# Patient Record
Sex: MALE | Race: WHITE | NOT HISPANIC OR LATINO | Employment: OTHER | URBAN - METROPOLITAN AREA
[De-identification: names, ages, dates, MRNs, and addresses within clinical notes are randomized per-mention and may not be internally consistent; named-entity substitution may affect disease eponyms.]

---

## 2017-01-09 ENCOUNTER — ALLSCRIPTS OFFICE VISIT (OUTPATIENT)
Dept: OTHER | Facility: OTHER | Age: 77
End: 2017-01-09

## 2017-01-09 ENCOUNTER — TRANSCRIBE ORDERS (OUTPATIENT)
Dept: LAB | Facility: CLINIC | Age: 77
End: 2017-01-09

## 2017-01-09 ENCOUNTER — APPOINTMENT (OUTPATIENT)
Dept: LAB | Facility: CLINIC | Age: 77
End: 2017-01-09
Payer: COMMERCIAL

## 2017-01-09 DIAGNOSIS — E11.8 TYPE 2 DIABETES MELLITUS WITH COMPLICATIONS (HCC): ICD-10-CM

## 2017-01-09 DIAGNOSIS — E11.65 TYPE 2 DIABETES MELLITUS WITH HYPERGLYCEMIA (HCC): ICD-10-CM

## 2017-01-09 LAB
ALBUMIN SERPL BCP-MCNC: 3.8 G/DL (ref 3.5–5)
ALP SERPL-CCNC: 40 U/L (ref 46–116)
ALT SERPL W P-5'-P-CCNC: 23 U/L (ref 12–78)
ANION GAP SERPL CALCULATED.3IONS-SCNC: 8 MMOL/L (ref 4–13)
AST SERPL W P-5'-P-CCNC: 17 U/L (ref 5–45)
BACTERIA UR QL AUTO: ABNORMAL /HPF
BASOPHILS # BLD AUTO: 0.03 THOUSANDS/ΜL (ref 0–0.1)
BASOPHILS NFR BLD AUTO: 1 % (ref 0–1)
BILIRUB SERPL-MCNC: 0.55 MG/DL (ref 0.2–1)
BILIRUB UR QL STRIP: ABNORMAL
BUN SERPL-MCNC: 17 MG/DL (ref 5–25)
CALCIUM SERPL-MCNC: 9.5 MG/DL (ref 8.3–10.1)
CHLORIDE SERPL-SCNC: 105 MMOL/L (ref 100–108)
CHOLEST SERPL-MCNC: 144 MG/DL (ref 50–200)
CLARITY UR: CLEAR
CO2 SERPL-SCNC: 29 MMOL/L (ref 21–32)
COLOR UR: ABNORMAL
CREAT SERPL-MCNC: 0.95 MG/DL (ref 0.6–1.3)
CREAT UR-MCNC: 311 MG/DL
EOSINOPHIL # BLD AUTO: 0.08 THOUSAND/ΜL (ref 0–0.61)
EOSINOPHIL NFR BLD AUTO: 1 % (ref 0–6)
ERYTHROCYTE [DISTWIDTH] IN BLOOD BY AUTOMATED COUNT: 17.5 % (ref 11.6–15.1)
EST. AVERAGE GLUCOSE BLD GHB EST-MCNC: 154 MG/DL
GFR SERPL CREATININE-BSD FRML MDRD: >60 ML/MIN/1.73SQ M
GLUCOSE SERPL-MCNC: 111 MG/DL (ref 65–140)
GLUCOSE UR STRIP-MCNC: NEGATIVE MG/DL
HBA1C MFR BLD: 7 % (ref 4.2–6.3)
HCT VFR BLD AUTO: 38.7 % (ref 36.5–49.3)
HDLC SERPL-MCNC: 60 MG/DL (ref 40–60)
HGB BLD-MCNC: 12.9 G/DL (ref 12–17)
HGB UR QL STRIP.AUTO: ABNORMAL
KETONES UR STRIP-MCNC: ABNORMAL MG/DL
LEUKOCYTE ESTERASE UR QL STRIP: NEGATIVE
LYMPHOCYTES # BLD AUTO: 1.3 THOUSANDS/ΜL (ref 0.6–4.47)
LYMPHOCYTES NFR BLD AUTO: 24 % (ref 14–44)
MCH RBC QN AUTO: 36.3 PG (ref 26.8–34.3)
MCHC RBC AUTO-ENTMCNC: 33.3 G/DL (ref 31.4–37.4)
MCV RBC AUTO: 109 FL (ref 82–98)
MICROALBUMIN UR-MCNC: 139 MG/L (ref 0–20)
MICROALBUMIN/CREAT 24H UR: 45 MG/G CREATININE (ref 0–30)
MONOCYTES # BLD AUTO: 0.42 THOUSAND/ΜL (ref 0.17–1.22)
MONOCYTES NFR BLD AUTO: 8 % (ref 4–12)
NEUTROPHILS # BLD AUTO: 3.68 THOUSANDS/ΜL (ref 1.85–7.62)
NEUTS SEG NFR BLD AUTO: 66 % (ref 43–75)
NITRITE UR QL STRIP: NEGATIVE
NON-SQ EPI CELLS URNS QL MICRO: ABNORMAL /HPF
NONHDLC SERPL-MCNC: 84 MG/DL
NRBC BLD AUTO-RTO: 0 /100 WBCS
PH UR STRIP.AUTO: 5 [PH] (ref 4.5–8)
PLATELET # BLD AUTO: 221 THOUSANDS/UL (ref 149–390)
PMV BLD AUTO: 10.9 FL (ref 8.9–12.7)
POTASSIUM SERPL-SCNC: 4.4 MMOL/L (ref 3.5–5.3)
PROT SERPL-MCNC: 7.6 G/DL (ref 6.4–8.2)
PROT UR STRIP-MCNC: ABNORMAL MG/DL
RBC # BLD AUTO: 3.55 MILLION/UL (ref 3.88–5.62)
RBC #/AREA URNS AUTO: ABNORMAL /HPF
SODIUM SERPL-SCNC: 142 MMOL/L (ref 136–145)
SP GR UR STRIP.AUTO: 1.03 (ref 1–1.03)
TSH SERPL DL<=0.05 MIU/L-ACNC: 0.51 UIU/ML (ref 0.36–3.74)
UROBILINOGEN UR QL STRIP.AUTO: 0.2 E.U./DL
WBC # BLD AUTO: 5.52 THOUSAND/UL (ref 4.31–10.16)
WBC #/AREA URNS AUTO: ABNORMAL /HPF

## 2017-01-09 PROCEDURE — 82043 UR ALBUMIN QUANTITATIVE: CPT

## 2017-01-09 PROCEDURE — 83721 ASSAY OF BLOOD LIPOPROTEIN: CPT

## 2017-01-09 PROCEDURE — 83718 ASSAY OF LIPOPROTEIN: CPT

## 2017-01-09 PROCEDURE — 36415 COLL VENOUS BLD VENIPUNCTURE: CPT

## 2017-01-09 PROCEDURE — 80053 COMPREHEN METABOLIC PANEL: CPT

## 2017-01-09 PROCEDURE — 85025 COMPLETE CBC W/AUTO DIFF WBC: CPT

## 2017-01-09 PROCEDURE — 82570 ASSAY OF URINE CREATININE: CPT

## 2017-01-09 PROCEDURE — 81001 URINALYSIS AUTO W/SCOPE: CPT

## 2017-01-09 PROCEDURE — 84443 ASSAY THYROID STIM HORMONE: CPT

## 2017-01-09 PROCEDURE — 83036 HEMOGLOBIN GLYCOSYLATED A1C: CPT

## 2017-05-22 ENCOUNTER — APPOINTMENT (OUTPATIENT)
Dept: LAB | Facility: CLINIC | Age: 77
End: 2017-05-22
Payer: COMMERCIAL

## 2017-05-22 ENCOUNTER — ALLSCRIPTS OFFICE VISIT (OUTPATIENT)
Dept: OTHER | Facility: OTHER | Age: 77
End: 2017-05-22

## 2017-05-22 ENCOUNTER — TRANSCRIBE ORDERS (OUTPATIENT)
Dept: LAB | Facility: CLINIC | Age: 77
End: 2017-05-22

## 2017-05-22 DIAGNOSIS — E11.8 DIABETIC COMPLICATION (HCC): ICD-10-CM

## 2017-05-22 DIAGNOSIS — I50.22 CHRONIC SYSTOLIC CONGESTIVE HEART FAILURE (HCC): ICD-10-CM

## 2017-05-22 DIAGNOSIS — E11.8 DIABETIC COMPLICATION (HCC): Primary | ICD-10-CM

## 2017-05-22 DIAGNOSIS — R29.6 REPEATED FALLS: ICD-10-CM

## 2017-05-22 DIAGNOSIS — I42.9 CARDIOMYOPATHY (HCC): ICD-10-CM

## 2017-05-22 DIAGNOSIS — IMO0002: ICD-10-CM

## 2017-05-22 DIAGNOSIS — R06.02 SHORTNESS OF BREATH: ICD-10-CM

## 2017-05-22 DIAGNOSIS — I25.10 ATHEROSCLEROTIC HEART DISEASE OF NATIVE CORONARY ARTERY WITHOUT ANGINA PECTORIS: ICD-10-CM

## 2017-05-22 LAB
ALBUMIN SERPL BCP-MCNC: 3.6 G/DL (ref 3.5–5)
ALP SERPL-CCNC: 38 U/L (ref 46–116)
ALT SERPL W P-5'-P-CCNC: 18 U/L (ref 12–78)
ANION GAP SERPL CALCULATED.3IONS-SCNC: 8 MMOL/L (ref 4–13)
AST SERPL W P-5'-P-CCNC: 14 U/L (ref 5–45)
BASOPHILS # BLD AUTO: 0.03 THOUSANDS/ΜL (ref 0–0.1)
BASOPHILS NFR BLD AUTO: 1 % (ref 0–1)
BILIRUB SERPL-MCNC: 0.44 MG/DL (ref 0.2–1)
BUN SERPL-MCNC: 17 MG/DL (ref 5–25)
CALCIUM SERPL-MCNC: 9 MG/DL (ref 8.3–10.1)
CHLORIDE SERPL-SCNC: 108 MMOL/L (ref 100–108)
CO2 SERPL-SCNC: 28 MMOL/L (ref 21–32)
CREAT SERPL-MCNC: 0.8 MG/DL (ref 0.6–1.3)
EOSINOPHIL # BLD AUTO: 0.1 THOUSAND/ΜL (ref 0–0.61)
EOSINOPHIL NFR BLD AUTO: 2 % (ref 0–6)
ERYTHROCYTE [DISTWIDTH] IN BLOOD BY AUTOMATED COUNT: 18.2 % (ref 11.6–15.1)
EST. AVERAGE GLUCOSE BLD GHB EST-MCNC: 154 MG/DL
GFR SERPL CREATININE-BSD FRML MDRD: >60 ML/MIN/1.73SQ M
GLUCOSE P FAST SERPL-MCNC: 91 MG/DL (ref 65–99)
HBA1C MFR BLD: 7 % (ref 4.2–6.3)
HCT VFR BLD AUTO: 35.9 % (ref 36.5–49.3)
HGB BLD-MCNC: 11.8 G/DL (ref 12–17)
LYMPHOCYTES # BLD AUTO: 1.3 THOUSANDS/ΜL (ref 0.6–4.47)
LYMPHOCYTES NFR BLD AUTO: 24 % (ref 14–44)
MCH RBC QN AUTO: 35.8 PG (ref 26.8–34.3)
MCHC RBC AUTO-ENTMCNC: 32.9 G/DL (ref 31.4–37.4)
MCV RBC AUTO: 109 FL (ref 82–98)
MONOCYTES # BLD AUTO: 0.55 THOUSAND/ΜL (ref 0.17–1.22)
MONOCYTES NFR BLD AUTO: 10 % (ref 4–12)
NEUTROPHILS # BLD AUTO: 3.34 THOUSANDS/ΜL (ref 1.85–7.62)
NEUTS SEG NFR BLD AUTO: 63 % (ref 43–75)
NRBC BLD AUTO-RTO: 0 /100 WBCS
NT-PROBNP SERPL-MCNC: 1075 PG/ML
PLATELET # BLD AUTO: 219 THOUSANDS/UL (ref 149–390)
PMV BLD AUTO: 9.9 FL (ref 8.9–12.7)
POTASSIUM SERPL-SCNC: 4.6 MMOL/L (ref 3.5–5.3)
PROT SERPL-MCNC: 7 G/DL (ref 6.4–8.2)
RBC # BLD AUTO: 3.3 MILLION/UL (ref 3.88–5.62)
SODIUM SERPL-SCNC: 144 MMOL/L (ref 136–145)
T4 FREE SERPL-MCNC: 1.27 NG/DL (ref 0.76–1.46)
TSH SERPL DL<=0.05 MIU/L-ACNC: 0.25 UIU/ML (ref 0.36–3.74)
WBC # BLD AUTO: 5.33 THOUSAND/UL (ref 4.31–10.16)

## 2017-05-22 PROCEDURE — 80053 COMPREHEN METABOLIC PANEL: CPT

## 2017-05-22 PROCEDURE — 36415 COLL VENOUS BLD VENIPUNCTURE: CPT

## 2017-05-22 PROCEDURE — 83036 HEMOGLOBIN GLYCOSYLATED A1C: CPT

## 2017-05-22 PROCEDURE — 85025 COMPLETE CBC W/AUTO DIFF WBC: CPT

## 2017-05-22 PROCEDURE — 84443 ASSAY THYROID STIM HORMONE: CPT

## 2017-05-22 PROCEDURE — 84439 ASSAY OF FREE THYROXINE: CPT

## 2017-05-22 PROCEDURE — 83880 ASSAY OF NATRIURETIC PEPTIDE: CPT

## 2017-05-31 ENCOUNTER — ALLSCRIPTS OFFICE VISIT (OUTPATIENT)
Dept: OTHER | Facility: OTHER | Age: 77
End: 2017-05-31

## 2017-06-06 ENCOUNTER — ALLSCRIPTS OFFICE VISIT (OUTPATIENT)
Dept: OTHER | Facility: OTHER | Age: 77
End: 2017-06-06

## 2017-06-16 ENCOUNTER — APPOINTMENT (OUTPATIENT)
Dept: RADIOLOGY | Facility: CLINIC | Age: 77
End: 2017-06-16
Payer: COMMERCIAL

## 2017-06-16 ENCOUNTER — TRANSCRIBE ORDERS (OUTPATIENT)
Dept: MRI IMAGING | Facility: CLINIC | Age: 77
End: 2017-06-16

## 2017-06-16 DIAGNOSIS — R06.02 SHORTNESS OF BREATH: ICD-10-CM

## 2017-06-16 DIAGNOSIS — I42.9 CARDIOMYOPATHY (HCC): ICD-10-CM

## 2017-06-16 DIAGNOSIS — I50.22 CHRONIC SYSTOLIC CONGESTIVE HEART FAILURE (HCC): ICD-10-CM

## 2017-06-16 PROCEDURE — 71020 HB CHEST X-RAY 2VW FRONTAL&LATL: CPT

## 2017-06-18 ENCOUNTER — GENERIC CONVERSION - ENCOUNTER (OUTPATIENT)
Dept: OTHER | Facility: OTHER | Age: 77
End: 2017-06-18

## 2017-06-27 ENCOUNTER — HOSPITAL ENCOUNTER (OUTPATIENT)
Dept: NON INVASIVE DIAGNOSTICS | Facility: CLINIC | Age: 77
Discharge: HOME | End: 2017-06-27
Payer: COMMERCIAL

## 2017-06-27 ENCOUNTER — GENERIC CONVERSION - ENCOUNTER (OUTPATIENT)
Dept: OTHER | Facility: OTHER | Age: 77
End: 2017-06-27

## 2017-06-27 DIAGNOSIS — R06.02 SHORTNESS OF BREATH: ICD-10-CM

## 2017-06-27 DIAGNOSIS — I42.9 CARDIOMYOPATHY (HCC): ICD-10-CM

## 2017-06-27 DIAGNOSIS — I25.10 ATHEROSCLEROTIC HEART DISEASE OF NATIVE CORONARY ARTERY WITHOUT ANGINA PECTORIS: ICD-10-CM

## 2017-06-27 LAB
ARRHY DURING EX: NORMAL
CHEST PAIN STATEMENT: NORMAL
MAX DIASTOLIC BP: 60 MMHG
MAX HEART RATE: 78 BPM
MAX PREDICTED HEART RATE: 145 BPM
MAX. SYSTOLIC BP: 120 MMHG
PROTOCOL NAME: NORMAL
REASON FOR TERMINATION: NORMAL
TARGET HR FORMULA: NORMAL
TIME IN EXERCISE PHASE: 180 S

## 2017-06-27 PROCEDURE — 78452 HT MUSCLE IMAGE SPECT MULT: CPT

## 2017-06-27 PROCEDURE — 93017 CV STRESS TEST TRACING ONLY: CPT

## 2017-06-27 PROCEDURE — A9502 TC99M TETROFOSMIN: HCPCS

## 2017-06-27 RX ADMIN — REGADENOSON 0.4 MG: 0.08 INJECTION, SOLUTION INTRAVENOUS at 09:11

## 2017-07-06 ENCOUNTER — HOSPITAL ENCOUNTER (OUTPATIENT)
Dept: NON INVASIVE DIAGNOSTICS | Facility: CLINIC | Age: 77
Discharge: HOME | End: 2017-07-06
Payer: COMMERCIAL

## 2017-07-06 DIAGNOSIS — R06.02 SHORTNESS OF BREATH: ICD-10-CM

## 2017-07-06 PROCEDURE — 93306 TTE W/DOPPLER COMPLETE: CPT

## 2017-07-09 ENCOUNTER — GENERIC CONVERSION - ENCOUNTER (OUTPATIENT)
Dept: OTHER | Facility: OTHER | Age: 77
End: 2017-07-09

## 2017-07-10 ENCOUNTER — ALLSCRIPTS OFFICE VISIT (OUTPATIENT)
Dept: OTHER | Facility: OTHER | Age: 77
End: 2017-07-10

## 2017-08-08 ENCOUNTER — GENERIC CONVERSION - ENCOUNTER (OUTPATIENT)
Dept: OTHER | Facility: OTHER | Age: 77
End: 2017-08-08

## 2017-08-09 ENCOUNTER — ALLSCRIPTS OFFICE VISIT (OUTPATIENT)
Dept: OTHER | Facility: OTHER | Age: 77
End: 2017-08-09

## 2017-09-12 ENCOUNTER — TRANSCRIBE ORDERS (OUTPATIENT)
Dept: LAB | Facility: CLINIC | Age: 77
End: 2017-09-12

## 2017-09-12 ENCOUNTER — LAB (OUTPATIENT)
Dept: LAB | Facility: CLINIC | Age: 77
End: 2017-09-12
Payer: COMMERCIAL

## 2017-09-12 DIAGNOSIS — E11.8 DIABETIC COMPLICATION (HCC): ICD-10-CM

## 2017-09-12 DIAGNOSIS — E11.8 UNCONTROLLED TYPE 2 DIABETES MELLITUS WITH COMPLICATION, UNSPECIFIED LONG TERM INSULIN USE STATUS: Primary | ICD-10-CM

## 2017-09-12 DIAGNOSIS — E11.65 UNCONTROLLED TYPE 2 DIABETES MELLITUS WITH COMPLICATION, UNSPECIFIED LONG TERM INSULIN USE STATUS: ICD-10-CM

## 2017-09-12 DIAGNOSIS — E11.65 UNCONTROLLED TYPE 2 DIABETES MELLITUS WITH COMPLICATION, UNSPECIFIED LONG TERM INSULIN USE STATUS: Primary | ICD-10-CM

## 2017-09-12 DIAGNOSIS — E11.8 UNCONTROLLED TYPE 2 DIABETES MELLITUS WITH COMPLICATION, UNSPECIFIED LONG TERM INSULIN USE STATUS: ICD-10-CM

## 2017-09-12 LAB
ALBUMIN SERPL BCP-MCNC: 3.9 G/DL (ref 3.5–5)
ALP SERPL-CCNC: 32 U/L (ref 46–116)
ALT SERPL W P-5'-P-CCNC: 18 U/L (ref 12–78)
ANION GAP SERPL CALCULATED.3IONS-SCNC: 7 MMOL/L (ref 4–13)
AST SERPL W P-5'-P-CCNC: 18 U/L (ref 5–45)
BASOPHILS # BLD AUTO: 0.04 THOUSANDS/ΜL (ref 0–0.1)
BASOPHILS NFR BLD AUTO: 1 % (ref 0–1)
BILIRUB SERPL-MCNC: 0.62 MG/DL (ref 0.2–1)
BUN SERPL-MCNC: 16 MG/DL (ref 5–25)
CALCIUM SERPL-MCNC: 9.5 MG/DL (ref 8.3–10.1)
CHLORIDE SERPL-SCNC: 106 MMOL/L (ref 100–108)
CO2 SERPL-SCNC: 26 MMOL/L (ref 21–32)
CREAT SERPL-MCNC: 0.92 MG/DL (ref 0.6–1.3)
EOSINOPHIL # BLD AUTO: 0.09 THOUSAND/ΜL (ref 0–0.61)
EOSINOPHIL NFR BLD AUTO: 2 % (ref 0–6)
ERYTHROCYTE [DISTWIDTH] IN BLOOD BY AUTOMATED COUNT: 18.6 % (ref 11.6–15.1)
EST. AVERAGE GLUCOSE BLD GHB EST-MCNC: 157 MG/DL
GFR SERPL CREATININE-BSD FRML MDRD: 81 ML/MIN/1.73SQ M
GLUCOSE SERPL-MCNC: 142 MG/DL (ref 65–140)
HBA1C MFR BLD: 7.1 % (ref 4.2–6.3)
HCT VFR BLD AUTO: 38.2 % (ref 36.5–49.3)
HGB BLD-MCNC: 12.9 G/DL (ref 12–17)
LYMPHOCYTES # BLD AUTO: 1.3 THOUSANDS/ΜL (ref 0.6–4.47)
LYMPHOCYTES NFR BLD AUTO: 26 % (ref 14–44)
MCH RBC QN AUTO: 36.6 PG (ref 26.8–34.3)
MCHC RBC AUTO-ENTMCNC: 33.8 G/DL (ref 31.4–37.4)
MCV RBC AUTO: 109 FL (ref 82–98)
MONOCYTES # BLD AUTO: 0.58 THOUSAND/ΜL (ref 0.17–1.22)
MONOCYTES NFR BLD AUTO: 12 % (ref 4–12)
NEUTROPHILS # BLD AUTO: 2.92 THOUSANDS/ΜL (ref 1.85–7.62)
NEUTS SEG NFR BLD AUTO: 59 % (ref 43–75)
NRBC BLD AUTO-RTO: 0 /100 WBCS
PLATELET # BLD AUTO: 221 THOUSANDS/UL (ref 149–390)
PMV BLD AUTO: 10.5 FL (ref 8.9–12.7)
POTASSIUM SERPL-SCNC: 4.6 MMOL/L (ref 3.5–5.3)
PROT SERPL-MCNC: 7.5 G/DL (ref 6.4–8.2)
RBC # BLD AUTO: 3.52 MILLION/UL (ref 3.88–5.62)
SODIUM SERPL-SCNC: 139 MMOL/L (ref 136–145)
T4 FREE SERPL-MCNC: 1.22 NG/DL (ref 0.76–1.46)
TSH SERPL DL<=0.05 MIU/L-ACNC: 0.62 UIU/ML (ref 0.36–3.74)
WBC # BLD AUTO: 4.95 THOUSAND/UL (ref 4.31–10.16)

## 2017-09-12 PROCEDURE — 36415 COLL VENOUS BLD VENIPUNCTURE: CPT

## 2017-09-12 PROCEDURE — 85025 COMPLETE CBC W/AUTO DIFF WBC: CPT

## 2017-09-12 PROCEDURE — 83036 HEMOGLOBIN GLYCOSYLATED A1C: CPT

## 2017-09-12 PROCEDURE — 84439 ASSAY OF FREE THYROXINE: CPT

## 2017-09-12 PROCEDURE — 84443 ASSAY THYROID STIM HORMONE: CPT

## 2017-09-12 PROCEDURE — 80053 COMPREHEN METABOLIC PANEL: CPT

## 2017-09-13 ENCOUNTER — GENERIC CONVERSION - ENCOUNTER (OUTPATIENT)
Dept: OTHER | Facility: OTHER | Age: 77
End: 2017-09-13

## 2017-09-28 ENCOUNTER — ALLSCRIPTS OFFICE VISIT (OUTPATIENT)
Dept: OTHER | Facility: OTHER | Age: 77
End: 2017-09-28

## 2017-10-13 ENCOUNTER — ALLSCRIPTS OFFICE VISIT (OUTPATIENT)
Dept: OTHER | Facility: OTHER | Age: 77
End: 2017-10-13

## 2017-10-13 DIAGNOSIS — Z12.11 ENCOUNTER FOR SCREENING FOR MALIGNANT NEOPLASM OF COLON: ICD-10-CM
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
## 2018-01-11 NOTE — RESULT NOTES
Verified Results  NM MYOCARDIAL PERFUSION SPECT (RX STRESS AND/OR REST) [id number] 07:49AM Gerald Werner Order Number: [id number]    - Patient Instructions: To schedule this appointment, please contact Central Scheduling at [phone]  Test Name Result Flag Reference   NM MYOCARDIAL PERFUSION SPECT (RX STRESS AND/OR REST) (Report)     [address] [place], [address] [place] [address]   [phone]     Rest/Stress Gated SPECT Myocardial Perfusion Imaging After Regadenoson     Patient: Baltazar Lentz   MR number: [id number]   Account number: [de-identified]   : 1940   Age: 77 years   Gender: Male   Status: Outpatient   Location: [facility]   Height: 66 in   Weight: 164 lb   BP: 120/ 60 mmHg     Allergies: ALLERGIES NOT ON FILE     Diagnosis: I42 9 - Cardiomyopathy, unspecified, R06 02 - Shortness of breath     Primary Physician: Darin Zurita MD   Interpreting Physician: Bunny Love MD   Referring Physician: Bunny Love MD   Technician: Rony Simon BS, BA, AART(N)   Group: [facility]   Other: Marci Lindo MS, CCT     INDICATIONS: Cardiomyopathy, SOB     HISTORY: The patient is a 77year old  male  Chest pain status: no chest pain  Other symptoms: change in dyspnea  Coronary artery disease risk factors: dyslipidemia, hypertension, and diabetes mellitus  Cardiovascular history:   coronary artery disease  Prior cardiovascular procedures: percutaneous transluminal coronary angioplasty and implantable cardioverter defibrillator procedure  Medications: a beta blocker, aspirin, clopidogrel, a lipid lowering agent, an   antihypertensive agent, diabetic medications, and thyroid medications  REST ECG: Normal sinus rhythm  Sinus bradycardia  Lateral wall MI, age indeterminate  Nonspecific ST and T wave abnormalities were present  PROCEDURE: The study was performed at [address] [place] [address] regadenoson infusion pharmacologic stress test was performed  Gated SPECT myocardial perfusion imaging was performed after stress and at rest  Systolic blood pressure   was 120 mmHg, at the start of the study  Diastolic blood pressure was 60 mmHg, at the start of the study  The heart rate was 59 bpm, at the start of the study  Regadenoson protocol:   HR bpm SBP mmHg DBP mmHg   Baseline 59 120 60   Immediate 76 112 60   1 min 70 -- --   2 min 65 120 60   No medications or fluids given  STRESS SUMMARY: Duration of pharmacologic stress was 3 min  Maximal heart rate during stress was 76 bpm  The rate-pressure product for the peak heart rate and blood pressure was 9120  There was no chest pain during stress  The stress test   was terminated due to protocol completion  The stress ECG was non-diagnostic due to resting ST/T wave abnormality  ISOTOPE ADMINISTRATION:   Resting isotope administration Stress isotope administration   Agent Tetrofosmin Tetrofosmin   Dose 10 84 mCi 31 4 mCi   Date 06/27/2017 06/27/2017     MYOCARDIAL PERFUSION IMAGING:   The image quality was good  The left ventricle was moderately dilated  The TID ratio was 1 12  GATED SPECT:   The calculated left ventricular ejection fraction was 25 %  There was severely reduced myocardial thickening and motion of the anterior wall and septal wall of the left ventricle  SUMMARY:   - Stress results: There was no chest pain during stress  - ECG conclusions: The stress ECG was non-diagnostic due to resting ST/T wave abnormality    - Gated SPECT: The calculated left ventricular ejection fraction was 25 %  There was severely reduced myocardial thickening and motion of the anterior wall and septal wall of the left ventricle  IMPRESSIONS: Abnormal study  large sized, severe intensity fixed defect involving the mid and apical anterior wall, apex and the apical septum c/w previous infract  No ischemia   Left ventricular systolic function was severely reduced, with   regional wall motion abnormalities       Prepared and signed by     Simeon St MD   Signed 06/27/2017 18:37:57

## 2018-01-12 VITALS
OXYGEN SATURATION: 98 % | HEIGHT: 66 IN | BODY MASS INDEX: 26.36 KG/M2 | DIASTOLIC BLOOD PRESSURE: 60 MMHG | SYSTOLIC BLOOD PRESSURE: 120 MMHG | HEART RATE: 82 BPM | WEIGHT: 164 LBS

## 2018-01-13 VITALS
OXYGEN SATURATION: 98 % | WEIGHT: 159 LBS | DIASTOLIC BLOOD PRESSURE: 50 MMHG | HEART RATE: 59 BPM | BODY MASS INDEX: 25.55 KG/M2 | SYSTOLIC BLOOD PRESSURE: 104 MMHG | HEIGHT: 66 IN

## 2018-01-13 VITALS
HEART RATE: 60 BPM | BODY MASS INDEX: 26.37 KG/M2 | DIASTOLIC BLOOD PRESSURE: 60 MMHG | SYSTOLIC BLOOD PRESSURE: 116 MMHG | WEIGHT: 163.38 LBS

## 2018-01-13 NOTE — PROGRESS NOTES
Plan  Screen for colon cancer    · (1) OCCULT BLOOD, FECAL IMMUNOCHEMICAL TEST; Status:Active; Requested  for:13Oct2017;   Screening for genitourinary condition    · (1) PSA (SCREEN) (Dx V76 44 Screen for Prostate Cancer); Status:Active; Requested  for:13Oct2017;     Discussion/Summary    Blood pressure, diabetes, heart failure under reasonable control  Preventive maintenance assessments includes colon cancer screening with Hemoccult and prostate screening with PSA  Follow-up diabetic check in 4 months  Impression: Subsequent Annual Wellness Visit  Cardiovascular screening and counseling: screening is current and Dx - V81 2 Screen for CV Disorder  Diabetes screening and counseling: screening not indicated and Dx - V77 1 Screen for DM  Colorectal cancer screening and counseling: screening is current and Dx - V76 51 Screen for CRC  Prostate cancer screening and counseling: screening is current and Dx - V76 44 Screen PSA  Osteoporosis screening and counseling: counseling was given on obtaining adequate amounts of calcium and vitamin D on a daily basis  Abdominal aortic aneurysm screening and counseling: screening is current and Dx - V81 2 Screen for CV Disorder  Glaucoma screening and counseling: screening is current and Dx - V80 1 Screen for Glaucoma  HIV screening and counseling: screening not indicated  Immunizations: the risks and benefits of influenza vaccination were discussed with the patient, the lifetime pneumococcal vaccine has been completed, hepatitis B vaccination series is not indicated at this time due to the patient's low risk of harry the disease, the patient declines the Zostavax vaccine, the patient declines the Td vaccine and the patient declines the Tdap vaccine  Advance Directive Planning: complete and up to date  Patient Discussion: plan discussed with the patient, follow-up visit needed in one year        History of Present Illness  HPI: Hypertension, diabetes, dyslipidemia  Systolic heart failure  Macrocytosis with slight anemia  This but last is stable  Welcome to [employer] and Wellness Visits: The patient is being seen for the subsequent annual wellness visit  Medicare Screening and Risk Factors   Hospitalizations: no previous hospitalizations  Once per lifetime medicare screening tests: AAA screening US has not yet been done  Medicare Screening Tests Risk Questions   Osteoporosis risk assessment:  and over 48years of age  Drug and Alcohol Use: The patient has never smoked cigarettes  The patient reports occasional alcohol use and drinking 1-2 drinks per week  He has never used illicit drugs  Diet and Physical Activity: Current diet includes well balanced meals, low salt food choices, 0 servings of fruit per day, 1 servings of meat per day and 1 cups of coffee per day  The patient does not exercise  Mood Disorder and Cognitive Impairment Screening: PHQ-9 Depression Scale   Over the past 2 weeks, how often have you been bothered by the following problems? 1 ) Little interest or pleasure in doing things? Not at all    2 ) Feeling down, depressed or hopeless? Half the days or more  3 ) Trouble falling asleep or sleeping too much? Half the days or more  4 ) Feeling tired or having little energy? Half the days or more  5 ) Poor appetite or overeating? Not at all    6 ) Feeling bad about yourself, or that you are a failure, or have let yourself or your family down? Not at all    7 ) Trouble concentrating on things, such as reading a newspaper or watching television? Several days  8 ) Moving or speaking so slowly that other people could have noticed, or the opposite, moving or speaking faster than usual? Not at all    9 ) Thoughts that you would be off dead or of hurting yourself in some way? Not at all     Cognitive impairment screening: denies difficulty learning/retaining new information, denies difficulty handling complex tasks, denies difficulty with reasoning, denies difficulty with spatial ability and orientation, denies difficulty with language and denies difficulty with behavior  Functional Ability/Level of Safety: Hearing is significantly decreased in the right ear and significantly decreased in the left ear  He uses a hearing aid  Injury History: polypharmacy and alcohol use  Advance Directives: Advance directives: living will and durable power of  for health care directives  Co-Managers and Medical Equipment/Suppliers: See Patient Care Team      Patient Care Team    Care Team Member Role Specialty Office Number   Daisy CARO  Cardiology [phone]   Irina Rankin MD  Urology [phone]     Active Problems    1  Anemia, unspecified type (285 9) (D64 9)   2  Arteriosclerotic cardiovascular disease (ASCVD) (429 2,440 9) (I25 10)   3  Arthralgia (719 40)   4  CAD (coronary artery disease) (414 00) (I25 10)   5  CAD S/P percutaneous coronary angioplasty (414 01,V45 82) (I25 10,Z98 61)   6  Cardiomyopathy (425 4) (I42 9)   7  Changing skin lesion (709 9) (L98 9)   8  Chest pain (786 50) (R07 9)   9  Chronic systolic congestive heart failure (428 22,428 0) (I50 22)   10  Diabetes mellitus type 2 with complications, uncontrolled (250 92) (E11 8,E11 65)   11  Elevated prostate specific antigen (PSA) (790 93) (R97 20)   12  Enlarged prostate with lower urinary tract symptoms (LUTS) (600 01) (N40 1)   13  Fall, subsequent encounter (V58 89,E888 9) (W19 XXXD)   15  Falls frequently (V15 88) (R29 6)   15  Gout (274 9) (M10 9)   16  Hyperlipidemia (272 4) (E78 5)   17  Hypertension (401 9) (I10)   18  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)   19  Microhematuria (599 72) (R31 29)   20  Mitral 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) (I05 9)   21  No advance directive on file (V49 89) (Z78 9)   22  Pain due to total hip replacement (996 77,V43 64) (T84 84XA,Z96 649)   23  Pain in joint, lower leg (719 46) (M25 569)   24   Renal mass (593  9) (N28 89)   25  Screen for colon cancer (V76 51) (Z12 11)   26  Screening for genitourinary condition (V81 6) (Z13 89)   27  Screening for skin condition (V82 0) (Z13 89)   28  Seborrheic keratosis (702 19) (L82 1)   29  Skin tag (701 9) (L91 8)   30  SOB (shortness of breath) (786 05) (R06 02)   31   Thyroid activity decreased (244 9) (E03 9)    Past Medical History    · History of Acquired Cyst Of Kidney (593 2)   · History of Benign paroxysmal vertigo, unspecified laterality (386 11) (H81 10)   · History of Cardiomyopathy (425 4) (I42 9)   · History of Cellulitis of leg (682 6) (L03 119)   · History of Cervical spinal stenosis (723 0) (M48 02)   · History of Colonoscopy (Fiberoptic) Screening   · History of Congestive heart failure (428 0) (I50 9)   · History of Coronary atherosclerosis of native coronary artery (414 01) (I25 10)   · History of Dysphagia (787 20) (R13 10)   · History of Esophageal candidiasis (112 84) (B37 81)   · History of anemia (V12 3) (Z86 2)   · History of cardiac disorder (V12 50) (Z86 79)   · History of chest pain (V13 89) (Y14 619)   · History of diabetes mellitus (V12 29) (Z86 39)   · History of hematuria (V13 09) (Z87 448)   · History of herpes zoster (V12 09) (Z86 19)   · History of hypertension (V12 59) (Z86 79)   · History of nonmelanoma skin cancer (V10 83) (A81 628)   · History of shortness of breath (V13 89) (Z33 546)   · 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)   · History of Incomplete emptying of bladder (788 21) (R33 9)   · History of Inflamed seborrheic keratosis (702 11) (L82 0)   · History of Internal Hemorrhoids (455 0)   · History of Malignant Neoplasm Of Skin Of Trunk (173 50)   · 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) (I25 2)   · History of Paroxysmal tachycardia (427 2) (I47 9)   · History of Special screening examination for neoplasm of prostate (V76 44) (Z12 5)   · History of Vitamin B deficiency (266 9) (E53 9)    Surgical History    · History of Cath Stent Placement   · History of Diagnostic Cystoscopy   · History of Excision Of Lesion Trunk Malignant   · History of Hip Surgery   · History of Implantable Cardioverter-Defibrillator   · History of PTCA    Family History  Mother    · Family history of Coronary Artery Disease (V17 49)   · Family history of Family Health Status Of Mother -    · Family history of Sudden / Instantaneous Death  Father    · Family history of Father  At Age 79   · Family history of Malignant Neoplasm Of Head, Face, Or Neck  Family History    · Family history of Cancer   · Family history of Coronary Artery Disease (V17 49)    Social History    · Alcohol Use (History)   · Denied: History of Drug Use   · Former smoker (V15 82) (O38 495)   · [address]   · Living Independently With Spouse   ·    · No advance directive on file (V49 89) (Z78 9)   · Occupation: Retired   · [employer]    Current Meds   1  Aspirin 81 MG TABS; TAKE 1 TABLET DAILY; Therapy: (Recorded:2014) to Recorded   2  Clopidogrel Bisulfate 75 MG Oral Tablet; take 1 tablet by mouth daily; Therapy: 21PQL2924 to (Evaluate:2018)  Requested for: 45EXV5749; Last   Rx:48Hlg8427 Ordered   3  Finasteride 5 MG Oral Tablet; TAKE 1 TABLET DAILY AS DIRECTED; Therapy: 58STX7333 to (Evaluate:08Koo6079)  Requested for: 02BMD3654; Last   Rx:96Sfq2638 Ordered   4  GlipiZIDE 5 MG Oral Tablet; take 1 tablet by mouth twice a day; Therapy: 82RBT5849 to (Javier Gardner)  Requested for: 45TWT0545; Last   Rx:44Aim0061 Ordered   5  Levothyroxine Sodium 88 MCG Oral Tablet; take 1 tablet by mouth once daily; Therapy: 80MIB2864 to (Evaluate:45Umq3624)  Requested for: 31VUI0189; Last   Rx:23Bpu1479 Ordered   6  MetFORMIN HCl - 1000 MG Oral Tablet; take 1 tablet by mouth twice a day; Therapy: 96Ohz4569 to (Evaluate:58Gaz8217)  Requested for: 94Fkp1114; Last   Rx:25Kbc8795; Status: ACTIVE - Renewal Denied Ordered   7   Metoprolol Tartrate 25 MG Oral Tablet; take 1 tablet by mouth twice a day; Therapy: 11XHU4565 to (Evaluate:12Apr2018)  Requested for: 72Gfb6496; Last   Rx:06Vrq4078 Ordered   8  Pioglitazone HCl - 30 MG Oral Tablet; take 1 tablet by mouth once daily; Therapy: 84HZF9069 to (Evaluate:51Gho8418)  Requested for: 13Pmh7680; Last   Rx:41Zcz9320 Ordered   9  Tamsulosin HCl - 0 4 MG Oral Capsule; take 1 capsule daily  Requested for: 00XKU4780;   Last Rx:17Abg5492 Ordered   10  Vytorin 10-20 MG Oral Tablet; take 1 tablet by mouth once daily; Therapy: 02OSE7011 to (Bertha Breen)  Requested for: 28XZS8248; Last    Rx:90Fgc0705 Ordered    Allergies    1  Lipitor TABS   2  Percocet TABS    Immunizations   1 2 3    Influenza  14-Oct-2010 01-Sep-2015 12-Sep-2016    PCV  07-Sep-2016      PPSV  Sep 2015      Tdap  23-Feb-2016      Zoster  Fall 2015       Vitals  Signs    Heart Rate: 71  Systolic: 496  Diastolic: 58  Height: 5 ft 6 in  Weight: 165 lb 10 oz  BMI Calculated: 26 73  BSA Calculated: 1 85  O2 Saturation: 98    Results/Data  Falls Risk Assessment (Dx Z13 89 Screen for Neurologic Disorder) 73QUN5753 03:43PM User, Ahs     Test Name Result Flag Reference   Falls Risk      No falls in the past year     PHQ-9 Adult Depression Screening 71GPX3871 03:30PM User, Ahs     Test Name Result Flag Reference   PHQ-9 Adult Depression Score 6     Over the last two weeks, how often have you been bothered by any of the following problems?   Little interest or pleasure in doing things: Not at all - 0  Feeling down, depressed, or hopeless: Not at all - 0  Trouble falling or staying asleep, or sleeping too much: More than half the days - 2  Feeling tired or having little energy: Nearly every day - 3  Poor appetite or over eating: Not at all - 0  Feeling bad about yourself - or that you are a failure or have let yourself or your family down: Not at all - 0  Trouble concentrating on things, such as reading the newspaper or watching television: Several days - 1  Moving or speaking so slowly that other people could have noticed  Or the opposite -  being so fidgety or restless that you have been moving around a lot more than usual: Not at all - 0  Thoughts that you would be better off dead, or of hurting yourself in some way: Not at all - 0   PHQ-9 Adult Depression Screening Negative     PHQ-9 Difficulty Level Not difficult at all     PHQ-9 Severity Mild Depression         Health Management  Health Maintenance   (1) PSA (SCREEN) (Dx V76 44 Screen for Prostate Cancer); every 1 year; Last  56OWW4218; Next Due: 54HZD9980; Overdue  Influenza (Split); every 1 year; Last 38Vce9304; Next Due: 93CHC2839; Overdue  Medicare Annual Wellness Visit; every 1 year; Next Due: 51Dzr1218; Overdue    Future Appointments    Date/Time Provider Specialty Site   12/15/2017 11:20 AM Cardiology, [facility]   [address]   10/22/2018 09:45 AM [place], SAN[facility]UARY AT THE [facility], THE Urology [facility] FOR [address]   08/13/2018 02:50 PM JUAN LUIS Cain   Dermatology [facility]     Signatures   Electronically signed by : JUAN LUIS Ramirez ; Oct 13 2017  3:52PM EST                       (Author)

## 2018-01-14 VITALS
HEIGHT: 66 IN | HEART RATE: 60 BPM | BODY MASS INDEX: 26.52 KG/M2 | DIASTOLIC BLOOD PRESSURE: 64 MMHG | SYSTOLIC BLOOD PRESSURE: 110 MMHG | WEIGHT: 165 LBS

## 2018-01-14 VITALS
HEART RATE: 59 BPM | HEIGHT: 66 IN | DIASTOLIC BLOOD PRESSURE: 54 MMHG | BODY MASS INDEX: 25.91 KG/M2 | WEIGHT: 161.25 LBS | SYSTOLIC BLOOD PRESSURE: 118 MMHG | OXYGEN SATURATION: 95 %

## 2018-01-15 VITALS
HEART RATE: 63 BPM | OXYGEN SATURATION: 98 % | DIASTOLIC BLOOD PRESSURE: 60 MMHG | SYSTOLIC BLOOD PRESSURE: 120 MMHG | HEIGHT: 66 IN | BODY MASS INDEX: 26.28 KG/M2 | WEIGHT: 163.5 LBS

## 2018-01-15 NOTE — RESULT NOTES
Verified Results  ECHO COMPLETE WITH CONTRAST IF INDICATED 74WRX1566 12:54PM Amber Juarez Order Number: [id number]    - Patient Instructions: To schedule this appointment, please contact Central Scheduling at [phone]  Test Name Result Flag Reference   ECHO COMPLETE WITH CONTRAST IF INDICATED (Report)     [place] [address]   [phone]     Transthoracic Echocardiogram   2D, M-mode, and Color Doppler     Study date: 2017     Patient: Prashant Vicente   MR number: [id number]   Account number: [de-identified]   : 1940   Age: 77 years   Gender: Male   Status: Outpatient   Location: [facility]   Height: 66 in   Weight: 164 lb   BP: 120/ 60 mmHg     Indications: Shortness of Breath  Diagnoses: R06 02 - Shortness of breath     Sonographer: Golden RCS   Interpreting Physician: Geovanna Forrest MD   Primary Physician: Ewa Bowers MD   Referring Physician: Geovanna Forrest MD   Group: [facility]     SUMMARY     LEFT VENTRICLE:   The ventricle was mildly dilated  Ejection fraction was estimated to be 25 %  There is severe hypokinesis of the septum, anterior wall and apex  MITRAL VALVE:   There was trace regurgitation  TRICUSPID VALVE:   There was trace regurgitation  HISTORY: PRIOR HISTORY: CAD  s/p angioplasty  Hyperlipidemia  Tachycardia  Former smoker  Previous (remote) myocardial infarction  Risk factors: hypertension, oral hypoglycemic-treated diabetes, and a family history of coronary artery   disease  PRIOR PROCEDURES: PTCA  PROCEDURE: The study was performed in the [address] [place] [address]  This was a routine study  The transthoracic approach was used  The study included complete 2D imaging, M-mode, and color Doppler  The heart rate was 69 bpm, at the   start of the study   Images were obtained from the parasternal, apical, subcostal, and suprasternal notch acoustic windows  Echocardiographic views were limited due to poor acoustic window availability, decreased penetration, and lung   interference  This was a technically difficult study  LEFT VENTRICLE: The ventricle was mildly dilated  Ejection fraction was estimated to be 25 %  There is severe hypokinesis of the septum, anterior wall and apex  RIGHT VENTRICLE: The size was normal  Systolic function was normal  Wall thickness was normal  ICD lead noted  LEFT ATRIUM: Size was normal      RIGHT ATRIUM: Size was normal      MITRAL VALVE: There was annular calcification  Valve structure was normal  There was normal leaflet separation  DOPPLER: The transmitral velocity was within the normal range  There was no evidence for stenosis  There was trace   regurgitation  AORTIC VALVE: The valve was not well visualized  DOPPLER: There was no evidence for stenosis  TRICUSPID VALVE: The valve structure was normal  There was normal leaflet separation  DOPPLER: The transtricuspid velocity was within the normal range  There was no evidence for stenosis  There was trace regurgitation  PULMONIC VALVE: Leaflets exhibited normal thickness, no calcification, and normal cuspal separation  DOPPLER: The transpulmonic velocity was within the normal range  There was no regurgitation  PERICARDIUM: There was no pericardial effusion  The pericardium was normal in appearance  AORTA: The root exhibited normal size  SYSTEM MEASUREMENT TABLES     Apical four chamber   4 chamber Left Atrium Volume Index; Planimetry; End Systole; Apical four chamber;: 27 1 cm2   Left Ventricular Diastolic Area; Method of Disks, Single Plane; End Diastole; Apical four chamber;: 47 77 cm2   Left Ventricular Ejection Fraction; Method of Disks, Single Plane; Apical four chamber;: 26 7 %   Left Ventricular systolic Area; Method of Disks, Single Plane; End Systole;  Apical four chamber;: 38 84 cm2   Right Atrium Systolic Area; Planimetry; End Systole; Apical four chamber;: 20 21 cm2   Right Ventricular Internal Diastolic Dimension; End Diastole; Apical four chamber;: 32 1 mm   TAPSE: 24 3 mm     Unspecified Scan Mode   Aortic Root Diameter; End Systole;: 35 2 mm   Gradient Pressure, Peak; Simplified Bernoulli; Antegrade Flow; Systole;: 6 8 mm[Hg]   Gradient pressure, average; Simplified Bernoulli; Antegrade Flow; Systole;: 4 2 mm[Hg]   Left Atrium to Aortic Root Ratio;: 1 02   Left atrial diameter; End Diastole;: 35 8 mm   Cardiac Output; Teichholz; Systole;: 7 24 L/min   Heart rate; Teichholz;: 67 {H  B }/min   Interventricular Septum Diastolic Thickness; Teichholz; End Diastole;: 7 5 mm   Left Ventricle Internal End Diastolic Dimension; Teichholz;: 61 4 mm   Left Ventricle Internal Systolic Dimension; Teichholz; End Systole;: 44 3 mm   Left Ventricle Mass; Mass AVCube with Teichholz; End Diastole;: 180 g   Left Ventricle Posterior Wall Diastolic Thickness; Teichholz; End Diastole;: 7 6 mm   Left Ventricular Ejection Fraction; Teichholz;: 53 %   Left Ventricular End Diastolic Volume; Teichholz;: 189 7 ml   Left Ventricular End Systolic Volume; Teichholz;: 89 1 ml   Left Ventricular Fractional Shortening;: 27 9 %   Stroke volume;  Teichholz; Systole;: 100 6 ml   Mitral Valve Area; Area by Pressure Half-Time; Systole;: 3 73 cm2   Mitral Valve E to A Ratio; Systole;: 0 46   Pressure half time; Diastole;: 0 06 s     [address] Accredited Echocardiography Laboratory     Prepared and electronically signed by     Arianne Holt MD   Signed 07-Jul-2017 15:35:37

## 2018-01-15 NOTE — RESULT NOTES
Verified Results  * XR CHEST PA & LATERAL [id number] 11:29AM Genia Bales Order Number: [id number]     Test Name Result Flag Reference   XR CHEST PA & LATERAL (Report)     CHEST      INDICATION: Dyspnea  COMPARISON: None     VIEWS: Frontal and lateral projections     IMAGES: 2     FINDINGS: Left apical defibrillator noted  The heart is top normal in size  The mediastinum and hilar structures are normal      The lungs are clear  No pneumothorax or pleural effusion  Visualized osseous structures appear within normal limits for the patient's age  IMPRESSION:     No active pulmonary disease         Workstation performed: [id number]     Signed by:   Joe Alanis MD   6/18/17

## 2018-01-16 NOTE — RESULT NOTES
Verified Results  (1) CBC/PLT/DIFF 88Ggv6426 01:56PM Larna Ng     Test Name Result Flag Reference   WBC COUNT 4 95 Thousand/uL  4 31-10 16   RBC COUNT 3 52 Million/uL L 3 88-5 62   HEMOGLOBIN 12 9 g/dL  12 0-17 0   HEMATOCRIT 38 2 %  36 5-49 3    fL H 82-98   MCH 36 6 pg H 26 8-34 3   MCHC 33 8 g/dL  31 4-37 4   RDW 18 6 % H 11 6-15 1   MPV 10 5 fL  8 9-12 7   PLATELET COUNT 629 Thousands/uL  149-390   nRBC AUTOMATED 0 /100 WBCs     NEUTROPHILS RELATIVE PERCENT 59 %  43-75   LYMPHOCYTES RELATIVE PERCENT 26 %  14-44   MONOCYTES RELATIVE PERCENT 12 %  4-12   EOSINOPHILS RELATIVE PERCENT 2 %  0-6   BASOPHILS RELATIVE PERCENT 1 %  0-1   NEUTROPHILS ABSOLUTE COUNT 2 92 Thousands/? ??L  1 85-7 62   LYMPHOCYTES ABSOLUTE COUNT 1 30 Thousands/? ??L  0 60-4 47   MONOCYTES ABSOLUTE COUNT 0 58 Thousand/? ??L  0 17-1 22   EOSINOPHILS ABSOLUTE COUNT 0 09 Thousand/? ??L  0 00-0 61   BASOPHILS ABSOLUTE COUNT 0 04 Thousands/? ??L  0 00-0 10   This is a patient instruction: This test is non-fasting  Please drink two glasses of water morning of bloodwork  (1) HEMOGLOBIN A1C 13Osr2677 01:56PM Larna Ng     Test Name Result Flag Reference   HEMOGLOBIN A1C 7 1 % H 4 2-6 3   EST  AVG  GLUCOSE 157 mg/dl       (1) T4, FREE 20Dne6849 01:56PM Larna Ng     Test Name Result Flag Reference   T4,FREE 1 22 ng/dL  0 76-1 46   Specimen collection should occur prior to Sulfasalazine administration due to the potential for falsely elevated results  (1) TSH 36Deg2432 01:56PM Larna Ng     Test Name Result Flag Reference   TSH 0 620 uIU/mL  0 358-3 740   This is a patient instruction: This test is non-fasting  Please drink two glasses of water morning of bloodwork  Patients undergoing fluorescein dye angiography may retain small amounts of fluorescein in the body for 48-72 hours post procedure  Samples containing fluorescein can produce falsely depressed TSH values   If the patient had this procedure,a specimen should be resubmitted post fluorescein clearance  (1) COMPREHENSIVE METABOLIC PANEL 60HBY6640 [address]     Test Name Result Flag Reference   GLUCOSE,RANDM 142 mg/dL H    If the patient is fasting, the ADA then defines impaired fasting glucose as > 100 mg/dL and diabetes as > or equal to 123 mg/dL  Specimen collection should occur prior to Sulfasalazine administration due to the potential for falsely depressed results  Specimen collection should occur prior to Sulfapyridine administration due to the potential for falsely elevated results  SODIUM 139 mmol/L  136-145   POTASSIUM 4 6 mmol/L  3 5-5 3   CHLORIDE 106 mmol/L  100-108   CARBON DIOXIDE 26 mmol/L  21-32   ANION GAP (CALC) 7 mmol/L  4-13   BLOOD UREA NITROGEN 16 mg/dL  5-25   CREATININE 0 92 mg/dL  0 60-1 30   Standardized to IDMS reference method   CALCIUM 9 5 mg/dL  8 3-10 1   BILI, TOTAL 0 62 mg/dL  0 20-1 00   ALK PHOSPHATAS 32 U/L L    ALT (SGPT) 18 U/L  12-78   Specimen collection should occur prior to Sulfasalazine and/or Sulfapyridine administration due to the potential for falsely depressed results  AST(SGOT) 18 U/L  5-45   Specimen collection should occur prior to Sulfasalazine administration due to the potential for falsely depressed results  ALBUMIN 3 9 g/dL  3 5-5 0   TOTAL PROTEIN 7 5 g/dL  6 4-8 2   eGFR 81 ml/min/1 73sq m     National Kidney Disease Education Program recommendations are as follows:  GFR calculation is accurate only with a steady state creatinine  Chronic Kidney disease less than 60 ml/min/1 73 sq  meters  Kidney failure less than 15 ml/min/1 73 sq  meters

## 2018-01-22 VITALS
BODY MASS INDEX: 26.62 KG/M2 | SYSTOLIC BLOOD PRESSURE: 106 MMHG | HEART RATE: 71 BPM | WEIGHT: 165.63 LBS | DIASTOLIC BLOOD PRESSURE: 58 MMHG | HEIGHT: 66 IN | OXYGEN SATURATION: 98 %

## 2018-02-09 ENCOUNTER — OFFICE VISIT (OUTPATIENT)
Dept: UROLOGY | Facility: CLINIC | Age: 78
End: 2018-02-09
Payer: COMMERCIAL

## 2018-02-09 ENCOUNTER — TELEPHONE (OUTPATIENT)
Dept: CARDIOLOGY CLINIC | Facility: CLINIC | Age: 78
End: 2018-02-09

## 2018-02-09 ENCOUNTER — TELEPHONE (OUTPATIENT)
Dept: INTERNAL MEDICINE CLINIC | Facility: CLINIC | Age: 78
End: 2018-02-09

## 2018-02-09 VITALS
HEART RATE: 82 BPM | WEIGHT: 145.8 LBS | DIASTOLIC BLOOD PRESSURE: 58 MMHG | HEIGHT: 66 IN | BODY MASS INDEX: 23.43 KG/M2 | SYSTOLIC BLOOD PRESSURE: 96 MMHG

## 2018-02-09 DIAGNOSIS — R33.9 URINARY RETENTION: ICD-10-CM

## 2018-02-09 PROCEDURE — 51798 US URINE CAPACITY MEASURE: CPT | Performed by: UROLOGY

## 2018-02-09 RX ORDER — PIOGLITAZONEHYDROCHLORIDE 30 MG/1
1 TABLET ORAL DAILY
COMMUNITY
Start: 2016-10-31 | End: 2018-02-14 | Stop reason: HOSPADM

## 2018-02-09 RX ORDER — FINASTERIDE 5 MG/1
1 TABLET, FILM COATED ORAL DAILY
COMMUNITY
Start: 2015-10-01 | End: 2018-02-16 | Stop reason: SDUPTHER

## 2018-02-09 RX ORDER — LEVOTHYROXINE SODIUM 88 UG/1
TABLET ORAL
Refills: 0 | COMMUNITY
Start: 2017-12-02 | End: 2018-02-16 | Stop reason: SDUPTHER

## 2018-02-09 RX ORDER — TAMSULOSIN HYDROCHLORIDE 0.4 MG/1
1 CAPSULE ORAL DAILY
COMMUNITY
End: 2018-02-16 | Stop reason: SDUPTHER

## 2018-02-09 RX ORDER — GLIPIZIDE 5 MG/1
1 TABLET ORAL 2 TIMES DAILY
COMMUNITY
Start: 2014-02-04 | End: 2018-02-16 | Stop reason: SDUPTHER

## 2018-02-09 RX ORDER — ROSUVASTATIN CALCIUM 20 MG/1
1 TABLET, COATED ORAL DAILY
COMMUNITY
Start: 2017-12-11 | End: 2018-02-16 | Stop reason: SDUPTHER

## 2018-02-09 RX ORDER — CLOPIDOGREL BISULFATE 75 MG/1
75 TABLET ORAL DAILY
Refills: 0 | COMMUNITY
Start: 2018-01-04 | End: 2018-02-16 | Stop reason: SDUPTHER

## 2018-02-09 NOTE — PROGRESS NOTES
2/9/2018    Ina Hamilton  1940  [id number]    Diagnosis  Urinary retention    Plan  Patient is to follow up with Dr Mahesh Almonte on 2/23/18 at 9:45    Procedure PVR    Patient was in the ER last night which heart trouble  Patient said that while he was there that they also put in a catheter and drained his bladder  He was able to void after that  Today he has been dribbling urine but has not had a large void  Patient states that he drank 4 cups of water today  Bladder ultrasound performed and PVR measured 118ml  Patient states that he has not had a bowel movement in a couple of days  Patient states that he is taking both finasteride and flomax but he does not think they were giving him one of them while he was in the hospital  Samantha Amado PA-C states that replace patient cortez if volume is over 250ml  Patient aware that cortez catheter does not need to be placed  Patient aware that he needs to continue taking bath finasteride and flomax and that he should take a laxative since he has not had a bowel movement  Patient aware that if over the weekend he is unable to void that he needs to go to the ER  Patient aware that he is to follow up with Dr Mahesh Almonte on 2/13/18 at 9:45 at the [facility]       Vitals:    02/09/18 1530   BP: 96/58   BP Location: Right arm   Patient Position: Sitting   Cuff Size: Standard   Pulse: 82   Weight: 66 1 kg (145 lb 12 8 oz)   Height: 5' 6" (1 676 m)           Ronald Clarke RN

## 2018-02-09 NOTE — TELEPHONE ENCOUNTER
PT'S WIFE CALLED AND SAID HE HAD A HEART ATTACK 2/7 AND WAS IN HOSPITAL  HE HAD LABS AND WANTS THE REPORT SENT OVER FROM [facility]

## 2018-02-10 ENCOUNTER — HOSPITAL ENCOUNTER (INPATIENT)
Facility: HOSPITAL | Age: 78
LOS: 4 days | Discharge: HOME HEALTH | DRG: 378 | End: 2018-02-14
Attending: EMERGENCY MEDICINE | Admitting: FAMILY MEDICINE
Payer: COMMERCIAL

## 2018-02-10 ENCOUNTER — APPOINTMENT (EMERGENCY)
Dept: RADIOLOGY | Facility: HOSPITAL | Age: 78
DRG: 378 | End: 2018-02-10
Payer: COMMERCIAL

## 2018-02-10 ENCOUNTER — APPOINTMENT (INPATIENT)
Dept: CT IMAGING | Facility: HOSPITAL | Age: 78
DRG: 378 | End: 2018-02-10
Payer: COMMERCIAL

## 2018-02-10 DIAGNOSIS — R33.9 URINARY RETENTION: Primary | ICD-10-CM

## 2018-02-10 DIAGNOSIS — R19.5 HEME POSITIVE STOOL: ICD-10-CM

## 2018-02-10 DIAGNOSIS — I25.10 CORONARY ARTERY DISEASE: ICD-10-CM

## 2018-02-10 DIAGNOSIS — E11.9 TYPE 2 DIABETES MELLITUS WITHOUT COMPLICATION, WITHOUT LONG-TERM CURRENT USE OF INSULIN (HCC): ICD-10-CM

## 2018-02-10 DIAGNOSIS — N17.9 AKI (ACUTE KIDNEY INJURY) (HCC): ICD-10-CM

## 2018-02-10 DIAGNOSIS — D64.9 ANEMIA, UNSPECIFIED TYPE: ICD-10-CM

## 2018-02-10 PROBLEM — I10 HYPERTENSION: Status: ACTIVE | Noted: 2018-02-10

## 2018-02-10 PROBLEM — E78.5 HYPERLIPIDEMIA: Status: ACTIVE | Noted: 2018-02-10

## 2018-02-10 PROBLEM — I50.9 CHF (CONGESTIVE HEART FAILURE) (HCC): Status: ACTIVE | Noted: 2018-02-10

## 2018-02-10 LAB
ABO GROUP BLD: NORMAL
ALBUMIN SERPL BCP-MCNC: 3.1 G/DL (ref 3.5–5)
ALP SERPL-CCNC: 163 U/L (ref 46–116)
ALT SERPL W P-5'-P-CCNC: 186 U/L (ref 12–78)
ANION GAP SERPL CALCULATED.3IONS-SCNC: 11 MMOL/L (ref 4–13)
AST SERPL W P-5'-P-CCNC: 371 U/L (ref 5–45)
ATRIAL RATE: 104 BPM
BACTERIA UR QL AUTO: ABNORMAL /HPF
BACTERIA UR QL AUTO: ABNORMAL /HPF
BASOPHILS # BLD AUTO: 0.01 THOUSANDS/ΜL (ref 0–0.1)
BASOPHILS NFR BLD AUTO: 0 % (ref 0–1)
BILIRUB SERPL-MCNC: 1.8 MG/DL (ref 0.2–1)
BILIRUB UR QL STRIP: NEGATIVE
BILIRUB UR QL STRIP: NEGATIVE
BLD GP AB SCN SERPL QL: NEGATIVE
BUN SERPL-MCNC: 25 MG/DL (ref 5–25)
CALCIUM SERPL-MCNC: 9 MG/DL (ref 8.3–10.1)
CHLORIDE SERPL-SCNC: 100 MMOL/L (ref 100–108)
CLARITY UR: CLEAR
CLARITY UR: CLEAR
CLARITY, POC: CLEAR
CO2 SERPL-SCNC: 27 MMOL/L (ref 21–32)
COLOR UR: YELLOW
COLOR UR: YELLOW
COLOR, POC: NORMAL
CREAT SERPL-MCNC: 2.06 MG/DL (ref 0.6–1.3)
EOSINOPHIL # BLD AUTO: 0 THOUSAND/ΜL (ref 0–0.61)
EOSINOPHIL NFR BLD AUTO: 0 % (ref 0–6)
ERYTHROCYTE [DISTWIDTH] IN BLOOD BY AUTOMATED COUNT: 17.7 % (ref 11.6–15.1)
EXT BILIRUBIN, UA: NORMAL
EXT BLOOD URINE: NORMAL
EXT GLUCOSE, UA: 100
EXT KETONES: NEGATIVE
EXT NITRITE, UA: NEGATIVE
EXT PH, UA: 6
EXT PROTEIN, UA: NORMAL
EXT SPECIFIC GRAVITY, UA: 1.02
EXT UROBILINOGEN: 0.2
FINE GRAN CASTS URNS QL MICRO: ABNORMAL /LPF
GFR SERPL CREATININE-BSD FRML MDRD: 30 ML/MIN/1.73SQ M
GLUCOSE SERPL-MCNC: 210 MG/DL (ref 65–140)
GLUCOSE SERPL-MCNC: 214 MG/DL (ref 65–140)
GLUCOSE SERPL-MCNC: 271 MG/DL (ref 65–140)
GLUCOSE UR STRIP-MCNC: ABNORMAL MG/DL
GLUCOSE UR STRIP-MCNC: ABNORMAL MG/DL
HCT VFR BLD AUTO: 26.8 % (ref 36.5–49.3)
HGB BLD-MCNC: 9 G/DL (ref 12–17)
HGB UR QL STRIP.AUTO: ABNORMAL
HGB UR QL STRIP.AUTO: ABNORMAL
KETONES UR STRIP-MCNC: NEGATIVE MG/DL
KETONES UR STRIP-MCNC: NEGATIVE MG/DL
LACTATE SERPL-SCNC: 2 MMOL/L (ref 0.5–2)
LEUKOCYTE ESTERASE UR QL STRIP: ABNORMAL
LEUKOCYTE ESTERASE UR QL STRIP: NEGATIVE
LIPASE SERPL-CCNC: 154 U/L (ref 73–393)
LYMPHOCYTES # BLD AUTO: 0.48 THOUSANDS/ΜL (ref 0.6–4.47)
LYMPHOCYTES NFR BLD AUTO: 6 % (ref 14–44)
MAGNESIUM SERPL-MCNC: 2 MG/DL (ref 1.6–2.6)
MCH RBC QN AUTO: 35.4 PG (ref 26.8–34.3)
MCHC RBC AUTO-ENTMCNC: 33.6 G/DL (ref 31.4–37.4)
MCV RBC AUTO: 106 FL (ref 82–98)
MONOCYTES # BLD AUTO: 0.58 THOUSAND/ΜL (ref 0.17–1.22)
MONOCYTES NFR BLD AUTO: 7 % (ref 4–12)
NEUTROPHILS # BLD AUTO: 6.76 THOUSANDS/ΜL (ref 1.85–7.62)
NEUTS SEG NFR BLD AUTO: 86 % (ref 43–75)
NITRITE UR QL STRIP: NEGATIVE
NITRITE UR QL STRIP: NEGATIVE
NON-SQ EPI CELLS URNS QL MICRO: ABNORMAL /HPF
NON-SQ EPI CELLS URNS QL MICRO: ABNORMAL /HPF
NRBC BLD AUTO-RTO: 0 /100 WBCS
P AXIS: 60 DEGREES
PH UR STRIP.AUTO: 6 [PH] (ref 4.5–8)
PH UR STRIP.AUTO: 7 [PH] (ref 4.5–8)
PLATELET # BLD AUTO: 206 THOUSANDS/UL (ref 149–390)
PMV BLD AUTO: 10.2 FL (ref 8.9–12.7)
POTASSIUM SERPL-SCNC: 3.1 MMOL/L (ref 3.5–5.3)
PR INTERVAL: 190 MS
PROT SERPL-MCNC: 7.2 G/DL (ref 6.4–8.2)
PROT UR STRIP-MCNC: ABNORMAL MG/DL
PROT UR STRIP-MCNC: ABNORMAL MG/DL
QRS AXIS: -65 DEGREES
QRSD INTERVAL: 114 MS
QT INTERVAL: 366 MS
QTC INTERVAL: 481 MS
RBC # BLD AUTO: 2.54 MILLION/UL (ref 3.88–5.62)
RBC #/AREA URNS AUTO: ABNORMAL /HPF
RBC #/AREA URNS AUTO: ABNORMAL /HPF
RETICS # AUTO: NORMAL 10*3/UL (ref 14356–105094)
RETICS # CALC: 1.6 % (ref 0.37–1.87)
RH BLD: POSITIVE
SODIUM SERPL-SCNC: 138 MMOL/L (ref 136–145)
SP GR UR STRIP.AUTO: 1.01 (ref 1–1.03)
SP GR UR STRIP.AUTO: 1.02 (ref 1–1.03)
SPECIMEN EXPIRATION DATE: NORMAL
T WAVE AXIS: 117 DEGREES
UROBILINOGEN UR QL STRIP.AUTO: 0.2 E.U./DL
UROBILINOGEN UR QL STRIP.AUTO: 0.2 E.U./DL
VENTRICULAR RATE: 104 BPM
WBC # BLD AUTO: 7.86 THOUSAND/UL (ref 4.31–10.16)
WBC # BLD EST: NEGATIVE 10*3/UL
WBC #/AREA URNS AUTO: ABNORMAL /HPF
WBC #/AREA URNS AUTO: ABNORMAL /HPF

## 2018-02-10 PROCEDURE — 83735 ASSAY OF MAGNESIUM: CPT | Performed by: GENERAL PRACTICE

## 2018-02-10 PROCEDURE — 82728 ASSAY OF FERRITIN: CPT | Performed by: GENERAL PRACTICE

## 2018-02-10 PROCEDURE — 86900 BLOOD TYPING SEROLOGIC ABO: CPT | Performed by: EMERGENCY MEDICINE

## 2018-02-10 PROCEDURE — 81001 URINALYSIS AUTO W/SCOPE: CPT | Performed by: GENERAL PRACTICE

## 2018-02-10 PROCEDURE — 85025 COMPLETE CBC W/AUTO DIFF WBC: CPT | Performed by: EMERGENCY MEDICINE

## 2018-02-10 PROCEDURE — 81001 URINALYSIS AUTO W/SCOPE: CPT | Performed by: EMERGENCY MEDICINE

## 2018-02-10 PROCEDURE — 80053 COMPREHEN METABOLIC PANEL: CPT | Performed by: EMERGENCY MEDICINE

## 2018-02-10 PROCEDURE — 99285 EMERGENCY DEPT VISIT HI MDM: CPT

## 2018-02-10 PROCEDURE — 87040 BLOOD CULTURE FOR BACTERIA: CPT | Performed by: EMERGENCY MEDICINE

## 2018-02-10 PROCEDURE — 83690 ASSAY OF LIPASE: CPT | Performed by: EMERGENCY MEDICINE

## 2018-02-10 PROCEDURE — 83605 ASSAY OF LACTIC ACID: CPT | Performed by: EMERGENCY MEDICINE

## 2018-02-10 PROCEDURE — 86850 RBC ANTIBODY SCREEN: CPT | Performed by: EMERGENCY MEDICINE

## 2018-02-10 PROCEDURE — 93005 ELECTROCARDIOGRAM TRACING: CPT

## 2018-02-10 PROCEDURE — 86901 BLOOD TYPING SEROLOGIC RH(D): CPT | Performed by: EMERGENCY MEDICINE

## 2018-02-10 PROCEDURE — 93010 ELECTROCARDIOGRAM REPORT: CPT | Performed by: INTERNAL MEDICINE

## 2018-02-10 PROCEDURE — 83550 IRON BINDING TEST: CPT | Performed by: GENERAL PRACTICE

## 2018-02-10 PROCEDURE — 83540 ASSAY OF IRON: CPT | Performed by: GENERAL PRACTICE

## 2018-02-10 PROCEDURE — 87086 URINE CULTURE/COLONY COUNT: CPT | Performed by: GENERAL PRACTICE

## 2018-02-10 PROCEDURE — 36415 COLL VENOUS BLD VENIPUNCTURE: CPT | Performed by: EMERGENCY MEDICINE

## 2018-02-10 PROCEDURE — 99222 1ST HOSP IP/OBS MODERATE 55: CPT | Performed by: INTERNAL MEDICINE

## 2018-02-10 PROCEDURE — 74176 CT ABD & PELVIS W/O CONTRAST: CPT

## 2018-02-10 PROCEDURE — 85045 AUTOMATED RETICULOCYTE COUNT: CPT | Performed by: GENERAL PRACTICE

## 2018-02-10 PROCEDURE — 82948 REAGENT STRIP/BLOOD GLUCOSE: CPT

## 2018-02-10 PROCEDURE — 81002 URINALYSIS NONAUTO W/O SCOPE: CPT | Performed by: EMERGENCY MEDICINE

## 2018-02-10 PROCEDURE — 99223 1ST HOSP IP/OBS HIGH 75: CPT | Performed by: GENERAL PRACTICE

## 2018-02-10 PROCEDURE — 0T9B70Z DRAINAGE OF BLADDER WITH DRAINAGE DEVICE, VIA NATURAL OR ARTIFICIAL OPENING: ICD-10-PCS | Performed by: EMERGENCY MEDICINE

## 2018-02-10 PROCEDURE — 71046 X-RAY EXAM CHEST 2 VIEWS: CPT

## 2018-02-10 RX ORDER — FINASTERIDE 5 MG/1
5 TABLET, FILM COATED ORAL DAILY
Status: DISCONTINUED | OUTPATIENT
Start: 2018-02-10 | End: 2018-02-11

## 2018-02-10 RX ORDER — PANTOPRAZOLE SODIUM 40 MG/1
40 TABLET, DELAYED RELEASE ORAL
Status: DISCONTINUED | OUTPATIENT
Start: 2018-02-10 | End: 2018-02-11

## 2018-02-10 RX ORDER — POTASSIUM CHLORIDE 20 MEQ/1
40 TABLET, EXTENDED RELEASE ORAL ONCE
Status: COMPLETED | OUTPATIENT
Start: 2018-02-10 | End: 2018-02-10

## 2018-02-10 RX ORDER — ASPIRIN 81 MG/1
81 TABLET, CHEWABLE ORAL DAILY
Status: DISCONTINUED | OUTPATIENT
Start: 2018-02-10 | End: 2018-02-11

## 2018-02-10 RX ORDER — CLOPIDOGREL BISULFATE 75 MG/1
75 TABLET ORAL DAILY
Status: DISCONTINUED | OUTPATIENT
Start: 2018-02-10 | End: 2018-02-11

## 2018-02-10 RX ORDER — TAMSULOSIN HYDROCHLORIDE 0.4 MG/1
0.4 CAPSULE ORAL DAILY
Status: DISCONTINUED | OUTPATIENT
Start: 2018-02-10 | End: 2018-02-11

## 2018-02-10 RX ORDER — ONDANSETRON 2 MG/ML
4 INJECTION INTRAMUSCULAR; INTRAVENOUS EVERY 6 HOURS PRN
Status: DISCONTINUED | OUTPATIENT
Start: 2018-02-10 | End: 2018-02-14 | Stop reason: HOSPADM

## 2018-02-10 RX ORDER — FERROUS SULFATE 325(65) MG
325 TABLET ORAL
Status: DISCONTINUED | OUTPATIENT
Start: 2018-02-11 | End: 2018-02-10

## 2018-02-10 RX ORDER — ATORVASTATIN CALCIUM 40 MG/1
40 TABLET, FILM COATED ORAL
Status: DISCONTINUED | OUTPATIENT
Start: 2018-02-10 | End: 2018-02-10

## 2018-02-10 RX ORDER — LEVOTHYROXINE SODIUM 88 UG/1
88 TABLET ORAL
Status: DISCONTINUED | OUTPATIENT
Start: 2018-02-11 | End: 2018-02-14 | Stop reason: HOSPADM

## 2018-02-10 RX ADMIN — METOPROLOL TARTRATE 25 MG: 25 TABLET ORAL at 17:50

## 2018-02-10 RX ADMIN — TAMSULOSIN HYDROCHLORIDE 0.4 MG: 0.4 CAPSULE ORAL at 15:29

## 2018-02-10 RX ADMIN — INSULIN LISPRO 1 UNITS: 100 INJECTION, SOLUTION INTRAVENOUS; SUBCUTANEOUS at 16:15

## 2018-02-10 RX ADMIN — PANTOPRAZOLE SODIUM 40 MG: 40 TABLET, DELAYED RELEASE ORAL at 16:12

## 2018-02-10 RX ADMIN — POTASSIUM CHLORIDE 40 MEQ: 1500 TABLET, EXTENDED RELEASE ORAL at 15:29

## 2018-02-10 RX ADMIN — FINASTERIDE 5 MG: 5 TABLET, FILM COATED ORAL at 15:29

## 2018-02-10 RX ADMIN — SODIUM CHLORIDE 1000 ML: 0.9 INJECTION, SOLUTION INTRAVENOUS at 12:43

## 2018-02-10 RX ADMIN — CLOPIDOGREL BISULFATE 75 MG: 75 TABLET ORAL at 15:29

## 2018-02-10 RX ADMIN — ASPIRIN 81 MG 81 MG: 81 TABLET ORAL at 15:29

## 2018-02-10 RX ADMIN — INSULIN LISPRO 2 UNITS: 100 INJECTION, SOLUTION INTRAVENOUS; SUBCUTANEOUS at 21:28

## 2018-02-10 NOTE — ED PROVIDER NOTES
History  Chief Complaint   Patient presents with    Urinary Retention     Pt reports urinary retention  Pt reports hx of enlarged prostate, inability to urinate since Thursday  60-year-old male with past medical history significant for coronary artery disease, BPH, diabetes presents for evaluation of acute recurrent urinary retention and abdominal pain  Patient has a longstanding history of BPH she has had to have multiple fully is in the past   He was seen at [facility] on Wednesday for ACS rule out and received an echocardiogram and a stress test, was discharged on Thursday  During the visit he had to have a Mckeon placed for urinary tension but they did remove the Mckeon prior to him leaving  Since then he has only been able to pee a small amount of urine, and today he states that he is unable to urinate at all  He also is having lower abdominal pain and abdominal distension  This is nonradiating, constant, pressure-like  He also states that he has been constipated recently headache only had baby poop today which he describes a small amount of dark stool  He has never had a history of GI bleeding in the past, he has never had a colonoscopy in the past despite his doctor suggesting he get one  He is on aspirin and Plavix but no other anticoagulation  Otherwise he is currently denying chest pain or shortness of breath denies any any nausea vomiting denies any hematemesis  Denies any headaches denies any fevers but does state that he had chills last night  He did not receive any Tylenol or Motrin prior to arrival             Prior to Admission Medications   Prescriptions Last Dose Informant Patient Reported?  Taking?   aspirin 81 MG tablet Past Week at Unknown time Self Yes Yes   Sig: Take 1 tablet by mouth daily   clopidogrel (PLAVIX) 75 mg tablet Past Week at Unknown time Self Yes Yes   Sig: Take 75 mg by mouth daily   finasteride (PROSCAR) 5 mg tablet Past Week at Unknown time Self Yes Yes Sig: Take 1 tablet by mouth daily   glipiZIDE (GLUCOTROL) 5 mg tablet Past Week at Unknown time Self Yes Yes   Sig: Take 1 tablet by mouth 2 (two) times a day   levothyroxine 88 mcg tablet Past Week at Unknown time Self Yes Yes   metFORMIN (GLUCOPHAGE) 1000 MG tablet Past Week at Unknown time Self Yes Yes   Sig: Take 1 tablet by mouth 2 (two) times a day   metoprolol tartrate (LOPRESSOR) 25 mg tablet Past Week at Unknown time Self Yes Yes   Sig: Take 1 tablet by mouth 2 (two) times a day   pioglitazone (ACTOS) 30 mg tablet  Self Yes No   Sig: Take 1 tablet by mouth daily   rosuvastatin (CRESTOR) 20 MG tablet Past Week at Unknown time Self Yes Yes   Sig: Take 1 tablet by mouth daily   tamsulosin (FLOMAX) 0 4 mg Past Week at Unknown time Self Yes Yes   Sig: Take 1 capsule by mouth daily      Facility-Administered Medications: None       Past Medical History:   Diagnosis Date    Cardiac disease     CHF (congestive heart failure) (HCC)     Diabetes mellitus (HCC)     Disease of thyroid gland     Enlarged prostate     Hyperlipidemia     Hypertension        Past Surgical History:   Procedure Laterality Date    HIP ARTHROPLASTY Right        History reviewed  No pertinent family history  I have reviewed and agree with the history as documented  Social History   Substance Use Topics    Smoking status: Former Smoker    Smokeless tobacco: Never Used    Alcohol use Yes      Comment: 1-2 per month         Review of Systems   Constitutional: Positive for chills  Negative for appetite change and fever  HENT: Negative for rhinorrhea and sore throat  Eyes: Negative for photophobia and visual disturbance  Respiratory: Negative for cough and shortness of breath  Cardiovascular: Negative for chest pain and palpitations  Gastrointestinal: Positive for abdominal distention and abdominal pain  Negative for diarrhea     Genitourinary: Positive for decreased urine volume, difficulty urinating, dysuria and hematuria  Negative for discharge, frequency, penile pain and urgency  Skin: Negative for rash  Neurological: Negative for dizziness and weakness  All other systems reviewed and are negative  Physical Exam  ED Triage Vitals   Temperature Pulse Respirations Blood Pressure SpO2   02/10/18 1258 02/10/18 1146 02/10/18 1146 02/10/18 1146 02/10/18 1146   98 7 °F (37 1 °C) (!) 108 22 98/59 99 %      Temp Source Heart Rate Source Patient Position - Orthostatic VS BP Location FiO2 (%)   02/10/18 1258 02/10/18 1146 02/10/18 1146 02/10/18 1146 --   Oral Monitor Lying Right arm       Pain Score       02/10/18 1258       No Pain           Orthostatic Vital Signs  Vitals:    02/10/18 1146 02/10/18 1258   BP: 98/59 121/57   Pulse: (!) 108 95   Patient Position - Orthostatic VS: Lying        Physical Exam   Constitutional: He is oriented to person, place, and time  He appears well-developed and well-nourished  Elderly male in no acute distress   HENT:   Head: Normocephalic and atraumatic  Right Ear: External ear normal    Left Ear: External ear normal    Mouth/Throat: Oropharynx is clear and moist    Eyes: Conjunctivae and EOM are normal  Pupils are equal, round, and reactive to light  Neck: Normal range of motion  Neck supple  No JVD present  No tracheal deviation present  Cardiovascular: Regular rhythm and normal heart sounds  Exam reveals no gallop and no friction rub  No murmur heard  Tachycardic   Pulmonary/Chest: Effort normal  No stridor  No respiratory distress  He has no wheezes  He has no rales  Abdominal: Soft  He exhibits distension  He exhibits no mass  There is tenderness  There is no rebound and no guarding  Distended and tender abdomen, most significantly left lower quadrant and suprapubic, bowel sounds present   Genitourinary: Rectal exam shows guaiac positive stool     Genitourinary Comments: Gross blood on rectal exam without any evidence of external or internal hemorrhoids or fissures Musculoskeletal: Normal range of motion  He exhibits no edema  Neurological: He is alert and oriented to person, place, and time  No cranial nerve deficit  Skin: Skin is warm and dry  No rash noted  No erythema  No pallor  Psychiatric: He has a normal mood and affect  Nursing note and vitals reviewed  ED Medications  Medications   aspirin chewable tablet 81 mg (81 mg Oral Given 2/10/18 1529)   clopidogrel (PLAVIX) tablet 75 mg (75 mg Oral Given 2/10/18 1529)   levothyroxine tablet 88 mcg (not administered)   metoprolol tartrate (LOPRESSOR) tablet 25 mg (not administered)   finasteride (PROSCAR) tablet 5 mg (5 mg Oral Given 2/10/18 1529)   tamsulosin (FLOMAX) capsule 0 4 mg (0 4 mg Oral Given 2/10/18 1529)   ondansetron (ZOFRAN) injection 4 mg (not administered)   insulin lispro (HumaLOG) 100 units/mL subcutaneous injection 1-5 Units (1 Units Subcutaneous Given 2/10/18 1615)   insulin lispro (HumaLOG) 100 units/mL subcutaneous injection 1-5 Units (not administered)   pantoprazole (PROTONIX) EC tablet 40 mg (40 mg Oral Given 2/10/18 1612)   sodium chloride 0 9 % bolus 1,000 mL (1,000 mL Intravenous New Bag 2/10/18 1243)   potassium chloride (K-DUR,KLOR-CON) CR tablet 40 mEq (40 mEq Oral Given 2/10/18 1529)       Diagnostic Studies  Results Reviewed     Procedure Component Value Units Date/Time    Iron Saturation % [[id number]] Collected:  02/10/18 1219    Lab Status: In process Specimen:  Blood from Arm, Right Updated:  02/10/18 1442    Ferritin [[id number]] Collected:  02/10/18 1219    Lab Status:   In process Specimen:  Blood from Arm, Right Updated:  02/10/18 1442    Reticulocytes [[id number]]  (Normal) Collected:  02/10/18 1219    Lab Status:  Final result Specimen:  Blood from Arm, Right Updated:  02/10/18 1442     Retic Ct Abs 41,000     Retic Ct Pct 1 60 %     Magnesium [[id number]]  (Normal) Collected:  02/10/18 1219    Lab Status:  Final result Specimen:  Blood from Arm, Right Updated:  02/10/18 7703 Magnesium 2 0 mg/dL     POCT urinalysis dipstick [[id number]]  (Normal) Resulted:  02/10/18 1320    Lab Status:  Final result Updated:  02/10/18 1342     Color, UA yellow/red     Clarity, UA clear     EXT Glucose,      EXT Bilirubin, UA (Ref: Negative) small     EXT Ketones, UA (Ref: Negative) negative     EXT Spec Grav, UA 1 020     EXT Blood, UA (Ref: Negative) large     EXT pH, UA 6 0     EXT Protein, UA (Ref: Negative) 30+     EXT Urobilinogen, UA (Ref: 0 2- 1 0) 0 2     EXT Leukocytes, UA (Ref: Negative) negative     EXT Nitrite, UA (Ref: Negative) negative    Urine Microscopic [[id number]]  (Abnormal) Collected:  02/10/18 1237    Lab Status:  Final result Specimen:  Urine from Urine, Straight Cath Updated:  02/10/18 1259     RBC, UA 20-30 (A) /hpf      WBC, UA None Seen /hpf      Epithelial Cells None Seen /hpf      Bacteria, UA None Seen /hpf      Fine granular casts 1-2 /lpf     Lactic acid x2 Q2H [[id number]]  (Normal) Collected:  02/10/18 1219    Lab Status:  Final result Specimen:  Blood from Arm, Right Updated:  02/10/18 1251     LACTIC ACID 2 0 mmol/L     Narrative:         Result may be elevated if tourniquet was used during collection      UA w Reflex to Microscopic w Reflex to Culture [[id number]]  (Abnormal) Collected:  02/10/18 1237    Lab Status:  Final result Specimen:  Urine from Urine, Straight Cath Updated:  02/10/18 1248     Color, UA Yellow     Clarity, UA Clear     Specific Bentley, UA 1 020     pH, UA 6 0     Leukocytes, UA Negative     Nitrite, UA Negative     Protein,  (2+) (A) mg/dl      Glucose,  (1/10%) (A) mg/dl      Ketones, UA Negative mg/dl      Urobilinogen, UA 0 2 E U /dl      Bilirubin, UA Negative     Blood, UA Large (A)    Comprehensive metabolic panel [[id number]]  (Abnormal) Collected:  02/10/18 1219    Lab Status:  Final result Specimen:  Blood from Arm, Right Updated:  02/10/18 1246     Sodium 138 mmol/L      Potassium 3 1 (L) mmol/L      Chloride 100 mmol/L CO2 27 mmol/L      Anion Gap 11 mmol/L      BUN 25 mg/dL      Creatinine 2 06 (H) mg/dL      Glucose 214 (H) mg/dL      Calcium 9 0 mg/dL       (H) U/L       (H) U/L      Alkaline Phosphatase 163 (H) U/L      Total Protein 7 2 g/dL      Albumin 3 1 (L) g/dL      Total Bilirubin 1 80 (H) mg/dL      eGFR 30 ml/min/1 73sq m     Narrative:         National Kidney Disease Education Program recommendations are as follows:  GFR calculation is accurate only with a steady state creatinine  Chronic Kidney disease less than 60 ml/min/1 73 sq  meters  Kidney failure less than 15 ml/min/1 73 sq  meters  Lipase [[id number]]  (Normal) Collected:  02/10/18 1219    Lab Status:  Final result Specimen:  Blood from Arm, Right Updated:  02/10/18 1246     Lipase 154 u/L     CBC and differential [[id number]]  (Abnormal) Collected:  02/10/18 1219    Lab Status:  Final result Specimen:  Blood from Arm, Right Updated:  02/10/18 1227     WBC 7 86 Thousand/uL      RBC 2 54 (L) Million/uL      Hemoglobin 9 0 (L) g/dL      Hematocrit 26 8 (L) %       (H) fL      MCH 35 4 (H) pg      MCHC 33 6 g/dL      RDW 17 7 (H) %      MPV 10 2 fL      Platelets 972 Thousands/uL      nRBC 0 /100 WBCs      Neutrophils Relative 86 (H) %      Lymphocytes Relative 6 (L) %      Monocytes Relative 7 %      Eosinophils Relative 0 %      Basophils Relative 0 %      Neutrophils Absolute 6 76 Thousands/µL      Lymphocytes Absolute 0 48 (L) Thousands/µL      Monocytes Absolute 0 58 Thousand/µL      Eosinophils Absolute 0 00 Thousand/µL      Basophils Absolute 0 01 Thousands/µL     Blood culture #2 [[id number]] Collected:  02/10/18 1219    Lab Status: In process Specimen:  Blood from Arm, Right Updated:  02/10/18 1224    Blood culture #1 [[id number]] Collected:  02/10/18 1219    Lab Status:   In process Specimen:  Blood from Arm, Right Updated:  02/10/18 1223                 CT abdomen pelvis wo contrast   Final Result by Rick Mckay MD (02/10 1350)   1  Cholelithiasis without other evidence of acute cholecystitis  Multiple dense foci within the region of the distal common bile duct suspicious for choledocholithiasis  Correlate for clinical and laboratory evidence of biliary obstruction and    consider further evaluation with MRCP  2   The bladder is decompressed by a Mckeon catheter  The catheter tip is at the superior and anterior margin of the bladder but does appear to still be within the bladder  Correlate clinically and consider catheter retraction  3   Cardiomegaly with small pericardial effusion  4   Large prostate  Workstation performed: [id number]         XR chest 2 views    (Results Pending)   US right upper quadrant    (Results Pending)              Procedures  Procedures       Phone Contacts  ED Phone Contact    ED Course  ED Course                                MDM  Number of Diagnoses or Management Options  Diagnosis management comments: 60-year-old male presents for evaluation of abdominal pain, urinary tension, melena  Patient borderline hypertensive in the emergency department will resuscitate with 1 L normal saline, will obtain 2 large-bore IVs, will treat with Protonix and obtain CBC, CMP, lipase, type and screen, lactic, blood cultures will insert Mckeon and obtain urinalysis, will obtain chest x-ray    Patient will likely be admitted for further care    CritCare Time    Disposition  Final diagnoses:   Urinary retention   [facility] ([facility] )   Heme positive stool     Time reflects when diagnosis was documented in both MDM as applicable and the Disposition within this note     Time User Action Codes Description Comment    2/10/2018  1:02 PM Kaylan Hunt Add [R33 9] Urinary retention     2/10/2018  1:02 PM Kaylan Bibber Add [N17 9] JESSA (acute kidney injury) ([facility] )     2/10/2018  1:03 PM Kaylan Hunt Add [R19 5] Heme positive stool     2/10/2018  2:25 PM Lenora Moreira Add [I25 10] Coronary artery disease 2/10/2018  2:25 PM Alexa Moreira Modify [I25 10] Coronary artery disease       ED Disposition     ED Disposition Condition Comment    Admit  Case was discussed with [facility] and the patient's admission status was agreed to be Admission Status: inpatient status to the service of Dr Anisa Jeffries   Follow-up Information    None       Current Discharge Medication List      CONTINUE these medications which have NOT CHANGED    Details   aspirin 81 MG tablet Take 1 tablet by mouth daily      clopidogrel (PLAVIX) 75 mg tablet Take 75 mg by mouth daily  Refills: 0      finasteride (PROSCAR) 5 mg tablet Take 1 tablet by mouth daily      glipiZIDE (GLUCOTROL) 5 mg tablet Take 1 tablet by mouth 2 (two) times a day      levothyroxine 88 mcg tablet Refills: 0      metFORMIN (GLUCOPHAGE) 1000 MG tablet Take 1 tablet by mouth 2 (two) times a day      metoprolol tartrate (LOPRESSOR) 25 mg tablet Take 1 tablet by mouth 2 (two) times a day      rosuvastatin (CRESTOR) 20 MG tablet Take 1 tablet by mouth daily      tamsulosin (FLOMAX) 0 4 mg Take 1 capsule by mouth daily      pioglitazone (ACTOS) 30 mg tablet Take 1 tablet by mouth daily           No discharge procedures on file      ED Provider  Electronically Signed by           Hansel Madsen MD  02/10/18 1238

## 2018-02-10 NOTE — CONSULTS
Consultation - [facility]  Brenton Brizuela 69 y o  male MRN: [id number]  Unit/Bed#: -01 Encounter: [id number]        Consults    ASSESSMENT/PLAN:     1  Heme-positive stool  2  Anemia new onset  3  Choledocholithiasis? 4   abnormal LFTs with a mixed picture differential diagnosis include hepatitis versus is gallstones    Heme-positive stool with baseline hemoglobin of 12 current hemoglobin of 9 differential diagnosis include peptic ulcer disease versus diverticular bleed less likely has no overt bleeding seen versus colonic mass versus AVM  Patient is not a reliable historian as he is unable to give details regarding his previous history  Will monitor H&H  Will start the Protonix drip  Can continue clears for now  In setting of aspirin/Plavix patient may have gastritis/peptic ulcer disease  Continue to monitor stool output  Further management based on trend of hemoglobin  May need inpatient versus outpatient EGD and colonoscopy  Of note no previous endoscopic evaluation as per patient  Would hold iron this time so stool output can be appropriately evaluated  New set anemia may be secondary to recent admission to the hospital with acute illness versus GI losses with heme-positive stools  Currently not having a major obscure overt GI bleed as patient has not had significant stool output in over several days  In fact reports being more constipated  Will continue monitor stool output  Can start stool softeners for now due to history of constipation  Possible choledocholithiasis seen on a CT scan without contrast   Would recommend ultrasound of the right upper quadrant  LFT elevation may be secondary to choledocholithiasis  We will continue to trend LFTs and follow up on ultrasound  Patient cannot get MRI due to pacer/defibrillator  If ultrasound is inconclusive and LFTs continue to rise will need ERCP versus endoscopic ultrasound    Is also since patient is on Plavix please discuss with Cardiology can be safely held  Fortunately no evidence of cholangitis  Check direct bili         ______________________________________________________________________    Reason for Consult / Principal Problem: Urinary retention    HPI: Chely Valverde is a 69y o  year old male who presents with Urinary retention  GI consult was called as patient was found to have new onset anemia, elevated LFTs, and heme-positive stool  CT scan of abdomen without contrast showed possible evidence of choledocholithiasis  No evidence of sepsis or cholangitis on exam or based on labs  The patient presents here for urinary retention CT scan of abdomen pelvis was done for further evaluation  CT scan without contrast that showed evidence of possible choledocholithiasis  Previously LFTs were within normal limits currently AST/ALT/T bili is elevated  AST today is 371, ALT is 186, alk-phos is 163, total bilirubin is 1 80  Currently denies any right upper quadrant pain  Denies any fevers chills  Liver enzymes on 09/12/2017 are within normal limits  No previous history of liver disease or hepatitis  He was also identified to have a hemoglobin of 9 with a baseline of approximately 12  He did have a recent admission at [facility] for urinary retention  We do not have the hemoglobin trend from that hospitalization  On exam he was identified to have heme-positive stools  He denies any overt bleeding  No previous endoscopic evaluation or colonoscopy  He is on aspirin and Plavix for coronary artery disease  Denies use of NSAIDs or any additional blood thinners  Otherwise doing well overall  Review of Systems: The remainder of the review of systems was negative except for the pertinent positives noted in HPI  Historical Information   Past Medical History:   Diagnosis Date    Cardiac disease     Disease of thyroid gland     Enlarged prostate     Hyperlipidemia      History reviewed  No pertinent surgical history  Social History   History   Alcohol Use No     History   Drug Use No     History   Smoking Status    Former Smoker   Smokeless Tobacco    Never Used     History reviewed  No pertinent family history  Meds/Allergies     Prescriptions Prior to Admission   Medication    aspirin 81 MG tablet    clopidogrel (PLAVIX) 75 mg tablet    finasteride (PROSCAR) 5 mg tablet    glipiZIDE (GLUCOTROL) 5 mg tablet    levothyroxine 88 mcg tablet    metFORMIN (GLUCOPHAGE) 1000 MG tablet    metoprolol tartrate (LOPRESSOR) 25 mg tablet    pioglitazone (ACTOS) 30 mg tablet    rosuvastatin (CRESTOR) 20 MG tablet    tamsulosin (FLOMAX) 0 4 mg     Current Facility-Administered Medications   Medication Dose Route Frequency    aspirin chewable tablet 81 mg  81 mg Oral Daily    clopidogrel (PLAVIX) tablet 75 mg  75 mg Oral Daily    [START ON 2/11/2018] ferrous sulfate tablet 325 mg  325 mg Oral Daily With Breakfast    finasteride (PROSCAR) tablet 5 mg  5 mg Oral Daily    insulin lispro (HumaLOG) 100 units/mL subcutaneous injection 1-5 Units  1-5 Units Subcutaneous TID AC    insulin lispro (HumaLOG) 100 units/mL subcutaneous injection 1-5 Units  1-5 Units Subcutaneous HS    [START ON 2/11/2018] levothyroxine tablet 88 mcg  88 mcg Oral Early Morning    metoprolol tartrate (LOPRESSOR) tablet 25 mg  25 mg Oral BID    ondansetron (ZOFRAN) injection 4 mg  4 mg Intravenous Q6H PRN    pantoprazole (PROTONIX) EC tablet 40 mg  40 mg Oral BID AC    potassium chloride (K-DUR,KLOR-CON) CR tablet 40 mEq  40 mEq Oral Once    tamsulosin (FLOMAX) capsule 0 4 mg  0 4 mg Oral Daily       Allergies   Allergen Reactions    Atorvastatin     Percocet  [Oxycodone-Acetaminophen]        Objective     Blood pressure 121/57, pulse 95, temperature 98 7 °F (37 1 °C), temperature source Oral, resp  rate 18, SpO2 95 %        Intake/Output Summary (Last 24 hours) at 02/10/18 1448  Last data filed at 02/10/18 1243 Gross per 24 hour   Intake                0 ml   Output             1000 ml   Net            -1000 ml       PHYSICAL EXAM     GEN: well nourished, well developed, no acute distress  HEENT: anicteric, MMM, no cervical or supraclavicular lymphadenopathy  CV: RRR, no m/r/g  CHEST: CTA b/l, no WRR  ABD: +BS, soft, NT/ND, no hepatosplenomegaly  EXT: no c/c/e  SKIN: no rashes,  NEURO: aaox3    Lab Results:   Admission on 02/10/2018   Component Date Value    WBC 02/10/2018 7 86     RBC 02/10/2018 2 54*    Hemoglobin 02/10/2018 9 0*    Hematocrit 02/10/2018 26 8*    MCV 02/10/2018 106*    MCH 02/10/2018 35 4*    MCHC 02/10/2018 33 6     RDW 02/10/2018 17 7*    MPV 02/10/2018 10 2     Platelets 11/12/8685 206     nRBC 02/10/2018 0     Neutrophils Relative 02/10/2018 86*    Lymphocytes Relative 02/10/2018 6*    Monocytes Relative 02/10/2018 7     Eosinophils Relative 02/10/2018 0     Basophils Relative 02/10/2018 0     Neutrophils Absolute 02/10/2018 6 76     Lymphocytes Absolute 02/10/2018 0 48*    Monocytes Absolute 02/10/2018 0 58     Eosinophils Absolute 02/10/2018 0 00     Basophils Absolute 02/10/2018 0 01     Sodium 02/10/2018 138     Potassium 02/10/2018 3 1*    Chloride 02/10/2018 100     CO2 02/10/2018 27     Anion Gap 02/10/2018 11     BUN 02/10/2018 25     Creatinine 02/10/2018 2 06*    Glucose 02/10/2018 214*    Calcium 02/10/2018 9 0     AST 02/10/2018 371*    ALT 02/10/2018 186*    Alkaline Phosphatase 02/10/2018 163*    Total Protein 02/10/2018 7 2     Albumin 02/10/2018 3 1*    Total Bilirubin 02/10/2018 1 80*    eGFR 02/10/2018 30     Lipase 02/10/2018 154     Color, UA 02/10/2018 yellow/red     Clarity, UA 02/10/2018 clear     EXT Glucose, UA 02/10/2018 100     EXT Bilirubin, UA (Ref: * 02/10/2018 small     EXT Ketones, UA (Ref: Ne* 02/10/2018 negative     EXT Spec Grav, UA 02/10/2018 1 020     EXT Blood, UA (Ref: Nega* 02/10/2018 large     EXT pH, UA 02/10/2018 6 0     EXT Protein, UA (Ref: Ne* 02/10/2018 30+     EXT Urobilinogen, UA (Re* 02/10/2018 0 2     EXT Leukocytes, UA (Ref:* 02/10/2018 negative     EXT Nitrite, UA (Ref: Ne* 02/10/2018 negative     Color, UA 02/10/2018 Yellow     Clarity, UA 02/10/2018 Clear     Specific Gravity, UA 02/10/2018 1 020     pH, UA 02/10/2018 6 0     Leukocytes, UA 02/10/2018 Negative     Nitrite, UA 02/10/2018 Negative     Protein, UA 02/10/2018 100 (2+)*    Glucose, UA 02/10/2018 100 (1/10%)*    Ketones, UA 02/10/2018 Negative     Urobilinogen, UA 02/10/2018 0 2     Bilirubin, UA 02/10/2018 Negative     Blood, UA 02/10/2018 Large*    LACTIC ACID 02/10/2018 2 0     ABO Grouping 02/10/2018 O     Rh Factor 02/10/2018 Positive     Antibody Screen 02/10/2018 Negative     Specimen Expiration Date 02/10/2018 [id number]     RBC, UA 02/10/2018 20-30*    WBC, UA 02/10/2018 None Seen     Epithelial Cells 02/10/2018 None Seen     Bacteria, UA 02/10/2018 None Seen     Fine granular casts 02/10/2018 1-2     Magnesium 02/10/2018 2 0     Ventricular Rate 02/10/2018 104     Atrial Rate 02/10/2018 104     VT Interval 02/10/2018 190     QRSD Interval 02/10/2018 114     QT Interval 02/10/2018 366     QTC Interval 02/10/2018 481     P Axis 02/10/2018 60     QRS Axis 02/10/2018 -65     T Wave [place] 02/10/2018 117     Retic Ct Abs 02/10/2018 11778     Retic Ct Pct 02/10/2018 1 60      Imaging Studies: I have personally reviewed pertinent reports

## 2018-02-10 NOTE — H&P
History and Physical - [facility]    Patient Information: Segundo Martínez 69 y o  male MRN: [id number]  Unit/Bed#: ED 12 Encounter: [id number]  Admitting Physician: Jennifer Bolanos MD  PCP: Juanita Xie MD  Date of Admission:  02/10/18    Assessment/Plan:    Hospital Problem List:     Principal Problem:    Urinary retention  Active Problems:    Coronary artery disease    Type 2 diabetes mellitus ([facility] [address] )    Anemia    Hypertension    Hyperlipidemia    CHF (congestive heart failure) ([facility] )      Plan for the Primary Problem(s):    Principal Problem:    Urinary retention-urology consult cortez placed with return of >1 liter urine; ho BPH; he has required cortez in the past; abdominal pain resolved  JESSA cr 2-received 1l IVF in er and has h/o systolic chf with aicd-will monitor cr with cortez in place  Transaminitis-ct a/p with Multiple dense foci within the region of the distal common bile duct suspicious for choledocholithiasis  Correlate for clinical and laboratory evidence of biliary obstruction and consider further evaluation with MRCP   Pt cant have mrcp due to AICD-gi consulted; since bladder decompression no abdominal pain-case d/w dr Libby Babb start PPI; hold plavix will place cardiology consult for cad recent admit to [facility] for chest pain regarding holding plavix; ruq us; clear liquid diet for now; may need ercp Monday 2/12/18      Active Problems:  Anemia-heme positive stool-GI consult; check indices;  Start fe    Coronary artery disease and h/o chf-had recent admit [facility] discharged 2 days ago  for chest pain-not presently complaining of this; cardiology consult and obtain records    Type 2 diabetes mellitus (HCC)-on metformin and glipizide as outpatient; ss coverage      Hypertension-continue outpatient meds    Hyperlipidemia-on statin as outpatient but will hold with elevated lfts  Hypokalemia replace and check mg        VTE Prophylaxis: Pharmacologic VTE Prophylaxis contraindicated due to anemia  / sequential compression device   Code Status: full  POLST: POLST is not applicable to this patient    Anticipated Length of Stay:  Patient will be admitted on an Inpatient basis with an anticipated length of stay of  > 2 midnights  Justification for Hospital Stay: anemia gi bleed    Total Time for Visit, including Counseling / Coordination of Care: 30 minutes  Greater than 50% of this total time spent on direct patient counseling and coordination of care  Chief Complaint:   Urinary retention    History of Present Illness:    Zully Avila is a 69 y o  male who presents with urinary retention  Patient was recently admitted to [facility] and discharged 2 days ago he was retaining urine at the time of discharge and was straight cathed however he has not been able to void since he was discharged  He has had abdominal pain and associated constipation  A [place] catheter was placed in the emergency department which relieved his abdominal pain  Patient notes that he had a small bowel movement yesterday which was black in color  Patient presently denies chest pain and his admission at [facility] recently was for chest pain he reports he had a stress test and was told his heart was weak  He has 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  Review of Systems:    Review of Systems   Constitutional: Negative  HENT: Negative  Respiratory: Negative for cough, choking and chest tightness  Cardiovascular: Negative for chest pain, palpitations and leg swelling  Gastrointestinal: Positive for abdominal pain and constipation  Negative for anal bleeding and blood in stool  Genitourinary: Positive for difficulty urinating and dysuria  Musculoskeletal: Negative  Allergic/Immunologic: Negative  Neurological: Negative for dizziness and light-headedness  Hematological: Negative          Past Medical and Surgical History:     Past Medical History: Diagnosis Date    Cardiac disease     Disease of thyroid gland     Enlarged prostate     Hyperlipidemia        History reviewed  No pertinent surgical history  Meds/Allergies:    Prior to Admission medications    Medication Sig Start Date End Date Taking? Authorizing Provider   aspirin 81 MG tablet Take 1 tablet by mouth daily    Historical Provider, MD   clopidogrel (PLAVIX) 75 mg tablet Take 75 mg by mouth daily 1/4/18   Historical Provider, MD   finasteride (PROSCAR) 5 mg tablet Take 1 tablet by mouth daily 10/1/15   Historical Provider, MD   glipiZIDE (GLUCOTROL) 5 mg tablet Take 1 tablet by mouth 2 (two) times a day 2/4/14   Historical Provider, MD   levothyroxine 88 mcg tablet  12/2/17   Historical Provider, MD   metFORMIN (GLUCOPHAGE) 1000 MG tablet Take 1 tablet by mouth 2 (two) times a day 8/2/17   Historical Provider, MD   metoprolol tartrate (LOPRESSOR) 25 mg tablet Take 1 tablet by mouth 2 (two) times a day 11/25/15   Historical Provider, MD   pioglitazone (ACTOS) 30 mg tablet Take 1 tablet by mouth daily 10/31/16   Historical Provider, MD   rosuvastatin (CRESTOR) 20 MG tablet Take 1 tablet by mouth daily 12/11/17   Historical Provider, MD   tamsulosin (FLOMAX) 0 4 mg Take 1 capsule by mouth daily    Historical Provider, MD     I have reviewed home medications with patient personally  Allergies:    Allergies   Allergen Reactions    Atorvastatin     Percocet  [Oxycodone-Acetaminophen]        Social History:     Marital Status: /Civil Union   Occupation:   Patient Pre-hospital Living Situation:   Patient Pre-hospital Level of Mobility:   Patient Pre-hospital Diet Restrictions:   Substance Use History:   History   Alcohol Use No     History   Smoking Status    Former Smoker   Smokeless Tobacco    Never Used     History   Drug Use No       Family History:    non-contributory    Physical Exam:     Vitals:   Blood Pressure: 121/57 (02/10/18 1258)  Pulse: 95 (02/10/18 1258)  Temperature: 98 7 °F (37 1 °C) (02/10/18 1258)  Temp Source: Oral (02/10/18 1258)  Respirations: 18 (02/10/18 1258)  SpO2: 95 % (02/10/18 1258)    Physical Exam   Constitutional: He is oriented to person, place, and time  He appears well-developed and well-nourished  HENT:   Head: Normocephalic and atraumatic  Eyes: Pupils are equal, round, and reactive to light  Cardiovascular: Normal rate and regular rhythm  Pulmonary/Chest: Effort normal and breath sounds normal    Abdominal: Soft  He exhibits no distension  There is no tenderness  There is no rebound and no guarding  Musculoskeletal: He exhibits edema  Neurological: He is alert and oriented to person, place, and time  Additional Data:     Lab Results: I have personally reviewed pertinent reports  Results from last 7 days  Lab Units 02/10/18  1219   WBC Thousand/uL 7 86   HEMOGLOBIN g/dL 9 0*   HEMATOCRIT % 26 8*   PLATELETS Thousands/uL 206   NEUTROS PCT % 86*   LYMPHS PCT % 6*   MONOS PCT % 7   EOS PCT % 0       Results from last 7 days  Lab Units 02/10/18  1219   SODIUM mmol/L 138   POTASSIUM mmol/L 3 1*   CHLORIDE mmol/L 100   CO2 mmol/L 27   BUN mg/dL 25   CREATININE mg/dL 2 06*   CALCIUM mg/dL 9 0   TOTAL PROTEIN g/dL 7 2   BILIRUBIN TOTAL mg/dL 1 80*   ALK PHOS U/L 163*   ALT U/L 186*   AST U/L 371*   GLUCOSE RANDOM mg/dL 214*           Imaging: I have personally reviewed pertinent reports  No results found  EKG, Pathology, and Other Studies Reviewed on Admission:   · EKG:     Allscripts Records Reviewed: Yes     ** Please Note: Dragon 360 Dictation voice to text software may have been used in the creation of this document   **

## 2018-02-10 NOTE — PLAN OF CARE
Problem: Nutrition/Hydration-ADULT  Goal: Nutrient/Hydration intake appropriate for improving, restoring or maintaining nutritional needs  Monitor and assess patient's nutrition/hydration status for malnutrition (ex- brittle hair, bruises, dry skin, pale skin and conjunctiva, muscle wasting, smooth red tongue, and disorientation)  Collaborate with interdisciplinary team and initiate plan and interventions as ordered  Monitor patient's weight and dietary intake as ordered or per policy  Utilize nutrition screening tool and intervene per policy  Determine patient's food preferences and provide high-protein, high-caloric foods as appropriate  INTERVENTIONS:  - Monitor oral intake, urinary output, labs, and treatment plans  - Assess nutrition and hydration status and recommend course of action  - Evaluate amount of meals eaten  - Assist patient with eating if necessary   - Allow adequate time for meals  - Recommend/ encourage appropriate diets, oral nutritional supplements, and vitamin/mineral supplements  - Order, calculate, and assess calorie counts as needed  - Recommend, monitor, and adjust tube feedings and TPN/PPN based on assessed needs  - Assess need for intravenous fluids  - Provide specific nutrition/hydration education as appropriate  - Include patient/family/caregiver in decisions related to nutrition   Outcome: Progressing      Problem: Potential for Falls  Goal: Patient will remain free of falls  INTERVENTIONS:  - Assess patient frequently for physical needs  -  Identify cognitive and physical deficits and behaviors that affect risk of falls    -  [place] fall precautions as indicated by assessment   - Educate patient/family on patient safety including physical limitations  - Instruct patient to call for assistance with activity based on assessment  - Modify environment to reduce risk of injury  - Consider OT/PT consult to assist with strengthening/mobility   Outcome: Progressing      Problem: PAIN - ADULT  Goal: Verbalizes/displays adequate comfort level or baseline comfort level  Interventions:  - Encourage patient to monitor pain and request assistance  - Assess pain using appropriate pain scale  - Administer analgesics based on type and severity of pain and evaluate response  - Implement non-pharmacological measures as appropriate and evaluate response  - Consider cultural and social influences on pain and pain management  - Notify physician/advanced practitioner if interventions unsuccessful or patient reports new pain  Outcome: Progressing      Problem: INFECTION - ADULT  Goal: Absence or prevention of progression during hospitalization  INTERVENTIONS:  - Assess and monitor for signs and symptoms of infection  - Monitor lab/diagnostic results  - Monitor all insertion sites, i e  indwelling lines, tubes, and drains  - Monitor endotracheal (as able) and nasal secretions for changes in amount and color  - [place] appropriate cooling/warming therapies per order  - Administer medications as ordered  - Instruct and encourage patient and family to use good hand hygiene technique  - Identify and instruct in appropriate isolation precautions for identified infection/condition  Outcome: Progressing    Goal: Absence of fever/infection during neutropenic period  INTERVENTIONS:  - Monitor WBC  - Implement neutropenic guidelines  Outcome: Progressing      Problem: SAFETY ADULT  Goal: Maintain or return to baseline ADL function  INTERVENTIONS:  -  Assess patient's ability to carry out ADLs; assess patient's baseline for ADL function and identify physical deficits which impact ability to perform ADLs (bathing, care of mouth/teeth, toileting, grooming, dressing, etc )  - Assess/evaluate cause of self-care deficits   - Assess range of motion  - Assess patient's mobility; develop plan if impaired  - Assess patient's need for assistive devices and provide as appropriate  - Encourage maximum independence but intervene and supervise when necessary  ¯ Involve family in performance of ADLs  ¯ Assess for home care needs following discharge   ¯ Request OT consult to assist with ADL evaluation and planning for discharge  ¯ Provide patient education as appropriate  Outcome: Progressing    Goal: Maintain or return mobility status to optimal level  INTERVENTIONS:  - Assess patient's baseline mobility status (ambulation, transfers, stairs, etc )    - Identify cognitive and physical deficits and behaviors that affect mobility  - Identify mobility aids required to assist with transfers and/or ambulation (gait belt, sit-to-stand, lift, walker, cane, etc )  - [place] fall precautions as indicated by assessment  - Record patient progress and toleration of activity level on Mobility SBAR; progress patient to next Phase/Stage  - Instruct patient to call for assistance with activity based on assessment  - Request Rehabilitation consult to assist with strengthening/weightbearing, etc   Outcome: Progressing      Problem: DISCHARGE PLANNING  Goal: Discharge to home or other facility with appropriate resources  INTERVENTIONS:  - Identify barriers to discharge w/patient and caregiver  - Arrange for needed discharge resources and transportation as appropriate  - Identify discharge learning needs (meds, wound care, etc )  - Arrange for interpretive services to assist at discharge as needed  - Refer to Case Management Department for coordinating discharge planning if the patient needs post-hospital services based on physician/advanced practitioner order or complex needs related to functional status, cognitive ability, or social support system  Outcome: Progressing      Problem: Knowledge Deficit  Goal: Patient/family/caregiver demonstrates understanding of disease process, treatment plan, medications, and discharge instructions  Complete learning assessment and assess knowledge base    Interventions:  - Provide teaching at level of understanding  - Provide teaching via preferred learning methods  Outcome: Progressing      Problem: GENITOURINARY - ADULT  Goal: Maintains or returns to baseline urinary function  INTERVENTIONS:  - Assess urinary function  - Encourage oral fluids to ensure adequate hydration  - Administer IV fluids as ordered to ensure adequate hydration  - Administer ordered medications as needed  - Offer frequent toileting  - Follow urinary retention protocol if ordered  Outcome: Progressing    Goal: Absence of urinary retention  INTERVENTIONS:  - Assess patients ability to void and empty bladder  - Monitor I/O  - Bladder scan as needed  - Discuss with physician/AP medications to alleviate retention as needed  - Discuss catheterization for long term situations as appropriate  Outcome: Progressing    Goal: Urinary catheter remains patent  INTERVENTIONS:  - Assess patency of urinary catheter  - If patient has a chronic cortez, consider changing catheter if non-functioning  - Follow guidelines for intermittent irrigation of non-functioning urinary catheter  Outcome: Progressing

## 2018-02-11 ENCOUNTER — APPOINTMENT (INPATIENT)
Dept: ULTRASOUND IMAGING | Facility: HOSPITAL | Age: 78
DRG: 378 | End: 2018-02-11
Payer: COMMERCIAL

## 2018-02-11 PROBLEM — R19.5 HEME POSITIVE STOOL: Status: ACTIVE | Noted: 2018-02-10

## 2018-02-11 LAB
ALBUMIN SERPL BCP-MCNC: 2.6 G/DL (ref 3.5–5)
ALP SERPL-CCNC: 137 U/L (ref 46–116)
ALT SERPL W P-5'-P-CCNC: 139 U/L (ref 12–78)
ANION GAP SERPL CALCULATED.3IONS-SCNC: 6 MMOL/L (ref 4–13)
AST SERPL W P-5'-P-CCNC: 180 U/L (ref 5–45)
BASOPHILS # BLD AUTO: 0.01 THOUSANDS/ΜL (ref 0–0.1)
BASOPHILS NFR BLD AUTO: 0 % (ref 0–1)
BILIRUB DIRECT SERPL-MCNC: 0.84 MG/DL (ref 0–0.2)
BILIRUB SERPL-MCNC: 1.4 MG/DL (ref 0.2–1)
BUN SERPL-MCNC: 20 MG/DL (ref 5–25)
CALCIUM SERPL-MCNC: 8.8 MG/DL (ref 8.3–10.1)
CHLORIDE SERPL-SCNC: 107 MMOL/L (ref 100–108)
CO2 SERPL-SCNC: 30 MMOL/L (ref 21–32)
CREAT SERPL-MCNC: 1.79 MG/DL (ref 0.6–1.3)
EOSINOPHIL # BLD AUTO: 0.03 THOUSAND/ΜL (ref 0–0.61)
EOSINOPHIL NFR BLD AUTO: 1 % (ref 0–6)
ERYTHROCYTE [DISTWIDTH] IN BLOOD BY AUTOMATED COUNT: 18.1 % (ref 11.6–15.1)
EST. AVERAGE GLUCOSE BLD GHB EST-MCNC: 166 MG/DL
FERRITIN SERPL-MCNC: 515 NG/ML (ref 8–388)
GFR SERPL CREATININE-BSD FRML MDRD: 36 ML/MIN/1.73SQ M
GLUCOSE SERPL-MCNC: 119 MG/DL (ref 65–140)
GLUCOSE SERPL-MCNC: 124 MG/DL (ref 65–140)
GLUCOSE SERPL-MCNC: 133 MG/DL (ref 65–140)
GLUCOSE SERPL-MCNC: 152 MG/DL (ref 65–140)
GLUCOSE SERPL-MCNC: 218 MG/DL (ref 65–140)
HBA1C MFR BLD: 7.4 % (ref 4.2–6.3)
HCT VFR BLD AUTO: 23.3 % (ref 36.5–49.3)
HCT VFR BLD AUTO: 23.6 % (ref 36.5–49.3)
HCT VFR BLD AUTO: 23.8 % (ref 36.5–49.3)
HGB BLD-MCNC: 7.7 G/DL (ref 12–17)
HGB BLD-MCNC: 7.8 G/DL (ref 12–17)
HGB BLD-MCNC: 7.9 G/DL (ref 12–17)
IRON SATN MFR SERPL: 10 %
IRON SERPL-MCNC: 29 UG/DL (ref 65–175)
LYMPHOCYTES # BLD AUTO: 0.92 THOUSANDS/ΜL (ref 0.6–4.47)
LYMPHOCYTES NFR BLD AUTO: 17 % (ref 14–44)
MCH RBC QN AUTO: 35.3 PG (ref 26.8–34.3)
MCHC RBC AUTO-ENTMCNC: 33.1 G/DL (ref 31.4–37.4)
MCV RBC AUTO: 107 FL (ref 82–98)
MONOCYTES # BLD AUTO: 0.46 THOUSAND/ΜL (ref 0.17–1.22)
MONOCYTES NFR BLD AUTO: 8 % (ref 4–12)
NEUTROPHILS # BLD AUTO: 4.1 THOUSANDS/ΜL (ref 1.85–7.62)
NEUTS SEG NFR BLD AUTO: 74 % (ref 43–75)
NRBC BLD AUTO-RTO: 0 /100 WBCS
PLATELET # BLD AUTO: 190 THOUSANDS/UL (ref 149–390)
PMV BLD AUTO: 10.5 FL (ref 8.9–12.7)
POTASSIUM SERPL-SCNC: 3.8 MMOL/L (ref 3.5–5.3)
PROT SERPL-MCNC: 6.3 G/DL (ref 6.4–8.2)
RBC # BLD AUTO: 2.21 MILLION/UL (ref 3.88–5.62)
SODIUM SERPL-SCNC: 143 MMOL/L (ref 136–145)
TIBC SERPL-MCNC: 280 UG/DL (ref 250–450)
WBC # BLD AUTO: 5.54 THOUSAND/UL (ref 4.31–10.16)

## 2018-02-11 PROCEDURE — 82248 BILIRUBIN DIRECT: CPT | Performed by: INTERNAL MEDICINE

## 2018-02-11 PROCEDURE — 85025 COMPLETE CBC W/AUTO DIFF WBC: CPT | Performed by: GENERAL PRACTICE

## 2018-02-11 PROCEDURE — 76705 ECHO EXAM OF ABDOMEN: CPT

## 2018-02-11 PROCEDURE — 85014 HEMATOCRIT: CPT | Performed by: FAMILY MEDICINE

## 2018-02-11 PROCEDURE — 99222 1ST HOSP IP/OBS MODERATE 55: CPT | Performed by: INTERNAL MEDICINE

## 2018-02-11 PROCEDURE — 85014 HEMATOCRIT: CPT | Performed by: GENERAL PRACTICE

## 2018-02-11 PROCEDURE — 85018 HEMOGLOBIN: CPT | Performed by: GENERAL PRACTICE

## 2018-02-11 PROCEDURE — 85018 HEMOGLOBIN: CPT | Performed by: FAMILY MEDICINE

## 2018-02-11 PROCEDURE — 83036 HEMOGLOBIN GLYCOSYLATED A1C: CPT | Performed by: FAMILY MEDICINE

## 2018-02-11 PROCEDURE — 99222 1ST HOSP IP/OBS MODERATE 55: CPT | Performed by: PHYSICIAN ASSISTANT

## 2018-02-11 PROCEDURE — C9113 INJ PANTOPRAZOLE SODIUM, VIA: HCPCS | Performed by: GENERAL PRACTICE

## 2018-02-11 PROCEDURE — 82948 REAGENT STRIP/BLOOD GLUCOSE: CPT

## 2018-02-11 PROCEDURE — 99232 SBSQ HOSP IP/OBS MODERATE 35: CPT | Performed by: GENERAL PRACTICE

## 2018-02-11 PROCEDURE — 80053 COMPREHEN METABOLIC PANEL: CPT | Performed by: GENERAL PRACTICE

## 2018-02-11 RX ADMIN — ASPIRIN 81 MG 81 MG: 81 TABLET ORAL at 08:28

## 2018-02-11 RX ADMIN — SODIUM CHLORIDE 8 MG/HR: 9 INJECTION, SOLUTION INTRAVENOUS at 22:42

## 2018-02-11 RX ADMIN — METOPROLOL TARTRATE 12.5 MG: 25 TABLET ORAL at 18:21

## 2018-02-11 RX ADMIN — CLOPIDOGREL BISULFATE 75 MG: 75 TABLET ORAL at 08:28

## 2018-02-11 RX ADMIN — SODIUM CHLORIDE 8 MG/HR: 9 INJECTION, SOLUTION INTRAVENOUS at 13:10

## 2018-02-11 RX ADMIN — FINASTERIDE 5 MG: 5 TABLET, FILM COATED ORAL at 08:28

## 2018-02-11 RX ADMIN — TAMSULOSIN HYDROCHLORIDE 0.4 MG: 0.4 CAPSULE ORAL at 08:28

## 2018-02-11 RX ADMIN — LEVOTHYROXINE SODIUM 88 MCG: 88 TABLET ORAL at 06:17

## 2018-02-11 RX ADMIN — PANTOPRAZOLE SODIUM 40 MG: 40 TABLET, DELAYED RELEASE ORAL at 06:17

## 2018-02-11 RX ADMIN — POLYETHYLENE GLYCOL 3350, SODIUM SULFATE ANHYDROUS, SODIUM BICARBONATE, SODIUM CHLORIDE, POTASSIUM CHLORIDE 4000 ML: 236; 22.74; 6.74; 5.86; 2.97 POWDER, FOR SOLUTION ORAL at 14:32

## 2018-02-11 NOTE — PROGRESS NOTES
[facility]  Progress Note - Nery Harley 69 y o  male MRN: [id number]    Unit/Bed#: -01 Encounter: [id number]    Assessment/Plan:  1  Heme-positive stool  2  Anemia new onset  3  Choledocholithiasis? 4   abnormal LFTs with a mixed picture differential diagnosis include hepatitis versus is gallstones  5   weight loss      Heme-positive stool with baseline hemoglobin of 12 current hemoglobin of 7 8 differential diagnosis include peptic ulcer disease versus diverticular bleed less likely has no overt bleeding seen versus colonic mass versus AVM  hemoglobin continues to decrease  Recent hemoglobin at outside hospital was approximately 9 approximately 5 days ago  He currently no overt bleeding or bowel movements  Rectal exam there was no stool in the rectal vault but there was scant red blood on exam   Patient is not a reliable historian as he is unable to give details regarding his previous history  Will monitor H&H  Will start the Protonix drip  Can continue clears for now  In setting of aspirin/Plavix patient may have gastritis/peptic ulcer disease  Continue to monitor stool output  Plan for diagnostic EGD and colonoscopy as hemoglobin continues to trend down worse  Of note no previous endoscopic evaluation as per patient  Would hold iron this time so stool output can be appropriately evaluated  Cardiology evaluate the patient has stopped aspirin and Plavix  Will plan for EGD/ colonoscopy tomorrow      New set anemia may be secondary to recent admission to the hospital with acute illness versus GI losses with heme-positive stools  Currently not having a major obscure overt GI bleed as patient has not had significant stool output in over several days  Will continue monitor stool output  Can start stool softeners for now due to history of constipation      Possible choledocholithiasis seen on a CT scan without contrast   Ultrasound of the right upper quadrant pending   LFT elevation may be secondary to choledocholithiasis  We will continue to trend LFTs and follow up on ultrasound  Fortunately LFTs are trending down  Patient cannot get MRI due to pacer/defibrillator  If ultrasound is inconclusive and LFTs continue to rise will need ERCP versus endoscopic ultrasound  Fortunately no evidence of cholangitis  Check direct bili  Last Plavix use was 2 days ago  Will probably plan for ERCP if needed later this week  30 lb weight loss over the last years of great concern with this H and new onset anemia  He will need further evaluation with EGD and colonoscopy for this  Will plan for this tomorrow  Of note patient was recently on Plavix 2 days ago            Subjective:   Denies any acute distress  Denies any nausea, vomiting, fevers, chills  No abdominal pain  No evidence of confusion  Objective:     Vitals: Blood pressure 100/52, pulse 80, temperature 98 6 °F (37 °C), temperature source Oral, resp  rate 17, SpO2 97 %  ,There is no height or weight on file to calculate BMI        Intake/Output Summary (Last 24 hours) at 02/11/18 1352  Last data filed at 02/11/18 0800   Gross per 24 hour   Intake              100 ml   Output           2428 8 ml   Net          -2328 8 ml       Physical Exam:    GEN: wn/wd, NAD  HEENT: MMM, no cervical or supraclavicular LAD, anciteric  CV: RRR, no m/r/g  CHEST: CTA b/l, no w/r/r  ABD: +BS, soft, NT/ND, no hepatosplenomegaly  EXT: no c/c/e  SKIN: no rashes  NEURO: aaox3      Invasive Devices     Peripheral Intravenous Line            Peripheral IV 02/10/18 Left Antecubital 1 day    Peripheral IV 02/10/18 Right Forearm 1 day          Drain            Urethral Catheter Coude 16 Fr  3 days                        Lab, Imaging and other studies:     Admission on 02/10/2018   Component Date Value    WBC 02/10/2018 7 86     RBC 02/10/2018 2 54*    Hemoglobin 02/10/2018 9 0*    Hematocrit 02/10/2018 26 8*    MCV 02/10/2018 106*    [facility] 02/10/2018 35 4*    MCHC 02/10/2018 33 6     RDW 02/10/2018 17 7*    MPV 02/10/2018 10 2     Platelets 59/33/3164 206     nRBC 02/10/2018 0     Neutrophils Relative 02/10/2018 86*    Lymphocytes Relative 02/10/2018 6*    Monocytes Relative 02/10/2018 7     Eosinophils Relative 02/10/2018 0     Basophils Relative 02/10/2018 0     Neutrophils Absolute 02/10/2018 6 76     Lymphocytes Absolute 02/10/2018 0 48*    Monocytes Absolute 02/10/2018 0 58     Eosinophils Absolute 02/10/2018 0 00     Basophils Absolute 02/10/2018 0 01     Sodium 02/10/2018 138     Potassium 02/10/2018 3 1*    Chloride 02/10/2018 100     CO2 02/10/2018 27     Anion Gap 02/10/2018 11     BUN 02/10/2018 25     Creatinine 02/10/2018 2 06*    Glucose 02/10/2018 214*    Calcium 02/10/2018 9 0     AST 02/10/2018 371*    ALT 02/10/2018 186*    Alkaline Phosphatase 02/10/2018 163*    Total Protein 02/10/2018 7 2     Albumin 02/10/2018 3 1*    Total Bilirubin 02/10/2018 1 80*    eGFR 02/10/2018 30     Lipase 02/10/2018 154     Color, UA 02/10/2018 yellow/red     Clarity, UA 02/10/2018 clear     EXT Glucose, UA 02/10/2018 100     EXT Bilirubin, UA (Ref: * 02/10/2018 small     EXT Ketones, UA (Ref: Ne* 02/10/2018 negative     EXT Spec Grav, UA 02/10/2018 1 020     EXT Blood, UA (Ref: Nega* 02/10/2018 large     EXT pH, UA 02/10/2018 6 0     EXT Protein, UA (Ref: Ne* 02/10/2018 30+     EXT Urobilinogen, UA (Re* 02/10/2018 0 2     EXT Leukocytes, UA (Ref:* 02/10/2018 negative     EXT Nitrite, UA (Ref: Ne* 02/10/2018 negative     Color, UA 02/10/2018 Yellow     Clarity, UA 02/10/2018 Clear     Specific Gravity, UA 02/10/2018 1 020     pH, UA 02/10/2018 6 0     Leukocytes, UA 02/10/2018 Negative     Nitrite, UA 02/10/2018 Negative     Protein, UA 02/10/2018 100 (2+)*    Glucose, UA 02/10/2018 100 (1/10%)*    Ketones, UA 02/10/2018 Negative     Urobilinogen, UA 02/10/2018 0 2     Bilirubin, UA 02/10/2018 Negative     Blood, UA 02/10/2018 Large*    LACTIC ACID 02/10/2018 2 0     ABO Grouping 02/10/2018 O     Rh Factor 02/10/2018 Positive     Antibody Screen 02/10/2018 Negative     Specimen Expiration Date 02/10/2018 [id number]     RBC, UA 02/10/2018 20-30*    WBC, UA 02/10/2018 None Seen     Epithelial Cells 02/10/2018 None Seen     Bacteria, UA 02/10/2018 None Seen     Fine granular casts 02/10/2018 1-2     Magnesium 02/10/2018 2 0     Ventricular Rate 02/10/2018 104     Atrial Rate 02/10/2018 104     W[place] Interval 02/10/2018 190     QRSD Interval 02/10/2018 114     QT Interval 02/10/2018 366     QTC Interval 02/10/2018 481     P Axis 02/10/2018 60     QRS Axis 02/10/2018 -65     T Wave [place] 02/10/2018 117     Color, UA 02/10/2018 Yellow     Clarity, UA 02/10/2018 Clear     Specific Gravity, UA 02/10/2018 1 010     pH, UA 02/10/2018 7 0     Leukocytes, UA 02/10/2018 Small*    Nitrite, UA 02/10/2018 Negative     Protein, UA 02/10/2018 30 (1+)*    Glucose, UA 02/10/2018 250 (1/4%)*    Ketones, UA 02/10/2018 Negative     Urobilinogen, UA 02/10/2018 0 2     Bilirubin, UA 02/10/2018 Negative     Blood, UA 02/10/2018 Moderate*    Iron Saturation 02/10/2018 10     TIBC 02/10/2018 280     Iron 02/10/2018 29*    Ferritin 02/10/2018 515*    Retic Ct Abs 02/10/2018 86975     Retic Ct Pct 02/10/2018 1 60     POC Glucose 02/10/2018 210*    POC Glucose 02/10/2018 271*    RBC, UA 02/10/2018 0-1*    WBC, UA 02/10/2018 10-20*    Epithelial Cells 02/10/2018 None Seen     Bacteria, UA 02/10/2018 Occasional     Sodium 02/11/2018 143     Potassium 02/11/2018 3 8     Chloride 02/11/2018 107     CO2 02/11/2018 30     Anion Gap 02/11/2018 6     BUN 02/11/2018 20     Creatinine 02/11/2018 1 79*    Glucose 02/11/2018 119     Calcium 02/11/2018 8 8     AST 02/11/2018 180*    ALT 02/11/2018 139*    Alkaline Phosphatase 02/11/2018 137*    Total Protein 02/11/2018 6 3*    Albumin 02/11/2018 2 6*    Total Bilirubin 02/11/2018 1 40*    eGFR 02/11/2018 36     WBC 02/11/2018 5 54     RBC 02/11/2018 2 21*    Hemoglobin 02/11/2018 7 8*    Hematocrit 02/11/2018 23 6*    MCV 02/11/2018 107*    MCH 02/11/2018 35 3*    MCHC 02/11/2018 33 1     RDW 02/11/2018 18 1*    MPV 02/11/2018 10 5     Platelets 09/58/9023 190     nRBC 02/11/2018 0     Neutrophils Relative 02/11/2018 74     Lymphocytes Relative 02/11/2018 17     Monocytes Relative 02/11/2018 8     Eosinophils Relative 02/11/2018 1     Basophils Relative 02/11/2018 0     Neutrophils Absolute 02/11/2018 4 10     Lymphocytes Absolute 02/11/2018 0 92     Monocytes Absolute 02/11/2018 0 46     Eosinophils Absolute 02/11/2018 0 03     Basophils Absolute 02/11/2018 0 01     POC Glucose 02/11/2018 133     POC Glucose 02/11/2018 124     Hemoglobin 02/11/2018 7 7*    Hematocrit 02/11/2018 23 3*         I have personally reviewed pertinent reports        Current Facility-Administered Medications   Medication Dose Route Frequency    insulin lispro (HumaLOG) 100 units/mL subcutaneous injection 1-5 Units  1-5 Units Subcutaneous TID AC    insulin lispro (HumaLOG) 100 units/mL subcutaneous injection 1-5 Units  1-5 Units Subcutaneous HS    levothyroxine tablet 88 mcg  88 mcg Oral Early Morning    metoprolol tartrate (LOPRESSOR) partial tablet 12 5 mg  12 5 mg Oral BID    ondansetron (ZOFRAN) injection 4 mg  4 mg Intravenous Q6H PRN    pantoprazole (PROTONIX) 80 mg in sodium chloride 0 9 % 100 mL infusion  8 mg/hr Intravenous Continuous

## 2018-02-11 NOTE — PROGRESS NOTES
[facility] Progress Note  Patient: Kristofer Hernandez 69 y o  male   MRN: [id number]  PCP: Marine Smith MD  Unit/Bed#: -01 Encounter: [id number]  Date Of Visit: 02/11/18    Assessment:    Principal Problem:    Urinary retention  Active Problems:    Coronary artery disease    Type 2 diabetes mellitus (HCC)    Anemia    Hypertension    Hyperlipidemia    CHF (congestive heart failure) ([facility] [address] )      Plan:    Urinary retention-urology consult cortez placed with return of >1 liter urine; ho BPH; he has required cortez in the past;     JESSA-improved with IV fluid and placement of cortez-will follow    Anemia-GI bleed-hgb down-received IVF also; on PPI; gi following;     Transaminitis-ct a/p with Multiple dense foci within the region of the distal common bile duct suspicious for choledocholithiasis   hold plavix will place cardiology consult for cad recent admit to [facility] for chest pain regarding holding plavix; ruq us; clear liquid diet for now; may need ercp Monday 2/12/18        Active Problems:      Coronary artery disease and h/o chf-had recent admit [facility] discharged 3 days ago  for chest pain-not presently complaining of this; cardiology consult and obtain records    Type 2 diabetes mellitus (HCC)-on metformin and glipizide as outpatient; ss coverage      Hypertension-continue outpatient meds    Hyperlipidemia-on statin as outpatient but will hold with elevated lfts  Hypokalemia resolved            VTE Pharmacologic Prophylaxis:   Pharmacologic: Pharmacologic VTE Prophylaxis contraindicated due to gi bleed  Mechanical VTE Prophylaxis in Place: Yes    Patient Centered Rounds: I have performed bedside rounds with nursing staff today  Discussions with Specialists or Other Care Team Provider:     Education and Discussions with Family / Patient:     Time Spent for Care: 30 minutes  More than 50% of total time spent on counseling and coordination of care as described above      Current Length of Stay: 1 day(s)    Current Patient Status: Inpatient   Certification Statement: The patient will continue to require additional inpatient hospital stay due to gi bleed    Discharge Plan: pending    Code Status: Level 1 - Full Code      Subjective:   Denies abdominal pain  Objective:     Vitals:   Temp (24hrs), Av 7 °F (37 1 °C), Min:98 6 °F (37 °C), Max:98 8 °F (37 1 °C)    HR:  [] 80  Resp:  [17-22] 17  BP: ()/(30-91) 100/52  SpO2:  [94 %-99 %] 97 %  There is no height or weight on file to calculate BMI  Input and Output Summary (last 24 hours): Intake/Output Summary (Last 24 hours) at 18 0828  Last data filed at 18 0641   Gross per 24 hour   Intake                0 ml   Output           3428 8 ml   Net          -3428 8 ml       Physical Exam:     Physical Exam   Constitutional: He appears well-developed and well-nourished  HENT:   Head: Normocephalic  Eyes: Pupils are equal, round, and reactive to light  Cardiovascular: Normal rate and regular rhythm  Pulmonary/Chest: Effort normal and breath sounds normal    Abdominal: Soft  He exhibits no distension  Musculoskeletal: He exhibits no edema  Additional Data:     Labs:      Results from last 7 days  Lab Units 18  0443   WBC Thousand/uL 5 54   HEMOGLOBIN g/dL 7 8*   HEMATOCRIT % 23 6*   PLATELETS Thousands/uL 190   NEUTROS PCT % 74   LYMPHS PCT % 17   MONOS PCT % 8   EOS PCT % 1       Results from last 7 days  Lab Units 18  0443   SODIUM mmol/L 143   POTASSIUM mmol/L 3 8   CHLORIDE mmol/L 107   CO2 mmol/L 30   BUN mg/dL 20   CREATININE mg/dL 1 79*   CALCIUM mg/dL 8 8   TOTAL PROTEIN g/dL 6 3*   BILIRUBIN TOTAL mg/dL 1 40*   ALK PHOS U/L 137*   ALT U/L 139*   AST U/L 180*   GLUCOSE RANDOM mg/dL 119           * I Have Reviewed All Lab Data Listed Above  * Additional Pertinent Lab Tests Reviewed:  All Labs Within Last 24 Hours Reviewed    Imaging:    Imaging Reports Reviewed Today Include:   Imaging Personally Reviewed by Myself Includes:      Recent Cultures (last 7 days):           Last 24 Hours Medication List:     Current Facility-Administered Medications:  aspirin 81 mg Oral Daily Skylar Fregoso MD   clopidogrel 75 mg Oral Daily Skylar Fregoso MD   finasteride 5 mg Oral Daily Pauly Moreira MD   insulin lispro 1-5 Units Subcutaneous TID AC Pauly Moreira MD   insulin lispro 1-5 Units Subcutaneous HS Skylar Fregoso MD   levothyroxine 88 mcg Oral Early Morning Jessiegabe Moreira MD   metoprolol tartrate 25 mg Oral BID Jessiegabe Moreira MD   ondansetron 4 mg Intravenous Q6H PRN Skylar Fregoso MD   pantoprazole 40 mg Oral BID AC Skylar Fregoso MD   tamsulosin 0 4 mg Oral Daily Skylar Fregoso MD        Today, Patient Was Seen By: Skylar Fregoso MD    ** Please Note: Dragon 360 Dictation voice to text software may have been used in the creation of this document   **

## 2018-02-11 NOTE — PLAN OF CARE
DISCHARGE PLANNING     Discharge to home or other facility with appropriate resources Progressing        GENITOURINARY - ADULT     Maintains or returns to baseline urinary function Progressing     Absence of urinary retention Progressing     Urinary catheter remains patent Progressing        INFECTION - ADULT     Absence or prevention of progression during hospitalization Progressing     Absence of fever/infection during neutropenic period Progressing        Knowledge Deficit     Patient/family/caregiver demonstrates understanding of disease process, treatment plan, medications, and discharge instructions Progressing        Nutrition/Hydration-ADULT     Nutrient/Hydration intake appropriate for improving, restoring or maintaining nutritional needs Progressing        PAIN - ADULT     Verbalizes/displays adequate comfort level or baseline comfort level Progressing        Potential for Falls     Patient will remain free of falls Progressing        SAFETY ADULT     Maintain or return to baseline ADL function Progressing     Maintain or return mobility status to optimal level Progressing

## 2018-02-11 NOTE — PLAN OF CARE
Problem: Nutrition/Hydration-ADULT  Goal: Nutrient/Hydration intake appropriate for improving, restoring or maintaining nutritional needs  Monitor and assess patient's nutrition/hydration status for malnutrition (ex- brittle hair, bruises, dry skin, pale skin and conjunctiva, muscle wasting, smooth red tongue, and disorientation)  Collaborate with interdisciplinary team and initiate plan and interventions as ordered  Monitor patient's weight and dietary intake as ordered or per policy  Utilize nutrition screening tool and intervene per policy  Determine patient's food preferences and provide high-protein, high-caloric foods as appropriate  INTERVENTIONS:  - Monitor oral intake, urinary output, labs, and treatment plans  - Assess nutrition and hydration status and recommend course of action  - Evaluate amount of meals eaten  - Assist patient with eating if necessary   - Allow adequate time for meals  - Recommend/ encourage appropriate diets, oral nutritional supplements, and vitamin/mineral supplements  - Order, calculate, and assess calorie counts as needed  - Recommend, monitor, and adjust tube feedings and TPN/PPN based on assessed needs  - Assess need for intravenous fluids  - Provide specific nutrition/hydration education as appropriate  - Include patient/family/caregiver in decisions related to nutrition   Outcome: Progressing      Problem: Potential for Falls  Goal: Patient will remain free of falls  INTERVENTIONS:  - Assess patient frequently for physical needs  -  Identify cognitive and physical deficits and behaviors that affect risk of falls    -  Pinch fall precautions as indicated by assessment   - Educate patient/family on patient safety including physical limitations  - Instruct patient to call for assistance with activity based on assessment  - Modify environment to reduce risk of injury  - Consider OT/PT consult to assist with strengthening/mobility   Outcome: Progressing      Problem: PAIN - ADULT  Goal: Verbalizes/displays adequate comfort level or baseline comfort level  Interventions:  - Encourage patient to monitor pain and request assistance  - Assess pain using appropriate pain scale  - Administer analgesics based on type and severity of pain and evaluate response  - Implement non-pharmacological measures as appropriate and evaluate response  - Consider cultural and social influences on pain and pain management  - Notify physician/advanced practitioner if interventions unsuccessful or patient reports new pain   Outcome: Progressing      Problem: INFECTION - ADULT  Goal: Absence or prevention of progression during hospitalization  INTERVENTIONS:  - Assess and monitor for signs and symptoms of infection  - Monitor lab/diagnostic results  - Monitor all insertion sites, i e  indwelling lines, tubes, and drains  - Monitor endotracheal (as able) and nasal secretions for changes in amount and color  - Husser appropriate cooling/warming therapies per order  - Administer medications as ordered  - Instruct and encourage patient and family to use good hand hygiene technique  - Identify and instruct in appropriate isolation precautions for identified infection/condition   Outcome: Progressing    Goal: Absence of fever/infection during neutropenic period  INTERVENTIONS:  - Monitor WBC  - Implement neutropenic guidelines   Outcome: Progressing      Problem: SAFETY ADULT  Goal: Maintain or return to baseline ADL function  INTERVENTIONS:  -  Assess patient's ability to carry out ADLs; assess patient's baseline for ADL function and identify physical deficits which impact ability to perform ADLs (bathing, care of mouth/teeth, toileting, grooming, dressing, etc )  - Assess/evaluate cause of self-care deficits   - Assess range of motion  - Assess patient's mobility; develop plan if impaired  - Assess patient's need for assistive devices and provide as appropriate  - Encourage maximum independence but intervene and supervise when necessary  ¯ Involve family in performance of ADLs  ¯ Assess for home care needs following discharge   ¯ Request OT consult to assist with ADL evaluation and planning for discharge  ¯ Provide patient education as appropriate   Outcome: Progressing    Goal: Maintain or return mobility status to optimal level  INTERVENTIONS:  - Assess patient's baseline mobility status (ambulation, transfers, stairs, etc )    - Identify cognitive and physical deficits and behaviors that affect mobility  - Identify mobility aids required to assist with transfers and/or ambulation (gait belt, sit-to-stand, lift, walker, cane, etc )  - [place] fall precautions as indicated by assessment  - Record patient progress and toleration of activity level on Mobility SBAR; progress patient to next Phase/Stage  - Instruct patient to call for assistance with activity based on assessment  - Request Rehabilitation consult to assist with strengthening/weightbearing, etc    Outcome: Progressing      Problem: DISCHARGE PLANNING  Goal: Discharge to home or other facility with appropriate resources  INTERVENTIONS:  - Identify barriers to discharge w/patient and caregiver  - Arrange for needed discharge resources and transportation as appropriate  - Identify discharge learning needs (meds, wound care, etc )  - Arrange for interpretive services to assist at discharge as needed  - Refer to Case Management Department for coordinating discharge planning if the patient needs post-hospital services based on physician/advanced practitioner order or complex needs related to functional status, cognitive ability, or social support system   Outcome: Progressing      Problem: Knowledge Deficit  Goal: Patient/family/caregiver demonstrates understanding of disease process, treatment plan, medications, and discharge instructions  Complete learning assessment and assess knowledge base    Interventions:  - Provide teaching at level of understanding  - Provide teaching via preferred learning methods   Outcome: Progressing      Problem: GENITOURINARY - ADULT  Goal: Maintains or returns to baseline urinary function  INTERVENTIONS:  - Assess urinary function  - Encourage oral fluids to ensure adequate hydration  - Administer IV fluids as ordered to ensure adequate hydration  - Administer ordered medications as needed  - Offer frequent toileting  - Follow urinary retention protocol if ordered   Outcome: Progressing    Goal: Absence of urinary retention  INTERVENTIONS:  - Assess patients ability to void and empty bladder  - Monitor I/O  - Bladder scan as needed  - Discuss with physician/AP medications to alleviate retention as needed  - Discuss catheterization for long term situations as appropriate   Outcome: Progressing    Goal: Urinary catheter remains patent  INTERVENTIONS:  - Assess patency of urinary catheter  - If patient has a chronic cortez, consider changing catheter if non-functioning  - Follow guidelines for intermittent irrigation of non-functioning urinary catheter   Outcome: Progressing

## 2018-02-11 NOTE — CONSULTS
Consultation - Cardiology    Jazmyn Bell 69 y o  male MRN: [id number]  Unit/Bed#: -01 Encounter: [id number]    Physician Requesting Consult: Car Rivera MD    Reason for Consult / Chief Complaint: cad    HPI: Jazmyn Bell is a 69 y o  male with a past medical history significant for CAD with history of PCI on Plavix, chronic systolic congestive heart failure, cardiomyopathy with AICD implantation, anemia, diabetes, gout, hypertension, hyperlipidemia, skin cancer  Patient came to the hospital with complaints of inability to urinate  Patient states he was recently at [facility], had Mckeon placed, was able to urinate, the Mckeon was taken out, he was discharged and since then has not been able to urinate  He has associated abdominal pain and constipation  He notes he had a small bowel movement about 2 days ago and it was black in color but had taken some milk of magnesia  He was at [facility] with complaints of chest pain  Had stress test which showed no ischemia but fixed defects, EF was 26%  At that time hemoglobin at its lowest was 9 3/27 7, on 2/7/2018  Patient denies any chest pain, chest pressure, chest heaviness currently  He denies any shortness of breath  He appears comfortable  Historical Information   Past Medical History:   Diagnosis Date    Cardiac disease     [facility] [address] [facility]     [facility]     Disease of thyroid gland     Enlarged prostate     Hyperlipidemia     Hypertension      Past Surgical History:   Procedure Laterality Date    HIP ARTHROPLASTY Right      History   Alcohol Use    Yes     Comment: 1-2 per month      History   Drug Use No     History   Smoking Status    Former Smoker   Smokeless Tobacco    Never Used       FAMILY HISTORY:  History reviewed  No pertinent family history      MEDS & ALLERGIES:  all current active meds have been reviewed and current meds:   Current Facility-Administered Medications Medication Dose Route Frequency    aspirin chewable tablet 81 mg  81 mg Oral Daily    clopidogrel (PLAVIX) tablet 75 mg  75 mg Oral Daily    finasteride (PROSCAR) tablet 5 mg  5 mg Oral Daily    insulin lispro (HumaLOG) 100 units/mL subcutaneous injection 1-5 Units  1-5 Units Subcutaneous TID AC    insulin lispro (HumaLOG) 100 units/mL subcutaneous injection 1-5 Units  1-5 Units Subcutaneous HS    levothyroxine tablet 88 mcg  88 mcg Oral Early Morning    metoprolol tartrate (LOPRESSOR) tablet 25 mg  25 mg Oral BID    ondansetron (ZOFRAN) injection 4 mg  4 mg Intravenous Q6H PRN    pantoprazole (PROTONIX) EC tablet 40 mg  40 mg Oral BID AC    tamsulosin (FLOMAX) capsule 0 4 mg  0 4 mg Oral Daily        Allergies   Allergen Reactions    Atorvastatin     Percocet  [Oxycodone-Acetaminophen]          REVIEW OF SYSTEMS:  Constitutional: Denies fever or chills  Eyes: Denies eye discharge or change in visual acuity   HENT: Denies sneezing, nasal congestion or sore throat   Respiratory: Denies cough, expectoration or shortness of breath   Cardiovascular: Denies chest pain, palpitations or lower extremity swelling   GI: + abdominal pain, constipation, black stool x1  Denies nausea, vomiting, bloody stools or diarrhea   : + difficulty with urination Denies dysuria, frequency, nocturia  Musculoskeletal: Denies back pain or joint pain   Neurologic: Denies lightheadedness, syncope, headache, focal weakness or sensory changes   Endocrine: Denies polyuria or polydipsia   Lymphatic: Denies swollen glands   Psychiatric: Denies depression, anxiety or panic       PHYSICAL EXAM:  Vitals:   Vitals:    02/11/18 0700   BP: 100/52   Pulse: 80   Resp: 17   Temp: 98 6 °F (37 °C)   SpO2: 97%     There is no height or weight on file to calculate BMI      Systolic (97BUM), DPP:198 , Min:78 , NGF:774     Diastolic (98FKF), AFJ:31, Min:30, Max:91      Intake/Output Summary (Last 24 hours) at 02/11/18 0908  Last data filed at 02/11/18 0641   Gross per 24 hour   Intake                0 ml   Output           3428 8 ml   Net          -3428 8 ml     Weight (last 2 days)     None        Invasive Devices     Peripheral Intravenous Line            Peripheral IV 02/10/18 Left Antecubital less than 1 day    Peripheral IV 02/10/18 Right Forearm less than 1 day          Drain            Urethral Catheter Coude 16 Fr  2 days                General: +pallor Patient is not in acute distress  Laying comfortably in the bed  Awake, alert, responding to commands  Head: Normocephalic  Atraumatic  HEENT: Both pupils normal sized, round and reactive to light  Nonicteric  Neck: JVP not elevated  Trachea central  No thyromegaly  Respiratory: Bilateral bronchovascular breath sounds with no crackles or rhonchi  Cardiovascular: RRR  S1 and S2 normal  No murmur, rub or gallop  GI: Abdomen soft, nontender  No guarding or rigidity  Liver and spleen not palpable  Bowel sounds present  Neurologic: Patient is awake, alert, responding to commands  Well-oriented to time, place and person   Moving all extremities  Extremities: No clubbing, cyanosis or edema  Integument: No skin rashes or ulceration  Lymphatic: No cervical lymphadenopathy      LABORATORY RESULTS:        CBC with diff:   Results from last 7 days  Lab Units 02/11/18  0443 02/10/18  1219   WBC Thousand/uL 5 54 7 86   HEMOGLOBIN g/dL 7 8* 9 0*   HEMATOCRIT % 23 6* 26 8*   MCV fL 107* 106*   PLATELETS Thousands/uL 190 206   MCH pg 35 3* 35 4*   MCHC g/dL 33 1 33 6   RDW % 18 1* 17 7*   MPV fL 10 5 10 2   NRBC AUTO /100 WBCs 0 0       CMP:  Results from last 7 days  Lab Units 02/11/18  0443 02/10/18  1219   SODIUM mmol/L 143 138   POTASSIUM mmol/L 3 8 3 1*   CHLORIDE mmol/L 107 100   CO2 mmol/L 30 27   ANION GAP mmol/L 6 11   BUN mg/dL 20 25   CREATININE mg/dL 1 79* 2 06*   GLUCOSE RANDOM mg/dL 119 214*   CALCIUM mg/dL 8 8 9 0   AST U/L 180* 371*   ALT U/L 139* 186*   ALK PHOS U/L 137* 163*   TOTAL PROTEIN g/dL 6  3* 7 2   BILIRUBIN TOTAL mg/dL 1 40* 1 80*   EGFR ml/min/1 73sq m 36 30       BMP:  Results from last 7 days  Lab Units 18  0443 02/10/18  1219   SODIUM mmol/L 143 138   POTASSIUM mmol/L 3 8 3 1*   CHLORIDE mmol/L 107 100   CO2 mmol/L 30 27   BUN mg/dL 20 25   CREATININE mg/dL 1 79* 2 06*   GLUCOSE RANDOM mg/dL 119 214*   CALCIUM mg/dL 8 8 9 0              Results from last 7 days  Lab Units 02/10/18  1219   MAGNESIUM mg/dL 2 0                   Lipid Profile:   Lab Results   Component Value Date    CHOL 144 2017    CHOL 142 2015     Lab Results   Component Value Date    HDL 60 2017    HDL 57 2015     Lab Results   Component Value Date    LDLCALC 66 2015     Lab Results   Component Value Date    TRIG 95 2015       Cardiac testing:   Results for orders placed during the hospital encounter of 17   Echo complete with contrast if indicated    Narrative [address]  [phone]    Transthoracic Echocardiogram  2D, M-mode, and Color Doppler    Study date:  2017    Patient: Hank Caldera  MR number: [id number]  Account number: [de-identified]  : 1940  Age: 77 years  Gender: Male  Status: Outpatient  Location: [facility]  Height: 66 in  Weight: 164 lb  BP: 120/ 60 mmHg    Indications: Shortness of Breath  Diagnoses: R06 02 - Shortness of breath    Sonographer:  Michael Her, ARTHUR  Interpreting Physician:  Ru Hutchinson MD  Primary Physician:  Sally Beck MD  Referring Physician:  Ru Hutchinson MD  Group:  [facility]    SUMMARY    LEFT VENTRICLE:  The ventricle was mildly dilated  Ejection fraction was estimated to be 25 %  There is severe hypokinesis of the septum, anterior wall and apex  MITRAL VALVE:  There was trace regurgitation  TRICUSPID VALVE:  There was trace regurgitation  HISTORY: PRIOR HISTORY: CAD  s/p angioplasty  Hyperlipidemia  Tachycardia  Former smoker  Previous (remote) myocardial infarction  Risk factors: hypertension, oral hypoglycemic-treated diabetes, and a family history of coronary artery  disease  PRIOR PROCEDURES: PTCA  PROCEDURE: The study was performed in the [address] [place] [address]  This was a routine study  The transthoracic approach was used  The study included complete 2D imaging, M-mode, and color Doppler  The heart rate was 69 bpm, at the  start of the study  Images were obtained from the parasternal, apical, subcostal, and suprasternal notch acoustic windows  Echocardiographic views were limited due to poor acoustic window availability, decreased penetration, and lung  interference  This was a technically difficult study  LEFT VENTRICLE: The ventricle was mildly dilated  Ejection fraction was estimated to be 25 %  There is severe hypokinesis of the septum, anterior wall and apex  RIGHT VENTRICLE: The size was normal  Systolic function was normal  Wall thickness was normal  ICD lead noted  LEFT ATRIUM: Size was normal     RIGHT ATRIUM: Size was normal     MITRAL VALVE: There was annular calcification  Valve structure was normal  There was normal leaflet separation  DOPPLER: The transmitral velocity was within the normal range  There was no evidence for stenosis  There was trace  regurgitation  AORTIC VALVE: The valve was not well visualized  DOPPLER: There was no evidence for stenosis  TRICUSPID VALVE: The valve structure was normal  There was normal leaflet separation  DOPPLER: The transtricuspid velocity was within the normal range  There was no evidence for stenosis  There was trace regurgitation  PULMONIC VALVE: Leaflets exhibited normal thickness, no calcification, and normal cuspal separation  DOPPLER: The transpulmonic velocity was within the normal range  There was no regurgitation  PERICARDIUM: There was no pericardial effusion   The pericardium was normal in appearance  AORTA: The root exhibited normal size  SYSTEM MEASUREMENT TABLES    Apical four chamber  4 chamber Left Atrium Volume Index; Planimetry; End Systole; Apical four chamber;: 27 1 cm2  Left Ventricular Diastolic Area; Method of Disks, Single Plane; End Diastole; Apical four chamber;: 47 77 cm2  Left Ventricular Ejection Fraction; Method of Disks, Single Plane; Apical four chamber;: 26 7 %  Left Ventricular systolic Area; Method of Disks, Single Plane; End Systole; Apical four chamber;: 38 84 cm2  Right Atrium Systolic Area; Planimetry; End Systole; Apical four chamber;: 20 21 cm2  Right Ventricular Internal Diastolic Dimension; End Diastole; Apical four chamber;: 32 1 mm  TAPSE: 24 3 mm    Unspecified Scan Mode  Aortic Root Diameter; End Systole;: 35 2 mm  Gradient Pressure, Peak; Simplified Bernoulli; Antegrade Flow; Systole;: 6 8 mm[Hg]  Gradient pressure, average; Simplified Bernoulli; Antegrade Flow; Systole;: 4 2 mm[Hg]  Left Atrium to Aortic Root Ratio;: 1 02  Left atrial diameter; End Diastole;: 35 8 mm  Cardiac Output; Teichholz; Systole;: 7 24 L/min  Heart rate; Teichholz;: 72 /min  Interventricular Septum Diastolic Thickness; Teichholz; End Diastole;: 7 5 mm  Left Ventricle Internal End Diastolic Dimension; Teichholz;: 61 4 mm  Left Ventricle Internal Systolic Dimension; Teichholz; End Systole;: 44 3 mm  Left Ventricle Mass; Mass AVCube with Teichholz; End Diastole;: 180 g  Left Ventricle Posterior Wall Diastolic Thickness; Teichholz; End Diastole;: 7 6 mm  Left Ventricular Ejection Fraction; Teichholz;: 53 %  Left Ventricular End Diastolic Volume; Teichholz;: 189 7 ml  Left Ventricular End Systolic Volume; Teichholz;: 89 1 ml  Left Ventricular Fractional Shortening;: 27 9 %  Stroke volume;  Teichholz; Systole;: 100 6 ml  Mitral Valve Area; Area by Pressure Half-Time; Systole;: 3 73 cm2  Mitral Valve E to A Ratio; Systole;: 0 46  Pressure half time; Diastole;: 0 06 s    [employer] Accredited Echocardiography Laboratory    Prepared and electronically signed by    Georges Sun MD  Signed 07-Jul-2017 15:35:37       No results found for this or any previous visit  No procedure found  No results found for this or any previous visit  Imaging: I have personally reviewed pertinent reports  Procedure: Ct Abdomen Pelvis Wo Contrast    Result Date: 2/10/2018  Narrative: CT ABDOMEN AND PELVIS WITHOUT IV CONTRAST INDICATION:  Urinary retention  COMPARISON: CT abdomen pelvis 10/28/2015 TECHNIQUE:  CT examination of the abdomen and pelvis was performed without intravenous contrast   Reformatted images were created in axial, sagittal, and coronal planes  Radiation dose length product (DLP) for this visit:  631 mGy-cm   This examination, like all CT scans performed in the [facility], was performed utilizing techniques to minimize radiation dose exposure, including the use of iterative reconstruction and automated exposure control  Enteric contrast was administered  FINDINGS: ABDOMEN Evaluation is limited by lack of intravenous contrast  LOWER CHEST:  Atelectatic changes are noted at the lung bases  No other significant abnormality identified in the lower chest   There is cardiomegaly with small pericardial effusion  LIVER/BILIARY TREE:  No intrahepatic or extrahepatic biliary ductal dilation  Multiple densities in the region of the distal common bile duct suspicious for choledocholithiasis  GALLBLADDER:  There are gallstone(s) within the gallbladder, without pericholecystic inflammatory changes  SPLEEN:  Unremarkable  PANCREAS:  Unremarkable  ADRENAL GLANDS:  Unremarkable  KIDNEYS/URETERS:  Unremarkable  No hydronephrosis  Stable right renal scarring  STOMACH AND BOWEL:  Small hiatal hernia  No evidence of bowel obstruction  Mild stool retention throughout the colon  APPENDIX:  A normal appendix was visualized   ABDOMINOPELVIC CAVITY:  No ascites or free intraperitoneal air  No lymphadenopathy  VESSELS:  Atherosclerotic changes are present  No evidence of aneurysm  PELVIS Evaluation of the pelvis is limited by streak artifact from right hip arthroplasty  REPRODUCTIVE ORGANS:  The prostate is enlarged  URINARY BLADDER:  Bladder is decompressed by a catheter  The catheter tip along the anterior superior margin of the bladder  ABDOMINAL WALL/INGUINAL REGIONS:  Unremarkable  OSSEOUS STRUCTURES:  There is streak artifact from the right hip arthroplasty  There are degenerative changes of the lumbar spine  Impression: 1  Cholelithiasis without other evidence of acute cholecystitis  Multiple dense foci within the region of the distal common bile duct suspicious for choledocholithiasis  Correlate for clinical and laboratory evidence of biliary obstruction and consider further evaluation with MRCP  2   The bladder is decompressed by a Mckeon catheter  The catheter tip is at the superior and anterior margin of the bladder but does appear to still be within the bladder  Correlate clinically and consider catheter retraction  3   Cardiomegaly with small pericardial effusion  4   Large prostate  Workstation performed: [id number]       EKG reviewed personally: ST, with PVC, IRBBB, LAFB, septal infarct age undetermined, t wave abnormalities consider lateral ishcemia    Telemetry reviewed personally:  Non tele      Assessment and Plan:  1  History of CAD/PCI--recent stress test done at [facility] last week; no ischemia but large fixed defect noted  EF 26%  Given current anemia will DC aspirin and Plavix, continue Lopressor 25 b i d  statins on hold given elevated LFTs  2   Anemia/Heme + Stools--H&H on admission 9 0/26 8 now 7 8/23  6  Aspirin and Plavix have been discontinued  Management per GI and [facility] Hospitalist    3  Elevated LFTs--right upper quadrant ultrasound pending    Management per [facility] Internal [facility] Hospitalist and GI    4  Chronic systolic congestive heart failure/cardiomyopathy--EF on stress test 1 week ago was 26%; patient has AICD  Patient appears euvolemic  Patient does not take diuretics at home  Continue with beta-blockers  Aspirin and Plavix are on hold, statins have been discontinued given elevated LFTs  No Ace inhibitors given renal insufficiency  Monitor intake and output  5   Hypertension--on the lower side last blood pressure 100/52; overnight patient was noted to have a blood pressure of 78/30  Monitor vitals closely  Patient on metoprolol 25 b i d  with holding parameters for SBP less than 110      6  Hyperlipidemia--statins on hold given elevated LFTs      Code Status: Level 1 - Full Code      Thank you for allowing us to participate in this patient's care  This pt will follow up with Dr Dallas Yun  once discharged  Counseling / Coordination of Care  Total floor / unit time spent today 35 minutes  Greater than 50% of total time was spent with the patient and / or family counseling and / or coordination of care  A description of the counseling / coordination of care: Review of history, current assessment, development of a plan         Albertina Husain PA-C  2/11/2018,9:08 AM

## 2018-02-11 NOTE — CASE MANAGEMENT
Initial Clinical Review    Admission: Date/Time/Statement: 2/10/18 @ 1303     Orders Placed This Encounter   Procedures    Inpatient Admission     Standing Status:   Standing     Number of Occurrences:   1     Order Specific Question:   Admitting Physician     Answer:   Sue Mason     Order Specific Question:   Level of Care     Answer:   Med Surg [16]     Order Specific Question:   Estimated length of stay     Answer:   More than 2 Midnights     Order Specific Question:   Certification     Answer:   I certify that inpatient services are medically necessary for this patient for a duration of greater than two midnights  See H&P and MD Progress Notes for additional information about the patient's course of treatment  ED: Date/Time/Mode of Arrival:   ED Arrival Information     Expected Arrival Acuity Means of Arrival Escorted By Service Admission Type    - 2/10/2018 11:28 Emergent [facility] Medicine Emergency    Arrival Complaint    URINARY RETENTION          Chief Complaint:   Chief Complaint   Patient presents with    Urinary Retention     Pt reports urinary retention  Pt reports hx of enlarged prostate, inability to urinate since Thursday  History of Sheryl Dubon is a 69 y o  male who presents with urinary retention  Patient was recently admitted to [facility] and discharged 2 days ago he was retaining urine at the time of discharge and was straight cathed however he has not been able to void since he was discharged  He has had abdominal pain and associated constipation  A Mckeon catheter was placed in the emergency department which relieved his abdominal pain  Patient notes that he had a small bowel movement yesterday which was black in color  Patient presently denies chest pain and his admission at [facility] recently was for chest pain he reports he had a stress test and was told his heart was weak    He has 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  ED Vital Signs:   ED Triage Vitals   Temperature Pulse Respirations Blood Pressure SpO2   02/10/18 1258 02/10/18 1146 02/10/18 1146 02/10/18 1146 02/10/18 1146   98 7 °F (37 1 °C) (!) 108 22 98/59 99 %      Temp Source Heart Rate Source Patient Position - Orthostatic VS BP Location FiO2 (%)   02/10/18 1258 02/10/18 1146 02/10/18 1146 02/10/18 1146 --   Oral Monitor Lying Right arm       Pain Score       02/10/18 1258       No Pain        Wt Readings from Last 1 Encounters:   02/09/18 66 1 kg (145 lb 12 8 oz)       Vital Signs (abnormal):   02/11/18 0015  --  --  --  100/55  --  --  --   02/10/18 2317  98 8 °F (37 1 °C)  76  18   78/30  95 %  None (Room air)  Lying   02/10/18 2000  98 6 °F (37 °C)  83  18  121/85  --  None (Room            Abnormal Labs/Diagnostic Test Results: K  3 1, BUN creat   25 2 06, gluc 214, ast  371, alt  186, alk phos  163, alb  3 1, total  Bili  1 80, H&H  9 0 26 8  CT abd -    1   Cholelithiasis without other evidence of acute cholecystitis   Multiple dense foci within the region of the distal common bile duct suspicious for choledocholithiasis   Correlate for clinical and laboratory evidence of biliary obstruction and   consider further evaluation with MRCP  2   The bladder is decompressed by a Mckeon catheter   The catheter tip is at the superior and anterior margin of the bladder but does appear to still be within the bladder   Correlate clinically and consider catheter retraction  3   Cardiomegaly with small pericardial effusion  4   Large prostate          EKG-    Sinus tachycardia with occasional Premature ventricular complexes  Incomplete right bundle branch block  Left anterior fascicular block  Septal infarct , age undetermined          ED Treatment:   Medication Administration from 02/10/2018 1128 to 02/10/2018 1428       Date/Time Order Dose Route Action Action by Comments     02/10/2018 1243 sodium chloride 0 9 % bolus 1,000 mL 1,000 mL Intravenous New Bag Gaurang Mccabe RN           Past Medical/Surgical History: Active Ambulatory Problems     Diagnosis Date Noted    No Active Ambulatory Problems     Resolved Ambulatory Problems     Diagnosis Date Noted    No Resolved Ambulatory Problems     Past Medical History:   Diagnosis Date    Cardiac disease     CHF (congestive heart failure) ([place] [address] )     Diabetes mellitus ([place] [address] )     Disease of thyroid gland     Enlarged prostate     Hyperlipidemia     Hypertension        Admitting Diagnosis: Coronary artery disease [I25 10]  Urinary retention [R33 9]  Heme positive stool [R19 5]  JESSA (acute kidney injury) ([place] [address] ) [N17 9]    Age/Sex: 69 y o  male    Assessment/Plan: Assessment/Plan:     Hospital Problem List:      Principal Problem:    Urinary retention  Active Problems:    Coronary artery disease    Type 2 diabetes mellitus (HCC)    Anemia    Hypertension    Hyperlipidemia    CHF (congestive heart failure) ([place] [address] )        Plan for the Primary Problem(s):     Principal Problem:    Urinary retention-urology consult cortez placed with return of >1 liter urine; ho BPH; he has required cortze in the past; abdominal pain resolved  JESSA cr 2-received 1l IVF in er and has h/o systolic chf with aicd-will monitor cr with cortez in place  Transaminitis-ct a/p with Multiple dense foci within the region of the distal common bile duct suspicious for choledocholithiasis   Correlate for clinical and laboratory evidence of biliary obstruction and consider further evaluation with MRCP   Pt cant have mrcp due to AICD-gi consulted; since bladder decompression no abdominal pain-case d/w dr Simran Paniagua start PPI; hold plavix will place cardiology consult for cad recent admit to [facility] for chest pain regarding holding plavix; ruq us; clear liquid diet for now; may need ercp Monday 2/12/18        Active Problems:  Anemia-heme positive stool-GI consult; check indices;  Start fe    Coronary artery disease and h/o chf-had recent admit [facility] discharged 2 days ago  for chest pain-not presently complaining of this; cardiology consult and obtain records    Type 2 diabetes mellitus (HCC)-on metformin and glipizide as outpatient; ss coverage      Hypertension-continue outpatient meds    Hyperlipidemia-on statin as outpatient but will hold with elevated lfts  Hypokalemia replace and check mg           VTE Prophylaxis: Pharmacologic VTE Prophylaxis contraindicated due to anemia  / sequential compression device   Code Status: full  POLST: POLST is not applicable to this patient     Anticipated Length of Stay:  Patient will be admitted on an Inpatient basis with an anticipated length of stay of  > 2 midnights  Justification for Hospital Stay: anemia gi bleed    Admission Orders:  Scheduled Meds:   Current Facility-Administered Medications:  aspirin 81 mg Oral Daily Rox Santiago MD   clopidogrel 75 mg Oral Daily Rox Santiago MD   finasteride 5 mg Oral Daily Paulygabe Moreira MD   insulin lispro 1-5 Units Subcutaneous TID AC Pauly Moreira MD   insulin lispro 1-5 Units Subcutaneous [facility] Lillie   levothyroxine 88 mcg Oral Early Morning Pauly Moreira MD   metoprolol tartrate 25 mg Oral BID [facility] MD Lillie   ondansetron 4 mg Intravenous Q6H PRN Rox Santiago MD   pantoprazole 40 mg Oral BID AC Pauly Moreira MD   tamsulosin 0 4 mg Oral Daily Pauly Moreira MD     Continuous Infusions:    PRN Meds: ondansetron    Fingerstick ac and hs  NPO   Up and OOB as cresencio   US RUQ   2/10 bili direct , hgb a1c   2/12 cbc , cmp   Cardiology , GI and urology consult   SCD    GI consult 2/10  ASSESSMENT/PLAN:      1   Heme-positive stool  2  Anemia new onset  3  Choledocholithiasis?   4   abnormal LFTs with a mixed picture differential diagnosis include hepatitis versus is gallstones     Heme-positive stool with baseline hemoglobin of 12 current hemoglobin of 9 differential diagnosis include peptic ulcer disease versus diverticular bleed less likely has no overt bleeding seen versus colonic mass versus AVM  Patient is not a reliable historian as he is unable to give details regarding his previous history  Will monitor H&H  Will start the Protonix drip  Can continue clears for now  In setting of aspirin/Plavix patient may have gastritis/peptic ulcer disease  Continue to monitor stool output  Further management based on trend of hemoglobin  May need inpatient versus outpatient EGD and colonoscopy  Of note no previous endoscopic evaluation as per patient  Would hold iron this time so stool output can be appropriately evaluated      New set anemia may be secondary to recent admission to the hospital with acute illness versus GI losses with heme-positive stools  Currently not having a major obscure overt GI bleed as patient has not had significant stool output in over several days  In fact reports being more constipated  Will continue monitor stool output  Can start stool softeners for now due to history of constipation      Possible choledocholithiasis seen on a CT scan without contrast   Would recommend ultrasound of the right upper quadrant  LFT elevation may be secondary to choledocholithiasis  We will continue to trend LFTs and follow up on ultrasound  Patient cannot get MRI due to pacer/defibrillator  If ultrasound is inconclusive and LFTs continue to rise will need ERCP versus endoscopic ultrasound  Is also since patient is on Plavix please discuss with Cardiology can be safely held  Fortunately no evidence of cholangitis  Check direct bili         Thank you,  [address] [facility] in the [facility] by Jose Malagon for 2017  Network Utilization Review Department  Phone: [phone]; Fax [phone]  ATTENTION: The Network Utilization Review Department is now centralized for our 7 Facilities   Make a note that we have a new phone and fax numbers for our Department  Please call with any questions or concerns to [phone] and carefully follow the prompts so that you are directed to the right person  All voicemails are confidential  Fax any determinations, approvals, denials, and requests for initial or continue stay review clinical to [phone]  Due to HIGH CALL volume, it would be easier if you could please send faxed requests to expedite your requests and in part, help us provide discharge notifications faster

## 2018-02-12 ENCOUNTER — ANESTHESIA (INPATIENT)
Dept: PERIOP | Facility: HOSPITAL | Age: 78
DRG: 378 | End: 2018-02-12
Payer: COMMERCIAL

## 2018-02-12 ENCOUNTER — TRANSITIONAL CARE MANAGEMENT (OUTPATIENT)
Dept: INTERNAL MEDICINE CLINIC | Facility: CLINIC | Age: 78
End: 2018-02-12

## 2018-02-12 ENCOUNTER — TELEPHONE (OUTPATIENT)
Dept: UROLOGY | Facility: CLINIC | Age: 78
End: 2018-02-12

## 2018-02-12 ENCOUNTER — ANESTHESIA EVENT (INPATIENT)
Dept: PERIOP | Facility: HOSPITAL | Age: 78
DRG: 378 | End: 2018-02-12
Payer: COMMERCIAL

## 2018-02-12 LAB
ALBUMIN SERPL BCP-MCNC: 2.5 G/DL (ref 3.5–5)
ALP SERPL-CCNC: 130 U/L (ref 46–116)
ALT SERPL W P-5'-P-CCNC: 95 U/L (ref 12–78)
ANION GAP SERPL CALCULATED.3IONS-SCNC: 10 MMOL/L (ref 4–13)
AST SERPL W P-5'-P-CCNC: 69 U/L (ref 5–45)
BACTERIA UR CULT: NORMAL
BASOPHILS # BLD AUTO: 0.02 THOUSANDS/ΜL (ref 0–0.1)
BASOPHILS NFR BLD AUTO: 0 % (ref 0–1)
BILIRUB SERPL-MCNC: 0.7 MG/DL (ref 0.2–1)
BUN SERPL-MCNC: 16 MG/DL (ref 5–25)
CALCIUM SERPL-MCNC: 8.4 MG/DL (ref 8.3–10.1)
CHLORIDE SERPL-SCNC: 105 MMOL/L (ref 100–108)
CO2 SERPL-SCNC: 28 MMOL/L (ref 21–32)
CREAT SERPL-MCNC: 1.7 MG/DL (ref 0.6–1.3)
EOSINOPHIL # BLD AUTO: 0.02 THOUSAND/ΜL (ref 0–0.61)
EOSINOPHIL NFR BLD AUTO: 0 % (ref 0–6)
ERYTHROCYTE [DISTWIDTH] IN BLOOD BY AUTOMATED COUNT: 18.3 % (ref 11.6–15.1)
EST. AVERAGE GLUCOSE BLD GHB EST-MCNC: 163 MG/DL
GFR SERPL CREATININE-BSD FRML MDRD: 38 ML/MIN/1.73SQ M
GLUCOSE SERPL-MCNC: 103 MG/DL (ref 65–140)
GLUCOSE SERPL-MCNC: 117 MG/DL (ref 65–140)
GLUCOSE SERPL-MCNC: 152 MG/DL (ref 65–140)
GLUCOSE SERPL-MCNC: 251 MG/DL (ref 65–140)
GLUCOSE SERPL-MCNC: 91 MG/DL (ref 65–140)
HBA1C MFR BLD: 7.3 % (ref 4.2–6.3)
HCT VFR BLD AUTO: 24.3 % (ref 36.5–49.3)
HGB BLD-MCNC: 8.1 G/DL (ref 12–17)
LYMPHOCYTES # BLD AUTO: 1 THOUSANDS/ΜL (ref 0.6–4.47)
LYMPHOCYTES NFR BLD AUTO: 20 % (ref 14–44)
MCH RBC QN AUTO: 35.4 PG (ref 26.8–34.3)
MCHC RBC AUTO-ENTMCNC: 33.3 G/DL (ref 31.4–37.4)
MCV RBC AUTO: 106 FL (ref 82–98)
MONOCYTES # BLD AUTO: 0.45 THOUSAND/ΜL (ref 0.17–1.22)
MONOCYTES NFR BLD AUTO: 9 % (ref 4–12)
NEUTROPHILS # BLD AUTO: 3.52 THOUSANDS/ΜL (ref 1.85–7.62)
NEUTS SEG NFR BLD AUTO: 70 % (ref 43–75)
NRBC BLD AUTO-RTO: 0 /100 WBCS
PLATELET # BLD AUTO: 184 THOUSANDS/UL (ref 149–390)
PMV BLD AUTO: 10.1 FL (ref 8.9–12.7)
POTASSIUM SERPL-SCNC: 3.3 MMOL/L (ref 3.5–5.3)
PROT SERPL-MCNC: 6.3 G/DL (ref 6.4–8.2)
RBC # BLD AUTO: 2.29 MILLION/UL (ref 3.88–5.62)
SODIUM SERPL-SCNC: 143 MMOL/L (ref 136–145)
WBC # BLD AUTO: 5.03 THOUSAND/UL (ref 4.31–10.16)

## 2018-02-12 PROCEDURE — 85025 COMPLETE CBC W/AUTO DIFF WBC: CPT | Performed by: GENERAL PRACTICE

## 2018-02-12 PROCEDURE — 80053 COMPREHEN METABOLIC PANEL: CPT | Performed by: GENERAL PRACTICE

## 2018-02-12 PROCEDURE — 82043 UR ALBUMIN QUANTITATIVE: CPT | Performed by: INTERNAL MEDICINE

## 2018-02-12 PROCEDURE — C9113 INJ PANTOPRAZOLE SODIUM, VIA: HCPCS | Performed by: GENERAL PRACTICE

## 2018-02-12 PROCEDURE — 45378 DIAGNOSTIC COLONOSCOPY: CPT | Performed by: INTERNAL MEDICINE

## 2018-02-12 PROCEDURE — 81001 URINALYSIS AUTO W/SCOPE: CPT | Performed by: INTERNAL MEDICINE

## 2018-02-12 PROCEDURE — 99221 1ST HOSP IP/OBS SF/LOW 40: CPT | Performed by: INTERNAL MEDICINE

## 2018-02-12 PROCEDURE — 99221 1ST HOSP IP/OBS SF/LOW 40: CPT | Performed by: NURSE PRACTITIONER

## 2018-02-12 PROCEDURE — 0DJD8ZZ INSPECTION OF LOWER INTESTINAL TRACT, VIA NATURAL OR ARTIFICIAL OPENING ENDOSCOPIC: ICD-10-PCS | Performed by: INTERNAL MEDICINE

## 2018-02-12 PROCEDURE — 82570 ASSAY OF URINE CREATININE: CPT | Performed by: INTERNAL MEDICINE

## 2018-02-12 PROCEDURE — 99232 SBSQ HOSP IP/OBS MODERATE 35: CPT | Performed by: GENERAL PRACTICE

## 2018-02-12 PROCEDURE — 43235 EGD DIAGNOSTIC BRUSH WASH: CPT | Performed by: INTERNAL MEDICINE

## 2018-02-12 PROCEDURE — 0DJ08ZZ INSPECTION OF UPPER INTESTINAL TRACT, VIA NATURAL OR ARTIFICIAL OPENING ENDOSCOPIC: ICD-10-PCS | Performed by: INTERNAL MEDICINE

## 2018-02-12 PROCEDURE — 83036 HEMOGLOBIN GLYCOSYLATED A1C: CPT | Performed by: GENERAL PRACTICE

## 2018-02-12 PROCEDURE — 82948 REAGENT STRIP/BLOOD GLUCOSE: CPT

## 2018-02-12 PROCEDURE — 99232 SBSQ HOSP IP/OBS MODERATE 35: CPT | Performed by: PHYSICIAN ASSISTANT

## 2018-02-12 RX ORDER — SODIUM CHLORIDE, SODIUM LACTATE, POTASSIUM CHLORIDE, CALCIUM CHLORIDE 600; 310; 30; 20 MG/100ML; MG/100ML; MG/100ML; MG/100ML
INJECTION, SOLUTION INTRAVENOUS CONTINUOUS PRN
Status: DISCONTINUED | OUTPATIENT
Start: 2018-02-12 | End: 2018-02-12 | Stop reason: SURG

## 2018-02-12 RX ORDER — PROPOFOL 10 MG/ML
INJECTION, EMULSION INTRAVENOUS AS NEEDED
Status: DISCONTINUED | OUTPATIENT
Start: 2018-02-12 | End: 2018-02-12 | Stop reason: SURG

## 2018-02-12 RX ORDER — POTASSIUM CHLORIDE 20 MEQ/1
40 TABLET, EXTENDED RELEASE ORAL ONCE
Status: COMPLETED | OUTPATIENT
Start: 2018-02-12 | End: 2018-02-12

## 2018-02-12 RX ORDER — KETAMINE HYDROCHLORIDE 50 MG/ML
INJECTION, SOLUTION, CONCENTRATE INTRAMUSCULAR; INTRAVENOUS AS NEEDED
Status: DISCONTINUED | OUTPATIENT
Start: 2018-02-12 | End: 2018-02-12 | Stop reason: SURG

## 2018-02-12 RX ADMIN — INSULIN LISPRO 1 UNITS: 100 INJECTION, SOLUTION INTRAVENOUS; SUBCUTANEOUS at 12:19

## 2018-02-12 RX ADMIN — METOPROLOL TARTRATE 12.5 MG: 25 TABLET ORAL at 17:50

## 2018-02-12 RX ADMIN — PROPOFOL 10 MG: 10 INJECTION, EMULSION INTRAVENOUS at 14:45

## 2018-02-12 RX ADMIN — LIDOCAINE HYDROCHLORIDE 50 MG: 20 INJECTION, SOLUTION INTRAVENOUS at 14:29

## 2018-02-12 RX ADMIN — METOPROLOL TARTRATE 12.5 MG: 25 TABLET ORAL at 10:28

## 2018-02-12 RX ADMIN — PROPOFOL 10 MG: 10 INJECTION, EMULSION INTRAVENOUS at 14:41

## 2018-02-12 RX ADMIN — SODIUM CHLORIDE 8 MG/HR: 9 INJECTION, SOLUTION INTRAVENOUS at 17:51

## 2018-02-12 RX ADMIN — PROPOFOL 10 MG: 10 INJECTION, EMULSION INTRAVENOUS at 14:36

## 2018-02-12 RX ADMIN — SODIUM CHLORIDE, SODIUM LACTATE, POTASSIUM CHLORIDE, AND CALCIUM CHLORIDE: .6; .31; .03; .02 INJECTION, SOLUTION INTRAVENOUS at 14:27

## 2018-02-12 RX ADMIN — KETAMINE HYDROCHLORIDE 10 MG: 50 INJECTION INTRAMUSCULAR; INTRAVENOUS at 14:31

## 2018-02-12 RX ADMIN — INSULIN LISPRO 2 UNITS: 100 INJECTION, SOLUTION INTRAVENOUS; SUBCUTANEOUS at 22:52

## 2018-02-12 RX ADMIN — PROPOFOL 100 MG: 10 INJECTION, EMULSION INTRAVENOUS at 14:34

## 2018-02-12 RX ADMIN — POTASSIUM CHLORIDE 40 MEQ: 1500 TABLET, EXTENDED RELEASE ORAL at 10:28

## 2018-02-12 RX ADMIN — KETAMINE HYDROCHLORIDE 5 MG: 50 INJECTION INTRAMUSCULAR; INTRAVENOUS at 14:46

## 2018-02-12 RX ADMIN — PROPOFOL 10 MG: 10 INJECTION, EMULSION INTRAVENOUS at 14:38

## 2018-02-12 RX ADMIN — SODIUM CHLORIDE 8 MG/HR: 9 INJECTION, SOLUTION INTRAVENOUS at 10:31

## 2018-02-12 RX ADMIN — PROPOFOL 10 MG: 10 INJECTION, EMULSION INTRAVENOUS at 14:43

## 2018-02-12 NOTE — ANESTHESIA POSTPROCEDURE EVALUATION
Post-Op Assessment Note      CV Status:  Stable    Mental Status:  Alert and awake    Hydration Status:  Stable    PONV Controlled:  None    Airway Patency:  Patent    Post Op Vitals Reviewed: Yes          Staff: CRNA           /56 (02/12/18 1503)    Temp (!) 97 °F (36 1 °C) (02/12/18 1503)    Pulse 69 (02/12/18 1503)   Resp 17 (02/12/18 1503)    SpO2 97 % (02/12/18 1503)    Post procedure VS noted above, SV non obstructed on RA, awake and comfortable

## 2018-02-12 NOTE — ANESTHESIA PREPROCEDURE EVALUATION
Review of Systems/Medical History  Patient summary reviewed  Chart reviewed  No history of anesthetic complications     Cardiovascular  EKG reviewed, Exercise tolerance: poor,  Pacemaker/AICD (ICD shocked him years ago, reset and nothing since), Hyperlipidemia, Hypertension controlled, Past MI > 6 months, CAD, , Cardiac stents > 1 year History of percutaneous transluminal coronary angioplasty, CHF compensated CHF, SIDDIQUI,   Comment: History of CAD/PCI--recent stress test done at [facility] 2/07/18; no ischemia but large fixed defect noted  EF 26%  Off Plavix and ASA since 2/8/18    TTE - July 2017    LEFT VENTRICLE: The ventricle was mildly dilated  Ejection fraction was estimated to be 25 %  There is severe hypokinesis of the septum, anterior wall and apex      RIGHT VENTRICLE: The size was normal  Systolic function was normal  Wall thickness was normal  ICD lead noted  CT Scan - Cardiomegaly with small pericardial effusion,  Pulmonary    Comment: Former smoker     GI/Hepatic    GI bleeding (melena, no vomiting) , active,        Prostatic disorder, benign prostatic hyperplasia  Comment: Patient with recurrent urinary retention needing cortez catheter placement  Endo/Other  Diabetes type 2 Oral agent, History of thyroid disease , hypothyroidism,      GYN       Hematology  Anemia acute blood loss anemia,     Musculoskeletal  Negative musculoskeletal ROS        Neurology  Negative neurology ROS      Psychology           Physical Exam    Airway  Comment: Uvula is split into 2 sides  Mallampati score: I  TM Distance: >3 FB  Neck ROM: full     Dental   Comment: Missing multiple teeth, remaining teeth poor dentition, No notable dental hx     Cardiovascular  Rhythm: regular, Rate: normal,     Pulmonary  Pulmonary exam normal     Other Findings        Anesthesia Plan  ASA Score- 3     Anesthesia Type- IV sedation with anesthesia with ASA Monitors     Additional Monitors:   Airway Plan:         Plan Factors-    Induction- intravenous  Postoperative Plan-     Informed Consent- Anesthetic plan and risks discussed with patient  I personally reviewed this patient with the CRNA  Discussed and agreed on the Anesthesia Plan with the CRNA  Renato Page Results for Danilo Berkowitz (MRN [id number]) as of 2/12/2018 09:24   Ref   Range 2/12/2018 05:53   Hemoglobin Latest Ref Range: 12 0 - 17 0 g/dL 8 1 (L)   Hematocrit Latest Ref Range: 36 5 - 49 3 % 24 3 (L)

## 2018-02-12 NOTE — OP NOTE
**** GI/ENDOSCOPY REPORT ****     PATIENT NAME: Sunita Manning - VISIT ID:  Patient ID: [id number] YOB: 1940     INTRODUCTION: Esophagogastroduodenoscopy - A 69 male patient presents for   an inpatient Esophagogastroduodenoscopy at [facility]  INDICATIONS: Low hematocrit anemia  CONSENT: The benefits, risks, and alternatives to the procedure were   discussed and informed consent was obtained from the patient  PREPARATION:  EKG, pulse, pulse oximetry and blood pressure were monitored   throughout the procedure  ASA Classification: Class 3 - Patient has severe   systemic disturbance that may or may not be related to the disorder   requiring surgery  MEDICATIONS: Anesthesia administered by anesthesiologist      PROCEDURE:  The endoscope was passed without difficulty through the mouth   under direct visualization and advanced to the 2nd portion of the   duodenum  The scope was withdrawn and the mucosa was carefully examined  FINDINGS:   Esophagus: The esophagus appeared to be normal, regular z line   at 41 cm from the entry site  Stomach: A diffuse area of moderately   severe chronic gastritis was found in the cardia, fundus, body of the   stomach, and incisura and on the greater curvature of the stomach body  The mucosa appeared cobblestoned and erosive  Otherwise, the stomach   appeared to be normal   Duodenum: The duodenal bulb, 1st portion of the   duodenum, and 2nd portion of the duodenum appeared to be normal      COMPLICATIONS: There were no complications  IMPRESSIONS: Normal esophagus  Moderately severe chronic gastritis found   in the cardia, fundus, body of the stomach, and incisura and on the   greater curvature of the stomach body  Normal duodenal bulb, 1st portion   of the duodenum, and 2nd portion of the duodenum  RECOMMENDATIONS: Return to floor   Avoid all non-steroidal   anti-inflammatory drugs (NSAID's) including but not limited to Ibuprofen,   Advil, Motrin, and Nuprin  Anti-reflux measures: Raise the head of the bed   4 to 6 inches  Avoid smoking  Avoid excess coffee, tea or other   caffeinated beverages  Avoid garments that fit tightly through the   abdomen  Avoid eating before bed  Check H pylori stool testing  Continue   PPI once daily  Will proceed with Colonoscopy for anemia  ESTIMATED BLOOD LOSS: None  PATHOLOGY SPECIMENS: No     PROCEDURE CODES:     ICD-9 Codes: 280 0 Iron deficiency anemia secondary to blood loss   (chronic) 535 10 Atrophic gastritis, 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

## 2018-02-12 NOTE — TELEPHONE ENCOUNTER
----- Message from Merly Zarco, [address] sent at 2/12/2018  7:23 AM EST -----  Regarding: Void trial  Please contact patient or spouse with new patient appointment for void trial within 1 week  Patient is known to Dr Aguirre Nurse and was seen in consultation at [facility] February 12 for urinary retention  Patient will be discharged with Mckeon catheter and should have void trial within 1 week  Thank you

## 2018-02-12 NOTE — MALNUTRITION/BMI
This medical record reflects one or more clinical indicators suggestive of malnutrition and/or morbid obesity  Malnutrition Findings:   Malnutrition type: Acute illness (Acute moderate malnutrition related to inadequate intake as evidenced by <75% energy intake >7 days per pt reported diet history, evidence of 12% weight loss based on weight of 165 lb on 10/13/17 encounter and 145 lb on 2/9/18 encounter )  Degree of Malnutrition: Malnutrition of moderate degree    See Nutrition note dated 2/12/18 for additional details  Completed nutrition assessment is viewable in the nutrition documentation

## 2018-02-12 NOTE — OP NOTE
**** GI/ENDOSCOPY REPORT ****     PATIENT NAME: Oliver Carballo ------ VISIT ID:  Patient ID: [id number]   YOB: 1940     INTRODUCTION: Colonoscopy - A 69 male patient presents for an inpatient   Colonoscopy at [facility]  PREVIOUS COLONOSCOPY: No prior colonoscopy  INDICATIONS: Surveillance  Low hematocrit anemia  CONSENT:  The benefits, risks, and alternatives to the procedure were   discussed and informed consent was obtained from the patient  PREPARATION: EKG, pulse, pulse oximetry and blood pressure were monitored   throughout the procedure  The patient was identified by myself both   verbally and by visual inspection of ID band  ASA Classification: Class 3   - Patient has severe systemic disturbance that may or may not be related   to the disorder requiring surgery  MEDICATIONS: Anesthesia-check records     PROCEDURE:  The endoscope was passed without difficulty through the anus   under direct visualization and advanced to the terminal ileum, confirmed   by appendiceal orifice, cecal strap (crow's foot), and ileocecal valve  The scope was withdrawn and the mucosa was carefully examined  The quality   of the preparation was fair  Cecal Intubation Time: 5 minutes(s) Scope   Withdrawal Time: 7 minutes(s)     RECTAL EXAM: Normal rectal exam  Normal sphincter tone  FINDINGS:  On retroflexed view, small internal hemorrhoids were found in   the distal rectum  Otherwise, the colon appeared to be normal  There was   no evidence of fresh or old blood througout the colon  The TI appeared   unremarkable in the distal 10cm as well  COMPLICATIONS: There were no complications  IMPRESSIONS: Internal hemorrhoids in the distal rectum  There was no   evidence of fresh or old blood througout the colon  The TI appeared   unremarkable in the distal 10cm as well  RECOMMENDATIONS: Colonoscopy recommended in 1 year   The recommended   follow-up interval was changed due to preparation for exam was   inadequate/incomplete  Monitor H/H and transfuse as necessary  He will   likely need outpatient pill cam as no source of anemia identified on   today's exam  Can resume anticoagulation while closely monitoring the H/H      ESTIMATED BLOOD LOSS: None  PATHOLOGY SPECIMENS: No     PROCEDURE CODES: Colonoscopy     ICD-9 Codes:     ICD-10 Codes: D64 9 Anemia, unspecified K64 9 Unspecified hemorrhoids     PERFORMED BY: JUAN LUIS Zapata  on 02/12/2018  Version 1, electronically signed by JUAN LUIS Mckay  on 02/12/2018   at 15:04

## 2018-02-12 NOTE — PROGRESS NOTES
General Cardiology   Progress Note   Makayla Jackson 69 y o  male MRN: [id number]  Unit/Bed#: -01 Encounter: [id number]        Subjective:   No significant events since the last encounter  Denies chest pain, shortness of breath, palpitations, lightheadedness, leg swelling, syncope  Follows Dr Clover Lawson outpatient  Objective:   Vitals:  Vitals:    02/12/18 0700   BP: 110/60   Pulse: 88   Resp: 18   Temp: 99 1 °F (37 3 °C)   SpO2: 99%       There is no height or weight on file to calculate BMI  Systolic (72MVQ), SLN:378 , Min:110 , NWW:040     Diastolic (30DCE), YBK:77, Min:53, Max:72      Intake/Output Summary (Last 24 hours) at 02/12/18 8545  Last data filed at 02/12/18 0500   Gross per 24 hour   Intake                0 ml   Output             1100 ml   Net            -1100 ml     Weight (last 2 days)     None          PHYSICAL EXAMS:  General:  Patient is not in acute distress, laying in the bed comfortably, awake, alert responding to commands  Head: Normocephalic, Atraumatic  HEENT: White sclera, pink conjunctiva,  PERRLA,pharynx benign  Neck:  Supple, no neck vein distention, carotids+2/+2 no bruits, thyromegaly, adenopathy  Respiratory: clear to P/A  Cardiovascular:  PMI normal, S1-S2 normal, No  Murmurs, thrills, gallops, rubs   Regular rhythm  GI:  Abdomen soft nontender   No hepatosplenomegaly, adenopathy, ascites,or rebound tenderness  Extremities: No edema, normal pulses, no calf tenderness, no joint deformities, no venous disease   Integument:  No skin rashes or ulceration  Lymphatic:  No cervical or inguinal lymphadenopathy  Neurologic:  Patient is awake alert, responding to command, well-oriented to time and place and person moving all extremities      LABORATORY RESULTS:      CBC with diff:   Results from last 7 days  Lab Units 02/12/18  0553 02/11/18  1658 02/11/18  1300 02/11/18  0443 02/10/18  1219   WBC Thousand/uL 5 03  --   --  5 54 7 86   HEMOGLOBIN g/dL 8 1* 7 9* 7 7* 7 8* 9 0* HEMATOCRIT % 24 3* 23 8* 23 3* 23 6* 26 8*   MCV fL 106*  --   --  107* 106*   PLATELETS Thousands/uL 184  --   --  190 206   MCH pg 35 4*  --   --  35 3* 35 4*   MCHC g/dL 33 3  --   --  33 1 33 6   RDW % 18 3*  --   --  18 1* 17 7*   MPV fL 10 1  --   --  10 5 10 2   NRBC AUTO /100 WBCs 0  --   --  0 0       CMP:  Results from last 7 days  Lab Units 02/12/18  0553 02/11/18  0443 02/10/18  1219   SODIUM mmol/L 143 143 138   POTASSIUM mmol/L 3 3* 3 8 3 1*   CHLORIDE mmol/L 105 107 100   CO2 mmol/L 28 30 27   ANION GAP mmol/L 10 6 11   BUN mg/dL 16 20 25   CREATININE mg/dL 1 70* 1 79* 2 06*   GLUCOSE RANDOM mg/dL 117 119 214*   CALCIUM mg/dL 8 4 8 8 9 0   AST U/L 69* 180* 371*   ALT U/L 95* 139* 186*   ALK PHOS U/L 130* 137* 163*   TOTAL PROTEIN g/dL 6 3* 6 3* 7 2   BILIRUBIN TOTAL mg/dL 0 70 1 40* 1 80*   EGFR ml/min/1 73sq m 38 36 30       BMP:  Results from last 7 days  Lab Units 02/12/18  0553 02/11/18  0443 02/10/18  1219   SODIUM mmol/L 143 143 138   POTASSIUM mmol/L 3 3* 3 8 3 1*   CHLORIDE mmol/L 105 107 100   CO2 mmol/L 28 30 27   BUN mg/dL 16 20 25   CREATININE mg/dL 1 70* 1 79* 2 06*   GLUCOSE RANDOM mg/dL 117 119 214*   CALCIUM mg/dL 8 4 8 8 9 0                Results from last 7 days  Lab Units 02/10/18  1219   MAGNESIUM mg/dL 2 0       Results from last 7 days  Lab Units 02/12/18  0553 02/11/18  1300   HEMOGLOBIN A1C % 7 3* 7 4*               Lipid Profile:   Lab Results   Component Value Date    CHOL 144 01/09/2017    CHOL 142 05/21/2015     Lab Results   Component Value Date    HDL 60 01/09/2017    HDL 57 05/21/2015     Lab Results   Component Value Date    LDLCALC 66 05/21/2015     Lab Results   Component Value Date    TRIG 95 05/21/2015       Cardiac testing:   Results for orders placed during the hospital encounter of 07/06/17   Echo complete with contrast if indicated    Narrative [facility] [address] [facility]  [phone]    Transthoracic Echocardiogram  2D, M-mode, and Color Doppler    Study date:  2017    Patient: Nelly Gomez  MR number: [id number]  Account number: [de-identified]  : 1940  Age: 77 years  Gender: Male  Status: Outpatient  Location: [facility]  Height: 66 in  Weight: 164 lb  BP: 120/ 60 mmHg    Indications: Shortness of Breath  Diagnoses: R06 02 - Shortness of breath    Sonographer:  Rodriguez RCS  Interpreting Physician:  Shyam Kincaid MD  Primary Physician:  Mateo Jimenes MD  Referring Physician:  Shyam Kincaid MD  Group:  [facility]    LEFT VENTRICLE:  The ventricle was mildly dilated  Ejection fraction was estimated to be 25 %  There is severe hypokinesis of the septum, anterior wall and apex  MITRAL VALVE:  There was trace regurgitation  TRICUSPID VALVE:  There was trace regurgitation  HISTORY: PRIOR HISTORY: CAD  s/p angioplasty  Hyperlipidemia  Tachycardia  Former smoker  Previous (remote) myocardial infarction  Risk factors: hypertension, oral hypoglycemic-treated diabetes, and a family history of coronary artery  disease  PRIOR PROCEDURES: PTCA  PROCEDURE: The study was performed in the [address] [place] [address]  This was a routine study  The transthoracic approach was used  The study included complete 2D imaging, M-mode, and color Doppler  The heart rate was 69 bpm, at the  start of the study  Images were obtained from the parasternal, apical, subcostal, and suprasternal notch acoustic windows  Echocardiographic views were limited due to poor acoustic window availability, decreased penetration, and lung  interference  This was a technically difficult study  LEFT VENTRICLE: The ventricle was mildly dilated  Ejection fraction was estimated to be 25 %  There is severe hypokinesis of the septum, anterior wall and apex      RIGHT VENTRICLE: The size was normal  Systolic function was normal  Wall thickness was normal  ICD lead noted     LEFT ATRIUM: Size was normal     RIGHT ATRIUM: Size was normal     MITRAL VALVE: There was annular calcification  Valve structure was normal  There was normal leaflet separation  DOPPLER: The transmitral velocity was within the normal range  There was no evidence for stenosis  There was trace  regurgitation  AORTIC VALVE: The valve was not well visualized  DOPPLER: There was no evidence for stenosis  TRICUSPID VALVE: The valve structure was normal  There was normal leaflet separation  DOPPLER: The transtricuspid velocity was within the normal range  There was no evidence for stenosis  There was trace regurgitation  PULMONIC VALVE: Leaflets exhibited normal thickness, no calcification, and normal cuspal separation  DOPPLER: The transpulmonic velocity was within the normal range  There was no regurgitation  PERICARDIUM: There was no pericardial effusion  The pericardium was normal in appearance  AORTA: The root exhibited normal size  SYSTEM MEASUREMENT TABLES    Apical four chamber  4 chamber Left Atrium Volume Index; Planimetry; End Systole; Apical four chamber;: 27 1 cm2  Left Ventricular Diastolic Area; Method of Disks, Single Plane; End Diastole; Apical four chamber;: 47 77 cm2  Left Ventricular Ejection Fraction; Method of Disks, Single Plane; Apical four chamber;: 26 7 %  Left Ventricular systolic Area; Method of Disks, Single Plane; End Systole; Apical four chamber;: 38 84 cm2  Right Atrium Systolic Area; Planimetry; End Systole; Apical four chamber;: 20 21 cm2  Right Ventricular Internal Diastolic Dimension; End Diastole; Apical four chamber;: 32 1 mm  TAPSE: 24 3 mm    Unspecified Scan Mode  Aortic Root Diameter; End Systole;: 35 2 mm  Gradient Pressure, Peak; Simplified Bernoulli; Antegrade Flow; Systole;: 6 8 mm[Hg]  Gradient pressure, average; Simplified Bernoulli; Antegrade Flow; Systole;: 4 2 mm[Hg]  Left Atrium to Aortic Root Ratio;: 1 02  Left atrial diameter;  End Diastole;: 35 8 mm  Cardiac Output; Teichholz; Systole;: 7 24 L/min  Heart rate; Teichholz;: 72 HB/min  Interventricular Septum Diastolic Thickness; Teichholz; End Diastole;: 7 5 mm  Left Ventricle Internal End Diastolic Dimension; Teichholz;: 61 4 mm  Left Ventricle Internal Systolic Dimension; Teichholz; End Systole;: 44 3 mm  Left Ventricle Mass; Mass AVCube with Teichholz; End Diastole;: 180 g  Left Ventricle Posterior Wall Diastolic Thickness; Teichholz; End Diastole;: 7 6 mm  Left Ventricular Ejection Fraction; Teichholz;: 53 %  Left Ventricular End Diastolic Volume; Teichholz;: 189 7 ml  Left Ventricular End Systolic Volume; Teichholz;: 89 1 ml  Left Ventricular Fractional Shortening;: 27 9 %  Stroke volume;  Teichholz; Systole;: 100 6 ml  Mitral Valve Area; Area by Pressure Half-Time; Systole;: 3 73 cm2  Mitral Valve E to A Ratio; Systole;: 0 46  Pressure half time; Diastole;: 0 06 s    [facility]    Prepared and electronically signed by    Glorious Carrel, MD  Signed 07-Jul-2017 15:35:37         Meds/Allergies   all current active meds have been reviewed and current meds:   Current Facility-Administered Medications   Medication Dose Route Frequency    insulin lispro (HumaLOG) 100 units/mL subcutaneous injection 1-5 Units  1-5 Units Subcutaneous TID AC    insulin lispro (HumaLOG) 100 units/mL subcutaneous injection 1-5 Units  1-5 Units Subcutaneous HS    levothyroxine tablet 88 mcg  88 mcg Oral Early Morning    metoprolol tartrate (LOPRESSOR) partial tablet 12 5 mg  12 5 mg Oral BID    ondansetron (ZOFRAN) injection 4 mg  4 mg Intravenous Q6H PRN    pantoprazole (PROTONIX) 80 mg in sodium chloride 0 9 % 100 mL infusion  8 mg/hr Intravenous Continuous     Prescriptions Prior to Admission   Medication    aspirin 81 MG tablet    clopidogrel (PLAVIX) 75 mg tablet    finasteride (PROSCAR) 5 mg tablet    glipiZIDE (GLUCOTROL) 5 mg tablet    levothyroxine 88 mcg tablet    metFORMIN (GLUCOPHAGE) 1000 MG tablet    metoprolol tartrate (LOPRESSOR) 25 mg tablet    rosuvastatin (CRESTOR) 20 MG tablet    tamsulosin (FLOMAX) 0 4 mg    pioglitazone (ACTOS) 30 mg tablet         pantoprozole (PROTONIX) infusion (Continuous) 8 mg/hr Last Rate: 8 mg/hr (02/11/18 8733)       Assessment/Plan:  1  History of CAD/PCI:  Recent stress test done at [facility] last week showed large fixed defect but no ischemia, EF 26%  Continue to hold aspirin and Plavix given anemia  Continue beta-blocker  Hold statin given elevated LFTs  2   Anemia, heme-positive stool:  Management per hospitalist Medicine  GI following  3   Elevated LFTs: Management per hospitalist Medicine  GI following  4   Chronic systolic CHF, cardiomyopathy:  EF per stress test last week 26%  Patient has ICD  Euvolemic on exam   Patient does not take diuretics at home  Continue beta-blocker  No ACEI/ARB given elevated creatinine  5   Hypertension:  Last recorded /60  Administer antihypertensive medications within parameters  6   Hyperlipidemia:  Statins held given elevated LFTs  ** Please Note: Dragon 360 Dictation voice to text software may have been used in the creation of this document   **

## 2018-02-12 NOTE — CONSULTS
Consultation - Nephrology   Ina Hamilton 69 y o  male MRN: [id number]  Unit/Bed#: -01 Encounter: [id number]    Referring PHYSICIAN:  Jossie Buerger     REASON FOR THE CONSULTATION: acute kidney injury    DATE OF CONSULTATION:  February 12, 2018    ADMISSION DIAGNOSIS: Urinary retention     CHIEF COMPLAINT     Patient admitted to hospital with urinary retention and also has acute kidney injury so I am being consulted    HPI     29-year-old gentleman with complex medical history including cardiomyopathy and history of renal cell cancer requiring partial nephrectomy  Also has enlarged prostate  Recently he was hospitalized in [facility] with chest pain where he also urinary retention  He had a Mckeon catheter inserted but he was discharged home without catheter  He started retaining urine again and came to this hospital and was admitted with urinary retention    Patient does have coronary artery disease requiring multiple stenting  He has a cardiomyopathy and pacemaker inserted  He is overall feeling well  Had abdominal discomfort and good have positive stool so he got EGD and colonoscopy which is positive for polyp only    He denies ever had a kidney problem in the past    PAST MEDICAL HISTORY     Past Medical History:   Diagnosis Date    Cardiac disease     CHF [facility]congestive heart failure) ([facility] )     Diabetes mellitus ([facility] )     Disease of thyroid gland     Enlarged prostate     Hyperlipidemia     Hypertension     Myocardial infarction        PAST SURGICAL HISTORY     Past Surgical History:   Procedure Laterality Date    CORONARY ANGIOPLASTY WITH STENT PLACEMENT      HIP ARTHROPLASTY Right     INSERT / REPLACE / REMOVE PACEMAKER      JOINT REPLACEMENT Right        ALLERGIES     Allergies   Allergen Reactions    Atorvastatin      Pt unsure      Percocet [Oxycodone-Acetaminophen] GI Intolerance       SOCIAL HISTORY     History   Alcohol Use    Yes     Comment: 1-2 per month      History Drug Use No     History   Smoking Status    Former Smoker   Smokeless Tobacco    Never Used       FAMILY HISTORY     History reviewed  No pertinent family history      CURRENT MEDICATIONS       Current Facility-Administered Medications:     insulin lispro (HumaLOG) 100 units/mL subcutaneous injection 1-5 Units, 1-5 Units, Subcutaneous, TID AC, 1 Units at 02/12/18 1219 **AND** Fingerstick Glucose (POCT), , , TID AC, Pauly Moreira MD    insulin lispro (HumaLOG) 100 units/mL subcutaneous injection 1-5 Units, 1-5 Units, Subcutaneous, HS, Rico Braun MD, 2 Units at 02/10/18 2128    levothyroxine tablet 88 mcg, 88 mcg, Oral, Early Morning, Rico Braun MD, Stopped at 02/12/18 0735    metoprolol tartrate (LOPRESSOR) partial tablet 12 5 mg, 12 5 mg, Oral, BID, Yesi Cantu PA-C, 12 5 mg at 02/12/18 1028    ondansetron (ZOFRAN) injection 4 mg, 4 mg, Intravenous, Q6H PRN, Charito Moreira MD    pantoprazole (PROTONIX) 80 mg in sodium chloride 0 9 % 100 mL infusion, 8 mg/hr, Intravenous, Continuous, Charito Moreira MD, Last Rate: 10 mL/hr at 02/12/18 1031, 8 mg/hr at 02/12/18 1031    REVIEW OF SYSTEMS     Review of Systems    LAB RESULTS          Results from last 7 days  Lab Units 02/12/18  0553 02/11/18  1658 02/11/18  1300 02/11/18  0443 02/10/18  1219   WBC Thousand/uL 5 03  --   --  5 54 7 86   HEMOGLOBIN g/dL 8 1* 7 9* 7 7* 7 8* 9 0*   HEMATOCRIT % 24 3* 23 8* 23 3* 23 6* 26 8*   PLATELETS Thousands/uL 184  --   --  190 206   SODIUM mmol/L 143  --   --  143 138   POTASSIUM mmol/L 3 3*  --   --  3 8 3 1*   CHLORIDE mmol/L 105  --   --  107 100   CO2 mmol/L 28  --   --  30 27   BUN mg/dL 16  --   --  20 25   CREATININE mg/dL 1 70*  --   --  1 79* 2 06*   EGFR ml/min/1 73sq m 38  --   --  36 30   CALCIUM mg/dL 8 4  --   --  8 8 9 0   MAGNESIUM mg/dL  --   --   --   --  2 0   TOTAL PROTEIN g/dL 6 3*  --   --  6 3* 7 2   GLUCOSE RANDOM mg/dL 117  --   --  119 214* I have personally reviewed the old medical records and patient's previously known baseline creatinine level is ~ 0 9    RADIOLOGY RESULTS     No results found for this or any previous visit  Results for orders placed during the hospital encounter of 02/10/18   XR chest 2 views    Narrative CHEST    INDICATION: 67-year-old male, fever, chills  COMPARISON: 6/16/2017 chest x-ray    VIEWS:  Frontal and lateral projections; 2 images    FINDINGS:  Typical single lead left-sided ICD  The lungs are clear  No pleural effusions  The cardiomediastinal silhouette is unremarkable  Bony thorax unremarkable  Mild multilevel degenerative thoracic spondylosis    Findings are stable      Impression No acute cardiopulmonary disease, stable        Workstation performed: [id number]       No results found for this or any previous visit  Results for orders placed in visit on 10/26/15   CT abdomen pelvis w wo contrast    Narrative EXAM ROOM =    EXAM START = [id number]   EXAM STOP = [id number]   FILMS USED =       CT ABDOMEN AND PELVIS WITH AND WITHOUT IV CONTRAST      INDICATION-  Microscopic hematuria  Difficulty urinating yesterday  COMPARISON-  March 12, 2013  TECHNIQUE- CT of the kidneys was performed without intravenous   contrast  Dynamic postcontrast CT evaluation of the abdomen and pelvis   was performed in both nephrographic and delayed phases after the   administration of intravenous contrast   Axial, sagittal and coronal   reformatted images were submitted for interpretation  Examination was   performed utilizing techniques to minimize radiation dose, including   the use of dose reduction software  100 ml of Omnipaque 350 was   injected intravenously  Enteric contrast was not administered        FINDINGS-      ABDOMEN      RIGHT KIDNEY AND URETER-   Metallic structure, presumably surgical clip, in the posterior medial   cortex of the upper pole, with adjacent focal cortical loss, unchanged   in appearance since the earlier CT  Stable 1 5 cm cortical cyst in the upper pole with mural   calcifications  No other evidence of mass  No hydronephrosis or hydroureter  1 mm nonobstructing calculus in the lower pole  LEFT KIDNEY AND URETER-   2 cm cortical cyst in the mid left kidney and 4 mm cyst in the lower   pole  No suspicious left renal mass  No hydronephrosis or hydroureter  No urinary tract calculi  No perinephric collection  URINARY BLADDER-   No bladder wall mass  No calculi  LUNG BASES-  Lung bases and imaged portions of the pleural spaces   clear  Cardiomegaly  Distal esophagus unremarkable  LIVER/BILIARY TREE-  Unremarkable  GALLBLADDER-  9 mm calcified gallstone  Gallbladder wall normal in   thickness  SPLEEN-  Unremarkable  PANCREAS-  Unremarkable  ADRENAL GLANDS-  Unremarkable  STOMACH, BOWEL AND APPENDIX-  Unremarkable  ABDOMINOPELVIC CAVITY-  No ascites  No free intraperitoneal air  No   lymphadenopathy  VESSELS-  Unremarkable for patient's age  PELVIS      REPRODUCTIVE ORGANS-  Prostatomegaly  ABDOMINAL WALL/INGUINAL REGIONS-  Unremarkable  OSSEOUS STRUCTURES-  Right hip replacement  Degenerative disease in   the spine and left hip  No recent fracture or destructive lesion  IMPRESSION-        1  Postoperative changes in the right kidney  2   Small bilateral renal cysts with no suspicious renal mass  3   1 mm nonobstructing right renal calculus  4   Prostatomegaly           Transcribed on- [id number]            [facility], RAD MD        Reading Radiologist- KAYE Zaman MD        Electronically Signed- KAYE Zaman MD        Released Date Time- 10/28/15 1607    ------------------------------------------------------------------------------   READ BY = 2411^DAVID THEODORE [facility] = 2411^DAVID LEWIS      Results for orders placed during the hospital encounter of 02/10/18   CT abdomen pelvis wo contrast    Narrative CT ABDOMEN AND PELVIS WITHOUT IV CONTRAST    INDICATION:  Urinary retention  COMPARISON: CT abdomen pelvis 10/28/2015    TECHNIQUE:  CT examination of the abdomen and pelvis was performed without intravenous contrast   Reformatted images were created in axial, sagittal, and coronal planes  Radiation dose length product (DLP) for this visit:  631 mGy-cm   This examination, like all CT scans performed in the [facility], was performed utilizing techniques to minimize radiation dose exposure, including the use of iterative   reconstruction and automated exposure control  Enteric contrast was administered  FINDINGS:    ABDOMEN    Evaluation is limited by lack of intravenous contrast     LOWER CHEST:  Atelectatic changes are noted at the lung bases  No other significant abnormality identified in the lower chest   There is cardiomegaly with small pericardial effusion  LIVER/BILIARY TREE:  No intrahepatic or extrahepatic biliary ductal dilation  Multiple densities in the region of the distal common bile duct suspicious for choledocholithiasis  GALLBLADDER:  There are gallstone(s) within the gallbladder, without pericholecystic inflammatory changes  SPLEEN:  Unremarkable  PANCREAS:  Unremarkable  ADRENAL GLANDS:  Unremarkable  KIDNEYS/URETERS:  Unremarkable  No hydronephrosis  Stable right renal scarring  STOMACH AND BOWEL:  Small hiatal hernia  No evidence of bowel obstruction  Mild stool retention throughout the colon  APPENDIX:  A normal appendix was visualized  ABDOMINOPELVIC CAVITY:  No ascites or free intraperitoneal air  No lymphadenopathy  VESSELS:  Atherosclerotic changes are present  No evidence of aneurysm  PELVIS    Evaluation of the pelvis is limited by streak artifact from right hip arthroplasty  REPRODUCTIVE ORGANS:  The prostate is enlarged      URINARY BLADDER:  Bladder is decompressed by a catheter  The catheter tip along the anterior superior margin of the bladder  ABDOMINAL WALL/INGUINAL REGIONS:  Unremarkable  OSSEOUS STRUCTURES:  There is streak artifact from the right hip arthroplasty  There are degenerative changes of the lumbar spine  Impression 1  Cholelithiasis without other evidence of acute cholecystitis  Multiple dense foci within the region of the distal common bile duct suspicious for choledocholithiasis  Correlate for clinical and laboratory evidence of biliary obstruction and   consider further evaluation with MRCP  2   The bladder is decompressed by a Mckeon catheter  The catheter tip is at the superior and anterior margin of the bladder but does appear to still be within the bladder  Correlate clinically and consider catheter retraction  3   Cardiomegaly with small pericardial effusion  4   Large prostate  Workstation performed: [id number]       No results found for this or any previous visit  OBJECTIVE     Current Weight:    Vitals:    02/12/18 1620   BP: 130/63   Pulse: 75   Resp: 18   Temp:    SpO2: 98%       Intake/Output Summary (Last 24 hours) at 02/12/18 1653  Last data filed at 02/12/18 1500   Gross per 24 hour   Intake              200 ml   Output             1425 ml   Net            -1225 ml       PHYSICAL EXAMINATION     Physical Exam     PLAN / RECOMMENDATIONS      Acute kidney injury:  Possible due to cardiorenal syndrome  Urinary retention may be contributing  Will monitor kidney function closely and if his due to urinary retention it will get better  I will also check urine electrolytes and will advised to avoid any nephrotoxic medicine    Urinary retention:  Due to enlarged prostate    History of renal cancer with status post partial nephrectomy    Cardiomyopathy:  Seems to be asymptomatic at this point            Thank you for the consultation to participate in patient's care   I have personally discussed my plan with the referring physician  Caridad Guzman MD  Nephrology  2/12/2018        Portions of the record may have been created with voice recognition software  Occasional wrong word or "sound a like" substitutions may have occurred due to the inherent limitations of voice recognition software  Read the chart carefully and recognize, using context, where substitutions have occurred

## 2018-02-12 NOTE — DISCHARGE INSTRUCTIONS

## 2018-02-12 NOTE — PROGRESS NOTES
[facility] Progress Note  Patient: Kailyn Talley 69 y o  male   MRN: [id number]  PCP: Jacqueline Coates MD  Unit/Bed#: -01 Encounter: [id number]  Date Of Visit: 02/12/18    Assessment:    Principal Problem:    Urinary retention  Active Problems:    Coronary artery disease    Type 2 diabetes mellitus (HCC)    Anemia    Hypertension    Hyperlipidemia    CHF (congestive heart failure) (HCC)    Heme positive stool      Plan:    Urinary retention-urology consult appreciated maintain cortez for d/c and outpatient urology follow up 1 week; UC negative     JESSA-improved with IV fluid and placement of cortez-will follow; nephrology consult     Anemia-GI bleed-hgb down-received IVF also; on PPI; gi following for endoscopy today; holding asa and plavix     Transaminitis-ct a/p with Multiple dense foci within the region of the distal common bile duct suspicious for choledocholithiasis   hold plavix will place cardiology consult for cad recent admit to [facility] for chest pain regarding holding asa and plavix; ruq us reviewed; improving        RUQ us:  Cholelithiasis  Choledocholithiasis, though definitively demonstrated on recent noncontrast CT, cannot be redemonstrated on the current examination due to overlying bowel gas  No intrahepatic biliary dilatation    Common duct only mildly dilated to 7   mm      Mild gallbladder wall thickening without evidence of Pichardo's sign or pericholecystic fluid         Active Problems:       Coronary artery disease and h/o chf systolic dysfunction-[facility]  for [facility] pain-not presently complaining of this; cardiology consult appreciated-negative ett [facility] last week but with systolic dysfunction-asa and plavix on hold    Type 2 diabetes mellitus (HCC)-on metformin and glipizide as outpatient; ss coverage      Hypertension-continue outpatient meds    Hyperlipidemia-on statin as outpatient but will hold with elevated lfts  Hypokalemia replacing      VTE Pharmacologic Prophylaxis:   Pharmacologic: Pharmacologic VTE Prophylaxis contraindicated due to anemia  Mechanical VTE Prophylaxis in Place: Yes    Patient Centered Rounds: I have performed bedside rounds with nursing staff today  Discussions with Specialists or Other Care Team Provider:     Education and Discussions with Family / Patient:     Time Spent for Care: 30 minutes  More than 50% of total time spent on counseling and coordination of care as described above  Current Length of Stay: 2 day(s)    Current Patient Status: Inpatient   Certification Statement: The patient will continue to require additional inpatient hospital stay due to endoscopy today    Discharge Plan: home    Code Status: Level 1 - Full Code      Subjective:   No c/o-denies abdominal pain  Objective:     Vitals:   Temp (24hrs), Av 9 °F (37 2 °C), Min:97 7 °F (36 5 °C), Max:99 5 °F (37 5 °C)    HR:  [75-89] 88  Resp:  [18] 18  BP: (110-119)/(53-72) 110/60  SpO2:  [96 %-99 %] 99 %  There is no height or weight on file to calculate BMI  Input and Output Summary (last 24 hours): Intake/Output Summary (Last 24 hours) at 18 0826  Last data filed at 18 0500   Gross per 24 hour   Intake                0 ml   Output             1100 ml   Net            -1100 ml       Physical Exam:     Physical Exam   Constitutional: He appears well-developed and well-nourished  HENT:   Head: Normocephalic and atraumatic  Eyes: Pupils are equal, round, and reactive to light  Cardiovascular: Normal rate and regular rhythm  Pulmonary/Chest: Effort normal and breath sounds normal    Abdominal: Soft  Bowel sounds are normal  He exhibits no distension  There is no tenderness  Musculoskeletal: He exhibits no edema         Additional Data:     Labs:      Results from last 7 days  Lab Units 18  0553   WBC Thousand/uL 5 03   HEMOGLOBIN g/dL 8 1*   HEMATOCRIT % 24 3*   PLATELETS Thousands/uL 184   NEUTROS PCT % 70   LYMPHS PCT % 20   MONOS PCT % 9   EOS PCT % 0       Results from last 7 days  Lab Units 02/12/18  0553   SODIUM mmol/L 143   POTASSIUM mmol/L 3 3*   CHLORIDE mmol/L 105   CO2 mmol/L 28   BUN mg/dL 16   CREATININE mg/dL 1 70*   CALCIUM mg/dL 8 4   TOTAL PROTEIN g/dL 6 3*   BILIRUBIN TOTAL mg/dL 0 70   ALK PHOS U/L 130*   ALT U/L 95*   AST U/L 69*   GLUCOSE RANDOM mg/dL 117           * I Have Reviewed All Lab Data Listed Above  * Additional Pertinent Lab Tests Reviewed: All Labs Within Last 24 Hours Reviewed    Imaging:    Imaging Reports Reviewed Today Include:   Imaging Personally Reviewed by Myself Includes:      Recent Cultures (last 7 days):       Results from last 7 days  Lab Units 02/10/18  2136 02/10/18  1219   BLOOD CULTURE   --  No Growth at 24 hrs  No Growth at 24 hrs  URINE CULTURE  No Growth <1000 cfu/mL  --        Last 24 Hours Medication List:     Current Facility-Administered Medications:  insulin lispro 1-5 Units Subcutaneous TID AC Pauly Moreira MD    insulin lispro 1-5 Units Subcutaneous HS Dylan Moreira MD    levothyroxine 88 mcg Oral Early Morning Pauly Moreira MD    metoprolol tartrate 12 5 mg Oral BID Yesi Cantu PA-C    ondansetron 4 mg Intravenous Q6H PRN Dylan Moreira MD    pantoprozole (PROTONIX) infusion (Continuous) 8 mg/hr Intravenous Continuous Dylan Moreira MD Last Rate: 8 mg/hr (02/11/18 2242)        Today, Patient Was Seen By: Declan Zee MD    ** Please Note: Dragon 360 Dictation voice to text software may have been used in the creation of this document   **

## 2018-02-12 NOTE — CONSULTS
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, BPH with moderate lower urinary tract symptoms manage maximally with Flomax and Proscar  Patient was recently admitted to [address] for cardiac reasons and developed urinary retention prior to discharge requiring intermittent catheterization  Patient was not discharge with Mckeon catheter and re-presented to [facility] Emergency room with abdominal pain of found to have significant bladder overdistention  Indwelling urinary catheter was inserted with urine yield exceeding 1 L  Patient expressed relief of symptoms  However, patient is admitted to Internal Medicine service for workup by Gastroenterology for heme-positive stool, abnormal LFTs and possible choledocholithiasis  Urologic consultation is requested for Mckeon management  Source:chart review and the patient         Patient Active Problem List   Diagnosis    Coronary artery disease    Urinary retention    [facility]    Anemia    Hypertension    Hyperlipidemia    [facility]cong[facility] failure[facility])    Heme positive stool     Impressions   Urinary Retention (Multi-factorial) secondary to recent acute illness, constipation and underlying BPH with prior history of urinary retention/lower urinary tract symptoms  Recommendations  Maintain Mckeon catheter to straight drainage  Do not remove  Not nursing or other department managed  Continue/resume Flomax and Proscar  Patient had greater than 1 L of urine on catheter insertion    With high volume retention, recommend 1 week Mckeon catheter  In the interim, ambulate as tolerated, prevent/treat constipation, we narcotics as applicable, increase oral fluid intake if not medically contraindicated  Would not recommend void trial during hospital admission, and light of further planned procedures requiring sedation/anesthesia  Our office will contact patient with appointment for void trial in 1 week's time  Past Medical History:   Diagnosis Date    Cardiac disease     [facility]congest[facility]     Diabetes mellitus ([facility] )     Disease of thyroid gland     Enlarged prostate     Hyperlipidemia     Hypertension        Past Surgical History:   Procedure Laterality Date    HIP ARTHROPLASTY Right        History reviewed  No pertinent family history  Social History     Social History    Marital status: /Civil Union     Spouse name: N/A    Number of children: N/A    Years of education: N/A     Occupational History    Not on file       Social History Main Topics    Smoking status: Former Smoker    Smokeless tobacco: Never Used    Alcohol use Yes      Comment: 1-2 per month     Drug use: No    Sexual activity: Not on file     Other Topics Concern    Not on file     Social History Narrative    No narrative on file       Allergies   Allergen Reactions    Atorvastatin     Percocet  [Oxycodone-Acetaminophen]        Review of Systems  10 point review of systems negative except as noted in HPI     Chart Review   Allergies, current medications, history, problem list    Vital Signs  Vitals:    02/12/18 0300   BP: 112/60   Pulse: 89   Resp: 18   Temp: 99 5 °F (37 5 °C)   SpO2: 99%         Physical Exam  /60 (BP Location: Left arm)   Pulse 89   Temp 99 5 °F (37 5 °C) (Oral)   Resp 18   SpO2 99%   General appearance: alert and oriented, in no acute distress, appears stated age and cooperative  Head: Normocephalic, without obvious abnormality, atraumatic  Neck: no adenopathy, no carotid bruit, no JVD, supple, symmetrical, trachea midline and thyroid not enlarged, symmetric, no tenderness/mass/nodules  Lungs: clear to auscultation bilaterally  Heart: regular rate and rhythm, S1, S2 normal, no murmur, click, rub or gallop  Abdomen: soft, non-tender; bowel sounds normal; no masses,  no organomegaly  Extremities: extremities normal, warm and well-perfused; no cyanosis, clubbing, or edema  Pulses: 2+ and symmetric  Neurologic: Grossly normal  Mckeon secure and patent for grossly clear yellow urine  Laboratory Studies  Lab Results   Component Value Date    HGBA1C 7 4 (H) 02/11/2018    HGBA1C 6 9 (H) 08/17/2015     02/12/2018     10/02/2015    K 3 3 (L) 02/12/2018    K 4 0 10/02/2015     02/12/2018     10/02/2015    CO2 28 02/12/2018    CO2 26 10/02/2015    GLUCOSE 117 02/12/2018    GLUCOSE 180 (H) 10/02/2015    CREATININE 1 70 (H) 02/12/2018    CREATININE 0 87 10/02/2015    BUN 16 02/12/2018    BUN 13 10/02/2015    MG 2 0 02/10/2018               Imaging and Other Studies  )Ct Abdomen Pelvis Wo Contrast    Result Date: 2/10/2018  Narrative: CT ABDOMEN AND PELVIS WITHOUT IV CONTRAST INDICATION:  Urinary retention  COMPARISON: CT abdomen pelvis 10/28/2015 TECHNIQUE:  CT examination of the abdomen and pelvis was performed without intravenous contrast   Reformatted images were created in axial, sagittal, and coronal planes  Radiation dose length product (DLP) for this visit:  631 mGy-cm   This examination, like all CT scans performed in the [facility], was performed utilizing techniques to minimize radiation dose exposure, including the use of iterative reconstruction and automated exposure control  Enteric contrast was administered  FINDINGS: ABDOMEN Evaluation is limited by lack of intravenous contrast  LOWER CHEST:  Atelectatic changes are noted at the lung bases  No other significant abnormality identified in the lower chest   There is cardiomegaly with small pericardial effusion  LIVER/BILIARY TREE:  No intrahepatic or extrahepatic biliary ductal dilation  Multiple densities in the region of the distal common bile duct suspicious for choledocholithiasis  GALLBLADDER:  There are gallstone(s) within the gallbladder, without pericholecystic inflammatory changes  SPLEEN:  Unremarkable  PANCREAS:  Unremarkable  ADRENAL GLANDS:  Unremarkable  KIDNEYS/URETERS:  Unremarkable  No hydronephrosis  Stable right renal scarring  STOMACH AND BOWEL:  Small hiatal hernia  No evidence of bowel obstruction  Mild stool retention throughout the colon  APPENDIX:  A normal appendix was visualized  ABDOMINOPELVIC CAVITY:  No ascites or free intraperitoneal air  No lymphadenopathy  VESSELS:  Atherosclerotic changes are present  No evidence of aneurysm  PELVIS Evaluation of the pelvis is limited by streak artifact from right hip arthroplasty  REPRODUCTIVE ORGANS:  The prostate is enlarged  URINARY BLADDER:  Bladder is decompressed by a catheter  The catheter tip along the anterior superior margin of the bladder  ABDOMINAL WALL/INGUINAL REGIONS:  Unremarkable  OSSEOUS STRUCTURES:  There is streak artifact from the right hip arthroplasty  There are degenerative changes of the lumbar spine  Impression: 1  Cholelithiasis without other evidence of acute cholecystitis  Multiple dense foci within the region of the distal common bile duct suspicious for choledocholithiasis  Correlate for clinical and laboratory evidence of biliary obstruction and consider further evaluation with MRCP  2   The bladder is decompressed by a Mckeon catheter  The catheter tip is at the superior and anterior margin of the bladder but does appear to still be within the bladder  Correlate clinically and consider catheter retraction  3   Cardiomegaly with small pericardial effusion  4   Large prostate   Workstation performed: [id number]     Xr Chest 2 Views    Result Date: 2/11/2018  Narrative: CHEST INDICATION: 69-year-old male, fever, chills COMPARISON: 6/16/2017 chest x-ray VIEWS:  Frontal and lateral projections; 2 images FINDINGS: Typical single lead left-sided ICD The lungs are clear  No pleural effusions  The cardiomediastinal silhouette is unremarkable  Bony thorax unremarkable  Mild multilevel degenerative thoracic spondylosis Findings are stable     Impression: No acute cardiopulmonary disease, stable Workstation performed: [id number]     Us Right Upper Quadrant    Result Date: 2/11/2018  Narrative: RIGHT UPPER QUADRANT ULTRASOUND INDICATION:  Abnormal liver function testing  Biliary ductal dilatation  Choledocholithiasis unresponsive CT  COMPARISON:  CT performed February 10, 2018  TECHNIQUE:   Real-time ultrasound of the right upper quadrant was performed with a curvilinear transducer with both volumetric sweeps and still imaging techniques  FINDINGS: PANCREAS:  Visualized portions of the pancreas are within normal limits  AORTA AND IVC:  Visualized portions are normal for patient age  LIVER: Size:  Within normal range  The liver measures 14 5 cm in the midclavicular line  Contour:  Surface contour is smooth  Parenchyma:  Echogenicity and echotexture are within normal limits  No evidence of suspicious mass  Limited imaging of the main portal vein shows it to be patent and hepatopetal   BILIARY: There is no intrahepatic biliary dilatation despite the presence of calcified stones demonstrated in the distal common duct on recent CT  Unfortunately, stones in the distal common duct are obscured on the current examination by overlying bowel gas  There are layering stones and sludge in the dependent lumen of the gallbladder which is minimally thick-walled but demonstrates no surrounding pericholecystic fluid  No sonographic Pichardo's sign  KIDNEY: Right kidney measures 11 7 cm  Within normal limits  ASCITES:   None  Impression: Cholelithiasis    Choledocholithiasis, though definitively demonstrated on recent noncontrast CT, cannot be redemonstrated on the current examination due to overlying bowel gas  No intrahepatic biliary dilatation  Common duct only mildly dilated to 7 mm  Mild gallbladder wall thickening without evidence of Pichardo's sign or pericholecystic fluid  Workstation performed: [id number]         Medications   Scheduled Meds:  Current Facility-Administered Medications:  insulin lispro 1-5 Units Subcutaneous TID AC Pauly Moreira MD    insulin lispro 1-5 Units Subcutaneous HS Baldev Moreira MD    levothyroxine 88 mcg Oral Early Morning Pauly Moreira MD    metoprolol tartrate 12 5 mg Oral BID Yesi Cantu PA-C    ondansetron 4 mg Intravenous Q6H PRN Baldev Moreira MD    pantoprozole (PROTONIX) infusion (Continuous) 8 mg/hr Intravenous Continuous Baldev Moreira MD Last Rate: 8 mg/hr (02/11/18 2242)     Continuous Infusions:  pantoprozole (PROTONIX) infusion (Continuous) 8 mg/hr Last Rate: 8 mg/hr (02/11/18 2242)     PRN Meds: ondansetron      Total time spent with patient 25 minutes, >50% spent counseling and/or coordination of care           )MELODIE Loza

## 2018-02-13 PROBLEM — I50.22 CHRONIC SYSTOLIC CONGESTIVE HEART FAILURE (HCC): Status: ACTIVE | Noted: 2018-02-10

## 2018-02-13 LAB
ALBUMIN SERPL BCP-MCNC: 2.6 G/DL (ref 3.5–5)
ALP SERPL-CCNC: 127 U/L (ref 46–116)
ALT SERPL W P-5'-P-CCNC: 68 U/L (ref 12–78)
ANION GAP SERPL CALCULATED.3IONS-SCNC: 8 MMOL/L (ref 4–13)
AST SERPL W P-5'-P-CCNC: 32 U/L (ref 5–45)
BACTERIA UR QL AUTO: ABNORMAL /HPF
BASOPHILS # BLD AUTO: 0.03 THOUSANDS/ΜL (ref 0–0.1)
BASOPHILS NFR BLD AUTO: 0 % (ref 0–1)
BILIRUB SERPL-MCNC: 0.6 MG/DL (ref 0.2–1)
BILIRUB UR QL STRIP: NEGATIVE
BUN SERPL-MCNC: 14 MG/DL (ref 5–25)
CALCIUM SERPL-MCNC: 8.5 MG/DL (ref 8.3–10.1)
CHLORIDE SERPL-SCNC: 105 MMOL/L (ref 100–108)
CLARITY UR: CLEAR
CO2 SERPL-SCNC: 29 MMOL/L (ref 21–32)
COLOR UR: YELLOW
CREAT SERPL-MCNC: 1.62 MG/DL (ref 0.6–1.3)
CREAT UR-MCNC: 63 MG/DL
EOSINOPHIL # BLD AUTO: 0 THOUSAND/ΜL (ref 0–0.61)
EOSINOPHIL NFR BLD AUTO: 0 % (ref 0–6)
ERYTHROCYTE [DISTWIDTH] IN BLOOD BY AUTOMATED COUNT: 18.1 % (ref 11.6–15.1)
FINE GRAN CASTS URNS QL MICRO: ABNORMAL /LPF
GFR SERPL CREATININE-BSD FRML MDRD: 41 ML/MIN/1.73SQ M
GLUCOSE SERPL-MCNC: 174 MG/DL (ref 65–140)
GLUCOSE SERPL-MCNC: 179 MG/DL (ref 65–140)
GLUCOSE SERPL-MCNC: 256 MG/DL (ref 65–140)
GLUCOSE SERPL-MCNC: 279 MG/DL (ref 65–140)
GLUCOSE SERPL-MCNC: 313 MG/DL (ref 65–140)
GLUCOSE UR STRIP-MCNC: ABNORMAL MG/DL
HCT VFR BLD AUTO: 26.3 % (ref 36.5–49.3)
HGB BLD-MCNC: 8.9 G/DL (ref 12–17)
HGB UR QL STRIP.AUTO: ABNORMAL
HYALINE CASTS #/AREA URNS LPF: ABNORMAL /LPF
KETONES UR STRIP-MCNC: NEGATIVE MG/DL
LEUKOCYTE ESTERASE UR QL STRIP: NEGATIVE
LYMPHOCYTES # BLD AUTO: 0.92 THOUSANDS/ΜL (ref 0.6–4.47)
LYMPHOCYTES NFR BLD AUTO: 14 % (ref 14–44)
MCH RBC QN AUTO: 35.6 PG (ref 26.8–34.3)
MCHC RBC AUTO-ENTMCNC: 33.8 G/DL (ref 31.4–37.4)
MCV RBC AUTO: 105 FL (ref 82–98)
MICROALBUMIN UR-MCNC: 97.4 MG/L (ref 0–20)
MICROALBUMIN/CREAT 24H UR: 155 MG/G CREATININE (ref 0–30)
MONOCYTES # BLD AUTO: 0.57 THOUSAND/ΜL (ref 0.17–1.22)
MONOCYTES NFR BLD AUTO: 8 % (ref 4–12)
NEUTROPHILS # BLD AUTO: 5.2 THOUSANDS/ΜL (ref 1.85–7.62)
NEUTS SEG NFR BLD AUTO: 77 % (ref 43–75)
NITRITE UR QL STRIP: NEGATIVE
NON-SQ EPI CELLS URNS QL MICRO: ABNORMAL /HPF
NRBC BLD AUTO-RTO: 0 /100 WBCS
PH UR STRIP.AUTO: 6.5 [PH] (ref 4.5–8)
PHOSPHATE SERPL-MCNC: 2.2 MG/DL (ref 2.3–4.1)
PLATELET # BLD AUTO: 207 THOUSANDS/UL (ref 149–390)
PMV BLD AUTO: 10.1 FL (ref 8.9–12.7)
POTASSIUM SERPL-SCNC: 3.5 MMOL/L (ref 3.5–5.3)
PROT SERPL-MCNC: 6.6 G/DL (ref 6.4–8.2)
PROT UR STRIP-MCNC: ABNORMAL MG/DL
PTH-INTACT SERPL-MCNC: 54.7 PG/ML (ref 14–72)
RBC # BLD AUTO: 2.5 MILLION/UL (ref 3.88–5.62)
RBC #/AREA URNS AUTO: ABNORMAL /HPF
SODIUM SERPL-SCNC: 142 MMOL/L (ref 136–145)
SP GR UR STRIP.AUTO: 1.01 (ref 1–1.03)
URATE SERPL-MCNC: 4 MG/DL (ref 4.2–8)
UROBILINOGEN UR QL STRIP.AUTO: 0.2 E.U./DL
WBC # BLD AUTO: 6.77 THOUSAND/UL (ref 4.31–10.16)
WBC #/AREA URNS AUTO: ABNORMAL /HPF

## 2018-02-13 PROCEDURE — 99232 SBSQ HOSP IP/OBS MODERATE 35: CPT | Performed by: INTERNAL MEDICINE

## 2018-02-13 PROCEDURE — 82948 REAGENT STRIP/BLOOD GLUCOSE: CPT

## 2018-02-13 PROCEDURE — 80053 COMPREHEN METABOLIC PANEL: CPT | Performed by: GENERAL PRACTICE

## 2018-02-13 PROCEDURE — G8978 MOBILITY CURRENT STATUS: HCPCS

## 2018-02-13 PROCEDURE — 83970 ASSAY OF PARATHORMONE: CPT | Performed by: INTERNAL MEDICINE

## 2018-02-13 PROCEDURE — C9113 INJ PANTOPRAZOLE SODIUM, VIA: HCPCS | Performed by: GENERAL PRACTICE

## 2018-02-13 PROCEDURE — 84100 ASSAY OF PHOSPHORUS: CPT | Performed by: INTERNAL MEDICINE

## 2018-02-13 PROCEDURE — 85025 COMPLETE CBC W/AUTO DIFF WBC: CPT | Performed by: INTERNAL MEDICINE

## 2018-02-13 PROCEDURE — G8979 MOBILITY GOAL STATUS: HCPCS

## 2018-02-13 PROCEDURE — 84550 ASSAY OF BLOOD/URIC ACID: CPT | Performed by: INTERNAL MEDICINE

## 2018-02-13 PROCEDURE — 97161 PT EVAL LOW COMPLEX 20 MIN: CPT

## 2018-02-13 PROCEDURE — G8980 MOBILITY D/C STATUS: HCPCS

## 2018-02-13 RX ORDER — PANTOPRAZOLE SODIUM 40 MG/1
40 TABLET, DELAYED RELEASE ORAL
Status: DISCONTINUED | OUTPATIENT
Start: 2018-02-13 | End: 2018-02-14 | Stop reason: HOSPADM

## 2018-02-13 RX ORDER — TAMSULOSIN HYDROCHLORIDE 0.4 MG/1
0.4 CAPSULE ORAL DAILY
Status: DISCONTINUED | OUTPATIENT
Start: 2018-02-13 | End: 2018-02-14 | Stop reason: HOSPADM

## 2018-02-13 RX ORDER — FINASTERIDE 5 MG/1
5 TABLET, FILM COATED ORAL DAILY
Status: DISCONTINUED | OUTPATIENT
Start: 2018-02-13 | End: 2018-02-14 | Stop reason: HOSPADM

## 2018-02-13 RX ORDER — CLOPIDOGREL BISULFATE 75 MG/1
75 TABLET ORAL DAILY
Status: DISCONTINUED | OUTPATIENT
Start: 2018-02-13 | End: 2018-02-14 | Stop reason: HOSPADM

## 2018-02-13 RX ORDER — ATORVASTATIN CALCIUM 40 MG/1
40 TABLET, FILM COATED ORAL
Status: DISCONTINUED | OUTPATIENT
Start: 2018-02-13 | End: 2018-02-14 | Stop reason: HOSPADM

## 2018-02-13 RX ORDER — ASPIRIN 81 MG/1
81 TABLET, CHEWABLE ORAL DAILY
Status: DISCONTINUED | OUTPATIENT
Start: 2018-02-13 | End: 2018-02-14 | Stop reason: HOSPADM

## 2018-02-13 RX ADMIN — CLOPIDOGREL BISULFATE 75 MG: 75 TABLET ORAL at 12:04

## 2018-02-13 RX ADMIN — PANTOPRAZOLE SODIUM 40 MG: 40 TABLET, DELAYED RELEASE ORAL at 16:52

## 2018-02-13 RX ADMIN — INSULIN LISPRO 3 UNITS: 100 INJECTION, SOLUTION INTRAVENOUS; SUBCUTANEOUS at 22:05

## 2018-02-13 RX ADMIN — ASPIRIN 81 MG 81 MG: 81 TABLET ORAL at 12:04

## 2018-02-13 RX ADMIN — INSULIN LISPRO 2 UNITS: 100 INJECTION, SOLUTION INTRAVENOUS; SUBCUTANEOUS at 12:07

## 2018-02-13 RX ADMIN — TAMSULOSIN HYDROCHLORIDE 0.4 MG: 0.4 CAPSULE ORAL at 12:04

## 2018-02-13 RX ADMIN — METOPROLOL TARTRATE 12.5 MG: 25 TABLET ORAL at 08:42

## 2018-02-13 RX ADMIN — SODIUM CHLORIDE 8 MG/HR: 9 INJECTION, SOLUTION INTRAVENOUS at 03:28

## 2018-02-13 RX ADMIN — METOPROLOL TARTRATE 12.5 MG: 25 TABLET ORAL at 22:05

## 2018-02-13 RX ADMIN — INSULIN LISPRO 2 UNITS: 100 INJECTION, SOLUTION INTRAVENOUS; SUBCUTANEOUS at 16:53

## 2018-02-13 RX ADMIN — ATORVASTATIN CALCIUM 40 MG: 40 TABLET, FILM COATED ORAL at 16:52

## 2018-02-13 RX ADMIN — FINASTERIDE 5 MG: 5 TABLET, FILM COATED ORAL at 12:05

## 2018-02-13 RX ADMIN — INSULIN LISPRO 1 UNITS: 100 INJECTION, SOLUTION INTRAVENOUS; SUBCUTANEOUS at 08:41

## 2018-02-13 RX ADMIN — LEVOTHYROXINE SODIUM 88 MCG: 88 TABLET ORAL at 05:41

## 2018-02-13 NOTE — PLAN OF CARE
Problem: Potential for Falls  Goal: Patient will remain free of falls  INTERVENTIONS:  - Assess patient frequently for physical needs  -  Identify cognitive and physical deficits and behaviors that affect risk of falls    -  Hayti fall precautions as indicated by assessment   - Educate patient/family on patient safety including physical limitations  - Instruct patient to call for assistance with activity based on assessment  - Modify environment to reduce risk of injury  - Consider OT/PT consult to assist with strengthening/mobility   Outcome: Progressing

## 2018-02-13 NOTE — PROGRESS NOTES
[facility] Internal Medicine Progress Note  Patient: Jeannette Fitzgerald 69 y o  male   MRN: [id number]  PCP: Roberta Borja MD  Unit/Bed#: -01 Encounter: [id number]  Date Of Visit: 02/13/18    Assessment/Plan:    Principal Problem:    Urinary retention  Active Problems:    Coronary artery disease    Type 2 diabetes mellitus ([facility] )    Anemia    Hypertension    Hyperlipidemia    Chronic systolic congestive heart failure ([facility] )    Heme positive stool    Present on Admission:   Coronary artery disease   Urinary retention   Type 2 diabetes mellitus ([facility] )   Anemia   Hypertension   Hyperlipidemia   Chronic systolic congestive heart failure (HCC)   Heme positive stool      · Urinary retention-patient status post Mckeon catheter placement  Mckeon catheter will be maintained at patient follow up with Urology on outpatient basis  No need for any antibiotics as cultures have been negative  · Acute kidney injury secondary to urinary retention-most likely  Creatinine is better as patient has a Mckeon catheter in which is draining fine  · Heme-positive stool  EGD and colonoscopy unremarkable  He would need capsule study on outpatient basis  Resume his aspirin and Plavix  He was also advised not to take any NSAIDs anymore  · Elevated transaminases  Not sure what the exact etiology is  LFTs are better/improved  Patient to follow up with GI on outpatient basis as well  · Diabetes mellitus type 2  Patient has ejection fraction of 25% or so  I would be very careful putting him back on Actos or even metformin  Both of these will be discontinued and would continue his glipizide  Patient to discussed with his primary care physician for alternative medications  May add Januvia on discharge  · Essential hypertension  Continue with current treatment  · Anemia  As above  Continue with current treatment  Also started on oral PPI  · Chronic systolic heart failure    Currently compensated and not in any exacerbation  · Coronary artery disease  Seen by Cardiology  Continue with medical management  · Gallstones  Patient is not really symptomatic  Continue to monitor  · Hypokalemia-repleted  Follow-up as needed      VTE Pharmacologic Prophylaxis:   Pharmacologic: Pharmacologic VTE Prophylaxis contraindicated due to Significant anemia  Mechanical VTE Prophylaxis in Place: Yes    Patient Centered Rounds: I have performed bedside rounds with nursing staff today  Discussions with Specialists or Other Care Team Provider:  Yes including Nephrology    Education and Discussions with Family / Patient:  Yes        Current Length of Stay: 3 day(s)    Current Patient Status: Inpatient   Certification Statement: The patient will continue to require additional inpatient hospital stay due to Monitoring of hemoglobin/renal functions    Discharge Plan:  Possibly discharge home either later today or tomorrow  Waiting for PT/OT evaluation    Code Status: Level 1 - Full Code      Subjective:   He feels fine  Denies any abdominal pain  Denies any chest pain or shortness of breath  Denies any fever or chills  He rarely drinks alcohol  He has not had a beer since last year in October  Objective:     Vitals:   Temp (24hrs), Av 2 °F (36 8 °C), Min:97 °F (36 1 °C), Max:99 °F (37 2 °C)    HR:  [67-90] 80  Resp:  [17-19] 18  BP: (103-160)/(54-69) 120/54  SpO2:  [91 %-100 %] 97 %  There is no height or weight on file to calculate BMI  Input and Output Summary (last 24 hours): Intake/Output Summary (Last 24 hours) at 18 1312  Last data filed at 18 1100   Gross per 24 hour   Intake              720 ml   Output             1675 ml   Net             -955 ml           Physical Exam:     Vital signs are reviewed as above  Constitutional:  Patient in bed  Patient laying comfortably  Eyes: EOM grossly intact  Conjunctivae are  pale  Patient has anicteric sclera  HENT: Oropharynx are dry   Did not notice any significant lesions on the tongue  Head normocephalic  Neck: Neck is supple  Cardiac: I did not hear any rubs or gallop  Patient appears to be in sinus rhythm  Respiratory: Patient not in significant respiratory distress  Air entry in general is fair  GI: Abdomen is soft  It is grossly nontender  Bowel sounds are adequate  I was not able to appreciate any hepatosplenomegaly  Neurologic:  Patient is awake and alert  Neurological examination is grossly intact  No obvious focal neurological deficit noticed  Skin: Skin is warm and dry  Skin is also pale  Psychiatric: Mood and affect are pleasant  Musculoskeletal  Patient moving all extremities   Has chronic arthritic joints   Extremities: Patient has no significant cyanosis, clubbing, or lower extremity edema         Additional Data:     Labs:      Results from last 7 days  Lab Units 02/13/18  0545   WBC Thousand/uL 6 77   HEMOGLOBIN g/dL 8 9*   HEMATOCRIT % 26 3*   PLATELETS Thousands/uL 207   NEUTROS PCT % 77*   LYMPHS PCT % 14   MONOS PCT % 8   EOS PCT % 0       Results from last 7 days  Lab Units 02/13/18  0545   SODIUM mmol/L 142   POTASSIUM mmol/L 3 5   CHLORIDE mmol/L 105   CO2 mmol/L 29   BUN mg/dL 14   CREATININE mg/dL 1 62*   CALCIUM mg/dL 8 5   TOTAL PROTEIN g/dL 6 6   BILIRUBIN TOTAL mg/dL 0 60   ALK PHOS U/L 127*   ALT U/L 68   AST U/L 32   GLUCOSE RANDOM mg/dL 179*           * I Have Reviewed All Lab Data Listed Above  * Additional Pertinent Lab Tests Reviewed: All Labs Within Last 24 Hours Reviewed    Recent Cultures (last 7 days):       Results from last 7 days  Lab Units 02/10/18  2136 02/10/18  1219   BLOOD CULTURE   --  No Growth at 48 hrs  No Growth at 48 hrs     URINE CULTURE  No Growth <1000 cfu/mL  --        Last 24 Hours Medication List:     Current Facility-Administered Medications:  aspirin 81 mg Oral Daily Nahed Vang MD   atorvastatin 40 mg Oral Daily With Dario Keating MD   clopidogrel 75 mg Oral Daily Nahed Vang MD finasteride 5 mg Oral Daily Donnie Oropeza MD   insulin lispro 1-5 Units Subcutaneous TID AC Pauly Moreira MD   insulin lispro 1-5 Units Subcutaneous HS Haven Moreira MD   levothyroxine 88 mcg Oral Early Morning Pauly Moreira MD   metoprolol tartrate 12 5 mg Oral BID Yesi Cantu PA-C   ondansetron 4 mg Intravenous Q6H PRN Haven Moreira MD   pantoprazole 40 mg Oral BID AC Donnie Oropeza MD   tamsulosin 0 4 mg Oral Daily Donnie Oropeza MD        Today, Patient Was Seen By: Donnie Oropeza MD    ** Please Note: Dragon 360 Dictation voice to text software may have been used in the creation of this document   **

## 2018-02-13 NOTE — PROGRESS NOTES
General Cardiology   Progress Note   Zully Avila 69 y o  male MRN: [id number]  Unit/Bed#: -01 Encounter: [id number]        Subjective:    No significant events since the last encounter  Denies chest pain, shortness of breath, palpitations, lightheadedness, leg swelling, syncope  Objective:   Vitals:  Vitals:    18 0500   BP: 118/56   Pulse: 82   Resp: 18   Temp: 99 °F (37 2 °C)   SpO2: 96%       There is no height or weight on file to calculate BMI  Systolic (60JQN), MXA:529 , Min:103 , Z     Diastolic (57PLL), OHM:35, Min:56, Max:69      Intake/Output Summary (Last 24 hours) at 18 1003  Last data filed at 18 1835   Gross per 24 hour   Intake              440 ml   Output              725 ml   Net             -285 ml     Weight (last 2 days)     None          PHYSICAL EXAMS:  General:  Patient is not in acute distress, laying in the bed comfortably, awake, alert responding to commands  Head: Normocephalic, Atraumatic  HEENT: White sclera, pink conjunctiva,  PERRLA,pharynx benign  Neck:  Supple, no neck vein distention, carotids+2/+2 no bruits, thyromegaly, adenopathy  Respiratory: clear to P/A  Cardiovascular:  PMI normal, S1-S2 normal, No  Murmurs, thrills, gallops, rubs   Regular rhythm  GI:  Abdomen soft nontender   No hepatosplenomegaly, adenopathy, ascites,or rebound tenderness  Extremities: No edema, normal pulses, no calf tenderness, no joint deformities, no venous disease   Integument:  No skin rashes or ulceration  Lymphatic:  No cervical or inguinal lymphadenopathy  Neurologic:  Patient is awake alert, responding to command, well-oriented to time and place and person moving all extremities      LABORATORY RESULTS:      CBC with diff:   Results from last 7 days  Lab Units 18  0545 18  0553 18  1658  18  0443   WBC Thousand/uL 6 77 5 03  --   --  5 54   HEMOGLOBIN g/dL 8 9* 8 1* 7 9*  < > 7 8*   HEMATOCRIT % 26 3* 24 3* 23 8*  < > 23 6*   MCV fL 105* 106*  --   --  107*   PLATELETS Thousands/uL 207 184  --   --  190   MCH pg 35 6* 35 4*  --   --  35 3*   MCHC g/dL 33 8 33 3  --   --  33 1   RDW % 18 1* 18 3*  --   --  18 1*   MPV fL 10 1 10 1  --   --  10 5   NRBC AUTO /100 WBCs 0 0  --   --  0   < > = values in this interval not displayed      CMP:  Results from last 7 days  Lab Units 02/13/18  0545 02/12/18  0553 02/11/18  0443   SODIUM mmol/L 142 143 143   POTASSIUM mmol/L 3 5 3 3* 3 8   CHLORIDE mmol/L 105 105 107   CO2 mmol/L 29 28 30   ANION GAP mmol/L 8 10 6   BUN mg/dL 14 16 20   CREATININE mg/dL 1 62* 1 70* 1 79*   GLUCOSE RANDOM mg/dL 179* 117 119   CALCIUM mg/dL 8 5 8 4 8 8   AST U/L 32 69* 180*   ALT U/L 68 95* 139*   ALK PHOS U/L 127* 130* 137*   TOTAL PROTEIN g/dL 6 6 6 3* 6 3*   BILIRUBIN TOTAL mg/dL 0 60 0 70 1 40*   EGFR ml/min/1 73sq m 41 38 36       BMP:  Results from last 7 days  Lab Units 02/13/18  0545 02/12/18  0553 02/11/18  0443   SODIUM mmol/L 142 143 143   POTASSIUM mmol/L 3 5 3 3* 3 8   CHLORIDE mmol/L 105 105 107   CO2 mmol/L 29 28 30   BUN mg/dL 14 16 20   CREATININE mg/dL 1 62* 1 70* 1 79*   GLUCOSE RANDOM mg/dL 179* 117 119   CALCIUM mg/dL 8 5 8 4 8 8                Results from last 7 days  Lab Units 02/10/18  1219   MAGNESIUM mg/dL 2 0       Results from last 7 days  Lab Units 02/12/18  0553 02/11/18  1300   HEMOGLOBIN A1C % 7 3* 7 4*               Lipid Profile:   Lab Results   Component Value Date    CHOL 144 01/09/2017    CHOL 142 05/21/2015     Lab Results   Component Value Date    HDL 60 01/09/2017    HDL 57 05/21/2015     Lab Results   Component Value Date    LDLCALC 66 05/21/2015     Lab Results   Component Value Date    TRIG 95 05/21/2015       Cardiac testing:   Results for orders placed during the hospital encounter of 07/06/17   Echo complete with contrast if indicated    Narrative [address], [address] [facility]  [phone]    Transthoracic Echocardiogram  2D, M-mode, and Color Doppler    Study date:  2017    Patient: Hank Caldera  MR number: [id number]  Account number: [de-identified]  : 1940  Age: 77 years  Gender: Male  Status: Outpatient  [facility]: [facility]  Height: 66 in  Weight: 164 lb  BP: 120/ 60 mmHg    Indications: Shortness of Breath  Diagnoses: R06 02 - Shortness of breath    Sonographer:  Aguilar RCS  Interpreting Physician:  Ru Hutchinson MD  Primary Physician:  Sally Beck MD  Referring Physician:  Ru Hutchinson MD  Group:  [facility]    LEFT VENTRICLE:  The ventricle was mildly dilated  Ejection fraction was estimated to be 25 %  There is severe hypokinesis of the septum, anterior wall and apex  MITRAL VALVE:  There was trace regurgitation  TRICUSPID VALVE:  There was trace regurgitation  HISTORY: PRIOR HISTORY: CAD  s/p angioplasty  Hyperlipidemia  Tachycardia  Former smoker  Previous (remote) myocardial infarction  Risk factors: hypertension, oral hypoglycemic-treated diabetes, and a family history of coronary artery  disease  PRIOR PROCEDURES: PTCA  PROCEDURE: The study was performed in the [address] [place] [address]  This was a routine study  The transthoracic approach was used  The study included complete 2D imaging, M-mode, and color Doppler  The heart rate was 69 bpm, at the  start of the study  Images were obtained from the parasternal, apical, subcostal, and suprasternal notch acoustic windows  Echocardiographic views were limited due to poor acoustic window availability, decreased penetration, and lung  interference  This was a technically difficult study  LEFT VENTRICLE: The ventricle was mildly dilated  Ejection fraction was estimated to be 25 %  There is severe hypokinesis of the septum, anterior wall and apex  RIGHT VENTRICLE: The size was normal  Systolic function was normal  Wall thickness was normal  ICD lead noted      LEFT ATRIUM: Size was normal     RIGHT ATRIUM: Size was normal     MITRAL VALVE: There was annular calcification  Valve structure was normal  There was normal leaflet separation  DOPPLER: The transmitral velocity was within the normal range  There was no evidence for stenosis  There was trace  regurgitation  AORTIC VALVE: The valve was not well visualized  DOPPLER: There was no evidence for stenosis  TRICUSPID VALVE: The valve structure was normal  There was normal leaflet separation  DOPPLER: The transtricuspid velocity was within the normal range  There was no evidence for stenosis  There was trace regurgitation  PULMONIC VALVE: Leaflets exhibited normal thickness, no calcification, and normal cuspal separation  DOPPLER: The transpulmonic velocity was within the normal range  There was no regurgitation  PERICARDIUM: There was no pericardial effusion  The pericardium was normal in appearance  AORTA: The root exhibited normal size  SYSTEM MEASUREMENT TABLES    Apical four chamber  4 chamber Left Atrium Volume Index; Planimetry; End Systole; Apical four chamber;: 27 1 cm2  Left Ventricular Diastolic Area; Method of Disks, Single Plane; End Diastole; Apical four chamber;: 47 77 cm2  Left Ventricular Ejection Fraction; Method of Disks, Single Plane; Apical four chamber;: 26 7 %  Left Ventricular systolic Area; Method of Disks, Single Plane; End Systole; Apical four chamber;: 38 84 cm2  Right Atrium Systolic Area; Planimetry; End Systole; Apical four chamber;: 20 21 cm2  Right Ventricular Internal Diastolic Dimension; End Diastole; Apical four chamber;: 32 1 mm  TAPSE: 24 3 mm    Unspecified Scan Mode  Aortic Root Diameter; End Systole;: 35 2 mm  Gradient Pressure, Peak; Simplified Bernoulli; Antegrade Flow; Systole;: 6 8 mm[Hg]  Gradient pressure, average; Simplified Bernoulli; Antegrade Flow; Systole;: 4 2 mm[Hg]  Left Atrium to Aortic Root Ratio;: 1 02  Left atrial diameter;  End Diastole;: 35 8 mm  Cardiac Output; Teichholz; Systole;: 7 24 L/min  Heart rate; Teichholz;: 72 HB/min  Interventricular Septum Diastolic Thickness; Teichholz; End Diastole;: 7 5 mm  Left Ventricle Internal End Diastolic Dimension; Teichholz;: 61 4 mm  Left Ventricle Internal Systolic Dimension; Teichholz; End Systole;: 44 3 mm  Left Ventricle Mass; Mass AVCube with Teichholz; End Diastole;: 180 g  Left Ventricle Posterior Wall Diastolic Thickness; Teichholz; End Diastole;: 7 6 mm  Left Ventricular Ejection Fraction; Teichholz;: 53 %  Left Ventricular End Diastolic Volume; Teichholz;: 189 7 ml  Left Ventricular End Systolic Volume; Teichholz;: 89 1 ml  Left Ventricular Fractional Shortening;: 27 9 %  Stroke volume;  Teichholz; Systole;: 100 6 ml  Mitral Valve Area; Area by Pressure Half-Time; Systole;: 3 73 cm2  Mitral Valve E to A Ratio; Systole;: 0 46  Pressure half time; Diastole;: 0 06 s    [address] Accredited [facility]ch[facility]    Prepared and electronically signed by    Rishi Braga MD  Signed 07-Jul-2017 15:35:37         Meds/Allergies   all current active meds have been reviewed and current meds:   Current Facility-Administered Medications   Medication Dose Route Frequency    aspirin chewable tablet 81 mg  81 mg Oral Daily    atorvastatin (LIPITOR) tablet 40 mg  40 mg Oral Daily With Dinner    clopidogrel (PLAVIX) tablet 75 mg  75 mg Oral Daily    finasteride (PROSCAR) tablet 5 mg  5 mg Oral Daily    insulin lispro (HumaLOG) 100 units/mL subcutaneous injection 1-5 Units  1-5 Units Subcutaneous TID AC    insulin lispro (HumaLOG) 100 units/mL subcutaneous injection 1-5 Units  1-5 Units Subcutaneous HS    levothyroxine tablet 88 mcg  88 mcg Oral Early Morning    metoprolol tartrate (LOPRESSOR) partial tablet 12 5 mg  12 5 mg Oral BID    ondansetron (ZOFRAN) injection 4 mg  4 mg Intravenous Q6H PRN    pantoprazole (PROTONIX) EC tablet 40 mg  40 mg Oral BID AC    tamsulosin (FLOMAX) capsule 0 4 mg 0 4 mg Oral Daily     Prescriptions Prior to Admission   Medication    aspirin 81 MG tablet    clopidogrel (PLAVIX) 75 mg tablet    finasteride (PROSCAR) 5 mg tablet    glipiZIDE (GLUCOTROL) 5 mg tablet    levothyroxine 88 mcg tablet    metFORMIN (GLUCOPHAGE) 1000 MG tablet    metoprolol tartrate (LOPRESSOR) 25 mg tablet    rosuvastatin (CRESTOR) 20 MG tablet    tamsulosin (FLOMAX) 0 4 mg    pioglitazone (ACTOS) 30 mg tablet         pantoprozole (PROTONIX) infusion (Continuous) 8 mg/hr Last Rate: 8 mg/hr (02/13/18 0328)       Assessment/Plan:  1  History of CAD/PCI:  Recent stress test done at [facility] last week showed large fixed defect but no ischemia, EF 26%  Continue to hold aspirin and Plavix given anemia  Continue beta-blocker  Hold statin given elevated LFTs      2  Anemia, heme-positive stool:  Continue to hold aspirin and Plavix given anemia  Management per hospitalist Medicine  GI following      3   Elevated LFTs: Management per hospitalist Medicine  GI following      4  Chronic systolic CHF, cardiomyopathy:  EF per stress test last week 26%  Patient has ICD  Euvolemic on exam   Patient does not take diuretics at home  Continue beta-blocker  No ACEI/ARB given elevated creatinine        5  Hypertension:  Last recorded /56  Continue present regimen      6  Hyperlipidemia:  Statins held given elevated LFTs  ** Please Note: Dragon 360 Dictation voice to text software may have been used in the creation of this document   **

## 2018-02-13 NOTE — PHYSICAL THERAPY NOTE
PHYSICAL THERAPY EVALUATION NOTE          Patient Name: Chely LOZADAQ Date: 2/13/2018  AGE:   69 y o  Mrn:   [id number]  ADMIT DX:  Coronary artery disease [I25 10]  Urinary retention [R33 9]  Heme positive stool [R19 5]  JESSA (acute kidney injury) ([facility] [address] ) [N17 9]    Past Medical History:   Diagnosis Date    Cardiac disease     CHF (congestive heart failure) (HCC)     Diabetes mellitus ([facility] )     Disease of thyroid gland     Enlarged prostate     Hyperlipidemia     Hypertension     Myocardial infarction      Length Of Stay: 3  PHYSICAL THERAPY EVALUATION :    02/13/18 0701   Note Type   Note type Eval only   Pain Assessment   Pain Assessment 0-10   Pain Score No Pain   Home Living   Type of [address] One level  (3 CLAUDIA with rail)   Bathroom Shower/Tub Tub/shower unit   Bathroom Toilet Standard   Bathroom Equipment Grab bars in shower  (shower seat)   Home Equipment Electric scooter  (SPC, RW)   Prior Function   Level of [place] Independent with ADLs and functional mobility   Lives With Spouse  (and dtr)   Receives Help From Family  (when needed)   ADL Assistance Independent   IADLs Independent   Falls in the last 6 months 1 to 4  (passed out 3 mon ago)   Comments Patient reports SPC use out of home, none in home, drives  Sits on shower seat, sometimes difficulty getting up from toilet  Restrictions/Precautions   Weight Bearing Precautions Per Order No   Braces or Orthoses (none)   Other Precautions Bed Alarm; Chair Alarm; Fall Risk  (catheter)   General   Additional Pertinent History Patient admitted with abdominal pain, constipation  DX: urinary retention  Was at [facility] 2 days ago for same  Mckeon placed with relief  Also noted Choledocholelithiasis  Heme (+) stools with anemia    Had EDG and colonsocopy     (OOB/up with A eval/treat, RN cleared for PT)   Family/Caregiver Present No   Cognition Overall Cognitive Status WFL   Arousal/Participation Alert   Orientation Level Oriented X4   Memory Within functional limits   Following Commands Follows all commands and directions without difficulty   Comments Agreeable to PT   RUE Assessment   RUE Assessment WFL   LUE Assessment   LUE Assessment WFL   RLE Assessment   RLE Assessment (grossly 4, ROM WFL)   LLE Assessment   LLE Assessment (grossly 4, ROM WFL)   Coordination   Movements are Fluid and Coordinated 1   Sensation WFL   Light Touch   RLE Light Touch Grossly intact   LLE Light Touch Grossly intact   Sharp/Dull   RLE Sharp/Dull Not tested   LLE Sharp/Dull Not tested   Proprioception   RLE Proprioception Not tested   LLE Proprioception Not tested   Bed Mobility   Supine to Sit 7  Independent   Additional Comments good sequencing, no problems   Transfers   Sit to Stand 5  Supervision   Additional items Assist x 1;Verbal cues   Stand to Sit 5  Supervision   Additional items Assist x 1;Verbal cues   Additional Comments good sequencing, cues for UE placement   Ambulation/Elevation   Gait pattern Decreased foot clearance; Short stride; Inconsistent brandon; Excessively slow  (decr step length, LE's barely pass each other)   Gait Assistance 5  Supervision   Additional items Assist x 1;Verbal cues  (for increased closeness to walker turning)   Assistive Device Rolling walker   Distance 150 feet   Balance   Static Sitting Good   Dynamic Sitting Good   Static Standing Good   Dynamic Standing Fair +  (with RW)   Ambulatory Fair +  (with RW)   Endurance Deficit   Endurance Deficit Yes   Endurance Deficit Description no SOB, but fatigued  Decreased gait speed after 75 feet  Activity Tolerance   Activity Tolerance Patient tolerated treatment well;Patient limited by fatigue  (LE fatigue)   Nurse Made Aware RN aware of session   Assessment   Prognosis (n/a)   Problem List Decreased strength;Decreased endurance; Impaired balance   Assessment Patient admitted with abdominal pain, constipation  DX: urinary retention  Was at [facility] for same  Mckeon placed with relief  Also noted Choledocholelithiasis  Heme (+) stools with anemia  Had EDG and colonsocopy  Patient presents with low history, moderate examination and stable clinical presentation  He does have deficits and is mildly below his baseline for which PT at home is suggested to assist with return to baseline  No further skilled PT needed in acute setting  Patient should ambulate with staff on unit  Low level evaluation complexity  Barriers to Discharge None   Goals   Patient Goals go home   STG Expiration Date (n/a)   Treatment Day 0   Plan   Treatment/Interventions Spoke to nursing  (no further acute PT need)   PT Frequency One time visit   Recommendation   Recommendation Home PT; Home with family support   Equipment Recommended (BSC for over toilet)   PT - OK to Discharge Yes   Barthel Index   Feeding 10   Bathing 0   Grooming Score 5   Dressing Score 10   Bladder Score 0   Bowels Score 10   Toilet Use Score 10   Transfers (Bed/Chair) Score 10   Mobility (Level Surface) Score 10   Stairs Score 5   Barthel Index Score 70       Skilled PT recommended upon DC to progress pt toward treatment goals       Josseline Hahn, PT

## 2018-02-13 NOTE — PLAN OF CARE
Problem: DISCHARGE PLANNING - CARE MANAGEMENT  Goal: Discharge to post-acute care or home with appropriate resources  INTERVENTIONS:  - Conduct assessment to determine patient/family and health care team treatment goals, and need for post-acute services based on payer coverage, community resources, and patient preferences, and barriers to discharge  - Address psychosocial, clinical, and financial barriers to discharge as identified in assessment in conjunction with the patient/family and health care team  - Arrange appropriate level of post-acute services according to patients   needs and preference and payer coverage in collaboration with the physician and health care team  - Communicate with and update the patient/family, physician, and health care team regarding progress on the discharge plan  - Arrange appropriate transportation to post-acute venues  Outcome: [address] with family when medically stable, with vna if receptive

## 2018-02-13 NOTE — SOCIAL WORK
Cm met with patient at bedside, patient alert and oriented during interview  Patient reports residing in a private one level home with his wife and daughter  Patient reports using a cane at home and is usually independent with ADL's  Patient reports seeing his PCP Carola as recommended and reports his wife drives him to MD appointments  Patient reports filling his prescriptions at [address] [place] [address] with no co-payment barriers  Patient report no advanced directives at this time  Cm team to provide education and resources as needed  Patient goal is to return home with family  Cm team will continue to assess for needs  CM reviewed discharge planning process including the following: identifying help at home, patient preference for discharge planning needs, pharmacy preference, and availability of treatment team to discuss questions or concerns patient and/or family may have regarding understanding medications and recognizing signs and symptoms once discharged  CM also encouraged patient to follow up with all recommended appointments after discharge  Patient advised of importance for patient and family to participate in managing patients medical well being

## 2018-02-13 NOTE — PROGRESS NOTES
NEPHROLOGY PROGRESS NOTE    Patient: Brenton Brizuela               Sex: male          DOA: 2/10/2018 11:35 AM   YOB: 1940        Age:  69 y o         LOS:  LOS: 3 days       HPI     Blondell Finger admitted to hospital with urinary tension  Has a chronic Mckeon catheter right now    SUBJECTIVE     He is feeling quite well    Denies any complaint  CURRENT MEDICATIONS       Current Facility-Administered Medications:     aspirin chewable tablet 81 mg, 81 mg, Oral, Daily, Mick Pederson MD, 81 mg at 02/13/18 1204    atorvastatin (LIPITOR) tablet 40 mg, 40 mg, Oral, Daily With Roberta Braun MD, 40 mg at 02/13/18 1652    clopidogrel (PLAVIX) tablet 75 mg, 75 mg, Oral, Daily, Mick Pederson MD, 75 mg at 02/13/18 1204    finasteride (PROSCAR) tablet 5 mg, 5 mg, Oral, Daily, Mick Pederson MD, 5 mg at 02/13/18 1205    insulin lispro (HumaLOG) 100 units/mL subcutaneous injection 1-5 Units, 1-5 Units, Subcutaneous, TID AC, 2 Units at 02/13/18 1653 **AND** Fingerstick Glucose (POCT), , , TID AC, Iraj Moreira MD    insulin lispro (HumaLOG) 100 units/mL subcutaneous injection 1-5 Units, 1-5 Units, Subcutaneous, HS, Alhaji Pfeiffer MD, 2 Units at 02/12/18 2252    levothyroxine tablet 88 mcg, 88 mcg, Oral, Early Morning, Iraj Moreira MD, 88 mcg at 02/13/18 0541    metoprolol tartrate (LOPRESSOR) partial tablet 12 5 mg, 12 5 mg, Oral, BID, Yesi Cantu PA-C, 12 5 mg at 02/13/18 0842    ondansetron (ZOFRAN) injection 4 mg, 4 mg, Intravenous, Q6H PRN, Alhaji Pfeiffer MD    pantoprazole (PROTONIX) EC tablet 40 mg, 40 mg, Oral, BID AC, Mick Pederson MD, 40 mg at 02/13/18 1652    tamsulosin (FLOMAX) capsule 0 4 mg, 0 4 mg, Oral, Daily, Mick Pederson MD, 0 4 mg at 02/13/18 1204    OBJECTIVE     Current Weight:    Vitals:    02/13/18 1519   BP: 121/58   Pulse: 95   Resp: 18   Temp: 97 5 °F (36 4 °C)   SpO2: 97%       Intake/Output Summary (Last 24 hours) at 02/13/18 1712  Last data filed at 02/13/18 1100   Gross per 24 hour   Intake              520 ml   Output              950 ml   Net             -430 ml       PHYSICAL EXAMINATION     Physical Exam   Constitutional: He is oriented to person, place, and time  He appears well-developed  No distress  HENT:   Head: Normocephalic  Mouth/Throat: Oropharynx is clear and moist    Eyes: Conjunctivae are normal  No scleral icterus  Neck: Neck supple  No JVD present  Cardiovascular: Normal rate and normal heart sounds  Pulmonary/Chest: Effort normal  He has no wheezes  Abdominal: Soft  There is no tenderness  Musculoskeletal: Normal range of motion  He exhibits no edema  Neurological: He is alert and oriented to person, place, and time  Skin: Skin is warm  No rash noted  Psychiatric: He has a normal mood and affect  His behavior is normal         LAB RESULTS       Results from last 7 days  Lab Units 02/13/18  0545 02/12/18  0553 02/11/18  1658 02/11/18  1300 02/11/18  0443 02/10/18  1219   WBC Thousand/uL 6 77 5 03  --   --  5 54 7 86   HEMOGLOBIN g/dL 8 9* 8 1* 7 9* 7 7* 7 8* 9 0*   HEMATOCRIT % 26 3* 24 3* 23 8* 23 3* 23 6* 26 8*   PLATELETS Thousands/uL 207 184  --   --  190 206   SODIUM mmol/L 142 143  --   --  143 138   POTASSIUM mmol/L 3 5 3 3*  --   --  3 8 3 1*   CHLORIDE mmol/L 105 105  --   --  107 100   CO2 mmol/L 29 28  --   --  30 27   BUN mg/dL 14 16  --   --  20 25   CREATININE mg/dL 1 62* 1 70*  --   --  1 79* 2 06*   EGFR ml/min/1 73sq m 41 38  --   --  36 30   CALCIUM mg/dL 8 5 8 4  --   --  8 8 9 0   MAGNESIUM mg/dL  --   --   --   --   --  2 0   PHOSPHORUS mg/dL 2 2*  --   --   --   --   --    TOTAL PROTEIN g/dL 6 6 6 3*  --   --  6 3* 7 2   GLUCOSE RANDOM mg/dL 179* 117  --   --  119 214*       RADIOLOGY RESULTS      No results found for this or any previous visit    Results for orders placed during the hospital encounter of 02/10/18   XR chest 2 views    Narrative CHEST    INDICATION: 29-year-old male, fever, chills  COMPARISON: 6/16/2017 chest x-ray    VIEWS:  Frontal and lateral projections; 2 images    FINDINGS:  Typical single lead left-sided ICD  The lungs are clear  No pleural effusions  The cardiomediastinal silhouette is unremarkable  Bony thorax unremarkable  Mild multilevel degenerative thoracic spondylosis    Findings are stable      Impression No acute cardiopulmonary disease, stable        Workstation performed: [id number]       No results found for this or any previous visit  Results for orders placed in visit on 10/26/15   CT abdomen pelvis w wo contrast    Narrative EXAM ROOM =    EXAM START = [id number]   EXAM STOP = [id number]   FILMS USED =       CT ABDOMEN AND PELVIS WITH AND WITHOUT IV CONTRAST      INDICATION-  Microscopic hematuria  Difficulty urinating yesterday  COMPARISON-  March 12, 2013  TECHNIQUE- CT of the kidneys was performed without intravenous   contrast  Dynamic postcontrast CT evaluation of the abdomen and pelvis   was performed in both nephrographic and delayed phases after the   administration of intravenous contrast   Axial, sagittal and coronal   reformatted images were submitted for interpretation  Examination was   performed utilizing techniques to minimize radiation dose, including   the use of dose reduction software  100 ml of Omnipaque 350 was   injected intravenously  Enteric contrast was not administered  FINDINGS-      ABDOMEN      RIGHT KIDNEY AND URETER-   Metallic structure, presumably surgical clip, in the posterior medial   cortex of the upper pole, with adjacent focal cortical loss, unchanged   in appearance since the earlier CT  Stable 1 5 cm cortical cyst in the upper pole with mural   calcifications  No other evidence of mass  No hydronephrosis or hydroureter  1 mm nonobstructing calculus in the lower pole  LEFT KIDNEY AND URETER-   2 cm cortical cyst in the mid left kidney and 4 mm cyst in the lower   pole    No suspicious left renal mass  No hydronephrosis or hydroureter  No urinary tract calculi  No perinephric collection  URINARY BLADDER-   No bladder wall mass  No calculi  LUNG BASES-  Lung bases and imaged portions of the pleural spaces   clear  Cardiomegaly  Distal esophagus unremarkable  LIVER/BILIARY TREE-  Unremarkable  GALLBLADDER-  9 mm calcified gallstone  Gallbladder wall normal in   thickness  SPLEEN-  Unremarkable  PANCREAS-  Unremarkable  ADRENAL GLANDS-  Unremarkable  STOMACH, BOWEL AND APPENDIX-  Unremarkable  ABDOMINOPELVIC CAVITY-  No ascites  No free intraperitoneal air  No   lymphadenopathy  VESSELS-  Unremarkable for patient's age  PELVIS      REPRODUCTIVE ORGANS-  Prostatomegaly  ABDOMINAL WALL/INGUINAL REGIONS-  Unremarkable  OSSEOUS STRUCTURES-  Right hip replacement  Degenerative disease in   the spine and left hip  No recent fracture or destructive lesion  IMPRESSION-        1  Postoperative changes in the right kidney  2   Small bilateral renal cysts with no suspicious renal mass  3   1 mm nonobstructing right renal calculus  4   Prostatomegaly  Transcribed on- [id number]            [facility], RAD MD        Reading Radiologist- KAYE Yan MD        Electronically Signed- KAYE Yan MD        Released Date Time- 10/28/15 1607    ------------------------------------------------------------------------------   READ BY = 2411^DAVID THEODORE [address] [place] [address] = 2411^DAVID LEWIS      Results for orders placed during the hospital encounter of 02/10/18   CT abdomen pelvis wo contrast    Narrative CT ABDOMEN AND PELVIS WITHOUT IV CONTRAST    INDICATION:  Urinary retention      COMPARISON: CT abdomen pelvis 10/28/2015    TECHNIQUE:  CT examination of the abdomen and pelvis was performed without intravenous contrast   Reformatted images were created in axial, sagittal, and coronal planes  Radiation dose length product (DLP) for this visit:  631 mGy-cm   This examination, like all CT scans performed in the [facility], was performed utilizing techniques to minimize radiation dose exposure, including the use of iterative   reconstruction and automated exposure control  Enteric contrast was administered  FINDINGS:    ABDOMEN    Evaluation is limited by lack of intravenous contrast     LOWER CHEST:  Atelectatic changes are noted at the lung bases  No other significant abnormality identified in the lower chest   There is cardiomegaly with small pericardial effusion  LIVER/BILIARY TREE:  No intrahepatic or extrahepatic biliary ductal dilation  Multiple densities in the region of the distal common bile duct suspicious for choledocholithiasis  GALLBLADDER:  There are gallstone(s) within the gallbladder, without pericholecystic inflammatory changes  SPLEEN:  Unremarkable  PANCREAS:  Unremarkable  ADRENAL GLANDS:  Unremarkable  KIDNEYS/URETERS:  Unremarkable  No hydronephrosis  Stable right renal scarring  STOMACH AND BOWEL:  Small hiatal hernia  No evidence of bowel obstruction  Mild stool retention throughout the colon  APPENDIX:  A normal appendix was visualized  ABDOMINOPELVIC CAVITY:  No ascites or free intraperitoneal air  No lymphadenopathy  VESSELS:  Atherosclerotic changes are present  No evidence of aneurysm  PELVIS    Evaluation of the pelvis is limited by streak artifact from right hip arthroplasty  REPRODUCTIVE ORGANS:  The prostate is enlarged  URINARY BLADDER:  Bladder is decompressed by a catheter  The catheter tip along the anterior superior margin of the bladder  ABDOMINAL WALL/INGUINAL REGIONS:  Unremarkable  OSSEOUS STRUCTURES:  There is streak artifact from the right hip arthroplasty  There are degenerative changes of the lumbar spine  Impression 1    Cholelithiasis without other evidence of acute cholecystitis  Multiple dense foci within the region of the distal common bile duct suspicious for choledocholithiasis  Correlate for clinical and laboratory evidence of biliary obstruction and   consider further evaluation with MRCP  2   The bladder is decompressed by a Mckeon catheter  The catheter tip is at the superior and anterior margin of the bladder but does appear to still be within the bladder  Correlate clinically and consider catheter retraction  3   Cardiomegaly with small pericardial effusion  4   Large prostate  Workstation performed: [id number]       No results found for this or any previous visit  PLAN / RECOMMENDATIONS      CKD stage 3:  Seems to be at baseline at this point  Likely secondary to recurrent urinary retention    Will continue to monitor    Urinary retention:  Likely secondary enlarged prostate    Cardiomyopathy:  Seems to be quite  comfortable    Will continue to monitor can be discharged renal point of view    Galina Miguel MD  Nephrology  2/13/2018        Portions of the record may have been created with voice recognition software  Occasional wrong word or "sound a like" substitutions may have occurred due to the inherent limitations of voice recognition software  Read the chart carefully and recognize, using context, where substitutions have occurred

## 2018-02-14 VITALS
TEMPERATURE: 98.7 F | BODY MASS INDEX: 22.82 KG/M2 | DIASTOLIC BLOOD PRESSURE: 48 MMHG | HEART RATE: 78 BPM | RESPIRATION RATE: 18 BRPM | HEIGHT: 66 IN | SYSTOLIC BLOOD PRESSURE: 102 MMHG | WEIGHT: 142 LBS | OXYGEN SATURATION: 97 %

## 2018-02-14 LAB
ALBUMIN SERPL BCP-MCNC: 2.4 G/DL (ref 3.5–5)
ALP SERPL-CCNC: 110 U/L (ref 46–116)
ALT SERPL W P-5'-P-CCNC: 47 U/L (ref 12–78)
ANION GAP SERPL CALCULATED.3IONS-SCNC: 8 MMOL/L (ref 4–13)
AST SERPL W P-5'-P-CCNC: 18 U/L (ref 5–45)
BASOPHILS # BLD AUTO: 0.02 THOUSANDS/ΜL (ref 0–0.1)
BASOPHILS NFR BLD AUTO: 0 % (ref 0–1)
BILIRUB SERPL-MCNC: 0.6 MG/DL (ref 0.2–1)
BUN SERPL-MCNC: 10 MG/DL (ref 5–25)
CALCIUM SERPL-MCNC: 8.3 MG/DL (ref 8.3–10.1)
CHLORIDE SERPL-SCNC: 104 MMOL/L (ref 100–108)
CO2 SERPL-SCNC: 29 MMOL/L (ref 21–32)
CREAT SERPL-MCNC: 1.52 MG/DL (ref 0.6–1.3)
EOSINOPHIL # BLD AUTO: 0.04 THOUSAND/ΜL (ref 0–0.61)
EOSINOPHIL NFR BLD AUTO: 1 % (ref 0–6)
ERYTHROCYTE [DISTWIDTH] IN BLOOD BY AUTOMATED COUNT: 18.1 % (ref 11.6–15.1)
GFR SERPL CREATININE-BSD FRML MDRD: 44 ML/MIN/1.73SQ M
GLUCOSE SERPL-MCNC: 176 MG/DL (ref 65–140)
GLUCOSE SERPL-MCNC: 204 MG/DL (ref 65–140)
GLUCOSE SERPL-MCNC: 255 MG/DL (ref 65–140)
HCT VFR BLD AUTO: 25.3 % (ref 36.5–49.3)
HGB BLD-MCNC: 8.4 G/DL (ref 12–17)
LYMPHOCYTES # BLD AUTO: 1.21 THOUSANDS/ΜL (ref 0.6–4.47)
LYMPHOCYTES NFR BLD AUTO: 20 % (ref 14–44)
MCH RBC QN AUTO: 35.3 PG (ref 26.8–34.3)
MCHC RBC AUTO-ENTMCNC: 33.2 G/DL (ref 31.4–37.4)
MCV RBC AUTO: 106 FL (ref 82–98)
MONOCYTES # BLD AUTO: 0.6 THOUSAND/ΜL (ref 0.17–1.22)
MONOCYTES NFR BLD AUTO: 10 % (ref 4–12)
NEUTROPHILS # BLD AUTO: 4.1 THOUSANDS/ΜL (ref 1.85–7.62)
NEUTS SEG NFR BLD AUTO: 68 % (ref 43–75)
NRBC BLD AUTO-RTO: 0 /100 WBCS
PLATELET # BLD AUTO: 204 THOUSANDS/UL (ref 149–390)
PMV BLD AUTO: 10.2 FL (ref 8.9–12.7)
POTASSIUM SERPL-SCNC: 3.1 MMOL/L (ref 3.5–5.3)
PROT SERPL-MCNC: 6.3 G/DL (ref 6.4–8.2)
RBC # BLD AUTO: 2.38 MILLION/UL (ref 3.88–5.62)
SODIUM SERPL-SCNC: 141 MMOL/L (ref 136–145)
WBC # BLD AUTO: 6.01 THOUSAND/UL (ref 4.31–10.16)

## 2018-02-14 PROCEDURE — 82948 REAGENT STRIP/BLOOD GLUCOSE: CPT

## 2018-02-14 PROCEDURE — 99232 SBSQ HOSP IP/OBS MODERATE 35: CPT | Performed by: INTERNAL MEDICINE

## 2018-02-14 PROCEDURE — 85025 COMPLETE CBC W/AUTO DIFF WBC: CPT | Performed by: INTERNAL MEDICINE

## 2018-02-14 PROCEDURE — 80053 COMPREHEN METABOLIC PANEL: CPT | Performed by: INTERNAL MEDICINE

## 2018-02-14 PROCEDURE — 99239 HOSP IP/OBS DSCHRG MGMT >30: CPT | Performed by: INTERNAL MEDICINE

## 2018-02-14 RX ORDER — POTASSIUM CHLORIDE 20 MEQ/1
40 TABLET, EXTENDED RELEASE ORAL ONCE
Status: COMPLETED | OUTPATIENT
Start: 2018-02-14 | End: 2018-02-14

## 2018-02-14 RX ORDER — PANTOPRAZOLE SODIUM 40 MG/1
40 TABLET, DELAYED RELEASE ORAL
Qty: 60 TABLET | Refills: 2 | Status: SHIPPED | OUTPATIENT
Start: 2018-02-14 | End: 2018-02-16 | Stop reason: SDUPTHER

## 2018-02-14 RX ADMIN — SITAGLIPTIN 50 MG: 50 TABLET, FILM COATED ORAL at 11:55

## 2018-02-14 RX ADMIN — PANTOPRAZOLE SODIUM 40 MG: 40 TABLET, DELAYED RELEASE ORAL at 06:23

## 2018-02-14 RX ADMIN — LEVOTHYROXINE SODIUM 88 MCG: 88 TABLET ORAL at 06:23

## 2018-02-14 RX ADMIN — INSULIN LISPRO 2 UNITS: 100 INJECTION, SOLUTION INTRAVENOUS; SUBCUTANEOUS at 11:55

## 2018-02-14 RX ADMIN — POTASSIUM CHLORIDE 40 MEQ: 1500 TABLET, EXTENDED RELEASE ORAL at 08:30

## 2018-02-14 RX ADMIN — METOPROLOL TARTRATE 12.5 MG: 25 TABLET ORAL at 09:27

## 2018-02-14 RX ADMIN — ASPIRIN 81 MG 81 MG: 81 TABLET ORAL at 09:27

## 2018-02-14 RX ADMIN — CLOPIDOGREL BISULFATE 75 MG: 75 TABLET ORAL at 09:27

## 2018-02-14 RX ADMIN — INSULIN LISPRO 1 UNITS: 100 INJECTION, SOLUTION INTRAVENOUS; SUBCUTANEOUS at 09:28

## 2018-02-14 RX ADMIN — TAMSULOSIN HYDROCHLORIDE 0.4 MG: 0.4 CAPSULE ORAL at 09:27

## 2018-02-14 RX ADMIN — FINASTERIDE 5 MG: 5 TABLET, FILM COATED ORAL at 09:27

## 2018-02-14 NOTE — DISCHARGE SUMMARY
Discharge Summary - [facility] Internal Medicine    Patient Information: Medhat Lux 69 y o  male MRN: [id number]  Unit/Bed#: -01 Encounter: [id number]    Discharging Physician / Practitioner: Nadiya Gant MD  PCP: Lou Thomas MD  Admission Date: 2/10/2018  Discharge Date: 02/14/18    Reason for Admission:  Urinary retention    Discharge Diagnoses:     Principal Problem:    Urinary retention  Active Problems:    Coronary artery disease    Type 2 diabetes mellitus ([facility] )    Anemia    Hypertension    Hyperlipidemia    Chronic systolic congestive [facility] failure ([facility] )    Heme positive stool  Resolved Problems:    * No resolved hospital problems  *    Present on Admission:   Coronary artery disease   Urinary retention   Type 2 diabetes mellitus ([facility] )   Anemia   Hypertension   Hyperlipidemia   Chronic systolic congestive heart failure (HCC)   Heme positive stool    Consultations During Hospital Stay:  · Urology  · GI  · Nephrology  · Cardiology    Procedures Performed:     · EGD and colonoscopy    Significant Findings:     · Ultrasound abdominal-right upper quadrant-showing gallstones and mild gallbladder wall thickening  · CT scan abdomen/pelvis-gallstone and dense foci within the region of the distal common bile duct suspicious for choledocholithiasis    Incidental Findings:   · As above     Test Results Pending at Discharge (will require follow up): · None     Outpatient Tests Requested:  · None, however, patient to get renal functions checked with primary care physician in a week or so and follow up with urology/nephrology in addition to GI services    Complications:  None    Hospital Course:     Medhat Lux is a 69 y o  male patient who originally presented to the hospital on 2/10/2018 due to urinary retention  He had a Mckeon catheter placed and was also seen by Urology  Patient complained of abdominal pain    His LFTs went up with slowly came down and now they are normal   His symptoms also resolved  As per GI, he may have passed stone, therefore, no further workup or any intervention at this point because of his symptoms have resolved  Patient also is anemic  He had GI workup done which was unremarkable including EGD and colonoscopy  He would likely need a capsule study on outpatient basis  His antiplatelets have been restarted  Patient also developed acute kidney injury/acute renal failure likely secondary to urinary retention and possible dehydration  He is also on metformin  His metformin and Actos have been discontinued in the setting of acute kidney injury  He also has significant underlying ischemic cardiomyopathy with ejection fraction around 25%  He would resume his glipizide  Also added Januvia  Patient discussed this with his primary care physician further adding or changing his diabetic medications when his creatinine improves  He would also follow up with Urology regarding urinary retention  He is back on his Flomax/Proscar  His beta-blocker dose has been decreased  Seen by Cardiology as well  On PPI for his anemia  His renal functions are slowly improving  Nephrology also following  Patient follow up with them closely as well    Condition at Discharge: fair     Discharge Day Visit / Exam:     Subjective:  Feels good today  Denies any chest pain  Denies any abdominal pain  Denies any nausea or vomiting  Vitals: Blood Pressure: (!) 102/48 (02/14/18 0925)  Pulse: 78 (02/14/18 0925)  Temperature: 98 7 °F (37 1 °C) (02/14/18 0709)  Temp Source: Oral (02/14/18 0709)  Respirations: 18 (02/14/18 0709)  Height: 5' 6" (167 6 cm) (02/14/18 0715)  Weight - Scale: 64 4 kg (142 lb) (02/14/18 0715)  SpO2: 97 % (02/14/18 0709)  Exam:        Vital signs are reviewed as above  Patient sitting up in the chair  Not in any distress  Chest mostly clear to auscultation  S1 and S2 are audible  Abdomen is soft  It is nontender  Bowel sounds are audible    Conjunctivae and skin are pale   Has Mckeon catheter in draining pretty much clear urine  Awake and alert  Oriented x3  Discharge instructions/Information to patient and family:   See after visit summary for information provided to patient and family  Provisions for Follow-Up Care:  See after visit summary for information related to follow-up care and any pertinent home health orders  Disposition:     Home with [facility] Services (Reminder: Complete face to face encounter)    For Discharges to [facility]:   · Not Applicable to this Patient - Not Applicable to this Patient    Planned Readmission:  None     Discharge Statement:  I spent 35+ minutes discharging the patient  This time was spent on the day of discharge  I had direct contact with the patient on the day of discharge  Greater than 50% of the total time was spent examining patient, answering all patient questions, arranging and discussing plan of care with patient as well as directly providing post-discharge instructions  Additional time then spent on discharge activities  Discharge Medications:  See after visit summary for reconciled discharge medications provided to patient and family  ** Please Note: Dragon 360 Dictation voice to text software may have been used in the creation of this document   **

## 2018-02-14 NOTE — CASE MANAGEMENT
Continued Stay Review    Date: 2/14    Vital Signs: BP (!) 102/48 (BP Location: Left arm)   Pulse 78   Temp 98 7 °F (37 1 °C) (Oral)   Resp 18   Ht 5' 6" (1 676 m)   Wt 64 4 kg (142 lb)   SpO2 97%   BMI 22 92 kg/m²     Medications:   Scheduled Meds:   Current Facility-Administered Medications:  aspirin 81 mg Oral Daily Haroon Berkowitz MD   atorvastatin 40 mg Oral Daily With Shruti Bermudez MD   clopidogrel 75 mg Oral Daily Haroon Berkowitz MD   finasteride 5 mg Oral Daily Haroon Berkowitz MD   insulin lispro 1-5 Units Subcutaneous TID AC Pauly A MD Lillie   insulin lispro 1-5 Units Subcutaneous HS Kerry Carrera MD   levothyroxine 88 mcg Oral Early Morning Eugene Moreira MD   metoprolol tartrate 12 5 mg Oral BID Yesi Cantu PA-C   ondansetron 4 mg Intravenous Q6H PRN Eugene Moreira MD   pantoprazole 40 mg Oral BID AC Haroon Berkowitz MD   sitaGLIPtin 50 mg Oral Daily Haroon Berkowitz MD   tamsulosin 0 4 mg Oral Daily Haroon Berkowitz MD     Continuous Infusions:    PRN Meds: ondansetron    Abnormal Labs/Diagnostic Results:    Results from last 7 days  Lab Units 02/14/18  0502 02/13/18  0545 02/12/18  0553 02/11/18  1658 02/11/18  1300 02/11/18  0443 02/10/18  1219   WBC Thousand/uL 6 01 6 77 5 03  --   --  5 54 7 86   HEMOGLOBIN g/dL 8 4* 8 9* 8 1* 7 9* 7 7* 7 8* 9 0*   HEMATOCRIT % 25 3* 26 3* 24 3* 23 8* 23 3* 23 6* 26 8*   PLATELETS Thousands/uL 204 207 184  --   --  190 206   SODIUM mmol/L 141 142 143  --   --  143 138   POTASSIUM mmol/L 3 1* 3 5 3 3*  --   --  3 8 3 1*   CHLORIDE mmol/L 104 105 105  --   --  107 100   CO2 mmol/L 29 29 28  --   --  30 27   BUN mg/dL 10 14 16  --   --  20 25   CREATININE mg/dL 1 52* 1 62* 1 70*  --   --  1 79* 2 06*   EGFR ml/min/1 73sq m 44 41 38  --   --  36 30   CALCIUM mg/dL 8 3 8 5 8 4  --   --  8 8 9 0   MAGNESIUM mg/dL  --   --   --   --   --   --  2 0   PHOSPHORUS mg/dL  --  2 2*  --   --   --   --   --    TOTAL PROTEIN g/dL 6 3* 6 6 6 3*  --   --  6 3* 7 2   GLUCOSE RANDOM mg/dL 176* 179* 117  --   --  119 214         Age/Sex: 69 y o  male     Assessment/Plan: Acute kidney injury:  Improved with Mckeon and hydration     CKD stage 3:  Likely to be multifactorial stable at this point to creatinine 1 4     Disposition can be discharged renal point of view and will follow as outpatient  Discussed with the patient and slim    Discharge Plan: [id number] Rubin  2/14  Segundo Martínez is a 69 y o  male patient who originally presented to the hospital on 2/10/2018 due to urinary retention  He had a Mckeon catheter placed and was also seen by Urology  Patient complained of abdominal pain  His LFTs went up with slowly came down and now they are normal   His symptoms also resolved  As per GI, he may have passed stone, therefore, no further workup or any intervention at this point because of his symptoms have resolved  Patient also is anemic  He had GI workup done which was unremarkable including EGD and colonoscopy  He would likely need a capsule study on outpatient basis  His antiplatelets have been restarted  Patient also developed acute kidney injury/acute renal failure likely secondary to urinary retention and possible dehydration  He is also on metformin  His metformin and Actos have been discontinued in the setting of acute kidney injury  He also has significant underlying ischemic cardiomyopathy with ejection fraction around 25%  He would resume his glipizide  Also added Januvia  Patient discussed this with his primary care physician further adding or changing his diabetic medications when his creatinine improves  He would also follow up with Urology regarding urinary retention  He is back on his Flomax/Proscar  His beta-blocker dose has been decreased  Seen by Cardiology as well  On PPI for his anemia  His renal functions are slowly improving  Nephrology also following    Patient follow up with them closely as well

## 2018-02-14 NOTE — PROGRESS NOTES
NEPHROLOGY PROGRESS NOTE    Patient: Isabela Johnson               Sex: male          DOA: 2/10/2018 11:35 AM   YOB: 1940        Age:  69 y o         LOS:  LOS: 4 days       HPI     Sandra Barajas is feeling quite well  Denies any complaint    SUBJECTIVE     He has Mckeon catheter which is draining    No shortness of breath no chest pain no palpitation    CURRENT MEDICATIONS       Current Facility-Administered Medications:     aspirin chewable tablet 81 mg, 81 mg, Oral, Daily, Eligio Mayorga MD, 81 mg at 02/14/18 3417    atorvastatin (LIPITOR) tablet 40 mg, 40 mg, Oral, Daily With Mio Hogan MD, 40 mg at 02/13/18 1652    clopidogrel (PLAVIX) tablet 75 mg, 75 mg, Oral, Daily, Eligio Mayorga MD, 75 mg at 02/14/18 2806    finasteride (PROSCAR) tablet 5 mg, 5 mg, Oral, Daily, Eligio Mayorga MD, 5 mg at 02/14/18 6432    insulin lispro (HumaLOG) 100 units/mL subcutaneous injection 1-5 Units, 1-5 Units, Subcutaneous, TID AC, 1 Units at 02/14/18 0928 **AND** Fingerstick Glucose (POCT), , , TID AC, Pauly Moreira MD    insulin lispro (HumaLOG) 100 units/mL subcutaneous injection 1-5 Units, 1-5 Units, Subcutaneous, HS, Jenifer Moreira MD, 3 Units at 02/13/18 2205    levothyroxine tablet 88 mcg, 88 mcg, Oral, Early Morning, Jenifer Moreira MD, 88 mcg at 02/14/18 9858    metoprolol tartrate (LOPRESSOR) partial tablet 12 5 mg, 12 5 mg, Oral, BID, Yesi Cantu PA-C, 12 5 mg at 02/14/18 0927    ondansetron (ZOFRAN) injection 4 mg, 4 mg, Intravenous, Q6H PRN, Jenifer Moreira MD    pantoprazole (PROTONIX) EC tablet 40 mg, 40 mg, Oral, BID AC, Eligio Mayorga MD, 40 mg at 02/14/18 2003    tamsulosin (FLOMAX) capsule 0 4 mg, 0 4 mg, Oral, Daily, Eligio Mayorga MD, 0 4 mg at 02/14/18 9372    OBJECTIVE     Current Weight: Weight - Scale: 64 4 kg (142 lb)  Vitals:    02/14/18 0925   BP: (!) 102/48   Pulse: 78   Resp:    Temp:    SpO2:        Intake/Output Summary (Last 24 hours) at 02/14/18 1107  Last data filed at 02/14/18 0900   Gross per 24 hour   Intake              560 ml   Output             2100 ml   Net            -1540 ml       PHYSICAL EXAMINATION     Physical Exam   Constitutional: He is oriented to person, place, and time  He appears well-developed  No distress  HENT:   Head: Normocephalic  Mouth/Throat: Oropharynx is clear and moist    Eyes: Conjunctivae are normal  No scleral icterus  Neck: Neck supple  No JVD present  Cardiovascular: Normal rate and normal heart sounds  Pulmonary/Chest: Effort normal  He has no wheezes  Abdominal: Soft  There is no tenderness  Musculoskeletal: Normal range of motion  He exhibits no edema  Neurological: He is alert and oriented to person, place, and time  Skin: Skin is warm  No rash noted  Psychiatric: He has a normal mood and affect  His behavior is normal         LAB RESULTS       Results from last 7 days  Lab Units 02/14/18  0502 02/13/18  0545 02/12/18  0553 02/11/18  1658 02/11/18  1300 02/11/18  0443 02/10/18  1219   WBC Thousand/uL 6 01 6 77 5 03  --   --  5 54 7 86   HEMOGLOBIN g/dL 8 4* 8 9* 8 1* 7 9* 7 7* 7 8* 9 0*   HEMATOCRIT % 25 3* 26 3* 24 3* 23 8* 23 3* 23 6* 26 8*   PLATELETS Thousands/uL 204 207 184  --   --  190 206   SODIUM mmol/L 141 142 143  --   --  143 138   POTASSIUM mmol/L 3 1* 3 5 3 3*  --   --  3 8 3 1*   CHLORIDE mmol/L 104 105 105  --   --  107 100   CO2 mmol/L 29 29 28  --   --  30 27   BUN mg/dL 10 14 16  --   --  20 25   CREATININE mg/dL 1 52* 1 62* 1 70*  --   --  1 79* 2 06*   EGFR ml/min/1 73sq m 44 41 38  --   --  36 30   CALCIUM mg/dL 8 3 8 5 8 4  --   --  8 8 9 0   MAGNESIUM mg/dL  --   --   --   --   --   --  2 0   PHOSPHORUS mg/dL  --  2 2*  --   --   --   --   --    TOTAL PROTEIN g/dL 6 3* 6 6 6 3*  --   --  6 3* 7 2   GLUCOSE RANDOM mg/dL 176* 179* 117  --   --  119 214*       RADIOLOGY RESULTS      No results found for this or any previous visit    Results for orders placed during the hospital encounter of 02/10/18   XR chest 2 views    Narrative CHEST    INDICATION: 69-year-old male, fever, chills  COMPARISON: 6/16/2017 chest x-ray    VIEWS:  Frontal and lateral projections; 2 images    FINDINGS:  Typical single lead left-sided ICD  The lungs are clear  No pleural effusions  The cardiomediastinal silhouette is unremarkable  Bony thorax unremarkable  Mild multilevel degenerative thoracic spondylosis    Findings are stable      Impression No acute cardiopulmonary disease, stable        Workstation performed: [id number]       No results found for this or any previous visit  Results for orders placed in visit on 10/26/15   CT abdomen pelvis w wo contrast    Narrative EXAM ROOM =    EXAM START = [id number]   EXAM STOP = [id number]   FILMS USED =       CT ABDOMEN AND PELVIS WITH AND WITHOUT IV CONTRAST      INDICATION-  Microscopic hematuria  Difficulty urinating yesterday  COMPARISON-  March 12, 2013  TECHNIQUE- CT of the kidneys was performed without intravenous   contrast  Dynamic postcontrast CT evaluation of the abdomen and pelvis   was performed in both nephrographic and delayed phases after the   administration of intravenous contrast   Axial, sagittal and coronal   reformatted images were submitted for interpretation  Examination was   performed utilizing techniques to minimize radiation dose, including   the use of dose reduction software  100 ml of Omnipaque 350 was   injected intravenously  Enteric contrast was not administered  FINDINGS-      ABDOMEN      RIGHT KIDNEY AND URETER-   Metallic structure, presumably surgical clip, in the posterior medial   cortex of the upper pole, with adjacent focal cortical loss, unchanged   in appearance since the earlier CT  Stable 1 5 cm cortical cyst in the upper pole with mural   calcifications  No other evidence of mass  No hydronephrosis or hydroureter  1 mm nonobstructing calculus in the lower pole        LEFT KIDNEY AND URETER-   2 cm cortical cyst in the mid left kidney and 4 mm cyst in the lower   pole  No suspicious left renal mass  No hydronephrosis or hydroureter  No urinary tract calculi  No perinephric collection  URINARY BLADDER-   No bladder wall mass  No calculi  LUNG BASES-  Lung bases and imaged portions of the pleural spaces   clear  Cardiomegaly  Distal esophagus unremarkable  LIVER/BILIARY TREE-  Unremarkable  GALLBLADDER-  9 mm calcified gallstone  Gallbladder wall normal in   thickness  SPLEEN-  Unremarkable  PANCREAS-  Unremarkable  ADRENAL GLANDS-  Unremarkable  STOMACH, BOWEL AND APPENDIX-  Unremarkable  ABDOMINOPELVIC CAVITY-  No ascites  No free intraperitoneal air  No   lymphadenopathy  VESSELS-  Unremarkable for patient's age  PELVIS      REPRODUCTIVE ORGANS-  Prostatomegaly  ABDOMINAL WALL/INGUINAL REGIONS-  Unremarkable  OSSEOUS STRUCTURES-  Right hip replacement  Degenerative disease in   the spine and left hip  No recent fracture or destructive lesion  IMPRESSION-        1  Postoperative changes in the right kidney  2   Small bilateral renal cysts with no suspicious renal mass  3   1 mm nonobstructing right renal calculus  4   Prostatomegaly  Transcribed on- [id number]            [facility], RAD MD        Reading Radiologist- KAYE Doran MD        Electronically Signed- KAYE Doran MD        Released Date Time- 10/28/15 1607    ------------------------------------------------------------------------------   READ BY = 2411^DAVID THEODORE [address] [place] [address] = 2411^DAVID LEWIS      Results for orders placed during the hospital encounter of 02/10/18   CT abdomen pelvis wo contrast    Narrative CT ABDOMEN AND PELVIS WITHOUT IV CONTRAST    INDICATION:  Urinary retention      COMPARISON: CT abdomen pelvis 10/28/2015    TECHNIQUE:  CT examination of the abdomen and pelvis was performed without intravenous contrast   Reformatted images were created in axial, sagittal, and coronal planes  Radiation dose length product (DLP) for this visit:  631 mGy-cm   This examination, like all CT scans performed in the [facility], was performed utilizing techniques to minimize radiation dose exposure, including the use of iterative   reconstruction and automated exposure control  Enteric contrast was administered  FINDINGS:    ABDOMEN    Evaluation is limited by lack of intravenous contrast     LOWER CHEST:  Atelectatic changes are noted at the lung bases  No other significant abnormality identified in the lower chest   There is cardiomegaly with small pericardial effusion  LIVER/BILIARY TREE:  No intrahepatic or extrahepatic biliary ductal dilation  Multiple densities in the region of the distal common bile duct suspicious for choledocholithiasis  GALLBLADDER:  There are gallstone(s) within the gallbladder, without pericholecystic inflammatory changes  SPLEEN:  Unremarkable  PANCREAS:  Unremarkable  ADRENAL GLANDS:  Unremarkable  KIDNEYS/URETERS:  Unremarkable  No hydronephrosis  Stable right renal scarring  STOMACH AND BOWEL:  Small hiatal hernia  No evidence of bowel obstruction  Mild stool retention throughout the colon  APPENDIX:  A normal appendix was visualized  ABDOMINOPELVIC CAVITY:  No ascites or free intraperitoneal air  No lymphadenopathy  VESSELS:  Atherosclerotic changes are present  No evidence of aneurysm  PELVIS    Evaluation of the pelvis is limited by streak artifact from right hip arthroplasty  REPRODUCTIVE ORGANS:  The prostate is enlarged  URINARY BLADDER:  Bladder is decompressed by a catheter  The catheter tip along the anterior superior margin of the bladder  ABDOMINAL WALL/INGUINAL REGIONS:  Unremarkable      OSSEOUS STRUCTURES:  There is streak artifact from the right hip arthroplasty  There are degenerative changes of the lumbar spine  Impression 1  Cholelithiasis without other evidence of acute cholecystitis  Multiple dense foci within the region of the distal common bile duct suspicious for choledocholithiasis  Correlate for clinical and laboratory evidence of biliary obstruction and   consider further evaluation with MRCP  2   The bladder is decompressed by a Mckeon catheter  The catheter tip is at the superior and anterior margin of the bladder but does appear to still be within the bladder  Correlate clinically and consider catheter retraction  3   Cardiomegaly with small pericardial effusion  4   Large prostate  Workstation performed: [id number]       No results found for this or any previous visit  PLAN / RECOMMENDATIONS      Acute kidney injury:  Improved with Mckeon and hydration    CKD stage 3:  Likely to be multifactorial stable at this point to creatinine 1 4    Disposition can be discharged renal point of view and will follow as outpatient  Discussed with the patient and slim    Yumiko Cox MD  Nephrology  2/14/2018        Portions of the record may have been created with voice recognition software  Occasional wrong word or "sound a like" substitutions may have occurred due to the inherent limitations of voice recognition software  Read the chart carefully and recognize, using context, where substitutions have occurred

## 2018-02-14 NOTE — NUTRITION
02/14/18 1004   Recommendations/Interventions   Interventions Diet: continued as ordered; Supplement initiate;Obtain current weight  (Continue current diet order  Recommend Glucerna BID, will adjust frequency based on overall po intake  Please obtain current weight as able  )

## 2018-02-14 NOTE — SOCIAL WORK
Patient will not have [facility] because of insurance coverage   Efax sent to [facility] Ngoc LUEVANO will take him

## 2018-02-14 NOTE — PLAN OF CARE

## 2018-02-15 NOTE — TELEPHONE ENCOUNTER
Called and spoke to spouse,  Patient home resting, Gwyn LUEVANO to come to assess patient  Called [facility],  Frederick Villanueva with clinical coordinator Manolo Ledesma, RN and verbal order given for 8 am cortez removal on Tuesday 2/20/18 at 8 am,  Patient scheduled for PVR at 2 pm [facility] office 2/20/18  Called and informed spouse of appointment details for void trial   Aware of office location

## 2018-02-16 ENCOUNTER — OFFICE VISIT (OUTPATIENT)
Dept: CARDIOLOGY CLINIC | Facility: CLINIC | Age: 78
End: 2018-02-16
Payer: COMMERCIAL

## 2018-02-16 ENCOUNTER — OFFICE VISIT (OUTPATIENT)
Dept: INTERNAL MEDICINE CLINIC | Facility: CLINIC | Age: 78
End: 2018-02-16
Payer: COMMERCIAL

## 2018-02-16 ENCOUNTER — TRANSITIONAL CARE MANAGEMENT (OUTPATIENT)
Dept: INTERNAL MEDICINE CLINIC | Facility: CLINIC | Age: 78
End: 2018-02-16

## 2018-02-16 VITALS
BODY MASS INDEX: 22.14 KG/M2 | WEIGHT: 137.8 LBS | SYSTOLIC BLOOD PRESSURE: 108 MMHG | HEIGHT: 66 IN | DIASTOLIC BLOOD PRESSURE: 60 MMHG | TEMPERATURE: 97.6 F | OXYGEN SATURATION: 88 % | HEART RATE: 255 BPM

## 2018-02-16 DIAGNOSIS — R09.02 HYPOXIA: Primary | ICD-10-CM

## 2018-02-16 DIAGNOSIS — I25.5 ISCHEMIC CARDIOMYOPATHY: ICD-10-CM

## 2018-02-16 DIAGNOSIS — D64.9 ANEMIA, UNSPECIFIED TYPE: ICD-10-CM

## 2018-02-16 DIAGNOSIS — Z95.810 PRESENCE OF AUTOMATIC CARDIOVERTER/DEFIBRILLATOR (AICD): ICD-10-CM

## 2018-02-16 DIAGNOSIS — E11.9 TYPE 2 DIABETES MELLITUS WITHOUT COMPLICATION, WITHOUT LONG-TERM CURRENT USE OF INSULIN (HCC): ICD-10-CM

## 2018-02-16 DIAGNOSIS — I25.10 ATHEROSCLEROSIS OF NATIVE CORONARY ARTERY OF NATIVE HEART WITHOUT ANGINA PECTORIS: ICD-10-CM

## 2018-02-16 DIAGNOSIS — I50.22 CHRONIC SYSTOLIC HEART FAILURE (HCC): Primary | ICD-10-CM

## 2018-02-16 LAB
BACTERIA BLD CULT: NORMAL
BACTERIA BLD CULT: NORMAL

## 2018-02-16 PROCEDURE — 99496 TRANSJ CARE MGMT HIGH F2F 7D: CPT | Performed by: PHYSICIAN ASSISTANT

## 2018-02-16 PROCEDURE — 99499 UNLISTED E&M SERVICE: CPT | Performed by: INTERNAL MEDICINE

## 2018-02-16 PROCEDURE — 93282 PRGRMG EVAL IMPLANTABLE DFB: CPT | Performed by: INTERNAL MEDICINE

## 2018-02-16 RX ORDER — ROSUVASTATIN CALCIUM 20 MG/1
20 TABLET, COATED ORAL DAILY
Qty: 90 TABLET | Refills: 0 | Status: SHIPPED | OUTPATIENT
Start: 2018-02-16 | End: 2018-04-23 | Stop reason: SDUPTHER

## 2018-02-16 RX ORDER — FINASTERIDE 5 MG/1
5 TABLET, FILM COATED ORAL DAILY
Qty: 90 TABLET | Refills: 0 | Status: SHIPPED | OUTPATIENT
Start: 2018-02-16 | End: 2018-03-28 | Stop reason: SDUPTHER

## 2018-02-16 RX ORDER — PANTOPRAZOLE SODIUM 40 MG/1
40 TABLET, DELAYED RELEASE ORAL
Qty: 60 TABLET | Refills: 0 | Status: SHIPPED | OUTPATIENT
Start: 2018-02-16 | End: 2018-04-23 | Stop reason: SDUPTHER

## 2018-02-16 RX ORDER — TAMSULOSIN HYDROCHLORIDE 0.4 MG/1
0.4 CAPSULE ORAL DAILY
Qty: 90 CAPSULE | Refills: 0 | Status: SHIPPED | OUTPATIENT
Start: 2018-02-16 | End: 2018-03-28 | Stop reason: SDUPTHER

## 2018-02-16 RX ORDER — GLIPIZIDE 5 MG/1
5 TABLET ORAL 2 TIMES DAILY
Qty: 180 TABLET | Refills: 0 | Status: SHIPPED | OUTPATIENT
Start: 2018-02-16 | End: 2018-04-23 | Stop reason: SDUPTHER

## 2018-02-16 RX ORDER — CLOPIDOGREL BISULFATE 75 MG/1
75 TABLET ORAL DAILY
Qty: 90 TABLET | Refills: 0 | Status: SHIPPED | OUTPATIENT
Start: 2018-02-16 | End: 2018-04-23 | Stop reason: SDUPTHER

## 2018-02-16 RX ORDER — LEVOTHYROXINE SODIUM 88 UG/1
88 TABLET ORAL DAILY
Qty: 90 TABLET | Refills: 3 | Status: SHIPPED | OUTPATIENT
Start: 2018-02-16 | End: 2018-04-23 | Stop reason: SDUPTHER

## 2018-02-16 NOTE — PROGRESS NOTES
Assessment/Plan:  He remains very weak short of breath with minor exertion  He has a poor appetite he is losing weight  He is scheduled for follow ups with specialists  Will get him nasal oxygen  He becomes tachypnea and hypoxic walking 20 feet  Oxygen saturation dropping from 97-88  I discussed this with Dr Manuela Almanzar    I reviewed all of his hospital data hemoglobin is 8 6 which is up a little BUN and creatinine stable at 2 and 30  He will follow here in 4 weeks or sooner if need be   Diagnoses and all orders for this visit:    Hypoxia  -     Oxygen    Anemia, unspecified type  -     clopidogrel (PLAVIX) 75 mg tablet; Take 1 tablet (75 mg total) by mouth daily for 90 days  -     finasteride (PROSCAR) 5 mg tablet; Take 1 tablet (5 mg total) by mouth daily for 90 days  -     glipiZIDE (GLUCOTROL) 5 mg tablet; Take 1 tablet (5 mg total) by mouth 2 (two) times a day for 90 days  -     levothyroxine 88 mcg tablet; Take 1 tablet (88 mcg total) by mouth daily for 90 days  -     metoprolol tartrate (LOPRESSOR) 25 mg tablet; Take 0 5 tablets (12 5 mg total) by mouth 2 (two) times a day for 90 days  -     pantoprazole (PROTONIX) 40 mg tablet; Take 1 tablet (40 mg total) by mouth 2 (two) times a day before meals  -     rosuvastatin (CRESTOR) 20 MG tablet; Take 1 tablet (20 mg total) by mouth daily for 90 days  -     tamsulosin (FLOMAX) 0 4 mg; Take 1 capsule (0 4 mg total) by mouth daily for 90 days  -     Comprehensive metabolic panel; Future  -     CBC and differential; Future    Type 2 diabetes mellitus without complication, without long-term current use of insulin (HCC)  -     clopidogrel (PLAVIX) 75 mg tablet; Take 1 tablet (75 mg total) by mouth daily for 90 days  -     finasteride (PROSCAR) 5 mg tablet; Take 1 tablet (5 mg total) by mouth daily for 90 days  -     glipiZIDE (GLUCOTROL) 5 mg tablet; Take 1 tablet (5 mg total) by mouth 2 (two) times a day for 90 days  -     levothyroxine 88 mcg tablet;  Take 1 tablet (88 mcg total) by mouth daily for 90 days  -     metoprolol tartrate (LOPRESSOR) 25 mg tablet; Take 0 5 tablets (12 5 mg total) by mouth 2 (two) times a day for 90 days  -     rosuvastatin (CRESTOR) 20 MG tablet; Take 1 tablet (20 mg total) by mouth daily for 90 days  -     sitaGLIPtin (JANUVIA) 50 mg tablet; Take 1 tablet (50 mg total) by mouth daily  -     tamsulosin (FLOMAX) 0 4 mg; Take 1 capsule (0 4 mg total) by mouth daily for 90 days  -     Comprehensive metabolic panel; Future  -     CBC and differential; Future         Subjective:     Patient ID: Onofre Yarbrough is a 69 y o  male  Hospital follow-up he is being followed for 2 hospitalizations  The 1st at [facility] He has had progressive shortness of breath and weakness over the past month or 2 with a lot of trouble walking because of weakness of his legs  Two weeks ago he developed chest pain and shortness of breath he was admitted and ischemic heart disease was ruled out  No changes were made in his medication  He has chronic renal failure which became a little worse he has anemia also a little worse  He has a known cardiomyopathy that was stable  He has diabetes which has been fairly adequately controlled  A few days after his 1st hospitalization he was readmitted to [facility] and urinary retention and again worsening renal function anemia and elevated blood sugars in the 200s  He was discharged 2 days ago and he remains weak and short of breath with minor exertion but no chest pain dizziness falls or syncope is appetite has been poor he is losing weight        Review of Systems   Constitutional: Positive for activity change, appetite change, fever and unexpected weight change  Negative for chills, diaphoresis and fatigue     HENT: Negative for congestion, dental problem, drooling, ear discharge, ear pain, facial swelling, hearing loss, nosebleeds, postnasal drip, rhinorrhea, sinus pain, sinus pressure, sneezing, sore throat, tinnitus, trouble swallowing and voice change  Eyes: Negative for photophobia, pain, discharge, redness, itching and visual disturbance  Respiratory: Positive for shortness of breath  Negative for apnea, cough, choking, chest tightness, wheezing and stridor  Cardiovascular: Negative for chest pain, palpitations and leg swelling  Gastrointestinal: Negative for abdominal distention, abdominal pain, anal bleeding, blood in stool, constipation, diarrhea, nausea, rectal pain and vomiting  Endocrine: Negative for cold intolerance, heat intolerance, polydipsia, polyphagia and polyuria  Genitourinary: Negative for decreased urine volume (Mckeon catheter), difficulty urinating, dysuria, enuresis, flank pain, frequency, genital sores, hematuria and urgency  Musculoskeletal: Negative for arthralgias, back pain, gait problem, joint swelling, myalgias, neck pain and neck stiffness  Skin: Negative for color change, pallor, rash and wound  Neurological: Negative for dizziness, tremors, seizures, syncope, facial asymmetry, speech difficulty, weakness, light-headedness, numbness and headaches  Hematological: Negative for adenopathy  Does not bruise/bleed easily  Psychiatric/Behavioral: Negative for agitation, behavioral problems, confusion, decreased concentration, dysphoric mood, hallucinations, self-injury, sleep disturbance and suicidal ideas  The patient is not nervous/anxious and is not hyperactive  Objective:     Physical Exam   Constitutional: He is oriented to person, place, and time  HENT:   Head: Normocephalic  Right Ear: External ear normal    Left Ear: External ear normal    Mouth/Throat: Oropharynx is clear and moist    Eyes: Conjunctivae are normal  Pupils are equal, round, and reactive to light  Neck: Neck supple  No thyromegaly present  Cardiovascular: Normal rate, regular rhythm, normal heart sounds and intact distal pulses      Pulmonary/Chest: Breath sounds normal  He is in respiratory distress (Decreased breath sounds)  Abdominal: Soft  Bowel sounds are normal  Distention: Mckeon catheter  Musculoskeletal: Normal range of motion  Edema: No significant edema  Neurological: He is alert and oriented to person, place, and time  He has normal reflexes  Skin: Skin is warm and dry  Diaphoretic: Pale chronic it ill looking teeth in poor repair decreased breath sounds  Vitals:    02/16/18 1558   BP: 108/60   BP Location: Left arm   Patient Position: Sitting   Cuff Size: Standard   Pulse: 95   Temp: 97 6 °F (36 4 °C)   TempSrc: Tympanic   SpO2: 95%   Weight: 62 5 kg (137 lb 12 8 oz)   Height: 5' 6" (1 676 m)       Transitional Care Management Review:  Cuca Rivero is a 69 y o  male here for TCM follow up       During the TCM phone call patient stated:    Date and time hospital follow up call was made:  2/12/2018  2:58 PM  Hospital care reviewed:  Records reviewed  Patient was hopsitalized at:  [facility]  Date of admission:  2/7/18  Date of discharge:  2/8/18  Diagnosis:  chest pain  Were the patients medicaitons reviewed and updated:  No  Scheduled for follow up?:  No (Comment: admitted to [place] [address] on 2/10/18)  Comments:  Pt  was admitted to [place] [address] on 2/10/18 for urinary retention and heme stool             Katlyn Beach PA-C

## 2018-02-19 ENCOUNTER — TELEPHONE (OUTPATIENT)
Dept: INTERNAL MEDICINE CLINIC | Facility: CLINIC | Age: 78
End: 2018-02-19

## 2018-02-19 NOTE — TELEPHONE ENCOUNTER
Faxed prescription refills to [facility] pharmacy for Januvia and pantoprazole  Confirmation received

## 2018-02-20 ENCOUNTER — TELEPHONE (OUTPATIENT)
Dept: UROLOGY | Facility: CLINIC | Age: 78
End: 2018-02-20

## 2018-02-20 ENCOUNTER — CLINICAL SUPPORT (OUTPATIENT)
Dept: UROLOGY | Facility: CLINIC | Age: 78
End: 2018-02-20
Payer: COMMERCIAL

## 2018-02-20 VITALS
DIASTOLIC BLOOD PRESSURE: 80 MMHG | SYSTOLIC BLOOD PRESSURE: 118 MMHG | HEIGHT: 66 IN | WEIGHT: 145 LBS | HEART RATE: 72 BPM | BODY MASS INDEX: 23.3 KG/M2

## 2018-02-20 DIAGNOSIS — R33.9 URINARY RETENTION: Primary | ICD-10-CM

## 2018-02-20 PROCEDURE — 51798 US URINE CAPACITY MEASURE: CPT

## 2018-02-20 NOTE — TELEPHONE ENCOUNTER
Received voicemail from [facility] with [facility],  Patient had cortez removed at 8 am this morning per order, patient is scheduled in our office for PVR this afternoon at 2 pm   Patient has declined further nursing services  Calling to update our office

## 2018-02-20 NOTE — PATIENT INSTRUCTIONS
Patient to start Colace 1 tablet twice daily, over the counter and Miralax 1 capful daily for constipation  Patient to follow up in 4 weeks with ALFREDITO Hanna to discuss possible surgery for prostate

## 2018-02-20 NOTE — PROGRESS NOTES
2/20/2018    Segundo Martínez  1940  [id number]    Diagnosis  Urinary retention    Plan  Follow up in 4 weeks with PA to discuss possible surgery for prostate  Procedure Cortez removal/voiding trial    Cortez catheter removed at 8 am this morning by Phi LUEVANO  Encouraged patient to hydrate well and return this afternoon for post void residual  He knows he may return early if uncomfortable and unable to urinate  Patient agrees to this plan  Patient returned this afternoon at 2 pm  Patient states able to void  Patient voided 100 ml while in office cornel colored urine  Bladder ultrasound performed and PVR measured 330 ml  Reviewed with Dr Dipesh Hooker,  Per Dr Dipesh Hooker 18 FR coude tip cortez catheter inserted, bladder drained for 300 ml straw colored urine  Balloon filled with 10 ml sterile water and cortez attached to leg bag  Patient reporting that he has not had bowel movement since colonoscopy last Monday  Per Dr Dipesh Hooker instructed to start Colace 1 tablet BID over the counter and Miralax 1 capful daily  Reviewed with patient and daughter      Vitals:    02/20/18 1356   BP: 118/80   Pulse: 72   Weight: 65 8 kg (145 lb)   Height: 5' 6" (1 676 m)           Omi Young RN

## 2018-02-22 ENCOUNTER — OFFICE VISIT (OUTPATIENT)
Dept: CARDIOLOGY CLINIC | Facility: CLINIC | Age: 78
End: 2018-02-22
Payer: COMMERCIAL

## 2018-02-22 VITALS
HEIGHT: 66 IN | WEIGHT: 143.6 LBS | HEART RATE: 68 BPM | DIASTOLIC BLOOD PRESSURE: 64 MMHG | SYSTOLIC BLOOD PRESSURE: 112 MMHG | BODY MASS INDEX: 23.08 KG/M2

## 2018-02-22 DIAGNOSIS — I42.9 CARDIOMYOPATHY, UNSPECIFIED TYPE (HCC): ICD-10-CM

## 2018-02-22 DIAGNOSIS — I50.22 CHRONIC SYSTOLIC CONGESTIVE HEART FAILURE (HCC): ICD-10-CM

## 2018-02-22 DIAGNOSIS — I10 ESSENTIAL HYPERTENSION: ICD-10-CM

## 2018-02-22 DIAGNOSIS — I25.10 CORONARY ARTERY DISEASE INVOLVING NATIVE CORONARY ARTERY OF NATIVE HEART WITHOUT ANGINA PECTORIS: Primary | ICD-10-CM

## 2018-02-22 PROCEDURE — 99214 OFFICE O/P EST MOD 30 MIN: CPT | Performed by: INTERNAL MEDICINE

## 2018-02-22 NOTE — PROGRESS NOTES
[facility] [place]  [address] [facility]  [id number]  Cardiology Follow Up    Adi Mcdaniel  1940  [id number]    1  Coronary artery disease involving native coronary artery of native heart without angina pectoris     2  Chronic systolic congestive heart failure (HCC)     3  Cardiomyopathy, unspecified type ([address] )     4  Essential hypertension         No chief complaint on file  Interval History: pt presents follow-up visit with history of cad, chronic systolic chf, cardiomyopathy, htn  Pt was recently hospitalized at [facility] and [facility]  He had originally presented to [facility] with chest pain subsequently had nuclear stress test which showed old scar but no new ischemia  He was discharged home and had been feeling well into the realized he had trouble urinating  He then came to [facility] where he was found to have urinary retention, heme-positive stools and anemia  He underwent EGD & colonoscopy were gastritis and internal hemorrhoids were noted  Patient was advised to have pill cam done  Patient states since being home he is feeling well  He denies any further chest pain, chest pressure, chest heaviness, shortness of breath  He denies any dizziness, syncope, palpitations  He is here today with his family  He is taking all medications as prescribed            Patient Active Problem List   Diagnosis    Coronary artery disease    Urinary retention    Type 2 diabetes mellitus ([address] )    Anemia    Hypertension    Hyperlipidemia    Chronic systolic congestive heart failure (HCC)    Heme positive stool     Past Medical History:   Diagnosis Date    Cardiac disease     CHF (congestive heart failure) ([address] )     Diabetes mellitus [address] [address]     Disease of thyroid gland     Enlarged prostate     Hyperlipidemia     Hypertension     Myocardial infarction      Social History     Social History    Marital status: /Civil Union Spouse name: N/A    Number of children: N/A    Years of education: N/A     Occupational History    Not on file  Social History Main Topics    Smoking status: Former Smoker     Quit date: 2/16/1978    Smokeless tobacco: Never Used    Alcohol use Yes      Comment: occasionally 1-2 per month     Drug use: No    Sexual activity: No     Other Topics Concern    Not on file     Social History Narrative    No narrative on file      Family History   Problem Relation Age of Onset    Heart disease Mother     Cancer Father      throat     Past Surgical History:   Procedure Laterality Date    CORONARY ANGIOPLASTY WITH STENT PLACEMENT      EGD AND COLONOSCOPY N/A 2/12/2018    Procedure: EGD AND COLONOSCOPY;  Surgeon: Pam Maharaj MD;  Location: [facility];   Service: Gastroenterology    HIP ARTHROPLASTY Right     Katlyn Living / El Graft / Ann Better JOINT REPLACEMENT Right        Current Outpatient Prescriptions:     aspirin 81 MG tablet, Take 1 tablet by mouth daily, Disp: , Rfl:     clopidogrel (PLAVIX) 75 mg tablet, Take 1 tablet (75 mg total) by mouth daily for 90 days, Disp: 90 tablet, Rfl: 0    finasteride (PROSCAR) 5 mg tablet, Take 1 tablet (5 mg total) by mouth daily for 90 days, Disp: 90 tablet, Rfl: 0    glipiZIDE (GLUCOTROL) 5 mg tablet, Take 1 tablet (5 mg total) by mouth 2 (two) times a day for 90 days, Disp: 180 tablet, Rfl: 0    levothyroxine 88 mcg tablet, Take 1 tablet (88 mcg total) by mouth daily for 90 days, Disp: 90 tablet, Rfl: 3    metoprolol tartrate (LOPRESSOR) 25 mg tablet, Take 0 5 tablets (12 5 mg total) by mouth 2 (two) times a day for 90 days, Disp: , Rfl: 0    pantoprazole (PROTONIX) 40 mg tablet, Take 1 tablet (40 mg total) by mouth 2 (two) times a day before meals, Disp: 60 tablet, Rfl: 0    rosuvastatin (CRESTOR) 20 MG tablet, Take 1 tablet (20 mg total) by mouth daily for 90 days, Disp: 90 tablet, Rfl: 0    sitaGLIPtin (JANUVIA) 50 mg tablet, Take 1 tablet (50 mg total) by mouth daily, Disp: 30 tablet, Rfl: 0    tamsulosin (FLOMAX) 0 4 mg, Take 1 capsule (0 4 mg total) by mouth daily for 90 days, Disp: 90 capsule, Rfl: 0  Allergies   Allergen Reactions    Atorvastatin      Pt unsure      Percocet [Oxycodone-Acetaminophen] GI Intolerance         Imaging: Ct Abdomen Pelvis Wo Contrast    Result Date: 2/10/2018  Narrative: CT ABDOMEN AND PELVIS WITHOUT IV CONTRAST INDICATION:  Urinary retention  COMPARISON: CT abdomen pelvis 10/28/2015 TECHNIQUE:  CT examination of the abdomen and pelvis was performed without intravenous contrast   Reformatted images were created in axial, sagittal, and coronal planes  Radiation dose length product (DLP) for this visit:  631 mGy-cm   This examination, like all CT scans performed in the [facility], was performed utilizing techniques to minimize radiation dose exposure, including the use of iterative reconstruction and automated exposure control  Enteric contrast was administered  FINDINGS: ABDOMEN Evaluation is limited by lack of intravenous contrast  LOWER CHEST:  Atelectatic changes are noted at the lung bases  No other significant abnormality identified in the lower chest   There is cardiomegaly with small pericardial effusion  LIVER/BILIARY TREE:  No intrahepatic or extrahepatic biliary ductal dilation  Multiple densities in the region of the distal common bile duct suspicious for choledocholithiasis  GALLBLADDER:  There are gallstone(s) within the gallbladder, without pericholecystic inflammatory changes  SPLEEN:  Unremarkable  PANCREAS:  Unremarkable  ADRENAL GLANDS:  Unremarkable  KIDNEYS/URETERS:  Unremarkable  No hydronephrosis  Stable right renal scarring  STOMACH AND BOWEL:  Small hiatal hernia  No evidence of bowel obstruction  Mild stool retention throughout the colon  APPENDIX:  A normal appendix was visualized  ABDOMINOPELVIC CAVITY:  No ascites or free intraperitoneal air   No lymphadenopathy  VESSELS:  Atherosclerotic changes are present  No evidence of aneurysm  PELVIS Evaluation of the pelvis is limited by streak artifact from right hip arthroplasty  REPRODUCTIVE ORGANS:  The prostate is enlarged  URINARY BLADDER:  Bladder is decompressed by a catheter  The catheter tip along the anterior superior margin of the bladder  ABDOMINAL WALL/INGUINAL REGIONS:  Unremarkable  OSSEOUS STRUCTURES:  There is streak artifact from the right hip arthroplasty  There are degenerative changes of the lumbar spine  Impression: 1  Cholelithiasis without other evidence of acute cholecystitis  Multiple dense foci within the region of the distal common bile duct suspicious for choledocholithiasis  Correlate for clinical and laboratory evidence of biliary obstruction and consider further evaluation with MRCP  2   The bladder is decompressed by a Mckeon catheter  The catheter tip is at the superior and anterior margin of the bladder but does appear to still be within the bladder  Correlate clinically and consider catheter retraction  3   Cardiomegaly with small pericardial effusion  4   Large prostate  Workstation performed: [id number]     Xr Chest 2 Views    Result Date: 2/11/2018  Narrative: CHEST INDICATION: 28-year-old male, fever, chills COMPARISON: 6/16/2017 chest x-ray VIEWS:  Frontal and lateral projections; 2 images FINDINGS: Typical single lead left-sided ICD The lungs are clear  No pleural effusions  The cardiomediastinal silhouette is unremarkable  Bony thorax unremarkable  Mild multilevel degenerative thoracic spondylosis Findings are stable     Impression: No acute cardiopulmonary disease, stable Workstation performed: [id number]     Us Right Upper Quadrant    Result Date: 2/11/2018  Narrative: RIGHT UPPER QUADRANT ULTRASOUND INDICATION:  Abnormal liver function testing  Biliary ductal dilatation  Choledocholithiasis unresponsive CT  COMPARISON:  CT performed February 10, 2018  TECHNIQUE:   Real-time ultrasound of the right upper quadrant was performed with a curvilinear transducer with both volumetric sweeps and still imaging techniques  FINDINGS: PANCREAS:  Visualized portions of the pancreas are within normal limits  AORTA AND IVC:  Visualized portions are normal for patient age  LIVER: Size:  Within normal range  The liver measures 14 5 cm in the midclavicular line  Contour:  Surface contour is smooth  Parenchyma:  Echogenicity and echotexture are within normal limits  No evidence of suspicious mass  Limited imaging of the main portal vein shows it to be patent and hepatopetal   BILIARY: There is no intrahepatic biliary dilatation despite the presence of calcified stones demonstrated in the distal common duct on recent CT  Unfortunately, stones in the distal common duct are obscured on the current examination by overlying bowel gas  There are layering stones and sludge in the dependent lumen of the gallbladder which is minimally thick-walled but demonstrates no surrounding pericholecystic fluid  No sonographic Pichardo's sign  KIDNEY: Right kidney measures 11 7 cm  Within normal limits  ASCITES:   None  Impression: Cholelithiasis  Choledocholithiasis, though definitively demonstrated on recent noncontrast CT, cannot be redemonstrated on the current examination due to overlying bowel gas  No intrahepatic biliary dilatation  Common duct only mildly dilated to 7 mm  Mild gallbladder wall thickening without evidence of Pichardo's sign or pericholecystic fluid  Workstation performed: [id number]       Review of Systems:  Review of Systems   Constitutional: Negative  HENT: Negative  Respiratory: Negative  Cardiovascular: Negative  Neurological: Negative  Hematological: Negative  All other systems reviewed and are negative          /64   Pulse 68   Ht 5' 6" (1 676 m)   Wt 65 1 kg (143 lb 9 6 oz)   BMI 23 18 kg/m²     Physical Exam:  Physical Exam Constitutional: He is oriented to person, place, and time  He appears well-developed and well-nourished  HENT:   Head: Normocephalic and atraumatic  Cardiovascular: Normal rate, regular rhythm and normal heart sounds  Pulmonary/Chest: Effort normal and breath sounds normal    Musculoskeletal: Normal range of motion  He exhibits no edema  Neurological: He is alert and oriented to person, place, and time  Skin: Skin is warm and dry  Psychiatric: He has a normal mood and affect  His behavior is normal  Judgment and thought content normal    Nursing note and vitals reviewed  Discussion/Summary:  1- CAD; stable  Recent stress test done, done at [facility], no ischemia, old scar noted  Continue all meds  2- chronic systolic chf -- stable, appears euvolemic  Continue all meds  3- Cardiomyopathy; stable  Last echo done July 2017, ef 25%  Continue all meds  4- HTN; stable  Continue all meds       Continue all meds  Report any symptoms  Fu with pcp  Advised to fup with GI, Dr Tunde Reyes  Return in 3 months

## 2018-02-22 NOTE — PATIENT INSTRUCTIONS
Continue all meds  Report any symptoms  Advised to fup with GI, Dr Bjorn Brambila  Return in 3 months

## 2018-03-06 ENCOUNTER — OFFICE VISIT (OUTPATIENT)
Dept: UROLOGY | Facility: CLINIC | Age: 78
End: 2018-03-06
Payer: COMMERCIAL

## 2018-03-06 VITALS
BODY MASS INDEX: 22.98 KG/M2 | DIASTOLIC BLOOD PRESSURE: 76 MMHG | HEART RATE: 68 BPM | HEIGHT: 66 IN | WEIGHT: 143 LBS | SYSTOLIC BLOOD PRESSURE: 118 MMHG

## 2018-03-06 DIAGNOSIS — R33.9 URINARY RETENTION: ICD-10-CM

## 2018-03-06 PROCEDURE — 99211 OFF/OP EST MAY X REQ PHY/QHP: CPT

## 2018-03-06 NOTE — PROGRESS NOTES
Makayla Jackson is a 69 y o  male  Vitals:    03/06/18 1435   BP: 118/76   Pulse: 68     Chief Complaint     cortez catheter concern        Patient presents to office for cortez catheter check states urine is leaking from bag  Irrigated existing cortez catheter with good return, no leakage noted, educated patient on cortez catheter care and attached new leg bag and stat lock secure device  Provided patient with additional leg bag, stat lock, and over night bag  He will follow up as scheduled with Ananth Joseph for possible void trial and discuss surgery

## 2018-03-19 ENCOUNTER — APPOINTMENT (OUTPATIENT)
Dept: LAB | Facility: CLINIC | Age: 78
End: 2018-03-19
Payer: COMMERCIAL

## 2018-03-19 DIAGNOSIS — D64.9 ANEMIA, UNSPECIFIED TYPE: ICD-10-CM

## 2018-03-19 DIAGNOSIS — E11.9 TYPE 2 DIABETES MELLITUS WITHOUT COMPLICATION, WITHOUT LONG-TERM CURRENT USE OF INSULIN (HCC): ICD-10-CM

## 2018-03-19 DIAGNOSIS — Z12.11 ENCOUNTER FOR SCREENING FOR MALIGNANT NEOPLASM OF COLON: ICD-10-CM

## 2018-03-19 LAB
ALBUMIN SERPL BCP-MCNC: 3.3 G/DL (ref 3.5–5)
ALP SERPL-CCNC: 68 U/L (ref 46–116)
ALT SERPL W P-5'-P-CCNC: 31 U/L (ref 12–78)
ANION GAP SERPL CALCULATED.3IONS-SCNC: 6 MMOL/L (ref 4–13)
AST SERPL W P-5'-P-CCNC: 29 U/L (ref 5–45)
BASOPHILS # BLD AUTO: 0.03 THOUSANDS/ΜL (ref 0–0.1)
BASOPHILS NFR BLD AUTO: 1 % (ref 0–1)
BILIRUB SERPL-MCNC: 0.6 MG/DL (ref 0.2–1)
BUN SERPL-MCNC: 18 MG/DL (ref 5–25)
CALCIUM SERPL-MCNC: 8.7 MG/DL (ref 8.3–10.1)
CHLORIDE SERPL-SCNC: 108 MMOL/L (ref 100–108)
CO2 SERPL-SCNC: 27 MMOL/L (ref 21–32)
CREAT SERPL-MCNC: 1.38 MG/DL (ref 0.6–1.3)
EOSINOPHIL # BLD AUTO: 0 THOUSAND/ΜL (ref 0–0.61)
EOSINOPHIL NFR BLD AUTO: 0 % (ref 0–6)
ERYTHROCYTE [DISTWIDTH] IN BLOOD BY AUTOMATED COUNT: 19.6 % (ref 11.6–15.1)
GFR SERPL CREATININE-BSD FRML MDRD: 49 ML/MIN/1.73SQ M
GLUCOSE SERPL-MCNC: 201 MG/DL (ref 65–140)
HCT VFR BLD AUTO: 30.7 % (ref 36.5–49.3)
HGB BLD-MCNC: 10.1 G/DL (ref 12–17)
LYMPHOCYTES # BLD AUTO: 1.26 THOUSANDS/ΜL (ref 0.6–4.47)
LYMPHOCYTES NFR BLD AUTO: 24 % (ref 14–44)
MCH RBC QN AUTO: 36.5 PG (ref 26.8–34.3)
MCHC RBC AUTO-ENTMCNC: 32.9 G/DL (ref 31.4–37.4)
MCV RBC AUTO: 111 FL (ref 82–98)
MONOCYTES # BLD AUTO: 0.47 THOUSAND/ΜL (ref 0.17–1.22)
MONOCYTES NFR BLD AUTO: 9 % (ref 4–12)
NEUTROPHILS # BLD AUTO: 3.54 THOUSANDS/ΜL (ref 1.85–7.62)
NEUTS SEG NFR BLD AUTO: 66 % (ref 43–75)
NRBC BLD AUTO-RTO: 0 /100 WBCS
PLATELET # BLD AUTO: 222 THOUSANDS/UL (ref 149–390)
PMV BLD AUTO: 10.7 FL (ref 8.9–12.7)
POTASSIUM SERPL-SCNC: 4.7 MMOL/L (ref 3.5–5.3)
PROT SERPL-MCNC: 7.3 G/DL (ref 6.4–8.2)
RBC # BLD AUTO: 2.77 MILLION/UL (ref 3.88–5.62)
SODIUM SERPL-SCNC: 141 MMOL/L (ref 136–145)
WBC # BLD AUTO: 5.31 THOUSAND/UL (ref 4.31–10.16)

## 2018-03-19 PROCEDURE — 85025 COMPLETE CBC W/AUTO DIFF WBC: CPT

## 2018-03-19 PROCEDURE — 36415 COLL VENOUS BLD VENIPUNCTURE: CPT

## 2018-03-19 PROCEDURE — 80053 COMPREHEN METABOLIC PANEL: CPT

## 2018-03-20 DIAGNOSIS — N32.89 BLADDER SPASMS: Primary | ICD-10-CM

## 2018-03-20 NOTE — TELEPHONE ENCOUNTER
Patient is experiencing burning when he needs to urinate  Patients urine is not going into the tube that leads to the cath  It is leaking from his penis

## 2018-03-20 NOTE — TELEPHONE ENCOUNTER
Spoke with patient patient states that he is having a bladder discomfort and burning and that he is having urine leaking around the catheter but is also having urine going into the bag  Patient aware that he can try un-hooking the bag and let the urine come out into a cup or the toilet to make sure he does not have an "air lock" patient also aware that the bag should be about his knee so that it is not being pulled  It sounds like patient is most likely having bladder spasms  Could we send in a medication to see if that helps with the patients discomfort and urine leaking  Thank you

## 2018-03-21 RX ORDER — OXYBUTYNIN CHLORIDE 10 MG/1
10 TABLET, EXTENDED RELEASE ORAL
Qty: 30 TABLET | Refills: 0 | Status: SHIPPED | OUTPATIENT
Start: 2018-03-21 | End: 2018-04-23 | Stop reason: ALTCHOICE

## 2018-03-21 NOTE — TELEPHONE ENCOUNTER
Called patient  Left messages explanning to patient that it sounds like he is having bladder spasms and that Michelle Plaza PA-C recommends trying Oxybutynin as needed for bladder spasms  Patient is to call the office if he has any other questions or concerns

## 2018-03-21 NOTE — TELEPHONE ENCOUNTER
Likely having bladder spasms if intermittent and normally urine flowing into catheter  Can try oxybutynin 10mg PO PRN bladder spasms  Advise s/e of constipation, dry mouth, and possible mental status change  Follow up at appointment as scheduled  Thank you

## 2018-03-22 ENCOUNTER — APPOINTMENT (OUTPATIENT)
Dept: LAB | Facility: HOSPITAL | Age: 78
End: 2018-03-22
Payer: COMMERCIAL

## 2018-03-22 LAB — HEMOCCULT STL QL IA: POSITIVE

## 2018-03-22 PROCEDURE — G0328 FECAL BLOOD SCRN IMMUNOASSAY: HCPCS

## 2018-03-23 ENCOUNTER — APPOINTMENT (OUTPATIENT)
Dept: LAB | Facility: CLINIC | Age: 78
End: 2018-03-23
Payer: COMMERCIAL

## 2018-03-23 ENCOUNTER — TELEPHONE (OUTPATIENT)
Dept: INTERNAL MEDICINE CLINIC | Facility: CLINIC | Age: 78
End: 2018-03-23

## 2018-03-23 ENCOUNTER — OFFICE VISIT (OUTPATIENT)
Dept: INTERNAL MEDICINE CLINIC | Facility: CLINIC | Age: 78
End: 2018-03-23
Payer: COMMERCIAL

## 2018-03-23 VITALS
BODY MASS INDEX: 23.5 KG/M2 | WEIGHT: 145.6 LBS | OXYGEN SATURATION: 87 % | HEART RATE: 52 BPM | DIASTOLIC BLOOD PRESSURE: 42 MMHG | SYSTOLIC BLOOD PRESSURE: 98 MMHG

## 2018-03-23 DIAGNOSIS — I50.22 CHRONIC SYSTOLIC CONGESTIVE HEART FAILURE (HCC): ICD-10-CM

## 2018-03-23 DIAGNOSIS — E11.65 TYPE 2 DIABETES MELLITUS WITH HYPERGLYCEMIA, WITHOUT LONG-TERM CURRENT USE OF INSULIN (HCC): Primary | ICD-10-CM

## 2018-03-23 DIAGNOSIS — E11.65 TYPE 2 DIABETES MELLITUS WITH HYPERGLYCEMIA, WITHOUT LONG-TERM CURRENT USE OF INSULIN (HCC): ICD-10-CM

## 2018-03-23 DIAGNOSIS — R33.9 URINARY RETENTION: ICD-10-CM

## 2018-03-23 DIAGNOSIS — I25.10 CORONARY ARTERY DISEASE INVOLVING NATIVE CORONARY ARTERY OF NATIVE HEART WITHOUT ANGINA PECTORIS: ICD-10-CM

## 2018-03-23 DIAGNOSIS — E78.2 MIXED HYPERLIPIDEMIA: ICD-10-CM

## 2018-03-23 DIAGNOSIS — I10 ESSENTIAL HYPERTENSION: ICD-10-CM

## 2018-03-23 DIAGNOSIS — R19.5 HEME POSITIVE STOOL: ICD-10-CM

## 2018-03-23 PROBLEM — I21.9 MYOCARDIAL INFARCTION (HCC): Status: RESOLVED | Noted: 2018-03-23 | Resolved: 2018-03-23

## 2018-03-23 PROBLEM — I50.9 CHF (CONGESTIVE HEART FAILURE) (HCC): Status: ACTIVE | Noted: 2018-03-23

## 2018-03-23 LAB
ALBUMIN SERPL BCP-MCNC: 3.3 G/DL (ref 3.5–5)
ALP SERPL-CCNC: 64 U/L (ref 46–116)
ALT SERPL W P-5'-P-CCNC: 24 U/L (ref 12–78)
ANION GAP SERPL CALCULATED.3IONS-SCNC: 5 MMOL/L (ref 4–13)
AST SERPL W P-5'-P-CCNC: 25 U/L (ref 5–45)
BASOPHILS # BLD AUTO: 0.03 THOUSANDS/ΜL (ref 0–0.1)
BASOPHILS NFR BLD AUTO: 1 % (ref 0–1)
BILIRUB SERPL-MCNC: 0.72 MG/DL (ref 0.2–1)
BUN SERPL-MCNC: 16 MG/DL (ref 5–25)
CALCIUM SERPL-MCNC: 8.9 MG/DL (ref 8.3–10.1)
CHLORIDE SERPL-SCNC: 106 MMOL/L (ref 100–108)
CO2 SERPL-SCNC: 29 MMOL/L (ref 21–32)
CREAT SERPL-MCNC: 1.28 MG/DL (ref 0.6–1.3)
EOSINOPHIL # BLD AUTO: 0 THOUSAND/ΜL (ref 0–0.61)
EOSINOPHIL NFR BLD AUTO: 0 % (ref 0–6)
ERYTHROCYTE [DISTWIDTH] IN BLOOD BY AUTOMATED COUNT: 19.4 % (ref 11.6–15.1)
GFR SERPL CREATININE-BSD FRML MDRD: 54 ML/MIN/1.73SQ M
GLUCOSE SERPL-MCNC: 183 MG/DL (ref 65–140)
HCT VFR BLD AUTO: 30.1 % (ref 36.5–49.3)
HGB BLD-MCNC: 9.7 G/DL (ref 12–17)
LYMPHOCYTES # BLD AUTO: 1.23 THOUSANDS/ΜL (ref 0.6–4.47)
LYMPHOCYTES NFR BLD AUTO: 23 % (ref 14–44)
MCH RBC QN AUTO: 35.7 PG (ref 26.8–34.3)
MCHC RBC AUTO-ENTMCNC: 32.2 G/DL (ref 31.4–37.4)
MCV RBC AUTO: 111 FL (ref 82–98)
MONOCYTES # BLD AUTO: 0.54 THOUSAND/ΜL (ref 0.17–1.22)
MONOCYTES NFR BLD AUTO: 10 % (ref 4–12)
NEUTROPHILS # BLD AUTO: 3.48 THOUSANDS/ΜL (ref 1.85–7.62)
NEUTS SEG NFR BLD AUTO: 66 % (ref 43–75)
NRBC BLD AUTO-RTO: 0 /100 WBCS
PLATELET # BLD AUTO: 224 THOUSANDS/UL (ref 149–390)
PMV BLD AUTO: 10.4 FL (ref 8.9–12.7)
POTASSIUM SERPL-SCNC: 4.6 MMOL/L (ref 3.5–5.3)
PROT SERPL-MCNC: 7.2 G/DL (ref 6.4–8.2)
RBC # BLD AUTO: 2.72 MILLION/UL (ref 3.88–5.62)
SODIUM SERPL-SCNC: 140 MMOL/L (ref 136–145)
WBC # BLD AUTO: 5.28 THOUSAND/UL (ref 4.31–10.16)

## 2018-03-23 PROCEDURE — 36415 COLL VENOUS BLD VENIPUNCTURE: CPT

## 2018-03-23 PROCEDURE — 99214 OFFICE O/P EST MOD 30 MIN: CPT | Performed by: INTERNAL MEDICINE

## 2018-03-23 PROCEDURE — 85025 COMPLETE CBC W/AUTO DIFF WBC: CPT

## 2018-03-23 PROCEDURE — 80053 COMPREHEN METABOLIC PANEL: CPT

## 2018-03-23 NOTE — PATIENT INSTRUCTIONS
Recheck CBC and metabolic panel today principally to keep track of kidney function  Follow-up in about 1 month

## 2018-03-23 NOTE — PROGRESS NOTES
Assessment/Plan:       Diagnoses and all orders for this visit:    Type 2 diabetes mellitus with hyperglycemia, without long-term current use of insulin (HCC)    Chronic systolic congestive heart failure (HCC)    Coronary artery disease involving native coronary artery of native heart without angina pectoris    Essential hypertension    Urinary retention    Heme positive stool    Mixed hyperlipidemia              Subjective:      Patient ID: Heidi Gilman is a 69 y o  male  A 76-year-old man with multiple chronic problems  A 11week-old history of acute urinary retention  Currently has an indwelling Mckeon catheter  Follows with Urology  This is his major acute issue   Working diagnosis is urinary retention secondary to prostatic hyperplasia  Complication of condition was development of acute kidney injury  This necessitated discontinuation of Actos and also metformin  Also, because blood pressure was low, the metoprolol dose was cut to 12 5 mg daily  Anemia noted  EGD and colonoscopy done with no significant diagnoses  Chronic problems of hyperlipidemia, hypertension, coronary artery disease status post angioplasty, systolic heart failure  Recent ejection fraction of 25%  Recent laboratory testing shows hemoglobin A1c of 7 3  but also notes heme-positive stool  Some recent laboratory testing has been done        The following portions of the patient's history were reviewed and updated as appropriate:   He has a past medical history of Cardiac disease; Disease of thyroid gland; Enlarged prostate; Hyperlipidemia; Hypertension; and Myocardial infarction  ,   does not have any pertinent problems on file  ,   has a past surgical history that includes Hip Arthroplasty (Right); Coronary angioplasty with stent; Joint replacement (Right); Insert / replace / remove pacemaker; and EGD AND COLONOSCOPY (N/A, 2/12/2018)  ,  family history includes Cancer in his father; Heart disease in his mother  ,   reports that he quit smoking about 40 years ago  He has never used smokeless tobacco  He reports that he drinks alcohol  He reports that he does not use drugs  ,  is allergic to atorvastatin and percocet [oxycodone-acetaminophen]     Current Outpatient Prescriptions   Medication Sig Dispense Refill    aspirin 81 MG tablet Take 1 tablet by mouth daily      clopidogrel (PLAVIX) 75 mg tablet Take 1 tablet (75 mg total) by mouth daily for 90 days 90 tablet 0    finasteride (PROSCAR) 5 mg tablet Take 1 tablet (5 mg total) by mouth daily for 90 days 90 tablet 0    glipiZIDE (GLUCOTROL) 5 mg tablet Take 1 tablet (5 mg total) by mouth 2 (two) times a day for 90 days 180 tablet 0    levothyroxine 88 mcg tablet Take 1 tablet (88 mcg total) by mouth daily for 90 days 90 tablet 3    metoprolol tartrate (LOPRESSOR) 25 mg tablet Take 0 5 tablets (12 5 mg total) by mouth 2 (two) times a day for 90 days  0    oxybutynin (DITROPAN-XL) 10 MG 24 hr tablet Take 1 tablet (10 mg total) by mouth daily at bedtime 30 tablet 0    pantoprazole (PROTONIX) 40 mg tablet Take 1 tablet (40 mg total) by mouth 2 (two) times a day before meals 60 tablet 0    rosuvastatin (CRESTOR) 20 MG tablet Take 1 tablet (20 mg total) by mouth daily for 90 days 90 tablet 0    sitaGLIPtin (JANUVIA) 50 mg tablet Take 1 tablet (50 mg total) by mouth daily 30 tablet 0    tamsulosin (FLOMAX) 0 4 mg Take 1 capsule (0 4 mg total) by mouth daily for 90 days 90 capsule 0     No current facility-administered medications for this visit  Review of Systems   Constitutional: Positive for fatigue  Negative for chills and fever  HENT: Negative for sore throat and trouble swallowing  Eyes: Negative for pain  Respiratory: Negative for cough and shortness of breath  Cardiovascular: Negative for chest pain and palpitations  Gastrointestinal: Negative for abdominal pain, blood in stool, diarrhea, nausea and vomiting     Endocrine: Negative for cold intolerance and heat intolerance  Genitourinary: Positive for difficulty urinating  Negative for dysuria, frequency and testicular pain  Musculoskeletal: Positive for arthralgias  Negative for joint swelling  Skin: Positive for color change and pallor  Allergic/Immunologic: Negative for immunocompromised state  Neurological: Positive for dizziness  Negative for syncope and headaches  Hematological: Negative for adenopathy  Does not bruise/bleed easily  Psychiatric/Behavioral: Negative for dysphoric mood  The patient is not nervous/anxious  Objective:  Vitals:    03/23/18 1509   BP: (!) 98/42   Pulse: (!) 52   SpO2: (!) 87%      Physical Exam   Constitutional: He is oriented to person, place, and time  He appears well-developed  Chronically ill in appearance  Not tachypneic no dyspneic  Not using accessory muscles  Very pale skin  Nail beds are not pink  Nail beds are weight and do not irvin  HENT:   Head: Normocephalic and atraumatic  Eyes: Pupils are equal, round, and reactive to light  Neck: Normal range of motion  Neck supple  No tracheal deviation present  No thyromegaly present  Cardiovascular: Normal rate, regular rhythm and normal heart sounds  Exam reveals no gallop  No murmur heard  Pulmonary/Chest: Effort normal  No respiratory distress  He has no wheezes  He has no rales  Abdominal: Soft  Bowel sounds are normal  There is no tenderness  Musculoskeletal: Normal range of motion  He exhibits no tenderness or deformity  Neurological: He is alert and oriented to person, place, and time  He has normal reflexes  Coordination normal    Skin: Skin is warm and dry  Psychiatric: He has a normal mood and affect

## 2018-03-23 NOTE — TELEPHONE ENCOUNTER
----- Message from Mateo Jimenes MD sent at 3/23/2018  7:52 AM EDT -----  Please call the patient regarding his abnormal result    Needs office visit for high sugar and also blood in stool

## 2018-03-26 ENCOUNTER — TELEPHONE (OUTPATIENT)
Dept: UROLOGY | Facility: CLINIC | Age: 78
End: 2018-03-26

## 2018-03-26 ENCOUNTER — TELEPHONE (OUTPATIENT)
Dept: INTERNAL MEDICINE CLINIC | Facility: CLINIC | Age: 78
End: 2018-03-26

## 2018-03-26 NOTE — TELEPHONE ENCOUNTER
----- Message from Tk Dunne MD sent at 3/23/2018  6:25 PM EDT -----  Kidney function has returned to near normal   Continue to stay off the metformin and continue to monitor sugars

## 2018-03-26 NOTE — TELEPHONE ENCOUNTER
Patient called  Patient states that he is having leaking around his catheter  Asked patient if he stated taking oxybutynin 10mg that was sent over to the pharmacy for him  Patient has not been taking the oxybutynin  Review with patient that he is most likely having bladder spasms and that the spasms are causing that leaking  Patient aware that he needs to take oxybutynin to help with bladder spasms and stop the leaking  Patient verbalizes understanding

## 2018-03-28 ENCOUNTER — OFFICE VISIT (OUTPATIENT)
Dept: UROLOGY | Facility: CLINIC | Age: 78
End: 2018-03-28
Payer: COMMERCIAL

## 2018-03-28 VITALS
BODY MASS INDEX: 23.78 KG/M2 | SYSTOLIC BLOOD PRESSURE: 112 MMHG | DIASTOLIC BLOOD PRESSURE: 58 MMHG | WEIGHT: 148 LBS | HEART RATE: 66 BPM | HEIGHT: 66 IN

## 2018-03-28 DIAGNOSIS — R33.9 URINARY RETENTION: Primary | ICD-10-CM

## 2018-03-28 DIAGNOSIS — D64.9 ANEMIA, UNSPECIFIED TYPE: ICD-10-CM

## 2018-03-28 DIAGNOSIS — N40.1 BENIGN PROSTATIC HYPERPLASIA WITH INCOMPLETE BLADDER EMPTYING: ICD-10-CM

## 2018-03-28 DIAGNOSIS — E11.9 TYPE 2 DIABETES MELLITUS WITHOUT COMPLICATION, WITHOUT LONG-TERM CURRENT USE OF INSULIN (HCC): ICD-10-CM

## 2018-03-28 DIAGNOSIS — R39.14 BENIGN PROSTATIC HYPERPLASIA WITH INCOMPLETE BLADDER EMPTYING: ICD-10-CM

## 2018-03-28 PROBLEM — N40.0 BPH (BENIGN PROSTATIC HYPERPLASIA): Status: ACTIVE | Noted: 2018-03-28

## 2018-03-28 PROCEDURE — 51798 US URINE CAPACITY MEASURE: CPT | Performed by: PHYSICIAN ASSISTANT

## 2018-03-28 PROCEDURE — 99213 OFFICE O/P EST LOW 20 MIN: CPT | Performed by: PHYSICIAN ASSISTANT

## 2018-03-28 RX ORDER — FINASTERIDE 5 MG/1
5 TABLET, FILM COATED ORAL DAILY
Qty: 90 TABLET | Refills: 3 | Status: SHIPPED | OUTPATIENT
Start: 2018-03-28 | End: 2018-04-23 | Stop reason: SDUPTHER

## 2018-03-28 RX ORDER — TAMSULOSIN HYDROCHLORIDE 0.4 MG/1
0.4 CAPSULE ORAL DAILY
Qty: 90 CAPSULE | Refills: 3 | Status: SHIPPED | OUTPATIENT
Start: 2018-03-28 | End: 2018-04-23 | Stop reason: SDUPTHER
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
## 2018-03-29 ENCOUNTER — TELEPHONE (OUTPATIENT)
Dept: GASTROENTEROLOGY | Facility: CLINIC | Age: 78
End: 2018-03-29

## 2018-03-29 ENCOUNTER — OFFICE VISIT (OUTPATIENT)
Dept: GASTROENTEROLOGY | Facility: CLINIC | Age: 78
End: 2018-03-29
Payer: COMMERCIAL

## 2018-03-29 VITALS
BODY MASS INDEX: 24.27 KG/M2 | HEART RATE: 62 BPM | WEIGHT: 150.38 LBS | SYSTOLIC BLOOD PRESSURE: 98 MMHG | DIASTOLIC BLOOD PRESSURE: 52 MMHG

## 2018-03-29 DIAGNOSIS — D64.9 ANEMIA, UNSPECIFIED TYPE: ICD-10-CM

## 2018-03-29 DIAGNOSIS — Z09 FOLLOW UP: Primary | ICD-10-CM

## 2018-03-29 DIAGNOSIS — R19.5 HEME POSITIVE STOOL: ICD-10-CM

## 2018-03-29 PROCEDURE — 99214 OFFICE O/P EST MOD 30 MIN: CPT | Performed by: INTERNAL MEDICINE

## 2018-03-29 NOTE — TELEPHONE ENCOUNTER
Please schedule an office appt with Dr Fermín Haq or Dr Kj Bonilla in the [facility] if possible  Thank you

## 2018-03-29 NOTE — LETTER
March 29, 2018     Mateo Jimenes MD  [address] [address]    Patient: Unique Martinez   YOB: 1940   Date of Visit: 3/29/2018       Dear Dr Clay Speaker: Thank you for referring Tree Robledo to me for evaluation  Below are my notes for this consultation  If you have questions, please do not hesitate to call me  I look forward to following your patient along with you  Sincerely,        Alfie Mcclellan MD        CC: No Recipients  Alfie Mcclellan MD  3/29/2018  4:36 PM  Sign at close encounter  Follow-up Note - [facility]  Unique Martinez 1940 69 y o  male     ASSESSMENT @ PLAN:   He is a 15-year-old male with a microcytic anemia who was in the hospital in February  His current hemoglobin is 9 7 is MCV is 111  He was stool Hemoccult positive  He had endoscopy at that time which showed chronic severe gastritis and a colonoscopy that was normal  Certainly his anemia is multifactorial     1 we are going to do a video capsule endoscopy to rule out small bowel bleeding  He is on aspirin and Plavix    2 he will continue on the Protonix 40 mg p o  b i d  for now given the severe gastritis    3 he needs to have a hematology referral to rule out myelodysplastic syndrome given his very high MCV-we will make the referral today    Reason:  Stool Hemoccult positive anemia    HPI:  He is a 15-year-old male with stool Hemoccult positive anemia  He reports his hemoglobin has decreased recently  He was in the hospital in February and had an endoscopy with 1 of my colleagues that showed a chronic severe gastritis  He also had a colonoscopy which was normal   It was recommended that he have a video capsule endoscopy but did not do this yet  He is on pantoprazole 40 mg p o  b i d  He denies heartburn regurgitation or dysphagia  He was stool Hemoccult positive recently  So there is some sort of ongoing bleeding process    His MCV is very high it is 111  He has not seen the hematologist ever  He is on aspirin and Plavix  He does have kidney disease  REVIEW OF SYSTEMS:     CONSTITUTIONAL: Denies any fever, chills, or rigors  Good appetite, and no recent weight loss  HEENT: No earache or tinnitus  Denies hearing loss or visual disturbances  CARDIOVASCULAR: No chest pain or palpitations  RESPIRATORY: Denies any cough, hemoptysis, shortness of breath or dyspnea on exertion  GASTROINTESTINAL: As noted in the History of Present Illness  GENITOURINARY: No problems with urination  Denies any hematuria or dysuria  NEUROLOGIC: No dizziness or vertigo, denies headaches  MUSCULOSKELETAL: Denies any muscle or joint pain  SKIN: Denies skin rashes or itching  ENDOCRINE: Denies excessive thirst  Denies intolerance to heat or cold  PSYCHOSOCIAL: Denies depression or anxiety  Denies any recent memory loss  Past Medical History:   Diagnosis Date    Cardiac disease     Disease of thyroid gland     Enlarged prostate     Hyperlipidemia     Hypertension     Myocardial infarction       Past Surgical History:   Procedure Laterality Date    CORONARY ANGIOPLASTY WITH STENT PLACEMENT      EGD AND COLONOSCOPY N/A 2/12/2018    Procedure: EGD AND COLONOSCOPY;  Surgeon: Pam Maharaj MD;  Location: [facility]; Service: Gastroenterology    HIP ARTHROPLASTY Right     INSERT / [facility] / [facility]      JOINT REPLACEMENT Right      Social History     Social History    Marital status: /Civil Union     Spouse name: N/A    Number of children: N/A    Years of education: N/A     Occupational History    Not on file       Social History Main Topics    Smoking status: Former Smoker     Quit date: 2/16/1978    Smokeless tobacco: Never Used    Alcohol use Yes      Comment: occasionally 1-2 per month     Drug use: No    Sexual activity: No     Other Topics Concern    Not on file     Social History Narrative    No narrative on file Family History   Problem Relation Age of Onset    Heart disease Mother     Cancer Father      throat     Atorvastatin and Percocet [oxycodone-acetaminophen]  Current Outpatient Prescriptions   Medication Sig Dispense Refill    aspirin 81 MG tablet Take 1 tablet by mouth daily      clopidogrel (PLAVIX) 75 mg tablet Take 1 tablet (75 mg total) by mouth daily for 90 days 90 tablet 0    finasteride (PROSCAR) 5 mg tablet Take 1 tablet (5 mg total) by mouth daily for 90 days 90 tablet 3    glipiZIDE (GLUCOTROL) 5 mg tablet Take 1 tablet (5 mg total) by mouth 2 (two) times a day for 90 days 180 tablet 0    levothyroxine 88 mcg tablet Take 1 tablet (88 mcg total) by mouth daily for 90 days 90 tablet 3    metoprolol tartrate (LOPRESSOR) 25 mg tablet Take 0 5 tablets (12 5 mg total) by mouth 2 (two) times a day for 90 days  0    pantoprazole (PROTONIX) 40 mg tablet Take 1 tablet (40 mg total) by mouth 2 (two) times a day before meals 60 tablet 0    rosuvastatin (CRESTOR) 20 MG tablet Take 1 tablet (20 mg total) by mouth daily for 90 days 90 tablet 0    sitaGLIPtin (JANUVIA) 50 mg tablet Take 1 tablet (50 mg total) by mouth daily 30 tablet 0    tamsulosin (FLOMAX) 0 4 mg Take 1 capsule (0 4 mg total) by mouth daily for 90 days 90 capsule 3    oxybutynin (DITROPAN-XL) 10 MG 24 hr tablet Take 1 tablet (10 mg total) by mouth daily at bedtime 30 tablet 0     No current facility-administered medications for this visit  Blood pressure 98/52, pulse 62, weight 68 2 kg (150 lb 6 oz)      PHYSICAL EXAM:     General Appearance:   Alert, cooperative, no distress, appears stated age    HEENT:   Normocephalic, atraumatic, anicteric      Neck:  Supple, symmetrical, trachea midline, no adenopathy;    thyroid: no enlargement/tenderness/nodules; no carotid  bruit or JVD    Lungs:   Clear to auscultation bilaterally; no rales, rhonchi or wheezing; respirations unlabored    Heart[de-identified]   S1 and S2 normal; regular rate and rhythm; no murmur, rub, or gallop     Abdomen:   Soft, non-tender, non-distended; normal bowel sounds; no masses, no organomegaly    Genitalia:   Deferred    Rectal:   Deferred    Extremities:  No cyanosis, clubbing or edema    Pulses:  2+ and symmetric all extremities    Skin:  Skin color, texture, turgor normal, no rashes or lesions    Lymph nodes:  No palpable cervical, axillary or inguinal lymphadenopathy        Lab Results   Component Value Date    WBC 5 28 03/23/2018    HGB 9 7 (L) 03/23/2018    HCT 30 1 (L) 03/23/2018     (H) 03/23/2018     03/23/2018     Lab Results   Component Value Date    GLUCOSE 183 (H) 03/23/2018    CALCIUM 8 9 03/23/2018     03/23/2018    K 4 6 03/23/2018    CO2 29 03/23/2018     03/23/2018    BUN 16 03/23/2018    CREATININE 1 28 03/23/2018     Lab Results   Component Value Date    ALT 24 03/23/2018    AST 25 03/23/2018    ALKPHOS 64 03/23/2018    BILITOT 0 72 03/23/2018     No results found for: INR, PROTIME

## 2018-03-29 NOTE — PROGRESS NOTES
Follow-up Note -  [facility]  Tk Balderrama 1940 69 y o  male     ASSESSMENT @ PLAN:   He is a 60-year-old male with a microcytic anemia who was in the hospital in February  His current hemoglobin is 9 7 is MCV is 111  He was stool Hemoccult positive  He had endoscopy at that time which showed chronic severe gastritis and a colonoscopy that was normal  Certainly his anemia is multifactorial     1 we are going to do a video capsule endoscopy to rule out small bowel bleeding  He is on aspirin and Plavix    2 he will continue on the Protonix 40 mg p o  b i d  for now given the severe gastritis    3 he needs to have a hematology referral to rule out myelodysplastic syndrome given his very high MCV-we will make the referral today    Reason:  Stool Hemoccult positive anemia    HPI:  He is a 60-year-old male with stool Hemoccult positive anemia  He reports his hemoglobin has decreased recently  He was in the hospital in February and had an endoscopy with 1 of my colleagues that showed a chronic severe gastritis  He also had a colonoscopy which was normal   It was recommended that he have a video capsule endoscopy but did not do this yet  He is on pantoprazole 40 mg p o  b i d  He denies heartburn regurgitation or dysphagia  He was stool Hemoccult positive recently  So there is some sort of ongoing bleeding process  His MCV is very high it is 111  He has not seen the hematologist ever  He is on aspirin and Plavix  He does have kidney disease  REVIEW OF SYSTEMS:     CONSTITUTIONAL: Denies any fever, chills, or rigors  Good appetite, and no recent weight loss  HEENT: No earache or tinnitus  Denies hearing loss or visual disturbances  CARDIOVASCULAR: No chest pain or palpitations  RESPIRATORY: Denies any cough, hemoptysis, shortness of breath or dyspnea on exertion  GASTROINTESTINAL: As noted in the History of Present Illness  GENITOURINARY: No problems with urination   Denies any hematuria or dysuria  NEUROLOGIC: No dizziness or vertigo, denies headaches  MUSCULOSKELETAL: Denies any muscle or joint pain  SKIN: Denies skin rashes or itching  ENDOCRINE: Denies excessive thirst  Denies intolerance to heat or cold  PSYCHOSOCIAL: Denies depression or anxiety  Denies any recent memory loss  Past Medical History:   Diagnosis Date    Cardiac disease     Disease of thyroid gland     Enlarged prostate     Hyperlipidemia     Hypertension     Myocardial infarction       Past Surgical History:   Procedure Laterality Date    CORONARY ANGIOPLASTY WITH STENT PLACEMENT      EGD AND COLONOSCOPY N/A 2/12/2018    Procedure: EGD AND COLONOSCOPY;  Surgeon: Lilliam Norman MD;  Location: [facility]; Service: Gastroenterology    HIP ARTHROPLASTY Right     INSERT / Julious Kaia / Rayma Rich      JOINT REPLACEMENT Right      Social History     Social History    Marital status: /Civil Union     Spouse name: N/A    Number of children: N/A    Years of education: N/A     Occupational History    Not on file       Social History Main Topics    Smoking status: Former Smoker     Quit date: 2/16/1978    Smokeless tobacco: Never Used    Alcohol use Yes      Comment: occasionally 1-2 per month     Drug use: No    Sexual activity: No     Other Topics Concern    Not on file     Social History Narrative    No narrative on file     Family History   Problem Relation Age of Onset    Heart disease Mother     Cancer Father      throat     Atorvastatin and Percocet [oxycodone-acetaminophen]  Current Outpatient Prescriptions   Medication Sig Dispense Refill    aspirin 81 MG tablet Take 1 tablet by mouth daily      clopidogrel (PLAVIX) 75 mg tablet Take 1 tablet (75 mg total) by mouth daily for 90 days 90 tablet 0    finasteride (PROSCAR) 5 mg tablet Take 1 tablet (5 mg total) by mouth daily for 90 days 90 tablet 3    glipiZIDE (GLUCOTROL) 5 mg tablet Take 1 tablet (5 mg total) by mouth 2 (two) times a day for 90 days 180 tablet 0    levothyroxine 88 mcg tablet Take 1 tablet (88 mcg total) by mouth daily for 90 days 90 tablet 3    metoprolol tartrate (LOPRESSOR) 25 mg tablet Take 0 5 tablets (12 5 mg total) by mouth 2 (two) times a day for 90 days  0    pantoprazole (PROTONIX) 40 mg tablet Take 1 tablet (40 mg total) by mouth 2 (two) times a day before meals 60 tablet 0    rosuvastatin (CRESTOR) 20 MG tablet Take 1 tablet (20 mg total) by mouth daily for 90 days 90 tablet 0    sitaGLIPtin (JANUVIA) 50 mg tablet Take 1 tablet (50 mg total) by mouth daily 30 tablet 0    tamsulosin (FLOMAX) 0 4 mg Take 1 capsule (0 4 mg total) by mouth daily for 90 days 90 capsule 3    oxybutynin (DITROPAN-XL) 10 MG 24 hr tablet Take 1 tablet (10 mg total) by mouth daily at bedtime 30 tablet 0     No current facility-administered medications for this visit  Blood pressure 98/52, pulse 62, weight 68 2 kg (150 lb 6 oz)  PHYSICAL EXAM:     General Appearance:   Alert, cooperative, no distress, appears stated age    HEENT:   Normocephalic, atraumatic, anicteric      Neck:  Supple, symmetrical, trachea midline, no adenopathy;    thyroid: no enlargement/tenderness/nodules; no carotid  bruit or JVD    Lungs:   Clear to auscultation bilaterally; no rales, rhonchi or wheezing; respirations unlabored    Heart[de-identified]   S1 and S2 normal; regular rate and rhythm; no murmur, rub, or gallop     Abdomen:   Soft, non-tender, non-distended; normal bowel sounds; no masses, no organomegaly    Genitalia:   Deferred    Rectal:   Deferred    Extremities:  No cyanosis, clubbing or edema    Pulses:  2+ and symmetric all extremities    Skin:  Skin color, texture, turgor normal, no rashes or lesions    Lymph nodes:  No palpable cervical, axillary or inguinal lymphadenopathy        Lab Results   Component Value Date    WBC 5 28 03/23/2018    HGB 9 7 (L) 03/23/2018    HCT 30 1 (L) 03/23/2018     (H) 03/23/2018     03/23/2018     Lab Results   Component Value Date    GLUCOSE 183 (H) 03/23/2018    CALCIUM 8 9 03/23/2018     03/23/2018    K 4 6 03/23/2018    CO2 29 03/23/2018     03/23/2018    BUN 16 03/23/2018    CREATININE 1 28 03/23/2018     Lab Results   Component Value Date    ALT 24 03/23/2018    AST 25 03/23/2018    ALKPHOS 64 03/23/2018    BILITOT 0 72 03/23/2018     No results found for: INR, PROTIME

## 2018-04-18 ENCOUNTER — CLINICAL SUPPORT (OUTPATIENT)
Dept: GASTROENTEROLOGY | Facility: CLINIC | Age: 78
End: 2018-04-18
Payer: COMMERCIAL

## 2018-04-18 DIAGNOSIS — D64.9 ANEMIA, UNSPECIFIED TYPE: ICD-10-CM

## 2018-04-18 PROCEDURE — 91110 GI TRC IMG INTRAL ESOPH-ILE: CPT | Performed by: INTERNAL MEDICINE

## 2018-04-23 ENCOUNTER — OFFICE VISIT (OUTPATIENT)
Dept: INTERNAL MEDICINE CLINIC | Facility: CLINIC | Age: 78
End: 2018-04-23
Payer: COMMERCIAL

## 2018-04-23 ENCOUNTER — APPOINTMENT (OUTPATIENT)
Dept: LAB | Facility: CLINIC | Age: 78
End: 2018-04-23
Payer: COMMERCIAL

## 2018-04-23 VITALS
BODY MASS INDEX: 23.82 KG/M2 | WEIGHT: 148.2 LBS | SYSTOLIC BLOOD PRESSURE: 104 MMHG | HEIGHT: 66 IN | OXYGEN SATURATION: 98 % | DIASTOLIC BLOOD PRESSURE: 52 MMHG | HEART RATE: 61 BPM

## 2018-04-23 DIAGNOSIS — I50.22 CHRONIC SYSTOLIC CONGESTIVE HEART FAILURE (HCC): ICD-10-CM

## 2018-04-23 DIAGNOSIS — I25.10 CORONARY ARTERY DISEASE INVOLVING NATIVE CORONARY ARTERY OF NATIVE HEART WITHOUT ANGINA PECTORIS: ICD-10-CM

## 2018-04-23 DIAGNOSIS — I10 ESSENTIAL HYPERTENSION: ICD-10-CM

## 2018-04-23 DIAGNOSIS — I21.9 MYOCARDIAL INFARCTION, UNSPECIFIED MI TYPE, UNSPECIFIED ARTERY (HCC): ICD-10-CM

## 2018-04-23 DIAGNOSIS — N40.1 BENIGN PROSTATIC HYPERPLASIA WITH INCOMPLETE BLADDER EMPTYING: ICD-10-CM

## 2018-04-23 DIAGNOSIS — E78.2 MIXED HYPERLIPIDEMIA: ICD-10-CM

## 2018-04-23 DIAGNOSIS — R39.14 BENIGN PROSTATIC HYPERPLASIA WITH INCOMPLETE BLADDER EMPTYING: ICD-10-CM

## 2018-04-23 DIAGNOSIS — D64.9 ANEMIA, UNSPECIFIED TYPE: ICD-10-CM

## 2018-04-23 DIAGNOSIS — R19.5 HEME POSITIVE STOOL: ICD-10-CM

## 2018-04-23 DIAGNOSIS — I50.22 CHRONIC SYSTOLIC CONGESTIVE HEART FAILURE (HCC): Primary | ICD-10-CM

## 2018-04-23 DIAGNOSIS — E11.9 TYPE 2 DIABETES MELLITUS WITHOUT COMPLICATION, WITHOUT LONG-TERM CURRENT USE OF INSULIN (HCC): ICD-10-CM

## 2018-04-23 LAB
ANION GAP SERPL CALCULATED.3IONS-SCNC: 4 MMOL/L (ref 4–13)
BASOPHILS # BLD AUTO: 0.01 THOUSANDS/ΜL (ref 0–0.1)
BASOPHILS NFR BLD AUTO: 0 % (ref 0–1)
BUN SERPL-MCNC: 17 MG/DL (ref 5–25)
CALCIUM SERPL-MCNC: 8.8 MG/DL (ref 8.3–10.1)
CHLORIDE SERPL-SCNC: 108 MMOL/L (ref 100–108)
CO2 SERPL-SCNC: 28 MMOL/L (ref 21–32)
CREAT SERPL-MCNC: 1.26 MG/DL (ref 0.6–1.3)
EOSINOPHIL # BLD AUTO: 0.19 THOUSAND/ΜL (ref 0–0.61)
EOSINOPHIL NFR BLD AUTO: 4 % (ref 0–6)
ERYTHROCYTE [DISTWIDTH] IN BLOOD BY AUTOMATED COUNT: 16.6 % (ref 11.6–15.1)
FOLATE SERPL-MCNC: 18.9 NG/ML (ref 3.1–17.5)
GFR SERPL CREATININE-BSD FRML MDRD: 55 ML/MIN/1.73SQ M
GLUCOSE SERPL-MCNC: 218 MG/DL (ref 65–140)
HCT VFR BLD AUTO: 31 % (ref 36.5–49.3)
HGB BLD-MCNC: 10.1 G/DL (ref 12–17)
IGA SERPL-MCNC: 176 MG/DL (ref 70–400)
IGG SERPL-MCNC: 1370 MG/DL (ref 700–1600)
IGM SERPL-MCNC: 47 MG/DL (ref 40–230)
LYMPHOCYTES # BLD AUTO: 1.36 THOUSANDS/ΜL (ref 0.6–4.47)
LYMPHOCYTES NFR BLD AUTO: 30 % (ref 14–44)
MAGNESIUM SERPL-MCNC: 2.2 MG/DL (ref 1.6–2.6)
MCH RBC QN AUTO: 35.7 PG (ref 26.8–34.3)
MCHC RBC AUTO-ENTMCNC: 32.6 G/DL (ref 31.4–37.4)
MCV RBC AUTO: 110 FL (ref 82–98)
MONOCYTES # BLD AUTO: 0.54 THOUSAND/ΜL (ref 0.17–1.22)
MONOCYTES NFR BLD AUTO: 12 % (ref 4–12)
NEUTROPHILS # BLD AUTO: 2.37 THOUSANDS/ΜL (ref 1.85–7.62)
NEUTS SEG NFR BLD AUTO: 54 % (ref 43–75)
NRBC BLD AUTO-RTO: 0 /100 WBCS
NT-PROBNP SERPL-MCNC: 1796 PG/ML
PLATELET # BLD AUTO: 192 THOUSANDS/UL (ref 149–390)
PMV BLD AUTO: 10.6 FL (ref 8.9–12.7)
POTASSIUM SERPL-SCNC: 4.7 MMOL/L (ref 3.5–5.3)
RBC # BLD AUTO: 2.83 MILLION/UL (ref 3.88–5.62)
SODIUM SERPL-SCNC: 140 MMOL/L (ref 136–145)
T4 FREE SERPL-MCNC: 1.08 NG/DL (ref 0.76–1.46)
TSH SERPL DL<=0.05 MIU/L-ACNC: 0.35 UIU/ML (ref 0.36–3.74)
VIT B12 SERPL-MCNC: 256 PG/ML (ref 100–900)
WBC # BLD AUTO: 4.47 THOUSAND/UL (ref 4.31–10.16)

## 2018-04-23 PROCEDURE — 36415 COLL VENOUS BLD VENIPUNCTURE: CPT

## 2018-04-23 PROCEDURE — 82232 ASSAY OF BETA-2 PROTEIN: CPT

## 2018-04-23 PROCEDURE — 80048 BASIC METABOLIC PNL TOTAL CA: CPT

## 2018-04-23 PROCEDURE — 85025 COMPLETE CBC W/AUTO DIFF WBC: CPT

## 2018-04-23 PROCEDURE — 82784 ASSAY IGA/IGD/IGG/IGM EACH: CPT

## 2018-04-23 PROCEDURE — 99214 OFFICE O/P EST MOD 30 MIN: CPT | Performed by: INTERNAL MEDICINE

## 2018-04-23 PROCEDURE — 84165 PROTEIN E-PHORESIS SERUM: CPT

## 2018-04-23 PROCEDURE — 83880 ASSAY OF NATRIURETIC PEPTIDE: CPT

## 2018-04-23 PROCEDURE — 83735 ASSAY OF MAGNESIUM: CPT

## 2018-04-23 PROCEDURE — 84165 PROTEIN E-PHORESIS SERUM: CPT | Performed by: PATHOLOGY

## 2018-04-23 PROCEDURE — 84439 ASSAY OF FREE THYROXINE: CPT

## 2018-04-23 PROCEDURE — 84443 ASSAY THYROID STIM HORMONE: CPT

## 2018-04-23 PROCEDURE — 82746 ASSAY OF FOLIC ACID SERUM: CPT

## 2018-04-23 PROCEDURE — 82607 VITAMIN B-12: CPT

## 2018-04-23 RX ORDER — ROSUVASTATIN CALCIUM 20 MG/1
20 TABLET, COATED ORAL DAILY
Qty: 90 TABLET | Refills: 0 | Status: SHIPPED | OUTPATIENT
Start: 2018-04-23 | End: 2018-09-18 | Stop reason: CLARIF

## 2018-04-23 RX ORDER — PANTOPRAZOLE SODIUM 40 MG/1
40 TABLET, DELAYED RELEASE ORAL
Qty: 60 TABLET | Refills: 0 | Status: SHIPPED | OUTPATIENT
Start: 2018-04-23 | End: 2018-05-16 | Stop reason: SDUPTHER

## 2018-04-23 RX ORDER — GLIPIZIDE 5 MG/1
5 TABLET ORAL 2 TIMES DAILY
Qty: 180 TABLET | Refills: 0 | Status: SHIPPED | OUTPATIENT
Start: 2018-04-23 | End: 2019-04-30 | Stop reason: SDUPTHER

## 2018-04-23 RX ORDER — CLOPIDOGREL BISULFATE 75 MG/1
75 TABLET ORAL DAILY
Qty: 90 TABLET | Refills: 0 | Status: SHIPPED | OUTPATIENT
Start: 2018-04-23 | End: 2018-11-19 | Stop reason: SDUPTHER

## 2018-04-23 RX ORDER — TAMSULOSIN HYDROCHLORIDE 0.4 MG/1
0.4 CAPSULE ORAL DAILY
Qty: 90 CAPSULE | Refills: 0 | Status: SHIPPED | OUTPATIENT
Start: 2018-04-23 | End: 2018-10-22 | Stop reason: SDUPTHER

## 2018-04-23 RX ORDER — FINASTERIDE 5 MG/1
5 TABLET, FILM COATED ORAL DAILY
Qty: 90 TABLET | Refills: 0 | Status: SHIPPED | OUTPATIENT
Start: 2018-04-23 | End: 2018-10-22 | Stop reason: SDUPTHER

## 2018-04-23 RX ORDER — LEVOTHYROXINE SODIUM 88 UG/1
88 TABLET ORAL DAILY
Qty: 90 TABLET | Refills: 0 | Status: SHIPPED | OUTPATIENT
Start: 2018-04-23 | End: 2019-04-02 | Stop reason: SDUPTHER

## 2018-04-23 NOTE — PATIENT INSTRUCTIONS
Multiple chronic problems are noted  Overall, the patient is fairly stable  His complaints of fatigue are to be expected with an ejection fraction of 25% and hemoglobin which has been low  Recheck lab parameters today in particular regarding CBC, BUN and creatinine, and also B12 and folate given the high MCV  Follow-up visit should be here in about 6 weeks

## 2018-04-23 NOTE — PROGRESS NOTES
Assessment/Plan:       Diagnoses and all orders for this visit:    Chronic systolic congestive heart failure (HCC)    Anemia, unspecified type  -     tamsulosin (FLOMAX) 0 4 mg; Take 1 capsule (0 4 mg total) by mouth daily for 90 days  -     rosuvastatin (CRESTOR) 20 MG tablet; Take 1 tablet (20 mg total) by mouth daily for 90 days  -     pantoprazole (PROTONIX) 40 mg tablet; Take 1 tablet (40 mg total) by mouth 2 (two) times a day before meals  -     metoprolol tartrate (LOPRESSOR) 25 mg tablet; Take 0 5 tablets (12 5 mg total) by mouth 2 (two) times a day for 90 days  -     levothyroxine 88 mcg tablet; Take 1 tablet (88 mcg total) by mouth daily for 90 days  -     glipiZIDE (GLUCOTROL) 5 mg tablet; Take 1 tablet (5 mg total) by mouth 2 (two) times a day for 90 days  -     finasteride (PROSCAR) 5 mg tablet; Take 1 tablet (5 mg total) by mouth daily for 90 days  -     clopidogrel (PLAVIX) 75 mg tablet; Take 1 tablet (75 mg total) by mouth daily for 90 days    Type 2 diabetes mellitus without complication, without long-term current use of insulin ([facility])  -     tamsulosin (FLOMAX) 0 4 mg; Take 1 capsule (0 4 mg total) by mouth daily for 90 days  -     sitaGLIPtin (JANUVIA) 50 mg tablet; Take 1 tablet (50 mg total) by mouth daily  -     rosuvastatin (CRESTOR) 20 MG tablet; Take 1 tablet (20 mg total) by mouth daily for 90 days  -     metoprolol tartrate (LOPRESSOR) 25 mg tablet; Take 0 5 tablets (12 5 mg total) by mouth 2 (two) times a day for 90 days  -     levothyroxine 88 mcg tablet; Take 1 tablet (88 mcg total) by mouth daily for 90 days  -     glipiZIDE (GLUCOTROL) 5 mg tablet; Take 1 tablet (5 mg total) by mouth 2 (two) times a day for 90 days  -     finasteride (PROSCAR) 5 mg tablet; Take 1 tablet (5 mg total) by mouth daily for 90 days  -     clopidogrel (PLAVIX) 75 mg tablet;  Take 1 tablet (75 mg total) by mouth daily for 90 days    Coronary artery disease involving native coronary artery of native heart without angina pectoris    Essential hypertension    Myocardial infarction, unspecified MI type, unspecified artery ([address] )    Benign prostatic hyperplasia with incomplete bladder emptying    Mixed hyperlipidemia    Heme positive stool              Subjective:      Patient ID: Maria Eugenia Baxter is a 69 y o  male  A 73-year-old man with multiple chronic problems  Chronic problems of hyperlipidemia, hypertension, coronary artery disease status post angioplasty, systolic heart failure  Recent ejection fraction of 25%  Recent laboratory testing shows hemoglobin A1c of 7 3      Anemia noted  Hemoglobin between 8 9  Heme-positive stool  EGD was done documenting severe gastritis and he was placed on PPI double dose  At the same time, MCV elevation of 111 so there may be a component of B12 deficiency or myelodysplasia  Chief complaint of fatigue  Chief complaint of pain felt in the right shoulder exacerbated by range of activity and motion and relieved by rest     A several months history of acute urinary retention  Currently has an indwelling Mckeon catheter  Follows with Urology  Working diagnosis is urinary retention secondary to prostatic hyperplasia  Complication of condition was development of acute kidney injury  This necessitated discontinuation of Actos and also metformin  Also, because blood pressure was low, the metoprolol dose was cut to 12 5 mg daily  However, the most recent metabolic panel documented recovery of kidney function            The following portions of the patient's history were reviewed and updated as appropriate:   He has a past medical history of Cardiac disease; Disease of thyroid gland; Enlarged prostate; Hyperlipidemia; Hypertension; and Myocardial infarction ([facility] [address] )  ,   does not have any pertinent problems on file  ,   has a past surgical history that includes Hip Arthroplasty (Right); Coronary angioplasty with stent; Joint replacement (Right);  Insert / replace / remove pacemaker; and EGD AND COLONOSCOPY (N/A, 2/12/2018)  ,  family history includes Cancer in his father; Heart disease in his mother  ,   reports that he quit smoking about 40 years ago  He has never used smokeless tobacco  He reports that he drinks alcohol  He reports that he does not use drugs  ,  is allergic to atorvastatin and percocet [oxycodone-acetaminophen]     Current Outpatient Prescriptions   Medication Sig Dispense Refill    aspirin 81 MG tablet Take 1 tablet by mouth daily      clopidogrel (PLAVIX) 75 mg tablet Take 1 tablet (75 mg total) by mouth daily for 90 days 90 tablet 0    finasteride (PROSCAR) 5 mg tablet Take 1 tablet (5 mg total) by mouth daily for 90 days 90 tablet 3    glipiZIDE (GLUCOTROL) 5 mg tablet Take 1 tablet (5 mg total) by mouth 2 (two) times a day for 90 days 180 tablet 0    levothyroxine 88 mcg tablet Take 1 tablet (88 mcg total) by mouth daily for 90 days 90 tablet 3    metoprolol tartrate (LOPRESSOR) 25 mg tablet Take 0 5 tablets (12 5 mg total) by mouth 2 (two) times a day for 90 days  0    pantoprazole (PROTONIX) 40 mg tablet Take 1 tablet (40 mg total) by mouth 2 (two) times a day before meals 60 tablet 0    rosuvastatin (CRESTOR) 20 MG tablet Take 1 tablet (20 mg total) by mouth daily for 90 days 90 tablet 0    sitaGLIPtin (JANUVIA) 50 mg tablet Take 1 tablet (50 mg total) by mouth daily 30 tablet 0    tamsulosin (FLOMAX) 0 4 mg Take 1 capsule (0 4 mg total) by mouth daily for 90 days 90 capsule 3     No current facility-administered medications for this visit  Review of Systems   Constitutional: Positive for fatigue  Negative for chills and fever  HENT: Negative for sore throat and trouble swallowing  Eyes: Negative for pain  Respiratory: Negative for cough and shortness of breath  Cardiovascular: Negative for chest pain and palpitations  Gastrointestinal: Negative for abdominal pain, blood in stool, diarrhea, nausea and vomiting     Endocrine: Negative for cold intolerance and heat intolerance  Genitourinary: Positive for difficulty urinating  Negative for dysuria, frequency and testicular pain  Musculoskeletal: Positive for arthralgias  Negative for joint swelling  Skin: Positive for color change and pallor  Allergic/Immunologic: Negative for immunocompromised state  Neurological: Positive for dizziness  Negative for syncope and headaches  Hematological: Negative for adenopathy  Does not bruise/bleed easily  Psychiatric/Behavioral: Negative for dysphoric mood  The patient is not nervous/anxious  Objective:  Vitals:    04/23/18 1532   BP: 104/52   Pulse: 61   SpO2: 98%      Physical Exam   Constitutional: He is oriented to person, place, and time  He appears well-developed  Chronically ill in appearance  Not tachypneic no dyspneic  Not using accessory muscles  Very pale skin  Nail beds are not pink  Nail beds are weight and do not irvin  HENT:   Head: Normocephalic and atraumatic  Eyes: Pupils are equal, round, and reactive to light  Neck: Normal range of motion  Neck supple  No tracheal deviation present  No thyromegaly present  Cardiovascular: Normal rate, regular rhythm and normal heart sounds  Exam reveals no gallop  No murmur heard  Pulmonary/Chest: Effort normal  No respiratory distress  He has no wheezes  He has no rales  Abdominal: Soft  Bowel sounds are normal  There is no tenderness  Musculoskeletal: Normal range of motion  He exhibits no tenderness or deformity  Neurological: He is alert and oriented to person, place, and time  He has normal reflexes  Coordination normal    Skin: Skin is warm and dry  Psychiatric: He has a normal mood and affect

## 2018-04-24 LAB — B2 MICROGLOB SERPL-MCNC: 3.3 MG/L (ref 0.6–2.4)

## 2018-04-26 LAB
ALBUMIN SERPL ELPH-MCNC: 3.83 G/DL (ref 3.5–5)
ALBUMIN SERPL ELPH-MCNC: 55.5 % (ref 52–65)
ALPHA1 GLOB SERPL ELPH-MCNC: 0.27 G/DL (ref 0.1–0.4)
ALPHA1 GLOB SERPL ELPH-MCNC: 3.9 % (ref 2.5–5)
ALPHA2 GLOB SERPL ELPH-MCNC: 0.77 G/DL (ref 0.4–1.2)
ALPHA2 GLOB SERPL ELPH-MCNC: 11.1 % (ref 7–13)
BETA GLOB ABNORMAL SERPL ELPH-MCNC: 0.43 G/DL (ref 0.4–0.8)
BETA1 GLOB SERPL ELPH-MCNC: 6.3 % (ref 5–13)
BETA2 GLOB SERPL ELPH-MCNC: 4.8 % (ref 2–8)
BETA2+GAMMA GLOB SERPL ELPH-MCNC: 0.33 G/DL (ref 0.2–0.5)
GAMMA GLOB ABNORMAL SERPL ELPH-MCNC: 1.27 G/DL (ref 0.5–1.6)
GAMMA GLOB SERPL ELPH-MCNC: 18.4 % (ref 12–22)
IGG/ALB SER: 1.25 {RATIO} (ref 1.1–1.8)
PROT PATTERN SERPL ELPH-IMP: NORMAL
PROT SERPL-MCNC: 6.9 G/DL (ref 6.4–8.2)

## 2018-05-16 ENCOUNTER — OFFICE VISIT (OUTPATIENT)
Dept: CARDIOLOGY CLINIC | Facility: CLINIC | Age: 78
End: 2018-05-16
Payer: COMMERCIAL

## 2018-05-16 VITALS
SYSTOLIC BLOOD PRESSURE: 114 MMHG | WEIGHT: 147 LBS | DIASTOLIC BLOOD PRESSURE: 64 MMHG | BODY MASS INDEX: 23.63 KG/M2 | HEART RATE: 63 BPM | HEIGHT: 66 IN | OXYGEN SATURATION: 96 %

## 2018-05-16 DIAGNOSIS — I25.10 CORONARY ARTERY DISEASE INVOLVING NATIVE CORONARY ARTERY OF NATIVE HEART WITHOUT ANGINA PECTORIS: Primary | ICD-10-CM

## 2018-05-16 DIAGNOSIS — K21.9 GASTROESOPHAGEAL REFLUX DISEASE, ESOPHAGITIS PRESENCE NOT SPECIFIED: Primary | ICD-10-CM

## 2018-05-16 DIAGNOSIS — I50.22 CHRONIC SYSTOLIC CONGESTIVE HEART FAILURE (HCC): ICD-10-CM

## 2018-05-16 DIAGNOSIS — D64.9 ANEMIA, UNSPECIFIED TYPE: ICD-10-CM

## 2018-05-16 DIAGNOSIS — I10 ESSENTIAL HYPERTENSION: ICD-10-CM

## 2018-05-16 DIAGNOSIS — E78.2 MIXED HYPERLIPIDEMIA: ICD-10-CM

## 2018-05-16 PROBLEM — R19.5 HEME POSITIVE STOOL: Status: RESOLVED | Noted: 2018-02-10 | Resolved: 2018-05-16

## 2018-05-16 PROCEDURE — 99214 OFFICE O/P EST MOD 30 MIN: CPT | Performed by: INTERNAL MEDICINE

## 2018-05-16 RX ORDER — PANTOPRAZOLE SODIUM 40 MG/1
40 TABLET, DELAYED RELEASE ORAL
Qty: 60 TABLET | Refills: 11 | Status: SHIPPED | OUTPATIENT
Start: 2018-05-16 | End: 2018-06-24 | Stop reason: SDUPTHER

## 2018-05-16 RX ORDER — PANTOPRAZOLE SODIUM 40 MG/1
40 TABLET, DELAYED RELEASE ORAL
Qty: 60 TABLET | Refills: 3 | Status: SHIPPED | OUTPATIENT
Start: 2018-05-16 | End: 2018-05-16 | Stop reason: SDUPTHER

## 2018-05-16 NOTE — PROGRESS NOTES
[facility] [place]  [address] [facility]  [id number]  Cardiology Follow Up    Joseph Carpio  1940  [id number]      1  Coronary artery disease involving native coronary artery of native heart without angina pectoris     2  Essential hypertension     3  Chronic systolic congestive heart failure ([facility] [address] )     4  Mixed hyperlipidemia         Chief Complaint   Patient presents with    Follow-up       Interval History:   Patient presents for follow-up visit  Patient denies any history of chest pain shortness of breath  Patient denies any history of leg edema or orthopnea PND  No history of presyncope syncope  Patient states compliance with the present list of medications  Patient recently had anemia and and had workup  EGD showed severe gastritis  Patient also had a capsule endoscopy which was unremarkable  Colonoscopy was unremarkable  Patient's hemoglobin has improved  He states that he has been compliant with all his present medications  Patient Active Problem List   Diagnosis    Coronary artery disease    Urinary retention    Type 2 diabetes mellitus ([facility] [address] )    Anemia    Hypertension    Hyperlipidemia    Chronic systolic congestive heart failure (HCC)    BPH (benign prostatic hyperplasia)     Past Medical History:   Diagnosis Date    Cardiac disease     Disease of thyroid gland     Enlarged prostate     Hyperlipidemia     Hypertension     Myocardial infarction [facility])      Social History     Social History    Marital status: /Civil Union     Spouse name: N/A    Number of children: N/A    Years of education: N/A     Occupational History    Not on file       Social History Main Topics    Smoking status: Former Smoker     Quit date: 2/16/1978    Smokeless tobacco: Never Used    Alcohol use Yes      Comment: occasionally 1-2 per month     Drug use: No    Sexual activity: No     Other Topics Concern    Not on file     Social History Narrative    No narrative on file      Family History   Problem Relation Age of Onset    Heart disease Mother     Cancer Father      throat     Past Surgical History:   Procedure Laterality Date    CORONARY ANGIOPLASTY WITH STENT PLACEMENT      EGD AND COLONOSCOPY N/A 2/12/2018    Procedure: EGD AND COLONOSCOPY;  Surgeon: Mark Bermudez MD;  Location: [facility];   Service: Gastroenterology    HIP ARTHROPLASTY Right     Heather Villa / Kehinde Mauricio / Cathie Fang JOINT REPLACEMENT Right        Current Outpatient Prescriptions:     aspirin 81 MG tablet, Take 1 tablet by mouth daily, Disp: , Rfl:     clopidogrel (PLAVIX) 75 mg tablet, Take 1 tablet (75 mg total) by mouth daily for 90 days, Disp: 90 tablet, Rfl: 0    finasteride (PROSCAR) 5 mg tablet, Take 1 tablet (5 mg total) by mouth daily for 90 days, Disp: 90 tablet, Rfl: 0    glipiZIDE (GLUCOTROL) 5 mg tablet, Take 1 tablet (5 mg total) by mouth 2 (two) times a day for 90 days, Disp: 180 tablet, Rfl: 0    levothyroxine 88 mcg tablet, Take 1 tablet (88 mcg total) by mouth daily for 90 days, Disp: 90 tablet, Rfl: 0    metoprolol tartrate (LOPRESSOR) 25 mg tablet, Take 0 5 tablets (12 5 mg total) by mouth 2 (two) times a day for 90 days, Disp: , Rfl: 0    pantoprazole (PROTONIX) 40 mg tablet, Take 1 tablet (40 mg total) by mouth 2 (two) times a day before meals, Disp: 60 tablet, Rfl: 0    rosuvastatin (CRESTOR) 20 MG tablet, Take 1 tablet (20 mg total) by mouth daily for 90 days, Disp: 90 tablet, Rfl: 0    sitaGLIPtin (JANUVIA) 50 mg tablet, Take 1 tablet (50 mg total) by mouth daily, Disp: 30 tablet, Rfl: 0    tamsulosin (FLOMAX) 0 4 mg, Take 1 capsule (0 4 mg total) by mouth daily for 90 days, Disp: 90 capsule, Rfl: 0  Allergies   Allergen Reactions    Atorvastatin      Pt unsure      Percocet [Oxycodone-Acetaminophen] GI Intolerance       Labs:  Appointment on 04/23/2018   Component Date Value    Beta-2 Microglobulin 04/23/2018 3 3*    IGA 04/23/2018 176 0  IGG 04/23/2018 1370 0     IGM 04/23/2018 47 0     A/G Ratio 04/23/2018 1 25     Albumin Electrophoresis 04/23/2018 55 5     Albumin CONC 04/23/2018 3 83     Alpha 1 04/23/2018 3 9     ALPHA 1 CONC 04/23/2018 0 27     Alpha 2 04/23/2018 11 1     ALPHA 2 CONC 04/23/2018 0 77     Beta-1 04/23/2018 6 3     BETA 1 CONC 04/23/2018 0 43     Beta-2 04/23/2018 4 8     BETA 2 CONC 04/23/2018 0 33     Gamma Globulin 04/23/2018 18 4     GAMMA CONC 04/23/2018 1 27     SPEP Interpretation 04/23/2018 The serum total protein, albumin and electrophoresis are within normal limits  No monoclonal bands noted  Reviewed by: Vijay Mansfield MD ([id number]) **Electronic Signature**     Total Protein 04/23/2018 6 9     WBC 04/23/2018 4 47     RBC 04/23/2018 2 83*    Hemoglobin 04/23/2018 10 1*    Hematocrit 04/23/2018 31 0*    MCV 04/23/2018 110*    MCH 04/23/2018 35 7*    MCHC 04/23/2018 32 6     RDW 04/23/2018 16 6*    MPV 04/23/2018 10 6     Platelets 12/42/4979 192     nRBC 04/23/2018 0     Neutrophils Relative 04/23/2018 54     Lymphocytes Relative 04/23/2018 30     Monocytes Relative 04/23/2018 12     Eosinophils Relative 04/23/2018 4     Basophils Relative 04/23/2018 0     Neutrophils Absolute 04/23/2018 2 37     Lymphocytes Absolute 04/23/2018 1 36     Monocytes Absolute 04/23/2018 0 54     Eosinophils Absolute 04/23/2018 0 19     Basophils Absolute 04/23/2018 0 01     Sodium 04/23/2018 140     Potassium 04/23/2018 4 7     Chloride 04/23/2018 108     CO2 04/23/2018 28     Anion Gap 04/23/2018 4     BUN 04/23/2018 17     Creatinine 04/23/2018 1 26     Glucose 04/23/2018 218*    Calcium 04/23/2018 8 8     eGFR 04/23/2018 55     Magnesium 04/23/2018 2 2     TSH 3RD GENERATON 04/23/2018 0 355*    Vitamin B-12 04/23/2018 256     Folate 04/23/2018 18 9*    NT-proBNP 04/23/2018 1796*    Free T4 04/23/2018 1 08    Appointment on 03/23/2018   Component Date Value    WBC 03/23/2018 5 28  RBC 03/23/2018 2 72*    Hemoglobin 03/23/2018 9 7*    Hematocrit 03/23/2018 30 1*    MCV 03/23/2018 111*    MCH 03/23/2018 35 7*    MCHC 03/23/2018 32 2     RDW 03/23/2018 19 4*    MPV 03/23/2018 10 4     Platelets 71/37/8291 224     nRBC 03/23/2018 0     Neutrophils Relative 03/23/2018 66     Lymphocytes Relative 03/23/2018 23     Monocytes Relative 03/23/2018 10     Eosinophils Relative 03/23/2018 0     Basophils Relative 03/23/2018 1     Neutrophils Absolute 03/23/2018 3 48     Lymphocytes Absolute 03/23/2018 1 23     Monocytes Absolute 03/23/2018 0 54     Eosinophils Absolute 03/23/2018 0 00     Basophils Absolute 03/23/2018 0 03     Sodium 03/23/2018 140     Potassium 03/23/2018 4 6     Chloride 03/23/2018 106     CO2 03/23/2018 29     Anion Gap 03/23/2018 5     BUN 03/23/2018 16     Creatinine 03/23/2018 1 28     Glucose 03/23/2018 183*    Calcium 03/23/2018 8 9     AST 03/23/2018 25     ALT 03/23/2018 24     Alkaline Phosphatase 03/23/2018 64     Total Protein 03/23/2018 7 2     Albumin 03/23/2018 3 3*    Total Bilirubin 03/23/2018 0 72     eGFR 03/23/2018 54    Appointment on 03/19/2018   Component Date Value    Sodium 03/19/2018 141     Potassium 03/19/2018 4 7     Chloride 03/19/2018 108     CO2 03/19/2018 27     Anion Gap 03/19/2018 6     BUN 03/19/2018 18     Creatinine 03/19/2018 1 38*    Glucose 03/19/2018 201*    Calcium 03/19/2018 8 7     AST 03/19/2018 29     ALT 03/19/2018 31     Alkaline Phosphatase 03/19/2018 68     Total Protein 03/19/2018 7 3     Albumin 03/19/2018 3 3*    Total Bilirubin 03/19/2018 0 60     eGFR 03/19/2018 49     WBC 03/19/2018 5 31     RBC 03/19/2018 2 77*    Hemoglobin 03/19/2018 10 1*    Hematocrit 03/19/2018 30 7*    MCV 03/19/2018 111*    MCH 03/19/2018 36 5*    MCHC 03/19/2018 32 9     RDW 03/19/2018 19 6*    MPV 03/19/2018 10 7     Platelets 71/80/0634 222     nRBC 03/19/2018 0     Neutrophils Relative 03/19/2018 66     Lymphocytes Relative 03/19/2018 24     Monocytes Relative 03/19/2018 9     Eosinophils Relative 03/19/2018 0     Basophils Relative 03/19/2018 1     Neutrophils Absolute 03/19/2018 3 54     Lymphocytes Absolute 03/19/2018 1 26     Monocytes Absolute 03/19/2018 0 47     Eosinophils Absolute 03/19/2018 0 00     Basophils Absolute 03/19/2018 0 03     OCCULT BLD, FECAL IMMUNO* 03/22/2018 Positive*     Imaging: No results found  Review of Systems:  Review of Systems   REVIEW OF SYSTEMS:  Constitutional:  Denies fever or chills   Eyes:  Denies change in visual acuity   HENT:  Denies nasal congestion or sore throat   Respiratory:  Denies cough or shortness of breath   Cardiovascular:  Denies chest pain or edema   GI:  Anemia with recent GI workup as above  :  Denies dysuria, frequency, difficulty in micturition and nocturia  Musculoskeletal:  Denies back pain or joint pain   Neurologic:  Denies headache, focal weakness or sensory changes   Endocrine:  Denies polyuria or polydipsia   Lymphatic:  Denies swollen glands   Psychiatric:  Denies depression or anxiety     Physical Exam:    /64   Pulse 63   Ht 5' 6" (1 676 m)   Wt 66 7 kg (147 lb)   SpO2 96%   BMI 23 73 kg/m²     Physical Exam   PHYSICAL EXAM:  General:  Patient is not in acute distress   Head: Normocephalic, Atraumatic  HEENT:  Both pupils normal-size atraumatic, normocephalic, nonicteric  Mild pallor  Neck:  JVP not raised  Trachea central  No carotid bruit  Respiratory:  normal breath sounds no crackles  no rhonchi  Cardiovascular:  Regular rate and rhythm no S3 no murmurs  GI:  Abdomen soft nontender  No organomegaly  Lymphatic:  No cervical or inguinal lymphadenopathy  Neurologic:  Patient is awake alert, oriented   Grossly nonfocal    Discussion/Summary:  Patient with multiple medical problems who seems to be doing reasonably well from cardiac standpoint  Previous studies reviewed with patient   Medications reviewed and possible side effects discussed  concepts of cardiovascular disease , signs and symptoms of heart disease  Dietary and risk factor modification reinforced  All questions answered  Safety measures reviewed  Patient advised to report any problems prompting medical attention  Symptoms to watch out from cardiac standpoint which would indicate the need for further cardiac evaluation discussed with patient and family  Recent gastroenterology evaluation also discussed with patient and family  Patient will continue to follow-up with [facility] clinic  Patient and family had a few questions which were answered  Patient will continue to follow up with primary care physician as well as Gastroenterology  Follow-up in a few months or earlier as needed  Medications reviewed  Importance of salt restriction reinforced with the patient

## 2018-06-06 ENCOUNTER — OFFICE VISIT (OUTPATIENT)
Dept: INTERNAL MEDICINE CLINIC | Facility: CLINIC | Age: 78
End: 2018-06-06
Payer: COMMERCIAL

## 2018-06-06 VITALS
HEART RATE: 61 BPM | WEIGHT: 150.4 LBS | SYSTOLIC BLOOD PRESSURE: 98 MMHG | DIASTOLIC BLOOD PRESSURE: 52 MMHG | BODY MASS INDEX: 24.17 KG/M2 | OXYGEN SATURATION: 95 % | HEIGHT: 66 IN

## 2018-06-06 DIAGNOSIS — E11.65 TYPE 2 DIABETES MELLITUS WITH HYPERGLYCEMIA, WITHOUT LONG-TERM CURRENT USE OF INSULIN (HCC): Primary | ICD-10-CM

## 2018-06-06 DIAGNOSIS — I25.10 CORONARY ARTERY DISEASE INVOLVING NATIVE CORONARY ARTERY OF NATIVE HEART WITHOUT ANGINA PECTORIS: ICD-10-CM

## 2018-06-06 DIAGNOSIS — E78.2 MIXED HYPERLIPIDEMIA: ICD-10-CM

## 2018-06-06 DIAGNOSIS — N18.30 ANEMIA IN STAGE 3 CHRONIC KIDNEY DISEASE (HCC): ICD-10-CM

## 2018-06-06 DIAGNOSIS — I50.22 CHRONIC SYSTOLIC CONGESTIVE HEART FAILURE (HCC): ICD-10-CM

## 2018-06-06 DIAGNOSIS — D63.1 ANEMIA IN STAGE 3 CHRONIC KIDNEY DISEASE (HCC): ICD-10-CM

## 2018-06-06 PROCEDURE — 99214 OFFICE O/P EST MOD 30 MIN: CPT | Performed by: INTERNAL MEDICINE

## 2018-06-06 NOTE — PATIENT INSTRUCTIONS
A patient with hypertension hyperlipidemia and diabetes with resultant [place] heart Association class 3 cardiomyopathy  He is clinically stable  A confounding variable of anemia which is likely to be multifactorial   There was at least a component of GI bleed caused by severe  Gastritis  Probably a component of poor production due to anemia of chronic disorder  These levels can be recheck     No medication changes are recommended right now    Follow-up in 4 months but call if problems develop  Echocardiogram should be done

## 2018-06-06 NOTE — PROGRESS NOTES
Assessment/Plan:       Diagnoses and all orders for this visit:    Type 2 diabetes mellitus with hyperglycemia, without long-term current use of insulin (HCC)  -     CBC and differential; Future  -     Basic metabolic panel; Future  -     HEMOGLOBIN A1C W/ EAG ESTIMATION; Future  -     TSH, 3rd generation with T4 reflex; Future  -     NT-BNP PRO; Future    Mixed hyperlipidemia    Coronary artery disease involving native coronary artery of native heart without angina pectoris  -     CBC and differential; Future  -     Basic metabolic panel; Future  -     HEMOGLOBIN A1C W/ EAG ESTIMATION; Future  -     TSH, 3rd generation with T4 reflex; Future  -     NT-BNP PRO; Future    Chronic systolic congestive heart failure (HCC)  -     CBC and differential; Future  -     Basic metabolic panel; Future  -     HEMOGLOBIN A1C W/ EAG ESTIMATION; Future  -     TSH, 3rd generation with T4 reflex; Future  -     NT-BNP PRO; Future  -     Echo complete with contrast if indicated; Future    Anemia in stage 3 chronic kidney disease  -     Retic Count; Future  -     Iron Panel; Future              Subjective:      Patient ID: Obey Colon is a 69 y o  male  A 78-year-old man with multiple chronic problems  Chronic problems of hyperlipidemia, hypertension, coronary artery disease status post angioplasty, systolic heart failure  Recent ejection fraction of 25%  Recent laboratory testing shows hemoglobin A1c of 7 3      Anemia noted  Hemoglobin between 8   An 10  Heme-positive stool  EGD was done documenting severe gastritis and he was placed on PPI double dose  Capsule enteroscopy follow-up reported by the patient  He was told was normal  Colonoscopy reported to be normal At the same time, MCV elevation of 111 so there may be a component of B12 deficiency or myelodysplasia  Chief complaint of fatigue  This has persisted    Chief complaint of pain felt in the right shoulder exacerbated by range of activity and motion and relieved by rest     A several months history of acute urinary retention  Currently has an indwelling Mckeon catheter  Follows with Urology  Working diagnosis is urinary retention secondary to prostatic hyperplasia  Complication of condition was development of acute kidney injury  This necessitated discontinuation of Actos and also metformin  Also, because blood pressure was low, the metoprolol dose was cut to 12 5 mg daily  However, the most recent metabolic panel documented recovery of kidney function            The following portions of the patient's history were reviewed and updated as appropriate:   He has a past medical history of Cardiac disease; Disease of thyroid gland; Enlarged prostate; Hyperlipidemia; Hypertension; and Myocardial infarction ([facility] [address] )  ,   does not have any pertinent problems on file  ,   has a past surgical history that includes Hip Arthroplasty (Right); Coronary angioplasty with stent; Joint replacement (Right); Insert / replace / remove pacemaker; and EGD AND COLONOSCOPY (N/A, 2/12/2018)  ,  family history includes Cancer in his father; Heart disease in his mother  ,   reports that he quit smoking about 40 years ago  He has never used smokeless tobacco  He reports that he drinks alcohol  He reports that he does not use drugs  ,  is allergic to atorvastatin and percocet [oxycodone-acetaminophen]     Current Outpatient Prescriptions   Medication Sig Dispense Refill    aspirin 81 MG tablet Take 1 tablet by mouth daily      clopidogrel (PLAVIX) 75 mg tablet Take 1 tablet (75 mg total) by mouth daily for 90 days 90 tablet 0    finasteride (PROSCAR) 5 mg tablet Take 1 tablet (5 mg total) by mouth daily for 90 days 90 tablet 0    glipiZIDE (GLUCOTROL) 5 mg tablet Take 1 tablet (5 mg total) by mouth 2 (two) times a day for 90 days 180 tablet 0    levothyroxine 88 mcg tablet Take 1 tablet (88 mcg total) by mouth daily for 90 days 90 tablet 0    metoprolol tartrate (LOPRESSOR) 25 mg tablet Take 0 5 tablets (12 5 mg total) by mouth 2 (two) times a day for 90 days  0    pantoprazole (PROTONIX) 40 mg tablet Take 1 tablet (40 mg total) by mouth 2 (two) times a day before meals 60 tablet 11    rosuvastatin (CRESTOR) 20 MG tablet Take 1 tablet (20 mg total) by mouth daily for 90 days 90 tablet 0    sitaGLIPtin (JANUVIA) 50 mg tablet Take 1 tablet (50 mg total) by mouth daily 30 tablet 0    tamsulosin (FLOMAX) 0 4 mg Take 1 capsule (0 4 mg total) by mouth daily for 90 days 90 capsule 0     No current facility-administered medications for this visit  Review of Systems   Constitutional: Positive for fatigue  Negative for chills and fever  HENT: Negative for sore throat and trouble swallowing  Eyes: Negative for pain  Respiratory: Negative for cough and shortness of breath  Cardiovascular: Negative for chest pain and palpitations  Gastrointestinal: Negative for abdominal pain, blood in stool, diarrhea, nausea and vomiting  Endocrine: Negative for cold intolerance and heat intolerance  Genitourinary: Positive for difficulty urinating  Negative for dysuria, frequency and testicular pain  Musculoskeletal: Positive for arthralgias  Negative for joint swelling  Skin: Positive for color change and pallor  Allergic/Immunologic: Negative for immunocompromised state  Neurological: Positive for dizziness  Negative for syncope and headaches  Hematological: Negative for adenopathy  Does not bruise/bleed easily  Psychiatric/Behavioral: Negative for dysphoric mood  The patient is not nervous/anxious  Objective:  Vitals:    06/06/18 1437   BP: 98/52   Pulse: 61   SpO2: 95%      Physical Exam   Constitutional: He is oriented to person, place, and time  He appears well-developed  Chronically ill in appearance  Not tachypneic no dyspneic  Not using accessory muscles  Very pale skin  Nail beds are not pink  Nail beds are weight and do not irvin       HENT:   Head: Normocephalic and atraumatic  Eyes: Pupils are equal, round, and reactive to light  Neck: Normal range of motion  Neck supple  No tracheal deviation present  No thyromegaly present  Cardiovascular: Normal rate, regular rhythm and normal heart sounds  Exam reveals no gallop  No murmur heard  Pulmonary/Chest: Effort normal  No respiratory distress  He has no wheezes  He has no rales  Abdominal: Soft  Bowel sounds are normal  There is no tenderness  Musculoskeletal: Normal range of motion  He exhibits no tenderness or deformity  Neurological: He is alert and oriented to person, place, and time  He has normal reflexes  Coordination normal    Skin: Skin is warm and dry  Psychiatric: He has a normal mood and affect

## 2018-06-07 ENCOUNTER — TELEPHONE (OUTPATIENT)
Dept: INTERNAL MEDICINE CLINIC | Facility: CLINIC | Age: 78
End: 2018-06-07

## 2018-06-13 DIAGNOSIS — D64.9 ANEMIA, UNSPECIFIED TYPE: ICD-10-CM

## 2018-06-13 DIAGNOSIS — E11.9 TYPE 2 DIABETES MELLITUS WITHOUT COMPLICATION, WITHOUT LONG-TERM CURRENT USE OF INSULIN (HCC): ICD-10-CM

## 2018-06-24 DIAGNOSIS — K21.9 GASTROESOPHAGEAL REFLUX DISEASE, ESOPHAGITIS PRESENCE NOT SPECIFIED: ICD-10-CM

## 2018-06-25 RX ORDER — PANTOPRAZOLE SODIUM 40 MG/1
TABLET, DELAYED RELEASE ORAL
Qty: 60 TABLET | Refills: 0 | Status: SHIPPED | OUTPATIENT
Start: 2018-06-25 | End: 2018-07-22 | Stop reason: SDUPTHER

## 2018-06-27 ENCOUNTER — IN-CLINIC DEVICE VISIT (OUTPATIENT)
Dept: CARDIOLOGY CLINIC | Facility: CLINIC | Age: 78
End: 2018-06-27
Payer: COMMERCIAL

## 2018-06-27 DIAGNOSIS — Z95.810 PRESENCE OF AUTOMATIC CARDIOVERTER/DEFIBRILLATOR (AICD): ICD-10-CM

## 2018-06-27 DIAGNOSIS — I25.5 ISCHEMIC CARDIOMYOPATHY: ICD-10-CM

## 2018-06-27 DIAGNOSIS — I50.22 CHRONIC SYSTOLIC HF (HEART FAILURE) (HCC): ICD-10-CM

## 2018-06-27 PROCEDURE — 93282 PRGRMG EVAL IMPLANTABLE DFB: CPT | Performed by: INTERNAL MEDICINE

## 2018-07-01 NOTE — PROGRESS NOTES
[employer] device check in person    Normal ICD function  Normal lead impedance  Battery status is good        R-waves -  16 2 mV    Ventricular capture threshold -  0 4 V@    0 5ms    Ventricular lead impedance  -   482  Ohms    No ICD discharges    NSVT episodes brief

## 2018-07-02 DIAGNOSIS — E11.9 TYPE 2 DIABETES MELLITUS WITHOUT COMPLICATION, WITHOUT LONG-TERM CURRENT USE OF INSULIN (HCC): ICD-10-CM

## 2018-07-02 RX ORDER — SITAGLIPTIN 50 MG/1
TABLET, FILM COATED ORAL
Qty: 30 TABLET | Refills: 0 | Status: SHIPPED | OUTPATIENT
Start: 2018-07-02 | End: 2018-07-25 | Stop reason: SDUPTHER

## 2018-07-13 ENCOUNTER — HOSPITAL ENCOUNTER (OUTPATIENT)
Dept: NON INVASIVE DIAGNOSTICS | Facility: CLINIC | Age: 78
Discharge: HOME | End: 2018-07-13
Payer: COMMERCIAL

## 2018-07-13 DIAGNOSIS — I50.22 CHRONIC SYSTOLIC CONGESTIVE HEART FAILURE (HCC): ICD-10-CM

## 2018-07-13 PROCEDURE — 93306 TTE W/DOPPLER COMPLETE: CPT

## 2018-07-14 PROCEDURE — 93306 TTE W/DOPPLER COMPLETE: CPT | Performed by: INTERNAL MEDICINE

## 2018-07-22 DIAGNOSIS — K21.9 GASTROESOPHAGEAL REFLUX DISEASE, ESOPHAGITIS PRESENCE NOT SPECIFIED: ICD-10-CM

## 2018-07-23 RX ORDER — PANTOPRAZOLE SODIUM 40 MG/1
TABLET, DELAYED RELEASE ORAL
Qty: 60 TABLET | Refills: 0 | Status: SHIPPED | OUTPATIENT
Start: 2018-07-23 | End: 2018-08-28 | Stop reason: SDUPTHER

## 2018-07-25 DIAGNOSIS — E11.9 TYPE 2 DIABETES MELLITUS WITHOUT COMPLICATION, WITHOUT LONG-TERM CURRENT USE OF INSULIN (HCC): ICD-10-CM

## 2018-07-25 RX ORDER — SITAGLIPTIN 50 MG/1
TABLET, FILM COATED ORAL
Qty: 30 TABLET | Refills: 0 | Status: SHIPPED | OUTPATIENT
Start: 2018-07-25 | End: 2018-08-28 | Stop reason: SDUPTHER

## 2018-08-13 ENCOUNTER — HOSPITAL ENCOUNTER (EMERGENCY)
Facility: HOSPITAL | Age: 78
Discharge: HOME | End: 2018-08-13
Attending: EMERGENCY MEDICINE
Payer: COMMERCIAL

## 2018-08-13 VITALS
SYSTOLIC BLOOD PRESSURE: 106 MMHG | DIASTOLIC BLOOD PRESSURE: 54 MMHG | TEMPERATURE: 97.6 F | BODY MASS INDEX: 24.91 KG/M2 | WEIGHT: 154.32 LBS | OXYGEN SATURATION: 99 % | HEART RATE: 66 BPM | RESPIRATION RATE: 18 BRPM

## 2018-08-13 DIAGNOSIS — L30.9 DERMATITIS: ICD-10-CM

## 2018-08-13 DIAGNOSIS — H61.20 CERUMEN IMPACTION: ICD-10-CM

## 2018-08-13 DIAGNOSIS — H60.90 OTITIS EXTERNA: Primary | ICD-10-CM

## 2018-08-13 PROCEDURE — 99282 EMERGENCY DEPT VISIT SF MDM: CPT

## 2018-08-13 RX ORDER — PREDNISONE 20 MG/1
60 TABLET ORAL ONCE
Status: COMPLETED | OUTPATIENT
Start: 2018-08-13 | End: 2018-08-13

## 2018-08-13 RX ORDER — PREDNISONE 20 MG/1
60 TABLET ORAL DAILY
Qty: 15 TABLET | Refills: 0 | Status: SHIPPED | OUTPATIENT
Start: 2018-08-13 | End: 2018-08-18

## 2018-08-13 RX ORDER — CIPROFLOXACIN AND DEXAMETHASONE 3; 1 MG/ML; MG/ML
4 SUSPENSION/ DROPS AURICULAR (OTIC) 2 TIMES DAILY
Qty: 7.5 ML | Refills: 0 | Status: SHIPPED | OUTPATIENT
Start: 2018-08-13 | End: 2018-09-18 | Stop reason: CLARIF

## 2018-08-13 RX ORDER — CIPROFLOXACIN AND DEXAMETHASONE 3; 1 MG/ML; MG/ML
4 SUSPENSION/ DROPS AURICULAR (OTIC) 2 TIMES DAILY
Status: DISCONTINUED | OUTPATIENT
Start: 2018-08-13 | End: 2018-08-13 | Stop reason: HOSPADM

## 2018-08-13 RX ADMIN — PREDNISONE 60 MG: 20 TABLET ORAL at 13:19

## 2018-08-13 RX ADMIN — CIPROFLOXACIN AND DEXAMETHASONE 4 DROP: 3; 1 SUSPENSION/ DROPS AURICULAR (OTIC) at 13:20

## 2018-08-13 NOTE — DISCHARGE INSTRUCTIONS
Dermatitis   WHAT YOU NEED TO KNOW:   Dermatitis is skin inflammation  You may have an itchy rash, redness, or swelling  You may also have bumps or blisters that crust over or ooze clear fluid  Dermatitis can be caused by allergens such as dust mites, pet dander, pollen, and certain foods  It can also develop when something touches your skin and irritates it or causes an allergic reaction  Examples include soaps, chemicals, latex, and poison ivy  DISCHARGE INSTRUCTIONS:   Call 911 if you have any of the following symptoms of anaphylaxis:   · Sudden trouble breathing    · Throat swelling and tightness    · Dizziness, lightheadedness, fainting, or confusion  Return to the emergency department if:   · You develop a fever or have red streaks going up your arm or leg  · Your rash gets more swollen, red, or hot  Contact your healthcare provider if:   · Your skin blisters, oozes white or yellow pus, or has a foul-smelling discharge  · Your rash spreads or does not get better, even after treatment  · You have questions or concerns about your condition or care  Medicines:   · Medicines  help decrease itching and inflammation, or treat a bacterial infection  They may be given as a topical cream, shot, or a pill  · Take your medicine as directed  Contact your healthcare provider if you think your medicine is not helping or if you have side effects  Tell him of her if you are allergic to any medicine  Keep a list of the medicines, vitamins, and herbs you take  Include the amounts, and when and why you take them  Bring the list or the pill bottles to follow-up visits  Carry your medicine list with you in case of an emergency  Apply a cool compress to your rash: This will help soothe your skin  Keep your skin moist:  Rub unscented cream or lotion on your skin to prevent dryness and itching  Do this right after a lukewarm bath or shower when your skin is still damp    Avoid skin irritants:  Do not use skin irritants, such as makeup, hair products, soaps, and cleansers  Use products that do not contain fragrances or dye  Follow up with your healthcare provider as directed:  Write down your questions so you remember to ask them during your visits  © 2017 [employer] Information is for End User's use only and may not be sold, redistributed or otherwise used for commercial purposes  All illustrations and images included in CareNotes® are the copyrighted property of A [employer] M [employer]  or Jose [employer]agon  The above information is an  only  It is not intended as medical advice for individual conditions or treatments  Talk to your doctor, nurse or pharmacist before following any medical regimen to see if it is safe and effective for you

## 2018-08-13 NOTE — ED PROVIDER NOTES
History  Chief Complaint   Patient presents with    Ear Problem     pt woke up with blood in his L ear, also co of all over itching  + thinners, decrease hearing      15-year-old male patient presents emergency department for evaluation of left ear bleeding  Patient states he has never had bleeding from his ear before, he had some pain in his ear and then today woke up with some blood coming from the ear  On physical exam patient does have a large clot in the left ear  The clot was loosened with hydrogen peroxide and then evacuated with a plastic loop curette  After this patient's hearing was better  Patient had a now obvious otitis externa at that time  History provided by:  Patient   used: No    Ear Drainage   Severity:  Moderate  Onset quality:  Gradual  Timing:  Constant  Progression:  Worsening  Chronicity:  New  Associated symptoms: no congestion and no diarrhea        Prior to Admission Medications   Prescriptions Last Dose Informant Patient Reported? Taking?    JANUVIA 50 MG tablet   No Yes   Sig: take 1 tablet by mouth once daily   aspirin 81 MG tablet  Self Yes Yes   Sig: Take 1 tablet by mouth daily   clopidogrel (PLAVIX) 75 mg tablet  Self No Yes   Sig: Take 1 tablet (75 mg total) by mouth daily for 90 days   finasteride (PROSCAR) 5 mg tablet  Self No Yes   Sig: Take 1 tablet (5 mg total) by mouth daily for 90 days   glipiZIDE (GLUCOTROL) 5 mg tablet  Self No Yes   Sig: Take 1 tablet (5 mg total) by mouth 2 (two) times a day for 90 days   levothyroxine 88 mcg tablet  Self No Yes   Sig: Take 1 tablet (88 mcg total) by mouth daily for 90 days   metoprolol tartrate (LOPRESSOR) 25 mg tablet   No Yes   Sig: take 1 tablet by mouth twice a day   pantoprazole (PROTONIX) 40 mg tablet   No Yes   Sig: take 1 tablet by mouth twice a day before meals   rosuvastatin (CRESTOR) 20 MG tablet  Self No Yes   Sig: Take 1 tablet (20 mg total) by mouth daily for 90 days   tamsulosin (FLOMAX) 0 4 mg  Self No Yes   Sig: Take 1 capsule (0 4 mg total) by mouth daily for 90 days      Facility-Administered Medications: None       Past Medical History:   Diagnosis Date    Acquired cyst of kidney     Anemia     Benign paroxysmal vertigo, unspecified ear     Cardiac disease     Cardiac disorder     Cardiomyopathy ([facility] )     Cellulitis of leg     Cervical spinal stenosis     Chest pain     Congestive heart failure (HCC)     Coronary atherosclerosis of native coronary artery     Diabetes mellitus ([facility] )     Disease of thyroid gland     Dysphagia     Enlarged prostate     Esophageal candidiasis (HCC)     Hematuria     Herpes zoster     Hyperlipidemia     Hypertension     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HCC)     Incomplete emptying of bladder     Inflamed seborrheic keratosis     Internal hemorrhoids     Malignant neoplasm of skin of trunk     Myocardial infarction ([facility] )     Nonmelanoma skin cancer     LAST ASSESSED: 8/9/17    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 infarction     Paroxysmal tachycardia (HCC)     Shortness of breath     Vitamin B deficiency        Past Surgical History:   Procedure Laterality Date    CARDIAC DEFIBRILLATOR PLACEMENT      COLONOSCOPY  10/07/2008    FIBEROPTIC SCREENING; NO FURTHER RECOMMENDATIONS    CORONARY ANGIOPLASTY WITH STENT PLACEMENT      CORONARY ANGIOPLASTY WITH STENT PLACEMENT      CYSTOSCOPY  11/06/2015    DIAGNOSTIC; MANAGED BY: Zulay Bowels    EGD AND COLONOSCOPY N/A 2/12/2018    Procedure: EGD AND COLONOSCOPY;  Surgeon: Mark Bermudez MD;  Location: [facility];   Service: Gastroenterology    HIP ARTHROPLASTY Right     INSERT / REPLACE / Neva Lay REPLACEMENT Right     TRUNK SKIN LESION EXCISIONAL BIOPSY  08/22/2007    MALIGNANT; BCC CHEST       Family History   Problem Relation Age of Onset    Heart disease Mother     Coronary artery disease Mother     Sudden death Mother     Cancer Father         throat; MALIGNANT NEOPLASM OF HEAD, FACE OR NECK    Throat cancer Father     Cancer Family     Coronary artery disease Family      I have reviewed and agree with the history as documented  Social History   Substance Use Topics    Smoking status: Former Smoker     Quit date: 2/16/1978    Smokeless tobacco: Never Used      Comment: QUIT: 1970 AS PER [employer]    Alcohol use Yes      Comment: occasionally 1-2 per month         Review of Systems   HENT: Negative for congestion  Gastrointestinal: Negative for diarrhea  All other systems reviewed and are negative  Physical Exam  Physical Exam   Constitutional: He is oriented to person, place, and time  He appears well-developed and well-nourished  No distress  HENT:   Head: Normocephalic and atraumatic  Right Ear: External ear normal  There is drainage  Left Ear: Tympanic membrane and external ear normal    Eyes: Conjunctivae and EOM are normal  Right eye exhibits no discharge  Left eye exhibits no discharge  No scleral icterus  Neck: Normal range of motion  Neck supple  No JVD present  No tracheal deviation present  No thyromegaly present  Cardiovascular: Normal rate and regular rhythm  Pulmonary/Chest: Effort normal and breath sounds normal  No stridor  No respiratory distress  He has no wheezes  He has no rales  Abdominal: Soft  Bowel sounds are normal  He exhibits no distension  There is no tenderness  Musculoskeletal: Normal range of motion  He exhibits no edema, tenderness or deformity  Neurological: He is alert and oriented to person, place, and time  No cranial nerve deficit  Coordination normal    Skin: Skin is warm and dry  Rash noted  He is not diaphoretic  Psychiatric: He has a normal mood and affect  His behavior is normal    Nursing note and vitals reviewed        Vital Signs  ED Triage Vitals   Temperature Pulse Respirations Blood Pressure SpO2   08/13/18 1242 08/13/18 1238 08/13/18 1238 08/13/18 1240 08/13/18 1238   97 6 °F (36 4 °C) 66 18 106/54 99 %      Temp Source Heart Rate Source Patient Position - Orthostatic VS BP Location FiO2 (%)   08/13/18 1242 08/13/18 1238 08/13/18 1238 08/13/18 1238 --   Oral Monitor Sitting Right arm       Pain Score       08/13/18 1238       No Pain           Vitals:    08/13/18 1238 08/13/18 1240   BP:  106/54   Pulse: 66    Patient Position - Orthostatic VS: Sitting        Visual Acuity      ED Medications  Medications   predniSONE tablet 60 mg (60 mg Oral Given 8/13/18 1319)       Diagnostic Studies  Results Reviewed     None                 No orders to display              Procedures  Procedures       Phone Contacts  ED Phone Contact    ED Course           Identification of Seniors at Risk      Most Recent Value   (ISAR) Identification of Seniors at Risk   Before the illness or injury that brought you to the Emergency, did you need someone to help you on a regular basis? 1 Filed at: 08/13/2018 1239   In the last 24 hours, have you needed more help than usual?  0 Filed at: 08/13/2018 1239   Have you been hospitalized for one or more nights during the past 6 months? 1 Filed at: 08/13/2018 1239   In general, do you see well?  0 Filed at: 08/13/2018 1239   In general, do you have serious problems with your memory? 0 Filed at: 08/13/2018 1239   Do you take more than three different medications every day?   1 Filed at: 08/13/2018 1239   ISAR Score  3 Filed at: 08/13/2018 1239                          MDM  Number of Diagnoses or Management Options  Cerumen impaction: new and requires workup  Dermatitis: new and requires workup  Otitis externa: new and requires workup     Amount and/or Complexity of Data Reviewed  Decide to obtain previous medical records or to obtain history from someone other than the patient: yes  Review and summarize past medical records: yes    Patient Progress  Patient progress: stable    CritCare Time    Disposition  Final diagnoses:   Otitis externa   Dermatitis   Cerumen impaction Time reflects when diagnosis was documented in both MDM as applicable and the Disposition within this note     Time User Action Codes Description Comment    8/13/2018  1:12 PM Laurie Gordon Add [H60 90] Otitis externa     8/13/2018  1:12 PM Laurie Gordon Add [L30 9] Dermatitis     8/13/2018  1:13 PM Laurie Gordon Add [H61 20] Cerumen impaction       ED Disposition     ED Disposition Condition Comment    Discharge  Durenda Barefoot discharge to home/self care      Condition at discharge: Stable        Follow-up Information     Follow up With Specialties Details Why Cory Norris MD Internal Medicine   [facility] [address] [place] [address]  [phone]            Discharge Medication List as of 8/13/2018  1:14 PM      START taking these medications    Details   ciprofloxacin-dexamethasone (CIPRODEX) otic suspension Administer 4 drops into the left ear 2 (two) times a day, Starting Mon 8/13/2018, Normal      predniSONE 20 mg tablet Take 3 tablets (60 mg total) by mouth daily for 5 days, Starting Mon 8/13/2018, Until Sat 8/18/2018, Normal         CONTINUE these medications which have NOT CHANGED    Details   aspirin 81 MG tablet Take 1 tablet by mouth daily, Historical Med      clopidogrel (PLAVIX) 75 mg tablet Take 1 tablet (75 mg total) by mouth daily for 90 days, Starting Mon 4/23/2018, Until Mon 8/13/2018, Normal      finasteride (PROSCAR) 5 mg tablet Take 1 tablet (5 mg total) by mouth daily for 90 days, Starting Mon 4/23/2018, Until Mon 8/13/2018, Normal      glipiZIDE (GLUCOTROL) 5 mg tablet Take 1 tablet (5 mg total) by mouth 2 (two) times a day for 90 days, Starting Mon 4/23/2018, Until Mon 8/13/2018, Normal      JANUVIA 50 MG tablet take 1 tablet by mouth once daily, Normal      levothyroxine 88 mcg tablet Take 1 tablet (88 mcg total) by mouth daily for 90 days, Starting Mon 4/23/2018, Until Mon 8/13/2018, Normal      metoprolol tartrate (LOPRESSOR) 25 mg tablet take 1 tablet by mouth twice a day, Normal      pantoprazole (PROTONIX) 40 mg tablet take 1 tablet by mouth twice a day before meals, Normal      rosuvastatin (CRESTOR) 20 MG tablet Take 1 tablet (20 mg total) by mouth daily for 90 days, Starting Mon 4/23/2018, Until Mon 8/13/2018, Normal      tamsulosin (FLOMAX) 0 4 mg Take 1 capsule (0 4 mg total) by mouth daily for 90 days, Starting Mon 4/23/2018, Until Mon 8/13/2018, Normal           No discharge procedures on file      ED Provider  Electronically Signed by           Moreno Murphy DO  08/13/18 0070

## 2018-08-28 DIAGNOSIS — K21.9 GASTROESOPHAGEAL REFLUX DISEASE, ESOPHAGITIS PRESENCE NOT SPECIFIED: ICD-10-CM

## 2018-08-28 DIAGNOSIS — E11.9 TYPE 2 DIABETES MELLITUS WITHOUT COMPLICATION, WITHOUT LONG-TERM CURRENT USE OF INSULIN (HCC): ICD-10-CM

## 2018-08-28 RX ORDER — PANTOPRAZOLE SODIUM 40 MG/1
TABLET, DELAYED RELEASE ORAL
Qty: 60 TABLET | Refills: 0 | Status: SHIPPED | OUTPATIENT
Start: 2018-08-28 | End: 2018-10-01 | Stop reason: SDUPTHER

## 2018-08-28 RX ORDER — SITAGLIPTIN 50 MG/1
TABLET, FILM COATED ORAL
Qty: 30 TABLET | Refills: 0 | Status: SHIPPED | OUTPATIENT
Start: 2018-08-28 | End: 2018-10-01 | Stop reason: SDUPTHER

## 2018-09-18 ENCOUNTER — OFFICE VISIT (OUTPATIENT)
Dept: INTERNAL MEDICINE CLINIC | Facility: CLINIC | Age: 78
End: 2018-09-18
Payer: COMMERCIAL

## 2018-09-18 VITALS
HEART RATE: 60 BPM | DIASTOLIC BLOOD PRESSURE: 60 MMHG | WEIGHT: 148 LBS | TEMPERATURE: 97.4 F | SYSTOLIC BLOOD PRESSURE: 102 MMHG | BODY MASS INDEX: 23.89 KG/M2 | OXYGEN SATURATION: 98 %

## 2018-09-18 DIAGNOSIS — R21 RASH OF UNKNOWN CAUSE: Primary | ICD-10-CM

## 2018-09-18 DIAGNOSIS — L29.9 PRURITUS: ICD-10-CM

## 2018-09-18 PROCEDURE — 99214 OFFICE O/P EST MOD 30 MIN: CPT | Performed by: PHYSICIAN ASSISTANT

## 2018-09-18 RX ORDER — BETAMETHASONE DIPROPIONATE 0.5 MG/G
CREAM TOPICAL 2 TIMES DAILY
Qty: 30 G | Refills: 0 | Status: SHIPPED | OUTPATIENT
Start: 2018-09-18 | End: 2019-05-28 | Stop reason: ALTCHOICE

## 2018-09-18 RX ORDER — HYDROXYZINE HYDROCHLORIDE 25 MG/1
25 TABLET, FILM COATED ORAL EVERY 6 HOURS PRN
Qty: 30 TABLET | Refills: 1 | Status: SHIPPED | OUTPATIENT
Start: 2018-09-18 | End: 2019-07-15

## 2018-09-18 NOTE — PROGRESS NOTES
Assessment/Plan:  Itchy rash appears to be allergic  It is hard to say with this may be due to seen by Dr Ramonita Mortensen he will see Dermatology in 4 days meanwhile treat him symptomatically       Diagnoses and all orders for this visit:    Rash of unknown cause  -     hydrOXYzine HCL (ATARAX) 25 mg tablet; Take 1 tablet (25 mg total) by mouth every 6 (six) hours as needed for itching  -     betamethasone dipropionate (DIPROSONE) 0 05 % cream; Apply topically 2 (two) times a day    Pruritus  -     hydrOXYzine HCL (ATARAX) 25 mg tablet; Take 1 tablet (25 mg total) by mouth every 6 (six) hours as needed for itching  -     betamethasone dipropionate (DIPROSONE) 0 05 % cream; Apply topically 2 (two) times a day        No problem-specific Assessment & Plan notes found for this encounter  Subjective:      Patient ID: Penelope Guillen is a 69 y o  male  He has had an itchy rash in his upper chest shoulders and upper back for 3 months  It is gradually getting itchy year and he has noted some spread to his left upper arm  He does a lot of scratching with some excoriation  He was hospitalized in February of 18 with renal failure CHF metformin was discontinued he was put on Januvia and glyburide  No other new medications supplements or food since that time seen in the emergency room about a month ago with otitis externa given Cipro ear drops at that time his ear problem has completely cleared he has a history of pacemaker CAD defibrillator cardiomyopathy normally active and well following with Cardiology closely  No CHF      Rash   Pertinent negatives include no congestion, cough, diarrhea, eye pain, fatigue, fever, rhinorrhea, shortness of breath, sore throat or vomiting  The following portions of the patient's history were reviewed and updated as appropriate:   He has a past medical history of Acquired cyst of kidney; Anemia;  Benign paroxysmal vertigo, unspecified ear; Cardiac disease; Cardiac disorder; Cardiomyopathy ([facility] ); Cellulitis of leg; Cervical spinal stenosis; Chest pain; Congest[facility] heart failure (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 ); Nonmelanoma skin cancer; 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 infarction; Paroxysmal tachycardia ([facility] ); Shortness of breath; and Vitamin B deficiency  ,   does not have any pertinent problems on file  ,   has a past surgical history that includes Hip Arthroplasty (Right); Coronary angioplasty with stent; Joint replacement (Right); Insert / replace / remove pacemaker; EGD AND COLONOSCOPY (N/A, 2/12/2018); Colonoscopy (10/07/2008); Cystoscopy (11/06/2015); Trunk skin lesion excisional biopsy (08/22/2007); Cardiac defibrillator placement; and Coronary angioplasty with stent  ,  family history includes Cancer in his family and father; Coronary artery disease in his family and mother; Heart disease in his mother; Sudden death in his mother; Throat cancer in his father  ,   reports that he quit smoking about 40 years ago  He has never used smokeless tobacco  He reports that he drinks alcohol  He reports that he does not use drugs  ,  is allergic to atorvastatin and percocet [oxycodone-acetaminophen]     Current Outpatient Prescriptions   Medication Sig Dispense Refill    aspirin 81 MG tablet Take 1 tablet by mouth daily      clopidogrel (PLAVIX) 75 mg tablet Take 1 tablet (75 mg total) by mouth daily for 90 days 90 tablet 0    finasteride (PROSCAR) 5 mg tablet Take 1 tablet (5 mg total) by mouth daily for 90 days 90 tablet 0    glipiZIDE (GLUCOTROL) 5 mg tablet Take 1 tablet (5 mg total) by mouth 2 (two) times a day for 90 days 180 tablet 0    JANUVIA 50 MG tablet take 1 tablet by mouth once daily 30 tablet 0    levothyroxine 88 mcg tablet Take 1 tablet (88 mcg total) by mouth daily for 90 days 90 tablet 0    metoprolol tartrate (LOPRESSOR) 25 mg tablet take 1 tablet by mouth twice a day 180 tablet 3    pantoprazole (PROTONIX) 40 mg tablet take 1 tablet twice a day BEFORE MEALS 60 tablet 0    tamsulosin (FLOMAX) 0 4 mg Take 1 capsule (0 4 mg total) by mouth daily for 90 days 90 capsule 0    betamethasone dipropionate (DIPROSONE) 0 05 % cream Apply topically 2 (two) times a day 30 g 0    ciprofloxacin-dexamethasone (CIPRODEX) otic suspension Administer 4 drops into the left ear 2 (two) times a day (Patient not taking: Reported on 9/18/2018 ) 7 5 mL 0    hydrOXYzine HCL (ATARAX) 25 mg tablet Take 1 tablet (25 mg total) by mouth every 6 (six) hours as needed for itching 30 tablet 1    rosuvastatin (CRESTOR) 20 MG tablet Take 1 tablet (20 mg total) by mouth daily for 90 days (Patient not taking: Reported on 9/18/2018 ) 90 tablet 0     No current facility-administered medications for this visit  Review of Systems   Constitutional: Negative for activity change, appetite change, chills, diaphoresis, fatigue, fever and unexpected weight change  HENT: Negative for congestion, dental problem, drooling, ear discharge, ear pain, facial swelling, hearing loss, nosebleeds, postnasal drip, rhinorrhea, sinus pain, sinus pressure, sneezing, sore throat, tinnitus, trouble swallowing and voice change  Eyes: Negative for photophobia, pain, discharge, redness, itching and visual disturbance  Respiratory: Negative for apnea, cough, choking, chest tightness, shortness of breath, wheezing and stridor  Cardiovascular: Negative for chest pain, palpitations and leg swelling  Gastrointestinal: Negative for abdominal distention, abdominal pain, anal bleeding, blood in stool, constipation, diarrhea, nausea, rectal pain and vomiting  Endocrine: Negative for cold intolerance, heat intolerance, polydipsia, polyphagia and polyuria  Genitourinary: Negative for decreased urine volume, difficulty urinating, dysuria, enuresis, flank pain, frequency, genital sores, hematuria and urgency  Musculoskeletal: Positive for back pain  Negative for arthralgias, gait problem, joint swelling, myalgias, neck pain and neck stiffness  Skin: Positive for rash  Negative for color change, pallor and wound  Neurological: Negative for dizziness, tremors, seizures, syncope, facial asymmetry, speech difficulty, weakness, light-headedness, numbness and headaches  Hematological: Negative for adenopathy  Does not bruise/bleed easily  Psychiatric/Behavioral: Negative for agitation, behavioral problems, confusion, decreased concentration, dysphoric mood, hallucinations, self-injury, sleep disturbance and suicidal ideas  The patient is not nervous/anxious and is not hyperactive  Objective:  Vitals:    09/18/18 1114   BP: 102/60   BP Location: Left arm   Patient Position: Sitting   Cuff Size: Standard   Pulse: 60   Temp: (!) 97 4 °F (36 3 °C)   SpO2: 98%   Weight: 67 1 kg (148 lb)     Body mass index is 23 89 kg/m²  Physical Exam   Constitutional: He is oriented to person, place, and time  He appears well-developed  HENT:   Head: Normocephalic  Right Ear: External ear normal    Left Ear: External ear normal    Mouth/Throat: Oropharynx is clear and moist    Eyes: Conjunctivae are normal  Pupils are equal, round, and reactive to light  Neck: Neck supple  No JVD present  No thyromegaly present  Cardiovascular: Normal rate, regular rhythm and intact distal pulses  Murmur heard  Pacer generator left upper chest   Pulmonary/Chest: Effort normal and breath sounds normal  No respiratory distress  He has no wheezes  He has no rales  He exhibits no tenderness  Abdominal: Soft  Bowel sounds are normal  He exhibits no mass     Musculoskeletal: Normal range of motion  He exhibits no edema  Lymphadenopathy:     He has no cervical adenopathy  Neurological: He is alert and oriented to person, place, and time  He has normal reflexes  Skin: Skin is warm and dry  Rash (Eczematous looking rash upper chest upper back left upper arm patchy slightly raised with t some excoriation from scratching) noted  No erythema  No pallor

## 2018-09-21 ENCOUNTER — OFFICE VISIT (OUTPATIENT)
Dept: DERMATOLOGY | Facility: CLINIC | Age: 78
End: 2018-09-21
Payer: COMMERCIAL

## 2018-09-21 DIAGNOSIS — Z13.89 SCREENING FOR SKIN CONDITION: ICD-10-CM

## 2018-09-21 DIAGNOSIS — R21 RASH: Primary | ICD-10-CM

## 2018-09-21 DIAGNOSIS — Z85.828 HISTORY OF SKIN CANCER: ICD-10-CM

## 2018-09-21 DIAGNOSIS — L82.1 SEBORRHEIC KERATOSIS: ICD-10-CM

## 2018-09-21 PROCEDURE — 88305 TISSUE EXAM BY PATHOLOGIST: CPT | Performed by: PATHOLOGY

## 2018-09-21 PROCEDURE — 11100 PR BIOPSY OF SKIN LESION: CPT | Performed by: DERMATOLOGY

## 2018-09-21 PROCEDURE — 99213 OFFICE O/P EST LOW 20 MIN: CPT | Performed by: DERMATOLOGY

## 2018-09-21 NOTE — PATIENT INSTRUCTIONS
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 will go ahead and treat with topical steroids will go ahead and give him a lb jar since the small to will not treat him    If no improvement may need to consider phototherapy and await the biopsy to defer confirm diagnosis will recheck in 1 month  Seborrheic keratosis patient reassured these are normal growths we acquire with age no treatment needed  History of skin cancer in no recurrence nothing else atypical sunblock recommended follow-up in 1 year  Screening for dermatologic disorders nothing else of concern noted on complete exam follow-up in 1 year  Wound care instructions given to patient

## 2018-09-21 NOTE — PROGRESS NOTES
[facility] DERMATOLOGY  [address]  [place]  [phone]  [phone]     MRN: [id number] : 1940  Encounter: [id number]  Patient Information: Maciel Nicholson  Chief complaint:Yearly checkup and rash      History of present illness:  60-year-old male with previous history of skin cancer presents secondary to concerns regarding a rash that has occurred over the last 3 months patient has been quite itchy patient with known history of renal failure and diabetes  Past Medical History:   Diagnosis Date    Acquired cyst of kidney     Anemia     Benign paroxysmal vertigo, unspecified ear     Cardiac disease     Cardiac disorder     Cardiomyopathy ([facility] )     Cellulitis of leg     Cervical spinal stenosis     Chest pain     Congestive heart failure (HCC)     Coronary atherosclerosis of native coronary artery     Diabetes mellitus ([facility] )     Disease of thyroid gland     Dysphagia     Enlarged prostate     Esophageal candidiasis (HCC)     Hematuria     Herpes zoster     Hyperlipidemia     Hypertension     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HCC)     Incomplete emptying of bladder     Inflamed seborrheic keratosis     Internal hemorrhoids     Malignant neoplasm of skin of trunk     Myocardial infarction ([facility] )     Nonmelanoma skin cancer     LAST ASSESSED: 17    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 infarction     Paroxysmal tachycardia (HCC)     Shortness of breath     Vitamin B deficiency      Past Surgical History:   Procedure Laterality Date    CARDIAC DEFIBRILLATOR PLACEMENT      COLONOSCOPY  10/07/2008    FIBEROPTIC SCREENING; NO FURTHER RECOMMENDATIONS    CORONARY ANGIOPLASTY WITH STENT PLACEMENT      CORONARY ANGIOPLASTY WITH STENT PLACEMENT      CYSTOSCOPY  2015    DIAGNOSTIC; MANAGED BY: Shalini Lai    EGD AND COLONOSCOPY N/A 2018    Procedure: EGD AND COLONOSCOPY;  Surgeon: Eliecer Coffey MD;  Location: [facility]; Service: Gastroenterology    HIP ARTHROPLASTY Right     INSERT / [address] / [address] REPLACEMENT Right     TRUNK SKIN LESION EXCISIONAL BIOPSY  08/22/2007    [facility]; [address] CHEST     Social History   History   Alcohol Use    Yes     Comment: occasionally 1-2 per month      History   Drug Use No     History   Smoking Status    Former Smoker    Quit date: 2/16/1978   Smokeless Tobacco    Never Used     Comment: QUIT: 1970 AS PER [employer]     Family History   Problem Relation Age of Onset    Heart disease Mother     Coronary artery disease Mother     Sudden death Mother     Cancer Father         throat; MALIGNANT NEOPLASM OF HEAD, FACE OR NECK    Throat cancer Father     Cancer Family     Coronary artery disease Family      Meds/Allergies   Allergies   Allergen Reactions    Atorvastatin      Pt unsure      Percocet [Oxycodone-Acetaminophen] GI Intolerance       Meds:  Prior to Admission medications    Medication Sig Start Date End Date Taking?  Authorizing Provider   aspirin 81 MG tablet Take 1 tablet by mouth daily   Yes Historical Provider, MD   betamethasone dipropionate (DIPROSONE) 0 05 % cream Apply topically 2 (two) times a day 9/18/18  Yes Ana Balderrama PA-C   hydrOXYzine HCL (ATARAX) 25 mg tablet Take 1 tablet (25 mg total) by mouth every 6 (six) hours as needed for itching 9/18/18  Yes Ana Balderrama PA-C   JANUVIA 50 MG tablet take 1 tablet by mouth once daily 8/28/18  Yes Ana Balderrama PA-C   metoprolol tartrate (LOPRESSOR) 25 mg tablet take 1 tablet by mouth twice a day 6/13/18  Yes Maritza Hernandez MD   pantoprazole (PROTONIX) 40 mg tablet take 1 tablet twice a day BEFORE MEALS 8/28/18  Yes Ana Balderrama PA-C   clopidogrel (PLAVIX) 75 mg tablet Take 1 tablet (75 mg total) by mouth daily for 90 days 4/23/18 9/18/18  Maritza Hernandez MD   finasteride (PROSCAR) 5 mg tablet Take 1 tablet (5 mg total) by mouth daily for 90 days 4/23/18 9/18/18  Maritza Hernandez MD glipiZIDE (GLUCOTROL) 5 mg tablet Take 1 tablet (5 mg total) by mouth 2 (two) times a day for 90 days 4/23/18 9/18/18  Shelley Rodriguez MD   levothyroxine 88 mcg tablet Take 1 tablet (88 mcg total) by mouth daily for 90 days 4/23/18 9/18/18  Shelley Rodriguez MD   tamsulosin [facility]) 0 4 mg Take 1 capsule (0 4 mg total) by mouth daily for 90 days 4/23/18 9/18/18  Shelley Rodriguez MD       Subjective:     Review of Systems:    General: negative for - chills, fatigue, fever,  weight gain or weight loss  Psychological: negative for - anxiety, behavioral disorder, concentration difficulties, decreased libido, depression, irritability, memory difficulties, mood swings, sleep disturbances or suicidal ideation  ENT: negative for - hearing difficulties , nasal congestion, nasal discharge, oral lesions, sinus pain, sneezing, sore throat  Allergy and Immunology: negative for - hives, insect bite sensitivity,  Hematological and Lymphatic: negative for - bleeding problems, blood clots,bruising, swollen lymph nodes  Endocrine: negative for - hair pattern changes, hot flashes, malaise/lethargy, mood swings, palpitations, polydipsia/polyuria, skin changes, temperature intolerance or unexpected weight change  Respiratory: negative for - cough, hemoptysis, orthopnea, shortness of breath, or wheezing  Cardiovascular: negative for - chest pain, dyspnea on exertion, edema,  Gastrointestinal: negative for - abdominal pain, nausea/vomiting  Genito-Urinary: negative for - dysuria, incontinence, irregular/heavy menses or urinary frequency/urgency  Musculoskeletal: negative for - gait disturbance, joint pain, joint stiffness, joint swelling, muscle pain, muscular weakness  Dermatological:  As in HPI  Neurological: negative for confusion, dizziness, headaches, impaired coordination/balance, memory loss, numbness/tingling, seizures, speech problems, tremors or weakness       Objective: There were no vitals taken for this visit      Physical Exam:    General Appearance:    Alert, cooperative, no distress   Head:    Normocephalic, without obvious abnormality, atraumatic           Skin:   A full skin exam was performed including scalp, head scalp, eyes, ears, nose, lips, neck, chest, axilla, abdomen, back, buttocks, bilateral upper extremities, bilateral lower extremities, hands, feet, fingers, toes, fingernails, and toenails juicy papules excoriation noted on areas of the chest back and arms normal keratotic papules with greasy stuck on appearance previous sites of skin cancer well healed without recurrence nothing else atypical noted on  exam  Shave Biopsy Procedure Note    Pre-operative Diagnosis: transient acantholytic dermatosis    Plan:  1  Instructed to keep the wound dry and covered for 24 and clean thereafter  2  Warning signs of infection were reviewed  3  Recommended that the patient use OTC acetaminophen as needed for pain  4  Return  Pending results of biopsy(ies)    Locations: left back    Indications:  Delineate rash    Anesthesia: Lidocaine 1% without epinephrine without added sodium bicarbonate    Procedure Details     Patient informed of the risks (including bleeding and infection) and benefits of the   procedure and Verbal informed consent obtained  The lesion and surrounding area were given a sterile prep using alcohol and draped in the usual sterile fashion  A Blue blade razor was used to obtain a specimen  Hemostasis achieved with aluminum chloride  Petrolatum and a sterile dressing applied  The specimen was sent for pathologic examination  The patient tolerated the procedure(s) well  Complications:  none  Assessment:     1  Rash     2  Seborrheic keratosis     3  Screening for skin condition     4   History of skin cancer           Plan:    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 will go ahead and treat with topical steroids will go ahead and give him a lb jar since the small to will not treat him   If no improvement may need to consider phototherapy and await the biopsy to defer confirm diagnosis will recheck in 1 month  Seborrheic keratosis patient reassured these are normal growths we acquire with age no treatment needed  History of skin cancer in no recurrence nothing else atypical sunblock recommended follow-up in 1 year  Screening for dermatologic disorders nothing else of concern noted on complete exam follow-up in 1 year      Kayden Pablo MD  9/21/2018,12:15 PM    Portions of the record may have been created with voice recognition software   Occasional wrong word or "sound a like" substitutions may have occurred due to the inherent limitations of voice recognition software   Read the chart carefully and recognize, using context, where substitutions have occurred

## 2018-09-30 ENCOUNTER — HOSPITAL ENCOUNTER (EMERGENCY)
Facility: HOSPITAL | Age: 78
Discharge: HOME | End: 2018-09-30
Attending: EMERGENCY MEDICINE
Payer: COMMERCIAL

## 2018-09-30 ENCOUNTER — APPOINTMENT (EMERGENCY)
Dept: RADIOLOGY | Facility: HOSPITAL | Age: 78
End: 2018-09-30
Payer: COMMERCIAL

## 2018-09-30 VITALS
WEIGHT: 149.69 LBS | HEIGHT: 66 IN | DIASTOLIC BLOOD PRESSURE: 60 MMHG | OXYGEN SATURATION: 98 % | HEART RATE: 77 BPM | SYSTOLIC BLOOD PRESSURE: 116 MMHG | TEMPERATURE: 97.8 F | BODY MASS INDEX: 24.06 KG/M2 | RESPIRATION RATE: 18 BRPM

## 2018-09-30 DIAGNOSIS — D64.9 ANEMIA: ICD-10-CM

## 2018-09-30 DIAGNOSIS — T14.8XXA HEMATOMA: ICD-10-CM

## 2018-09-30 DIAGNOSIS — L29.9 ITCHING: ICD-10-CM

## 2018-09-30 DIAGNOSIS — S60.221A CONTUSION OF RIGHT HAND, INITIAL ENCOUNTER: ICD-10-CM

## 2018-09-30 DIAGNOSIS — S70.01XA CONTUSION OF RIGHT HIP, INITIAL ENCOUNTER: Primary | ICD-10-CM

## 2018-09-30 LAB
ALBUMIN SERPL BCP-MCNC: 3.3 G/DL (ref 3.5–5)
ALP SERPL-CCNC: 69 U/L (ref 46–116)
ALT SERPL W P-5'-P-CCNC: 22 U/L (ref 12–78)
ANION GAP SERPL CALCULATED.3IONS-SCNC: 5 MMOL/L (ref 4–13)
APTT PPP: 27 SECONDS (ref 24–36)
AST SERPL W P-5'-P-CCNC: 17 U/L (ref 5–45)
BASOPHILS # BLD AUTO: 0.04 THOUSANDS/ΜL (ref 0–0.1)
BASOPHILS NFR BLD AUTO: 1 % (ref 0–1)
BILIRUB SERPL-MCNC: 0.8 MG/DL (ref 0.2–1)
BUN SERPL-MCNC: 14 MG/DL (ref 5–25)
CALCIUM SERPL-MCNC: 8.8 MG/DL (ref 8.3–10.1)
CHLORIDE SERPL-SCNC: 101 MMOL/L (ref 100–108)
CO2 SERPL-SCNC: 29 MMOL/L (ref 21–32)
CREAT SERPL-MCNC: 1.45 MG/DL (ref 0.6–1.3)
EOSINOPHIL # BLD AUTO: 0.29 THOUSAND/ΜL (ref 0–0.61)
EOSINOPHIL NFR BLD AUTO: 6 % (ref 0–6)
ERYTHROCYTE [DISTWIDTH] IN BLOOD BY AUTOMATED COUNT: 16.7 % (ref 11.6–15.1)
GFR SERPL CREATININE-BSD FRML MDRD: 46 ML/MIN/1.73SQ M
GLUCOSE SERPL-MCNC: 424 MG/DL (ref 65–140)
HCT VFR BLD AUTO: 30.9 % (ref 36.5–49.3)
HGB BLD-MCNC: 10.4 G/DL (ref 12–17)
IMM GRANULOCYTES # BLD AUTO: 0.04 THOUSAND/UL (ref 0–0.2)
IMM GRANULOCYTES NFR BLD AUTO: 1 % (ref 0–2)
INR PPP: 1.09 (ref 0.86–1.17)
LYMPHOCYTES # BLD AUTO: 0.84 THOUSANDS/ΜL (ref 0.6–4.47)
LYMPHOCYTES NFR BLD AUTO: 16 % (ref 14–44)
MCH RBC QN AUTO: 34.6 PG (ref 26.8–34.3)
MCHC RBC AUTO-ENTMCNC: 33.7 G/DL (ref 31.4–37.4)
MCV RBC AUTO: 103 FL (ref 82–98)
MONOCYTES # BLD AUTO: 0.46 THOUSAND/ΜL (ref 0.17–1.22)
MONOCYTES NFR BLD AUTO: 9 % (ref 4–12)
NEUTROPHILS # BLD AUTO: 3.55 THOUSANDS/ΜL (ref 1.85–7.62)
NEUTS SEG NFR BLD AUTO: 67 % (ref 43–75)
NRBC BLD AUTO-RTO: 0 /100 WBCS
NT-PROBNP SERPL-MCNC: 902 PG/ML
PLATELET # BLD AUTO: 186 THOUSANDS/UL (ref 149–390)
PMV BLD AUTO: 10 FL (ref 8.9–12.7)
POTASSIUM SERPL-SCNC: 4.3 MMOL/L (ref 3.5–5.3)
PROT SERPL-MCNC: 6.9 G/DL (ref 6.4–8.2)
PROTHROMBIN TIME: 14 SECONDS (ref 11.8–14.2)
RBC # BLD AUTO: 3.01 MILLION/UL (ref 3.88–5.62)
RETICS # AUTO: ABNORMAL 10*3/UL (ref 14356–105094)
RETICS # CALC: 1.92 % (ref 0.37–1.87)
SODIUM SERPL-SCNC: 135 MMOL/L (ref 136–145)
TSH SERPL DL<=0.05 MIU/L-ACNC: 1.2 UIU/ML (ref 0.36–3.74)
WBC # BLD AUTO: 5.22 THOUSAND/UL (ref 4.31–10.16)

## 2018-09-30 PROCEDURE — 82728 ASSAY OF FERRITIN: CPT | Performed by: PHYSICIAN ASSISTANT

## 2018-09-30 PROCEDURE — 85730 THROMBOPLASTIN TIME PARTIAL: CPT | Performed by: PHYSICIAN ASSISTANT

## 2018-09-30 PROCEDURE — 83880 ASSAY OF NATRIURETIC PEPTIDE: CPT | Performed by: PHYSICIAN ASSISTANT

## 2018-09-30 PROCEDURE — 36415 COLL VENOUS BLD VENIPUNCTURE: CPT | Performed by: PHYSICIAN ASSISTANT

## 2018-09-30 PROCEDURE — 80053 COMPREHEN METABOLIC PANEL: CPT | Performed by: PHYSICIAN ASSISTANT

## 2018-09-30 PROCEDURE — 85025 COMPLETE CBC W/AUTO DIFF WBC: CPT | Performed by: PHYSICIAN ASSISTANT

## 2018-09-30 PROCEDURE — 85045 AUTOMATED RETICULOCYTE COUNT: CPT | Performed by: PHYSICIAN ASSISTANT

## 2018-09-30 PROCEDURE — 99284 EMERGENCY DEPT VISIT MOD MDM: CPT

## 2018-09-30 PROCEDURE — 73552 X-RAY EXAM OF FEMUR 2/>: CPT

## 2018-09-30 PROCEDURE — 73502 X-RAY EXAM HIP UNI 2-3 VIEWS: CPT

## 2018-09-30 PROCEDURE — 73130 X-RAY EXAM OF HAND: CPT

## 2018-09-30 PROCEDURE — 73564 X-RAY EXAM KNEE 4 OR MORE: CPT

## 2018-09-30 PROCEDURE — 83540 ASSAY OF IRON: CPT | Performed by: PHYSICIAN ASSISTANT

## 2018-09-30 PROCEDURE — 84443 ASSAY THYROID STIM HORMONE: CPT | Performed by: PHYSICIAN ASSISTANT

## 2018-09-30 PROCEDURE — 83550 IRON BINDING TEST: CPT | Performed by: PHYSICIAN ASSISTANT

## 2018-09-30 PROCEDURE — 85610 PROTHROMBIN TIME: CPT | Performed by: PHYSICIAN ASSISTANT

## 2018-09-30 RX ORDER — MELOXICAM 15 MG/1
15 TABLET ORAL DAILY PRN
Qty: 30 TABLET | Refills: 0 | Status: SHIPPED | OUTPATIENT
Start: 2018-09-30 | End: 2018-11-05 | Stop reason: ALTCHOICE

## 2018-09-30 NOTE — DISCHARGE INSTRUCTIONS
Hip Contusion   WHAT YOU NEED TO KNOW:   A hip contusion is a bruise that appears on the skin of your hip after an injury  The injury is caused by a fall, being hit with a large object, or kicked  A bruise happens when small blood vessels tear but the skin does not  When blood vessels tear, blood leaks into nearby tissue, such as soft tissue or muscle  DISCHARGE INSTRUCTIONS:   Return to the emergency department if:   · You have severe pain in your hip  · You have numbness in your leg or toes  · You cannot put any weight on or move your hip  Contact your healthcare provider if:   · Your pain does not decrease, even after treatment  · You have questions or concerns about your condition or care  Medicines:   · NSAIDs , such as ibuprofen, help decrease swelling, pain, and fever  This medicine is available with or without a doctor's order  NSAIDs can cause stomach bleeding or kidney problems in certain people  If you take blood thinner medicine, always ask if NSAIDs are safe for you  Always read the medicine label and follow directions  Do not give these medicines to children under 10months of age without direction from your child's healthcare provider  · Take your medicine as directed  Contact your healthcare provider if you think your medicine is not helping or if you have side effects  Tell him of her if you are allergic to any medicine  Keep a list of the medicines, vitamins, and herbs you take  Include the amounts, and when and why you take them  Bring the list or the pill bottles to follow-up visits  Carry your medicine list with you in case of an emergency  Follow up with your healthcare provider as directed: You may need to return within a week to recheck your injury  Write down any questions you have so you remember to ask them during your visits    Self-care:   · Rest  your injured hip so that it can heal  You may need to avoid putting any weight on your hip for at least 48 hours  Return to normal activities as directed  · Ice  the injury for 20 minutes every 4 hours, or as directed  Use an ice pack, or put crushed ice in a plastic bag  Cover it with a towel to protect your skin  Ice helps prevent tissue damage and decreases swelling and pain  · Compress  your injury with an elastic bandage to reduce swelling  Ask your healthcare provider how to use an elastic bandage and how tight it should be  · Elevate  your injured hip above the level of your heart as often as you can  This will help decrease swelling and pain  If possible, prop your hip and leg on pillows or blankets to keep it elevated comfortably  Help your hip contusion heal:   · Do not massage or use heat  Heat and massage may slow healing of the area  · Do not stretch injured muscles  Ask your healthcare provider when and how you may safely stretch after your injury  · Do not drink alcohol  Alcohol may slow healing of your injury  Prevent another contusion:   · Stretch and warm up before you play sports or exercise  · Wear protective gear when you play sports  · If you begin a new physical activity, start slowly to give your body a chance to adjust   © 2017 [facility] Information is for End User's use only and may not be sold, redistributed or otherwise used for commercial purposes  All illustrations and images included in CareNotes® are the copyrighted property of [employer]  or Josevernon Malagon  The above information is an  only  It is not intended as medical advice for individual conditions or treatments  Talk to your doctor, nurse or pharmacist before following any medical regimen to see if it is safe and effective for you  Contusion in Adults   WHAT YOU NEED TO KNOW:   A contusion is a bruise that appears on your skin after an injury  A bruise happens when small blood vessels tear but skin does not   When blood vessels tear, blood leaks into nearby tissue, such as soft tissue or muscle  DISCHARGE INSTRUCTIONS:   Return to the emergency department if:   · You have new trouble moving the injured area  · You have tingling or numbness in or near the injured area  · Your hand or foot below the bruise gets cold or turns pale  Contact your healthcare provider if:   · You find a new lump in the injured area  · Your symptoms do not improve with treatment after 4 to 5 days  · You have questions or concerns about your condition or care  Medicines: You may need any of the following:  · NSAIDs  help decrease swelling and pain or fever  This medicine is available with or without a doctor's order  NSAIDs can cause stomach bleeding or kidney problems in certain people  If you take blood thinner medicine, always ask your healthcare provider if NSAIDs are safe for you  Always read the medicine label and follow directions  · Prescription pain medicine  may be given  Do not wait until the pain is severe before you take your medicine  · Take your medicine as directed  Contact your healthcare provider if you think your medicine is not helping or if you have side effects  Tell him of her if you are allergic to any medicine  Keep a list of the medicines, vitamins, and herbs you take  Include the amounts, and when and why you take them  Bring the list or the pill bottles to follow-up visits  Carry your medicine list with you in case of an emergency  Follow up with your healthcare provider as directed: You may need to return within a week to check your injury again  Write down your questions so you remember to ask them during your visits  Help a contusion heal:   · Rest the injured area  or use it less than usual  If you bruised your leg or foot, you may need crutches or a cane to help you walk  This will help you keep weight off your injured body part  · Apply ice  to decrease swelling and pain  Ice may also help prevent tissue damage   Use an ice pack, or put crushed ice in a plastic bag  Cover it with a towel and place it on your bruise for 15 to 20 minutes every hour or as directed  · Use compression  to support the area and decrease swelling  Wrap an elastic bandage around the area over the bruised muscle  Make sure the bandage is not too tight  You should be able to fit 1 finger between the bandage and your skin  · Elevate (raise) your injured body part  above the level of your heart to help decrease pain and swelling  Use pillows, blankets, or rolled towels to elevate the area as often as you can  · Do not drink alcohol  as directed  Alcohol may slow healing  · Do not stretch injured muscles  right after your injury  Ask your healthcare provider when and how you may safely stretch after your injury  Gentle stretches can help increase your flexibility  · Do not massage the area or put heating pads  on the bruise right after your injury  Heat and massage may slow healing  Your healthcare provider may tell you to apply heat after several days  At that time, heat will start to help the injury heal   Prevent another contusion:   · Stretch and warm up before you play sports or exercise  · Wear protective gear when you play sports  Examples are shin guards and padding  · If you begin a new physical activity, start slowly to give your body a chance to adjust   © 2017 [address] Information is for End User's use only and may not be sold, redistributed or otherwise used for commercial purposes  All illustrations and images included in CareNotes® are the copyrighted property of [employer]  or Jose Malagon  The above information is an  only  It is not intended as medical advice for individual conditions or treatments  Talk to your doctor, nurse or pharmacist before following any medical regimen to see if it is safe and effective for you        Anemia   WHAT YOU NEED TO KNOW:   Anemia is a low number of red blood cells or a low amount of hemoglobin in your red blood cells  Hemoglobin is a protein that helps carry oxygen throughout your body  Red blood cells use iron to create hemoglobin  Anemia may develop if your body does not have enough iron  It may also develop if your body does not make enough red blood cells or they die faster than your body can make them  DISCHARGE INSTRUCTIONS:   Call 911 or have someone call 911 for any of the following:   · You lose consciousness  · You have severe chest pain  Return to the emergency department if:   · You have dark or bloody bowel movements  Contact your healthcare provider if:   · Your symptoms are worse, even after treatment  · You have questions or concerns about your condition or care  Medicines:   · Iron or folic acid supplements  help increase your red blood cell and hemoglobin levels  · Vitamin B12 injections  may help boost your red blood cell level and decrease your symptoms  Ask your healthcare provider how to inject B12  · Take your medicine as directed  Contact your healthcare provider if you think your medicine is not helping or if you have side effects  Tell him of her if you are allergic to any medicine  Keep a list of the medicines, vitamins, and herbs you take  Include the amounts, and when and why you take them  Bring the list or the pill bottles to follow-up visits  Carry your medicine list with you in case of an emergency  Prevent anemia:  Eat healthy foods rich in iron and vitamin C  Nuts, meat, dark leafy green vegetables, and beans are high in iron and protein  Vitamin C helps your body absorb iron  Foods rich in vitamin C include oranges and other citrus fruits  Ask your healthcare provider for a list of other foods that are high in iron or vitamin C  Ask if you need to be on a special diet  Follow up with your healthcare provider as directed:  Write down your questions so you remember to ask them during your visits     © 2017 [address] [facility] Information is for End User's use only and may not be sold, redistributed or otherwise used for commercial purposes  All illustrations and images included in CareNotes® are the copyrighted property of [employer]  or Jose Malagon  The above information is an  only  It is not intended as medical advice for individual conditions or treatments  Talk to your doctor, nurse or pharmacist before following any medical regimen to see if it is safe and effective for you  Itchy Skin   WHAT YOU NEED TO KNOW:   Itchy skin may interfere with your daily tasks and sleep  Treatment is important because constant scratching can damage your skin and increase your risk of infection  DISCHARGE INSTRUCTIONS:   Medicines:  · Medicines  may help decrease itching or inflammation  Skin creams, such as steroid creams or anti-itch creams may also help  · Take your medicine as directed  Contact your healthcare provider if you think your medicine is not helping or if you have side effects  Tell him or her if you are allergic to any medicine  Keep a list of the medicines, vitamins, and herbs you take  Include the amounts, and when and why you take them  Bring the list or the pill bottles to follow-up visits  Carry your medicine list with you in case of an emergency  Follow up with your healthcare provider as directed:  Write down your questions so you remember to ask them during your visits  Manage itchy skin:   · Take short showers in warm water  Avoid using hot water for your showers  Use only a small amount of mild skin cleanser  · Apply moisturizer or cooling creams  after you bathe and throughout the day  · Use a cool mist humidifier  to moisten the air in your home and maintain a cool temperature  Cool, humid air can decrease skin dryness and itching  · Avoid allergens and skin irritants  Do not use perfume, fabric softener, or makeup that irritates your skin  Use a mild detergent to wash your clothes  Wear loose cotton clothes and use cotton sheets  Avoid wool  Contact your healthcare provider if:   · Your itching does not improve or gets worse  · Scratching has caused your skin to be red or swollen  · You have new symptoms such as weight loss, fatigue, changes in urination, or fever  · You have questions or concerns about your condition or care  © 2017 [facility] [facility] Information is for End User's use only and may not be sold, redistributed or otherwise used for commercial purposes  All illustrations and images included in CareNotes® are the copyrighted property of [employer]  or Jose Malagon  The above information is an  only  It is not intended as medical advice for individual conditions or treatments  Talk to your doctor, nurse or pharmacist before following any medical regimen to see if it is safe and effective for you

## 2018-09-30 NOTE — ED PROVIDER NOTES
History  Chief Complaint   Patient presents with    Fall     Pt on thinners fell from stool at table height 7 days ago  Pt c/o continued right hip pain  69-year-old male  With past medical history significant for hyperlipidemia, coronary artery disease, cardiomyopathy with cervical spinal stenosis presents to the emergency department with chief complaint of fall  Patient reports he fell off of a stool from table height 7 days ago  He landed on his right side  He does not believe that he struck his head  There was no loss of consciousness  He denies any symptoms preceding his fall  He states that the stool simply gave out under him and he fell  He reports he landed on his right side  He has a history of hip replacement on the right side  Initially he had only pain in the right hip but over the past few days he noticed some bruising around the same area  He also reports some  Pain in the right hand which he also believes he struck when he fell  He lives at home with his wife  He also notes that he has been having some itching to his bilateral feet as well as the development of a rash which she has seen a dermatologist for an currently has biopsies pending  He reports he saw the podiatrist who indicated that the only source for the possible itching in his feet asked to be new medications  He was recently placed on pantoprazole and glipizide  He is questioning if these are causing the itching  He denies any associated symptoms such as lip tongue or facial swelling  He denies dizziness or syncope  He denies chest pain or shortness of breath  He denies nausea or vomiting  He denies lower extremity paralysis paresthesias or weakness  He does report right hip pain that radiates down the right side of his leg  He reports the pain stops at the level of the knee  Denies any low back pain  Modifying factors:  Patient reports walking and weight-bearing exacerbates pain in the hip    Context: Patient does take 81 mg of aspirin daily in addition to 75 mg of Plavix daily  He has a follow-up next week with his primary care physician  He is supposed to get lab work tomorrow   In anticipation of this visit  He reports he is having pain in the right hip along with a new development of bruising which is concerning him that he may have damaged his prior hip replacement  Reviewed past visits via [employer]  Patient was last seen on August 13, 2018 in the emergency department for evaluation of otitis externa and cerumen impaction  Additional details obtained from patient's wife and daughter at bedside  History provided by:  Patient, spouse and relative   used: No    Fall   Associated symptoms: no abdominal pain, no back pain, no chest pain, no headaches, no hearing loss, no nausea, no neck pain, no seizures and no vomiting        Prior to Admission Medications   Prescriptions Last Dose Informant Patient Reported? Taking?    JANUVIA 50 MG tablet  Self No No   Sig: take 1 tablet by mouth once daily   aspirin 81 MG tablet  Self Yes No   Sig: Take 1 tablet by mouth daily   betamethasone dipropionate (DIPROSONE) 0 05 % cream   No No   Sig: Apply topically 2 (two) times a day   clopidogrel (PLAVIX) 75 mg tablet  Self No No   Sig: Take 1 tablet (75 mg total) by mouth daily for 90 days   finasteride (PROSCAR) 5 mg tablet  Self No No   Sig: Take 1 tablet (5 mg total) by mouth daily for 90 days   glipiZIDE (GLUCOTROL) 5 mg tablet  Self No No   Sig: Take 1 tablet (5 mg total) by mouth 2 (two) times a day for 90 days   hydrOXYzine HCL (ATARAX) 25 mg tablet   No No   Sig: Take 1 tablet (25 mg total) by mouth every 6 (six) hours as needed for itching   levothyroxine 88 mcg tablet  Self No No   Sig: Take 1 tablet (88 mcg total) by mouth daily for 90 days   metoprolol tartrate (LOPRESSOR) 25 mg tablet  Self No No   Sig: take 1 tablet by mouth twice a day   pantoprazole (PROTONIX) 40 mg tablet  Self No No   Sig: take 1 tablet twice a day BEFORE MEALS   tamsulosin (FLOMAX) 0 4 mg  Self No No   Sig: Take 1 capsule (0 4 mg total) by mouth daily for 90 days   triamcinolone (KENALOG) 0 1 % ointment   No No   Sig: Apply topically 2 (two) times a day To rash till clear      Facility-Administered Medications: None       Past Medical History:   Diagnosis Date    Acquired cyst of kidney     Anemia     Benign paroxysmal vertigo, unspecified ear     Cardiac disease     Cardiac disorder     Cardiomyopathy ([facility] )     Cellulitis of leg     Cervical spinal stenosis     Chest pain     Congestive heart failure (HCC)     Coronary atherosclerosis of native coronary artery     Diabetes mellitus ([facility] )     Disease of thyroid gland     Dysphagia     Enlarged prostate     Esophageal candidiasis (HCC)     Hematuria     Herpes zoster     Hyperlipidemia     Hypertension     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HCC)     Incomplete emptying of bladder     Inflamed seborrheic keratosis     Internal hemorrhoids     Malignant neoplasm of skin of trunk     Myocardial infarction ([facility] )     Nonmelanoma skin cancer     LAST ASSESSED: 8/9/17    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 infarction     Paroxysmal tachycardia (HCC)     Shortness of breath     Vitamin B deficiency        Past Surgical History:   Procedure Laterality Date    CARDIAC DEFIBRILLATOR PLACEMENT      COLONOSCOPY  10/07/2008    FIBEROPTIC SCREENING; NO FURTHER RECOMMENDATIONS    CORONARY ANGIOPLASTY WITH STENT PLACEMENT      CORONARY ANGIOPLASTY WITH STENT PLACEMENT      CYSTOSCOPY  11/06/2015    DIAGNOSTIC; MANAGED BY: Barry Barthel    EGD AND COLONOSCOPY N/A 2/12/2018    Procedure: EGD AND COLONOSCOPY;  Surgeon: Shruti Koehler MD;  Location: [facility];   Service: Gastroenterology    HIP ARTHROPLASTY Right     INSERT / Reynaldo Zach / [address] REPLACEMENT Right     TRUNK SKIN LESION EXCISIONAL BIOPSY  08/22/2007    MAL[place]NT; [address]  [employer] [facility] Family History   Problem Relation Age of Onset    Heart disease Mother     Coronary artery disease Mother     Sudden death Mother     Cancer Father         throat; MALIGNANT NEOPLASM OF HEAD, FACE OR NECK    Throat cancer Father     Cancer Family     Coronary artery disease Family      I have reviewed and agree with the history as documented  Social History   Substance Use Topics    Smoking status: Former Smoker     Quit date: 2/16/1978    Smokeless tobacco: Never Used      Comment: QUIT: 1970 AS PER [employer]    Alcohol use Yes      Comment: occasionally 1-2 per month         Review of Systems   Constitutional: Negative for activity change, appetite change, chills, diaphoresis, fatigue, fever and unexpected weight change  HENT: Negative for congestion, dental problem, drooling, ear discharge, ear pain, facial swelling, hearing loss, mouth sores, nosebleeds, postnasal drip, rhinorrhea, sinus pain, sinus pressure, sneezing, sore throat, tinnitus, trouble swallowing and voice change  Eyes: Negative for photophobia, pain, discharge, redness, itching and visual disturbance  Respiratory: Negative for apnea, cough, choking, chest tightness, shortness of breath, wheezing and stridor  Cardiovascular: Negative for chest pain, palpitations and leg swelling  Gastrointestinal: Negative for abdominal distention, abdominal pain, anal bleeding, blood in stool, constipation, diarrhea, nausea and vomiting  Endocrine: Negative for cold intolerance, heat intolerance, polydipsia, polyphagia and polyuria  Genitourinary: Negative for difficulty urinating, dysuria, flank pain, frequency, hematuria and urgency  Musculoskeletal: Positive for arthralgias, gait problem and joint swelling  Negative for back pain, myalgias, neck pain and neck stiffness  Skin: Positive for rash  Negative for color change, pallor and wound     Allergic/Immunologic: Negative for environmental allergies, food allergies and immunocompromised state  Neurological: Negative for dizziness, tremors, seizures, syncope, facial asymmetry, speech difficulty, weakness, light-headedness, numbness and headaches  Hematological: Negative for adenopathy  Bruises/bleeds easily  Psychiatric/Behavioral: Negative for agitation, confusion and hallucinations  The patient is not nervous/anxious  All other systems reviewed and are negative  Physical Exam  Physical Exam   Constitutional: He is oriented to person, place, and time  He appears well-developed and well-nourished  No distress  /60   Pulse 77   Temp 97 8 °F (36 6 °C) (Oral)   Resp 18   Ht 5' 6" (1 676 m)   Wt 67 9 kg (149 lb 11 1 oz)   SpO2 98%   BMI 24 16 kg/m²    HENT:   Head: Normocephalic and atraumatic  Right Ear: External ear normal    Left Ear: External ear normal    Nose: Nose normal    Mouth/Throat: Oropharynx is clear and moist  No oropharyngeal exudate  Eyes: Pupils are equal, round, and reactive to light  Conjunctivae and EOM are normal  Right eye exhibits no discharge  Left eye exhibits no discharge  No scleral icterus  Neck: Normal range of motion  Neck supple  No tracheal deviation present  No thyromegaly present  Cardiovascular: Normal rate, regular rhythm and intact distal pulses  Pulmonary/Chest: Effort normal and breath sounds normal  No stridor  No respiratory distress  He has no wheezes  He has no rales  He exhibits no tenderness  Abdominal: Soft  Bowel sounds are normal  He exhibits no distension and no mass  There is no tenderness  There is no rebound and no guarding  Musculoskeletal: Normal range of motion  He exhibits edema and tenderness  He exhibits no deformity  Right lower extremity exam:  Normal inspection  No gross deformity  Distal neurovascular tendon intact  Capillary refills less than 3 seconds to the right foot  Right knee and lower leg are nontender to palpation throughout    There is tenderness to palpation to the lateral aspect of the right hip with continued tenderness tracking down the lateral aspect of the right thigh  There is localized bruising noted to the right lateral hip  There is a well-healed prior surgical scar to the lateral right hip with no dehiscence, erythema or bleeding  Range of motion of the right is intact but patient reports pain in the lateral right hip and radiating down the lateral right thigh with walking and weight-bearing  Right knee is normal to inspection, nontender to palpation to all surfaces with FROM  Right Hand exam:  normal inspection, no gross deformity,  SILT throughout, NVI, cap refill less than 3 seconds  Strength 5/5 normal  Chronic changes of osteoarthritis noted at IP joints of all fingers  There is tenderness to palpation to right 5th finger and metacarpal    Flexor/extensor tendon function fully intact to all fingers  Wrist is normal to inspection, nontender to palpation with FROM  Radial pulse 2/4 normal   Fingernails intact to all finger without subungal hematoma or avulsion  Patient is right hand dominant  Lymphadenopathy:     He has no cervical adenopathy  Neurological: He is alert and oriented to person, place, and time  He displays normal reflexes  No cranial nerve deficit or sensory deficit  He exhibits normal muscle tone  Coordination normal    Skin: Skin is warm and dry  Capillary refill takes less than 2 seconds  Rash noted  He is not diaphoretic  No erythema  No pallor  There are multiple small (less than 0 5 cm diameter) papules present to upper back, upper chest and neck  No petechiae, no pustules or vesicles  There are some slight overlying excoriations to area of rash  There are some scattered raised well defined brown colored lesions appear consistent with seborrheic keratosis noted to neck and upper chest   No hives  Psychiatric: He has a normal mood and affect   His behavior is normal  Judgment and thought content normal    Nursing note and vitals reviewed  Vital Signs  ED Triage Vitals [09/30/18 1240]   Temperature Pulse Respirations Blood Pressure SpO2   97 8 °F (36 6 °C) 88 18 145/61 97 %      Temp Source Heart Rate Source Patient Position - Orthostatic VS BP Location FiO2 (%)   Oral Monitor -- Right arm --      Pain Score       5           Vitals:    09/30/18 1240 09/30/18 1524   BP: 145/61 116/60   Pulse: 88 77       Visual Acuity      ED Medications  Medications - No data to display    Diagnostic Studies  Results Reviewed     Procedure Component Value Units Date/Time    Comprehensive metabolic panel [[id number]]  (Abnormal) Collected:  09/30/18 1343    Lab Status:  Final result Specimen:  Blood from Arm, Left Updated:  09/30/18 1417     Sodium 135 (L) mmol/L      Potassium 4 3 mmol/L      Chloride 101 mmol/L      CO2 29 mmol/L      ANION GAP 5 mmol/L      BUN 14 mg/dL      Creatinine 1 45 (H) mg/dL      Glucose 424 (H) mg/dL      Calcium 8 8 mg/dL      AST 17 U/L      ALT 22 U/L      Alkaline Phosphatase 69 U/L      Total Protein 6 9 g/dL      Albumin 3 3 (L) g/dL      Total Bilirubin 0 80 mg/dL      eGFR 46 ml/min/1 73sq m     Narrative:         National Kidney Disease Education Program recommendations are as follows:  GFR calculation is accurate only with a steady state creatinine  Chronic Kidney disease less than 60 ml/min/1 73 sq  meters  Kidney failure less than 15 ml/min/1 73 sq  meters  TSH [[id number]]  (Normal) Collected:  09/30/18 1343    Lab Status:  Final result Specimen:  Blood from Arm, Left Updated:  09/30/18 1416     TSH 3RD GENERATON 1 204 uIU/mL     Narrative:         Patients undergoing fluorescein dye angiography may retain small amounts of fluorescein in the body for 48-72 hours post procedure  Samples containing fluorescein can produce falsely depressed TSH values  If the patient had this procedure,a specimen should be resubmitted post fluorescein clearance      B-type natriuretic peptide [[id number]]  (Abnormal) Collected:  09/30/18 1343    Lab Status:  Final result Specimen:  Blood from Arm, Left Updated:  09/30/18 1416     NT-proBNP 902 (H) pg/mL     Protime-INR [[id number]]  (Normal) Collected:  09/30/18 1343    Lab Status:  Final result Specimen:  Blood from Arm, Left Updated:  09/30/18 1358     Protime 14 0 seconds      INR 1 09    APTT [[id number]]  (Normal) Collected:  09/30/18 1343    Lab Status:  Final result Specimen:  Blood from Arm, Left Updated:  09/30/18 1358     PTT 27 seconds     CBC and differential [[id number]]  (Abnormal) Collected:  09/30/18 1343    Lab Status:  Final result Specimen:  Blood from Arm, Left Updated:  09/30/18 1351     WBC 5 22 Thousand/uL      RBC 3 01 (L) Million/uL      Hemoglobin 10 4 (L) g/dL      Hematocrit 30 9 (L) %       (H) fL      MCH 34 6 (H) pg      MCHC 33 7 g/dL      RDW 16 7 (H) %      MPV 10 0 fL      Platelets 567 Thousands/uL      nRBC 0 /100 WBCs      Neutrophils Relative 67 %      Immat GRANS % 1 %      Lymphocytes Relative 16 %      Monocytes Relative 9 %      Eosinophils Relative 6 %      Basophils Relative 1 %      Neutrophils Absolute 3 55 Thousands/µL      Immature Grans Absolute 0 04 Thousand/uL      Lymphocytes Absolute 0 84 Thousands/µL      Monocytes Absolute 0 46 Thousand/µL      Eosinophils Absolute 0 29 Thousand/µL      Basophils Absolute 0 04 Thousands/µL     Reticulocytes [[id number]]  (Abnormal) Collected:  09/30/18 1343    Lab Status:  Final result Specimen:  Blood from Arm, Left Updated:  09/30/18 1351     Retic Ct Abs 57,800     Retic Ct Pct 1 92 (H) %     Iron Saturation % [[id number]] Collected:  09/30/18 1343    Lab Status:  No result Specimen:  Blood from Arm, Left     Ferritin [[id number]] Collected:  09/30/18 1343    Lab Status:  No result Specimen:  Blood from Arm, Left                  XR hip/pelv 2-3 vws right   Final Result by David Zaman MD (09/30 1516)      Unremarkable appearance of right total hip arthroplasty              Workstation performed: [id number]         XR femur 2 views RIGHT   Final Result by Yimi Alvarenga MD (09/30 1509)         1  No acute osseous abnormality  2   Degenerative changes as described  3   Intact right hip arthroplasty  Workstation performed: [id number]         XR knee 4+ views Right injury   Final Result by Yimi Alvarenga MD (09/30 1500)      No acute osseous abnormality  Degenerative changes as described  Workstation performed: [id number]         XR hand 3+ views RIGHT   Final Result by Yimi Alvarenga MD (09/30 1457)      No acute osseous abnormality  Degenerative changes as described  Workstation performed: [id number]                    Procedures  Procedures       Phone Contacts  ED Phone Contact    ED Course                               MDM  Number of Diagnoses or Management Options  Anemia: new and requires workup  Contusion of right hand, initial encounter: new and requires workup  Contusion of right hip, initial encounter: new and requires workup  Hematoma: new and requires workup  Itching: new and requires workup  Diagnosis management comments:   Differential diagnosis includes but is not limited to right hip fracture, periprosthetic fracture, right near femur fracture, hematoma, contusion, musculoskeletal pain, lumbar radiculopathy, allergic reaction, thrombocytopenia, renal or hepatic failure, urticaria, thyroid dysfunction, hand fracture, contusion, dislocation, injury, workup including x-rays of the right hip to rule out fracture dislocation, right thigh and knee as well as the right hand to rule out fracture dislocation  Discussed with patient will add labs including CBC, chemistry panel, TSH, retake count and BMP for further evaluation of itching and other symptoms  Despite Plavix therapy has been 7 days since the patient fell and with no reported head injury or neurological dysfunction no indication to proceed with CT scan at this time        X-ray images of the right hip demonstrates significant arthritic changes but an intact hip prosthesis without surrounding fracture dislocation  X-ray images of the right thigh and knee independently reviewed and interpreted by me demonstrate no acute fracture of the  Femur or knee  No dislocation  There is significant osteoarthritic changes to the right knee noted  Right hand x-ray images independently visualized and interpreted by me demonstrates significant arthritic changes but no acute fracture dislocation  Lab results reviewed  Comprehensive metabolic panel demonstrates a mildly low sodium of 135 and a mildly elevated creatinine of 1 45  Review of prior levels puts the patient's baseline at 1 2  Glucose is elevated at 424  Albumin is low at 3 3   TSH is normal at 1 2  CBC demonstrates a hemoglobin of 10 4 and hematocrit of 30 9 which are low  These are consistent with prior levels on review of prior labs  inr normal at 1 09  No coagulopathy  BNP is elevated at 902 however this is improved from previous levels of 1700  Retake count is mildly elevated at 1 92  I reviewed all test results with the patient and wife and daughter at bedside  I discussed x-ray images  Discussed diagnosis of contusion with hematoma to the right hip  Discussed use of ice for the next few days then switch to heat  Discussed use of walker at home for support  Discussed follow up with primary care physician and Orthopedics in 3-5 days for further evaluation of symptoms  Discussed diagnosis of right hand contusion  Discussed rest, ice, use of Mobic for pain  Will avoid narcotics as patient has had significant side effects with Percocet in the past   Reviewed all lab results with the patient at bedside  His anemia is at baseline  He has no current inappropriate bleeding that needs a acute workup  He is aware of the slight elevation of his creatinine compared to prior     I discussed with the patient regarding his itching a recommended follow-up with his primary care physician for further evaluation  He was started on pantoprazole and glipizide recently  Both of which have a side effect profile that includes itching as a common symptom  I discussed with him development of a plan with PCP to systematically discontinue each medication to see if one is the source for symptoms  His glucose is suboptimally controlled after switch to glipizide  Alternate diagnosis to consider, patient may be experiencing peripheral neuropathy and this may be source for symptoms  Discussed follow up with pcp for further evaluation, and outpatient orthopedic follow up regarding hip pain  Will start mobic for hip pain  Reviewed reasons to return to ed  Patient verbalized understanding of diagnosis and agreement with discharge plan of care as well as understanding of reasons to return to ed  reviewed plan with wife and daughter at bedside  Amount and/or Complexity of Data Reviewed  Clinical lab tests: ordered and reviewed  Tests in the radiology section of CPT®: ordered and reviewed  Discussion of test results with the performing providers: yes  Obtain history from someone other than the patient: yes  Review and summarize past medical records: yes  Independent visualization of images, tracings, or specimens: yes    Patient Progress  Patient progress: stable    CritCare Time    Disposition  Final diagnoses:   Contusion of right hip, initial encounter   Hematoma   Contusion of right hand, initial encounter   Itching   Anemia     Time reflects when diagnosis was documented in both MDM as applicable and the Disposition within this note     Time User Action Codes Description Comment    9/30/2018  3:15 PM Amanda Nunez Add [S70 01XA] Contusion of right hip, initial encounter     9/30/2018  3:15 PM Jennifer Stage, [address]  [id number]] Hematoma     9/30/2018  3:15 PM Amanda Nunez Add [[id number]] Contusion of right hand, initial encounter 9/30/2018  3:15 PM [place] Peeling Add [L29 9] Itching     9/30/2018  3:15 PM [place] Pe[place] Add [D64 9] Anemia       ED Disposition     ED Disposition Condition Comment    Discharge  Sara Valverde discharge to home/self care      Condition at discharge: Stable        Follow-up Information     Follow up With Specialties Details Why [id number] [facility] Go in 1 week for further evaluation of symptoms [address]       Unique Todd MD Orthopedic Surgery Call in 2 days for further evaluation of symptoms [address] [facility] [address] [place] [address],[address] Emergency Department Emergency Medicine Go to If symptoms worsen 34 [facility] [address]  [phone] [facility], [address] [place], [place], [address]          Discharge Medication List as of 9/30/2018  3:19 PM      START taking these medications    Details   meloxicam (MOBIC) 15 mg tablet Take 1 tablet (15 mg total) by mouth daily as needed (hip pain/initial rx ), Starting Sun 9/30/2018, Print         CONTINUE these medications which have NOT CHANGED    Details   aspirin 81 MG tablet Take 1 tablet by mouth daily, Historical Med      betamethasone dipropionate (DIPROSONE) 0 05 % cream Apply topically 2 (two) times a day, Starting Tue 9/18/2018, Normal      clopidogrel (PLAVIX) 75 mg tablet Take 1 tablet (75 mg total) by mouth daily for 90 days, Starting Mon 4/23/2018, Until Tue 9/18/2018, Normal      finasteride (PROSCAR) 5 mg tablet Take 1 tablet (5 mg total) by mouth daily for 90 days, Starting Mon 4/23/2018, Until Tue 9/18/2018, Normal      glipiZIDE (GLUCOTROL) 5 mg tablet Take 1 tablet (5 mg total) by mouth 2 (two) times a day for 90 days, Starting Mon 4/23/2018, Until Tue 9/18/2018, Normal      hydrOXYzine HCL (ATARAX) 25 mg tablet Take 1 tablet (25 mg total) by mouth every 6 (six) hours as needed for itching, Starting Tue 9/18/2018, Normal      JANUVIA 50 MG tablet take 1 tablet by mouth once daily, Normal      levothyroxine 88 mcg tablet Take 1 tablet (88 mcg total) by mouth daily for 90 days, Starting Mon 4/23/2018, Until Tue 9/18/2018, Normal      metoprolol tartrate (LOPRESSOR) 25 mg tablet take 1 tablet by mouth twice a day, Normal      pantoprazole (PROTONIX) 40 mg tablet take 1 tablet twice a day BEFORE MEALS, Normal      tamsulosin (FLOMAX) 0 4 mg Take 1 capsule (0 4 mg total) by mouth daily for 90 days, Starting Mon 4/23/2018, Until Tue 9/18/2018, Normal      triamcinolone (KENALOG) 0 1 % ointment Apply topically 2 (two) times a day To rash till clear, Starting Fri 9/21/2018, Normal           No discharge procedures on file      ED Provider  Electronically Signed by           Alec Spencer PA-C  09/30/18 0848

## 2018-10-01 DIAGNOSIS — E11.9 TYPE 2 DIABETES MELLITUS WITHOUT COMPLICATION, WITHOUT LONG-TERM CURRENT USE OF INSULIN (HCC): ICD-10-CM

## 2018-10-01 DIAGNOSIS — K21.9 GASTROESOPHAGEAL REFLUX DISEASE, ESOPHAGITIS PRESENCE NOT SPECIFIED: ICD-10-CM

## 2018-10-01 LAB
FERRITIN SERPL-MCNC: 104 NG/ML (ref 8–388)
IRON SATN MFR SERPL: 21 %
IRON SERPL-MCNC: 66 UG/DL (ref 65–175)
TIBC SERPL-MCNC: 311 UG/DL (ref 250–450)

## 2018-10-01 RX ORDER — SITAGLIPTIN 50 MG/1
TABLET, FILM COATED ORAL
Qty: 30 TABLET | Refills: 0 | Status: SHIPPED | OUTPATIENT
Start: 2018-10-01 | End: 2018-10-08

## 2018-10-01 RX ORDER — PANTOPRAZOLE SODIUM 40 MG/1
TABLET, DELAYED RELEASE ORAL
Qty: 60 TABLET | Refills: 0 | Status: SHIPPED | OUTPATIENT
Start: 2018-10-01 | End: 2018-10-08

## 2018-10-01 NOTE — PROGRESS NOTES
Discussed with patient  Patient advise if the rash does not go away in  another month to call for recheck  Patient advised that the biopsy was consistent with a drug reaction    Probably antibiotic she took prior to this process occurring

## 2018-10-08 ENCOUNTER — OFFICE VISIT (OUTPATIENT)
Dept: INTERNAL MEDICINE CLINIC | Facility: CLINIC | Age: 78
End: 2018-10-08
Payer: COMMERCIAL

## 2018-10-08 VITALS
DIASTOLIC BLOOD PRESSURE: 52 MMHG | WEIGHT: 148.2 LBS | BODY MASS INDEX: 23.82 KG/M2 | HEART RATE: 72 BPM | HEIGHT: 66 IN | OXYGEN SATURATION: 96 % | SYSTOLIC BLOOD PRESSURE: 92 MMHG

## 2018-10-08 DIAGNOSIS — E11.65 TYPE 2 DIABETES MELLITUS WITH HYPERGLYCEMIA, WITHOUT LONG-TERM CURRENT USE OF INSULIN (HCC): ICD-10-CM

## 2018-10-08 DIAGNOSIS — I10 ESSENTIAL HYPERTENSION: ICD-10-CM

## 2018-10-08 DIAGNOSIS — E03.9 HYPOTHYROIDISM (ACQUIRED): ICD-10-CM

## 2018-10-08 DIAGNOSIS — E78.2 MIXED HYPERLIPIDEMIA: Primary | ICD-10-CM

## 2018-10-08 DIAGNOSIS — D64.9 ANEMIA, UNSPECIFIED TYPE: ICD-10-CM

## 2018-10-08 DIAGNOSIS — R21 RASH AND NONSPECIFIC SKIN ERUPTION: ICD-10-CM

## 2018-10-08 DIAGNOSIS — M79.672 FOOT PAIN, BILATERAL: ICD-10-CM

## 2018-10-08 DIAGNOSIS — M79.671 FOOT PAIN, BILATERAL: ICD-10-CM

## 2018-10-08 DIAGNOSIS — I50.22 CHRONIC SYSTOLIC CONGESTIVE HEART FAILURE (HCC): ICD-10-CM

## 2018-10-08 DIAGNOSIS — E11.9 TYPE 2 DIABETES MELLITUS WITHOUT COMPLICATION, WITHOUT LONG-TERM CURRENT USE OF INSULIN (HCC): ICD-10-CM

## 2018-10-08 DIAGNOSIS — I25.10 CORONARY ARTERY DISEASE INVOLVING NATIVE CORONARY ARTERY OF NATIVE HEART WITHOUT ANGINA PECTORIS: ICD-10-CM

## 2018-10-08 PROBLEM — L03.119 CELLULITIS OF LEG: Status: RESOLVED | Noted: 2018-10-08 | Resolved: 2018-10-08

## 2018-10-08 PROCEDURE — 3074F SYST BP LT 130 MM HG: CPT | Performed by: INTERNAL MEDICINE

## 2018-10-08 PROCEDURE — 3078F DIAST BP <80 MM HG: CPT | Performed by: INTERNAL MEDICINE

## 2018-10-08 PROCEDURE — 99214 OFFICE O/P EST MOD 30 MIN: CPT | Performed by: INTERNAL MEDICINE

## 2018-10-08 RX ORDER — ROSUVASTATIN CALCIUM 10 MG/1
10 TABLET, COATED ORAL DAILY
Qty: 90 TABLET | Refills: 3
Start: 2018-10-08 | End: 2018-10-08

## 2018-10-08 NOTE — PROGRESS NOTES
Diabetic Foot Exam    Patient's shoes and socks removed  Right Foot/Ankle   Right Foot Inspection  Skin Exam: skin intact, callus and callus                      Callus Size (cm):0 5    Toe Exam: ROM and strength within normal limits  Sensory       Monofilament testing: intact  Vascular    The right DP pulse is 1+  The right PT pulse is 1+  Left Foot/Ankle  Left Foot Inspection  Skin Exam: skin intact and callus                     Callus Size (cm): 0 5    Toe Exam: ROM and strength within normal limits                   Sensory       Monofilament: intact  Vascular    The left DP pulse is 1+  The left PT pulse is 1+  Assign Risk Category:  Deformity present; No loss of protective sensation;  No weak pulses       Risk: 1

## 2018-10-08 NOTE — PROGRESS NOTES
Assessment/Plan:       Diagnoses and all orders for this visit:    Mixed hyperlipidemia  -     rosuvastatin (CRESTOR) 10 MG tablet; Take 1 tablet (10 mg total) by mouth daily    Essential hypertension    Coronary artery disease involving native coronary artery of native heart without angina pectoris    Chronic systolic congestive heart failure (HCC)    Type 2 diabetes mellitus with hyperglycemia, without long-term current use of insulin (HCC)    Hypothyroidism (acquired)    Rash and nonspecific skin eruption    Foot pain, bilateral          There are no Patient Instructions on file for this visit  Subjective:      Patient ID: Josy Mosqueda is a 69 y o  male  A 69-year-old man with multiple chronic problems  Chief complaint today of ongoing rash x3 months  Slowly getting worse  He ran out of both Januvia and Protonix and does not want to renew them because he has been told they could be causing the rash  Per Dr Bolivar Lomas times note, a biopsy showed changes typical of drug reaction      Chronic problems of hyperlipidemia, hypertension, coronary artery disease status post angioplasty, systolic heart failure  Recent ejection fraction of 25%  Recent laboratory testing shows hemoglobin A1c of 7 3      Anemia noted  Hemoglobin between 8   An 10  Heme-positive stool  EGD was done documenting severe gastritis and he was placed on PPI double dose  Capsule enteroscopy follow-up reported by the patient  He was told was normal  Colonoscopy reported to be normal At the same time, MCV elevation of 111 so there may be a component of B12 deficiency or myelodysplasia  Chief complaint of fatigue  This has persisted  Chief complaint of pain felt in the right shoulder exacerbated by range of activity and motion and relieved by rest     A several months history of acute urinary retention  He had an indwelling Mckeon catheter for number of months but it has finally been removed  Jeannine Cisneros with Urology    Working diagnosis is urinary retention secondary to prostatic hyperplasia  Complication of condition was development of acute kidney injury  This necessitated discontinuation of Actos and also metformin  Also, because blood pressure was low, the metoprolol dose was cut to 12 5 mg daily  However, the most recent metabolic panel documented recovery of kidney function    A 2nd problem reported by the patient's is pain on walking  He has pain localized to the bottom of the feet both sides  The following portions of the patient's history were reviewed and updated as appropriate:   He has a past medical history of Acquired cyst of kidney; Anemia; Benign paroxysmal vertigo, unspecified ear; Cardiac disease; Cardiac disorder; Cardiomyopathy ([address] ); Cellulitis of leg; Cervical spinal stenosis; Chest pain; Coronary atherosclerosis of native coronary artery; Dysphagia; Enlarged prostate; Esophageal candidiasis ([address] ); Hematuria; Herpes zoster; Hyperlipidemia; Hypertension; 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[address] ); Incomplete emptying of bladder; Inflamed seborrheic keratosis; Internal hemorrhoids; Malignant neoplasm of skin of trunk; Myocardial infarction ([address] ); Nonmelanoma skin cancer; 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 infarction; Paroxysmal tachycardia ([address] ); Shortness of breath; and Vitamin B deficiency  ,   does not have any pertinent problems on file  ,   has a past surgical history that includes Hip Arthroplasty (Right); Coronary angioplasty with stent; Joint replacement (Right); Insert / replace / remove pacemaker; EGD AND COLONOSCOPY (N/A, 2/12/2018); Colonoscopy (10/07/2008); Cystoscopy (11/06/2015); Trunk skin lesion excisional biopsy (08/22/2007); Cardiac defibrillator placement; and Coronary angioplasty with stent  ,  family history includes Cancer in his family and father; Coronary artery disease in his family and mother; Heart disease in his mother; Sudden death in his mother; Throat cancer in his father  ,   reports that he quit smoking about 40 years ago  He has never used smokeless tobacco  He reports that he drinks alcohol  He reports that he does not use drugs  ,  is allergic to atorvastatin and percocet [oxycodone-acetaminophen]     Current Outpatient Prescriptions   Medication Sig Dispense Refill    aspirin 81 MG tablet Take 1 tablet by mouth daily      betamethasone dipropionate (DIPROSONE) 0 05 % cream Apply topically 2 (two) times a day 30 g 0    clopidogrel (PLAVIX) 75 mg tablet Take 1 tablet (75 mg total) by mouth daily for 90 days 90 tablet 0    finasteride (PROSCAR) 5 mg tablet Take 1 tablet (5 mg total) by mouth daily for 90 days 90 tablet 0    glipiZIDE (GLUCOTROL) 5 mg tablet Take 1 tablet (5 mg total) by mouth 2 (two) times a day for 90 days 180 tablet 0    hydrOXYzine HCL (ATARAX) 25 mg tablet Take 1 tablet (25 mg total) by mouth every 6 (six) hours as needed for itching 30 tablet 1    levothyroxine 88 mcg tablet Take 1 tablet (88 mcg total) by mouth daily for 90 days 90 tablet 0    meloxicam (MOBIC) 15 mg tablet Take 1 tablet (15 mg total) by mouth daily as needed (hip pain/initial rx ) 30 tablet 0    metoprolol tartrate (LOPRESSOR) 25 mg tablet take 1 tablet by mouth twice a day 180 tablet 3    tamsulosin (FLOMAX) 0 4 mg Take 1 capsule (0 4 mg total) by mouth daily for 90 days 90 capsule 0    triamcinolone (KENALOG) 0 1 % ointment Apply topically 2 (two) times a day To rash till clear 454 g 1    rosuvastatin (CRESTOR) 10 MG tablet Take 1 tablet (10 mg total) by mouth daily 90 tablet 3     No current facility-administered medications for this visit  Review of Systems   Constitutional: Positive for fatigue  Negative for chills and fever  HENT: Negative for sore throat and trouble swallowing  Eyes: Negative for pain  Respiratory: Negative for cough and shortness of breath  Cardiovascular: Negative for chest pain and palpitations     Gastrointestinal: Negative for abdominal pain, blood in stool, diarrhea, nausea and vomiting  Endocrine: Negative for cold intolerance and heat intolerance  Genitourinary: Positive for difficulty urinating  Negative for dysuria, frequency and testicular pain  Musculoskeletal: Positive for arthralgias and gait problem  Negative for joint swelling  Right shoulder pain and right hip pain reported secondary to a recent fall    Pain felt in both 1st metatarsophalangeal joints, worsened by ambulation but also worsened by pressure on the affected area  Skin: Positive for rash  Negative for color change, pallor and wound  Allergic/Immunologic: Negative for immunocompromised state  Neurological: Positive for dizziness  Negative for syncope and headaches  Hematological: Negative for adenopathy  Does not bruise/bleed easily  Psychiatric/Behavioral: Negative for dysphoric mood  The patient is not nervous/anxious  Objective:  Vitals:    10/08/18 1410   BP: 92/52   Pulse: 72   SpO2: 96%      Physical Exam   Constitutional: He is oriented to person, place, and time  He appears well-developed  Chronically ill in appearance  Not tachypneic no dyspneic  Not using accessory muscles  Very pale skin  Nail beds are not pink  Nail beds are weight and do not irvin  HENT:   Head: Normocephalic and atraumatic  Eyes: Pupils are equal, round, and reactive to light  Neck: Normal range of motion  Neck supple  No tracheal deviation present  No thyromegaly present  Cardiovascular: Normal rate, regular rhythm, normal heart sounds and intact distal pulses  Exam reveals no gallop  No murmur heard  Pulmonary/Chest: Effort normal  No respiratory distress  He has no wheezes  He has no rales  Abdominal: Soft  Bowel sounds are normal  There is no tenderness  Musculoskeletal: Normal range of motion  He exhibits no tenderness or deformity  Neurological: He is alert and oriented to person, place, and time  He has normal reflexes   Coordination normal  Skin: Skin is warm  Rash noted  Diffuse macular rash noted most prominent in the upper chest, and the arms  Callus present under both 1st metatarsophalangeal joints, left more than right  Pressure on the left in particular illicits exquisite point tenderness  Psychiatric: He has a normal mood and affect

## 2018-10-08 NOTE — PATIENT INSTRUCTIONS
Systolic heart failure, compensated  No overt symptoms  Diabetes-need to recheck hemoglobin A1c  Diffuse macular rash-working diagnosis of drug eruption  This is the major problem  Very itchy, any of the drugs could be doing this but to me, the newer drugs are more likely suspects  The problem is the risk of stopping the drugs  Stopping the prostate drug runs the risk of not urinating again  Stopping the metoprolol runs the risk of heart failure or heart attack  I think it is safe to stop the Januvia, the Crestor and the pantoprazole or Protonix  Stop those drugs, stay off than 1 month  Come back to see me in a month  Recheck hemoglobin A1c today  Use the topical steroid twice a day and a minimum  Checked a blood sugar at home twice a day  If the blood sugar jumps up, you will have to use insulin shots, but that can be done and you can be taught the right way to use it

## 2018-10-22 ENCOUNTER — OFFICE VISIT (OUTPATIENT)
Dept: UROLOGY | Facility: CLINIC | Age: 78
End: 2018-10-22
Payer: COMMERCIAL

## 2018-10-22 VITALS
WEIGHT: 150.2 LBS | HEART RATE: 78 BPM | HEIGHT: 66 IN | BODY MASS INDEX: 24.14 KG/M2 | SYSTOLIC BLOOD PRESSURE: 118 MMHG | DIASTOLIC BLOOD PRESSURE: 70 MMHG

## 2018-10-22 DIAGNOSIS — R33.9 URINARY RETENTION: Primary | ICD-10-CM

## 2018-10-22 DIAGNOSIS — E11.9 TYPE 2 DIABETES MELLITUS WITHOUT COMPLICATION, WITHOUT LONG-TERM CURRENT USE OF INSULIN (HCC): ICD-10-CM

## 2018-10-22 DIAGNOSIS — D64.9 ANEMIA, UNSPECIFIED TYPE: ICD-10-CM

## 2018-10-22 LAB — POST-VOID RESIDUAL VOLUME, ML POC: 24 ML

## 2018-10-22 PROCEDURE — 99213 OFFICE O/P EST LOW 20 MIN: CPT | Performed by: PHYSICIAN ASSISTANT

## 2018-10-22 PROCEDURE — 51798 US URINE CAPACITY MEASURE: CPT | Performed by: PHYSICIAN ASSISTANT

## 2018-10-22 RX ORDER — TAMSULOSIN HYDROCHLORIDE 0.4 MG/1
0.4 CAPSULE ORAL DAILY
Qty: 90 CAPSULE | Refills: 3 | Status: SHIPPED | OUTPATIENT
Start: 2018-10-22 | End: 2019-10-28 | Stop reason: SDUPTHER

## 2018-10-22 RX ORDER — FINASTERIDE 5 MG/1
5 TABLET, FILM COATED ORAL DAILY
Qty: 90 TABLET | Refills: 3 | Status: SHIPPED | OUTPATIENT
Start: 2018-10-22 | End: 2018-11-05 | Stop reason: SDUPTHER

## 2018-10-22 RX ORDER — FINASTERIDE 5 MG/1
5 TABLET, FILM COATED ORAL DAILY
Refills: 0 | COMMUNITY
Start: 2018-10-21 | End: 2019-10-28 | Stop reason: SDUPTHER

## 2018-10-22 RX ORDER — A/SINGAPORE/GP1908/2015 IVR-180 (H1N1) (AN A/MICHIGAN/45/2015 (H1N1)PDM09-LIKE VIRUS), A/HONG KONG/4801/2014, NYMC X-263B (H3N2) (AN A/HONG KONG/4801/2014-LIKE VIRUS), AND B/BRISBANE/60/2008, WILD TYPE (A B/BRISBANE/60/2008-LIKE VIRUS) 15; 15; 15 UG/.5ML; UG/.5ML; UG/.5ML
INJECTION, SUSPENSION INTRAMUSCULAR
Refills: 0 | COMMUNITY
Start: 2018-10-09 | End: 2018-11-05 | Stop reason: ALTCHOICE

## 2018-10-22 RX ORDER — ROSUVASTATIN CALCIUM 20 MG/1
20 TABLET, COATED ORAL DAILY
COMMUNITY
Start: 2018-10-01 | End: 2019-04-18 | Stop reason: SDUPTHER
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
## 2018-10-31 ENCOUNTER — APPOINTMENT (OUTPATIENT)
Dept: LAB | Facility: CLINIC | Age: 78
End: 2018-10-31
Payer: COMMERCIAL

## 2018-10-31 ENCOUNTER — IN-CLINIC DEVICE VISIT (OUTPATIENT)
Dept: CARDIOLOGY CLINIC | Facility: CLINIC | Age: 78
End: 2018-10-31
Payer: COMMERCIAL

## 2018-10-31 ENCOUNTER — TELEPHONE (OUTPATIENT)
Dept: INTERNAL MEDICINE CLINIC | Facility: CLINIC | Age: 78
End: 2018-10-31

## 2018-10-31 DIAGNOSIS — I50.22 CHRONIC SYSTOLIC CONGESTIVE HEART FAILURE (HCC): ICD-10-CM

## 2018-10-31 DIAGNOSIS — I10 ESSENTIAL HYPERTENSION: ICD-10-CM

## 2018-10-31 DIAGNOSIS — I25.5 ISCHEMIC CARDIOMYOPATHY: ICD-10-CM

## 2018-10-31 DIAGNOSIS — Z95.810 PRESENCE OF CARDIAC DEFIBRILLATOR: Primary | ICD-10-CM

## 2018-10-31 LAB
ANION GAP SERPL CALCULATED.3IONS-SCNC: 6 MMOL/L (ref 4–13)
BUN SERPL-MCNC: 23 MG/DL (ref 5–25)
CALCIUM SERPL-MCNC: 9.1 MG/DL (ref 8.3–10.1)
CHLORIDE SERPL-SCNC: 100 MMOL/L (ref 100–108)
CO2 SERPL-SCNC: 28 MMOL/L (ref 21–32)
CREAT SERPL-MCNC: 1.4 MG/DL (ref 0.6–1.3)
EST. AVERAGE GLUCOSE BLD GHB EST-MCNC: 303 MG/DL
GFR SERPL CREATININE-BSD FRML MDRD: 48 ML/MIN/1.73SQ M
GLUCOSE SERPL-MCNC: 433 MG/DL (ref 65–140)
HBA1C MFR BLD: 12.2 % (ref 4.2–6.3)
POTASSIUM SERPL-SCNC: 4 MMOL/L (ref 3.5–5.3)
SODIUM SERPL-SCNC: 134 MMOL/L (ref 136–145)

## 2018-10-31 PROCEDURE — 36415 COLL VENOUS BLD VENIPUNCTURE: CPT

## 2018-10-31 PROCEDURE — 93282 PRGRMG EVAL IMPLANTABLE DFB: CPT | Performed by: INTERNAL MEDICINE

## 2018-10-31 PROCEDURE — 80048 BASIC METABOLIC PNL TOTAL CA: CPT

## 2018-10-31 PROCEDURE — 83036 HEMOGLOBIN GLYCOSYLATED A1C: CPT

## 2018-10-31 NOTE — TELEPHONE ENCOUNTER
----- Message from Marine Smith MD sent at 10/31/2018  4:10 PM EDT -----  Needs office visit for diabetes out of control

## 2018-11-05 ENCOUNTER — OFFICE VISIT (OUTPATIENT)
Dept: INTERNAL MEDICINE CLINIC | Facility: CLINIC | Age: 78
End: 2018-11-05
Payer: COMMERCIAL

## 2018-11-05 VITALS
HEIGHT: 66 IN | OXYGEN SATURATION: 92 % | DIASTOLIC BLOOD PRESSURE: 54 MMHG | SYSTOLIC BLOOD PRESSURE: 100 MMHG | BODY MASS INDEX: 22.98 KG/M2 | WEIGHT: 143 LBS | HEART RATE: 66 BPM

## 2018-11-05 DIAGNOSIS — E11.65 TYPE 2 DIABETES MELLITUS WITH HYPERGLYCEMIA, WITHOUT LONG-TERM CURRENT USE OF INSULIN (HCC): Primary | ICD-10-CM

## 2018-11-05 DIAGNOSIS — Z23 ENCOUNTER FOR IMMUNIZATION: Primary | ICD-10-CM

## 2018-11-05 LAB
LEFT EYE IMAGE QUALITY: NORMAL
RIGHT EYE IMAGE QUALITY: NORMAL
SEVERITY (EYE EXAM): NORMAL

## 2018-11-05 PROCEDURE — 92250 FUNDUS PHOTOGRAPHY W/I&R: CPT

## 2018-11-05 PROCEDURE — 99213 OFFICE O/P EST LOW 20 MIN: CPT | Performed by: INTERNAL MEDICINE

## 2018-11-05 RX ORDER — PEN NEEDLE, DIABETIC 30 GX5/16"
NEEDLE, DISPOSABLE MISCELLANEOUS
Qty: 200 EACH | Refills: 5 | Status: SHIPPED | OUTPATIENT
Start: 2018-11-05 | End: 2018-11-27 | Stop reason: SDUPTHER

## 2018-11-05 NOTE — PROGRESS NOTES
Assessment/Plan:       Diagnoses and all orders for this visit:    Type 2 diabetes mellitus with hyperglycemia, without long-term current use of insulin (HCC)  -     insulin detemir (LEVEMIR FLEXPEN) 100 Units/mL injection pen; Inject 15 Units under the skin daily  -     insulin aspart (NOVOLOG FLEXPEN) 100 Units/mL injection pen; Inject 5 Units under the skin 3 (three) times a day with meals  -     Insulin Pen Needle (PEN NEEDLES 3/16") 31G X 5 MM MISC; by Does not apply route 4 (four) times a day (before meals and at bedtime)          Patient Instructions   Your blood sugars really have control and you need began using insulin  You will need to check your blood sugar 4 times a day  I know this is a  pain but it is how it has to happen for now  So  your prescriptions for insulin and needles and come back here on Wednesday or Thursday to see Stephy Gottlieb, or Cyrus, for a block of instruction on how to give insulin  Subjective:      Patient ID: Medhat Lux is a 69 y o  male  A patient with multiple chronic problems and in particular low cardiac output syndrome  However, brought back specifically today because hemoglobin A1c has risen from 7 3-12 2  He will have to be placed on insulin  Chronic problems of hyperlipidemia, hypertension, coronary artery disease status post angioplasty, systolic heart failure  Recent ejection fraction of 25%     Anemia noted  Hemoglobin between 8  and 10  Heme-positive stool  EGD was done documenting severe gastritis and he was placed on PPI double dose  Capsule enteroscopy follow-up reported by the patient  He was told was normal  Colonoscopy reported to be normal At the same time, MCV elevation of 111 so there may be a component of B12 deficiency or myelodysplasia         Chief complaint of fatigue  This has persisted    Chief complaint of pain felt in the right shoulder exacerbated by range of activity and motion and relieved by rest      A several months history of acute urinary retention  He had an indwelling Mckeon catheter for number of months but it has finally been removed  Lisa Ramey with Urology  Working diagnosis is urinary retention secondary to prostatic hyperplasia      Complication of condition was development of acute kidney injury  This necessitated discontinuation of Actos and also metformin  Also, because blood pressure was low, the metoprolol dose was cut to 12 5 mg daily  However, the most recent metabolic panel documented recovery of kidney function     A 2nd problem reported by the patient's is pain on walking  He has pain localized to the bottom of the feet both sides            The following portions of the patient's history were reviewed and updated as appropriate:   He has a past medical history of Acquired cyst of kidney; Anemia; Benign paroxysmal vertigo, unspecified ear; Cardiac disease; Cardiac disorder; Cardiomyopathy ([address] ); Cellulitis of leg; Cervical spinal stenosis; Chest pain; Coronary atherosclerosis of native coronary artery; Dysphagia; Enlarged prostate; Esophageal candidiasis ([address] ); Hematuria; Herpes zoster; Hyperlipidemia; Hypertension; 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[address] ); Incomplete emptying of bladder; Inflamed seborrheic keratosis; Internal hemorrhoids; Malignant neoplasm of skin of trunk; Myocardial infarction ([address] ); Nonmelanoma skin cancer; 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 infarction; Paroxysmal tachycardia ([address] ); Shortness of breath; and Vitamin B deficiency  ,   does not have any pertinent problems on file  ,   has a past surgical history that includes Hip Arthroplasty (Right); Coronary angioplasty with stent; Joint replacement (Right); Insert / replace / remove pacemaker; EGD AND COLONOSCOPY (N/A, 2/12/2018); Colonoscopy (10/07/2008); Cystoscopy (11/06/2015); Trunk skin lesion excisional biopsy (08/22/2007); Cardiac defibrillator placement; and Coronary angioplasty with stent  ,  family history includes Cancer in his family and father; Coronary artery disease in his family and mother; Heart disease in his mother; Sudden death in his mother; Throat cancer in his father  ,   reports that he quit smoking about 40 years ago  He has never used smokeless tobacco  He reports that he drinks alcohol  He reports that he does not use drugs  ,  is allergic to atorvastatin and percocet [oxycodone-acetaminophen]     Current Outpatient Prescriptions   Medication Sig Dispense Refill    aspirin 81 MG tablet Take 1 tablet by mouth daily      betamethasone dipropionate (DIPROSONE) 0 05 % cream Apply topically 2 (two) times a day 30 g 0    clopidogrel (PLAVIX) 75 mg tablet Take 1 tablet (75 mg total) by mouth daily for 90 days 90 tablet 0    finasteride (PROSCAR) 5 mg tablet Take 5 mg by mouth daily  0    glipiZIDE (GLUCOTROL) 5 mg tablet Take 1 tablet (5 mg total) by mouth 2 (two) times a day for 90 days 180 tablet 0    hydrOXYzine HCL (ATARAX) 25 mg tablet Take 1 tablet (25 mg total) by mouth every 6 (six) hours as needed for itching 30 tablet 1    levothyroxine 88 mcg tablet Take 1 tablet (88 mcg total) by mouth daily for 90 days 90 tablet 0    metoprolol tartrate (LOPRESSOR) 25 mg tablet Take 0 5 tablets (12 5 mg total) by mouth every 12 (twelve) hours 90 tablet 3    rosuvastatin (CRESTOR) 20 MG tablet       tamsulosin (FLOMAX) 0 4 mg Take 1 capsule (0 4 mg total) by mouth daily 90 capsule 3    triamcinolone (KENALOG) 0 1 % ointment Apply topically 2 (two) times a day To rash till clear 454 g 1    insulin aspart (NOVOLOG FLEXPEN) 100 Units/mL injection pen Inject 5 Units under the skin 3 (three) times a day with meals 5 pen 0    insulin detemir (LEVEMIR FLEXPEN) 100 Units/mL injection pen Inject 15 Units under the skin daily 5 pen 0    Insulin Pen Needle (PEN NEEDLES 3/16") 31G X 5 MM MISC by Does not apply route 4 (four) times a day (before meals and at bedtime) 200 each 5     No current facility-administered medications for this visit  Review of Systems   Constitutional: Positive for fatigue  Respiratory: Positive for shortness of breath  Endocrine: Positive for cold intolerance, heat intolerance, polydipsia and polyuria  Psychiatric/Behavioral: The patient is nervous/anxious  Objective:  Vitals:    11/05/18 1415   BP: 100/54   Pulse: 66   SpO2: 92%      Physical Exam   Pulmonary/Chest: Effort normal    Skin: Skin is warm  Psychiatric: He has a normal mood and affect

## 2018-11-05 NOTE — PATIENT INSTRUCTIONS
Your blood sugars really have control and you need began using insulin  You will need to check your blood sugar 4 times a day  I know this is a  pain but it is how it has to happen for now  So  your prescriptions for insulin and needles and come back here on Wednesday or Thursday to see Herminia Martinez, or Cyrus, for a block of instruction on how to give insulin

## 2018-11-19 ENCOUNTER — TELEPHONE (OUTPATIENT)
Dept: INTERNAL MEDICINE CLINIC | Facility: CLINIC | Age: 78
End: 2018-11-19

## 2018-11-19 ENCOUNTER — OFFICE VISIT (OUTPATIENT)
Dept: CARDIOLOGY CLINIC | Facility: CLINIC | Age: 78
End: 2018-11-19
Payer: COMMERCIAL

## 2018-11-19 VITALS
WEIGHT: 143.2 LBS | BODY MASS INDEX: 23.01 KG/M2 | HEIGHT: 66 IN | DIASTOLIC BLOOD PRESSURE: 58 MMHG | SYSTOLIC BLOOD PRESSURE: 102 MMHG | HEART RATE: 81 BPM | OXYGEN SATURATION: 95 %

## 2018-11-19 DIAGNOSIS — I10 ESSENTIAL HYPERTENSION: ICD-10-CM

## 2018-11-19 DIAGNOSIS — I25.5 ISCHEMIC CARDIOMYOPATHY: ICD-10-CM

## 2018-11-19 DIAGNOSIS — I50.22 CHRONIC SYSTOLIC CONGESTIVE HEART FAILURE (HCC): ICD-10-CM

## 2018-11-19 DIAGNOSIS — E11.9 TYPE 2 DIABETES MELLITUS WITHOUT COMPLICATION, WITHOUT LONG-TERM CURRENT USE OF INSULIN (HCC): ICD-10-CM

## 2018-11-19 DIAGNOSIS — I25.10 CORONARY ARTERY DISEASE INVOLVING NATIVE CORONARY ARTERY OF NATIVE HEART WITHOUT ANGINA PECTORIS: Primary | ICD-10-CM

## 2018-11-19 DIAGNOSIS — D64.9 ANEMIA, UNSPECIFIED TYPE: ICD-10-CM

## 2018-11-19 PROCEDURE — 99213 OFFICE O/P EST LOW 20 MIN: CPT | Performed by: INTERNAL MEDICINE

## 2018-11-19 RX ORDER — CLOPIDOGREL BISULFATE 75 MG/1
75 TABLET ORAL DAILY
Qty: 90 TABLET | Refills: 3 | Status: SHIPPED | OUTPATIENT
Start: 2018-11-19 | End: 2019-12-23 | Stop reason: SDUPTHER

## 2018-11-19 NOTE — PROGRESS NOTES
[facility] [place]  [address] [place] [address] [address] [id number]  Cardiology Follow Up    Chely Valverde  1940  [id number]      1  Coronary artery disease involving native coronary artery of native heart without angina pectoris     2  Essential hypertension     3  Chronic systolic congestive heart failure ([facility] )     4  Ischemic cardiomyopathy         Chief Complaint   Patient presents with    Follow-up       Interval History:  Patient presents for follow-up visit  Patient denies any chest pain  No shortness of breath out of the ordinary  No history of leg edema orthopnea PND  No history of presyncope syncope  No history of ICD discharges  Patient does have uncontrolled diabetes which is being followed by primary care physician  Patient's insulin dosage was adjusted recently      Patient Active Problem List   Diagnosis    Coronary artery disease    Urinary retention    Type 2 diabetes mellitus [facility] )    Anemia    Hypertension    Hyperlipidemia    Chronic systolic congestive heart failure (HCC)    BPH (benign prostatic hyperplasia)    Hypothyroidism (acquired)    Rash and nonspecific skin eruption    Foot pain, bilateral    Ischemic cardiomyopathy     Past Medical History:   Diagnosis Date    Acquired cyst of kidney     Anemia     Benign paroxysmal vertigo, unspecified ear     Cardiac disease     Cardiac disorder     Cardiomyopathy ([facility] )     Cellulitis of leg     Cervical spinal stenosis     Chest pain     Coronary atherosclerosis of native coronary artery     Dysphagia     Enlarged prostate     Esophageal candidiasis (HCC)     Hematuria     Herpes zoster     Hyperlipidemia     Hypertension     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HCC)     Incomplete emptying of bladder     Inflamed seborrheic keratosis     Internal hemorrhoids     Malignant neoplasm of skin of trunk     Myocardial infarction ([facility] )     Nonmelanoma skin cancer     LAST ASSESSED: 8/9/17  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 infarction     Paroxysmal tachycardia (HCC)     Shortness of breath     Vitamin B deficiency      Social History     Social History    Marital status: /Civil Union     Spouse name: N/A    Number of children: N/A    Years of education: N/A     Occupational History    RETIRED      Social History Main Topics    Smoking status: Former Smoker     Quit date: 2/16/1978    Smokeless tobacco: Never Used      Comment: QUIT: 1970 AS PER [employer]    Alcohol use Yes      Comment: occasionally 1-2 per month     Drug use: No    Sexual activity: No     Other Topics Concern    Not on file     Social History Narrative    LIVING INDEPENDENTLY WITH SPOUSE    NO ADVANCE DIRECTIVE ON FILE          Family History   Problem Relation Age of Onset    Heart disease Mother     Coronary artery disease Mother     Sudden death Mother     Cancer Father         throat; MALIGNANT NEOPLASM OF HEAD, FACE OR NECK    Throat cancer Father     Cancer Family     Coronary artery disease Family      Past Surgical History:   Procedure Laterality Date    CARDIAC DEFIBRILLATOR PLACEMENT      COLONOSCOPY  10/07/2008    FIBEROPTIC SCREENING; NO FURTHER RECOMMENDATIONS    CORONARY ANGIOPLASTY WITH STENT PLACEMENT      CORONARY ANGIOPLASTY WITH STENT PLACEMENT      CYSTOSCOPY  11/06/2015    DIAGNOSTIC; MANAGED BY: Xiao Holbrook    EGD AND COLONOSCOPY N/A 2/12/2018    Procedure: EGD AND COLONOSCOPY;  Surgeon: Luis Miguel Cruz MD;  Location: [facility];   Service: Gastroenterology    HIP ARTHROPLASTY Right     INSERT / Watson León / [address] REPLACEMENT Right     TRUNK SKIN LESION EXCISIONAL BIOPSY  08/22/2007    MALIGNANT; BCC CHEST       Current Outpatient Prescriptions:     aspirin 81 MG tablet, Take 1 tablet by mouth daily, Disp: , Rfl:     betamethasone dipropionate (DIPROSONE) 0 05 % cream, Apply topically 2 (two) times a day, Disp: 30 g, Rfl: 0    clopidogrel (PLAVIX) 75 mg tablet, Take 1 tablet (75 mg total) by mouth daily for 90 days, Disp: 90 tablet, Rfl: 0    finasteride (PROSCAR) 5 mg tablet, Take 5 mg by mouth daily, Disp: , Rfl: 0    glipiZIDE (GLUCOTROL) 5 mg tablet, Take 1 tablet (5 mg total) by mouth 2 (two) times a day for 90 days, Disp: 180 tablet, Rfl: 0    hydrOXYzine HCL (ATARAX) 25 mg tablet, Take 1 tablet (25 mg total) by mouth every 6 (six) hours as needed for itching, Disp: 30 tablet, Rfl: 1    insulin aspart (NOVOLOG FLEXPEN) 100 Units/mL injection pen, Inject 5 Units under the skin 3 (three) times a day with meals, Disp: 5 pen, Rfl: 0    insulin detemir (LEVEMIR FLEXPEN) 100 Units/mL injection pen, Inject 15 Units under the skin daily, Disp: 5 pen, Rfl: 0    Insulin Pen Needle (PEN NEEDLES 3/16") 31G X 5 MM MISC, by Does not apply route 4 (four) times a day (before meals and at bedtime), Disp: 200 each, Rfl: 5    levothyroxine 88 mcg tablet, Take 1 tablet (88 mcg total) by mouth daily for 90 days, Disp: 90 tablet, Rfl: 0    metoprolol tartrate (LOPRESSOR) 25 mg tablet, Take 0 5 tablets (12 5 mg total) by mouth every 12 (twelve) hours, Disp: 90 tablet, Rfl: 3    rosuvastatin (CRESTOR) 20 MG tablet, 20 mg daily  , Disp: , Rfl:     tamsulosin (FLOMAX) 0 4 mg, Take 1 capsule (0 4 mg total) by mouth daily, Disp: 90 capsule, Rfl: 3    triamcinolone (KENALOG) 0 1 % ointment, Apply topically 2 (two) times a day To rash till clear, Disp: 454 g, Rfl: 1  Allergies   Allergen Reactions    Atorvastatin      Pt unsure      Percocet [Oxycodone-Acetaminophen] GI Intolerance and Nausea Only       Labs:  Orders Only on 11/05/2018   Component Date Value    Severity 11/05/2018 Alert     Right Eye Image Quality 11/05/2018 Ungradeable Image     Left Eye Image Quality 11/05/2018 Ungradeable Image    Appointment on 10/31/2018   Component Date Value    Hemoglobin A1C 10/31/2018 12 2*    EAG 10/31/2018 303     Sodium 10/31/2018 134*    Potassium 10/31/2018 4 0     Chloride 10/31/2018 100     CO2 10/31/2018 28     ANION GAP 10/31/2018 6     BUN 10/31/2018 23     Creatinine 10/31/2018 1 40*    Glucose 10/31/2018 433*    Calcium 10/31/2018 9 1     eGFR 10/31/2018 48    Office Visit on 10/22/2018   Component Date Value    POST-VOID RESIDUAL VOLUM* 10/22/2018 24    Admission on 09/30/2018, Discharged on 09/30/2018   Component Date Value    Sodium 09/30/2018 135*    Potassium 09/30/2018 4 3     Chloride 09/30/2018 101     CO2 09/30/2018 29     ANION GAP 09/30/2018 5     BUN 09/30/2018 14     Creatinine 09/30/2018 1 45*    Glucose 09/30/2018 424*    Calcium 09/30/2018 8 8     AST 09/30/2018 17     ALT 09/30/2018 22     Alkaline Phosphatase 09/30/2018 69     Total Protein 09/30/2018 6 9     Albumin 09/30/2018 3 3*    Total Bilirubin 09/30/2018 0 80     eGFR 09/30/2018 46     TSH 3RD GENERATON 09/30/2018 1 204     WBC 09/30/2018 5 22     RBC 09/30/2018 3 01*    Hemoglobin 09/30/2018 10 4*    Hematocrit 09/30/2018 30 9*    MCV 09/30/2018 103*    MCH 09/30/2018 34 6*    MCHC 09/30/2018 33 7     RDW 09/30/2018 16 7*    MPV 09/30/2018 10 0     Platelets 17/55/9782 186     nRBC 09/30/2018 0     Neutrophils Relative 09/30/2018 67     Immat GRANS % 09/30/2018 1     Lymphocytes Relative 09/30/2018 16     Monocytes Relative 09/30/2018 9     Eosinophils Relative 09/30/2018 6     Basophils Relative 09/30/2018 1     Neutrophils Absolute 09/30/2018 3 55     Immature Grans Absolute 09/30/2018 0 04     Lymphocytes Absolute 09/30/2018 0 84     Monocytes Absolute 09/30/2018 0 46     Eosinophils Absolute 09/30/2018 0 29     Basophils Absolute 09/30/2018 0 04     Protime 09/30/2018 14 0     INR 09/30/2018 1 09     PTT 09/30/2018 27     NT-proBNP 09/30/2018 902*    Retic Ct Abs 09/30/2018 18893     Retic Ct Pct 09/30/2018 1 92*    Iron Saturation 09/30/2018 21     TIBC 09/30/2018 311     Iron 09/30/2018 66     Ferritin 09/30/2018 104    Office Visit on 09/21/2018   Component Date Value    Case Report 09/21/2018                      Value:Surgical Pathology Report                         Case: [id number]                                   Authorizing Provider:  Lucien May MD          Collected:           09/21/2018 1252              Ordering Location:     [facility]           Received:            09/21/2018 132[address] [facility]                                                           Pathologist:           Vida Rangel MD                                                            Specimen:    Back, lower left                                                                           Final Diagnosis 09/21/2018                      Value: This result contains rich text formatting which cannot be displayed here   Additional Information 09/21/2018                      Value: This result contains rich text formatting which cannot be displayed here  Collins Abt Gross Description 09/21/2018                      Value: This result contains rich text formatting which cannot be displayed here   Clinical Information 09/21/2018                      Value:transiebt acantholytic dermatosis     Imaging: No results found      Review of Systems:  Review of Systems   REVIEW OF SYSTEMS:  Constitutional:  Denies fever or chills   Eyes:  Denies change in visual acuity   HENT:  Denies nasal congestion or sore throat   Respiratory:  Denies cough or shortness of breath   Cardiovascular:  Denies chest pain or edema   GI:  Denies abdominal pain, nausea, vomiting, bloody stools or diarrhea   :  Denies dysuria, frequency, difficulty in micturition and nocturia  Musculoskeletal:  Denies back pain or joint pain   Neurologic:  Denies headache, focal weakness or sensory changes   Endocrine:  Denies polyuria or polydipsia   Lymphatic:  Denies swollen glands   Psychiatric:  Denies depression or anxiety     Physical Exam:    /58   Pulse 81   Ht 5' 6" (1 676 m)   Wt 65 kg (143 lb 3 2 oz)   SpO2 95%   BMI 23 11 kg/m²     Physical Exam   PHYSICAL EXAM:  General:  Patient is not in acute distress   Head: Normocephalic, Atraumatic  HEENT:  Both pupils normal-size atraumatic, normocephalic, nonicteric  Neck:  JVP not raised  Trachea central  No carotid bruit  Respiratory:  normal breath sounds no crackles  no rhonchi  Cardiovascular:  Regular rate and rhythm no S3 no murmurs  GI:  Abdomen soft nontender  No organomegaly  Lymphatic:  No cervical or inguinal lymphadenopathy  Neurologic:  Patient is awake alert, oriented   Grossly nonfocal    Discussion/Summary:  Patient with multiple medical problems who seems to be doing reasonably well from cardiac standpoint  Previous studies reviewed with patient  Medications reviewed and possible side effects discussed  concepts of cardiovascular disease , signs and symptoms of heart disease  Dietary and risk factor modification reinforced  All questions answered  Safety measures reviewed  Patient advised to report any problems prompting medical attention  Results of ICD interrogation data reviewed with patient  Symptoms to watch out from cardiac standpoint also discussed with the patient  Previous cardiovascular studies reviewed  Follow-up in 6 months  Follow-up with primary care physician  Patient is agreeable with the plan of care

## 2018-11-27 ENCOUNTER — OFFICE VISIT (OUTPATIENT)
Dept: INTERNAL MEDICINE CLINIC | Facility: CLINIC | Age: 78
End: 2018-11-27
Payer: COMMERCIAL

## 2018-11-27 VITALS
HEIGHT: 66 IN | WEIGHT: 145.8 LBS | SYSTOLIC BLOOD PRESSURE: 110 MMHG | DIASTOLIC BLOOD PRESSURE: 56 MMHG | BODY MASS INDEX: 23.43 KG/M2 | OXYGEN SATURATION: 94 % | HEART RATE: 95 BPM

## 2018-11-27 DIAGNOSIS — E11.65 TYPE 2 DIABETES MELLITUS WITH HYPERGLYCEMIA, WITHOUT LONG-TERM CURRENT USE OF INSULIN (HCC): ICD-10-CM

## 2018-11-27 DIAGNOSIS — R21 RASH: Primary | ICD-10-CM

## 2018-11-27 PROCEDURE — 3008F BODY MASS INDEX DOCD: CPT | Performed by: INTERNAL MEDICINE

## 2018-11-27 PROCEDURE — 1160F RVW MEDS BY RX/DR IN RCRD: CPT | Performed by: INTERNAL MEDICINE

## 2018-11-27 PROCEDURE — 1036F TOBACCO NON-USER: CPT | Performed by: INTERNAL MEDICINE

## 2018-11-27 PROCEDURE — 4040F PNEUMOC VAC/ADMIN/RCVD: CPT | Performed by: INTERNAL MEDICINE

## 2018-11-27 PROCEDURE — 99213 OFFICE O/P EST LOW 20 MIN: CPT | Performed by: INTERNAL MEDICINE

## 2018-11-27 RX ORDER — PEN NEEDLE, DIABETIC 30 GX5/16"
NEEDLE, DISPOSABLE MISCELLANEOUS
Qty: 200 EACH | Refills: 0 | Status: SHIPPED | OUTPATIENT
Start: 2018-11-27 | End: 2019-01-18

## 2018-11-27 NOTE — PROGRESS NOTES
Assessment/Plan:       Diagnoses and all orders for this visit:    Rash  -     Ambulatory referral to Dermatology; Future    Type 2 diabetes mellitus with hyperglycemia, without long-term current use of insulin (HCC)  -     Insulin Pen Needle (PEN NEEDLES 3/16") 31G X 5 MM MISC; 5 units 2 to 3 times daily before meals  -     insulin aspart (NOVOLOG FLEXPEN) 100 Units/mL injection pen; Inject 10 Units under the skin 3 (three) times a day with meals  -     insulin detemir (LEVEMIR FLEXPEN) 100 Units/mL injection pen; Inject 20 Units under the skin daily          There are no Patient Instructions on file for this visit  Subjective:      Patient ID: Lynette Wheeler is a 69 y o  male  Patient is a 56-year-old  male coming in today for follow-up follow-up regarding his diabetes mellitus per type 2  Recent lab work demonstrates the patient's hemoglobin A1c to be at 12 2  I have reason this to believe this is because the patient is not check check a sugar as often as he should  Patient admitted should not checking her sugar as often as he should  He admits that he checks his sugar once maybe twice a day  This is because of his abnormal sleep cycle leading him to wake up around 11 to a m  To 12:00 p m  Healing eats 2 meals a day does he only checks his sugars at most twice  Patient notes that even twice is uncommon  The patient was informed that a hemoglobin level of this magnitude is unacceptable  The following portions of the patient's history were reviewed and updated as appropriate:   He has a past medical history of Acquired cyst of kidney; Anemia; Benign paroxysmal vertigo, unspecified ear; Cardiac disease; Cardiac disorder; Cardiomyopathy ([address] ); Cellulitis of leg; Cervical spinal stenosis; Chest pain; Coronary atherosclerosis of native coronary artery; Dysphagia; Enlarged prostate; Esophageal candidiasis ([address] ); Hematuria; Herpes zoster; Hyperlipidemia;  Hypertension; 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[facility] [address] ); Incomplete emptying of bladder; Inflamed seborrheic keratosis; Internal hemorrhoids; Malignant neoplasm of skin of trunk; Myocardial infarction ([facility] [address] ); Nonmelanoma skin cancer; 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 infarction; Paroxysmal tachycardia ([facility] [address] ); Shortness of breath; and Vitamin B deficiency  ,   does not have any pertinent problems on file  ,   has a past surgical history that includes Hip Arthroplasty (Right); Coronary angioplasty with stent; Joint replacement (Right); Insert / replace / remove pacemaker; EGD AND COLONOSCOPY (N/A, 2/12/2018); Colonoscopy (10/07/2008); Cystoscopy (11/06/2015); Trunk skin lesion excisional biopsy (08/22/2007); Cardiac defibrillator placement; and Coronary angioplasty with stent  ,  family history includes Cancer in his family and father; Coronary artery disease in his family and mother; Heart disease in his mother; Sudden death in his mother; Throat cancer in his father  ,   reports that he quit smoking about 40 years ago  He has never used smokeless tobacco  He reports that he drinks alcohol  He reports that he does not use drugs  ,  is allergic to atorvastatin and percocet [oxycodone-acetaminophen]     Current Outpatient Prescriptions   Medication Sig Dispense Refill    aspirin 81 MG tablet Take 1 tablet by mouth daily      betamethasone dipropionate (DIPROSONE) 0 05 % cream Apply topically 2 (two) times a day 30 g 0    clopidogrel (PLAVIX) 75 mg tablet Take 1 tablet (75 mg total) by mouth daily 90 tablet 3    finasteride (PROSCAR) 5 mg tablet Take 5 mg by mouth daily  0    glipiZIDE (GLUCOTROL) 5 mg tablet Take 1 tablet (5 mg total) by mouth 2 (two) times a day for 90 days 180 tablet 0    hydrOXYzine HCL (ATARAX) 25 mg tablet Take 1 tablet (25 mg total) by mouth every 6 (six) hours as needed for itching 30 tablet 1    insulin aspart (NOVOLOG FLEXPEN) 100 Units/mL injection pen Inject 10 Units under the skin 3 (three) times a day with meals 5 pen 0    insulin detemir (LEVEMIR FLEXPEN) 100 Units/mL injection pen Inject 20 Units under the skin daily 5 pen 0    Insulin Pen Needle (PEN NEEDLES 3/16") 31G X 5 MM MISC 5 units 2 to 3 times daily before meals 200 each 0    levothyroxine 88 mcg tablet Take 1 tablet (88 mcg total) by mouth daily for 90 days 90 tablet 0    metoprolol tartrate (LOPRESSOR) 25 mg tablet Take 0 5 tablets (12 5 mg total) by mouth every 12 (twelve) hours 90 tablet 3    rosuvastatin (CRESTOR) 20 MG tablet 20 mg daily        tamsulosin (FLOMAX) 0 4 mg Take 1 capsule (0 4 mg total) by mouth daily 90 capsule 3    triamcinolone (KENALOG) 0 1 % ointment Apply topically 2 (two) times a day To rash till clear 454 g 1     No current facility-administered medications for this visit  Review of Systems   Constitutional: Negative for activity change, appetite change and fever  HENT: Negative for sinus pain, sinus pressure and trouble swallowing  Eyes: Negative for pain and visual disturbance  Respiratory: Negative for cough, shortness of breath and wheezing  Cardiovascular: Negative for chest pain and palpitations  Gastrointestinal: Negative for abdominal pain, blood in stool, constipation, diarrhea, nausea and vomiting  Endocrine: Negative for polydipsia and polyphagia  Genitourinary: Negative for difficulty urinating, discharge, hematuria and testicular pain  Musculoskeletal: Negative for arthralgias and joint swelling  Skin: Positive for rash  Negative for color change  Allergic/Immunologic: Negative for immunocompromised state  Neurological: Negative for dizziness, seizures, syncope and headaches  Hematological: Negative for adenopathy  Does not bruise/bleed easily  Psychiatric/Behavioral: Negative for agitation, confusion and suicidal ideas  Objective:  Vitals:    11/27/18 1520   BP: 110/56   Pulse: 95   SpO2: 94%      Physical Exam   Constitutional: He is oriented to person, place, and time   He appears well-developed and well-nourished  Appears stated is stated age in no acute distress  HENT:   Head: Normocephalic and atraumatic  Eyes: Pupils are equal, round, and reactive to light  Conjunctivae are normal    Neck: Normal range of motion  No thyromegaly present  Cardiovascular: Normal rate, regular rhythm and normal heart sounds  No murmur heard  Pulmonary/Chest: Effort normal  No respiratory distress  He has no wheezes  He has no rales  Genitourinary: Penis normal  Right testis shows no mass and no tenderness  Left testis shows no mass and no tenderness  Musculoskeletal: Normal range of motion  He exhibits no deformity  Lymphadenopathy:     He has no cervical adenopathy  Neurological: He is alert and oriented to person, place, and time  Skin: Skin is warm and dry  Rash noted  Patient has diffuse macular rash bilaterally across his whole back  It is also across his entire chest   The rash is now spreading bilaterally to his lower extremities between his ankles and his knees  Psychiatric: He has a normal mood and affect   His behavior is normal  Judgment and thought content normal

## 2018-11-27 NOTE — PATIENT INSTRUCTIONS
Patient is a 81-year-old  male coming in today for follow-up regarding his diabetes mellitus type 2  Recent lab work reveals the patient's hemoglobin A1c level to be at 12 2  This is completely unacceptable  The patient revealed that he is only taking her sugar level are all once in may be twice a day  The patient explains because he wakes up around 11:00 a m  To 12:00 p m  And as a result only eats 2 meals a day and he takes his sugars when he eats his meals  It was explained to the patient that the patient needs to take a sugars more often it in order to more proactive regarding his treatment  In addition, I have up to his NovoLog dosage to 10 units which will take with meals  I encouraged him to eat 3 meals a day  I also up his Levemir to 20 units per day  I want to see me again with the new year

## 2019-01-18 ENCOUNTER — OFFICE VISIT (OUTPATIENT)
Dept: INTERNAL MEDICINE CLINIC | Facility: CLINIC | Age: 79
End: 2019-01-18
Payer: COMMERCIAL

## 2019-01-18 VITALS
SYSTOLIC BLOOD PRESSURE: 102 MMHG | BODY MASS INDEX: 25.18 KG/M2 | DIASTOLIC BLOOD PRESSURE: 60 MMHG | WEIGHT: 156 LBS | OXYGEN SATURATION: 97 % | HEART RATE: 64 BPM

## 2019-01-18 DIAGNOSIS — E11.65 TYPE 2 DIABETES MELLITUS WITH HYPERGLYCEMIA, WITHOUT LONG-TERM CURRENT USE OF INSULIN (HCC): ICD-10-CM

## 2019-01-18 PROBLEM — I25.10 CORONARY ATHEROSCLEROSIS OF NATIVE CORONARY ARTERY: Status: ACTIVE | Noted: 2019-01-18

## 2019-01-18 PROBLEM — B37.81 ESOPHAGEAL CANDIDIASIS (HCC): Status: RESOLVED | Noted: 2019-01-18 | Resolved: 2019-01-18

## 2019-01-18 PROCEDURE — 99213 OFFICE O/P EST LOW 20 MIN: CPT | Performed by: INTERNAL MEDICINE

## 2019-01-18 RX ORDER — INSULIN GLARGINE 100 [IU]/ML
20 INJECTION, SOLUTION SUBCUTANEOUS DAILY
Qty: 3 ML | Refills: 3 | Status: SHIPPED | OUTPATIENT
Start: 2019-01-18 | End: 2019-03-05

## 2019-01-18 RX ORDER — PEN NEEDLE, DIABETIC 30 GX3/16"
NEEDLE, DISPOSABLE MISCELLANEOUS
Qty: 200 EACH | Refills: 3 | Status: SHIPPED | OUTPATIENT
Start: 2019-01-18 | End: 2020-01-20 | Stop reason: SDUPTHER

## 2019-01-18 NOTE — PROGRESS NOTES
Assessment/Plan:       Diagnoses and all orders for this visit:    Type 2 diabetes mellitus with hyperglycemia, without long-term current use of insulin ([facility])  -     insulin glargine (LANTUS) 100 units/mL subcutaneous injection; Inject 20 Units under the skin daily  -     Insulin Pen Needle (PEN NEEDLES) 32G X 4 MM MISC; Check blood glucose at home [facility] and  4 times a day          Patient Instructions   Need to recheck A1c shortly  Subjective:      Patient ID: Josy Mosqueda is a 69 y o  male  Follow-up visit for this male patient  He has diabetes, and low ejection fraction cardiomyopathy  Several months ago hemoglobin A1c was up to 12  He was not paying any attention    Now reports sugars have improved  Sugars in the mid 100 to 200s at home       Low EF but aside from an expected global sensation of fatigue no activity  No dyspnea orthopnea or PND  The following portions of the patient's history were reviewed and updated as appropriate:   He has a past medical history of Acquired cyst of kidney; Anemia; Benign paroxysmal vertigo, unspecified ear; Cellulitis of leg; Cervical spinal stenosis; Chest pain; Coronary atherosclerosis of native coronary artery; Enlarged prostate; Esophageal candidiasis ([address] ); Hematuria; Herpes zoster; Hyperlipidemia; Hypertension; Incomplete emptying of bladder; Inflamed seborrheic keratosis; Internal hemorrhoids; Malignant neoplasm of skin of trunk; Myocardial infarction ([address] ); Nonmelanoma skin cancer; 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 infarction; Paroxysmal tachycardia ([address] ); Shortness of breath; and Vitamin B deficiency  ,   does not have any pertinent problems on file  ,   has a past surgical history that includes Hip Arthroplasty (Right); Coronary angioplasty with stent; Joint replacement (Right); Insert / replace / remove pacemaker; EGD AND COLONOSCOPY (N/A, 2/12/2018); Colonoscopy (10/07/2008); Cystoscopy (11/06/2015); Trunk skin lesion excisional biopsy (08/22/2007);  Cardiac defibrillator placement; and Coronary angioplasty with stent  ,  family history includes Cancer in his family and father; Coronary artery disease in his family and mother; Heart disease in his mother; Sudden death in his mother; Throat cancer in his father  ,   reports that he quit smoking about 40 years ago  He has never used smokeless tobacco  He reports that he drinks alcohol  He reports that he does not use drugs  ,  is allergic to atorvastatin and percocet [oxycodone-acetaminophen]     Current Outpatient Prescriptions   Medication Sig Dispense Refill    aspirin 81 MG tablet Take 1 tablet by mouth daily      betamethasone dipropionate (DIPROSONE) 0 05 % cream Apply topically 2 (two) times a day 30 g 0    clopidogrel (PLAVIX) 75 mg tablet Take 1 tablet (75 mg total) by mouth daily 90 tablet 3    finasteride (PROSCAR) 5 mg tablet Take 5 mg by mouth daily  0    hydrOXYzine HCL (ATARAX) 25 mg tablet Take 1 tablet (25 mg total) by mouth every 6 (six) hours as needed for itching 30 tablet 1    insulin aspart (NOVOLOG FLEXPEN) 100 Units/mL injection pen Inject 10 Units under the skin 3 (three) times a day with meals 5 pen 0    metoprolol tartrate (LOPRESSOR) 25 mg tablet Take 0 5 tablets (12 5 mg total) by mouth every 12 (twelve) hours 90 tablet 3    rosuvastatin (CRESTOR) 20 MG tablet 20 mg daily        tamsulosin (FLOMAX) 0 4 mg Take 1 capsule (0 4 mg total) by mouth daily 90 capsule 3    triamcinolone (KENALOG) 0 1 % ointment Apply topically 2 (two) times a day To rash till clear 454 g 1    glipiZIDE (GLUCOTROL) 5 mg tablet Take 1 tablet (5 mg total) by mouth 2 (two) times a day for 90 days 180 tablet 0    insulin glargine (LANTUS) 100 units/mL subcutaneous injection Inject 20 Units under the skin daily 3 mL 3    Insulin Pen Needle (PEN NEEDLES) 32G X 4 MM MISC Check blood glucose at home AC and HS 4 times a day 200 each 3    levothyroxine 88 mcg tablet Take 1 tablet (88 mcg total) by mouth daily for 90 days 90 tablet 0     No current facility-administered medications for this visit  Review of Systems   Constitutional: Positive for fatigue  Negative for fever and unexpected weight change  Respiratory: Positive for shortness of breath  Cardiovascular: Negative for chest pain and leg swelling  Musculoskeletal: Positive for arthralgias  Skin: Positive for rash  Neurological: Positive for weakness and light-headedness  Hematological: Bruises/bleeds easily  Psychiatric/Behavioral: Negative for confusion  The patient is not nervous/anxious  Objective:  Vitals:    01/18/19 1556   BP: 102/60   Pulse: 64   SpO2: 97%      Physical Exam   Cardiovascular: Normal rate and regular rhythm  Exam reveals distant heart sounds  Exam reveals no gallop and no friction rub  Pulmonary/Chest: Effort normal    Skin: Skin is warm  Psychiatric: He has a normal mood and affect

## 2019-01-30 ENCOUNTER — REMOTE DEVICE CLINIC VISIT (OUTPATIENT)
Dept: CARDIOLOGY CLINIC | Facility: CLINIC | Age: 79
End: 2019-01-30
Payer: COMMERCIAL

## 2019-01-30 DIAGNOSIS — I50.22 CHRONIC SYSTOLIC CONGESTIVE HEART FAILURE (HCC): Primary | ICD-10-CM

## 2019-01-30 DIAGNOSIS — Z95.810 PRESENCE OF IMPLANTABLE CARDIOVERTER-DEFIBRILLATOR (ICD): ICD-10-CM

## 2019-01-30 DIAGNOSIS — I25.5 ISCHEMIC CARDIOMYOPATHY: ICD-10-CM

## 2019-01-30 PROCEDURE — 93296 REM INTERROG EVL PM/IDS: CPT | Performed by: INTERNAL MEDICINE

## 2019-01-30 PROCEDURE — 93295 DEV INTERROG REMOTE 1/2/MLT: CPT | Performed by: INTERNAL MEDICINE

## 2019-01-30 NOTE — PROGRESS NOTES
BSC SINGLE ICD  LATITUDE TRANSMISSION:  BATTERY VOLTAGE ADEQUATE (8 YR)    0%   ALL LEAD PARAMETERS WITHIN NORMAL LIMITS   SINCE 10/31/18, 2 VT-1 EPISODES FOR PROBABLE NSVT (23 @ 164 BPM, 17 @ 163 BPM, NO THERAPIES)  AND 7 NON SUST EPISODES (6 @ 165 BPM, 5 @ 163 BPM)   NORMAL DEVICE FUNCTION   RG

## 2019-03-05 ENCOUNTER — OFFICE VISIT (OUTPATIENT)
Dept: INTERNAL MEDICINE CLINIC | Facility: CLINIC | Age: 79
End: 2019-03-05
Payer: COMMERCIAL

## 2019-03-05 ENCOUNTER — APPOINTMENT (OUTPATIENT)
Dept: LAB | Facility: CLINIC | Age: 79
End: 2019-03-05
Payer: COMMERCIAL

## 2019-03-05 VITALS
SYSTOLIC BLOOD PRESSURE: 122 MMHG | BODY MASS INDEX: 25.76 KG/M2 | DIASTOLIC BLOOD PRESSURE: 70 MMHG | OXYGEN SATURATION: 97 % | HEART RATE: 73 BPM | WEIGHT: 159.6 LBS

## 2019-03-05 DIAGNOSIS — E11.65 TYPE 2 DIABETES MELLITUS WITH HYPERGLYCEMIA, WITHOUT LONG-TERM CURRENT USE OF INSULIN (HCC): Primary | ICD-10-CM

## 2019-03-05 DIAGNOSIS — I50.22 CHRONIC SYSTOLIC CONGESTIVE HEART FAILURE (HCC): ICD-10-CM

## 2019-03-05 DIAGNOSIS — E03.9 HYPOTHYROIDISM (ACQUIRED): ICD-10-CM

## 2019-03-05 DIAGNOSIS — I10 ESSENTIAL HYPERTENSION: ICD-10-CM

## 2019-03-05 DIAGNOSIS — E11.65 TYPE 2 DIABETES MELLITUS WITH HYPERGLYCEMIA, WITHOUT LONG-TERM CURRENT USE OF INSULIN (HCC): ICD-10-CM

## 2019-03-05 LAB
ALBUMIN SERPL BCP-MCNC: 3.9 G/DL (ref 3.5–5)
ALP SERPL-CCNC: 59 U/L (ref 46–116)
ALT SERPL W P-5'-P-CCNC: 23 U/L (ref 12–78)
ANION GAP SERPL CALCULATED.3IONS-SCNC: 5 MMOL/L (ref 4–13)
AST SERPL W P-5'-P-CCNC: 19 U/L (ref 5–45)
BACTERIA UR QL AUTO: ABNORMAL /HPF
BASOPHILS # BLD AUTO: 0.04 THOUSANDS/ΜL (ref 0–0.1)
BASOPHILS NFR BLD AUTO: 1 % (ref 0–1)
BILIRUB SERPL-MCNC: 0.73 MG/DL (ref 0.2–1)
BILIRUB UR QL STRIP: NEGATIVE
BUN SERPL-MCNC: 18 MG/DL (ref 5–25)
CALCIUM SERPL-MCNC: 8.7 MG/DL (ref 8.3–10.1)
CHLORIDE SERPL-SCNC: 104 MMOL/L (ref 100–108)
CHOLEST SERPL-MCNC: 121 MG/DL (ref 50–200)
CLARITY UR: CLEAR
CO2 SERPL-SCNC: 28 MMOL/L (ref 21–32)
COLOR UR: YELLOW
CREAT SERPL-MCNC: 1.33 MG/DL (ref 0.6–1.3)
CREAT UR-MCNC: 134 MG/DL
EOSINOPHIL # BLD AUTO: 0.2 THOUSAND/ΜL (ref 0–0.61)
EOSINOPHIL NFR BLD AUTO: 2 % (ref 0–6)
ERYTHROCYTE [DISTWIDTH] IN BLOOD BY AUTOMATED COUNT: 17.6 % (ref 11.6–15.1)
GFR SERPL CREATININE-BSD FRML MDRD: 51 ML/MIN/1.73SQ M
GLUCOSE P FAST SERPL-MCNC: 236 MG/DL (ref 65–99)
GLUCOSE UR STRIP-MCNC: ABNORMAL MG/DL
HCT VFR BLD AUTO: 37.4 % (ref 36.5–49.3)
HDLC SERPL-MCNC: 54 MG/DL (ref 40–60)
HGB BLD-MCNC: 12.3 G/DL (ref 12–17)
HGB UR QL STRIP.AUTO: ABNORMAL
HYALINE CASTS #/AREA URNS LPF: ABNORMAL /LPF
IMM GRANULOCYTES # BLD AUTO: 0.02 THOUSAND/UL (ref 0–0.2)
IMM GRANULOCYTES NFR BLD AUTO: 0 % (ref 0–2)
KETONES UR STRIP-MCNC: NEGATIVE MG/DL
LDLC SERPL CALC-MCNC: 53 MG/DL (ref 0–100)
LEUKOCYTE ESTERASE UR QL STRIP: ABNORMAL
LYMPHOCYTES # BLD AUTO: 1.1 THOUSANDS/ΜL (ref 0.6–4.47)
LYMPHOCYTES NFR BLD AUTO: 13 % (ref 14–44)
MCH RBC QN AUTO: 35.4 PG (ref 26.8–34.3)
MCHC RBC AUTO-ENTMCNC: 32.9 G/DL (ref 31.4–37.4)
MCV RBC AUTO: 108 FL (ref 82–98)
MICROALBUMIN UR-MCNC: 245 MG/L (ref 0–20)
MICROALBUMIN/CREAT 24H UR: 183 MG/G CREATININE (ref 0–30)
MONOCYTES # BLD AUTO: 0.69 THOUSAND/ΜL (ref 0.17–1.22)
MONOCYTES NFR BLD AUTO: 8 % (ref 4–12)
NEUTROPHILS # BLD AUTO: 6.4 THOUSANDS/ΜL (ref 1.85–7.62)
NEUTS SEG NFR BLD AUTO: 76 % (ref 43–75)
NITRITE UR QL STRIP: NEGATIVE
NON-SQ EPI CELLS URNS QL MICRO: ABNORMAL /HPF
NRBC BLD AUTO-RTO: 0 /100 WBCS
NT-PROBNP SERPL-MCNC: 682 PG/ML
PH UR STRIP.AUTO: 6 [PH]
PLATELET # BLD AUTO: 178 THOUSANDS/UL (ref 149–390)
PMV BLD AUTO: 11.1 FL (ref 8.9–12.7)
POTASSIUM SERPL-SCNC: 3.9 MMOL/L (ref 3.5–5.3)
PROT SERPL-MCNC: 7.3 G/DL (ref 6.4–8.2)
PROT UR STRIP-MCNC: ABNORMAL MG/DL
RBC # BLD AUTO: 3.47 MILLION/UL (ref 3.88–5.62)
RBC #/AREA URNS AUTO: ABNORMAL /HPF
SODIUM SERPL-SCNC: 137 MMOL/L (ref 136–145)
SP GR UR STRIP.AUTO: 1.03 (ref 1–1.03)
TRIGL SERPL-MCNC: 70 MG/DL
TSH SERPL DL<=0.05 MIU/L-ACNC: 2.45 UIU/ML (ref 0.36–3.74)
UROBILINOGEN UR QL STRIP.AUTO: 1 E.U./DL
WBC # BLD AUTO: 8.45 THOUSAND/UL (ref 4.31–10.16)
WBC #/AREA URNS AUTO: ABNORMAL /HPF

## 2019-03-05 PROCEDURE — 1160F RVW MEDS BY RX/DR IN RCRD: CPT | Performed by: INTERNAL MEDICINE

## 2019-03-05 PROCEDURE — 3078F DIAST BP <80 MM HG: CPT | Performed by: INTERNAL MEDICINE

## 2019-03-05 PROCEDURE — 83880 ASSAY OF NATRIURETIC PEPTIDE: CPT

## 2019-03-05 PROCEDURE — 85025 COMPLETE CBC W/AUTO DIFF WBC: CPT

## 2019-03-05 PROCEDURE — 3074F SYST BP LT 130 MM HG: CPT | Performed by: INTERNAL MEDICINE

## 2019-03-05 PROCEDURE — 1036F TOBACCO NON-USER: CPT | Performed by: INTERNAL MEDICINE

## 2019-03-05 PROCEDURE — 3725F SCREEN DEPRESSION PERFORMED: CPT | Performed by: INTERNAL MEDICINE

## 2019-03-05 PROCEDURE — 82570 ASSAY OF URINE CREATININE: CPT | Performed by: INTERNAL MEDICINE

## 2019-03-05 PROCEDURE — 36415 COLL VENOUS BLD VENIPUNCTURE: CPT

## 2019-03-05 PROCEDURE — 80061 LIPID PANEL: CPT

## 2019-03-05 PROCEDURE — 81001 URINALYSIS AUTO W/SCOPE: CPT | Performed by: INTERNAL MEDICINE

## 2019-03-05 PROCEDURE — 80053 COMPREHEN METABOLIC PANEL: CPT

## 2019-03-05 PROCEDURE — 84443 ASSAY THYROID STIM HORMONE: CPT

## 2019-03-05 PROCEDURE — 82043 UR ALBUMIN QUANTITATIVE: CPT | Performed by: INTERNAL MEDICINE

## 2019-03-05 PROCEDURE — 99214 OFFICE O/P EST MOD 30 MIN: CPT | Performed by: INTERNAL MEDICINE

## 2019-03-05 NOTE — PATIENT INSTRUCTIONS
Chronic heart failure stable  Blood pressure good  Exam, given the severity of his overall condition, is fairly benign  Recheck labs today  Four-month follow-up

## 2019-03-05 NOTE — PROGRESS NOTES
Diabetic Foot Exam    Patient's shoes and socks removed  Right Foot/Ankle   Right Foot Inspection  Skin Exam: skin normal, skin intact and dry skin no warmth, no callus, no erythema, no maceration, no abnormal color, no pre-ulcer, no ulcer and no callus                            Sensory       Monofilament testing: intact  Vascular  Capillary refills: < 3 seconds  The right DP pulse is 1+  The right PT pulse is 1+  Left Foot/Ankle  Left Foot Inspection  Skin Exam: skin normal, skin intact and dry skinno warmth, no erythema, no maceration, normal color, no pre-ulcer, no ulcer and no callus                                         Sensory       Monofilament: intact  Vascular  Capillary refills: < 3 seconds  The left DP pulse is 1+  The left PT pulse is 2+  Assign Risk Category:  No deformity present; Loss of protective sensation; Weak pulses       Risk: 1  Assessment/Plan:       Diagnoses and all orders for this visit:    Type 2 diabetes mellitus with hyperglycemia, without long-term current use of insulin (HCC)  -     insulin glargine (BASAGLAR KWIKPEN) 100 units/mL injection pen; INJECT 20 UNITS SUBCUTANEOUSLY DAILY  -     CBC and differential; Future  -     Comprehensive metabolic panel; Future  -     TSH, 3rd generation with Free T4 reflex; Future  -     Urinalysis with reflex to microscopic  -     Microalbumin / creatinine urine ratio  -     Lipid Panel with Direct LDL reflex; Future  -     NT-BNP PRO; Future    Essential hypertension  -     CBC and differential; Future  -     Comprehensive metabolic panel; Future  -     TSH, 3rd generation with Free T4 reflex; Future  -     Urinalysis with reflex to microscopic  -     Microalbumin / creatinine urine ratio  -     Lipid Panel with Direct LDL reflex; Future  -     NT-BNP PRO; Future    Chronic systolic congestive heart failure (HCC)  -     CBC and differential; Future  -     Comprehensive metabolic panel;  Future  -     TSH, 3rd generation with Free T4 reflex; Future  -     Urinalysis with reflex to microscopic  -     Microalbumin / creatinine urine ratio  -     Lipid Panel with Direct LDL reflex; Future  -     NT-BNP PRO; Future    Hypothyroidism (acquired)  -     CBC and differential; Future  -     Comprehensive metabolic panel; Future  -     TSH, 3rd generation with Free T4 reflex; Future  -     Urinalysis with reflex to microscopic  -     Microalbumin / creatinine urine ratio  -     Lipid Panel with Direct LDL reflex; Future  -     NT-BNP PRO; Future          Patient Instructions   Chronic heart failure stable  Blood pressure good  Exam, given the severity of his overall condition, is fairly benign  Recheck labs today  Four-month follow-up  Subjective:      Patient ID: Dell Ramirez is a 69 y o  male  Patient is a 36-year-old  male coming in today for follow-up follow-up regarding his diabetes mellitus per type 2  PRIOR HEMOGLOBIN A1C OR 12 NEEDS TO BE RECHECK TODAY  IN THE INTERVAL SINCE THEN THE PATIENT HAS TIGHTENED UP ON HIS DIET AND INCREASED INSULIN  CURRENTLY TAKING LANTUS INSULIN 20 UNITS ONCE AND SHORT-ACTING INSULIN 10 UNITS 3 TIMES A DAY          Chronic problems of hyperlipidemia, hypertension, coronary artery disease status post angioplasty, systolic heart failure  Recent ejection fraction of 25%  He continues to have chronic symptomatology regarding his low EF  He has exertional dyspnea and has a sensation of fatigue on exertion  However, this is at a baseline status with no recent decompensation and his examination is normal with clear breath sounds and no edema      Anemia  Had been noted  Hemoglobin between 8   An 10  Heme-positive stool  EGD was done documenting severe gastritis and he was placed on PPI double dose  Capsule enteroscopy follow-up reported by the patient    He was told was normal  Colonoscopy reported to be normal At the same time, MCV elevation of 111 so there may be a component of B12 deficiency or myelodysplasia         Right shoulder pain exacerbated by motion and relieved by rest is chronic      Urinary retention with resolution:  He had been treated for this about a year ago  He had an indwelling Mckeon catheter for number of months but it has finally been removed  Lisa Ramey with Urology  Working diagnosis is urinary retention secondary to prostatic hyperplasia  No recurrene of the retention     Complication of condition was development of acute kidney injury  This necessitated discontinuation of Actos and also metformin  Also, because blood pressure was low, the metoprolol dose was cut to 12 5 mg daily  However, the most recent metabolic panel documented recovery of kidney function              The following portions of the patient's history were reviewed and updated as appropriate:   He has a past medical history of Acquired cyst of kidney, Anemia, Benign paroxysmal vertigo, unspecified ear, Cellulitis of leg, Cervical spinal stenosis, Chest pain, Coronary atherosclerosis of native coronary artery, Enlarged prostate, Esophageal candidiasis (HCC), Hematuria, Herpes zoster, Hyperlipidemia, Hypertension, Incomplete emptying of bladder, Inflamed seborrheic keratosis, Internal hemorrhoids, Malignant neoplasm of skin of trunk, Myocardial infarction ([address] ), Nonmelanoma skin cancer, 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 infarction, Paroxysmal tachycardia ([address] ), Shortness of breath, and Vitamin B deficiency  ,  does not have any pertinent problems on file  ,   has a past surgical history that includes Hip Arthroplasty (Right); Coronary angioplasty with stent; Joint replacement (Right); Insert / replace / remove pacemaker; EGD AND COLONOSCOPY (N/A, 2/12/2018); Colonoscopy (10/07/2008); Cystoscopy (11/06/2015); Trunk skin lesion excisional biopsy (08/22/2007); Cardiac defibrillator placement; and Coronary angioplasty with stent  ,  family history includes Cancer in his family and father; Coronary artery disease in his family and mother; Heart disease in his mother; Sudden death in his mother; Throat cancer in his father  ,   reports that he quit smoking about 41 years ago  He has never used smokeless tobacco  He reports that he drinks alcohol  He reports that he does not use drugs  ,  is allergic to atorvastatin and percocet [oxycodone-acetaminophen]     Current Outpatient Medications   Medication Sig Dispense Refill    aspirin 81 MG tablet Take 1 tablet by mouth daily      betamethasone dipropionate (DIPROSONE) 0 05 % cream Apply topically 2 (two) times a day 30 g 0    clopidogrel (PLAVIX) 75 mg tablet Take 1 tablet (75 mg total) by mouth daily 90 tablet 3    finasteride (PROSCAR) 5 mg tablet Take 5 mg by mouth daily  0    hydrOXYzine HCL (ATARAX) 25 mg tablet Take 1 tablet (25 mg total) by mouth every 6 (six) hours as needed for itching 30 tablet 1    insulin aspart (NOVOLOG FLEXPEN) 100 Units/mL injection pen Inject 10 Units under the skin 3 (three) times a day with meals 5 pen 0    Insulin Pen Needle (PEN NEEDLES) 32G X 4 MM MISC Check blood glucose at home [facility] and  4 times a day 200 each 3    metoprolol tartrate (LOPRESSOR) 25 mg tablet Take 0 5 tablets (12 5 mg total) by mouth every 12 (twelve) hours 90 tablet 3    rosuvastatin (CRESTOR) 20 MG tablet 20 mg daily        tamsulosin (FLOMAX) 0 4 mg Take 1 capsule (0 4 mg total) by mouth daily 90 capsule 3    triamcinolone (KENALOG) 0 1 % ointment Apply topically 2 (two) times a day To rash till clear 454 g 1    glipiZIDE (GLUCOTROL) 5 mg tablet Take 1 tablet (5 mg total) by mouth 2 (two) times a day for 90 days 180 tablet 0    insulin glargine (BASAGLAR KWIKPEN) 100 units/mL injection pen INJECT 20 UNITS SUBCUTANEOUSLY DAILY 5 pen 3    levothyroxine 88 mcg tablet Take 1 tablet (88 mcg total) by mouth daily for 90 days 90 tablet 0     No current facility-administered medications for this visit  Review of Systems   Constitutional: Positive for fatigue   Negative for chills and fever    HENT: Negative for sore throat and trouble swallowing  Eyes: Negative for pain  Respiratory: Positive for shortness of breath  Negative for cough  Cardiovascular: Negative for chest pain and palpitations  Gastrointestinal: Negative for abdominal pain, blood in stool, diarrhea, nausea and vomiting  Endocrine: Negative for cold intolerance and heat intolerance  Genitourinary: Negative for dysuria, frequency and testicular pain  Musculoskeletal: Positive for arthralgias  Negative for joint swelling  Skin: Positive for rash  Allergic/Immunologic: Negative for immunocompromised state  Neurological: Negative for dizziness, syncope and headaches  Hematological: Negative for adenopathy  Does not bruise/bleed easily  Psychiatric/Behavioral: Negative for dysphoric mood  The patient is not nervous/anxious  Objective:  Vitals:    03/05/19 1142   BP: 122/70   Pulse: 73   SpO2: 97%      Physical Exam   Constitutional: He is oriented to person, place, and time  He appears well-developed and well-nourished  Appears stated is stated age in no acute distress  HENT:   Head: Normocephalic and atraumatic  Eyes: Pupils are equal, round, and reactive to light  Conjunctivae are normal    Neck: Normal range of motion  No thyromegaly present  Cardiovascular: Normal rate, regular rhythm and normal heart sounds  Pulses are weak pulses  No murmur heard  Pulses:       Dorsalis pedis pulses are 1+ on the right side, and 1+ on the left side  Posterior tibial pulses are 1+ on the right side, and 2+ on the left side  Pulmonary/Chest: Effort normal and breath sounds normal  No respiratory distress  He has no wheezes  He has no rales  Abdominal: Soft  Genitourinary: Penis normal  Right testis shows no mass and no tenderness  Left testis shows no mass and no tenderness  Musculoskeletal: Normal range of motion  He exhibits deformity     Osteoarthrosis deformities of the fingers which are mild  Pretty noticeable hammertoe deformities  No inflammatory joints peer   Feet:   Right Foot:   Skin Integrity: Positive for dry skin  Negative for ulcer, skin breakdown, erythema, warmth or callus  Left Foot:   Skin Integrity: Positive for dry skin  Negative for ulcer, skin breakdown, erythema, warmth or callus  Lymphadenopathy:     He has no cervical adenopathy  Neurological: He is alert and oriented to person, place, and time  Skin: Skin is warm and dry  Rash noted  Patchy erythema ptosis areas scattered around his trunk and legs with slight scale  Psychiatric: He has a normal mood and affect   His behavior is normal  Judgment and thought content normal

## 2019-03-18 LAB
LEFT EYE DIABETIC RETINOPATHY: NORMAL
RIGHT EYE DIABETIC RETINOPATHY: NORMAL

## 2019-03-22 DIAGNOSIS — E11.65 TYPE 2 DIABETES MELLITUS WITH HYPERGLYCEMIA, WITHOUT LONG-TERM CURRENT USE OF INSULIN (HCC): ICD-10-CM

## 2019-03-22 RX ORDER — INSULIN ASPART 100 [IU]/ML
INJECTION, SOLUTION INTRAVENOUS; SUBCUTANEOUS
Qty: 15 ML | Refills: 0 | Status: SHIPPED | OUTPATIENT
Start: 2019-03-22 | End: 2019-04-10

## 2019-03-25 ENCOUNTER — TELEPHONE (OUTPATIENT)
Dept: INTERNAL MEDICINE CLINIC | Facility: CLINIC | Age: 79
End: 2019-03-25

## 2019-04-02 DIAGNOSIS — D64.9 ANEMIA, UNSPECIFIED TYPE: ICD-10-CM

## 2019-04-02 DIAGNOSIS — E11.9 TYPE 2 DIABETES MELLITUS WITHOUT COMPLICATION, WITHOUT LONG-TERM CURRENT USE OF INSULIN (HCC): ICD-10-CM

## 2019-04-02 RX ORDER — LEVOTHYROXINE SODIUM 88 UG/1
TABLET ORAL
Qty: 90 TABLET | Refills: 3 | Status: SHIPPED | OUTPATIENT
Start: 2019-04-02 | End: 2020-01-20 | Stop reason: SDUPTHER

## 2019-04-10 DIAGNOSIS — E11.65 TYPE 2 DIABETES MELLITUS WITH HYPERGLYCEMIA, WITHOUT LONG-TERM CURRENT USE OF INSULIN (HCC): Primary | ICD-10-CM

## 2019-04-18 DIAGNOSIS — E11.9 TYPE 2 DIABETES MELLITUS WITHOUT COMPLICATION, WITHOUT LONG-TERM CURRENT USE OF INSULIN (HCC): Primary | ICD-10-CM

## 2019-04-18 DIAGNOSIS — E78.2 MIXED HYPERLIPIDEMIA: ICD-10-CM

## 2019-04-19 RX ORDER — ROSUVASTATIN CALCIUM 20 MG/1
TABLET, COATED ORAL
Qty: 90 TABLET | Refills: 3 | Status: SHIPPED | OUTPATIENT
Start: 2019-04-19 | End: 2020-02-10 | Stop reason: SDUPTHER

## 2019-04-30 DIAGNOSIS — E11.9 TYPE 2 DIABETES MELLITUS WITHOUT COMPLICATION, WITHOUT LONG-TERM CURRENT USE OF INSULIN (HCC): ICD-10-CM

## 2019-04-30 DIAGNOSIS — D64.9 ANEMIA, UNSPECIFIED TYPE: ICD-10-CM

## 2019-04-30 RX ORDER — GLIPIZIDE 5 MG/1
TABLET ORAL
Qty: 180 TABLET | Refills: 0 | Status: SHIPPED | OUTPATIENT
Start: 2019-04-30 | End: 2019-08-16 | Stop reason: SDUPTHER

## 2019-05-08 ENCOUNTER — OFFICE VISIT (OUTPATIENT)
Dept: INTERNAL MEDICINE CLINIC | Facility: CLINIC | Age: 79
End: 2019-05-08
Payer: COMMERCIAL

## 2019-05-08 VITALS
DIASTOLIC BLOOD PRESSURE: 62 MMHG | SYSTOLIC BLOOD PRESSURE: 120 MMHG | BODY MASS INDEX: 26.42 KG/M2 | OXYGEN SATURATION: 96 % | WEIGHT: 164.4 LBS | HEART RATE: 72 BPM | HEIGHT: 66 IN

## 2019-05-08 DIAGNOSIS — E11.9 TYPE 2 DIABETES MELLITUS WITHOUT COMPLICATION, WITHOUT LONG-TERM CURRENT USE OF INSULIN (HCC): Primary | ICD-10-CM

## 2019-05-08 LAB — SL AMB POCT HEMOGLOBIN AIC: 7.9 (ref ?–6.5)

## 2019-05-08 PROCEDURE — 1160F RVW MEDS BY RX/DR IN RCRD: CPT | Performed by: INTERNAL MEDICINE

## 2019-05-08 PROCEDURE — 83036 HEMOGLOBIN GLYCOSYLATED A1C: CPT | Performed by: INTERNAL MEDICINE

## 2019-05-08 PROCEDURE — 99213 OFFICE O/P EST LOW 20 MIN: CPT | Performed by: INTERNAL MEDICINE

## 2019-05-09 ENCOUNTER — TELEPHONE (OUTPATIENT)
Dept: INTERNAL MEDICINE CLINIC | Facility: CLINIC | Age: 79
End: 2019-05-09

## 2019-05-28 ENCOUNTER — OFFICE VISIT (OUTPATIENT)
Dept: CARDIOLOGY CLINIC | Facility: CLINIC | Age: 79
End: 2019-05-28
Payer: COMMERCIAL

## 2019-05-28 VITALS
BODY MASS INDEX: 26.07 KG/M2 | WEIGHT: 162.2 LBS | DIASTOLIC BLOOD PRESSURE: 62 MMHG | SYSTOLIC BLOOD PRESSURE: 110 MMHG | HEIGHT: 66 IN | HEART RATE: 61 BPM | OXYGEN SATURATION: 97 %

## 2019-05-28 DIAGNOSIS — I25.5 ISCHEMIC CARDIOMYOPATHY: ICD-10-CM

## 2019-05-28 DIAGNOSIS — I50.22 CHRONIC SYSTOLIC CONGESTIVE HEART FAILURE (HCC): ICD-10-CM

## 2019-05-28 DIAGNOSIS — I25.10 ATHEROSCLEROSIS OF NATIVE CORONARY ARTERY OF NATIVE HEART WITHOUT ANGINA PECTORIS: Primary | ICD-10-CM

## 2019-05-28 PROCEDURE — 99213 OFFICE O/P EST LOW 20 MIN: CPT | Performed by: INTERNAL MEDICINE

## 2019-06-19 ENCOUNTER — REMOTE DEVICE CLINIC VISIT (OUTPATIENT)
Dept: CARDIOLOGY CLINIC | Facility: CLINIC | Age: 79
End: 2019-06-19
Payer: COMMERCIAL

## 2019-06-19 DIAGNOSIS — Z95.810 PRESENCE OF IMPLANTABLE CARDIOVERTER-DEFIBRILLATOR (ICD): Primary | ICD-10-CM

## 2019-06-19 PROCEDURE — 93295 DEV INTERROG REMOTE 1/2/MLT: CPT | Performed by: INTERNAL MEDICINE

## 2019-06-19 PROCEDURE — 93296 REM INTERROG EVL PM/IDS: CPT | Performed by: INTERNAL MEDICINE

## 2019-07-15 ENCOUNTER — OFFICE VISIT (OUTPATIENT)
Dept: INTERNAL MEDICINE CLINIC | Facility: CLINIC | Age: 79
End: 2019-07-15
Payer: COMMERCIAL

## 2019-07-15 VITALS
HEIGHT: 66 IN | WEIGHT: 163.8 LBS | BODY MASS INDEX: 26.33 KG/M2 | DIASTOLIC BLOOD PRESSURE: 64 MMHG | HEART RATE: 92 BPM | SYSTOLIC BLOOD PRESSURE: 112 MMHG | OXYGEN SATURATION: 96 %

## 2019-07-15 DIAGNOSIS — I10 ESSENTIAL HYPERTENSION: ICD-10-CM

## 2019-07-15 DIAGNOSIS — E11.65 TYPE 2 DIABETES MELLITUS WITH HYPERGLYCEMIA, WITHOUT LONG-TERM CURRENT USE OF INSULIN (HCC): ICD-10-CM

## 2019-07-15 DIAGNOSIS — I50.22 CHRONIC SYSTOLIC CONGESTIVE HEART FAILURE (HCC): ICD-10-CM

## 2019-07-15 PROCEDURE — 1036F TOBACCO NON-USER: CPT | Performed by: INTERNAL MEDICINE

## 2019-07-15 PROCEDURE — 1160F RVW MEDS BY RX/DR IN RCRD: CPT | Performed by: INTERNAL MEDICINE

## 2019-07-15 PROCEDURE — 3074F SYST BP LT 130 MM HG: CPT | Performed by: INTERNAL MEDICINE

## 2019-07-15 PROCEDURE — 99213 OFFICE O/P EST LOW 20 MIN: CPT | Performed by: INTERNAL MEDICINE

## 2019-07-15 RX ORDER — METOPROLOL SUCCINATE 25 MG/1
25 TABLET, EXTENDED RELEASE ORAL DAILY
Qty: 90 TABLET | Refills: 3 | Status: SHIPPED | OUTPATIENT
Start: 2019-07-15 | End: 2019-09-04 | Stop reason: SDUPTHER

## 2019-07-15 NOTE — PATIENT INSTRUCTIONS
A patient with multiple chronic diagnoses which are stable  Follow-up in September    Repeat laboratory testing then

## 2019-07-15 NOTE — PROGRESS NOTES
Assessment/Plan:       Diagnoses and all orders for this visit:    Type 2 diabetes mellitus with hyperglycemia, without long-term current use of insulin (HCC)  -     insulin lispro (HUMALOG WILFRED KWIKPEN) 100 units/mL injection pen; INJECT UP TO 20 UNITS 3 TIMES A DAY BEFORE MEALS    Essential hypertension    Chronic systolic congestive heart failure (HCC)  -     metoprolol succinate (TOPROL-XL) 25 mg 24 hr tablet; Take 1 tablet (25 mg total) by mouth daily          Patient Instructions     A patient with multiple chronic diagnoses which are stable  Follow-up in September  Repeat laboratory testing then        Subjective:      Patient ID: Jonel Messina is a 69 y o  male  Patient is a 52-year-old  male coming in today for follow-up follow-up regarding his diabetes mellitus per type 2  At the very beginning of the year his hemoglobin A1c was 12  The patient increased his insulin  The beginning of May, hemoglobin A1c was 7 9 percent  He is taking Lantus insulin 20 units once and short-acting insulin 10 units 3 times a day     Chronic problems of hyperlipidemia, hypertension, coronary artery disease status post angioplasty, systolic heart failure  Recent ejection fraction of 25%  He continues to have chronic symptomatology regarding his low EF  He has exertional dyspnea and has a sensation of fatigue on exertion  However, this is at a baseline status with no recent decompensation and his examination is normal with clear breath sounds and no edema      Anemia  Had been noted  Hemoglobin between 8 and 10  Heme-positive stool  EGD was done documenting severe gastritis and he was placed on PPI double dose  Capsule enteroscopy follow-up reported by the patient  He was told was normal  Colonoscopy reported to be normal At the same time, MCV elevation of 111 so there may be a component of B12 deficiency or myelodysplasia   The most recent hemoglobin was up to 12 3 again with the macrocytosis        Right shoulder pain exacerbated by motion and relieved by rest is chronic      Urinary retention with resolution:  He had been treated for this about a year ago  He had an indwelling Mckeon catheter for number of months but it has finally been removed  Emilee Funes with Urology  Working diagnosis is urinary retention secondary to prostatic hyperplasia  No recurrene of the retention     Complication of condition was development of acute kidney injury  This necessitated discontinuation of Actos and also metformin  Also, because blood pressure was low, the metoprolol dose was cut to 12 5 mg daily  However, the most recent metabolic panel documented recovery of kidney function         in today for follow-up  He actually states he feels well  He is mobile  No dyspnea orthopnea or PND  The following portions of the patient's history were reviewed and updated as appropriate:   He has a past medical history of Acquired cyst of kidney, Anemia, Benign paroxysmal vertigo, unspecified ear, Cellulitis of leg, Cervical spinal stenosis, Chest pain, Coronary atherosclerosis of native coronary artery, Enlarged prostate, Esophageal candidiasis ([address] ), Hematuria, Herpes zoster, Hyperlipidemia, Hypertension, Incomplete emptying of bladder, Inflamed seborrheic keratosis, Internal hemorrhoids, Malignant neoplasm of skin of trunk, Myocardial infarction ([address] ), Nonmelanoma skin cancer, 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 infarction, Paroxysmal tachycardia ([address] ), Shortness of breath, and Vitamin B deficiency  ,  does not have any pertinent problems on file  ,   has a past surgical history that includes Hip Arthroplasty (Right); Coronary angioplasty with stent; Joint replacement (Right); Insert / replace / remove pacemaker; EGD AND COLONOSCOPY (N/A, 2/12/2018); Colonoscopy (10/07/2008); Cystoscopy (11/06/2015); Trunk skin lesion excisional biopsy (08/22/2007); Cardiac defibrillator placement; and Coronary angioplasty with stent  ,  family history includes Cancer in his family and father; Coronary artery disease in his family and mother; Heart disease in his mother; Sudden death in his mother; Throat cancer in his father  ,   reports that he quit smoking about 41 years ago  He has never used smokeless tobacco  He reports that he drinks alcohol  He reports that he does not use drugs  ,  is allergic to atorvastatin and percocet [oxycodone-acetaminophen]     Current Outpatient Medications   Medication Sig Dispense Refill    aspirin 81 MG tablet Take 1 tablet by mouth daily      clopidogrel (PLAVIX) 75 mg tablet Take 1 tablet (75 mg total) by mouth daily 90 tablet 3    finasteride (PROSCAR) 5 mg tablet Take 5 mg by mouth daily  0    glipiZIDE (GLUCOTROL) 5 mg tablet take 1 tablet by mouth twice a day 180 tablet 0    insulin lispro (HUMALOG WILFRED KWIKPEN) 100 units/mL injection pen INJECT UP TO 20 UNITS 3 TIMES A DAY BEFORE MEALS 20 pen 6    Insulin Pen Needle (PEN NEEDLES) 32G X 4 MM MISC Check blood glucose at home AC and HS 4 times a day 200 each 3    levothyroxine 88 mcg tablet take 1 tablet by mouth daily 90 tablet 3    rosuvastatin (CRESTOR) 20 MG tablet take 1 tablet by mouth daily 90 tablet 3    tamsulosin (FLOMAX) 0 4 mg Take 1 capsule (0 4 mg total) by mouth daily 90 capsule 3    metoprolol succinate (TOPROL-XL) 25 mg 24 hr tablet Take 1 tablet (25 mg total) by mouth daily 90 tablet 3     No current facility-administered medications for this visit  Review of Systems   Constitutional: Positive for fatigue  Negative for chills and fever  HENT: Negative for sore throat and trouble swallowing  Eyes: Negative for pain  Respiratory: Positive for shortness of breath  Negative for cough  Cardiovascular: Negative for chest pain and palpitations  Gastrointestinal: Negative for abdominal pain, blood in stool, diarrhea, nausea and vomiting  Endocrine: Negative for cold intolerance and heat intolerance     Genitourinary: Negative for dysuria, frequency and testicular pain  Musculoskeletal: Positive for arthralgias  Negative for joint swelling  Skin: Positive for rash  Allergic/Immunologic: Negative for immunocompromised state  Neurological: Negative for dizziness, syncope and headaches  Hematological: Negative for adenopathy  Does not bruise/bleed easily  Psychiatric/Behavioral: Negative for dysphoric mood  The patient is not nervous/anxious  Objective:  Vitals:    07/15/19 1258   BP: 112/64   Pulse: 92   SpO2: 96%      Physical Exam   Constitutional: He is oriented to person, place, and time  He appears well-developed and well-nourished  Older male patient  He appears to be stated age  A bit unkempt  He looks chronically ill but is not in distress   HENT:   Head: Normocephalic and atraumatic  Eyes: Pupils are equal, round, and reactive to light  Conjunctivae are normal    Neck: Normal range of motion  No thyromegaly present  Cardiovascular: Normal rate, regular rhythm and normal heart sounds  No murmur heard  Pulses:       Dorsalis pedis pulses are 1+ on the right side, and 1+ on the left side  Posterior tibial pulses are 1+ on the right side, and 2+ on the left side  Pulmonary/Chest: Effort normal and breath sounds normal  No respiratory distress  He has no wheezes  He has no rales  Abdominal: Soft  Genitourinary: Penis normal  Right testis shows no mass and no tenderness  Left testis shows no mass and no tenderness  Musculoskeletal: Normal range of motion  He exhibits deformity  Osteoarthrosis deformities of the fingers which are mild  Pretty noticeable hammertoe deformities  No inflammatory joints peer   Feet:   Right Foot:   Skin Integrity: Positive for dry skin  Negative for ulcer, skin breakdown, erythema, warmth or callus  Left Foot:   Skin Integrity: Positive for dry skin  Negative for ulcer, skin breakdown, erythema, warmth or callus     Lymphadenopathy:     He has no cervical adenopathy  Neurological: He is alert and oriented to person, place, and time  Skin: Skin is warm and dry  Rash noted  Patchy erythema ptosis areas scattered around his trunk and legs with slight scale  Psychiatric: He has a normal mood and affect   His behavior is normal  Judgment and thought content normal

## 2019-08-16 DIAGNOSIS — D64.9 ANEMIA, UNSPECIFIED TYPE: ICD-10-CM

## 2019-08-16 DIAGNOSIS — E11.9 TYPE 2 DIABETES MELLITUS WITHOUT COMPLICATION, WITHOUT LONG-TERM CURRENT USE OF INSULIN (HCC): ICD-10-CM

## 2019-08-16 RX ORDER — GLIPIZIDE 5 MG/1
TABLET ORAL
Qty: 180 TABLET | Refills: 0 | Status: SHIPPED | OUTPATIENT
Start: 2019-08-16 | End: 2019-08-26 | Stop reason: SDUPTHER

## 2019-08-26 DIAGNOSIS — E11.9 TYPE 2 DIABETES MELLITUS WITHOUT COMPLICATION, WITHOUT LONG-TERM CURRENT USE OF INSULIN (HCC): ICD-10-CM

## 2019-08-26 DIAGNOSIS — D64.9 ANEMIA, UNSPECIFIED TYPE: ICD-10-CM

## 2019-08-26 RX ORDER — GLIPIZIDE 5 MG/1
5 TABLET ORAL 2 TIMES DAILY
Qty: 180 TABLET | Refills: 3 | Status: SHIPPED | OUTPATIENT
Start: 2019-08-26 | End: 2020-03-02 | Stop reason: SDUPTHER

## 2019-08-26 NOTE — TELEPHONE ENCOUNTER
Pt needs a new 90 day rx with 3 refills for glipizide 5mg 1 tablet daily sent to [facility] [address]      30 day rx was much more expensive  Please advise,

## 2019-09-04 ENCOUNTER — REMOTE DEVICE CLINIC VISIT (OUTPATIENT)
Dept: CARDIOLOGY CLINIC | Facility: CLINIC | Age: 79
End: 2019-09-04
Payer: COMMERCIAL

## 2019-09-04 DIAGNOSIS — Z95.810 PRESENCE OF IMPLANTABLE CARDIOVERTER-DEFIBRILLATOR (ICD): Primary | ICD-10-CM

## 2019-09-04 DIAGNOSIS — I50.22 CHRONIC SYSTOLIC CONGESTIVE HEART FAILURE (HCC): ICD-10-CM

## 2019-09-04 PROCEDURE — 93296 REM INTERROG EVL PM/IDS: CPT | Performed by: INTERNAL MEDICINE

## 2019-09-04 PROCEDURE — 93295 DEV INTERROG REMOTE 1/2/MLT: CPT | Performed by: INTERNAL MEDICINE

## 2019-09-04 RX ORDER — METOPROLOL SUCCINATE 25 MG/1
37.5 TABLET, EXTENDED RELEASE ORAL DAILY
Qty: 135 TABLET | Refills: 3 | Status: SHIPPED | OUTPATIENT
Start: 2019-09-04 | End: 2019-12-16 | Stop reason: SDUPTHER

## 2019-09-04 NOTE — PROGRESS NOTES
[facility] SINGLE CHAMBER ICD  LATITUDE TRANSMISSION:  BATTERY VOLTAGE ADEQUATE (7 5 YR)    0%   ALL LEAD PARAMETERS WITHIN NORMAL LIMITS   3 VT-1 EPISODES (NO THERAPIES) WITH EGMS SHOWING PROBABLE SVT ~ 160 BPM   10 NON-SUSTAINED V EVENTS WITH EGMS SHOWING PROBABLE NSVT (6 @ 136 BPM, 4 @ 156 BPM)   NORMAL DEVICE FUNCTION   RG

## 2019-09-04 NOTE — TELEPHONE ENCOUNTER
Med pending  Pt verbally understood per Dr Jaret Gerber device function is normal and battery status is good  He is to increase metoprolol succinate to 25 mg 1 and half tablets daily  Verbal understanding given

## 2019-09-27 ENCOUNTER — APPOINTMENT (OUTPATIENT)
Dept: LAB | Facility: CLINIC | Age: 79
End: 2019-09-27
Payer: COMMERCIAL

## 2019-09-27 ENCOUNTER — OFFICE VISIT (OUTPATIENT)
Dept: INTERNAL MEDICINE CLINIC | Facility: CLINIC | Age: 79
End: 2019-09-27
Payer: COMMERCIAL

## 2019-09-27 VITALS
HEART RATE: 74 BPM | DIASTOLIC BLOOD PRESSURE: 58 MMHG | OXYGEN SATURATION: 97 % | SYSTOLIC BLOOD PRESSURE: 110 MMHG | BODY MASS INDEX: 26.84 KG/M2 | HEIGHT: 66 IN | WEIGHT: 167 LBS

## 2019-09-27 DIAGNOSIS — E03.9 HYPOTHYROIDISM (ACQUIRED): ICD-10-CM

## 2019-09-27 DIAGNOSIS — E78.2 MIXED HYPERLIPIDEMIA: ICD-10-CM

## 2019-09-27 DIAGNOSIS — I50.22 CHRONIC SYSTOLIC CONGESTIVE HEART FAILURE (HCC): ICD-10-CM

## 2019-09-27 DIAGNOSIS — E11.65 TYPE 2 DIABETES MELLITUS WITH HYPERGLYCEMIA, WITHOUT LONG-TERM CURRENT USE OF INSULIN (HCC): Primary | ICD-10-CM

## 2019-09-27 DIAGNOSIS — R33.9 URINARY RETENTION: ICD-10-CM

## 2019-09-27 DIAGNOSIS — E11.65 TYPE 2 DIABETES MELLITUS WITH HYPERGLYCEMIA, WITHOUT LONG-TERM CURRENT USE OF INSULIN (HCC): ICD-10-CM

## 2019-09-27 PROBLEM — R21 RASH AND NONSPECIFIC SKIN ERUPTION: Status: RESOLVED | Noted: 2018-10-08 | Resolved: 2019-09-27

## 2019-09-27 LAB
ALBUMIN SERPL BCP-MCNC: 3.9 G/DL (ref 3.5–5)
ALP SERPL-CCNC: 67 U/L (ref 46–116)
ALT SERPL W P-5'-P-CCNC: 29 U/L (ref 12–78)
ANION GAP SERPL CALCULATED.3IONS-SCNC: 5 MMOL/L (ref 4–13)
AST SERPL W P-5'-P-CCNC: 26 U/L (ref 5–45)
BACTERIA UR QL AUTO: ABNORMAL /HPF
BASOPHILS # BLD AUTO: 0.05 THOUSANDS/ΜL (ref 0–0.1)
BASOPHILS NFR BLD AUTO: 1 % (ref 0–1)
BILIRUB SERPL-MCNC: 0.57 MG/DL (ref 0.2–1)
BILIRUB UR QL STRIP: ABNORMAL
BUN SERPL-MCNC: 19 MG/DL (ref 5–25)
CALCIUM SERPL-MCNC: 9.5 MG/DL (ref 8.3–10.1)
CHLORIDE SERPL-SCNC: 108 MMOL/L (ref 100–108)
CHOLEST SERPL-MCNC: 138 MG/DL (ref 50–200)
CLARITY UR: CLEAR
CO2 SERPL-SCNC: 27 MMOL/L (ref 21–32)
COLOR UR: YELLOW
CREAT SERPL-MCNC: 1.24 MG/DL (ref 0.6–1.3)
CREAT UR-MCNC: 235 MG/DL
EOSINOPHIL # BLD AUTO: 0.26 THOUSAND/ΜL (ref 0–0.61)
EOSINOPHIL NFR BLD AUTO: 4 % (ref 0–6)
ERYTHROCYTE [DISTWIDTH] IN BLOOD BY AUTOMATED COUNT: 17.2 % (ref 11.6–15.1)
EST. AVERAGE GLUCOSE BLD GHB EST-MCNC: 203 MG/DL
GFR SERPL CREATININE-BSD FRML MDRD: 55 ML/MIN/1.73SQ M
GLUCOSE P FAST SERPL-MCNC: 98 MG/DL (ref 65–99)
GLUCOSE UR STRIP-MCNC: NEGATIVE MG/DL
HBA1C MFR BLD: 8.7 % (ref 4.2–6.3)
HCT VFR BLD AUTO: 38.6 % (ref 36.5–49.3)
HDLC SERPL-MCNC: 43 MG/DL (ref 40–60)
HGB BLD-MCNC: 12.6 G/DL (ref 12–17)
HGB UR QL STRIP.AUTO: ABNORMAL
HYALINE CASTS #/AREA URNS LPF: ABNORMAL /LPF
IMM GRANULOCYTES # BLD AUTO: 0.02 THOUSAND/UL (ref 0–0.2)
IMM GRANULOCYTES NFR BLD AUTO: 0 % (ref 0–2)
KETONES UR STRIP-MCNC: NEGATIVE MG/DL
LDLC SERPL CALC-MCNC: 56 MG/DL (ref 0–100)
LEUKOCYTE ESTERASE UR QL STRIP: NEGATIVE
LYMPHOCYTES # BLD AUTO: 1.36 THOUSANDS/ΜL (ref 0.6–4.47)
LYMPHOCYTES NFR BLD AUTO: 20 % (ref 14–44)
MCH RBC QN AUTO: 35.9 PG (ref 26.8–34.3)
MCHC RBC AUTO-ENTMCNC: 32.6 G/DL (ref 31.4–37.4)
MCV RBC AUTO: 110 FL (ref 82–98)
MICROALBUMIN UR-MCNC: 187 MG/L (ref 0–20)
MICROALBUMIN/CREAT 24H UR: 80 MG/G CREATININE (ref 0–30)
MONOCYTES # BLD AUTO: 0.63 THOUSAND/ΜL (ref 0.17–1.22)
MONOCYTES NFR BLD AUTO: 9 % (ref 4–12)
NEUTROPHILS # BLD AUTO: 4.54 THOUSANDS/ΜL (ref 1.85–7.62)
NEUTS SEG NFR BLD AUTO: 66 % (ref 43–75)
NITRITE UR QL STRIP: NEGATIVE
NON-SQ EPI CELLS URNS QL MICRO: ABNORMAL /HPF
NRBC BLD AUTO-RTO: 0 /100 WBCS
PH UR STRIP.AUTO: 6 [PH]
PLATELET # BLD AUTO: 205 THOUSANDS/UL (ref 149–390)
PMV BLD AUTO: 10.5 FL (ref 8.9–12.7)
POTASSIUM SERPL-SCNC: 3.9 MMOL/L (ref 3.5–5.3)
PROT SERPL-MCNC: 7.5 G/DL (ref 6.4–8.2)
PROT UR STRIP-MCNC: ABNORMAL MG/DL
RBC # BLD AUTO: 3.51 MILLION/UL (ref 3.88–5.62)
RBC #/AREA URNS AUTO: ABNORMAL /HPF
SODIUM SERPL-SCNC: 140 MMOL/L (ref 136–145)
SP GR UR STRIP.AUTO: 1.02 (ref 1–1.03)
TRIGL SERPL-MCNC: 196 MG/DL
TSH SERPL DL<=0.05 MIU/L-ACNC: 0.96 UIU/ML (ref 0.36–3.74)
UROBILINOGEN UR QL STRIP.AUTO: 1 E.U./DL
WBC # BLD AUTO: 6.86 THOUSAND/UL (ref 4.31–10.16)
WBC #/AREA URNS AUTO: ABNORMAL /HPF

## 2019-09-27 PROCEDURE — 82570 ASSAY OF URINE CREATININE: CPT | Performed by: INTERNAL MEDICINE

## 2019-09-27 PROCEDURE — 80053 COMPREHEN METABOLIC PANEL: CPT

## 2019-09-27 PROCEDURE — 83036 HEMOGLOBIN GLYCOSYLATED A1C: CPT

## 2019-09-27 PROCEDURE — 80061 LIPID PANEL: CPT

## 2019-09-27 PROCEDURE — 84443 ASSAY THYROID STIM HORMONE: CPT

## 2019-09-27 PROCEDURE — 81001 URINALYSIS AUTO W/SCOPE: CPT | Performed by: INTERNAL MEDICINE

## 2019-09-27 PROCEDURE — 82043 UR ALBUMIN QUANTITATIVE: CPT | Performed by: INTERNAL MEDICINE

## 2019-09-27 PROCEDURE — 36415 COLL VENOUS BLD VENIPUNCTURE: CPT

## 2019-09-27 PROCEDURE — 85025 COMPLETE CBC W/AUTO DIFF WBC: CPT

## 2019-09-27 PROCEDURE — 99214 OFFICE O/P EST MOD 30 MIN: CPT | Performed by: INTERNAL MEDICINE

## 2019-09-27 NOTE — PATIENT INSTRUCTIONS
A patient with multiple chronic diagnoses at a stable baseline  He received influenza vaccine last week  I would like to see him repeat his laboratory testing    I recognize the pain in the hands  Unfortunately his comorbidities of CKD, heart failure, diabetes redder use of nonsteroidal anti-inflammatory drugs ill advised  He is stuck with Tylenol as a first-line drug  See me back here again in 3-4 months  Get the lab work done today and I will call you with those results

## 2019-09-27 NOTE — PROGRESS NOTES
Assessment/Plan:       Diagnoses and all orders for this visit:    Type 2 diabetes mellitus with hyperglycemia, without long-term current use of insulin (HCC)  -     insulin glargine (LANTUS SOLOSTAR) 100 units/mL injection pen; Inject 20 Units under the skin daily  -     CBC and differential; Future  -     Lipid Panel with Direct LDL reflex; Future  -     Comprehensive metabolic panel; Future  -     TSH, 3rd generation with Free T4 reflex; Future  -     Urinalysis with reflex to microscopic  -     Microalbumin / creatinine urine ratio  -     Hemoglobin A1C; Future    Hypothyroidism (acquired)  -     CBC and differential; Future  -     Lipid Panel with Direct LDL reflex; Future  -     Comprehensive metabolic panel; Future  -     TSH, 3rd generation with Free T4 reflex; Future  -     Urinalysis with reflex to microscopic  -     Microalbumin / creatinine urine ratio  -     Hemoglobin A1C; Future    Chronic systolic congestive heart failure (HCC)  -     CBC and differential; Future  -     Lipid Panel with Direct LDL reflex; Future  -     Comprehensive metabolic panel; Future  -     TSH, 3rd generation with Free T4 reflex; Future  -     Urinalysis with reflex to microscopic  -     Microalbumin / creatinine urine ratio  -     Hemoglobin A1C; Future    Urinary retention    Mixed hyperlipidemia  -     CBC and differential; Future  -     Lipid Panel with Direct LDL reflex; Future  -     Comprehensive metabolic panel; Future  -     TSH, 3rd generation with Free T4 reflex; Future  -     Urinalysis with reflex to microscopic  -     Microalbumin / creatinine urine ratio  -     Hemoglobin A1C; Future          Patient Instructions   A patient with multiple chronic diagnoses at a stable baseline  He received influenza vaccine last week  I would like to see him repeat his laboratory testing    I recognize the pain in the hands    Unfortunately his comorbidities of CKD, heart failure, diabetes redder use of nonsteroidal anti-inflammatory drugs ill advised  He is stuck with Tylenol as a first-line drug  See me back here again in 3-4 months  Get the lab work done today and I will call you with those results  Subjective:      Patient ID: Penelope Guillen is a 67 y o  male  Patient is a 69-year-old  male coming in today for follow-up follow-up regarding his diabetes mellitus per type 2  At the very beginning of the year his hemoglobin A1c was 12  The patient increased his insulin  The beginning of May, hemoglobin A1c was 7 9 percent  He is taking Lantus insulin 20 units once and short-acting insulin 10 units 3 times a day  He reports that he has episodes of low blood sugar occurring about 2 or 3:00 a m  In the morning which has happened 4 times since the last visit  That was in July  So  happening perhaps twice a month      Chronic problems of hyperlipidemia, hypertension, coronary artery disease status post angioplasty, systolic heart failure  Recent ejection fraction of 25%  He continues to have chronic symptomatology regarding his low EF  He has exertional dyspnea and has a sensation of fatigue on exertion  However, this is at a baseline status with no recent decompensation and his examination is normal with clear breath sounds and no edema      Anemia noted  Hemoglobin between 8 and 10  Heme-positive stool  EGD was done documenting severe gastritis and he was placed on PPI double dose  Capsule enteroscopy follow-up reported by the patient  He was told was normal  Colonoscopy reported to be normal At the same time, MCV elevation of 111 so there may be a component of B12 deficiency or myelodysplasia  The most recent hemoglobin was up to 12 3 again with the macrocytosis        Right shoulder pain exacerbated by motion and relieved by rest is chronic      Urinary retention with resolution:  He had been treated for this about a year ago    He had an indwelling Mckeon catheter for number of months but it has finally been removed  Ewa Monzon with Urology  Working diagnosis is urinary retention secondary to prostatic hyperplasia  No recurrene of the retention     Complication of condition was development of acute kidney injury  This necessitated discontinuation of Actos and also metformin  Also, because blood pressure was low, the metoprolol dose was cut to 12 5 mg daily  However, the most recent metabolic panel documented recovery of kidney function      I saw him with this above statement in May and he comes back today for routine checkup and also ongoing laboratory testing    The patient has osteoarthritis and he is complaining particularly of his fingers  He has both Heberden and Rupesh's nodes of all his fingers  Additionally, he has a tendon flexion problem with the 4th tendon of the right hand and has some contraction of that finger          The following portions of the patient's history were reviewed and updated as appropriate:   He has a past medical history of Acquired cyst of kidney, Anemia, Benign paroxysmal vertigo, unspecified ear, Cellulitis of leg, Cervical spinal stenosis, Chest pain, Coronary atherosclerosis of native coronary artery, Enlarged prostate, Esophageal candidiasis ([address] ), Hematuria, Herpes zoster, Hyperlipidemia, Hypertension, Incomplete emptying of bladder, Inflamed seborrheic keratosis, Internal hemorrhoids, Malignant neoplasm of skin of trunk, Myocardial infarction ([address] ), Nonmelanoma skin cancer, 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 infarction, Paroxysmal tachycardia ([address] ), Shortness of breath, and Vitamin B deficiency  ,  does not have any pertinent problems on file  ,   has a past surgical history that includes Hip Arthroplasty (Right); Coronary angioplasty with stent; Joint replacement (Right); Insert / replace / remove pacemaker; EGD AND COLONOSCOPY (N/A, 2/12/2018); Colonoscopy (10/07/2008); Cystoscopy (11/06/2015); Trunk skin lesion excisional biopsy (08/22/2007);  Cardiac defibrillator placement; and Coronary angioplasty with stent  ,  family history includes Cancer in his family and father; Coronary artery disease in his family and mother; Heart disease in his mother; Sudden death in his mother; Throat cancer in his father  ,   reports that he quit smoking about 41 years ago  He has never used smokeless tobacco  He reports that he drinks alcohol  He reports that he does not use drugs  ,  is allergic to atorvastatin and percocet [oxycodone-acetaminophen]     Current Outpatient Medications   Medication Sig Dispense Refill    aspirin 81 MG tablet Take 1 tablet by mouth daily      clopidogrel (PLAVIX) 75 mg tablet Take 1 tablet (75 mg total) by mouth daily 90 tablet 3    glipiZIDE (GLUCOTROL) 5 mg tablet Take 1 tablet (5 mg total) by mouth 2 (two) times a day 180 tablet 3    insulin lispro (HUMALOG WILFRED KWIKPEN) 100 units/mL injection pen INJECT UP TO 20 UNITS 3 TIMES A DAY BEFORE MEALS 20 pen 6    Insulin Pen Needle (PEN NEEDLES) 32G X 4 MM MISC Check blood glucose at home AC and HS 4 times a day 200 each 3    levothyroxine 88 mcg tablet take 1 tablet by mouth daily 90 tablet 3    metoprolol succinate (TOPROL-XL) 25 mg 24 hr tablet Take 1 5 tablets (37 5 mg total) by mouth daily Take 1 and a half tablet (37 5 mg total) by mouth daily  135 tablet 3    rosuvastatin (CRESTOR) 20 MG tablet take 1 tablet by mouth daily 90 tablet 3    tamsulosin (FLOMAX) 0 4 mg Take 1 capsule (0 4 mg total) by mouth daily 90 capsule 3    finasteride (PROSCAR) 5 mg tablet Take 5 mg by mouth daily  0    insulin glargine (LANTUS SOLOSTAR) 100 units/mL injection pen Inject 20 Units under the skin daily 5 pen 5     No current facility-administered medications for this visit  Review of Systems   Constitutional: Positive for fatigue  Negative for chills and fever  HENT: Negative for sore throat and trouble swallowing  Eyes: Negative for pain  Respiratory: Positive for shortness of breath  Negative for cough  Cardiovascular: Negative for chest pain and palpitations  Gastrointestinal: Negative for abdominal pain, blood in stool, diarrhea, nausea and vomiting  Endocrine: Negative for cold intolerance and heat intolerance  Genitourinary: Negative for dysuria, frequency and testicular pain  Musculoskeletal: Positive for arthralgias  Negative for joint swelling  Skin: Positive for rash  Allergic/Immunologic: Negative for immunocompromised state  Neurological: Negative for dizziness, syncope and headaches  Hematological: Negative for adenopathy  Does not bruise/bleed easily  Psychiatric/Behavioral: Negative for dysphoric mood  The patient is not nervous/anxious  Objective:  Vitals:    09/27/19 1343   BP: 110/58   Pulse: 74   SpO2: 97%      Physical Exam   Constitutional: He is oriented to person, place, and time  He appears well-developed and well-nourished  Older male patient  He appears to be stated age  A bit unkempt  He looks chronically ill but is not in distress   HENT:   Head: Normocephalic and atraumatic  Eyes: Pupils are equal, round, and reactive to light  Conjunctivae are normal    Neck: Normal range of motion  No thyromegaly present  Cardiovascular: Normal rate, regular rhythm and normal heart sounds  No murmur heard  Pulses:       Dorsalis pedis pulses are 1+ on the right side, and 1+ on the left side  Posterior tibial pulses are 1+ on the right side, and 2+ on the left side  Pulmonary/Chest: Effort normal and breath sounds normal  No respiratory distress  He has no wheezes  He has no rales  Abdominal: Soft  Genitourinary: Penis normal  Right testis shows no mass and no tenderness  Left testis shows no mass and no tenderness  Musculoskeletal: Normal range of motion  He exhibits deformity  Osteoarthrosis deformities of the fingers which are mild  Pretty noticeable hammertoe deformities    No inflammatory joints peer   Feet:   Right Foot:   Skin Integrity: Positive for dry skin  Negative for ulcer, skin breakdown, erythema, warmth or callus  Left Foot:   Skin Integrity: Positive for dry skin  Negative for ulcer, skin breakdown, erythema, warmth or callus  Lymphadenopathy:     He has no cervical adenopathy  Neurological: He is alert and oriented to person, place, and time  Skin: Skin is warm and dry  Rash noted  Patchy erythema ptosis areas scattered around his trunk and legs with slight scale  Psychiatric: He has a normal mood and affect  His behavior is normal  Judgment and thought content normal        Body mass index is 26 95 kg/m²

## 2019-09-30 ENCOUNTER — TELEPHONE (OUTPATIENT)
Dept: INTERNAL MEDICINE CLINIC | Facility: CLINIC | Age: 79
End: 2019-09-30

## 2019-09-30 NOTE — TELEPHONE ENCOUNTER
Re:  HgA1c results/needs appt  LVM  asking pt to return call to discuss "test" results/instructions and/or follow-up  No details given

## 2019-09-30 NOTE — TELEPHONE ENCOUNTER
----- Message from Susan Caldwell MD sent at 9/30/2019  7:49 AM EDT -----  A diabetic control with hemoglobin A1c of 8 7 he needs an office visit to go over this period

## 2019-10-01 ENCOUNTER — TELEPHONE (OUTPATIENT)
Dept: INTERNAL MEDICINE CLINIC | Facility: CLINIC | Age: 79
End: 2019-10-01

## 2019-10-07 ENCOUNTER — OFFICE VISIT (OUTPATIENT)
Dept: INTERNAL MEDICINE CLINIC | Facility: CLINIC | Age: 79
End: 2019-10-07
Payer: COMMERCIAL

## 2019-10-07 VITALS
HEART RATE: 82 BPM | DIASTOLIC BLOOD PRESSURE: 80 MMHG | BODY MASS INDEX: 27.02 KG/M2 | TEMPERATURE: 96.3 F | WEIGHT: 167.4 LBS | OXYGEN SATURATION: 96 % | SYSTOLIC BLOOD PRESSURE: 128 MMHG

## 2019-10-07 DIAGNOSIS — I25.5 ISCHEMIC CARDIOMYOPATHY: ICD-10-CM

## 2019-10-07 DIAGNOSIS — R39.14 BENIGN PROSTATIC HYPERPLASIA WITH INCOMPLETE BLADDER EMPTYING: ICD-10-CM

## 2019-10-07 DIAGNOSIS — D64.9 ANEMIA, UNSPECIFIED TYPE: ICD-10-CM

## 2019-10-07 DIAGNOSIS — E11.65 TYPE 2 DIABETES MELLITUS WITH HYPERGLYCEMIA, WITHOUT LONG-TERM CURRENT USE OF INSULIN (HCC): ICD-10-CM

## 2019-10-07 DIAGNOSIS — E03.9 HYPOTHYROIDISM (ACQUIRED): Primary | ICD-10-CM

## 2019-10-07 DIAGNOSIS — N40.1 BENIGN PROSTATIC HYPERPLASIA WITH INCOMPLETE BLADDER EMPTYING: ICD-10-CM

## 2019-10-07 PROCEDURE — 3288F FALL RISK ASSESSMENT DOCD: CPT | Performed by: PHYSICIAN ASSISTANT

## 2019-10-07 PROCEDURE — 99214 OFFICE O/P EST MOD 30 MIN: CPT | Performed by: PHYSICIAN ASSISTANT

## 2019-10-07 PROCEDURE — 1100F PTFALLS ASSESS-DOCD GE2>/YR: CPT | Performed by: PHYSICIAN ASSISTANT

## 2019-10-07 NOTE — PROGRESS NOTES
Assessment/Plan: will increase his Lantus by 3 units  He will try to be more compliant with his Humalog   will get A1c in 1 month     Diagnoses and all orders for this visit:    Hypothyroidism (acquired)    Type 2 diabetes mellitus with hyperglycemia, without long-term current use of insulin ([facility])  -     Discontinue: insulin glargine (LANTUS SOLOSTAR) 100 units/mL injection pen; Inject 20 Units under the skin daily  -     insulin glargine (LANTUS SOLOSTAR) 100 units/mL injection pen; Inject 23 Units under the skin daily  -     Hemoglobin A1C; Future    Ischemic cardiomyopathy    Anemia, unspecified type    Benign prostatic hyperplasia with incomplete bladder emptying        No problem-specific Assessment & Plan notes found for this encounter  Subjective:      Patient ID: Allie Comer is a 67 y o  male  He is here for follow-up recent labs showed that his A1c went up from 7 8 to 8 7  He admits to be poorly compliant with his regular insulin but he says he takes his Lantus regularly 20 units appetite weight stable he feels well long-term problem with pain in his righthand previous surgery for Dupuytren's contracture he has followed with Ortho and Neurology      The following portions of the patient's history were reviewed and updated as appropriate:   He has a past medical history of Acquired cyst of kidney, Anemia, Benign paroxysmal vertigo, unspecified ear, Cellulitis of leg, Cervical spinal stenosis, Chest pain, Coronary atherosclerosis of native coronary artery, Enlarged prostate, Esophageal candidiasis ([address] ), Hematuria, Herpes zoster, Hyperlipidemia, Hypertension, Incomplete emptying of bladder, Inflamed seborrheic keratosis, Internal hemorrhoids, Malignant neoplasm of skin of trunk, Myocardial infarction ([address] ), Nonmelanoma skin cancer, 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 infarction, Paroxysmal tachycardia ([address] ), Shortness of breath, and Vitamin B deficiency  ,  does not have any pertinent problems on file  ,   has a past surgical history that includes Hip Arthroplasty (Right); Coronary angioplasty with stent; Joint replacement (Right); Insert / replace / remove pacemaker; EGD AND COLONOSCOPY (N/A, 2/12/2018); Colonoscopy (10/07/2008); Cystoscopy (11/06/2015); Trunk skin lesion excisional biopsy (08/22/2007); Cardiac defibrillator placement; and Coronary angioplasty with stent  ,  family history includes Cancer in his family and father; Coronary artery disease in his family and mother; Heart disease in his mother; Sudden death in his mother; Throat cancer in his father  ,   reports that he quit smoking about 41 years ago  His smoking use included cigarettes  He has a 10 00 pack-year smoking history  He has never used smokeless tobacco  He reports that he drinks alcohol  He reports that he does not use drugs  ,  is allergic to atorvastatin and percocet [oxycodone-acetaminophen]     Current Outpatient Medications   Medication Sig Dispense Refill    aspirin 81 MG tablet Take 1 tablet by mouth daily      clopidogrel (PLAVIX) 75 mg tablet Take 1 tablet (75 mg total) by mouth daily 90 tablet 3    finasteride (PROSCAR) 5 mg tablet Take 5 mg by mouth daily  0    glipiZIDE (GLUCOTROL) 5 mg tablet Take 1 tablet (5 mg total) by mouth 2 (two) times a day 180 tablet 3    insulin glargine (LANTUS SOLOSTAR) 100 units/mL injection pen Inject 23 Units under the skin daily 5 pen 5    insulin lispro (HUMALOG WILFRED KWIKPEN) 100 units/mL injection pen INJECT UP TO 20 UNITS 3 TIMES A DAY BEFORE MEALS 20 pen 6    Insulin Pen Needle (PEN NEEDLES) 32G X 4 MM MISC Check blood glucose at home AC and HS 4 times a day 200 each 3    levothyroxine 88 mcg tablet take 1 tablet by mouth daily 90 tablet 3    metoprolol succinate (TOPROL-XL) 25 mg 24 hr tablet Take 1 5 tablets (37 5 mg total) by mouth daily Take 1 and a half tablet (37 5 mg total) by mouth daily   135 tablet 3    rosuvastatin (CRESTOR) 20 MG tablet take 1 tablet by mouth daily 90 tablet 3    tamsulosin (FLOMAX) 0 4 mg Take 1 capsule (0 4 mg total) by mouth daily 90 capsule 3     No current facility-administered medications for this visit  Review of Systems   Constitutional: Negative for activity change, appetite change, chills, diaphoresis, fatigue, fever and unexpected weight change  HENT: Negative for congestion, dental problem, drooling, ear discharge, ear pain, facial swelling, hearing loss, nosebleeds, postnasal drip, rhinorrhea, sinus pressure, sinus pain, sneezing, sore throat, tinnitus, trouble swallowing and voice change  Eyes: Negative for photophobia, pain, discharge, redness, itching and visual disturbance  Respiratory: Negative for apnea, cough, choking, chest tightness, shortness of breath, wheezing and stridor  Cardiovascular: Negative for chest pain, palpitations and leg swelling  Gastrointestinal: Negative for abdominal distention, abdominal pain, anal bleeding, blood in stool, constipation, diarrhea, nausea, rectal pain and vomiting  Endocrine: Negative for cold intolerance, heat intolerance, polydipsia, polyphagia and polyuria  Genitourinary: Negative for decreased urine volume, difficulty urinating, dysuria, enuresis, flank pain, frequency, genital sores, hematuria and urgency  Musculoskeletal: Positive for myalgias  Negative for arthralgias, back pain, gait problem, joint swelling, neck pain and neck stiffness  Skin: Negative for color change, pallor, rash and wound  Neurological: Negative for dizziness, tremors, seizures, syncope, facial asymmetry, speech difficulty, weakness, light-headedness, numbness and headaches  Hematological: Negative for adenopathy  Does not bruise/bleed easily  Psychiatric/Behavioral: Negative for agitation, behavioral problems, confusion, decreased concentration, dysphoric mood, hallucinations, self-injury, sleep disturbance and suicidal ideas  The patient is not nervous/anxious and is not hyperactive  Objective:  Vitals:    10/07/19 0836   BP: 128/80   BP Location: Left arm   Patient Position: Sitting   Cuff Size: Standard   Pulse: 82   Temp: (!) 96 3 °F (35 7 °C)   TempSrc: Tympanic   SpO2: 96%   Weight: 75 9 kg (167 lb 6 4 oz)     Body mass index is 27 02 kg/m²  Physical Exam   Constitutional: He is oriented to person, place, and time  He appears well-developed  Teeth in poor repair   HENT:   Head: Normocephalic  Right Ear: External ear normal    Left Ear: External ear normal    Mouth/Throat: Oropharynx is clear and moist    Eyes: Pupils are equal, round, and reactive to light  Conjunctivae are normal    Neck: Neck supple  No JVD present  No thyromegaly present  Cardiovascular: Normal rate, regular rhythm, normal heart sounds and intact distal pulses  No murmur heard  Pulmonary/Chest: Effort normal and breath sounds normal  No respiratory distress  Abdominal: Soft  Bowel sounds are normal    Musculoskeletal: Normal range of motion  He exhibits tenderness and deformity ( right hand Dupuytren's contracture middle finger atrophy of the hand somewhat red)  He exhibits no edema  Lymphadenopathy:     He has no cervical adenopathy  Neurological: He is alert and oriented to person, place, and time  He has normal reflexes  Skin: Skin is warm and dry  Vitals reviewed
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
## 2019-10-28 ENCOUNTER — OFFICE VISIT (OUTPATIENT)
Dept: UROLOGY | Facility: CLINIC | Age: 79
End: 2019-10-28
Payer: COMMERCIAL

## 2019-10-28 VITALS
HEART RATE: 88 BPM | HEIGHT: 66 IN | BODY MASS INDEX: 26.68 KG/M2 | SYSTOLIC BLOOD PRESSURE: 130 MMHG | WEIGHT: 166 LBS | DIASTOLIC BLOOD PRESSURE: 74 MMHG

## 2019-10-28 DIAGNOSIS — D64.9 ANEMIA, UNSPECIFIED TYPE: ICD-10-CM

## 2019-10-28 DIAGNOSIS — N40.1 BENIGN PROSTATIC HYPERPLASIA WITH URINARY FREQUENCY: ICD-10-CM

## 2019-10-28 DIAGNOSIS — E11.9 TYPE 2 DIABETES MELLITUS WITHOUT COMPLICATION, WITHOUT LONG-TERM CURRENT USE OF INSULIN (HCC): ICD-10-CM

## 2019-10-28 DIAGNOSIS — R33.9 URINARY RETENTION: Primary | ICD-10-CM

## 2019-10-28 DIAGNOSIS — R35.0 BENIGN PROSTATIC HYPERPLASIA WITH URINARY FREQUENCY: ICD-10-CM

## 2019-10-28 LAB — POST-VOID RESIDUAL VOLUME, ML POC: 0 ML

## 2019-10-28 PROCEDURE — 99213 OFFICE O/P EST LOW 20 MIN: CPT | Performed by: PHYSICIAN ASSISTANT

## 2019-10-28 PROCEDURE — 51798 US URINE CAPACITY MEASURE: CPT | Performed by: PHYSICIAN ASSISTANT

## 2019-10-28 RX ORDER — FINASTERIDE 5 MG/1
5 TABLET, FILM COATED ORAL DAILY
Qty: 90 TABLET | Refills: 3 | Status: SHIPPED | OUTPATIENT
Start: 2019-10-28 | End: 2020-01-20 | Stop reason: SDUPTHER

## 2019-10-28 RX ORDER — TAMSULOSIN HYDROCHLORIDE 0.4 MG/1
0.4 CAPSULE ORAL DAILY
Qty: 90 CAPSULE | Refills: 3 | Status: SHIPPED | OUTPATIENT
Start: 2019-10-28 | End: 2020-01-20 | Stop reason: SDUPTHER

## 2019-12-04 ENCOUNTER — REMOTE DEVICE CLINIC VISIT (OUTPATIENT)
Dept: CARDIOLOGY CLINIC | Facility: CLINIC | Age: 79
End: 2019-12-04
Payer: COMMERCIAL

## 2019-12-04 DIAGNOSIS — Z95.810 PRESENCE OF IMPLANTABLE CARDIOVERTER-DEFIBRILLATOR (ICD): Primary | ICD-10-CM

## 2019-12-04 PROCEDURE — 93295 DEV INTERROG REMOTE 1/2/MLT: CPT | Performed by: INTERNAL MEDICINE

## 2019-12-04 PROCEDURE — 93296 REM INTERROG EVL PM/IDS: CPT | Performed by: INTERNAL MEDICINE

## 2019-12-04 NOTE — PROGRESS NOTES
BSC SINGLE CHAMBER ICD  LATITUDE TRANSMISSION:  BATTERY VOLTAGE ADEQUATE (7 YR)    0%   ALL LEAD PARAMETERS WITHIN NORMAL LIMITS   2 VT-1 EPISODES (MONITOR, NO THERAPIES) WITH 1 AVAILABLE EGM SHOWING PROBABLE RVR VS NSVT (10 @ 195 BPM)   7 NON-SUST V EVENTS WITH EGMS SHOWING NSVT (4 @ 187 BPM, 5 @ 165 BPM, 18 @ 170 BPM, 3 @ 180 BPM, 4 @ 161 BPM)   NORMAL DEVICE FUNCTION   RG

## 2019-12-06 ENCOUNTER — TELEPHONE (OUTPATIENT)
Dept: CARDIOLOGY CLINIC | Facility: CLINIC | Age: 79
End: 2019-12-06

## 2019-12-06 DIAGNOSIS — I50.22 CHRONIC SYSTOLIC CONGESTIVE HEART FAILURE (HCC): ICD-10-CM

## 2019-12-06 NOTE — TELEPHONE ENCOUNTER
----- Message from Howie Cyr MD sent at 12/5/2019 11:44 AM EST -----  Patient still has episodes of nonsustained ventricular tachycardia  Please instruct the patient increase metoprolol succinate to 50 mg daily  He may need any new prescription  Battery status is good  Normal device function

## 2019-12-06 NOTE — TELEPHONE ENCOUNTER
LM on home informing patient on med change and device function  Message also left on mobile number as well

## 2019-12-12 NOTE — PROGRESS NOTES
Problem List Items Addressed This Visit        Genitourinary    BPH (benign prostatic hyperplasia)    Relevant Orders    Case request operating room: CYSTOSCOPY WITH INSERTION UROLIFT (Completed)    Urinary retention - Primary       We reviewed the options for treating BPH/LUTS which include but are not limited to expectant management, medical therapy, transurethral resection of prostate (TURP)  We also discussed minimally invasive options  At this point, the patient wishes to proceed with Urolift  This is an appropriate option for the patient based on the following criteria:    - cystoscopy revealed lateral lobe hypertrophy, no median lobe, high-grade obstruction of the prostatic urethra  - measured gland volume 69 53  - uroflow with 2 milliliters/second maximum urinary flow  - PVR with 23 milliliters  - normal renal function  - no active urinary tract infection  - PSA:  3 1  - no documented allergy to nickel    - He has failed the following treatment options:  Maximal medical therapy   The additional morbidity of standard surgical options (ie, TURP) is not necessary given the above criteria  The risks of Urolift include but are not limited to bleeding, infection, reaction to anesthesia such as heart attack, stroke, DVT/PE, hyponatremia, bladder neck contracture, urethral stricture, injury to surrounding structures (ureters, rectum, etc)  We discussed that additional risks of trans urethral resection procedures such as incontinence, retrograde ejaculation, and erectile dysfunction have been only rarely reported with the Uro lift procedure  We did discuss that this is a newer procedure and a permanent implant  We discussed that there may be some long-term implications that are unforeseen with this newer technology, such as perhaps complicating treatment for prostate cancer if indicated down the line    Finally, I told him that he may require additional procedures secondary to some of these complications  Informed consent was obtained for the Uro lift procedure  This will be scheduled in the near future to be performed under IV sedation versus general anesthesia  Relevant Orders    POCT urine dip (Completed)    Case request operating room: CYSTOSCOPY WITH INSERTION UROLIFT (Completed)    Cystoscopy    POCT Measure PVR (Completed)    Biopsy prostate      Other Visit Diagnoses     History of elevated PSA        Relevant Orders    PSA, total and free            Discussion:  I had a nice visit with Sonja Abdi today  His urinary stream is suffering, he has intermittency and hesitancy, he is awakening every hour or so at night to void  He has failed maximal medical therapy  Workup today shows an obstructive prostatic urethra, no median lobe, and a prostate volume of 69 53 grams  As such he is an excellent candidate for Uro lift, he is very interested in this procedure  Informed consent was obtained, we will proceed to Uro lift in the coming weeks to months  He also has a history of some elevation of his PSA  My threshold to perform a prostate biopsy in him would be very high given some of his comorbidities, however it does make sense to recheck this as it was last checked 3 years ago and we would not want to miss occult prostate cancer as a cause of his urinary symptoms  Should that return as elevated and should he undergo a prostate biopsy, he would be a candidate only for radiation therapy  This would not be significantly affected by Uro lift placement      Assessment and plan:       Please see problem oriented charting for the assessment plan of today's urological complaints    Jarad Archer MD      Chief Complaint     Chief Complaint   Patient presents with    Cystoscopy     urinary retention         History of Present Illness     Molinda Saint is a 67 y o  gentleman with history of urinary retention  Currently on dual medical therapy  PVR has been 0   AUA symptom score has been 13 with a bother score of 4, does continue to have nocturia, frequency, and intermittency  Does have a history of diabetes, this can cause diabetic bladder which can lead to symptom similar to the ones that he is having  Cystoscopy and transrectal ultrasound today indicate that he is a good candidate for Uro lift  He states that I have to do something as his quality of life is decreasing daily due to his urinary symptoms  Patient does have a history of elevated PSA, most recently this was last checked in May of 2016 and was 3 1  Prostate is enlarged but smooth on his examination today  No new urologic complaints  The following portions of the patient's history were reviewed and updated as appropriate: allergies, current medications, past family history, past medical history, past social history, past surgical history and problem list         Detailed Urologic History     - please refer to HPI    Review of Systems     Review of Systems   Constitutional: Negative  HENT: Negative  Eyes: Negative  Respiratory: Negative  Cardiovascular: Negative  Gastrointestinal: Negative  Endocrine: Negative  Genitourinary:        As per HPI   Musculoskeletal: Negative  Skin: Negative  Allergic/Immunologic: Negative  Neurological: Negative  Hematological: Negative  Psychiatric/Behavioral: Negative  Allergies     Allergies   Allergen Reactions    Atorvastatin Other (See Comments)     Pt unsure      Percocet [Oxycodone-Acetaminophen] Nausea Only and GI Intolerance       Physical Exam     Physical Exam   Constitutional: He is oriented to person, place, and time  He appears well-developed and well-nourished  No distress  Poor dentition   HENT:   Head: Normocephalic and atraumatic  Eyes: Right eye exhibits no discharge  Left eye exhibits no discharge  Neck: No tracheal deviation present  Cardiovascular: Intact distal pulses     Pulmonary/Chest: Effort normal  No stridor  No respiratory distress  Abdominal: Soft  He exhibits no distension and no mass  There is no tenderness  There is no rebound and no guarding  No hernia  Genitourinary: Penis normal    Genitourinary Comments: Prostate is enlarged, smooth   Musculoskeletal: He exhibits no edema, tenderness or deformity  Neurological: He is alert and oriented to person, place, and time  No cranial nerve deficit  Coordination normal    Skin: Skin is warm and dry  No rash noted  He is not diaphoretic  No erythema  No pallor  Psychiatric: He has a normal mood and affect  His behavior is normal  Judgment and thought content normal    Nursing note and vitals reviewed  Vital Signs  Vitals:    12/13/19 1201   Weight: 76 1 kg (167 lb 12 8 oz)   Height: 5' 6" (1 676 m)         Current Medications       Current Outpatient Medications:     aspirin 81 MG tablet, Take 1 tablet by mouth daily, Disp: , Rfl:     clopidogrel (PLAVIX) 75 mg tablet, Take 1 tablet (75 mg total) by mouth daily, Disp: 90 tablet, Rfl: 3    finasteride (PROSCAR) 5 mg tablet, Take 1 tablet (5 mg total) by mouth daily, Disp: 90 tablet, Rfl: 3    glipiZIDE (GLUCOTROL) 5 mg tablet, Take 1 tablet (5 mg total) by mouth 2 (two) times a day, Disp: 180 tablet, Rfl: 3    insulin glargine (LANTUS SOLOSTAR) 100 units/mL injection pen, Inject 23 Units under the skin daily, Disp: 5 pen, Rfl: 5    insulin lispro (HUMALOG WILFRED KWIKPEN) 100 units/mL injection pen, INJECT UP TO 20 UNITS 3 TIMES A DAY BEFORE MEALS, Disp: 20 pen, Rfl: 6    Insulin Pen Needle (PEN NEEDLES) 32G X 4 MM MISC, Check blood glucose at home AC and HS 4 times a day, Disp: 200 each, Rfl: 3    levothyroxine 88 mcg tablet, take 1 tablet by mouth daily, Disp: 90 tablet, Rfl: 3    metoprolol succinate (TOPROL-XL) 25 mg 24 hr tablet, Take 1 5 tablets (37 5 mg total) by mouth daily Take 1 and a half tablet (37 5 mg total) by mouth daily  , Disp: 135 tablet, Rfl: 3    rosuvastatin (CRESTOR) 20 MG tablet, take 1 tablet by mouth daily, Disp: 90 tablet, Rfl: 3    tamsulosin (FLOMAX) 0 4 mg, Take 1 capsule (0 4 mg total) by mouth daily, Disp: 90 capsule, Rfl: 3    HUMALOG WILFRED KWIKPEN 100 units/mL injection pen, , Disp: , Rfl: 0      Active Problems     Patient Active Problem List   Diagnosis    Type 2 diabetes mellitus (HCC)    Anemia    Hyperlipidemia    Chronic systolic congestive heart failure (HCC)    BPH (benign prostatic hyperplasia)    Hypothyroidism (acquired)    Foot pain, bilateral    Ischemic cardiomyopathy    Coronary atherosclerosis of native coronary artery    Urinary retention         Past Medical History     Past Medical History:   Diagnosis Date    Acquired cyst of kidney     Anemia     Benign paroxysmal vertigo, unspecified ear     Cellulitis of leg     Cervical spinal stenosis     Chest pain     Coronary atherosclerosis of native coronary artery     Enlarged prostate     Esophageal candidiasis (HCC)     Hematuria     Herpes zoster     Hyperlipidemia     Hypertension     Incomplete emptying of bladder     Inflamed seborrheic keratosis     Internal hemorrhoids     Malignant neoplasm of skin of trunk     Myocardial infarction ([facility] [address] )     Nonmelanoma skin cancer     LAST ASSESSED: 8/9/17    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 infarction     Paroxysmal tachycardia (HCC)     Shortness of breath     Vitamin B deficiency          Surgical History     Past Surgical History:   Procedure Laterality Date    CARDIAC DEFIBRILLATOR PLACEMENT      COLONOSCOPY  10/07/2008    FIBEROPTIC SCREENING; NO FURTHER RECOMMENDATIONS    CORONARY ANGIOPLASTY WITH STENT PLACEMENT      CORONARY ANGIOPLASTY WITH STENT PLACEMENT      CYSTOSCOPY  11/06/2015    DIAGNOSTIC; MANAGED BY: Malathi Valentin    EGD AND COLONOSCOPY N/A 2/12/2018    Procedure: EGD AND COLONOSCOPY;  Surgeon: Sammie Sharp MD;  Location: [facility];   Service: Gastroenterology    HIP ARTHROPLASTY Right     Maurice Beckham / REPLACE / REMOVE PACEMAKER      JOINT REPLACEMENT Right     TRUNK SKIN LESION EXCISIONAL BIOPSY  2007    MALIGNANT; BCC CHEST         Family History     Family History   Problem Relation Age of Onset    Heart disease Mother     Coronary artery disease Mother     Sudden death Mother     Cancer Father         throat; MALIGNANT NEOPLASM OF HEAD, FACE OR NECK    Throat cancer Father     Cancer Family     Coronary artery disease Family          Social History     Social History     Social History     Tobacco Use   Smoking Status Former Smoker    Packs/day: 1 00    Years: 10 00    Pack years: 10 00    Types: Cigarettes    Last attempt to quit: 1978    Years since quittin 8   Smokeless Tobacco Never Used         Pertinent Lab Values     Lab Results   Component Value Date    CREATININE 1 24 2019       Lab Results   Component Value Date    PSA 3 1 2016    PSA 5 3 (H) 10/02/2015    PSA 11 88 (H) 2015             Pertinent Imaging      Real-time review of transrectal ultrasound performed today

## 2019-12-12 NOTE — PATIENT INSTRUCTIONS
Transurethral Prostatectomy   WHAT YOU NEED TO KNOW:   A transurethral prostatectomy is surgery that is done to remove part or all of your prostate gland  This surgery is also called transurethral resection of the prostate (TURP)  HOW TO PREPARE:   Before your surgery:   · Bring your medicine bottles or a list of your medicines when you see your caregiver  Tell your caregiver if you are allergic to any medicine  Tell your caregiver if you use any herbs, food supplements, or over-the-counter medicine  · Ask your caregiver if you need to stop using aspirin or any other prescribed or over-the-counter medicine before your procedure or surgery  · Tell your healthcare provider if you have heart disease or blood clotting problems  · Your healthcare provider may give you medicine to shrink the size of your prostate  You may also be given medicine to help prevent infection  Ask your healthcare provider for more information about the medicine that you need to take before surgery  · You may need blood and urine tests  You may also need a rectal exam to check the size of your prostate  Ask your healthcare provider for more information about tests that you may need  Write down the date, time, and location of each test      · Write down the correct date, time, and location of your surgery  The day before your surgery:  Ask caregivers about directions for eating and drinking  The day of your surgery:   · You or a close family member will be asked to sign a legal document called a consent form  It gives caregivers permission to do the procedure or surgery  It also explains the problems that may happen, and your choices  Make sure all your questions are answered before you sign this form  · Caregivers may insert an intravenous tube (IV) into your vein  A vein in the arm is usually chosen  Through the IV tube, you may be given liquids and medicine       · An anesthesiologist will talk to you before your surgery  You may need medicine to keep you asleep or numb an area of your body during surgery  Tell caregivers if you or anyone in your family has had a problem with anesthesia in the past   WHAT WILL HAPPEN:   What will happen:   · You may be given anesthesia medicine to make you lose feeling from the waist down, or to keep you asleep during this surgery  Healthcare providers will insert a resectoscope through your urethra  A resectoscope is a tube with a small monitor on the end  The monitor shows your prostate on a screen for healthcare providers while they do surgery  Your bladder may be filled with fluid during surgery  · Heat that is produced by the resectoscope will be used to remove part, or all, of your prostate  Heat will also be used to stop bleeding in the surgery area  Fluid is used to wash away extra tissue and blood  The resectoscope will be removed from your urethra  A catheter (soft tube) will be put through your urethra and into your bladder  The catheter will be left in place to drain urine out of your body and into a bag  After your surgery: You are taken to a room to rest  Do not get out of bed until healthcare providers say it is okay  When healthcare providers see that you are okay, you will be taken to your room  The catheter may need to stay in for 2 to 3 days after surgery, or longer if needed  CONTACT YOUR HEALTHCARE PROVIDER IF:   · You cannot get to surgery on time  · You have a fever  · Your symptoms, such as trouble urinating, get worse  · Your urine has blood in it  SEEK CARE IMMEDIATELY IF:   · You cannot urinate, or you are urinating very little or less often than usual     · You have severe abdominal or back pain  RISKS:   · During surgery, you may bleed more than expected, and need a blood transfusion  Your prostate, bladder, or urethra may be damaged  After surgery, your urethra or part of your bladder may grow narrow   This can make it difficult or painful to urinate  You may feel like you need to urinate often, or have trouble controlling when you urinate  You may get blood clots in your urine that can block your urethra  · After surgery, you may get a urinary tract infection, or an infection in the surgery area  You may have trouble getting an erection or ejaculating  You may develop TURP syndrome, which can cause dizziness, fatigue, stomach pain, and vomiting  If you had a partial resection of the prostate, the part of your prostate that was not removed may grow too large  This can cause your signs and symptoms to return, and you may need to have surgery again  · Without TURP surgery, your prostate may grow larger, and your symptoms may get worse  Urine may not be able to flow through your urethra as it should  The urine may remain in your bladder, and cause an infection  Stones may form in your bladder and kidneys, and you may have blood in your urine  These problems can damage your bladder or urethra and over time may cause your kidneys to stop working  If you are unable to urinate on your own, a Mckeon catheter may need to stay in place to drain your urine  CARE AGREEMENT:   You have the right to help plan your care  Learn about your health condition and how it may be treated  Discuss treatment options with your caregivers to decide what care you want to receive  You always have the right to refuse treatment  © 2017 [address] [employer] Information is for End User's use only and may not be sold, redistributed or otherwise used for commercial purposes  All illustrations and images included in CareNotes® are the copyrighted property of [employer]  or Jose [employer]agon  The above information is an  only  It is not intended as medical advice for individual conditions or treatments  Talk to your doctor, nurse or pharmacist before following any medical regimen to see if it is safe and effective for you      Prostatic Urethral Lift Procedure: Urolift    Benign Prostatic enlargement, also sometimes called benign prostatic hyperplasia with lower urinary tract symptoms (BPH with LUTS), is a condition in which the prostate enlarges as men age  This disease process is very common, affecting nearly 40 million Americans and 500 million men worldwide  More than 40% of men in their 46s and more than 70% of men in their 62s have an enlarged prostate  While enlargement of the prostate is often a benign condition and unrelated to prostate cancer, it can greatly affect a man's quality of life  Just because the man has a larger prostate does not necessarily mean he has more symptoms than someone with a smaller prostate  The opposite is also true  The UroLift® System offers rapid, lasting relief from benign prostatic enlargement causing lower urinary tract symptoms  This procedure is also known is a prostatic urethral lift procedure  Small implants are used to hold open the obstructed pathway that blocks urine flow  This channel is made through the anterior prostate and essentially acts as a mold of a Mckeon catheter creating a lasting passage through the prostate and decreasing bladder outlet resistance  Unlike medications, the UroLift System treatment addresses the blockage directly, offering a mechanical solution to a mechanical problem  In such a fashion, men that are taking medications for benign prostatic enlargement with lower urinary tract symptoms can often be taken off of these medicines  Decreasing medication burden has real benefits in terms of decreasing polypharmacy, and decreasing the burden of having to take multiple pills, every day  Many men living with BPH symptoms take prescription medications after they are diagnosed, although these often do not provide adequate relief and may cause dizziness, fatigue, and sexual dysfunction  The [address] is a treatment option for men looking for relief from BPH symptoms   It does not require ongoing medication, heating, cutting or removal of prostate tissue, and the [address] [place] [address] treatment typically takes less than one hour  In fact, placement of the device within the urethra usually takes around 10 minutes (with the patient requiring some time at the beginning and the end of the procedure to undergo sedation or anesthesia and to emerge from this)  Finally, placement of the Uro lift system does not preclude a patient from undergoing further therapies for prostatic enlargement in the event that the prostate continues to grow and obstruct urine hope over time  The long-term data (5 year results) suggests a lasting treatment benefit after placement of the Urolift device (most men receive 4 implants, sometimes more and sometimes less depending on prostatic anatomy)  To ensure that you are a good candidate for Uro lift you will undergo some testing as the Urolift procedure does not work particularly well for men with very large prostates (greater than 80 grams in size), or in men with a median lobe (a 3rd part of the prostate that grows into the lumen of the urinary bladder like a cobra head or ball valve that blocks the bladder outlet)  These tests include determination of a postvoid residual urine volume (the urine remaining in your bladder after you urinate), completion of a symptom score survey (this helps us to track your symptoms over time as well as to look at your symptomatic improvement after the procedure), and cystoscopy to look inside your prostate and your bladder, as well as a transrectal ultrasound of the prostate for volume determination purposes  If your prostate specific antigen (or PSA) is elevated, you may require prostate biopsy prior to proceeding to the Urolift procedure      One final benefit of the Urolift procedure is that in men with prostate cancer that also have lower urinary tract symptoms or benign prostatic enlargement, the metal end-pieces of the implant also work as fiducial markers to marisol the location of the prostate such that the radiation oncologist may effectively radiate it for treatment of prostate cancer  Side effect of this procedure are usually mild and include blood in the urine, frequency of urination, urgency of urination, sensation of incomplete bladder emptying directly after placement of the device, and pain at the tip of the penis and in the pelvis  The symptoms are, admittedly, annoying in the frank procedural time frame, but they do tend to improve and then disappear over a number of days to weeks  These symptoms tend to be less than in other prostate surgeries for benign prostatic enlargement with LUTS  You will be seen in follow-up in 4 weeks after the procedure at which time discussion is had about stopping prostate medications and at which time your postprocedural symptoms are re-evaluated  In summary, the Urolift system offers lasting results for men seeking a minimally invasive approach to the treatment of their lower urinary tract symptoms and in men desiring a long-term solution to the symptoms without the need for multiple medical therapies going forward  Dr Phi Alva is experienced in the placement of the Urolift system and is endorsed by the [employer] (developers of the Urolift system) in the completion of this minimally invasive surgery  Dr Phi Alva does not have a financial relationship with this company and he also offers other treatments for benign prostatic enlargement in men that are not good candidates for the Urolift procedure  Thank you for trusting your urologic health and care with the [facility]

## 2019-12-13 ENCOUNTER — PROCEDURE VISIT (OUTPATIENT)
Dept: UROLOGY | Facility: CLINIC | Age: 79
End: 2019-12-13
Payer: COMMERCIAL

## 2019-12-13 ENCOUNTER — OFFICE VISIT (OUTPATIENT)
Dept: INTERNAL MEDICINE CLINIC | Facility: CLINIC | Age: 79
End: 2019-12-13
Payer: COMMERCIAL

## 2019-12-13 VITALS
OXYGEN SATURATION: 98 % | TEMPERATURE: 97.3 F | DIASTOLIC BLOOD PRESSURE: 64 MMHG | SYSTOLIC BLOOD PRESSURE: 122 MMHG | BODY MASS INDEX: 26.74 KG/M2 | WEIGHT: 166.4 LBS | HEART RATE: 55 BPM | HEIGHT: 66 IN

## 2019-12-13 VITALS — WEIGHT: 167.8 LBS | HEIGHT: 66 IN | BODY MASS INDEX: 26.97 KG/M2

## 2019-12-13 DIAGNOSIS — R33.9 URINARY RETENTION: Primary | ICD-10-CM

## 2019-12-13 DIAGNOSIS — Z71.2 PERSON CONSULTING FOR EXPLANATION OF EXAMINATION OR TEST FINDING: ICD-10-CM

## 2019-12-13 DIAGNOSIS — M25.552 PAIN OF LEFT HIP JOINT: ICD-10-CM

## 2019-12-13 DIAGNOSIS — E03.9 HYPOTHYROIDISM (ACQUIRED): ICD-10-CM

## 2019-12-13 DIAGNOSIS — I50.22 CHRONIC SYSTOLIC CONGESTIVE HEART FAILURE (HCC): ICD-10-CM

## 2019-12-13 DIAGNOSIS — N40.1 BENIGN PROSTATIC HYPERPLASIA WITH INCOMPLETE BLADDER EMPTYING: ICD-10-CM

## 2019-12-13 DIAGNOSIS — R39.14 BENIGN PROSTATIC HYPERPLASIA WITH INCOMPLETE BLADDER EMPTYING: ICD-10-CM

## 2019-12-13 DIAGNOSIS — E11.65 TYPE 2 DIABETES MELLITUS WITH HYPERGLYCEMIA, WITHOUT LONG-TERM CURRENT USE OF INSULIN (HCC): Primary | ICD-10-CM

## 2019-12-13 DIAGNOSIS — M54.32 SCIATICA OF LEFT SIDE: ICD-10-CM

## 2019-12-13 DIAGNOSIS — Z87.898 HISTORY OF ELEVATED PSA: ICD-10-CM

## 2019-12-13 DIAGNOSIS — E78.2 MIXED HYPERLIPIDEMIA: ICD-10-CM

## 2019-12-13 LAB
POST-VOID RESIDUAL VOLUME, ML POC: 23 ML
SL AMB  POCT GLUCOSE, UA: 2000
SL AMB LEUKOCYTE ESTERASE,UA: NORMAL
SL AMB POCT BILIRUBIN,UA: NORMAL
SL AMB POCT BLOOD,UA: NORMAL
SL AMB POCT CLARITY,UA: CLEAR
SL AMB POCT COLOR,UA: YELLOW
SL AMB POCT KETONES,UA: NORMAL
SL AMB POCT NITRITE,UA: NORMAL
SL AMB POCT PH,UA: 5
SL AMB POCT SPECIFIC GRAVITY,UA: 1.02
SL AMB POCT URINE PROTEIN: NORMAL
SL AMB POCT UROBILINOGEN: 0.2

## 2019-12-13 PROCEDURE — 51798 US URINE CAPACITY MEASURE: CPT | Performed by: UROLOGY

## 2019-12-13 PROCEDURE — 51741 ELECTRO-UROFLOWMETRY FIRST: CPT | Performed by: UROLOGY

## 2019-12-13 PROCEDURE — 1036F TOBACCO NON-USER: CPT | Performed by: PHYSICIAN ASSISTANT

## 2019-12-13 PROCEDURE — 1160F RVW MEDS BY RX/DR IN RCRD: CPT | Performed by: PHYSICIAN ASSISTANT

## 2019-12-13 PROCEDURE — 81002 URINALYSIS NONAUTO W/O SCOPE: CPT | Performed by: UROLOGY

## 2019-12-13 PROCEDURE — 76872 US TRANSRECTAL: CPT | Performed by: UROLOGY

## 2019-12-13 PROCEDURE — 99214 OFFICE O/P EST MOD 30 MIN: CPT | Performed by: UROLOGY

## 2019-12-13 PROCEDURE — 52000 CYSTOURETHROSCOPY: CPT | Performed by: UROLOGY

## 2019-12-13 PROCEDURE — 99214 OFFICE O/P EST MOD 30 MIN: CPT | Performed by: PHYSICIAN ASSISTANT

## 2019-12-13 RX ORDER — CYCLOBENZAPRINE HCL 10 MG
10 TABLET ORAL 3 TIMES DAILY PRN
Qty: 30 TABLET | Refills: 0 | Status: SHIPPED | OUTPATIENT
Start: 2019-12-13 | End: 2020-02-17 | Stop reason: CLARIF

## 2019-12-13 RX ORDER — ACETAMINOPHEN 325 MG/1
975 TABLET ORAL ONCE
Status: CANCELLED | OUTPATIENT
Start: 2019-12-13 | End: 2019-12-13

## 2019-12-13 RX ORDER — INSULIN LISPRO 100 [IU]/ML
INJECTION, SOLUTION SUBCUTANEOUS
Refills: 0 | COMMUNITY
Start: 2019-10-21 | End: 2020-02-17 | Stop reason: SDUPTHER

## 2019-12-13 RX ORDER — GABAPENTIN 100 MG/1
300 CAPSULE ORAL ONCE
Status: CANCELLED | OUTPATIENT
Start: 2019-12-13 | End: 2019-12-13

## 2019-12-13 NOTE — PROGRESS NOTES
Office Cystoscopy Procedure Note    Indication:     medically refractory lower urinary tract symptoms     Informed consent   The risks, benefits, complications, treatment options, and expected outcomes were discussed with the patient  The patient concurred with the proposed plan and provided informed consent  Anesthesia  Lidocaine jelly 2%    Antibiotic prophylaxis   None    Procedure  The patient was placed in the supineposition, was prepped and draped in the usual manner using sterile technique, and 2% lidocaine jelly instilled into the urethra  A 17 F flexible cystoscope was then inserted into the urethra and the urethra and bladder carefully examined  The following findings were noted:    Findings:  Urethra:  Normal caliber, no stricture, intact sphincter mechanism  Prostate:  High-grade obstruction due to lateral lobe hypertrophy, no median lobe  Bladder:  Normal, no lesions, tumors, defects, or stones  Ureteral orifices:  Orthotopic, clear urine exiting  Other findings:  None, retroflexed confirms    Specimens: None                 Complications:    None; patient tolerated the procedure well           Disposition: To home   Condition: Stable    Plan: Patient is a good candidate for Uro lift, wishes to proceed to this minimally invasive therapy for his lower urinary tract symptoms  Cystoscopy  Date/Time: 12/13/2019 12:25 PM  Performed by: Yoly Wylie MD  Authorized by: Yoly Wylie MD     Procedure details: cystoscopy    Patient tolerance: Patient tolerated the procedure well with no immediate complications    Additional Procedure Details: Indication:     medically refractory lower urinary tract symptoms     Informed consent   The risks, benefits, complications, treatment options, and expected outcomes were discussed with the patient  The patient concurred with the proposed plan and provided informed consent      Anesthesia  Lidocaine jelly 2%    Antibiotic prophylaxis None    Procedure  The patient was placed in the supineposition, was prepped and draped in the usual manner using sterile technique, and 2% lidocaine jelly instilled into the urethra  A 17 F flexible cystoscope was then inserted into the urethra and the urethra and bladder carefully examined  The following findings were noted:    Findings:  Urethra:  Normal caliber, no stricture, intact sphincter mechanism  Prostate:  High-grade obstruction due to lateral lobe hypertrophy, no median lobe  Bladder:  Normal, no lesions, tumors, defects, or stones  Ureteral orifices:  Orthotopic, clear urine exiting  Other findings:  None, retroflexed confirms    Specimens: None                 Complications:    None; patient tolerated the procedure well           Disposition: To home   Condition: Stable    Plan: Patient is a good candidate for Uro lift, wishes to proceed to this minimally invasive therapy for his lower urinary tract symptoms

## 2019-12-13 NOTE — PROGRESS NOTES
Office TRUS    Indication    Medically refractory lower urinary tract symptoms    Informed consent   The risks, benefits and alternatives to TRUS were discussed with the patient, informed consent was obtained      Transrectal ultrasonography  The patient was placed in the left lateral decubitus position  After an attentive digital rectal examination, a 7 5 mHz sidefire ultrasound probe was gently inserted into the rectum and biplanar imaging of the prostate was done with the findings noted below  Images were taken of any abnormal findings and also to document prostate size  Bladder  The bladder base appeared normal     Prostate      Ultrasound size measurements:  -Volume:  69 53 cm3    Ultrasound findings:  -Cysts: None  -Masses: None  -Median lobe: absent                  Complications  There were no procedural complications  Disposition  The patient was dismissed to home     Post-procedure instructions: Today he underwent an uncomplicated transrectal ultrasound showing him to be a good candidate for Uro lift  Biopsy prostate     Date/Time 12/13/2019 12:26 PM     Performed by  Tim Smith MD     Authorized by Tim Smith MD      Universal Protocol Consent: Verbal consent obtained  Written consent obtained  Risks and benefits: risks, benefits and alternatives were discussed  Consent given by: patient  Patient understanding: patient states understanding of the procedure being performed  Patient consent: the patient's understanding of the procedure matches consent given  Procedure consent: procedure consent matches procedure scheduled  Relevant documents: relevant documents present and verified  Test results: test results available and properly labeled  Site marked: the operative site was not marked  Radiology Images displayed and confirmed   If images not available, report reviewed: imaging studies available  Required items: required blood products, implants, devices, and special equipment available  Patient identity confirmed: verbally with patient and provided demographic data        Local anesthesia used: no     Anesthesia   Local anesthesia used: no     Sedation   Patient sedated: no        Specimen: no    Culture: no   Procedure Details   Procedure Notes: Only transrectal ultrasound was performed today  Patient is a good candidate for Uro lift    Prostate volume 69 53 grams, no intravesical protrusion of the prostate, no median lobe

## 2019-12-13 NOTE — PROGRESS NOTES
Assessment/Plan: sciatica and hip joint pain  Will get imaging of both treat him with heat cyclobenzaprine and nonsteroidals PT follow-up  Follow up here in February   Diagnoses and all orders for this visit:    Type 2 diabetes mellitus with hyperglycemia, without long-term current use of insulin (HCC)    Hypothyroidism (acquired)    Chronic systolic congestive heart failure (HCC)    Benign prostatic hyperplasia with incomplete bladder emptying    Mixed hyperlipidemia    Pain of left hip joint  -     XR hip/pelv 2-3 vws left if performed; Future  -     XR spine lumbar 2 or 3 views injury; Future  -     cyclobenzaprine (FLEXERIL) 10 mg tablet; Take 1 tablet (10 mg total) by mouth 3 (three) times a day as needed for muscle spasms  -     Ambulatory Referral to Comprehensive [facility]; Future    Sciatica of left side  -     XR hip/pelv 2-3 vws left if performed; Future  -     XR spine lumbar 2 or 3 views injury; Future  -     cyclobenzaprine (FLEXERIL) 10 mg tablet; Take 1 tablet (10 mg total) by mouth 3 (three) times a day as needed for muscle spasms  -     Ambulatory Referral to [facility]; Future        No problem-specific Assessment & Plan notes found for this encounter  BMI Counseling: Body mass index is 26 86 kg/m²  The BMI is above normal  Nutrition recommendations include decreasing portion sizes  Exercise recommendations include exercising 3-5 times per week  Subjective:      Patient ID: Noemy Roca is a 67 y o  male  He slipped  Off of his chair and landed on his rear end about a month ago and since then he has pain in his left groin left buttocks area occasionally it radiates down his left leg laterally in the sciatic distribution  His left hip area hurts all the time but especially when he walks  Usually no problems bending forward or twisting  No weakness of his legs no numbness or tingling of his leg    No bruising when he fell no skin rash his left groin pains and intermittent sciatica problem has not been getting worse over the past month but not going away either  History of diabetes severe somewhat high still not very compliant with his insulin  Having some difficulty urinating at times following with urology about this considering  Invasive management  Hypothyroid hyperlipidemia well controlled with meds      The following portions of the patient's history were reviewed and updated as appropriate:   He has a past medical history of Acquired cyst of kidney, Anemia, Benign paroxysmal vertigo, unspecified ear, Cellulitis of leg, Cervical spinal stenosis, Chest pain, Coronary atherosclerosis of native coronary artery, Enlarged prostate, Esophageal candidiasis ([address] ), Hematuria, Herpes zoster, Hyperlipidemia, Hypertension, Incomplete emptying of bladder, Inflamed seborrheic keratosis, Internal hemorrhoids, Malignant neoplasm of skin of trunk, Myocardial infarction ([address] ), Nonmelanoma skin cancer, 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 infarction, Paroxysmal tachycardia ([address] ), Shortness of breath, and Vitamin B deficiency  ,  does not have any pertinent problems on file  ,   has a past surgical history that includes Hip Arthroplasty (Right); Coronary angioplasty with stent; Joint replacement (Right); Insert / replace / remove pacemaker; EGD AND COLONOSCOPY (N/A, 2/12/2018); Colonoscopy (10/07/2008); Cystoscopy (11/06/2015); Trunk skin lesion excisional biopsy (08/22/2007); Cardiac defibrillator placement; and Coronary angioplasty with stent  ,  family history includes Cancer in his family and father; Coronary artery disease in his family and mother; Heart disease in his mother; Sudden death in his mother; Throat cancer in his father  ,   reports that he quit smoking about 41 years ago  His smoking use included cigarettes  He has a 10 00 pack-year smoking history  He has never used smokeless tobacco  He reports that he drinks alcohol  He reports that he does not use drugs  ,  is allergic to atorvastatin and percocet [oxycodone-acetaminophen]     Current Outpatient Medications   Medication Sig Dispense Refill    aspirin 81 MG tablet Take 1 tablet by mouth daily      clopidogrel (PLAVIX) 75 mg tablet Take 1 tablet (75 mg total) by mouth daily 90 tablet 3    finasteride (PROSCAR) 5 mg tablet Take 1 tablet (5 mg total) by mouth daily 90 tablet 3    glipiZIDE (GLUCOTROL) 5 mg tablet Take 1 tablet (5 mg total) by mouth 2 (two) times a day 180 tablet 3    HUMALOG WILFRED KWIKPEN 100 units/mL injection pen   0    insulin glargine (LANTUS SOLOSTAR) 100 units/mL injection pen Inject 23 Units under the skin daily 5 pen 5    insulin lispro (HUMALOG WILFRED KWIKPEN) 100 units/mL injection pen INJECT UP TO 20 UNITS 3 TIMES A DAY BEFORE MEALS 20 pen 6    Insulin Pen Needle (PEN NEEDLES) 32G X 4 MM MISC Check blood glucose at home AC and HS 4 times a day 200 each 3    levothyroxine 88 mcg tablet take 1 tablet by mouth daily 90 tablet 3    metoprolol succinate (TOPROL-XL) 25 mg 24 hr tablet Take 1 5 tablets (37 5 mg total) by mouth daily Take 1 and a half tablet (37 5 mg total) by mouth daily  135 tablet 3    rosuvastatin (CRESTOR) 20 MG tablet take 1 tablet by mouth daily 90 tablet 3    tamsulosin (FLOMAX) 0 4 mg Take 1 capsule (0 4 mg total) by mouth daily 90 capsule 3    cyclobenzaprine (FLEXERIL) 10 mg tablet Take 1 tablet (10 mg total) by mouth 3 (three) times a day as needed for muscle spasms 30 tablet 0     No current facility-administered medications for this visit  Review of Systems   Constitutional: Negative for activity change, appetite change, chills, diaphoresis, fatigue, fever and unexpected weight change  HENT: Negative for congestion, dental problem, drooling, ear discharge, ear pain, facial swelling, hearing loss, nosebleeds, postnasal drip, rhinorrhea, sinus pressure, sinus pain, sneezing, sore throat, tinnitus, trouble swallowing and voice change      Eyes: Negative for photophobia, pain, discharge, redness, itching and visual disturbance  Respiratory: Negative for apnea, cough, choking, chest tightness, shortness of breath, wheezing and stridor  Cardiovascular: Negative for chest pain, palpitations and leg swelling  Gastrointestinal: Negative for abdominal distention, abdominal pain, anal bleeding, blood in stool, constipation, diarrhea, nausea, rectal pain and vomiting  Endocrine: Negative for cold intolerance, heat intolerance, polydipsia, polyphagia and polyuria  Genitourinary: Positive for difficulty urinating and urgency  Negative for decreased urine volume, dysuria, enuresis, flank pain, frequency, genital sores and hematuria  Musculoskeletal: Positive for arthralgias and back pain  Negative for gait problem, joint swelling, myalgias, neck pain and neck stiffness  Skin: Negative for color change, pallor, rash and wound  Neurological: Negative for dizziness, tremors, seizures, syncope, facial asymmetry, speech difficulty, weakness, light-headedness, numbness and headaches  Hematological: Negative for adenopathy  Does not bruise/bleed easily  Psychiatric/Behavioral: Negative for agitation, behavioral problems, confusion, decreased concentration, dysphoric mood, hallucinations, self-injury, sleep disturbance and suicidal ideas  The patient is not nervous/anxious and is not hyperactive  Objective:  Vitals:    12/13/19 1343   BP: 122/64   BP Location: Left arm   Patient Position: Sitting   Cuff Size: Standard   Pulse: 55   Temp: (!) 97 3 °F (36 3 °C)   TempSrc: Tympanic   SpO2: 98%   Weight: 75 5 kg (166 lb 6 4 oz)   Height: 5' 6" (1 676 m)     Body mass index is 26 86 kg/m²  Physical Exam   Constitutional: He is oriented to person, place, and time  He appears well-developed  Teeth in poor repair   HENT:   Head: Normocephalic     Right Ear: External ear normal    Left Ear: External ear normal    Nose: Nose normal    Mouth/Throat: Oropharynx is clear and moist  No oropharyngeal exudate  Eyes: Pupils are equal, round, and reactive to light  Conjunctivae are normal    Neck: Neck supple  No JVD present  No thyromegaly present  Cardiovascular: Normal rate, regular rhythm and intact distal pulses  Murmur heard  Pulmonary/Chest: Effort normal and breath sounds normal  No stridor  No respiratory distress  He has no wheezes  Abdominal: Soft  Bowel sounds are normal    Musculoskeletal: Normal range of motion  He exhibits tenderness ( modest paraspinal tenderness lumbar spine  Straight leg lift negative DTRs preserved in both legs pain with externally rotating left hip walks with a limp)  Lymphadenopathy:     He has no cervical adenopathy  Neurological: He is alert and oriented to person, place, and time  He has normal reflexes  He displays normal reflexes  No cranial nerve deficit or sensory deficit  He exhibits normal muscle tone  Coordination normal    Skin: Skin is warm and dry  No rash noted  No erythema  No pallor  Vitals reviewed

## 2019-12-13 NOTE — ASSESSMENT & PLAN NOTE
We reviewed the options for treating BPH/LUTS which include but are not limited to expectant management, medical therapy, transurethral resection of prostate (TURP)  We also discussed minimally invasive options  At this point, the patient wishes to proceed with Urolift  This is an appropriate option for the patient based on the following criteria:    - cystoscopy revealed lateral lobe hypertrophy, no median lobe, high-grade obstruction of the prostatic urethra  - measured gland volume 69 53  - uroflow with 2 milliliters/second maximum urinary flow  - PVR with 23 milliliters  - normal renal function  - no active urinary tract infection  - PSA:  3 1  - no documented allergy to nickel    - He has failed the following treatment options:  Maximal medical therapy   The additional morbidity of standard surgical options (ie, TURP) is not necessary given the above criteria  The risks of Urolift include but are not limited to bleeding, infection, reaction to anesthesia such as heart attack, stroke, DVT/PE, hyponatremia, bladder neck contracture, urethral stricture, injury to surrounding structures (ureters, rectum, etc)  We discussed that additional risks of trans urethral resection procedures such as incontinence, retrograde ejaculation, and erectile dysfunction have been only rarely reported with the Uro lift procedure  We did discuss that this is a newer procedure and a permanent implant  We discussed that there may be some long-term implications that are unforeseen with this newer technology, such as perhaps complicating treatment for prostate cancer if indicated down the line  Finally, I told him that he may require additional procedures secondary to some of these complications  Informed consent was obtained for the Uro lift procedure  This will be scheduled in the near future to be performed under IV sedation versus general anesthesia

## 2019-12-16 ENCOUNTER — TELEPHONE (OUTPATIENT)
Dept: UROLOGY | Facility: CLINIC | Age: 79
End: 2019-12-16

## 2019-12-16 RX ORDER — METOPROLOL SUCCINATE 50 MG/1
50 TABLET, EXTENDED RELEASE ORAL DAILY
Qty: 90 TABLET | Refills: 3 | Status: SHIPPED | OUTPATIENT
Start: 2019-12-16 | End: 2020-11-03 | Stop reason: SDUPTHER

## 2019-12-16 NOTE — TELEPHONE ENCOUNTER
Patient came into office today, stated that [facility] did not have metoprolol succinate in stock and did receive message that was left  Patient stated he would like med sent to [facility] on [address] in [facility]  Patient advised to double Metoprolol Succ 25 mg  Daily that he has on hand until 50 mg tabs are picked up  Med pending

## 2019-12-17 ENCOUNTER — TELEPHONE (OUTPATIENT)
Dept: PHYSICAL THERAPY | Facility: OTHER | Age: 79
End: 2019-12-17

## 2019-12-17 NOTE — TELEPHONE ENCOUNTER
Voice message left for patient to call back  Phone number and hours of business provided  This is the 1st attempt to reach the patient    Will defer per protocol

## 2019-12-18 NOTE — TELEPHONE ENCOUNTER
Left msg asking for pt to call and schedule surgery  Sent pt letter asking for him to call and schedule surgery

## 2019-12-19 ENCOUNTER — TELEPHONE (OUTPATIENT)
Dept: PHYSICAL THERAPY | Facility: OTHER | Age: 79
End: 2019-12-19

## 2019-12-19 ENCOUNTER — TELEPHONE (OUTPATIENT)
Dept: INTERNAL MEDICINE CLINIC | Facility: CLINIC | Age: 79
End: 2019-12-19

## 2019-12-19 NOTE — TELEPHONE ENCOUNTER
Voice mail/message left for patient to return call to [place] [id number] program including our hours of business and phone number  Third attempt to contact patient  Referral closed per protocol

## 2019-12-19 NOTE — TELEPHONE ENCOUNTER
Spoke to patient to inform him that his rx for cyclobenzaprine has been approved and rx is ready to be picked up

## 2019-12-23 DIAGNOSIS — E11.9 TYPE 2 DIABETES MELLITUS WITHOUT COMPLICATION, WITHOUT LONG-TERM CURRENT USE OF INSULIN (HCC): ICD-10-CM

## 2019-12-23 DIAGNOSIS — D64.9 ANEMIA, UNSPECIFIED TYPE: ICD-10-CM

## 2019-12-23 RX ORDER — CLOPIDOGREL BISULFATE 75 MG/1
75 TABLET ORAL DAILY
Qty: 90 TABLET | Refills: 3 | Status: SHIPPED | OUTPATIENT
Start: 2019-12-23 | End: 2020-04-06 | Stop reason: SDUPTHER

## 2020-01-15 NOTE — TELEPHONE ENCOUNTER
Per patients chart Metoprolol was sent to the pharmacy on 12/16/2019:  metoprolol succinate (TOPROL-XL) 50 mg 24 hr tablet        Sig: Take 1 tablet (50 mg total) by mouth daily        Sent to pharmacy as: metoprolol succinate (TOPROL-XL) 50 mg 24 hr tablet        Class: Normal        Notes to Pharmacy: Per Dr Tim Hairston medication is increased  Route: Oral        E-Prescribing Status: Receipt confirmed by pharmacy (12/16/2019  8:02 PM EST)        [facility]/pharmacy #[id number] - [address]    Spoke with patient advised that medication was sent in December, he stated that he will check with pharmacy  Spoke with pharmacy stated that rx was received and filled  Patient never picked up RX so it was put back  Stated that they will refill script for patient and that it will be ready later today

## 2020-01-15 NOTE — TELEPHONE ENCOUNTER
Spoke to pt to make him an April appt & pt would like a call back regarding the status of the Metoprolol

## 2020-01-20 ENCOUNTER — TELEPHONE (OUTPATIENT)
Dept: UROLOGY | Facility: CLINIC | Age: 80
End: 2020-01-20

## 2020-01-20 DIAGNOSIS — E11.65 TYPE 2 DIABETES MELLITUS WITH HYPERGLYCEMIA, WITHOUT LONG-TERM CURRENT USE OF INSULIN (HCC): ICD-10-CM

## 2020-01-20 DIAGNOSIS — D64.9 ANEMIA, UNSPECIFIED TYPE: ICD-10-CM

## 2020-01-20 DIAGNOSIS — E11.9 TYPE 2 DIABETES MELLITUS WITHOUT COMPLICATION, WITHOUT LONG-TERM CURRENT USE OF INSULIN (HCC): ICD-10-CM

## 2020-01-20 DIAGNOSIS — N40.1 BENIGN PROSTATIC HYPERPLASIA WITH URINARY FREQUENCY: ICD-10-CM

## 2020-01-20 DIAGNOSIS — R35.0 BENIGN PROSTATIC HYPERPLASIA WITH URINARY FREQUENCY: ICD-10-CM

## 2020-01-20 RX ORDER — TAMSULOSIN HYDROCHLORIDE 0.4 MG/1
0.4 CAPSULE ORAL DAILY
Qty: 90 CAPSULE | Refills: 3 | Status: SHIPPED | OUTPATIENT
Start: 2020-01-20 | End: 2020-01-24 | Stop reason: SDUPTHER

## 2020-01-20 RX ORDER — LEVOTHYROXINE SODIUM 88 UG/1
88 TABLET ORAL DAILY
Qty: 90 TABLET | Refills: 3 | Status: SHIPPED | OUTPATIENT
Start: 2020-01-20 | End: 2020-11-03 | Stop reason: SDUPTHER

## 2020-01-20 RX ORDER — FINASTERIDE 5 MG/1
5 TABLET, FILM COATED ORAL DAILY
Qty: 90 TABLET | Refills: 3 | Status: SHIPPED | OUTPATIENT
Start: 2020-01-20 | End: 2020-01-24 | Stop reason: SDUPTHER

## 2020-01-20 RX ORDER — PEN NEEDLE, DIABETIC 30 GX3/16"
NEEDLE, DISPOSABLE MISCELLANEOUS
Qty: 200 EACH | Refills: 3 | Status: SHIPPED | OUTPATIENT
Start: 2020-01-20 | End: 2020-09-09 | Stop reason: SDUPTHER

## 2020-01-20 NOTE — TELEPHONE ENCOUNTER
PT stopped by our office requesting refill on Tamsuloin and Finasteride medications They are currently done  Pt would like these called into [facility] on [facility] in [place] [address]  Please notify PT when the order has been refilled   Thank you

## 2020-01-20 NOTE — TELEPHONE ENCOUNTER
RX  REFLL   LEVOTHYROXINE  88 MCG  / INSULIN  PEN  NEEDLES    WANTS  THE  RX  SENT  TO  [facility]    PT DOESN'T  WANT  IT  SENT  TO  [facility]  [facility]  ORDER   ANY  QUESTIONS    CALL PT  [phone]

## 2020-01-20 NOTE — TELEPHONE ENCOUNTER
Patient is managed by Dr Burnette at the [facility] office  Patient is seen for urinary retention  Patient is requesting refill on Tamsulosin and Finasteide to be sent into [facility] pharmacy on [address]  Pharmacy in chart

## 2020-01-20 NOTE — TELEPHONE ENCOUNTER
A message was left in letting him know that his prescriptions have been sent over to his preferred pharmacy in his chart  I left our call back number in the message, should he have a question or concern

## 2020-01-24 DIAGNOSIS — R35.0 BENIGN PROSTATIC HYPERPLASIA WITH URINARY FREQUENCY: ICD-10-CM

## 2020-01-24 DIAGNOSIS — N40.1 BENIGN PROSTATIC HYPERPLASIA WITH URINARY FREQUENCY: ICD-10-CM

## 2020-01-24 DIAGNOSIS — E11.9 TYPE 2 DIABETES MELLITUS WITHOUT COMPLICATION, WITHOUT LONG-TERM CURRENT USE OF INSULIN (HCC): ICD-10-CM

## 2020-01-24 DIAGNOSIS — D64.9 ANEMIA, UNSPECIFIED TYPE: ICD-10-CM

## 2020-01-24 RX ORDER — TAMSULOSIN HYDROCHLORIDE 0.4 MG/1
0.4 CAPSULE ORAL DAILY
Qty: 90 CAPSULE | Refills: 3 | Status: SHIPPED | OUTPATIENT
Start: 2020-01-24 | End: 2020-11-03 | Stop reason: SDUPTHER

## 2020-01-24 RX ORDER — FINASTERIDE 5 MG/1
5 TABLET, FILM COATED ORAL DAILY
Qty: 90 TABLET | Refills: 3 | Status: SHIPPED | OUTPATIENT
Start: 2020-01-24 | End: 2020-11-03 | Stop reason: SDUPTHER

## 2020-01-24 NOTE — TELEPHONE ENCOUNTER
Patient stopped by the office and said that his two prescriptions were sent to the wrong [facility]  I changed the pharmacy on his chart and corrected it to the right one  His preferred pharmacy is [facility] on [address] [facility] [place]  He is requesting that two medications be refilled and sent to [facility]:    Finasteride 5 mg  Tablet     Tamsulosin HCL 0 4 mg capsule     Thank you

## 2020-02-10 DIAGNOSIS — E78.2 MIXED HYPERLIPIDEMIA: ICD-10-CM

## 2020-02-10 RX ORDER — ROSUVASTATIN CALCIUM 20 MG/1
20 TABLET, COATED ORAL DAILY
Qty: 90 TABLET | Refills: 3 | Status: SHIPPED | OUTPATIENT
Start: 2020-02-10 | End: 2020-11-03 | Stop reason: SDUPTHER

## 2020-02-10 NOTE — TELEPHONE ENCOUNTER
Patient came in and he needs a refill of   Rosuvastatin calcium 20 mg tab   please send to [facility] pharmacy [place] [address] [facility]

## 2020-02-14 ENCOUNTER — TELEPHONE (OUTPATIENT)
Dept: INTERNAL MEDICINE CLINIC | Facility: CLINIC | Age: 80
End: 2020-02-14

## 2020-02-14 ENCOUNTER — APPOINTMENT (OUTPATIENT)
Dept: LAB | Facility: CLINIC | Age: 80
End: 2020-02-14
Payer: COMMERCIAL

## 2020-02-14 ENCOUNTER — OFFICE VISIT (OUTPATIENT)
Dept: INTERNAL MEDICINE CLINIC | Facility: CLINIC | Age: 80
End: 2020-02-14
Payer: COMMERCIAL

## 2020-02-14 VITALS
HEART RATE: 60 BPM | DIASTOLIC BLOOD PRESSURE: 60 MMHG | SYSTOLIC BLOOD PRESSURE: 110 MMHG | HEIGHT: 66 IN | BODY MASS INDEX: 26.68 KG/M2 | OXYGEN SATURATION: 98 % | WEIGHT: 166 LBS

## 2020-02-14 DIAGNOSIS — E03.9 HYPOTHYROIDISM (ACQUIRED): ICD-10-CM

## 2020-02-14 DIAGNOSIS — E11.65 TYPE 2 DIABETES MELLITUS WITH HYPERGLYCEMIA, WITHOUT LONG-TERM CURRENT USE OF INSULIN (HCC): Primary | ICD-10-CM

## 2020-02-14 DIAGNOSIS — I50.22 CHRONIC SYSTOLIC CONGESTIVE HEART FAILURE (HCC): ICD-10-CM

## 2020-02-14 DIAGNOSIS — I25.5 ISCHEMIC CARDIOMYOPATHY: ICD-10-CM

## 2020-02-14 DIAGNOSIS — R33.9 URINARY RETENTION: ICD-10-CM

## 2020-02-14 DIAGNOSIS — D64.9 ANEMIA, UNSPECIFIED TYPE: ICD-10-CM

## 2020-02-14 DIAGNOSIS — E11.65 TYPE 2 DIABETES MELLITUS WITH HYPERGLYCEMIA, WITHOUT LONG-TERM CURRENT USE OF INSULIN (HCC): ICD-10-CM

## 2020-02-14 PROBLEM — Z12.5 PROSTATE CANCER SCREENING: Status: ACTIVE | Noted: 2020-02-14

## 2020-02-14 LAB
ANION GAP SERPL CALCULATED.3IONS-SCNC: 3 MMOL/L (ref 4–13)
BACTERIA UR QL AUTO: ABNORMAL /HPF
BASOPHILS # BLD AUTO: 0.08 THOUSANDS/ΜL (ref 0–0.1)
BASOPHILS NFR BLD AUTO: 1 % (ref 0–1)
BILIRUB UR QL STRIP: ABNORMAL
BUN SERPL-MCNC: 22 MG/DL (ref 5–25)
CALCIUM SERPL-MCNC: 9.6 MG/DL (ref 8.3–10.1)
CHLORIDE SERPL-SCNC: 109 MMOL/L (ref 100–108)
CLARITY UR: CLEAR
CO2 SERPL-SCNC: 29 MMOL/L (ref 21–32)
COLOR UR: ABNORMAL
CREAT SERPL-MCNC: 1.21 MG/DL (ref 0.6–1.3)
CREAT UR-MCNC: 356 MG/DL
EOSINOPHIL # BLD AUTO: 0.23 THOUSAND/ΜL (ref 0–0.61)
EOSINOPHIL NFR BLD AUTO: 3 % (ref 0–6)
ERYTHROCYTE [DISTWIDTH] IN BLOOD BY AUTOMATED COUNT: 15.9 % (ref 11.6–15.1)
EST. AVERAGE GLUCOSE BLD GHB EST-MCNC: 212 MG/DL
FOLATE SERPL-MCNC: >20 NG/ML (ref 3.1–17.5)
GFR SERPL CREATININE-BSD FRML MDRD: 57 ML/MIN/1.73SQ M
GLUCOSE P FAST SERPL-MCNC: 190 MG/DL (ref 65–99)
GLUCOSE UR STRIP-MCNC: ABNORMAL MG/DL
HBA1C MFR BLD: 9 %
HCT VFR BLD AUTO: 39.5 % (ref 36.5–49.3)
HGB BLD-MCNC: 13.3 G/DL (ref 12–17)
HGB UR QL STRIP.AUTO: NEGATIVE
HYALINE CASTS #/AREA URNS LPF: ABNORMAL /LPF
IMM GRANULOCYTES # BLD AUTO: 0.02 THOUSAND/UL (ref 0–0.2)
IMM GRANULOCYTES NFR BLD AUTO: 0 % (ref 0–2)
IRON SERPL-MCNC: 93 UG/DL (ref 65–175)
KETONES UR STRIP-MCNC: ABNORMAL MG/DL
LEUKOCYTE ESTERASE UR QL STRIP: NEGATIVE
LYMPHOCYTES # BLD AUTO: 1.38 THOUSANDS/ΜL (ref 0.6–4.47)
LYMPHOCYTES NFR BLD AUTO: 20 % (ref 14–44)
MCH RBC QN AUTO: 38.1 PG (ref 26.8–34.3)
MCHC RBC AUTO-ENTMCNC: 33.7 G/DL (ref 31.4–37.4)
MCV RBC AUTO: 113 FL (ref 82–98)
MICROALBUMIN UR-MCNC: 141 MG/L (ref 0–20)
MICROALBUMIN/CREAT 24H UR: 40 MG/G CREATININE (ref 0–30)
MONOCYTES # BLD AUTO: 0.58 THOUSAND/ΜL (ref 0.17–1.22)
MONOCYTES NFR BLD AUTO: 8 % (ref 4–12)
NEUTROPHILS # BLD AUTO: 4.64 THOUSANDS/ΜL (ref 1.85–7.62)
NEUTS SEG NFR BLD AUTO: 68 % (ref 43–75)
NITRITE UR QL STRIP: NEGATIVE
NON-SQ EPI CELLS URNS QL MICRO: ABNORMAL /HPF
NRBC BLD AUTO-RTO: 0 /100 WBCS
NT-PROBNP SERPL-MCNC: 773 PG/ML
PH UR STRIP.AUTO: 5.5 [PH]
PLATELET # BLD AUTO: 203 THOUSANDS/UL (ref 149–390)
PMV BLD AUTO: 10.8 FL (ref 8.9–12.7)
POTASSIUM SERPL-SCNC: 4.6 MMOL/L (ref 3.5–5.3)
PROT UR STRIP-MCNC: ABNORMAL MG/DL
RBC # BLD AUTO: 3.49 MILLION/UL (ref 3.88–5.62)
RBC #/AREA URNS AUTO: ABNORMAL /HPF
SODIUM SERPL-SCNC: 141 MMOL/L (ref 136–145)
SP GR UR STRIP.AUTO: 1.03 (ref 1–1.03)
TSH SERPL DL<=0.05 MIU/L-ACNC: 1.18 UIU/ML (ref 0.36–3.74)
UROBILINOGEN UR QL STRIP.AUTO: 1 E.U./DL
VIT B12 SERPL-MCNC: 397 PG/ML (ref 100–900)
WBC # BLD AUTO: 6.93 THOUSAND/UL (ref 4.31–10.16)
WBC #/AREA URNS AUTO: ABNORMAL /HPF

## 2020-02-14 PROCEDURE — 1125F AMNT PAIN NOTED PAIN PRSNT: CPT | Performed by: INTERNAL MEDICINE

## 2020-02-14 PROCEDURE — 99214 OFFICE O/P EST MOD 30 MIN: CPT | Performed by: INTERNAL MEDICINE

## 2020-02-14 PROCEDURE — 85025 COMPLETE CBC W/AUTO DIFF WBC: CPT

## 2020-02-14 PROCEDURE — 84443 ASSAY THYROID STIM HORMONE: CPT

## 2020-02-14 PROCEDURE — 83880 ASSAY OF NATRIURETIC PEPTIDE: CPT

## 2020-02-14 PROCEDURE — 82607 VITAMIN B-12: CPT

## 2020-02-14 PROCEDURE — 3078F DIAST BP <80 MM HG: CPT | Performed by: INTERNAL MEDICINE

## 2020-02-14 PROCEDURE — 3008F BODY MASS INDEX DOCD: CPT | Performed by: INTERNAL MEDICINE

## 2020-02-14 PROCEDURE — G0439 PPPS, SUBSEQ VISIT: HCPCS | Performed by: INTERNAL MEDICINE

## 2020-02-14 PROCEDURE — 83540 ASSAY OF IRON: CPT

## 2020-02-14 PROCEDURE — 4040F PNEUMOC VAC/ADMIN/RCVD: CPT | Performed by: INTERNAL MEDICINE

## 2020-02-14 PROCEDURE — 2022F DILAT RTA XM EVC RTNOPTHY: CPT | Performed by: INTERNAL MEDICINE

## 2020-02-14 PROCEDURE — 1170F FXNL STATUS ASSESSED: CPT | Performed by: INTERNAL MEDICINE

## 2020-02-14 PROCEDURE — 1160F RVW MEDS BY RX/DR IN RCRD: CPT | Performed by: INTERNAL MEDICINE

## 2020-02-14 PROCEDURE — 80048 BASIC METABOLIC PNL TOTAL CA: CPT

## 2020-02-14 PROCEDURE — 3074F SYST BP LT 130 MM HG: CPT | Performed by: INTERNAL MEDICINE

## 2020-02-14 PROCEDURE — 1036F TOBACCO NON-USER: CPT | Performed by: INTERNAL MEDICINE

## 2020-02-14 PROCEDURE — 82746 ASSAY OF FOLIC ACID SERUM: CPT

## 2020-02-14 PROCEDURE — 81001 URINALYSIS AUTO W/SCOPE: CPT | Performed by: INTERNAL MEDICINE

## 2020-02-14 PROCEDURE — 36415 COLL VENOUS BLD VENIPUNCTURE: CPT

## 2020-02-14 PROCEDURE — 82570 ASSAY OF URINE CREATININE: CPT | Performed by: INTERNAL MEDICINE

## 2020-02-14 PROCEDURE — 83036 HEMOGLOBIN GLYCOSYLATED A1C: CPT

## 2020-02-14 PROCEDURE — 82043 UR ALBUMIN QUANTITATIVE: CPT | Performed by: INTERNAL MEDICINE

## 2020-02-14 NOTE — PROGRESS NOTES
Assessment/Plan:       Diagnoses and all orders for this visit:    Type 2 diabetes mellitus with hyperglycemia, without long-term current use of insulin (HCC)  -     CBC and differential; Future  -     Hemoglobin A1C; Future  -     TSH, 3rd generation with Free T4 reflex; Future  -     UA (URINE) with reflex to Scope  -     Microalbumin / creatinine urine ratio  -     Basic metabolic panel; Future  -     NT-BNP PRO; Future    Chronic systolic congestive heart failure (HCC)  -     NT-BNP PRO; Future    Hypothyroidism (acquired)    Ischemic cardiomyopathy  -     NT-BNP PRO; Future    Urinary retention  -     UA (URINE) with reflex to Scope    Anemia, unspecified type  -     Iron; Future  -     Vitamin B12; Future  -     Folate; Future                Subjective:      Patient ID: Molinda Saint is a 67 y o  male  67-year-old male will multiple problems  Acutely the patient reports that he has a pain felt in the left of the abdomen about the mid flank the higher aspect of the left lower quadrant  /lower aspect of the left upper quadrant  Is a minimal tenderness to palpation  No associated change in bowel habit  No rectal bleeding  No fever, no chills, and no sweats  This pain does not awaken him at night but he does awaken every 1-2 hours for urinary frequency  This is felt as twinges not is a steady pain  No radiation  This began about a week ago  Diabetic with mediocre control at best   Comorbidity of chronic systolic heart failure limits our choice of medications  Currently using insulin      Chronic problems of hyperlipidemia, hypertension, coronary artery disease status post angioplasty, systolic heart failure  Recent ejection fraction of 25%  He continues to have chronic symptomatology regarding his low EF  He has exertional dyspnea and has a sensation of fatigue on exertion    However, this is at a baseline status with no recent decompensation and his examination is normal with clear breath sounds and no edema      Anemia noted  Hemoglobin between had been 8 and 10  Heme-positive stool  EGD was done documenting severe gastritis and he was placed on PPI double dose  Capsule enteroscopy follow-up reported by the patient  He was told was normal  Colonoscopy reported to be normal At the same time, MCV elevation of 111 so there may be a component of B12 deficiency or myelodysplasia  The most recent hemoglobin was up to 12 3 again with the macrocytosis      Right shoulder pain exacerbated by motion and relieved by rest is chronic      Urinary retention with resolution:  He had been treated for this about   2 years ago  He had an indwelling Mckeon catheter for number of months but it has finally been removed  Jane Terry with Urology  Working diagnosis is urinary retention secondary to prostatic hyperplasia  No recurrene of the retention     The patient has osteoarthritis and he is complaining particularly of his fingers  He has both Heberden and Rupesh's nodes of all his fingers  Additionally, he has a tendon flexion problem with the 4th tendon of the right hand and has some contraction of that finger    Sees urology for BPH  Sees ophthalmology; told he has cataract in will need cataract surgery  The following portions of the patient's history were reviewed and updated as appropriate:   He has a past medical history of Acquired cyst of kidney, Anemia, Benign paroxysmal vertigo, unspecified ear, Cellulitis of leg, Cervical spinal stenosis, Chest pain, Coronary atherosclerosis of native coronary artery, Enlarged prostate, Esophageal candidiasis ([address] ), Hematuria, Herpes zoster, Hyperlipidemia, Hypertension, Incomplete emptying of bladder, Inflamed seborrheic keratosis, Internal hemorrhoids, Malignant neoplasm of skin of trunk, Myocardial infarction ([address] ), Nonmelanoma skin cancer, 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 infarction, Paroxysmal tachycardia ([address] ), Shortness of breath, and Vitamin B deficiency  ,  does not have any pertinent problems on file  ,   has a past surgical history that includes Hip Arthroplasty (Right); Coronary angioplasty with stent; Joint replacement (Right); Insert / replace / remove pacemaker; EGD AND COLONOSCOPY (N/A, 2/12/2018); Colonoscopy (10/07/2008); Cystoscopy (11/06/2015); Trunk skin lesion excisional biopsy (08/22/2007); Cardiac defibrillator placement; and Coronary angioplasty with stent  ,  family history includes Cancer in his family and father; Coronary artery disease in his family and mother; Heart disease in his mother; Sudden death in his mother; Throat cancer in his father  ,   reports that he quit smoking about 42 years ago  His smoking use included cigarettes  He has a 10 00 pack-year smoking history  He has never used smokeless tobacco  He reports that he drinks alcohol  He reports that he does not use drugs  ,  is allergic to atorvastatin and percocet [oxycodone-acetaminophen]     Current Outpatient Medications   Medication Sig Dispense Refill    aspirin 81 MG tablet Take 1 tablet by mouth daily      clopidogrel (PLAVIX) 75 mg tablet Take 1 tablet (75 mg total) by mouth daily 90 tablet 3    finasteride (PROSCAR) 5 mg tablet Take 1 tablet (5 mg total) by mouth daily 90 tablet 3    glipiZIDE (GLUCOTROL) 5 mg tablet Take 1 tablet (5 mg total) by mouth 2 (two) times a day 180 tablet 3    HUMALOG WILFRED KWIKPEN 100 units/mL injection pen   0    insulin glargine (LANTUS SOLOSTAR) 100 units/mL injection pen Inject 23 Units under the skin daily 5 pen 5    insulin lispro (HUMALOG WILFRED KWIKPEN) 100 units/mL injection pen INJECT UP TO 20 UNITS 3 TIMES A DAY BEFORE MEALS 20 pen 6    Insulin Pen Needle (PEN NEEDLES) 32G X 4 MM MISC Check blood glucose at home AC and HS 4 times a day 200 each 3    levothyroxine 88 mcg tablet Take 1 tablet (88 mcg total) by mouth daily 90 tablet 3    metoprolol succinate (TOPROL-XL) 50 mg 24 hr tablet Take 1 tablet (50 mg total) by mouth daily 90 tablet 3    rosuvastatin (CRESTOR) 20 MG tablet Take 1 tablet (20 mg total) by mouth daily 90 tablet 3    tamsulosin (FLOMAX) 0 4 mg Take 1 capsule (0 4 mg total) by mouth daily 90 capsule 3    cyclobenzaprine (FLEXERIL) 10 mg tablet Take 1 tablet (10 mg total) by mouth 3 (three) times a day as needed for muscle spasms (Patient not taking: Reported on 2/14/2020) 30 tablet 0     No current facility-administered medications for this visit  Review of Systems   Constitutional: Positive for fatigue  HENT: Negative for sore throat and trouble swallowing  Eyes: Positive for visual disturbance  Respiratory: Positive for shortness of breath  Gastrointestinal: Positive for abdominal pain  Negative for abdominal distention, anal bleeding, constipation, diarrhea and nausea  Genitourinary: Positive for frequency  Musculoskeletal: Positive for arthralgias  Skin: Positive for rash  Neurological: Positive for weakness  All other systems reviewed and are negative  Objective:  Vitals:    02/14/20 1314   BP: 110/60   Pulse: 60   SpO2: 98%      Physical Exam   Constitutional: He is oriented to person, place, and time  Male patient who appears stated age  He looks somewhat frail  A bit disheveled  No teeth to speak of   HENT:   Head: Normocephalic and atraumatic  Eyes: Pupils are equal, round, and reactive to light  Neck: Normal range of motion  Neck supple  No tracheal deviation present  No thyromegaly present  Cardiovascular: Normal rate and regular rhythm  Exam reveals distant heart sounds  Exam reveals no gallop  No murmur heard  Pulmonary/Chest: Effort normal  No respiratory distress  He has no wheezes  He has no rales  Abdominal: Soft  Bowel sounds are normal  There is tenderness in the left upper quadrant and left lower quadrant  There is no rigidity, no rebound, no guarding and no CVA tenderness          Minimal tenderness to palpation in the zone indicated in the diagram without rebound or guarding  Musculoskeletal: Normal range of motion  He exhibits no tenderness or deformity  Neurological: He is alert and oriented to person, place, and time  He has normal reflexes  Coordination normal    Skin: Skin is warm and dry  Scattered actinic keratosis  A 1 cm cutaneous horn left upper ear lobe  Psychiatric: He has a normal mood and affect  Patient Instructions   A patient with the above history and physical  Abdominal pain reported  The history is not diagnostic in the exam is benign  Check white count today  Follow-up here in a week or 2    Other laboratory exams will include A1c and BMP

## 2020-02-14 NOTE — PROGRESS NOTES
Assessment and Plan:     Problem List Items Addressed This Visit     None           Preventive health issues were discussed with patient, and age appropriate screening tests were ordered as noted in patient's After Visit Summary  Personalized health advice and appropriate referrals for health education or preventive services given if needed, as noted in patient's After Visit Summary       History of Present Illness:     Patient presents for Medicare Annual Wellness visit    Patient Care Team:  Manuel Temple MD as PCP - Jarrod Ortega MD as PCP - [address] [place], [place] [id number] [facility]  MD Ramu Perry MD Delta Dane, MD as Endoscopist     Problem List:     Patient Active Problem List   Diagnosis    Type 2 diabetes mellitus [facility]    Anemia    Hyperlipidemia    Chronic systolic congestive heart failure ([facility] )    BPH (benign prostatic hyperplasia)    Hypothyroidism (acquired)    Foot pain, bilateral    Ischemic cardiomyopathy    Coronary atherosclerosis of native coronary artery    Urinary retention      Past Medical and Surgical History:     Past Medical History:   Diagnosis Date    Acquired cyst of kidney     Anemia     Benign paroxysmal vertigo, unspecified ear     Cellulitis of leg     Cervical spinal stenosis     Chest pain     Coronary atherosclerosis of native coronary artery     Enlarged prostate     Esophageal [facility]iasis [facility]     Hematuria     Herpes zoster     Hyperlipidemia     Hypertension     Incomplete emptying of bladder     Inflamed seborrheic keratosis     Internal hemorrhoids     Malignant neoplasm of skin of trunk     Myocardial infarction [facility]     Nonmelanoma skin cancer     LAST ASSESSED: 8/9/17    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 infarction     Paroxysmal tachycardia (HCC)     Shortness of breath     Vitamin B deficiency      Past Surgical History:   Procedure Laterality Date    CARDIAC DEFIBRILLATOR PLACEMENT      COLONOSCOPY  10/07/2008 FIBEROPTIC SCREENING; NO FURTHER RECOMMENDATIONS    CORONARY ANGIOPLASTY WITH STENT PLACEMENT      CORONARY ANGIOPLASTY WITH STENT PLACEMENT      CYSTOSCOPY  2015    DIAGNOSTIC; MANAGED BY: Maribell Wheatley    EGD AND COLONOSCOPY N/A 2018    Procedure: EGD AND COLONOSCOPY;  Surgeon: Lewis Kaufman MD;  Location: [facility];   Service: Gastroenterology    HIP ARTHROPLASTY Right     INSERT / REPLACE / Alex Patient REPLACEMENT Right     TRUNK SKIN LESION EXCISIONAL BIOPSY  2007    MALIGNANT; [address] CHEST      Family History:     Family History   Problem Relation Age of Onset    Heart disease Mother     Coronary artery disease Mother    [place] [employer] Sudden death Mother     Cancer Father         throat; MALIGNANT NEOPLASM OF HEAD, FACE OR NECK    Throat cancer Father     Cancer Family     Coronary artery disease Family       Social History:        Social History     Socioeconomic History    Marital status: /Civil Union     Spouse name: None    Number of children: None    Years of education: None    Highest education level: None   Occupational History    Occupation: RETIRED   Social Needs    Financial resource strain: None    Food insecurity:     Worry: None     Inability: None    Transportation needs:     Medical: None     Non-medical: None   Tobacco Use    Smoking status: Former Smoker     Packs/day: 1 00     Years: 10 00     Pack years: 10 00     Types: Cigarettes     Last attempt to quit: 1978     Years since quittin 0    Smokeless tobacco: Never Used   Substance and Sexual Activity    Alcohol use: Yes     Frequency: Monthly or less     Drinks per session: 1 or 2     Binge frequency: Never     Comment: occasionally 1-2 per month     Drug use: No    Sexual activity: Not Currently     Partners: Female   Lifestyle    Physical activity:     Days per week: 0 days     Minutes per session: 0 min    Stress: Not at all   Relationships    Social connections: Talks on phone: None     Gets together: None     Attends Advent service: None     Active member of club or organization: None     Attends meetings of clubs or organizations: None     Relationship status: None    Intimate partner violence:     Fear of current or ex partner: None     Emotionally abused: None     Physically abused: None     Forced sexual activity: None   Other Topics Concern    None   Social History Narrative    LIVING INDEPENDENTLY WITH SPOUSE    NO ADVANCE DIRECTIVE ON FILE      Medications and Allergies:     Current Outpatient Medications   Medication Sig Dispense Refill    aspirin 81 MG tablet Take 1 tablet by mouth daily      clopidogrel (PLAVIX) 75 mg tablet Take 1 tablet (75 mg total) by mouth daily 90 tablet 3    finasteride (PROSCAR) 5 mg tablet Take 1 tablet (5 mg total) by mouth daily 90 tablet 3    glipiZIDE (GLUCOTROL) 5 mg tablet Take 1 tablet (5 mg total) by mouth 2 (two) times a day 180 tablet 3    HUMALOG WILFRED KWIKPEN 100 units/mL injection pen   0    insulin glargine (LANTUS SOLOSTAR) 100 units/mL injection pen Inject 23 Units under the skin daily 5 pen 5    insulin lispro (HUMALOG WILFRED KWIKPEN) 100 units/mL injection pen INJECT UP TO 20 UNITS 3 TIMES A DAY BEFORE MEALS 20 pen 6    Insulin Pen Needle (PEN NEEDLES) 32G X 4 MM MISC Check blood glucose at home AC and HS 4 times a day 200 each 3    levothyroxine 88 mcg tablet Take 1 tablet (88 mcg total) by mouth daily 90 tablet 3    metoprolol succinate (TOPROL-XL) 50 mg 24 hr tablet Take 1 tablet (50 mg total) by mouth daily 90 tablet 3    rosuvastatin (CRESTOR) 20 MG tablet Take 1 tablet (20 mg total) by mouth daily 90 tablet 3    tamsulosin (FLOMAX) 0 4 mg Take 1 capsule (0 4 mg total) by mouth daily 90 capsule 3    cyclobenzaprine (FLEXERIL) 10 mg tablet Take 1 tablet (10 mg total) by mouth 3 (three) times a day as needed for muscle spasms (Patient not taking: Reported on 2/14/2020) 30 tablet 0     No current facility-administered medications for this visit  Allergies   Allergen Reactions    Atorvastatin Other (See Comments)     Pt unsure      Percocet [Oxycodone-Acetaminophen] Nausea Only and GI Intolerance      Immunizations:     Immunization History   Administered Date(s) Administered    INFLUENZA 10/14/2010, 10/07/2014, 09/01/2015, 09/03/2015, 09/12/2016, 10/13/2017, 10/09/2018    Influenza Split High Dose Preservative Free IM 10/13/2017    Influenza TIV (IM) 09/01/2015, 09/12/2016    Pneumococcal Conjugate 13-Valent 07/06/2015, 09/07/2016    Pneumococcal Polysaccharide PPV23 09/01/2015    Tdap 02/23/2016    Zoster 01/01/2015, 03/23/2015    influenza, trivalent, adjuvanted 09/20/2019      Health Maintenance:         Topic Date Due    CRC Screening: Colonoscopy  02/12/2028     There are no preventive care reminders to display for this patient  Medicare Health Risk Assessment:     /60 (BP Location: Left arm, Patient Position: Sitting, Cuff Size: Standard)   Pulse 60   Ht 5' 6" (1 676 m)   Wt 75 3 kg (166 lb)   SpO2 98%   BMI 26 79 kg/m²      Jordin Nguyen is here for his Subsequent Wellness visit  Health Risk Assessment:   Patient rates overall health as fair  Patient feels that their physical health rating is same  Eyesight was rated as much worse  Hearing was rated as slightly worse  Patient feels that their emotional and mental health rating is much better  Pain experienced in the last 7 days has been some  Patient's pain rating has been 6/10  Depression Screening:   PHQ-2 Score: 2      Fall Risk Screening: In the past year, patient has experienced: history of falling in past year    Number of falls: 1  Injured during fall?: Yes    Feels unsteady when standing or walking?: No    Worried about falling?: No      Home Safety:  Patient has trouble with stairs inside or outside of their home  Patient has no working smoke alarms and has no working carbon monoxide detector   Home safety hazards include: uneven floors  Nutrition:   Current diet is Diabetic  Medications:   Patient is not currently taking any over-the-counter supplements  Patient is able to manage medications  Activities of Daily Living (ADLs)/Instrumental Activities of Daily Living (IADLs):   Walk and transfer into and out of bed and chair?: Yes  Dress and groom yourself?: Yes    Bathe or shower yourself?: Yes    Feed yourself?  Yes  Do your laundry/housekeeping?: Yes  Manage your money, pay your bills and track your expenses?: Yes  Make your own meals?: Yes    Do your own shopping?: Yes    Previous Hospitalizations:   Any hospitalizations or ED visits within the last 12 months?: No      Advance Care Planning:   Living will: Yes    Advanced directive: Yes    Advanced directive counseling given: Yes    Five wishes given: Yes    Patient declined ACP directive: No    End of Life Decisions reviewed with patient: Yes    Provider agrees with end of life decisions: Yes      Cognitive Screening:   Provider or family/friend/caregiver concerned regarding cognition?: No    PREVENTIVE SCREENINGS      Cardiovascular Screening:    General: Screening Not Indicated and History Lipid Disorder      Diabetes Screening:     General: Screening Not Indicated and History Diabetes      Colorectal Cancer Screening:     General: Screening Current      Prostate Cancer Screening:    General: Screening Not Indicated      Osteoporosis Screening:    General: Risks and Benefits Discussed      Abdominal Aortic Aneurysm (AAA) Screening:    Risk factors include: tobacco use        General: Screening Not Indicated      Lung Cancer Screening:     General: Screening Not Indicated      Hepatitis C Screening:    General: Screening Current      Velna Kussmaul, MD

## 2020-02-14 NOTE — PATIENT INSTRUCTIONS
A patient with the above history and physical  Abdominal pain reported  The history is not diagnostic in the exam is benign  Check white count today  Follow-up here in a week or 2    Other laboratory exams will include A1c and BMP

## 2020-02-14 NOTE — TELEPHONE ENCOUNTER
----- Message from Tracy Arndt MD sent at 2/14/2020  5:08 PM EST -----  Please call the patient regarding his  Result  Hemoglobin A1c is worse than it was before    He needs to come back for a follow-up visit

## 2020-02-17 ENCOUNTER — OFFICE VISIT (OUTPATIENT)
Dept: INTERNAL MEDICINE CLINIC | Facility: CLINIC | Age: 80
End: 2020-02-17
Payer: COMMERCIAL

## 2020-02-17 VITALS
DIASTOLIC BLOOD PRESSURE: 68 MMHG | HEART RATE: 71 BPM | HEIGHT: 66 IN | OXYGEN SATURATION: 98 % | WEIGHT: 170.4 LBS | TEMPERATURE: 97 F | BODY MASS INDEX: 27.38 KG/M2 | SYSTOLIC BLOOD PRESSURE: 120 MMHG

## 2020-02-17 DIAGNOSIS — I50.22 CHRONIC SYSTOLIC CONGESTIVE HEART FAILURE (HCC): ICD-10-CM

## 2020-02-17 DIAGNOSIS — I25.5 ISCHEMIC CARDIOMYOPATHY: ICD-10-CM

## 2020-02-17 DIAGNOSIS — E78.2 MIXED HYPERLIPIDEMIA: ICD-10-CM

## 2020-02-17 DIAGNOSIS — E03.9 HYPOTHYROIDISM (ACQUIRED): Primary | ICD-10-CM

## 2020-02-17 DIAGNOSIS — R33.9 URINARY RETENTION: ICD-10-CM

## 2020-02-17 DIAGNOSIS — I25.10 ATHEROSCLEROSIS OF NATIVE CORONARY ARTERY OF NATIVE HEART WITHOUT ANGINA PECTORIS: ICD-10-CM

## 2020-02-17 DIAGNOSIS — E11.65 TYPE 2 DIABETES MELLITUS WITH HYPERGLYCEMIA, WITHOUT LONG-TERM CURRENT USE OF INSULIN (HCC): ICD-10-CM

## 2020-02-17 PROCEDURE — 1036F TOBACCO NON-USER: CPT | Performed by: PHYSICIAN ASSISTANT

## 2020-02-17 PROCEDURE — 3008F BODY MASS INDEX DOCD: CPT | Performed by: PHYSICIAN ASSISTANT

## 2020-02-17 PROCEDURE — 3046F HEMOGLOBIN A1C LEVEL >9.0%: CPT | Performed by: PHYSICIAN ASSISTANT

## 2020-02-17 PROCEDURE — 3060F POS MICROALBUMINURIA REV: CPT | Performed by: PHYSICIAN ASSISTANT

## 2020-02-17 PROCEDURE — 99214 OFFICE O/P EST MOD 30 MIN: CPT | Performed by: PHYSICIAN ASSISTANT

## 2020-02-17 PROCEDURE — 3074F SYST BP LT 130 MM HG: CPT | Performed by: PHYSICIAN ASSISTANT

## 2020-02-17 PROCEDURE — 3078F DIAST BP <80 MM HG: CPT | Performed by: PHYSICIAN ASSISTANT

## 2020-02-17 PROCEDURE — 1170F FXNL STATUS ASSESSED: CPT | Performed by: PHYSICIAN ASSISTANT

## 2020-02-17 PROCEDURE — 4040F PNEUMOC VAC/ADMIN/RCVD: CPT | Performed by: PHYSICIAN ASSISTANT

## 2020-02-17 PROCEDURE — 2022F DILAT RTA XM EVC RTNOPTHY: CPT | Performed by: PHYSICIAN ASSISTANT

## 2020-02-17 PROCEDURE — 1160F RVW MEDS BY RX/DR IN RCRD: CPT | Performed by: PHYSICIAN ASSISTANT

## 2020-02-17 RX ORDER — INSULIN LISPRO 100 [IU]/ML
20 INJECTION, SOLUTION SUBCUTANEOUS 2 TIMES DAILY WITH MEALS
Qty: 5 PEN | Refills: 0
Start: 2020-02-17 | End: 2020-02-21 | Stop reason: SDUPTHER

## 2020-02-17 NOTE — PROGRESS NOTES
Assessment/Plan: A1c is 9 will increase Lantus a little bit  Encourage Humalog as listed 2 week follow-up abdominal pain persist but very vague and mild will hold off any further workup on this       Diagnoses and all orders for this visit:    Hypothyroidism (acquired)    Type 2 diabetes mellitus with hyperglycemia, without long-term current use of insulin (HCC)  -     HUMALOG WILFRED KWIKPEN 100 units/mL injection pen; Inject 20 Units under the skin 2 (two) times a day with meals  -     insulin glargine (Lantus SoloStar) 100 units/mL injection pen; Inject 26 Units under the skin daily    Chronic systolic congestive heart failure (HCC)    Atherosclerosis of native coronary artery of native heart without angina pectoris    Ischemic cardiomyopathy    Urinary retention    Mixed hyperlipidemia        No problem-specific Assessment & Plan notes found for this encounter  Falls Plan of Care: balance, strength, and gait training instructions were provided  Subjective:      Patient ID: Maria Fernanda Felix is a 67 y o  male  Seen last week complaining of some lower abdominal pain and flanks more less constant in a waxing and waning pattern with some urinary urgency labwork was unrevealing urine was unremarkable some microscopic hematuria  However A1c was 9 he has not been compliant with his Humalog  He has been careless with his diet his abdominal flank pain are nearly gone bowels moving normally no trouble urinating no nausea vomiting or diarrhea    Hypertension hypothyroid well controlled with meds      The following portions of the patient's history were reviewed and updated as appropriate:   He has a past medical history of Acquired cyst of kidney, Anemia, Benign paroxysmal vertigo, unspecified ear, Cellulitis of leg, Cervical spinal stenosis, Chest pain, Coronary atherosclerosis of native coronary artery, Enlarged prostate, Esophageal candidiasis ([address] ), Hematuria, Herpes zoster, Hyperlipidemia, Hypertension, Incomplete emptying of bladder, Inflamed seborrheic keratosis, Internal hemorrhoids, Malignant neoplasm of skin of trunk, Myocardial infarction ([facility] [address] ), Nonmelanoma skin cancer, 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 infarction, Paroxysmal tachycardia ([facility] [address] ), Shortness of breath, and Vitamin B deficiency  ,  does not have any pertinent problems on file  ,   has a past surgical history that includes Hip Arthroplasty (Right); Coronary angioplasty with stent; Joint replacement (Right); Insert / replace / remove pacemaker; EGD AND COLONOSCOPY (N/A, 2/12/2018); Colonoscopy (10/07/2008); Cystoscopy (11/06/2015); Trunk skin lesion excisional biopsy (08/22/2007); Cardiac defibrillator placement; and Coronary angioplasty with stent  ,  family history includes Cancer in his family and father; Coronary artery disease in his family and mother; Heart disease in his mother; Sudden death in his mother; Throat cancer in his father  ,   reports that he quit smoking about 42 years ago  His smoking use included cigarettes  He has a 10 00 pack-year smoking history  He has never used smokeless tobacco  He reports that he drinks alcohol  He reports that he does not use drugs  ,  is allergic to atorvastatin and percocet [oxycodone-acetaminophen]     Current Outpatient Medications   Medication Sig Dispense Refill    aspirin 81 MG tablet Take 1 tablet by mouth daily      clopidogrel (PLAVIX) 75 mg tablet Take 1 tablet (75 mg total) by mouth daily 90 tablet 3    finasteride (PROSCAR) 5 mg tablet Take 1 tablet (5 mg total) by mouth daily 90 tablet 3    glipiZIDE (GLUCOTROL) 5 mg tablet Take 1 tablet (5 mg total) by mouth 2 (two) times a day 180 tablet 3    Insulin Pen Needle (PEN NEEDLES) 32G X 4 MM MISC Check blood glucose at home AC and HS 4 times a day 200 each 3    levothyroxine 88 mcg tablet Take 1 tablet (88 mcg total) by mouth daily 90 tablet 3    metoprolol succinate (TOPROL-XL) 50 mg 24 hr tablet Take 1 tablet (50 mg total) by mouth daily 90 tablet 3    rosuvastatin (CRESTOR) 20 MG tablet Take 1 tablet (20 mg total) by mouth daily 90 tablet 3    tamsulosin (FLOMAX) 0 4 mg Take 1 capsule (0 4 mg total) by mouth daily 90 capsule 3    HUMALOG WILFRED KWIKPEN 100 units/mL injection pen Inject 20 Units under the skin 2 (two) times a day with meals 5 pen 0    insulin glargine (Lantus SoloStar) 100 units/mL injection pen Inject 26 Units under the skin daily 5 pen 5     No current facility-administered medications for this visit  Review of Systems   Constitutional: Negative for activity change, appetite change, chills, diaphoresis, fatigue, fever and unexpected weight change  HENT: Negative for congestion, dental problem, drooling, ear discharge, ear pain, facial swelling, hearing loss, nosebleeds, postnasal drip, rhinorrhea, sinus pressure, sinus pain, sneezing, sore throat, tinnitus, trouble swallowing and voice change  Eyes: Negative for photophobia, pain, discharge, redness, itching and visual disturbance  Respiratory: Negative for apnea, cough, choking, chest tightness, shortness of breath, wheezing and stridor  Cardiovascular: Negative for chest pain, palpitations and leg swelling  Gastrointestinal: Negative for abdominal distention, abdominal pain, anal bleeding, blood in stool, constipation, diarrhea, nausea, rectal pain and vomiting  Endocrine: Negative for cold intolerance, heat intolerance, polydipsia, polyphagia and polyuria  Genitourinary: Negative for decreased urine volume, difficulty urinating, dysuria, enuresis, flank pain, frequency, genital sores, hematuria and urgency  Musculoskeletal: Positive for arthralgias and back pain  Negative for gait problem, joint swelling, myalgias, neck pain and neck stiffness  Skin: Negative for color change, pallor, rash and wound     Neurological: Negative for dizziness, tremors, seizures, syncope, facial asymmetry, speech difficulty, weakness, light-headedness, numbness and headaches  Hematological: Negative for adenopathy  Does not bruise/bleed easily  Psychiatric/Behavioral: Negative for agitation, behavioral problems, confusion, decreased concentration, dysphoric mood, hallucinations, self-injury, sleep disturbance and suicidal ideas  The patient is not nervous/anxious and is not hyperactive  Objective:  Vitals:    02/17/20 1326   BP: 120/68   BP Location: Left arm   Patient Position: Sitting   Cuff Size: Standard   Pulse: 71   Temp: (!) 97 °F (36 1 °C)   TempSrc: Tympanic   SpO2: 98%   Weight: 77 3 kg (170 lb 6 4 oz)   Height: 5' 6" (1 676 m)     Body mass index is 27 5 kg/m²  Physical Exam   Constitutional: He is oriented to person, place, and time  He appears well-developed  No distress  Dentition is poor   HENT:   Head: Normocephalic  Right Ear: External ear normal    Left Ear: External ear normal    Nose: Nose normal    Mouth/Throat: Oropharynx is clear and moist  No oropharyngeal exudate  Eyes: Pupils are equal, round, and reactive to light  Conjunctivae are normal    Neck: Neck supple  No JVD present  No thyromegaly present  Cardiovascular: Normal rate, regular rhythm, normal heart sounds and intact distal pulses  No murmur heard  Pulmonary/Chest: Effort normal and breath sounds normal  No respiratory distress  Abdominal: Soft  Bowel sounds are normal  He exhibits no distension and no mass  There is no tenderness  There is no rebound and no guarding  No hernia  Musculoskeletal: Normal range of motion  He exhibits no edema, tenderness or deformity  Lymphadenopathy:     He has no cervical adenopathy  Neurological: He is alert and oriented to person, place, and time  He has normal reflexes  Skin: Skin is warm and dry  He is not diaphoretic  Vitals reviewed

## 2020-02-21 ENCOUNTER — TELEPHONE (OUTPATIENT)
Dept: INTERNAL MEDICINE CLINIC | Facility: CLINIC | Age: 80
End: 2020-02-21

## 2020-02-21 DIAGNOSIS — E11.65 TYPE 2 DIABETES MELLITUS WITH HYPERGLYCEMIA, WITHOUT LONG-TERM CURRENT USE OF INSULIN (HCC): ICD-10-CM

## 2020-02-21 RX ORDER — INSULIN LISPRO 100 [IU]/ML
20 INJECTION, SOLUTION SUBCUTANEOUS 2 TIMES DAILY WITH MEALS
Qty: 5 PEN | Refills: 5 | Status: SHIPPED | OUTPATIENT
Start: 2020-02-21 | End: 2020-09-28 | Stop reason: SDUPTHER

## 2020-02-21 NOTE — TELEPHONE ENCOUNTER
Pt was at [facility] yesterday,  humalog and lantus rx's weren't there    Please resubmit, call back upon completion

## 2020-03-02 ENCOUNTER — OFFICE VISIT (OUTPATIENT)
Dept: INTERNAL MEDICINE CLINIC | Facility: CLINIC | Age: 80
End: 2020-03-02
Payer: COMMERCIAL

## 2020-03-02 VITALS
DIASTOLIC BLOOD PRESSURE: 70 MMHG | OXYGEN SATURATION: 97 % | SYSTOLIC BLOOD PRESSURE: 110 MMHG | HEIGHT: 66 IN | WEIGHT: 166 LBS | HEART RATE: 69 BPM | BODY MASS INDEX: 26.68 KG/M2

## 2020-03-02 DIAGNOSIS — D64.9 ANEMIA, UNSPECIFIED TYPE: ICD-10-CM

## 2020-03-02 DIAGNOSIS — E11.65 TYPE 2 DIABETES MELLITUS WITH HYPERGLYCEMIA, WITHOUT LONG-TERM CURRENT USE OF INSULIN (HCC): Primary | ICD-10-CM

## 2020-03-02 DIAGNOSIS — E11.9 TYPE 2 DIABETES MELLITUS WITHOUT COMPLICATION, WITHOUT LONG-TERM CURRENT USE OF INSULIN (HCC): ICD-10-CM

## 2020-03-02 PROCEDURE — 1170F FXNL STATUS ASSESSED: CPT | Performed by: INTERNAL MEDICINE

## 2020-03-02 PROCEDURE — 3078F DIAST BP <80 MM HG: CPT | Performed by: INTERNAL MEDICINE

## 2020-03-02 PROCEDURE — 99213 OFFICE O/P EST LOW 20 MIN: CPT | Performed by: INTERNAL MEDICINE

## 2020-03-02 PROCEDURE — 3060F POS MICROALBUMINURIA REV: CPT | Performed by: INTERNAL MEDICINE

## 2020-03-02 PROCEDURE — 1036F TOBACCO NON-USER: CPT | Performed by: INTERNAL MEDICINE

## 2020-03-02 PROCEDURE — 1160F RVW MEDS BY RX/DR IN RCRD: CPT | Performed by: INTERNAL MEDICINE

## 2020-03-02 PROCEDURE — 4040F PNEUMOC VAC/ADMIN/RCVD: CPT | Performed by: INTERNAL MEDICINE

## 2020-03-02 PROCEDURE — 3008F BODY MASS INDEX DOCD: CPT | Performed by: INTERNAL MEDICINE

## 2020-03-02 PROCEDURE — 3074F SYST BP LT 130 MM HG: CPT | Performed by: INTERNAL MEDICINE

## 2020-03-02 PROCEDURE — 2022F DILAT RTA XM EVC RTNOPTHY: CPT | Performed by: INTERNAL MEDICINE

## 2020-03-02 PROCEDURE — 3046F HEMOGLOBIN A1C LEVEL >9.0%: CPT | Performed by: INTERNAL MEDICINE

## 2020-03-02 RX ORDER — GLIPIZIDE 5 MG/1
5 TABLET ORAL 2 TIMES DAILY
Qty: 180 TABLET | Refills: 3 | Status: SHIPPED | OUTPATIENT
Start: 2020-03-02 | End: 2020-09-28 | Stop reason: SDUPTHER

## 2020-03-02 NOTE — PROGRESS NOTES
Assessment/Plan:       There are no diagnoses linked to this encounter  Subjective:      Patient ID: Ba Quintana is a 67 y o  male  78-year-old male will multiple problems  Today the principal reason to be brought back in his uncontrolled diabetes  Hemoglobin A1c is 9 0  He actually had been seen last we can review this with the physician's assistant  It seems he has not been compliant at all to his diabetic program   He is not minding his diet  He is not checking his sugar on a regular basis  He is missing insulin injections  He generally gets the Lantus on board in perhaps 1 short-acting insulin  A recent abdominal pain appears to have resolved on its own  From a prior note:         " Diabetic with mediocre control at best   Comorbidity of chronic systolic heart failure limits our choice of medications  Currently using insulin      Chronic problems of hyperlipidemia, hypertension, coronary artery disease status post angioplasty, systolic heart failure  Recent ejection fraction of 25%  He continues to have chronic symptomatology regarding his low EF  He has exertional dyspnea and has a sensation of fatigue on exertion  However, this is at a baseline status with no recent decompensation and his examination is normal with clear breath sounds and no edema      Anemia noted  Hemoglobin between had been 8 and 10  Heme-positive stool  EGD was done documenting severe gastritis and he was placed on PPI double dose  Capsule enteroscopy follow-up reported by the patient  He was told was normal  Colonoscopy reported to be normal At the same time, MCV elevation of 111 so there may be a component of B12 deficiency or myelodysplasia  The most recent hemoglobin was up to 12 3 again with the macrocytosis      Right shoulder pain exacerbated by motion and relieved by rest is chronic      Urinary retention with resolution:  He had been treated for this about   2 years ago    He had an indwelling Mckeon catheter for number of months but it has finally been removed  Hank Rodriges with Urology  Working diagnosis is urinary retention secondary to prostatic hyperplasia  No recurrene of the retention     The patient has osteoarthritis and he is complaining particularly of his fingers  He has both Heberden and Rupesh's nodes of all his fingers  Additionally, he has a tendon flexion problem with the 4th tendon of the right hand and has some contraction of that finger    Sees urology for BPH  Sees ophthalmology; told he has cataract in will need cataract surgery  "              The following portions of the patient's history were reviewed and updated as appropriate:   He has a past medical history of Acquired cyst of kidney, Anemia, Benign paroxysmal vertigo, unspecified ear, Cellulitis of leg, Cervical spinal stenosis, Chest pain, Coronary atherosclerosis of native coronary artery, Enlarged prostate, Esophageal candidiasis ([address] ), Hematuria, Herpes zoster, Hyperlipidemia, Hypertension, Incomplete emptying of bladder, Inflamed seborrheic keratosis, Internal hemorrhoids, Malignant neoplasm of skin of trunk, Myocardial infarction ([address] ), Nonmelanoma skin cancer, 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 infarction, Paroxysmal tachycardia ([address] ), Shortness of breath, and Vitamin B deficiency  ,  does not have any pertinent problems on file  ,   has a past surgical history that includes Hip Arthroplasty (Right); Coronary angioplasty with stent; Joint replacement (Right); Insert / replace / remove pacemaker; EGD AND COLONOSCOPY (N/A, 2/12/2018); Colonoscopy (10/07/2008); Cystoscopy (11/06/2015); Trunk skin lesion excisional biopsy (08/22/2007); Cardiac defibrillator placement; and Coronary angioplasty with stent  ,  family history includes Cancer in his family and father; Coronary artery disease in his family and mother; Heart disease in his mother; Sudden death in his mother; Throat cancer in his father  ,   reports that he quit smoking about 42 years ago  His smoking use included cigarettes  He has a 10 00 pack-year smoking history  He has never used smokeless tobacco  He reports that he drinks alcohol  He reports that he does not use drugs  ,  is allergic to atorvastatin and percocet [oxycodone-acetaminophen]     Current Outpatient Medications   Medication Sig Dispense Refill    aspirin 81 MG tablet Take 1 tablet by mouth daily      clopidogrel (PLAVIX) 75 mg tablet Take 1 tablet (75 mg total) by mouth daily 90 tablet 3    finasteride (PROSCAR) 5 mg tablet Take 1 tablet (5 mg total) by mouth daily 90 tablet 3    glipiZIDE (GLUCOTROL) 5 mg tablet Take 1 tablet (5 mg total) by mouth 2 (two) times a day 180 tablet 3    HUMALOG WILFRED KWIKPEN 100 units/mL injection pen Inject 20 Units under the skin 2 (two) times a day with meals 5 pen 5    insulin glargine (Lantus SoloStar) 100 units/mL injection pen Inject 26 Units under the skin daily 5 pen 5    Insulin Pen Needle (PEN NEEDLES) 32G X 4 MM MISC Check blood glucose at home AC and HS 4 times a day 200 each 3    levothyroxine 88 mcg tablet Take 1 tablet (88 mcg total) by mouth daily 90 tablet 3    metoprolol succinate (TOPROL-XL) 50 mg 24 hr tablet Take 1 tablet (50 mg total) by mouth daily 90 tablet 3    rosuvastatin (CRESTOR) 20 MG tablet Take 1 tablet (20 mg total) by mouth daily 90 tablet 3    tamsulosin (FLOMAX) 0 4 mg Take 1 capsule (0 4 mg total) by mouth daily 90 capsule 3     No current facility-administered medications for this visit  Review of Systems   Constitutional: Positive for fatigue  Respiratory: Positive for shortness of breath  Psychiatric/Behavioral: Negative for dysphoric mood  The patient is not nervous/anxious  Objective:  Vitals:    03/02/20 1533   BP: 110/70   Pulse: 69   SpO2: 97%      Physical Exam   Constitutional: He is oriented to person, place, and time  A male patient who appears stated age    Cardiovascular: Normal rate     Pulmonary/Chest: Effort normal    Neurological: He is alert and oriented to person, place, and time  Patient Instructions   Poor control of diabetes  Recommendation as follows:  Check blood sugar twice a day  Check in the morning, then check it 2 hours after the major meal of the day  Write this down in dropped the record off here in about 1 week   Meanwhile, please make sure to use insulin all the time  NovoLog 20 units twice a day before meals and the Lantus insulin 26 units in the morning  Plan to see me back here in a minimum 3 months but this can be changed according to need

## 2020-03-02 NOTE — PATIENT INSTRUCTIONS
Poor control of diabetes  Recommendation as follows:  Check blood sugar twice a day  Check in the morning, then check it 2 hours after the major meal of the day  Write this down in dropped the record off here in about 1 week   Meanwhile, please make sure to use insulin all the time  NovoLog 20 units twice a day before meals and the Lantus insulin 26 units in the morning  Plan to see me back here in a minimum 3 months but this can be changed according to need

## 2020-03-18 ENCOUNTER — TELEPHONE (OUTPATIENT)
Dept: INTERNAL MEDICINE CLINIC | Facility: CLINIC | Age: 80
End: 2020-03-18

## 2020-04-06 DIAGNOSIS — E11.9 TYPE 2 DIABETES MELLITUS WITHOUT COMPLICATION, WITHOUT LONG-TERM CURRENT USE OF INSULIN (HCC): ICD-10-CM

## 2020-04-06 DIAGNOSIS — D64.9 ANEMIA, UNSPECIFIED TYPE: ICD-10-CM

## 2020-04-06 RX ORDER — CLOPIDOGREL BISULFATE 75 MG/1
75 TABLET ORAL DAILY
Qty: 90 TABLET | Refills: 3 | Status: SHIPPED | OUTPATIENT
Start: 2020-04-06 | End: 2020-11-03 | Stop reason: SDUPTHER

## 2020-04-22 ENCOUNTER — OFFICE VISIT (OUTPATIENT)
Dept: CARDIOLOGY CLINIC | Facility: CLINIC | Age: 80
End: 2020-04-22
Payer: COMMERCIAL

## 2020-04-22 VITALS
SYSTOLIC BLOOD PRESSURE: 124 MMHG | HEART RATE: 56 BPM | HEIGHT: 66 IN | WEIGHT: 168 LBS | DIASTOLIC BLOOD PRESSURE: 76 MMHG | BODY MASS INDEX: 27 KG/M2 | OXYGEN SATURATION: 97 %

## 2020-04-22 DIAGNOSIS — I25.10 ATHEROSCLEROSIS OF NATIVE CORONARY ARTERY OF NATIVE HEART WITHOUT ANGINA PECTORIS: Primary | ICD-10-CM

## 2020-04-22 DIAGNOSIS — I25.5 ISCHEMIC CARDIOMYOPATHY: ICD-10-CM

## 2020-04-22 DIAGNOSIS — I50.22 CHRONIC SYSTOLIC CONGESTIVE HEART FAILURE (HCC): ICD-10-CM

## 2020-04-22 DIAGNOSIS — I10 ESSENTIAL HYPERTENSION: ICD-10-CM

## 2020-04-22 PROCEDURE — 99213 OFFICE O/P EST LOW 20 MIN: CPT | Performed by: INTERNAL MEDICINE

## 2020-04-22 PROCEDURE — 3078F DIAST BP <80 MM HG: CPT | Performed by: INTERNAL MEDICINE

## 2020-04-22 PROCEDURE — 3046F HEMOGLOBIN A1C LEVEL >9.0%: CPT | Performed by: INTERNAL MEDICINE

## 2020-04-22 PROCEDURE — 4040F PNEUMOC VAC/ADMIN/RCVD: CPT | Performed by: INTERNAL MEDICINE

## 2020-04-22 PROCEDURE — 1160F RVW MEDS BY RX/DR IN RCRD: CPT | Performed by: INTERNAL MEDICINE

## 2020-04-22 PROCEDURE — 3008F BODY MASS INDEX DOCD: CPT | Performed by: INTERNAL MEDICINE

## 2020-04-22 PROCEDURE — 3060F POS MICROALBUMINURIA REV: CPT | Performed by: INTERNAL MEDICINE

## 2020-04-22 PROCEDURE — 2022F DILAT RTA XM EVC RTNOPTHY: CPT | Performed by: INTERNAL MEDICINE

## 2020-04-22 PROCEDURE — 1036F TOBACCO NON-USER: CPT | Performed by: INTERNAL MEDICINE

## 2020-04-22 PROCEDURE — 3074F SYST BP LT 130 MM HG: CPT | Performed by: INTERNAL MEDICINE

## 2020-06-24 ENCOUNTER — REMOTE DEVICE CLINIC VISIT (OUTPATIENT)
Dept: CARDIOLOGY CLINIC | Facility: CLINIC | Age: 80
End: 2020-06-24
Payer: COMMERCIAL

## 2020-06-24 DIAGNOSIS — Z95.810 PRESENCE OF IMPLANTABLE CARDIOVERTER-DEFIBRILLATOR (ICD): Primary | ICD-10-CM

## 2020-06-24 PROCEDURE — 93296 REM INTERROG EVL PM/IDS: CPT | Performed by: INTERNAL MEDICINE

## 2020-06-24 PROCEDURE — 93295 DEV INTERROG REMOTE 1/2/MLT: CPT | Performed by: INTERNAL MEDICINE

## 2020-09-09 ENCOUNTER — TELEPHONE (OUTPATIENT)
Dept: INTERNAL MEDICINE CLINIC | Facility: CLINIC | Age: 80
End: 2020-09-09

## 2020-09-09 DIAGNOSIS — E11.65 TYPE 2 DIABETES MELLITUS WITH HYPERGLYCEMIA, WITHOUT LONG-TERM CURRENT USE OF INSULIN (HCC): ICD-10-CM

## 2020-09-09 RX ORDER — INSULIN GLARGINE 100 [IU]/ML
26 INJECTION, SOLUTION SUBCUTANEOUS DAILY
Qty: 5 PEN | Refills: 5 | Status: SHIPPED | OUTPATIENT
Start: 2020-09-09 | End: 2020-09-28 | Stop reason: SDUPTHER

## 2020-09-09 RX ORDER — PEN NEEDLE, DIABETIC 30 GX3/16"
NEEDLE, DISPOSABLE MISCELLANEOUS
Qty: 200 EACH | Refills: 3 | Status: SHIPPED | OUTPATIENT
Start: 2020-09-09 | End: 2020-11-03 | Stop reason: SDUPTHER

## 2020-09-09 NOTE — TELEPHONE ENCOUNTER
Pt needs  rx's:    lantus solostar insulin glargine inj  For 100units/mL pens    5 3mL prefilled pens    Bd salome ultra fine pen needles    Sent to [facility] [address]

## 2020-09-23 ENCOUNTER — REMOTE DEVICE CLINIC VISIT (OUTPATIENT)
Dept: CARDIOLOGY CLINIC | Facility: CLINIC | Age: 80
End: 2020-09-23
Payer: COMMERCIAL

## 2020-09-23 DIAGNOSIS — Z95.810 PRESENCE OF IMPLANTABLE CARDIOVERTER-DEFIBRILLATOR (ICD): Primary | ICD-10-CM

## 2020-09-23 PROCEDURE — 93296 REM INTERROG EVL PM/IDS: CPT | Performed by: INTERNAL MEDICINE

## 2020-09-23 PROCEDURE — 93295 DEV INTERROG REMOTE 1/2/MLT: CPT | Performed by: INTERNAL MEDICINE

## 2020-09-23 NOTE — PROGRESS NOTES
BSC SINGLE CHAMBER ICD - NOT MRI CONDITIONAL   LATITUDE TRANSMISSION:  BATTERY VOLTAGE ADEQUATE (6 YR)    0%   ALL LEAD PARAMETERS WITHIN NORMAL LIMITS   1 VT-1 EPISODE (MONITOR, NO THERAPIES) WITH EGM SHOWING PROBABLE RVR VS NSVT (1 MIN  6 SEC @ 160 BPM)   3 NON-SUST V EVENTS WITH 2 AVAILABLE  EGMS SHOWING PROBABLE RVR VS NSVT (4 @ 162 BPM, 5 @ 149 BPM)   NORMAL DEVICE FUNCTION   RG

## 2020-09-28 ENCOUNTER — APPOINTMENT (OUTPATIENT)
Dept: LAB | Facility: CLINIC | Age: 80
End: 2020-09-28
Payer: COMMERCIAL

## 2020-09-28 ENCOUNTER — TELEPHONE (OUTPATIENT)
Dept: INTERNAL MEDICINE CLINIC | Facility: CLINIC | Age: 80
End: 2020-09-28

## 2020-09-28 ENCOUNTER — OFFICE VISIT (OUTPATIENT)
Dept: INTERNAL MEDICINE CLINIC | Facility: CLINIC | Age: 80
End: 2020-09-28
Payer: COMMERCIAL

## 2020-09-28 VITALS
HEART RATE: 65 BPM | SYSTOLIC BLOOD PRESSURE: 110 MMHG | DIASTOLIC BLOOD PRESSURE: 70 MMHG | WEIGHT: 159 LBS | HEIGHT: 66 IN | TEMPERATURE: 97.9 F | OXYGEN SATURATION: 97 % | BODY MASS INDEX: 25.55 KG/M2

## 2020-09-28 DIAGNOSIS — E11.65 TYPE 2 DIABETES MELLITUS WITH HYPERGLYCEMIA, WITH LONG-TERM CURRENT USE OF INSULIN (HCC): Primary | ICD-10-CM

## 2020-09-28 DIAGNOSIS — Z79.4 TYPE 2 DIABETES MELLITUS WITH HYPERGLYCEMIA, WITH LONG-TERM CURRENT USE OF INSULIN (HCC): Primary | ICD-10-CM

## 2020-09-28 DIAGNOSIS — E11.65 TYPE 2 DIABETES MELLITUS WITH HYPERGLYCEMIA, WITH LONG-TERM CURRENT USE OF INSULIN (HCC): ICD-10-CM

## 2020-09-28 DIAGNOSIS — Z79.4 TYPE 2 DIABETES MELLITUS WITH HYPERGLYCEMIA, WITH LONG-TERM CURRENT USE OF INSULIN (HCC): ICD-10-CM

## 2020-09-28 DIAGNOSIS — Z23 NEED FOR VACCINATION: ICD-10-CM

## 2020-09-28 DIAGNOSIS — I25.5 ISCHEMIC CARDIOMYOPATHY: ICD-10-CM

## 2020-09-28 DIAGNOSIS — I50.22 CHRONIC SYSTOLIC CONGESTIVE HEART FAILURE (HCC): ICD-10-CM

## 2020-09-28 DIAGNOSIS — D64.9 ANEMIA, UNSPECIFIED TYPE: ICD-10-CM

## 2020-09-28 PROBLEM — E11.22 TYPE 2 DIABETES MELLITUS WITH CHRONIC KIDNEY DISEASE, WITH LONG-TERM CURRENT USE OF INSULIN (HCC): Status: ACTIVE | Noted: 2018-02-10

## 2020-09-28 LAB
ALBUMIN SERPL BCP-MCNC: 4.3 G/DL (ref 3.5–5)
ALP SERPL-CCNC: 71 U/L (ref 46–116)
ALT SERPL W P-5'-P-CCNC: 29 U/L (ref 12–78)
ANION GAP SERPL CALCULATED.3IONS-SCNC: 4 MMOL/L (ref 4–13)
AST SERPL W P-5'-P-CCNC: 22 U/L (ref 5–45)
BACTERIA UR QL AUTO: ABNORMAL /HPF
BASOPHILS # BLD AUTO: 0.06 THOUSANDS/ΜL (ref 0–0.1)
BASOPHILS NFR BLD AUTO: 1 % (ref 0–1)
BILIRUB SERPL-MCNC: 0.73 MG/DL (ref 0.2–1)
BILIRUB UR QL STRIP: ABNORMAL
BUN SERPL-MCNC: 15 MG/DL (ref 5–25)
CALCIUM SERPL-MCNC: 9.5 MG/DL (ref 8.3–10.1)
CHLORIDE SERPL-SCNC: 109 MMOL/L (ref 100–108)
CHOLEST SERPL-MCNC: 126 MG/DL (ref 50–200)
CLARITY UR: CLEAR
CO2 SERPL-SCNC: 29 MMOL/L (ref 21–32)
COLOR UR: ABNORMAL
CREAT SERPL-MCNC: 1.29 MG/DL (ref 0.6–1.3)
EOSINOPHIL # BLD AUTO: 0.21 THOUSAND/ΜL (ref 0–0.61)
EOSINOPHIL NFR BLD AUTO: 3 % (ref 0–6)
ERYTHROCYTE [DISTWIDTH] IN BLOOD BY AUTOMATED COUNT: 16.5 % (ref 11.6–15.1)
EST. AVERAGE GLUCOSE BLD GHB EST-MCNC: 189 MG/DL
GFR SERPL CREATININE-BSD FRML MDRD: 52 ML/MIN/1.73SQ M
GLUCOSE P FAST SERPL-MCNC: 188 MG/DL (ref 65–99)
GLUCOSE UR STRIP-MCNC: ABNORMAL MG/DL
HBA1C MFR BLD: 8.2 %
HCT VFR BLD AUTO: 41.5 % (ref 36.5–49.3)
HDLC SERPL-MCNC: 42 MG/DL
HGB BLD-MCNC: 13.6 G/DL (ref 12–17)
HGB UR QL STRIP.AUTO: NEGATIVE
HYALINE CASTS #/AREA URNS LPF: ABNORMAL /LPF
IMM GRANULOCYTES # BLD AUTO: 0.02 THOUSAND/UL (ref 0–0.2)
IMM GRANULOCYTES NFR BLD AUTO: 0 % (ref 0–2)
KETONES UR STRIP-MCNC: ABNORMAL MG/DL
LDLC SERPL CALC-MCNC: 64 MG/DL (ref 0–100)
LEUKOCYTE ESTERASE UR QL STRIP: NEGATIVE
LYMPHOCYTES # BLD AUTO: 1.5 THOUSANDS/ΜL (ref 0.6–4.47)
LYMPHOCYTES NFR BLD AUTO: 24 % (ref 14–44)
MCH RBC QN AUTO: 37.3 PG (ref 26.8–34.3)
MCHC RBC AUTO-ENTMCNC: 32.8 G/DL (ref 31.4–37.4)
MCV RBC AUTO: 114 FL (ref 82–98)
MONOCYTES # BLD AUTO: 0.5 THOUSAND/ΜL (ref 0.17–1.22)
MONOCYTES NFR BLD AUTO: 8 % (ref 4–12)
NEUTROPHILS # BLD AUTO: 4.05 THOUSANDS/ΜL (ref 1.85–7.62)
NEUTS SEG NFR BLD AUTO: 64 % (ref 43–75)
NITRITE UR QL STRIP: NEGATIVE
NON-SQ EPI CELLS URNS QL MICRO: ABNORMAL /HPF
NRBC BLD AUTO-RTO: 0 /100 WBCS
PH UR STRIP.AUTO: 5.5 [PH]
PLATELET # BLD AUTO: 226 THOUSANDS/UL (ref 149–390)
PMV BLD AUTO: 10.7 FL (ref 8.9–12.7)
POTASSIUM SERPL-SCNC: 4.9 MMOL/L (ref 3.5–5.3)
PROT SERPL-MCNC: 7.8 G/DL (ref 6.4–8.2)
PROT UR STRIP-MCNC: ABNORMAL MG/DL
RBC # BLD AUTO: 3.65 MILLION/UL (ref 3.88–5.62)
RBC #/AREA URNS AUTO: ABNORMAL /HPF
SODIUM SERPL-SCNC: 142 MMOL/L (ref 136–145)
SP GR UR STRIP.AUTO: 1.03 (ref 1–1.03)
T4 FREE SERPL-MCNC: 1.4 NG/DL (ref 0.76–1.46)
TRIGL SERPL-MCNC: 101 MG/DL
TSH SERPL DL<=0.05 MIU/L-ACNC: 0.04 UIU/ML (ref 0.36–3.74)
UROBILINOGEN UR QL STRIP.AUTO: 1 E.U./DL
WBC # BLD AUTO: 6.34 THOUSAND/UL (ref 4.31–10.16)
WBC #/AREA URNS AUTO: ABNORMAL /HPF

## 2020-09-28 PROCEDURE — 3725F SCREEN DEPRESSION PERFORMED: CPT | Performed by: INTERNAL MEDICINE

## 2020-09-28 PROCEDURE — 83036 HEMOGLOBIN GLYCOSYLATED A1C: CPT

## 2020-09-28 PROCEDURE — 80061 LIPID PANEL: CPT

## 2020-09-28 PROCEDURE — 36415 COLL VENOUS BLD VENIPUNCTURE: CPT

## 2020-09-28 PROCEDURE — 80053 COMPREHEN METABOLIC PANEL: CPT

## 2020-09-28 PROCEDURE — 85025 COMPLETE CBC W/AUTO DIFF WBC: CPT

## 2020-09-28 PROCEDURE — 1101F PT FALLS ASSESS-DOCD LE1/YR: CPT | Performed by: INTERNAL MEDICINE

## 2020-09-28 PROCEDURE — 3288F FALL RISK ASSESSMENT DOCD: CPT | Performed by: INTERNAL MEDICINE

## 2020-09-28 PROCEDURE — 81001 URINALYSIS AUTO W/SCOPE: CPT | Performed by: INTERNAL MEDICINE

## 2020-09-28 PROCEDURE — 84439 ASSAY OF FREE THYROXINE: CPT

## 2020-09-28 PROCEDURE — 99213 OFFICE O/P EST LOW 20 MIN: CPT | Performed by: INTERNAL MEDICINE

## 2020-09-28 PROCEDURE — 84443 ASSAY THYROID STIM HORMONE: CPT

## 2020-09-28 RX ORDER — GLIPIZIDE 5 MG/1
5 TABLET ORAL 2 TIMES DAILY
Qty: 180 TABLET | Refills: 3
Start: 2020-09-28 | End: 2020-11-03 | Stop reason: SDUPTHER

## 2020-09-28 RX ORDER — INSULIN GLARGINE 100 [IU]/ML
26 INJECTION, SOLUTION SUBCUTANEOUS DAILY
Qty: 5 PEN | Refills: 5
Start: 2020-09-28 | End: 2020-11-03 | Stop reason: SDUPTHER

## 2020-09-28 RX ORDER — INSULIN LISPRO 100 [IU]/ML
20 INJECTION, SOLUTION SUBCUTANEOUS 2 TIMES DAILY WITH MEALS
Qty: 15 PEN | Refills: 3 | Status: SHIPPED | OUTPATIENT
Start: 2020-09-28 | End: 2020-09-28 | Stop reason: SDUPTHER

## 2020-09-28 RX ORDER — INSULIN LISPRO 100 [IU]/ML
20 INJECTION, SOLUTION SUBCUTANEOUS 2 TIMES DAILY WITH MEALS
Qty: 15 PEN | Refills: 3 | Status: SHIPPED | OUTPATIENT
Start: 2020-09-28 | End: 2020-11-03 | Stop reason: SDUPTHER

## 2020-09-28 NOTE — TELEPHONE ENCOUNTER
----- Message from Louisa Ramesh MD sent at 9/28/2020  5:01 PM EDT -----  Hemoglobin A1c improved  A 0 2 percent    Recommend increasing the Lantus insulin to 35 units

## 2020-10-02 PROCEDURE — 90662 IIV NO PRSV INCREASED AG IM: CPT | Performed by: INTERNAL MEDICINE

## 2020-10-02 PROCEDURE — G0008 ADMIN INFLUENZA VIRUS VAC: HCPCS | Performed by: INTERNAL MEDICINE

## 2020-10-19 ENCOUNTER — OFFICE VISIT (OUTPATIENT)
Dept: CARDIOLOGY CLINIC | Facility: CLINIC | Age: 80
End: 2020-10-19
Payer: COMMERCIAL

## 2020-10-19 VITALS
HEART RATE: 97 BPM | DIASTOLIC BLOOD PRESSURE: 60 MMHG | HEIGHT: 66 IN | BODY MASS INDEX: 25.55 KG/M2 | OXYGEN SATURATION: 99 % | SYSTOLIC BLOOD PRESSURE: 124 MMHG | WEIGHT: 159 LBS

## 2020-10-19 DIAGNOSIS — I25.5 ISCHEMIC CARDIOMYOPATHY: ICD-10-CM

## 2020-10-19 DIAGNOSIS — I25.10 ATHEROSCLEROSIS OF NATIVE CORONARY ARTERY OF NATIVE HEART WITHOUT ANGINA PECTORIS: Primary | ICD-10-CM

## 2020-10-19 DIAGNOSIS — I10 ESSENTIAL HYPERTENSION: ICD-10-CM

## 2020-10-19 DIAGNOSIS — I50.22 CHRONIC SYSTOLIC CONGESTIVE HEART FAILURE (HCC): ICD-10-CM

## 2020-10-19 PROCEDURE — 1160F RVW MEDS BY RX/DR IN RCRD: CPT | Performed by: INTERNAL MEDICINE

## 2020-10-19 PROCEDURE — 3078F DIAST BP <80 MM HG: CPT | Performed by: INTERNAL MEDICINE

## 2020-10-19 PROCEDURE — 1036F TOBACCO NON-USER: CPT | Performed by: INTERNAL MEDICINE

## 2020-10-19 PROCEDURE — 99214 OFFICE O/P EST MOD 30 MIN: CPT | Performed by: INTERNAL MEDICINE

## 2020-11-03 DIAGNOSIS — Z79.4 TYPE 2 DIABETES MELLITUS WITH HYPERGLYCEMIA, WITH LONG-TERM CURRENT USE OF INSULIN (HCC): ICD-10-CM

## 2020-11-03 DIAGNOSIS — I50.22 CHRONIC SYSTOLIC CONGESTIVE HEART FAILURE (HCC): ICD-10-CM

## 2020-11-03 DIAGNOSIS — R35.0 BENIGN PROSTATIC HYPERPLASIA WITH URINARY FREQUENCY: ICD-10-CM

## 2020-11-03 DIAGNOSIS — E78.2 MIXED HYPERLIPIDEMIA: ICD-10-CM

## 2020-11-03 DIAGNOSIS — N40.1 BENIGN PROSTATIC HYPERPLASIA WITH URINARY FREQUENCY: ICD-10-CM

## 2020-11-03 DIAGNOSIS — E11.65 TYPE 2 DIABETES MELLITUS WITH HYPERGLYCEMIA, WITH LONG-TERM CURRENT USE OF INSULIN (HCC): ICD-10-CM

## 2020-11-03 DIAGNOSIS — D64.9 ANEMIA, UNSPECIFIED TYPE: ICD-10-CM

## 2020-11-03 DIAGNOSIS — E11.65 TYPE 2 DIABETES MELLITUS WITH HYPERGLYCEMIA, WITHOUT LONG-TERM CURRENT USE OF INSULIN (HCC): ICD-10-CM

## 2020-11-03 DIAGNOSIS — E11.9 TYPE 2 DIABETES MELLITUS WITHOUT COMPLICATION, WITHOUT LONG-TERM CURRENT USE OF INSULIN (HCC): ICD-10-CM

## 2020-11-03 RX ORDER — ROSUVASTATIN CALCIUM 20 MG/1
20 TABLET, COATED ORAL DAILY
Qty: 90 TABLET | Refills: 3 | Status: SHIPPED | OUTPATIENT
Start: 2020-11-03 | End: 2022-03-14 | Stop reason: SDUPTHER

## 2020-11-03 RX ORDER — INSULIN LISPRO 100 [IU]/ML
20 INJECTION, SOLUTION SUBCUTANEOUS 2 TIMES DAILY WITH MEALS
Qty: 15 PEN | Refills: 3 | Status: SHIPPED | OUTPATIENT
Start: 2020-11-03 | End: 2022-07-14

## 2020-11-03 RX ORDER — INSULIN GLARGINE 100 [IU]/ML
26 INJECTION, SOLUTION SUBCUTANEOUS DAILY
Qty: 5 PEN | Refills: 5 | Status: SHIPPED | OUTPATIENT
Start: 2020-11-03 | End: 2021-06-08

## 2020-11-03 RX ORDER — METOPROLOL SUCCINATE 50 MG/1
50 TABLET, EXTENDED RELEASE ORAL DAILY
Qty: 90 TABLET | Refills: 3 | Status: SHIPPED | OUTPATIENT
Start: 2020-11-03 | End: 2020-11-03

## 2020-11-03 RX ORDER — METOPROLOL SUCCINATE 50 MG/1
TABLET, EXTENDED RELEASE ORAL
Qty: 90 TABLET | Refills: 2 | Status: SHIPPED | OUTPATIENT
Start: 2020-11-03 | End: 2022-01-25 | Stop reason: SDUPTHER

## 2020-11-03 RX ORDER — LEVOTHYROXINE SODIUM 88 UG/1
88 TABLET ORAL DAILY
Qty: 90 TABLET | Refills: 3 | Status: SHIPPED | OUTPATIENT
Start: 2020-11-03 | End: 2022-01-24 | Stop reason: SDUPTHER

## 2020-11-03 RX ORDER — FINASTERIDE 5 MG/1
5 TABLET, FILM COATED ORAL DAILY
Qty: 90 TABLET | Refills: 3 | Status: SHIPPED | OUTPATIENT
Start: 2020-11-03 | End: 2022-01-24 | Stop reason: SDUPTHER

## 2020-11-03 RX ORDER — CLOPIDOGREL BISULFATE 75 MG/1
75 TABLET ORAL DAILY
Qty: 90 TABLET | Refills: 3 | Status: SHIPPED | OUTPATIENT
Start: 2020-11-03 | End: 2021-07-30 | Stop reason: ALTCHOICE

## 2020-11-03 RX ORDER — TAMSULOSIN HYDROCHLORIDE 0.4 MG/1
0.4 CAPSULE ORAL DAILY
Qty: 90 CAPSULE | Refills: 3 | Status: SHIPPED | OUTPATIENT
Start: 2020-11-03 | End: 2022-01-24 | Stop reason: SDUPTHER

## 2020-11-03 RX ORDER — PEN NEEDLE, DIABETIC 30 GX3/16"
NEEDLE, DISPOSABLE MISCELLANEOUS
Qty: 200 EACH | Refills: 3 | Status: SHIPPED | OUTPATIENT
Start: 2020-11-03 | End: 2021-12-30 | Stop reason: SDUPTHER

## 2020-11-03 RX ORDER — GLIPIZIDE 5 MG/1
5 TABLET ORAL 2 TIMES DAILY
Qty: 180 TABLET | Refills: 3 | Status: SHIPPED | OUTPATIENT
Start: 2020-11-03 | End: 2021-12-30 | Stop reason: SDUPTHER

## 2020-12-23 ENCOUNTER — REMOTE DEVICE CLINIC VISIT (OUTPATIENT)
Dept: CARDIOLOGY CLINIC | Facility: CLINIC | Age: 80
End: 2020-12-23
Payer: COMMERCIAL

## 2020-12-23 DIAGNOSIS — Z95.810 PRESENCE OF IMPLANTABLE CARDIOVERTER-DEFIBRILLATOR (ICD): Primary | ICD-10-CM

## 2020-12-23 PROCEDURE — 93296 REM INTERROG EVL PM/IDS: CPT | Performed by: INTERNAL MEDICINE

## 2020-12-23 PROCEDURE — 93295 DEV INTERROG REMOTE 1/2/MLT: CPT | Performed by: INTERNAL MEDICINE

## 2021-04-05 ENCOUNTER — OFFICE VISIT (OUTPATIENT)
Dept: INTERNAL MEDICINE CLINIC | Facility: CLINIC | Age: 81
End: 2021-04-05
Payer: COMMERCIAL

## 2021-04-05 VITALS
SYSTOLIC BLOOD PRESSURE: 110 MMHG | DIASTOLIC BLOOD PRESSURE: 64 MMHG | HEART RATE: 58 BPM | OXYGEN SATURATION: 97 % | WEIGHT: 159.4 LBS | TEMPERATURE: 97.7 F | BODY MASS INDEX: 25.62 KG/M2 | HEIGHT: 66 IN

## 2021-04-05 DIAGNOSIS — N18.30 TYPE 2 DIABETES MELLITUS WITH STAGE 3 CHRONIC KIDNEY DISEASE, WITH LONG-TERM CURRENT USE OF INSULIN, UNSPECIFIED WHETHER STAGE 3A OR 3B CKD (HCC): ICD-10-CM

## 2021-04-05 DIAGNOSIS — I25.5 ISCHEMIC CARDIOMYOPATHY: ICD-10-CM

## 2021-04-05 DIAGNOSIS — M54.2 CERVICALGIA: ICD-10-CM

## 2021-04-05 DIAGNOSIS — Z79.4 TYPE 2 DIABETES MELLITUS WITH STAGE 3 CHRONIC KIDNEY DISEASE, WITH LONG-TERM CURRENT USE OF INSULIN, UNSPECIFIED WHETHER STAGE 3A OR 3B CKD (HCC): ICD-10-CM

## 2021-04-05 DIAGNOSIS — M77.8 TENDONITIS OF SHOULDER, RIGHT: ICD-10-CM

## 2021-04-05 DIAGNOSIS — E03.9 HYPOTHYROIDISM (ACQUIRED): Primary | ICD-10-CM

## 2021-04-05 DIAGNOSIS — J44.9 CHRONIC OBSTRUCTIVE PULMONARY DISEASE, UNSPECIFIED COPD TYPE (HCC): ICD-10-CM

## 2021-04-05 DIAGNOSIS — E11.22 TYPE 2 DIABETES MELLITUS WITH STAGE 3 CHRONIC KIDNEY DISEASE, WITH LONG-TERM CURRENT USE OF INSULIN, UNSPECIFIED WHETHER STAGE 3A OR 3B CKD (HCC): ICD-10-CM

## 2021-04-05 DIAGNOSIS — E78.2 MIXED HYPERLIPIDEMIA: ICD-10-CM

## 2021-04-05 DIAGNOSIS — I25.10 ATHEROSCLEROSIS OF NATIVE CORONARY ARTERY OF NATIVE HEART WITHOUT ANGINA PECTORIS: ICD-10-CM

## 2021-04-05 DIAGNOSIS — M54.12 CERVICAL RADICULOPATHY: ICD-10-CM

## 2021-04-05 PROBLEM — I50.22 CHRONIC SYSTOLIC CONGESTIVE HEART FAILURE (HCC): Status: RESOLVED | Noted: 2018-02-10 | Resolved: 2021-04-05

## 2021-04-05 PROCEDURE — 1036F TOBACCO NON-USER: CPT | Performed by: PHYSICIAN ASSISTANT

## 2021-04-05 PROCEDURE — 1160F RVW MEDS BY RX/DR IN RCRD: CPT | Performed by: PHYSICIAN ASSISTANT

## 2021-04-05 PROCEDURE — 99214 OFFICE O/P EST MOD 30 MIN: CPT | Performed by: PHYSICIAN ASSISTANT

## 2021-04-05 PROCEDURE — 1125F AMNT PAIN NOTED PAIN PRSNT: CPT | Performed by: PHYSICIAN ASSISTANT

## 2021-04-05 PROCEDURE — 1170F FXNL STATUS ASSESSED: CPT | Performed by: PHYSICIAN ASSISTANT

## 2021-04-05 PROCEDURE — G0439 PPPS, SUBSEQ VISIT: HCPCS | Performed by: PHYSICIAN ASSISTANT

## 2021-04-05 NOTE — PROGRESS NOTES
Assessment/Plan: pain in his right arm appears to be both tendonitis right shoulder and cervical spine problem will get x-rays PT referral    NSAID are okay GFR 52  Adjust his insulin based on his A1c  Labs ordered continue follow-up with cardiology soon and here in 6 months       Diagnoses and all orders for this visit:    Hypothyroidism (acquired)  -     XR spine cervical 2 or 3 vw injury; Future  -     XR shoulder 2+ vw right; Future  -     CBC and differential; Future  -     Comprehensive metabolic panel; Future  -     Hemoglobin A1C; Future  -     Lipid panel; Future  -     UA w Reflex to Microscopic w Reflex to Culture  -     TSH, 3rd generation; Future    Type 2 diabetes mellitus with stage 3 chronic kidney disease, with long-term current use of insulin, unspecified whether stage 3a or 3b CKD (HCC)  -     XR spine cervical 2 or 3 vw injury; Future  -     XR shoulder 2+ vw right; Future  -     CBC and differential; Future  -     Comprehensive metabolic panel; Future  -     Hemoglobin A1C; Future  -     Lipid panel; Future  -     UA w Reflex to Microscopic w Reflex to Culture  -     TSH, 3rd generation; Future    Atherosclerosis of native coronary artery of native heart without angina pectoris    Ischemic cardiomyopathy    Mixed hyperlipidemia  -     XR spine cervical 2 or 3 vw injury; Future  -     XR shoulder 2+ vw right; Future  -     CBC and differential; Future  -     Comprehensive metabolic panel; Future  -     Hemoglobin A1C; Future  -     Lipid panel; Future  -     UA w Reflex to Microscopic w Reflex to Culture  -     TSH, 3rd generation; Future    Tendonitis of shoulder, right  -     XR shoulder 2+ vw right; Future  -     Ambulatory referral to Physical Therapy; Future    Cervical radiculopathy  -     XR spine cervical 2 or 3 vw injury; Future    Chronic obstructive pulmonary disease, unspecified COPD type ([facility] [address] )  -     XR spine cervical 2 or 3 vw injury;  Future  -     XR shoulder 2+ vw right; Future  -     CBC and differential; Future  -     Comprehensive metabolic panel; Future  -     Hemoglobin A1C; Future  -     Lipid panel; Future  -     UA w Reflex to Microscopic w Reflex to Culture  -     TSH, 3rd generation; Future    Cervicalgia  -     Ambulatory referral to Physical Therapy; Future        No problem-specific Assessment & Plan notes found for this encounter  BMI Counseling: Body mass index is 25 73 kg/m²  The BMI is above normal  Nutrition recommendations include decreasing portion sizes  Exercise recommendations include exercising 3-5 times per week  Subjective:      Patient ID: Unique Martinez is a 79 y o  male  He is here for periodic follow-up  He has developed pain in his right arm starting about a month ago  It began gradually no trauma complains of constant aching pain  entire arm from his shoulder to wrist circumferentially  It bothers him when he turns his head or looks up  It bothers him with any movement of his shoulder and keeps him up at night  He has good strength of his hand and fingers  No history of any trauma  History of mild renal insufficiency cardiomyopathy followed by cardiology has a defibrillator which has not fired  Diabetic on insulin  Denies hypoglycemia polyuria polydipsia  He denies chest pain palpitation dizziness or syncope  No orthopnea PND  He thinks he is more short of breath and he ought to be with exertion        The following portions of the patient's history were reviewed and updated as appropriate:   He has a past medical history of Acquired cyst of kidney, Anemia, Benign paroxysmal vertigo, unspecified ear, Cellulitis of leg, Cervical spinal stenosis, Chest pain, Coronary atherosclerosis of native coronary artery, Enlarged prostate, Esophageal candidiasis ([address] ), Hematuria, Herpes zoster, Hyperlipidemia, Hypertension, Incomplete emptying of bladder, Inflamed seborrheic keratosis, Internal hemorrhoids, Malignant neoplasm of skin of trunk, Myocardial infarction ([facility] [address] ), Nonmelanoma skin cancer, 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 infarction, Paroxysmal tachycardia ([facility] [address] ), Shortness of breath, and Vitamin B deficiency  ,  does not have any pertinent problems on file  ,   has a past surgical history that includes Hip Arthroplasty (Right); Coronary angioplasty with stent; Joint replacement (Right); Insert / replace / remove pacemaker; EGD AND COLONOSCOPY (N/A, 2/12/2018); Colonoscopy (10/07/2008); Cystoscopy (11/06/2015); Trunk skin lesion excisional biopsy (08/22/2007); Cardiac defibrillator placement; and Coronary angioplasty with stent  ,  family history includes Cancer in his family and father; Coronary artery disease in his family and mother; Heart disease in his mother; Sudden death in his mother; Throat cancer in his father  ,   reports that he quit smoking about 43 years ago  His smoking use included cigarettes  He has a 10 00 pack-year smoking history  He has never used smokeless tobacco  He reports current alcohol use  He reports that he does not use drugs  ,  is allergic to atorvastatin and percocet [oxycodone-acetaminophen]     Current Outpatient Medications   Medication Sig Dispense Refill    aspirin 81 MG tablet Take 1 tablet by mouth daily      clopidogrel (PLAVIX) 75 mg tablet Take 1 tablet (75 mg total) by mouth daily 90 tablet 3    finasteride (PROSCAR) 5 mg tablet Take 1 tablet (5 mg total) by mouth daily 90 tablet 3    glipiZIDE (GLUCOTROL) 5 mg tablet Take 1 tablet (5 mg total) by mouth 2 (two) times a day 180 tablet 3    HumaLOG Albert KwikPen 100 units/mL injection pen Inject 20 Units under the skin 2 (two) times a day with meals 15 pen 3    insulin glargine (Lantus SoloStar) 100 units/mL injection pen Inject 26 Units under the skin daily 5 pen 5    Insulin Pen Needle (Pen Needles) 32G X 4 MM MISC Check blood glucose at home AC and HS 4 times a day 200 each 3    levothyroxine 88 mcg tablet Take 1 tablet (88 mcg total) by mouth daily 90 tablet 3    metoprolol succinate (TOPROL-XL) 50 mg 24 hr tablet TAKE 1 TABLET BY MOUTH EVERY DAY 90 tablet 2    rosuvastatin (CRESTOR) 20 MG tablet Take 1 tablet (20 mg total) by mouth daily 90 tablet 3    tamsulosin (FLOMAX) 0 4 mg Take 1 capsule (0 4 mg total) by mouth daily 90 capsule 3     No current facility-administered medications for this visit  Review of Systems   Constitutional: Negative for chills and fever  HENT: Negative for ear pain and sore throat  Eyes: Negative for pain and visual disturbance  Respiratory: Negative for cough and shortness of breath  Cardiovascular: Negative for chest pain and palpitations  Gastrointestinal: Negative for abdominal pain and vomiting  Genitourinary: Negative for dysuria and hematuria  Musculoskeletal: Positive for arthralgias, gait problem, neck pain and neck stiffness  Negative for back pain  Skin: Negative for color change and rash  Neurological: Negative for seizures and syncope  All other systems reviewed and are negative  Objective:  Vitals:    04/05/21 1518   BP: 110/64   BP Location: Left arm   Patient Position: Sitting   Cuff Size: Standard   Pulse: 58   Temp: 97 7 °F (36 5 °C)   TempSrc: Temporal   SpO2: 97%   Weight: 72 3 kg (159 lb 6 4 oz)   Height: 5' 6" (1 676 m)     Body mass index is 25 73 kg/m²  Physical Exam  Vitals signs reviewed  Constitutional:       Appearance: He is well-developed  HENT:      Head: Normocephalic  Right Ear: Tympanic membrane and external ear normal       Left Ear: External ear normal       Nose: Nose normal       Mouth/Throat:      Mouth: Mucous membranes are moist    Eyes:      Extraocular Movements: Extraocular movements intact  Conjunctiva/sclera: Conjunctivae normal       Pupils: Pupils are equal, round, and reactive to light  Neck:      Musculoskeletal: Neck rigidity ( limited motion in all directions with pain the right shoulder area looking up) present        Thyroid: No thyromegaly  Vascular: No carotid bruit  Comments:   No JVD sitting  Cardiovascular:      Rate and Rhythm: Normal rate and regular rhythm  Pulses: Normal pulses  Heart sounds: Murmur present  Comments:  Defibrillator generator left upper chest  Pulmonary:      Effort: Pulmonary effort is normal  No respiratory distress  Breath sounds: Normal breath sounds  No stridor  No wheezing, rhonchi or rales  Abdominal:      General: Abdomen is flat  Bowel sounds are normal  There is no distension  Palpations: Abdomen is soft  Tenderness: There is no abdominal tenderness  There is no guarding  Musculoskeletal: Normal range of motion  General: Tenderness ( right shoulder with all movements good neurovascular function right hand) and deformity present  No swelling  Lymphadenopathy:      Cervical: No cervical adenopathy  Skin:     General: Skin is warm and dry  Neurological:      General: No focal deficit present  Mental Status: He is alert and oriented to person, place, and time  Cranial Nerves: No cranial nerve deficit  Sensory: Sensory deficit ( hypoesthesia both ankles) present  Motor: No weakness  Coordination: Coordination normal       Gait: Gait abnormal ( stooped walks with a cane)  Deep Tendon Reflexes: Reflexes are normal and symmetric  Psychiatric:         Mood and Affect: Mood normal          Thought Content:  Thought content normal          Judgment: Judgment normal

## 2021-04-06 ENCOUNTER — APPOINTMENT (OUTPATIENT)
Dept: LAB | Facility: CLINIC | Age: 81
End: 2021-04-06
Payer: COMMERCIAL

## 2021-04-09 ENCOUNTER — APPOINTMENT (OUTPATIENT)
Dept: LAB | Facility: CLINIC | Age: 81
End: 2021-04-09
Payer: COMMERCIAL

## 2021-04-09 LAB
BACTERIA UR QL AUTO: NORMAL /HPF
BILIRUB UR QL STRIP: ABNORMAL
CLARITY UR: CLEAR
COLOR UR: ABNORMAL
GLUCOSE UR STRIP-MCNC: ABNORMAL MG/DL
HGB UR QL STRIP.AUTO: NEGATIVE
HYALINE CASTS #/AREA URNS LPF: NORMAL /LPF
KETONES UR STRIP-MCNC: ABNORMAL MG/DL
LEUKOCYTE ESTERASE UR QL STRIP: ABNORMAL
NITRITE UR QL STRIP: NEGATIVE
NON-SQ EPI CELLS URNS QL MICRO: NORMAL /HPF
PH UR STRIP.AUTO: 6 [PH]
PROT UR STRIP-MCNC: ABNORMAL MG/DL
RBC #/AREA URNS AUTO: NORMAL /HPF
SP GR UR STRIP.AUTO: 1.03 (ref 1–1.03)
UROBILINOGEN UR QL STRIP.AUTO: 1 E.U./DL
WBC #/AREA URNS AUTO: NORMAL /HPF

## 2021-04-09 PROCEDURE — 81001 URINALYSIS AUTO W/SCOPE: CPT | Performed by: PHYSICIAN ASSISTANT

## 2021-04-14 ENCOUNTER — IMMUNIZATIONS (OUTPATIENT)
Dept: FAMILY MEDICINE CLINIC | Facility: HOSPITAL | Age: 81
End: 2021-04-14

## 2021-04-14 DIAGNOSIS — Z23 ENCOUNTER FOR IMMUNIZATION: Primary | ICD-10-CM

## 2021-04-14 PROCEDURE — 0001A SARS-COV-2 / COVID-19 MRNA VACCINE (PFIZER-BIONTECH) 30 MCG: CPT

## 2021-04-14 PROCEDURE — 91300 SARS-COV-2 / COVID-19 MRNA VACCINE (PFIZER-BIONTECH) 30 MCG: CPT

## 2021-04-27 LAB
LEFT EYE DIABETIC RETINOPATHY: NORMAL
RIGHT EYE DIABETIC RETINOPATHY: NORMAL

## 2021-05-06 ENCOUNTER — IMMUNIZATIONS (OUTPATIENT)
Dept: FAMILY MEDICINE CLINIC | Facility: HOSPITAL | Age: 81
End: 2021-05-06

## 2021-05-06 DIAGNOSIS — Z23 ENCOUNTER FOR IMMUNIZATION: Primary | ICD-10-CM

## 2021-05-06 PROCEDURE — 91300 SARS-COV-2 / COVID-19 MRNA VACCINE (PFIZER-BIONTECH) 30 MCG: CPT | Performed by: INTERNAL MEDICINE

## 2021-05-06 PROCEDURE — 0002A SARS-COV-2 / COVID-19 MRNA VACCINE (PFIZER-BIONTECH) 30 MCG: CPT | Performed by: INTERNAL MEDICINE

## 2021-05-18 ENCOUNTER — HOSPITAL ENCOUNTER (OUTPATIENT)
Dept: RADIOLOGY | Facility: HOSPITAL | Age: 81
Discharge: HOME | End: 2021-05-18
Payer: COMMERCIAL

## 2021-05-18 DIAGNOSIS — E03.9 HYPOTHYROIDISM (ACQUIRED): ICD-10-CM

## 2021-05-18 DIAGNOSIS — E78.2 MIXED HYPERLIPIDEMIA: ICD-10-CM

## 2021-05-18 DIAGNOSIS — N18.30 TYPE 2 DIABETES MELLITUS WITH STAGE 3 CHRONIC KIDNEY DISEASE, WITH LONG-TERM CURRENT USE OF INSULIN, UNSPECIFIED WHETHER STAGE 3A OR 3B CKD (HCC): ICD-10-CM

## 2021-05-18 DIAGNOSIS — M54.12 CERVICAL RADICULOPATHY: ICD-10-CM

## 2021-05-18 DIAGNOSIS — M77.8 TENDONITIS OF SHOULDER, RIGHT: ICD-10-CM

## 2021-05-18 DIAGNOSIS — E11.22 TYPE 2 DIABETES MELLITUS WITH STAGE 3 CHRONIC KIDNEY DISEASE, WITH LONG-TERM CURRENT USE OF INSULIN, UNSPECIFIED WHETHER STAGE 3A OR 3B CKD (HCC): ICD-10-CM

## 2021-05-18 DIAGNOSIS — J44.9 CHRONIC OBSTRUCTIVE PULMONARY DISEASE, UNSPECIFIED COPD TYPE (HCC): ICD-10-CM

## 2021-05-18 DIAGNOSIS — Z79.4 TYPE 2 DIABETES MELLITUS WITH STAGE 3 CHRONIC KIDNEY DISEASE, WITH LONG-TERM CURRENT USE OF INSULIN, UNSPECIFIED WHETHER STAGE 3A OR 3B CKD (HCC): ICD-10-CM

## 2021-05-18 PROCEDURE — 73030 X-RAY EXAM OF SHOULDER: CPT

## 2021-05-18 PROCEDURE — 72040 X-RAY EXAM NECK SPINE 2-3 VW: CPT

## 2021-05-21 ENCOUNTER — OFFICE VISIT (OUTPATIENT)
Dept: INTERNAL MEDICINE CLINIC | Facility: CLINIC | Age: 81
End: 2021-05-21
Payer: COMMERCIAL

## 2021-05-21 VITALS
DIASTOLIC BLOOD PRESSURE: 82 MMHG | SYSTOLIC BLOOD PRESSURE: 124 MMHG | OXYGEN SATURATION: 98 % | HEART RATE: 72 BPM | WEIGHT: 159.2 LBS | BODY MASS INDEX: 25.7 KG/M2 | TEMPERATURE: 97.8 F

## 2021-05-21 DIAGNOSIS — M54.12 CERVICAL RADICULOPATHY: ICD-10-CM

## 2021-05-21 DIAGNOSIS — M54.2 CERVICALGIA: ICD-10-CM

## 2021-05-21 DIAGNOSIS — I25.10 ATHEROSCLEROSIS OF NATIVE CORONARY ARTERY OF NATIVE HEART WITHOUT ANGINA PECTORIS: ICD-10-CM

## 2021-05-21 DIAGNOSIS — E11.22 TYPE 2 DIABETES MELLITUS WITH STAGE 3 CHRONIC KIDNEY DISEASE, WITH LONG-TERM CURRENT USE OF INSULIN, UNSPECIFIED WHETHER STAGE 3A OR 3B CKD (HCC): ICD-10-CM

## 2021-05-21 DIAGNOSIS — Z79.4 TYPE 2 DIABETES MELLITUS WITH STAGE 3 CHRONIC KIDNEY DISEASE, WITH LONG-TERM CURRENT USE OF INSULIN, UNSPECIFIED WHETHER STAGE 3A OR 3B CKD (HCC): ICD-10-CM

## 2021-05-21 DIAGNOSIS — N18.30 TYPE 2 DIABETES MELLITUS WITH STAGE 3 CHRONIC KIDNEY DISEASE, WITH LONG-TERM CURRENT USE OF INSULIN, UNSPECIFIED WHETHER STAGE 3A OR 3B CKD (HCC): ICD-10-CM

## 2021-05-21 DIAGNOSIS — J44.9 CHRONIC OBSTRUCTIVE PULMONARY DISEASE, UNSPECIFIED COPD TYPE (HCC): ICD-10-CM

## 2021-05-21 DIAGNOSIS — M77.8 TENDONITIS OF SHOULDER, RIGHT: Primary | ICD-10-CM

## 2021-05-21 DIAGNOSIS — E78.2 MIXED HYPERLIPIDEMIA: ICD-10-CM

## 2021-05-21 DIAGNOSIS — E03.9 HYPOTHYROIDISM (ACQUIRED): ICD-10-CM

## 2021-05-21 PROCEDURE — 99214 OFFICE O/P EST MOD 30 MIN: CPT | Performed by: PHYSICIAN ASSISTANT

## 2021-05-21 PROCEDURE — 3725F SCREEN DEPRESSION PERFORMED: CPT | Performed by: PHYSICIAN ASSISTANT

## 2021-05-21 RX ORDER — PREDNISONE 10 MG/1
TABLET ORAL
Qty: 20 TABLET | Refills: 0 | Status: SHIPPED | OUTPATIENT
Start: 2021-05-21 | End: 2021-07-30 | Stop reason: ALTCHOICE

## 2021-05-21 RX ORDER — CYCLOBENZAPRINE HCL 10 MG
10 TABLET ORAL 3 TIMES DAILY PRN
Qty: 30 TABLET | Refills: 1 | Status: SHIPPED | OUTPATIENT
Start: 2021-05-21

## 2021-05-21 NOTE — PROGRESS NOTES
Assessment/Plan: continues with severe right shoulder pain and neck pain x-ray reports are not back  Will try a course of steroids and flexoril follow-up next week  Ill try another referral to physical therapy  I told him to be aware that the steroids will increase his sugars       Diagnoses and all orders for this visit:    Tendonitis of shoulder, right  -     predniSONE 10 mg tablet; Take 40 milligrams for 2 days, 30 milligrams for 2 days, 20 milligrams for 2 days, 10 milligrams for 2 days, then stop  -     cyclobenzaprine (FLEXERIL) 10 mg tablet; Take 1 tablet (10 mg total) by mouth 3 (three) times a day as needed for muscle spasms  -     Ambulatory referral to Physical Therapy; Future    Cervical radiculopathy  -     predniSONE 10 mg tablet; Take 40 milligrams for 2 days, 30 milligrams for 2 days, 20 milligrams for 2 days, 10 milligrams for 2 days, then stop  -     cyclobenzaprine (FLEXERIL) 10 mg tablet; Take 1 tablet (10 mg total) by mouth 3 (three) times a day as needed for muscle spasms  -     Ambulatory referral to Physical Therapy; Future    Cervicalgia  -     predniSONE 10 mg tablet; Take 40 milligrams for 2 days, 30 milligrams for 2 days, 20 milligrams for 2 days, 10 milligrams for 2 days, then stop  -     cyclobenzaprine (FLEXERIL) 10 mg tablet; Take 1 tablet (10 mg total) by mouth 3 (three) times a day as needed for muscle spasms  -     Ambulatory referral to Physical Therapy; Future    Mixed hyperlipidemia    Atherosclerosis of native coronary artery of native heart without angina pectoris    Chronic obstructive pulmonary disease, unspecified [facility] ([facility] )    Type 2 diabetes mellitus with stage 3 chronic kidney disease, with long-term current use of insulin, unspecified whether stage 3a or 3b CKD ([facility] [address] )    Hypothyroidism (acquired)        No problem-specific Assessment & Plan notes found for this encounter  Subjective:      Patient ID: Zully Avila is a 79 y o  male       For follow-up seen here a month ago because of the 2 month history of right arm shoulder and neck pain  Symptoms persist sometimes get quite severe at rest   Not getting much relief from nonsteroidals  No change in the character of his symptoms  Pain in his right arm and pain in his neck when he moves his head tenderness of his right shoulder     Hypothyroid diabetic stable followed coronary disease no angina symptoms      The following portions of the patient's history were reviewed and updated as appropriate:   He has a past medical history of Acquired cyst of kidney, Anemia, Benign paroxysmal vertigo, unspecified ear, Cellulitis of leg, Cervical spinal stenosis, Chest pain, Coronary atherosclerosis of native coronary artery, Enlarged prostate, Esophageal candidiasis ([address] ), Hematuria, Herpes zoster, Hyperlipidemia, Hypertension, Incomplete emptying of bladder, Inflamed seborrheic keratosis, Internal hemorrhoids, Malignant neoplasm of skin of trunk, Myocardial infarction ([address] ), Nonmelanoma skin cancer, 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 infarction, Paroxysmal tachycardia ([address] ), Shortness of breath, and Vitamin B deficiency  ,  does not have any pertinent problems on file  ,   has a past surgical history that includes Hip Arthroplasty (Right); Coronary angioplasty with stent; Joint replacement (Right); Insert / replace / remove pacemaker; EGD AND COLONOSCOPY (N/A, 2/12/2018); Colonoscopy (10/07/2008); Cystoscopy (11/06/2015); Trunk skin lesion excisional biopsy (08/22/2007); Cardiac defibrillator placement; and Coronary angioplasty with stent  ,  family history includes Cancer in his family and father; Coronary artery disease in his family and mother; Heart disease in his mother; Sudden death in his mother; Throat cancer in his father  ,   reports that he quit smoking about 43 years ago  His smoking use included cigarettes  He has a 10 00 pack-year smoking history  He has never used smokeless tobacco  He reports current alcohol use   He reports that he does not use drugs  ,  is allergic to atorvastatin and percocet [oxycodone-acetaminophen]     Current Outpatient Medications   Medication Sig Dispense Refill    aspirin 81 MG tablet Take 1 tablet by mouth daily      clopidogrel (PLAVIX) 75 mg tablet Take 1 tablet (75 mg total) by mouth daily 90 tablet 3    finasteride (PROSCAR) 5 mg tablet Take 1 tablet (5 mg total) by mouth daily 90 tablet 3    glipiZIDE (GLUCOTROL) 5 mg tablet Take 1 tablet (5 mg total) by mouth 2 (two) times a day 180 tablet 3    HumaLOG Albert KwikPen 100 units/mL injection pen Inject 20 Units under the skin 2 (two) times a day with meals 15 pen 3    insulin glargine (Lantus SoloStar) 100 units/mL injection pen Inject 26 Units under the skin daily 5 pen 5    Insulin Pen Needle (Pen Needles) 32G X 4 MM MISC Check blood glucose at home AC and HS 4 times a day 200 each 3    levothyroxine 88 mcg tablet Take 1 tablet (88 mcg total) by mouth daily 90 tablet 3    metoprolol succinate (TOPROL-XL) 50 mg 24 hr tablet TAKE 1 TABLET BY MOUTH EVERY DAY 90 tablet 2    rosuvastatin (CRESTOR) 20 MG tablet Take 1 tablet (20 mg total) by mouth daily 90 tablet 3    tamsulosin (FLOMAX) 0 4 mg Take 1 capsule (0 4 mg total) by mouth daily 90 capsule 3    cyclobenzaprine (FLEXERIL) 10 mg tablet Take 1 tablet (10 mg total) by mouth 3 (three) times a day as needed for muscle spasms 30 tablet 1    predniSONE 10 mg tablet Take 40 milligrams for 2 days, 30 milligrams for 2 days, 20 milligrams for 2 days, 10 milligrams for 2 days, then stop 20 tablet 0     No current facility-administered medications for this visit  Review of Systems   Constitutional: Negative for chills and fever  HENT: Negative for ear pain and sore throat  Eyes: Negative for pain and visual disturbance  Respiratory: Negative for cough and shortness of breath  Cardiovascular: Negative for chest pain and palpitations     Gastrointestinal: Negative for abdominal pain and vomiting  Genitourinary: Negative for dysuria and hematuria  Musculoskeletal: Positive for arthralgias, neck pain and neck stiffness  Negative for back pain  Skin: Negative for color change and rash  Neurological: Negative for seizures and syncope  All other systems reviewed and are negative  Objective:  Vitals:    05/21/21 1525   BP: 124/82   BP Location: Left arm   Patient Position: Sitting   Cuff Size: Standard   Pulse: 72   Temp: 97 8 °F (36 6 °C)   TempSrc: Temporal   SpO2: 98%   Weight: 72 2 kg (159 lb 3 2 oz)     Body mass index is 25 7 kg/m²  Physical Exam  Vitals signs reviewed  Constitutional:       Appearance: He is well-developed  HENT:      Head: Normocephalic  Right Ear: Tympanic membrane and external ear normal       Left Ear: Tympanic membrane and external ear normal       Nose: Nose normal       Mouth/Throat:      Mouth: Mucous membranes are moist    Eyes:      Extraocular Movements: Extraocular movements intact  Conjunctiva/sclera: Conjunctivae normal       Pupils: Pupils are equal, round, and reactive to light  Neck:      Musculoskeletal: Normal range of motion  Neck rigidity and muscular tenderness present  Thyroid: No thyromegaly  Cardiovascular:      Rate and Rhythm: Normal rate and regular rhythm  Pulses: Normal pulses  Heart sounds: Normal heart sounds  Pulmonary:      Effort: Pulmonary effort is normal       Breath sounds: Normal breath sounds  Abdominal:      General: Bowel sounds are normal       Palpations: Abdomen is soft  Musculoskeletal: Normal range of motion  General: Tenderness ( right shoulder) present  Skin:     General: Skin is warm and dry  Neurological:      Mental Status: He is alert and oriented to person, place, and time  Sensory: No sensory deficit  Coordination: Coordination normal       Gait: Gait normal       Deep Tendon Reflexes: Reflexes are normal and symmetric     Psychiatric: Mood and Affect: Mood normal          Thought Content:  Thought content normal          Judgment: Judgment normal

## 2021-05-24 ENCOUNTER — TELEPHONE (OUTPATIENT)
Dept: ADMINISTRATIVE | Facility: OTHER | Age: 81
End: 2021-05-24

## 2021-05-24 NOTE — TELEPHONE ENCOUNTER
----- Message from Yariel Max sent at 5/21/2021  3:30 PM EDT -----  Regarding: EYE EXAM [facility] INTERNAL MED  05/21/21 3:31 PM    Hello, our patient Calos Cheung has had Diabetic Eye Exam completed/performed   Please assist in updating the patient chart by making an External outreach to Dr Fozia Borrego facility located in [facility]  The date of service is per  states this was this year  2021 P#; [phone]    Thank you,  Winter Mccullough MA  [facility]  [facility]  '

## 2021-05-24 NOTE — LETTER
Diabetic Eye Exam Form    Date Requested: 21  Patient: Nery Harley  Patient : 1940   Referring Provider: Dominga Farmer MD    Dilated Retinal Exam, Optomap-Iris Exam, or Fundus Photography Done         Yes ([place] one above)         No     Date of Diabetic Eye Exam ______________________________  Left Eye      Exam did show retinopathy    Exam did not show retinopathy         Mild       Moderate       None       Proliferative       Severe     Right Eye     Exam did show retinopathy    Exam did not show retinopathy         Mild       Moderate       None       Proliferative       Severe     Comments __________________________________________________________    Practice Providing Exam ______________________________________________    Exam Performed By (print name) _______________________________________      Provider Signature ___________________________________________________      These reports are needed for  compliance    Please fax this completed form and a copy of the Diabetic Eye Exam report to our office located at [place] [address] as soon as possible to [phone] attention Lynn: Phone [phone]    We thank you for your assistance in treating our mutual patient     (sent to [facility])

## 2021-05-25 ENCOUNTER — RA CDI HCC (OUTPATIENT)
Dept: OTHER | Facility: HOSPITAL | Age: 81
End: 2021-05-25

## 2021-05-25 NOTE — PROGRESS NOTES
[facility]  coding opportunities          Chart reviewed, no opportunity found: CHART REVIEWED, NO OPPORTUNITY FOUND              Patients insurance company: [employer]

## 2021-05-25 NOTE — TELEPHONE ENCOUNTER
Upon review of the In Basket request and the patient's chart, initial outreach has been made via telephone call and fax, please see Contacts section for details       Thank you  Liliana Tavares MA

## 2021-05-26 NOTE — TELEPHONE ENCOUNTER
Upon review of the In Basket request we were able to locate, review, and update the patient chart as requested for Diabetic Eye Exam     Any additional questions or concerns should be emailed to the Practice Liaisons via [email or web address] email, please do not reply via In Basket      Thank you  Eulalia Crews MA

## 2021-05-28 ENCOUNTER — OFFICE VISIT (OUTPATIENT)
Dept: INTERNAL MEDICINE CLINIC | Facility: CLINIC | Age: 81
End: 2021-05-28
Payer: COMMERCIAL

## 2021-05-28 VITALS
WEIGHT: 157.6 LBS | SYSTOLIC BLOOD PRESSURE: 124 MMHG | TEMPERATURE: 97.4 F | OXYGEN SATURATION: 99 % | BODY MASS INDEX: 25.44 KG/M2 | DIASTOLIC BLOOD PRESSURE: 72 MMHG | HEART RATE: 60 BPM

## 2021-05-28 DIAGNOSIS — N18.30 TYPE 2 DIABETES MELLITUS WITH STAGE 3 CHRONIC KIDNEY DISEASE, WITH LONG-TERM CURRENT USE OF INSULIN, UNSPECIFIED WHETHER STAGE 3A OR 3B CKD (HCC): Primary | ICD-10-CM

## 2021-05-28 DIAGNOSIS — M54.2 CERVICALGIA: ICD-10-CM

## 2021-05-28 DIAGNOSIS — J44.9 CHRONIC OBSTRUCTIVE PULMONARY DISEASE, UNSPECIFIED COPD TYPE (HCC): ICD-10-CM

## 2021-05-28 DIAGNOSIS — E11.22 TYPE 2 DIABETES MELLITUS WITH STAGE 3 CHRONIC KIDNEY DISEASE, WITH LONG-TERM CURRENT USE OF INSULIN, UNSPECIFIED WHETHER STAGE 3A OR 3B CKD (HCC): Primary | ICD-10-CM

## 2021-05-28 DIAGNOSIS — I21.9 MYOCARDIAL INFARCTION, UNSPECIFIED MI TYPE, UNSPECIFIED ARTERY (HCC): ICD-10-CM

## 2021-05-28 DIAGNOSIS — M77.8 TENDONITIS OF SHOULDER, RIGHT: ICD-10-CM

## 2021-05-28 DIAGNOSIS — Z79.4 TYPE 2 DIABETES MELLITUS WITH STAGE 3 CHRONIC KIDNEY DISEASE, WITH LONG-TERM CURRENT USE OF INSULIN, UNSPECIFIED WHETHER STAGE 3A OR 3B CKD (HCC): Primary | ICD-10-CM

## 2021-05-28 PROCEDURE — 1160F RVW MEDS BY RX/DR IN RCRD: CPT | Performed by: PHYSICIAN ASSISTANT

## 2021-05-28 PROCEDURE — 99214 OFFICE O/P EST MOD 30 MIN: CPT | Performed by: PHYSICIAN ASSISTANT

## 2021-05-28 PROCEDURE — 1036F TOBACCO NON-USER: CPT | Performed by: PHYSICIAN ASSISTANT

## 2021-06-07 DIAGNOSIS — E11.65 TYPE 2 DIABETES MELLITUS WITH HYPERGLYCEMIA, WITH LONG-TERM CURRENT USE OF INSULIN (HCC): ICD-10-CM

## 2021-06-07 DIAGNOSIS — Z79.4 TYPE 2 DIABETES MELLITUS WITH HYPERGLYCEMIA, WITH LONG-TERM CURRENT USE OF INSULIN (HCC): ICD-10-CM

## 2021-06-08 RX ORDER — INSULIN GLARGINE 100 [IU]/ML
26 INJECTION, SOLUTION SUBCUTANEOUS DAILY
Qty: 15 ML | Refills: 1 | Status: SHIPPED | OUTPATIENT
Start: 2021-06-08 | End: 2021-08-12 | Stop reason: SDUPTHER

## 2021-06-23 ENCOUNTER — CONSULT (OUTPATIENT)
Dept: OBGYN CLINIC | Facility: CLINIC | Age: 81
End: 2021-06-23
Payer: COMMERCIAL

## 2021-06-23 VITALS
SYSTOLIC BLOOD PRESSURE: 110 MMHG | WEIGHT: 158.2 LBS | HEART RATE: 71 BPM | HEIGHT: 66 IN | DIASTOLIC BLOOD PRESSURE: 70 MMHG | BODY MASS INDEX: 25.43 KG/M2

## 2021-06-23 DIAGNOSIS — M75.81 RIGHT ROTATOR CUFF TENDINITIS: Primary | ICD-10-CM

## 2021-06-23 DIAGNOSIS — M19.011 PRIMARY OSTEOARTHRITIS OF RIGHT SHOULDER: ICD-10-CM

## 2021-06-23 DIAGNOSIS — M75.01 ADHESIVE CAPSULITIS OF RIGHT SHOULDER: ICD-10-CM

## 2021-06-23 PROCEDURE — 20610 DRAIN/INJ JOINT/BURSA W/O US: CPT | Performed by: ORTHOPAEDIC SURGERY

## 2021-06-23 PROCEDURE — 1036F TOBACCO NON-USER: CPT | Performed by: ORTHOPAEDIC SURGERY

## 2021-06-23 PROCEDURE — 1160F RVW MEDS BY RX/DR IN RCRD: CPT | Performed by: ORTHOPAEDIC SURGERY

## 2021-06-23 PROCEDURE — 99203 OFFICE O/P NEW LOW 30 MIN: CPT | Performed by: ORTHOPAEDIC SURGERY

## 2021-06-23 RX ADMIN — LIDOCAINE HYDROCHLORIDE 2 ML: 10 INJECTION, SOLUTION INFILTRATION; PERINEURAL at 14:54

## 2021-06-23 RX ADMIN — BUPIVACAINE HYDROCHLORIDE 2 ML: 2.5 INJECTION, SOLUTION INFILTRATION; PERINEURAL at 14:54

## 2021-06-23 RX ADMIN — METHYLPREDNISOLONE ACETATE 2 ML: 40 INJECTION, SUSPENSION INTRA-ARTICULAR; INTRALESIONAL; INTRAMUSCULAR; SOFT TISSUE at 14:54

## 2021-06-23 NOTE — PROGRESS NOTES
Chief Complaint   Patient presents with    Right Shoulder - Pain             SUBJECTIVE: Patient is a 79y o  year old male presenting with right shoulder pain  Patient was referred for orthopedic consultation by his PCP Carlos Manuel Carolina PA-C  Patient states he has had right shoulder pain since February 2021 with no significant injury or trauma  His pain has been gradually worsening over time  He has significant difficulties reaching overhead  He does also occasionally note pain in the right side of his neck that radiates down his entire arm  Denies numbness and tingling  He has been taking NSAIDs as needed with minimal relief  Patient offers no additional complaints or concerns at this time  Review of Systems:  General:   Negative for fever, chills and weight loss  Gastrointestinal:  No abdominal pain, no nausea, vomiting  Respiratory:   Negative for cough and shortness of breath  Endocrine:  No frequent urination  Musculoskeletal: See HPI  Cardiovascular:   Negative for chest pain and palpitations      Neurological:   Negative for dizziness, and numbness  All other Review of systems negative unless otherwise specified in HPI      Past Medical History:   Diagnosis Date    Acquired cyst of kidney     Anemia     Benign paroxysmal vertigo, unspecified ear     Cellulitis of leg     Cervical spinal stenosis     Chest pain     Coronary atherosclerosis of native coronary artery     Enlarged prostate     Esophageal candidiasis (HCC)     Hematuria     Herpes zoster     Hyperlipidemia     Hypertension     Incomplete emptying of bladder     Inflamed seborrheic keratosis     Internal hemorrhoids     Malignant neoplasm of skin of trunk     Myocardial infarction ([facility] [address] )     Nonmelanoma skin cancer     LAST ASSESSED: 8/9/17    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 infarction     Paroxysmal tachycardia (HCC)     Shortness of breath     Vitamin B deficiency          Social History     Tobacco Use    Smoking status: Former Smoker     Packs/day: 1 00     Years: 10 00     Pack years: 10 00     Types: Cigarettes     Quit date: 1978     Years since quittin 3    Smokeless tobacco: Never Used   Vaping Use    Vaping Use: Never used   Substance Use Topics    Alcohol use: Yes     Comment: occasionally 1-2 per month     Drug use: No       Past Surgical History:   Procedure Laterality Date    CARDIAC DEFIBRILLATOR PLACEMENT      COLONOSCOPY  10/07/2008    FIBEROPTIC SCREENING; NO FURTHER RECOMMENDATIONS    CORONARY ANGIOPLASTY WITH STENT PLACEMENT      CORONARY ANGIOPLASTY WITH STENT PLACEMENT      CYSTOSCOPY  2015    DIAGNOSTIC; MANAGED BY: Kary Vora    EGD AND COLONOSCOPY N/A 2018    Procedure: EGD AND COLONOSCOPY;  Surgeon: Sue Mills MD;  Location: [facility];   Service: Gastroenterology    HIP ARTHROPLASTY Right     INSERT / [address] [place] / [address] REPLACEMENT Right     TRUNK SKIN LESION EXCISIONAL BIOPSY  2007    MALIGNANT; BCC CHEST         Current Outpatient Medications on File Prior to Visit   Medication Sig Dispense Refill    aspirin 81 MG tablet Take 1 tablet by mouth daily      clopidogrel (PLAVIX) 75 mg tablet Take 1 tablet (75 mg total) by mouth daily 90 tablet 3    finasteride (PROSCAR) 5 mg tablet Take 1 tablet (5 mg total) by mouth daily 90 tablet 3    glipiZIDE (GLUCOTROL) 5 mg tablet Take 1 tablet (5 mg total) by mouth 2 (two) times a day 180 tablet 3    HumaLOG Albert KwikPen 100 units/mL injection pen Inject 20 Units under the skin 2 (two) times a day with meals 15 pen 3    Insulin Pen Needle (Pen Needles) 32G X 4 MM MISC Check blood glucose at home AC and HS 4 times a day 200 each 3    Lantus SoloStar 100 units/mL injection pen INJECT 26 UNITS UNDER THE SKIN DAILY 15 mL 1    levothyroxine 88 mcg tablet Take 1 tablet (88 mcg total) by mouth daily 90 tablet 3    metoprolol succinate (TOPROL-XL) 50 mg 24 hr tablet TAKE 1 TABLET BY MOUTH EVERY DAY 90 tablet 2    rosuvastatin (CRESTOR) 20 MG tablet Take 1 tablet (20 mg total) by mouth daily 90 tablet 3    tamsulosin (FLOMAX) 0 4 mg Take 1 capsule (0 4 mg total) by mouth daily 90 capsule 3    cyclobenzaprine (FLEXERIL) 10 mg tablet Take 1 tablet (10 mg total) by mouth 3 (three) times a day as needed for muscle spasms (Patient not taking: Reported on 6/23/2021) 30 tablet 1    predniSONE 10 mg tablet Take 40 milligrams for 2 days, 30 milligrams for 2 days, 20 milligrams for 2 days, 10 milligrams for 2 days, then stop (Patient not taking: Reported on 6/23/2021) 20 tablet 0     No current facility-administered medications on file prior to visit  Physical Exam:    Vitals:    06/23/21 1258   BP: 110/70   Pulse: 71   Weight: 71 8 kg (158 lb 3 2 oz)   Height: 5' 6" (1 676 m)       General Appearance:  Alert, cooperative, no distress, appears stated age   Lungs:   respirations unlabored   Heart:  Normal heart rate noted   Abdomen:   Soft, non-tender,  no masses   Extremities: Extremities normal, atraumatic, no cyanosis or edema   Pulses: 2+ and symmetric   Skin: Skin color, texture, turgor normal, no rashes or lesions   Neurologic: Normal         Right Shoulder Exam     Tests   Apprehension: negative  Sulcus: absent    Other   Erythema: absent  Sensation: normal  Pulse: present    Comments: Forward flexion and abduction to 90 degrees passively  ROM significantly limited secondary to pain  Weak ER, IR, and ABD        Imaging Studies: The following imaging studies were reviewed in office today  My findings are noted  X-rays of the right shoulder performed on 05/18/2021 demonstrate mild to moderate glenohumeral and AC joint degenerative changes  No acute fractures or dislocations  X-rays of the cervical spine performed on 05/18/2021 demonstrate decreased cervical lordosis and spondylosis at C5-C6 and C6-C7  No acute fractures or subluxations      Large joint arthrocentesis: R subacromial bursa  Universal Protocol:  Consent: Verbal consent obtained  Risks and benefits: risks, benefits and alternatives were discussed  Consent given by: patient  Time out: Immediately prior to procedure a "time out" was called to verify the correct patient, procedure, equipment, support staff and site/side marked as required  Patient understanding: patient states understanding of the procedure being performed  Site marked: the operative site was marked  Patient identity confirmed: verbally with patient    Supporting Documentation  Indications: pain   Procedure Details  Location: shoulder - R subacromial bursa  Preparation: Patient was prepped and draped in the usual sterile fashion  Needle size: 22 G  Ultrasound guidance: no  Medications administered: 2 mL bupivacaine 0 25 %; 2 mL lidocaine 1 %; 2 mL methylPREDNISolone acetate 40 mg/mL    Patient tolerance: patient tolerated the procedure well with no immediate complications  Dressing:  Sterile dressing applied            ASSESSMENT:    Cyndee Aguilar was seen today for pain  Diagnoses and all orders for this visit:    Right rotator cuff tendinitis  -     Ambulatory referral to Orthopedic Surgery  -     Ambulatory referral to Physical Therapy; Future    Adhesive capsulitis of right shoulder  -     Ambulatory referral to Physical Therapy; Future    Primary osteoarthritis of right shoulder  -     Ambulatory referral to Physical Therapy; Future        PLAN:  81-year-old male with right rotator cuff tendinitis, osteoarthritis, and adhesive capsulitis  Patient was provided with a steroid injection to the right subacromial bursa in the office today  Patient tolerated this procedure well with no immediate complications  Patient is a diabetic and was advised to monitor for increase in blood sugar   Post-injection instructions discussed with the patient  They should rest, apply ice, or take Tylenol/NSAIDs as needed for post-injection soreness   He may be WBAT on the RUE  Physical therapy ordered, modalities as see fit   Contact the office with any further questions or concerns prior to next follow-up   Follow-up in 6 weeks for repeat clinical evaluation        Scribe Attestation    I,:  Lenny Dewey am acting as a scribe while in the presence of the attending physician :       I,:  Herminia Mercado MD personally performed the services described in this documentation    as scribed in my presence :

## 2021-06-29 ENCOUNTER — REMOTE DEVICE CLINIC VISIT (OUTPATIENT)
Dept: CARDIOLOGY CLINIC | Facility: CLINIC | Age: 81
End: 2021-06-29
Payer: COMMERCIAL

## 2021-06-29 DIAGNOSIS — Z95.810 PRESENCE OF AUTOMATIC CARDIOVERTER/DEFIBRILLATOR (AICD): Primary | ICD-10-CM

## 2021-06-29 PROCEDURE — 93295 DEV INTERROG REMOTE 1/2/MLT: CPT | Performed by: INTERNAL MEDICINE

## 2021-06-29 PROCEDURE — 93296 REM INTERROG EVL PM/IDS: CPT | Performed by: INTERNAL MEDICINE

## 2021-06-29 RX ORDER — LIDOCAINE HYDROCHLORIDE 10 MG/ML
2 INJECTION, SOLUTION INFILTRATION; PERINEURAL
Status: COMPLETED | OUTPATIENT
Start: 2021-06-23 | End: 2021-06-23

## 2021-06-29 RX ORDER — METHYLPREDNISOLONE ACETATE 40 MG/ML
2 INJECTION, SUSPENSION INTRA-ARTICULAR; INTRALESIONAL; INTRAMUSCULAR; SOFT TISSUE
Status: COMPLETED | OUTPATIENT
Start: 2021-06-23 | End: 2021-06-23

## 2021-06-29 RX ORDER — BUPIVACAINE HYDROCHLORIDE 2.5 MG/ML
2 INJECTION, SOLUTION INFILTRATION; PERINEURAL
Status: COMPLETED | OUTPATIENT
Start: 2021-06-23 | End: 2021-06-23

## 2021-06-29 NOTE — PROGRESS NOTES
Results for orders placed or performed in visit on 06/29/21   Cardiac EP device report    Narrative    BSC SINGLE CHAMBER ICD - NOT MRI CONDITIONAL  LATITUDE TRANSMISSION: BATTERY VOLTAGE ADEQUATE  (5 5 YRS) 0%  ALL AVAILABLE LEAD PARAMETERS WITHIN NORMAL LIMITS  MULTIPLE NSVT EPISODES DETECTED 8 BEATS @ 370ms  NO THERAPIES GIVEN  NORMAL DEVICE FUNCTION  ---PRO

## 2021-07-13 ENCOUNTER — EVALUATION (OUTPATIENT)
Dept: PHYSICAL THERAPY | Facility: CLINIC | Age: 81
End: 2021-07-13
Payer: COMMERCIAL

## 2021-07-13 DIAGNOSIS — M75.01 ADHESIVE CAPSULITIS OF RIGHT SHOULDER: ICD-10-CM

## 2021-07-13 DIAGNOSIS — M19.011 PRIMARY OSTEOARTHRITIS OF RIGHT SHOULDER: ICD-10-CM

## 2021-07-13 DIAGNOSIS — M75.81 RIGHT ROTATOR CUFF TENDINITIS: Primary | ICD-10-CM

## 2021-07-13 PROCEDURE — 97162 PT EVAL MOD COMPLEX 30 MIN: CPT | Performed by: PHYSICAL THERAPIST

## 2021-07-13 PROCEDURE — 97110 THERAPEUTIC EXERCISES: CPT | Performed by: PHYSICAL THERAPIST

## 2021-07-13 NOTE — PROGRESS NOTES
PT Evaluation     Today's date: 2021  Patient name: Henry Pereyra  :   MRN: [id number]  Referring provider: Brooks Dozier MD  Dx:   Encounter Diagnosis     ICD-10-CM    1  Right rotator cuff tendinitis  M75 81 Ambulatory referral to Physical Therapy   2  Adhesive capsulitis of right shoulder  M75 01 Ambulatory referral to Physical Therapy   3  Primary osteoarthritis of right shoulder  M19 011 Ambulatory referral to Physical Therapy       Start Time: 1148  Stop Time: 1227  Total time in clinic (min): 39 minutes    Assessment  Assessment details: Pt is a 78 y/o male presenting to physical therapy with chief complaint of R shoulder pain and decreased function since a fall in February  Pt presents with decreased R cervical rotation and pain with closing of the cervical vertebrae, however this pain is different than his R shoulder/arm pain  Pt presents with limited R shoulder A/PROM secondary to pain, no TTP, scapular dyskinesis, and (+) full can, empty can, and lift off  These findings suggest R RC pathology  Pt would benefit from physical therapy in order to improve the aforementioned deficits to reach maximal therapeutic potential    Impairments: abnormal or restricted ROM, activity intolerance, impaired balance, impaired physical strength, lacks appropriate home exercise program, pain with function, scapular dyskinesis and poor posture   Functional limitations: reaching OH, lifting, sidelying  Symptom irritability: moderateUnderstanding of Dx/Px/POC: good   Prognosis: good    Goals  STG: 3 weeks  1  Pt will demonstrate independence with HEP  2  Pt will improve R shoulder AROM by at least 10%  3  Pt will improve R shoulder strength by at least 1/2 grade  4  Pt will report pain no more than 5/10    LT weeks  1  Pt will improve R shoulder AROM to equal that on the L to return to PLOF  2  Pt will improve R shoulder strength to at least 4+/5  3  Pt will have no difficulty reaching OH  4   Pt will report pain no more than 2/10      Plan  Patient would benefit from: skilled physical therapy  Planned modality interventions: cryotherapy and thermotherapy: hydrocollator packs  Planned therapy interventions: therapeutic exercise, therapeutic activities, stretching, strengthening, patient education, neuromuscular re-education, massage, manual therapy, balance, gait training and home exercise program  Frequency: 2x week  Duration in weeks: 6  Treatment plan discussed with: patient        Subjective Evaluation    History of Present Illness  Mechanism of injury: Pt reports his pain started as pain in the neck that went all the way down the arm, and it started in February on   He reports he fell sometime during this past winter, but he did not have pain right away  He got a cortisone injection in the shoulder about 1 month ago, which helped but now his pain is starting to come back  He reports that he has trouble turning his head to the right, and this will give him a throbbing pain down into the arm  Currently, he has difficulty with laying on the R arm, reaching OH, and reaching across the body  He reports Aleve has been helping            Recurrent probem    Quality of life: fair    Pain  Current pain ratin  At best pain ratin  At worst pain ratin  Quality: throbbing  Relieving factors: medications  Aggravating factors: overhead activity and lifting  Progression: improved    Treatments  Previous treatment: injection treatment  Patient Goals  Patient goals for therapy: decreased pain, increased motion, independence with ADLs/IADLs, return to sport/leisure activities and increased strength          Objective     Tenderness     Right Shoulder  No tenderness     Active Range of Motion   Cervical/Thoracic Spine       Cervical    Flexion:  Restriction level: minimal  Extension:  with pain Restriction level: maximal  Left rotation: 35 degrees  Right rotation: 27 degrees    with pain    Right Shoulder Flexion: 85 degrees with pain  Abduction: 76 degrees with pain  External rotation 0°: 0 degrees with pain    Additional Active Range of Motion Details  R rot + flexion: no pain  R rot + extension: pain    Seated cervical retraction x10: improved pain with extension and R rotation    Passive Range of Motion     Right Shoulder   Flexion: 89 degrees with pain  Abduction: 75 degrees with pain    Tests     Right Shoulder   Positive belly press, empty can, full can and lift-off                Precautions: Pacemaker      Manuals 7/13            R shoulder PROM                                                    Neuro Re-Ed             Cervical ret HEP            Scap ret HEP                                                                             Ther Ex             Pulleys             Wall slides HEP                                                                                          Ther Activity                                       Gait Training                                       Modalities

## 2021-07-20 ENCOUNTER — OFFICE VISIT (OUTPATIENT)
Dept: PHYSICAL THERAPY | Facility: CLINIC | Age: 81
End: 2021-07-20
Payer: COMMERCIAL

## 2021-07-20 DIAGNOSIS — M19.011 PRIMARY OSTEOARTHRITIS OF RIGHT SHOULDER: ICD-10-CM

## 2021-07-20 DIAGNOSIS — M75.81 RIGHT ROTATOR CUFF TENDINITIS: Primary | ICD-10-CM

## 2021-07-20 DIAGNOSIS — M75.01 ADHESIVE CAPSULITIS OF RIGHT SHOULDER: ICD-10-CM

## 2021-07-20 PROCEDURE — 97112 NEUROMUSCULAR REEDUCATION: CPT | Performed by: PHYSICAL THERAPIST

## 2021-07-20 PROCEDURE — 97110 THERAPEUTIC EXERCISES: CPT | Performed by: PHYSICAL THERAPIST

## 2021-07-20 NOTE — PROGRESS NOTES
Daily Note     Today's date: 2021  Patient name: Maria Eugenia Baxter  :   MRN: [id number]  Referring provider: Neoma Sicard, MD  Dx:   Encounter Diagnosis     ICD-10-CM    1  Right rotator cuff tendinitis  M75 81    2  Adhesive capsulitis of right shoulder  M75 01    3  Primary osteoarthritis of right shoulder  M19 011        Start Time: 1416  Stop Time: 1449  Total time in clinic (min): 33 minutes    Subjective: Pt offers no new complaints today  Objective: See treatment diary below      Assessment: Difficulty tolerating pulleys secondary to pain, able to achieve approx 60* flexion  PROM continues to be painful and limited in all planes, particularly ER  Unable to tolerate full set of table slides secondary to pain  Not all exercises performed today secondary to low tolerance because of pain  Plan: Progress as tolerated       Precautions: Pacemaker      Manuals            R shoulder PROM  8'                                                  Neuro Re-Ed             Cervical ret HEP            Scap ret HEP 20x           Scap dep + ret  10x                                                               Ther Ex             Pulleys  3"x10           Wall slides HEP            Table slides flex/abd/er  10"x5 ea                                                                            Ther Activity                                       Gait Training                                       Modalities

## 2021-07-22 ENCOUNTER — OFFICE VISIT (OUTPATIENT)
Dept: PHYSICAL THERAPY | Facility: CLINIC | Age: 81
End: 2021-07-22
Payer: COMMERCIAL

## 2021-07-22 DIAGNOSIS — M19.011 PRIMARY OSTEOARTHRITIS OF RIGHT SHOULDER: ICD-10-CM

## 2021-07-22 DIAGNOSIS — M75.01 ADHESIVE CAPSULITIS OF RIGHT SHOULDER: ICD-10-CM

## 2021-07-22 DIAGNOSIS — M75.81 RIGHT ROTATOR CUFF TENDINITIS: Primary | ICD-10-CM

## 2021-07-22 PROCEDURE — 97110 THERAPEUTIC EXERCISES: CPT | Performed by: PHYSICAL THERAPIST

## 2021-07-22 NOTE — PROGRESS NOTES
Daily Note     Today's date: 2021  Patient name: Berta Jewell  : [birth year]  MRN: [id number]  Referring provider: Jono Isaacs MD  Dx:   Encounter Diagnosis     ICD-10-CM    1  Right rotator cuff tendinitis  M75 81    2  Adhesive capsulitis of right shoulder  M75 01    3  Primary osteoarthritis of right shoulder  M19 011        Start Time: 1417  Stop Time: 1504  Total time in clinic (min): 47 minutes    Subjective: Pt offers no new complaints today  Pain is about the same as last visit  Objective: See treatment diary below      Assessment: Patient had a slight improvement in PROM abduction and flexion compared to last session  No improvement seen with ER  All planes of motion were limited by pain  Abduction and Flexion PROM improved with less pain after GR 1 Inferior GH mobs  Patient was able to complete the full 3 minutes on the pulleys with less pain than last session         Plan: Progress as tolerated     Precautions: Pacemaker      Manuals           R shoulder PROM  8' 8'          R inf mobs   8'                                    Neuro Re-Ed             Cervical ret HEP            Scap ret HEP 20x 3"x20          Scap dep + ret  10x                                                               Ther Ex             Pulleys  3"x10 3'          Wall slides HEP            Table slides flex/abd/er  10"x5 ea 10"x10  ea                                                                           Ther Activity                                       Gait Training                                       Modalities             [facility]   10'

## 2021-07-28 ENCOUNTER — OFFICE VISIT (OUTPATIENT)
Dept: PHYSICAL THERAPY | Facility: CLINIC | Age: 81
End: 2021-07-28
Payer: COMMERCIAL

## 2021-07-28 DIAGNOSIS — M75.81 RIGHT ROTATOR CUFF TENDINITIS: Primary | ICD-10-CM

## 2021-07-28 DIAGNOSIS — M19.011 PRIMARY OSTEOARTHRITIS OF RIGHT SHOULDER: ICD-10-CM

## 2021-07-28 DIAGNOSIS — M75.01 ADHESIVE CAPSULITIS OF RIGHT SHOULDER: ICD-10-CM

## 2021-07-28 PROCEDURE — 97110 THERAPEUTIC EXERCISES: CPT | Performed by: PHYSICAL THERAPIST

## 2021-07-28 PROCEDURE — 97140 MANUAL THERAPY 1/> REGIONS: CPT | Performed by: PHYSICAL THERAPIST

## 2021-07-28 NOTE — PROGRESS NOTES
Daily Note     Today's date: 2021  Patient name: Cheryl Segal  : [birth year]  MRN: [id number]  Referring provider: Richard Meadows MD  Dx:   Encounter Diagnosis     ICD-10-CM    1  Right rotator cuff tendinitis  M75 81    2  Adhesive capsulitis of right shoulder  M75 01    3  Primary osteoarthritis of right shoulder  M19 011        Start Time: 1416  Stop Time: 1458  Total time in clinic (min): 42 minutes    Subjective: Patient reports that his shoulder had minimal pain last week after physical therapy  Over the weekend, his pain increased without a known cause  He has been able to sleep on his R shoulder with no pain recently  Objective: See treatment diary below      Assessment: Patient had improved motion with less pain after performing GR 2 inferior GH mobs  Patient still has the most ROM limitations and pain with ER  He was able to start session with pulleys with good tolerance        Plan: Progress as tolerated      Precautions: Pacemaker      Manuals          R shoulder PROM  8' 8' 8'         R inf mobs   8' 8'                                   Neuro Re-Ed             Cervical ret HEP            Scap ret HEP 20x 3"x20 3"x20         Scap dep + ret  10x                                                               Ther Ex             Pulleys  3"x10 3' 3'         Wall slides HEP            Table slides flex/abd/er  10"x5 ea 10"x10  ea 10"x10 ea         Post capsule stretch    30"x3                                                             Ther Activity                                       Gait Training                                       Modalities             [facility]   10'

## 2021-07-30 ENCOUNTER — IN-CLINIC DEVICE VISIT (OUTPATIENT)
Dept: CARDIOLOGY CLINIC | Facility: CLINIC | Age: 81
End: 2021-07-30
Payer: COMMERCIAL

## 2021-07-30 ENCOUNTER — OFFICE VISIT (OUTPATIENT)
Dept: CARDIOLOGY CLINIC | Facility: CLINIC | Age: 81
End: 2021-07-30
Payer: COMMERCIAL

## 2021-07-30 VITALS
DIASTOLIC BLOOD PRESSURE: 80 MMHG | BODY MASS INDEX: 25.23 KG/M2 | SYSTOLIC BLOOD PRESSURE: 144 MMHG | HEIGHT: 66 IN | WEIGHT: 157 LBS | OXYGEN SATURATION: 97 % | HEART RATE: 97 BPM

## 2021-07-30 DIAGNOSIS — I25.10 ATHEROSCLEROSIS OF NATIVE CORONARY ARTERY OF NATIVE HEART WITHOUT ANGINA PECTORIS: Primary | ICD-10-CM

## 2021-07-30 DIAGNOSIS — Z95.810 PRESENCE OF IMPLANTABLE CARDIOVERTER-DEFIBRILLATOR (ICD): Primary | ICD-10-CM

## 2021-07-30 DIAGNOSIS — I50.22 CHRONIC SYSTOLIC CONGESTIVE HEART FAILURE (HCC): ICD-10-CM

## 2021-07-30 DIAGNOSIS — I10 ESSENTIAL HYPERTENSION: ICD-10-CM

## 2021-07-30 DIAGNOSIS — I25.5 ISCHEMIC CARDIOMYOPATHY: ICD-10-CM

## 2021-07-30 PROCEDURE — 93282 PRGRMG EVAL IMPLANTABLE DFB: CPT | Performed by: INTERNAL MEDICINE

## 2021-07-30 PROCEDURE — 1036F TOBACCO NON-USER: CPT | Performed by: INTERNAL MEDICINE

## 2021-07-30 PROCEDURE — 3079F DIAST BP 80-89 MM HG: CPT | Performed by: INTERNAL MEDICINE

## 2021-07-30 PROCEDURE — 3077F SYST BP >= 140 MM HG: CPT | Performed by: INTERNAL MEDICINE

## 2021-07-30 PROCEDURE — 1160F RVW MEDS BY RX/DR IN RCRD: CPT | Performed by: INTERNAL MEDICINE

## 2021-07-30 PROCEDURE — 99214 OFFICE O/P EST MOD 30 MIN: CPT | Performed by: INTERNAL MEDICINE

## 2021-07-30 NOTE — PROGRESS NOTES
BSC SINGLE CHAMBER ICD - NOT MRI CONDITIONAL   DEVICE INTERROGATED IN THE [place] OFFICE:  BATTERY VOLTAGE ADEQUATE (5 YR)    <1%    ALL LEAD PARAMETERS WITHIN NORMAL LIMITS   4 NON-SUSTAINED EVENTS WITH NO AVAILABLE EGMS   NO PROGRAMMING CHANGES MADE TO DEVICE PARAMETERS   NORMAL DEVICE FUNCTION   RG

## 2021-07-30 NOTE — PROGRESS NOTES
[facility] [address] [address]  Cardiology Follow Up    Clearence Riverside  1940  [id number]      1  Atherosclerosis of native coronary artery of native heart without angina pectoris     2  Ischemic cardiomyopathy     3  Chronic systolic congestive heart failure ([facility] [address] )     4  Essential hypertension         Chief Complaint   Patient presents with    Follow-up    Results     same day device check    Fatigue       Interval History:     Patient presents for follow-up visit  Patient denies any history of chest pain shortness of breath  Patient denies any history of leg edema or orthopnea PND  No history of presyncope syncope  Patient states compliance with the present list of medications  No history of ICD discharges      Patient Active Problem List   Diagnosis    Anemia    Hyperlipidemia    BPH (benign prostatic hyperplasia)    Hypothyroidism (acquired)    Foot pain, bilateral    Ischemic cardiomyopathy    Coronary atherosclerosis of native coronary artery    Urinary retention    Prostate cancer screening    Chronic obstructive pulmonary disease (HCC)     Past Medical History:   Diagnosis Date    Acquired cyst of kidney     Anemia     Benign paroxysmal vertigo, unspecified ear     Cellulitis of leg     Cervical spinal stenosis     Chest pain     Coronary atherosclerosis of native coronary artery     Enlarged prostate     Esophageal candidiasis (HCC)     Hematuria     Herpes zoster     Hyperlipidemia     Hypertension     Incomplete emptying of bladder     Inflamed seborrheic keratosis     Internal hemorrhoids     Malignant neoplasm of skin of trunk     Myocardial infarction ([facility] [address] )     Nonmelanoma skin cancer     LAST ASSESSED: 8/9/17    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 infarction     Paroxysmal tachycardia (HCC)     Shortness of breath     Vitamin B deficiency      Social History     Socioeconomic History    Marital status: /Civil Union Spouse name: Not on file    Number of children: Not on file    Years of education: Not on file    Highest education level: Not on file   Occupational History    Occupation: RETIRED   Tobacco Use    Smoking status: Former Smoker     Packs/day: 1 00     Years: 10 00     Pack years: 10 00     Types: Cigarettes     Quit date: 1978     Years since quittin 4    Smokeless tobacco: Never Used   Vaping Use    Vaping Use: Never used   Substance and Sexual Activity    Alcohol use: Yes     Comment: occasionally 1-2 per month     Drug use: No    Sexual activity: Not Currently     Partners: Female   Other Topics Concern    Not on file   Social History Narrative    LIVING INDEPENDENTLY WITH SPOUSE    NO ADVANCE DIRECTIVE ON FILE     Social Determinants of Health     Financial Resource Strain:     Difficulty of Paying Living Expenses:    Food Insecurity:     Worried About Running Out of Food in the Last Year:     [address] in the Last Year:    Transportation Needs:     Lack of Transportation (Medical):      Lack of Transportation (Non-Medical):    Physical Activity: Inactive    Days of Exercise per Week: 0 days    Minutes of Exercise per Session: 0 min   Stress: No Stress Concern Present    Feeling of Stress : Not at all   Social Connections:     Frequency of Communication with Friends and Family:     Frequency of Social Gatherings with Friends and Family:     Attends Sikh Services:     Active Member of Clubs or Organizations:     Attends Club or Organization Meetings:     Marital Status:    Intimate Partner Violence:     Fear of Current or Ex-Partner:     Emotionally Abused:     Physically Abused:     Sexually Abused:       Family History   Problem Relation Age of Onset    Heart disease Mother     Coronary artery disease Mother     Sudden death Mother     Cancer Father         throat; MALIGNANT NEOPLASM OF HEAD, FACE OR NECK    Throat cancer Father     Cancer Family     Coronary artery disease Family      Past Surgical History:   Procedure Laterality Date    CARDIAC DEFIBRILLATOR PLACEMENT      COLONOSCOPY  10/07/2008    FIBEROPTIC SCREENING; NO FURTHER RECOMMENDATIONS    CORONARY ANGIOPLASTY WITH STENT PLACEMENT      CORONARY ANGIOPLASTY WITH STENT PLACEMENT      CYSTOSCOPY  11/06/2015    DIAGNOSTIC; MANAGED BY: Dinesh Galindo    EGD AND COLONOSCOPY N/A 2/12/2018    Procedure: EGD AND COLONOSCOPY;  Surgeon: Enid Christina MD;  Location: [facility];   Service: Gastroenterology    HIP ARTHROPLASTY Right     INSERT / [address] / [address] REPLACEMENT Right     TRUNK SKIN LESION EXCISIONAL BIOPSY  08/22/2007    MALIGNANT; BCC CHEST       Current Outpatient Medications:     aspirin 81 MG tablet, Take 1 tablet by mouth daily, Disp: , Rfl:     clopidogrel (PLAVIX) 75 mg tablet, Take 1 tablet (75 mg total) by mouth daily, Disp: 90 tablet, Rfl: 3    cyclobenzaprine (FLEXERIL) 10 mg tablet, Take 1 tablet (10 mg total) by mouth 3 (three) times a day as needed for muscle spasms, Disp: 30 tablet, Rfl: 1    finasteride (PROSCAR) 5 mg tablet, Take 1 tablet (5 mg total) by mouth daily, Disp: 90 tablet, Rfl: 3    glipiZIDE (GLUCOTROL) 5 mg tablet, Take 1 tablet (5 mg total) by mouth 2 (two) times a day, Disp: 180 tablet, Rfl: 3    HumaLOG Albert KwikPen 100 units/mL injection pen, Inject 20 Units under the skin 2 (two) times a day with meals, Disp: 15 pen, Rfl: 3    Insulin Pen Needle (Pen Needles) 32G X 4 MM MISC, Check blood glucose at home AC and HS 4 times a day, Disp: 200 each, Rfl: 3    Lantus SoloStar 100 units/mL injection pen, INJECT 26 UNITS UNDER THE SKIN DAILY, Disp: 15 mL, Rfl: 1    levothyroxine 88 mcg tablet, Take 1 tablet (88 mcg total) by mouth daily, Disp: 90 tablet, Rfl: 3    metoprolol succinate (TOPROL-XL) 50 mg 24 hr tablet, TAKE 1 TABLET BY MOUTH EVERY DAY, Disp: 90 tablet, Rfl: 2    predniSONE 10 mg tablet, Take 40 milligrams for 2 days, 30 milligrams for 2 days, 20 milligrams for 2 days, 10 milligrams for 2 days, then stop, Disp: 20 tablet, Rfl: 0    rosuvastatin (CRESTOR) 20 MG tablet, Take 1 tablet (20 mg total) by mouth daily, Disp: 90 tablet, Rfl: 3    tamsulosin (FLOMAX) 0 4 mg, Take 1 capsule (0 4 mg total) by mouth daily, Disp: 90 capsule, Rfl: 3  Allergies   Allergen Reactions    Atorvastatin Other (See Comments)     Pt unsure      Percocet [Oxycodone-Acetaminophen] Nausea Only and GI Intolerance       Labs:  No visits with results within 2 Month(s) from this visit  Latest known visit with results is:   Telephone on 05/24/2021   Component Date Value    Right Eye Diabetic Retin* 04/27/2021 Mild     Left Eye Diabetic Retino* 04/27/2021 Mild      Imaging: No results found  Review of Systems:  Review of Systems    REVIEW OF SYSTEMS:  Constitutional:  Denies fever or chills   Eyes:  Denies change in visual acuity   HENT:  Denies nasal congestion or sore throat   Respiratory:  Denies cough or shortness of breath   Cardiovascular:  Denies chest pain or edema   GI:  Denies abdominal pain, nausea, vomiting, bloody stools or diarrhea   :  Denies dysuria, frequency, difficulty in micturition and nocturia  Musculoskeletal:  Denies back pain or joint pain   Neurologic:  Denies headache, focal weakness or sensory changes   Endocrine:  Denies polyuria or polydipsia   Lymphatic:  Denies swollen glands   Psychiatric:  Denies depression or anxiety     Physical Exam:    /80   Pulse 97   Ht 5' 6" (1 676 m)   Wt 71 2 kg (157 lb)   SpO2 97%   BMI 25 34 kg/m²     Physical Exam   PHYSICAL EXAM:  General:  Patient is not in acute distress   Head: Normocephalic, Atraumatic  HEENT:  Both pupils normal-size atraumatic, normocephalic, nonicteric  Neck:  JVP not raised  Trachea central  No carotid bruit  Respiratory:  normal breath sounds no crackles   no rhonchi  Cardiovascular:  Regular rate and rhythm no S3 no murmurs  GI:  Abdomen soft nontender  No organomegaly  Lymphatic:  No cervical or inguinal lymphadenopathy  Neurologic:  Patient is awake alert, oriented   Grossly nonfocal    Extremities no edema    Discussion/Summary:   Patient with multiple medical problems who seems to be doing reasonably well from cardiac standpoint  Previous studies reviewed with patient  Medications reviewed and possible side effects discussed  concepts of cardiovascular disease , signs and symptoms of heart disease  Dietary and risk factor modification reinforced  All questions answered  Safety measures reviewed  Patient advised to report any problems prompting medical attention  importance of salt restriction reinforced with the patient  Medications were reviewed  Patient will follow-up with ICD clinic  Symptoms to watch out from cardiac standpoint discussed at length with patient and family  Follow-up in 6 months or earlier as needed  Patient is agreeable with the plan of care

## 2021-08-02 ENCOUNTER — OFFICE VISIT (OUTPATIENT)
Dept: PHYSICAL THERAPY | Facility: CLINIC | Age: 81
End: 2021-08-02
Payer: COMMERCIAL

## 2021-08-02 DIAGNOSIS — M75.81 RIGHT ROTATOR CUFF TENDINITIS: Primary | ICD-10-CM

## 2021-08-02 DIAGNOSIS — M75.01 ADHESIVE CAPSULITIS OF RIGHT SHOULDER: ICD-10-CM

## 2021-08-02 DIAGNOSIS — M19.011 PRIMARY OSTEOARTHRITIS OF RIGHT SHOULDER: ICD-10-CM

## 2021-08-02 PROCEDURE — 97112 NEUROMUSCULAR REEDUCATION: CPT

## 2021-08-02 PROCEDURE — 97110 THERAPEUTIC EXERCISES: CPT

## 2021-08-02 NOTE — PROGRESS NOTES
Daily Note     Today's date: 2021  Patient name: Wai Umanzor  :   MRN: [id number]  Referring provider: Kai Dao MD  Dx:   Encounter Diagnosis     ICD-10-CM    1  Right rotator cuff tendinitis  M75 81    2  Adhesive capsulitis of right shoulder  M75 01    3  Primary osteoarthritis of right shoulder  M19 011            Manual therapy (joint mobilizations) performed by YAAKOV Underwood under the direct supervision of Sachin Suarez DPT  Subjective: Pt states he still has R shoulder pain and difficulty lifting RUE  Pt reports he has not experienced much pain in c/s  Objective: See treatment diary below      Assessment: Muscle guarding present during PROM so unable to assess for end feel  Patient does present with pain with most ROM exercises however they were performed within self tolerance  Pt would benefit from continued PT in order to improve R shoulder ROM and periscapular strength in order to improve overall R shoulder function  Plan: Continue per plan of care        Precautions: Pacemaker      Manuals  8/2        R shoulder PROM  8' 8' 8' 8'        R inf mobs   8' 8' MS, SPT                                  Neuro Re-Ed             Cervical ret HEP            Scap ret HEP 20x 3"x20 3"x20 3"x20        Scap dep + ret  10x   x20                                                            Ther Ex             Pulleys  3"x10 3' 3' 3'        Wall slides HEP            Table slides flex/abd/er  10"x5 ea 10"x10  ea 10"x10 ea 10"x10 ea        Post capsule stretch    30"x3 30"x3        Cane flexion     5"x10        SL ER     x20                                  Ther Activity                                       Gait Training                                       Modalities             [facility]   10'

## 2021-08-04 ENCOUNTER — OFFICE VISIT (OUTPATIENT)
Dept: PHYSICAL THERAPY | Facility: CLINIC | Age: 81
End: 2021-08-04
Payer: COMMERCIAL

## 2021-08-04 DIAGNOSIS — M19.011 PRIMARY OSTEOARTHRITIS OF RIGHT SHOULDER: ICD-10-CM

## 2021-08-04 DIAGNOSIS — M75.81 RIGHT ROTATOR CUFF TENDINITIS: Primary | ICD-10-CM

## 2021-08-04 DIAGNOSIS — M75.01 ADHESIVE CAPSULITIS OF RIGHT SHOULDER: ICD-10-CM

## 2021-08-04 PROCEDURE — 97140 MANUAL THERAPY 1/> REGIONS: CPT | Performed by: PHYSICAL THERAPIST

## 2021-08-04 PROCEDURE — 97110 THERAPEUTIC EXERCISES: CPT | Performed by: PHYSICAL THERAPIST

## 2021-08-04 NOTE — PROGRESS NOTES
Daily Note     Today's date: 2021  Patient name: Ramo Sanford  :   MRN: [id number]  Referring provider: Stanton Cohen MD  Dx:   Encounter Diagnosis     ICD-10-CM    1  Right rotator cuff tendinitis  M75 81    2  Adhesive capsulitis of right shoulder  M75 01    3  Primary osteoarthritis of right shoulder  M19 011        Start Time: 1416  Stop Time: 1450  Total time in clinic (min): 34 minutes    Subjective: Patient states that he woke up this morning with the worst shoulder pain he has felt in a while  He said there was no known cause for the pain  Objective: See treatment diary below      Assessment: Patients PROM decreased since last visit in all 3 planes due to pain   He was unable to get into started position for posterior capsule stretch due to pain  Patient could actively flex and abduct his shoulder to approx 50* then can get to 90* with PT assistance  PT treatment was shortened due to increase in pain with all exercises         Plan: Progress as tolerated     Precautions: Pacemaker      Manuals        R shoulder PROM  8' 8' 8' 8' 8'       R inf mobs   8' 8' MS, SPT 8'                                 Neuro Re-Ed             Cervical ret HEP            Scap ret HEP 20x 3"x20 3"x20 3"x20 3"x20       Scap dep + ret  10x   x20                                                            Ther Ex             Pulleys  3"x10 3' 3' 3' 3'       Wall slides HEP            Table slides flex/abd/er  10"x5 ea 10"x10  ea 10"x10 ea 10"x10 ea 10"x10 ea       Post capsule stretch    30"x3 30"x3 df       Cane flexion     5"x10        SL ER     x20        Stand flex/abd      AA x10ea                    Ther Activity                                       Gait Training                                       Modalities             [facility]   10'

## 2021-08-09 ENCOUNTER — OFFICE VISIT (OUTPATIENT)
Dept: PHYSICAL THERAPY | Facility: CLINIC | Age: 81
End: 2021-08-09
Payer: COMMERCIAL

## 2021-08-09 DIAGNOSIS — M19.011 PRIMARY OSTEOARTHRITIS OF RIGHT SHOULDER: ICD-10-CM

## 2021-08-09 DIAGNOSIS — M75.81 RIGHT ROTATOR CUFF TENDINITIS: Primary | ICD-10-CM

## 2021-08-09 DIAGNOSIS — M75.01 ADHESIVE CAPSULITIS OF RIGHT SHOULDER: ICD-10-CM

## 2021-08-09 PROCEDURE — 97140 MANUAL THERAPY 1/> REGIONS: CPT | Performed by: PHYSICAL THERAPIST

## 2021-08-09 PROCEDURE — 97110 THERAPEUTIC EXERCISES: CPT | Performed by: PHYSICAL THERAPIST

## 2021-08-09 NOTE — PROGRESS NOTES
Daily Note     Today's date: 2021  Patient name: Penelope Guillen  :   MRN: [id number]  Referring provider: Sonia Land MD  Dx:   Encounter Diagnosis     ICD-10-CM    1  Right rotator cuff tendinitis  M75 81    2  Adhesive capsulitis of right shoulder  M75 01    3  Primary osteoarthritis of right shoulder  M19 011        Start Time: 1420  Stop Time: 1459  Total time in clinic (min): 39 minutes    Subjective: Patient said his R shoulder is sore today but it has gotten slightly better since last visit  Objective: See treatment diary below      Assessment: Patient was able to improve shoulder abduction AROM from approx 60* to 100* after Gr II inferior GH mobs with minimal pain  Patient still has severe limitations with passive ER  Median NG caused an increase in pain in the elbow but is inconclusive if nerve glide was effective or if the pain was from wrist muscle soreness         Plan: Progress as tolerated     Precautions: Pacemaker      Manuals       R shoulder PROM  8' 8' 8' 8' 8' 8'      R inf mobs   8' 8' MS, SPT 8' 8'                                Neuro Re-Ed             Cervical ret HEP            Scap ret HEP 20x 3"x20 3"x20 3"x20 3"x20       Scap dep + ret  10x   x20  3"x20      Median NG       x20                                             Ther Ex             Pulleys  3"x10 3' 3' 3' 3' 3'      Wall slides HEP            Table slides flex/abd/er  10"x5 ea 10"x10  ea 10"x10 ea 10"x10 ea 10"x10 ea 10"x10 ea      Post capsule stretch    30"x3 30"x3 df       Cane flexion     5"x10        SL ER     x20        Stand flex/abd      AA S19PE AA  x10ea                   Ther Activity                                       Gait Training                                       Modalities             [facility]   10'

## 2021-08-12 ENCOUNTER — OFFICE VISIT (OUTPATIENT)
Dept: PHYSICAL THERAPY | Facility: CLINIC | Age: 81
End: 2021-08-12
Payer: COMMERCIAL

## 2021-08-12 DIAGNOSIS — Z79.4 TYPE 2 DIABETES MELLITUS WITH HYPERGLYCEMIA, WITH LONG-TERM CURRENT USE OF INSULIN (HCC): ICD-10-CM

## 2021-08-12 DIAGNOSIS — M75.81 RIGHT ROTATOR CUFF TENDINITIS: Primary | ICD-10-CM

## 2021-08-12 DIAGNOSIS — E11.65 TYPE 2 DIABETES MELLITUS WITH HYPERGLYCEMIA, WITH LONG-TERM CURRENT USE OF INSULIN (HCC): ICD-10-CM

## 2021-08-12 DIAGNOSIS — M19.011 PRIMARY OSTEOARTHRITIS OF RIGHT SHOULDER: ICD-10-CM

## 2021-08-12 DIAGNOSIS — M75.01 ADHESIVE CAPSULITIS OF RIGHT SHOULDER: ICD-10-CM

## 2021-08-12 PROCEDURE — 97112 NEUROMUSCULAR REEDUCATION: CPT

## 2021-08-12 PROCEDURE — 97140 MANUAL THERAPY 1/> REGIONS: CPT

## 2021-08-12 PROCEDURE — 97110 THERAPEUTIC EXERCISES: CPT

## 2021-08-12 NOTE — PROGRESS NOTES
Daily Note     Today's date: 2021  Patient name: Adolfo Lazaro  :   MRN: [id number]  Referring provider: Arvin Escalera MD  Dx:   Encounter Diagnosis     ICD-10-CM    1  Right rotator cuff tendinitis  M75 81    2  Adhesive capsulitis of right shoulder  M75 01    3  Primary osteoarthritis of right shoulder  M19 011                   Subjective: Patient reports "I went to the doctor yesterday and my shoulder really hurts it is about an 8/10 "      Objective: See treatment diary below      Assessment: Tolerated treatment poor  New c/o anterior bicep pain, which is not present with PROM  He describes the pain as sharp and a stretch  Patient had difficulty with  AAROM  Shoulder ROM is restricted empty end feel with flexion and abd, ER limited by hard end feel  Patient demonstrated fatigue post treatment  Plan: Continue per plan of care        Precautions: Pacemaker      Manuals      R shoulder PROM  8' 8' 8' 8' 8' 8' 8'     R inf mobs   8' 8' MS, SPT 8' 8'                                Neuro Re-Ed             Cervical ret HEP       10x     Scap ret HEP 20x 3"x20 3"x20 3"x20 3"x20  3"x20     Scap dep + ret  10x   x20  3"x20 3"x5     Median NG       x20 NP                                            Ther Ex             Pulleys  3"x10 3' 3' 3' 3' 3' 3'     Wall slides HEP            Table slides flex/abd/er  10"x5 ea 10"x10  ea 10"x10 ea 10"x10 ea 10"x10 ea 10"x10 ea 10"x10 ea     Post capsule stretch    30"x3 30"x3 df       Cane flexion     5"x10        SL ER     x20        Stand flex/abd      AA x10ea AA  x10ea Seated  x10 ea     Supine ER        5x     Ther Activity                                       Gait Training                                       Modalities             [facility]   10'

## 2021-08-16 ENCOUNTER — OFFICE VISIT (OUTPATIENT)
Dept: PHYSICAL THERAPY | Facility: CLINIC | Age: 81
End: 2021-08-16
Payer: COMMERCIAL

## 2021-08-16 DIAGNOSIS — M75.81 RIGHT ROTATOR CUFF TENDINITIS: Primary | ICD-10-CM

## 2021-08-16 DIAGNOSIS — M19.011 PRIMARY OSTEOARTHRITIS OF RIGHT SHOULDER: ICD-10-CM

## 2021-08-16 DIAGNOSIS — M75.01 ADHESIVE CAPSULITIS OF RIGHT SHOULDER: ICD-10-CM

## 2021-08-16 PROCEDURE — 97110 THERAPEUTIC EXERCISES: CPT

## 2021-08-16 NOTE — PROGRESS NOTES
Daily Note     Today's date: 2021  Patient name: Nicole Ocampo  :   MRN: [id number]  Referring provider: Danette Do MD  Dx:   Encounter Diagnosis     ICD-10-CM    1  Right rotator cuff tendinitis  M75 81    2  Adhesive capsulitis of right shoulder  M75 01    3  Primary osteoarthritis of right shoulder  M19 011                   Subjective: Pt reports pain is usually less at rest now in R shoulder but still has difficulty with use of UE  Objective: See treatment diary below      Assessment: Stiffness remains in all planes with significant ROM deficits  Muscle guarding during PROM  Tactile cuing provided for appropriate facilitation of scapular setting  All exercises performed with patient's tolerance  Plan: Continue per plan of care        Precautions: Pacemaker      Manuals     R shoulder PROM  8' 8' 8' 8' 8' 8' 8' 8'    R inf mobs   8' 8' MS, SPT 8' 8'                                Neuro Re-Ed             Cervical ret HEP       10x     Scap ret HEP 20x 3"x20 3"x20 3"x20 3"x20  3"x20 3"x20    Scap dep + ret  10x   x20  3"x20 3"x5     Median NG       x20 NP                                            Ther Ex             Pulleys  3"x10 3' 3' 3' 3' 3' 3' 3'    Wall slides HEP            Table slides flex/abd/er  10"x5 ea 10"x10  ea 10"x10 ea 10"x10 ea 10"x10 ea 10"x10 ea 10"x10 ea pball x20    Post capsule stretch    30"x3 30"x3 df       Cane flexion     5"x10    x20    SL ER     x20        Stand flex/abd      AA x10ea AA  x10ea Seated  x10 ea AA x20    Supine ER        5x     pball up wall         x12    Ther Activity                                       Gait Training                                       Modalities             P   10'

## 2021-08-18 RX ORDER — INSULIN GLARGINE 100 [IU]/ML
26 INJECTION, SOLUTION SUBCUTANEOUS DAILY
Qty: 15 ML | Refills: 1 | Status: SHIPPED | OUTPATIENT
Start: 2021-08-18 | End: 2021-11-24

## 2021-08-20 ENCOUNTER — OFFICE VISIT (OUTPATIENT)
Dept: OBGYN CLINIC | Facility: CLINIC | Age: 81
End: 2021-08-20
Payer: COMMERCIAL

## 2021-08-20 VITALS
SYSTOLIC BLOOD PRESSURE: 102 MMHG | HEIGHT: 66 IN | BODY MASS INDEX: 25.39 KG/M2 | DIASTOLIC BLOOD PRESSURE: 66 MMHG | WEIGHT: 158 LBS | HEART RATE: 99 BPM

## 2021-08-20 DIAGNOSIS — M75.101 ROTATOR CUFF TEAR ARTHROPATHY OF RIGHT SHOULDER: Primary | ICD-10-CM

## 2021-08-20 DIAGNOSIS — M12.811 ROTATOR CUFF TEAR ARTHROPATHY OF RIGHT SHOULDER: Primary | ICD-10-CM

## 2021-08-20 DIAGNOSIS — M75.01 ADHESIVE CAPSULITIS OF RIGHT SHOULDER: ICD-10-CM

## 2021-08-20 PROCEDURE — 20610 DRAIN/INJ JOINT/BURSA W/O US: CPT | Performed by: ORTHOPAEDIC SURGERY

## 2021-08-20 PROCEDURE — 99213 OFFICE O/P EST LOW 20 MIN: CPT | Performed by: ORTHOPAEDIC SURGERY

## 2021-08-20 RX ADMIN — BUPIVACAINE HYDROCHLORIDE 2 ML: 2.5 INJECTION, SOLUTION INFILTRATION; PERINEURAL at 10:57

## 2021-08-20 RX ADMIN — LIDOCAINE HYDROCHLORIDE 2 ML: 10 INJECTION, SOLUTION INFILTRATION; PERINEURAL at 10:57

## 2021-08-20 RX ADMIN — METHYLPREDNISOLONE ACETATE 2 ML: 40 INJECTION, SUSPENSION INTRA-ARTICULAR; INTRALESIONAL; INTRAMUSCULAR; SOFT TISSUE at 10:57

## 2021-08-31 NOTE — PROGRESS NOTES
PT Discharge    Today's date: 2021  Patient name: Jennye Cogan  :   MRN: [id number]  Referring provider: Alex Live MD  Dx:   Encounter Diagnosis     ICD-10-CM    1  Right rotator cuff tendinitis  M75 81    2  Adhesive capsulitis of right shoulder  M75 01    3  Primary osteoarthritis of right shoulder  M19 011      Assessment  Assessment details: Pt has not attended physical therapy since 21 and has no future appointments scheduled  Subjective and objective information and goals unable to be updated at this time  Pt DC from skilled therapy  Impairments: abnormal or restricted ROM, activity intolerance, impaired balance, impaired physical strength, lacks appropriate home exercise program, pain with function, scapular dyskinesis and poor posture   Functional limitations: reaching OH, lifting, sidelying  Symptom irritability: moderateUnderstanding of Dx/Px/POC: good   Prognosis: good    Goals  STG: 3 weeks - unable to assess  1  Pt will demonstrate independence with HEP  2  Pt will improve R shoulder AROM by at least 10%  3  Pt will improve R shoulder strength by at least 1/2 grade  4  Pt will report pain no more than 5/10    LT weeks - unable to assess  1  Pt will improve R shoulder AROM to equal that on the L to return to PLOF  2  Pt will improve R shoulder strength to at least 4+/5  3  Pt will have no difficulty reaching OH  4   Pt will report pain no more than 2/10      Plan  Patient would benefit from: skilled physical therapy  Planned modality interventions: cryotherapy and thermotherapy: hydrocollator packs  Planned therapy interventions: therapeutic exercise, therapeutic activities, stretching, strengthening, patient education, neuromuscular re-education, massage, manual therapy, balance, gait training and home exercise program  Treatment plan discussed with: patient        Subjective Evaluation    History of Present Illness  Mechanism of injury: Pt reports his pain started as pain in the neck that went all the way down the arm, and it started in February on   He reports he fell sometime during this past winter, but he did not have pain right away  He got a cortisone injection in the shoulder about 1 month ago, which helped but now his pain is starting to come back  He reports that he has trouble turning his head to the right, and this will give him a throbbing pain down into the arm  Currently, he has difficulty with laying on the R arm, reaching OH, and reaching across the body  He reports Aleve has been helping  Recurrent probem    Quality of life: fair    Pain  Current pain ratin  At best pain ratin  At worst pain ratin  Quality: throbbing  Relieving factors: medications  Aggravating factors: overhead activity and lifting  Progression: improved    Treatments  Previous treatment: injection treatment  Patient Goals  Patient goals for therapy: decreased pain, increased motion, independence with ADLs/IADLs, return to sport/leisure activities and increased strength          Objective     Tenderness     Right Shoulder  No tenderness     Active Range of Motion   Cervical/Thoracic Spine       Cervical    Flexion:  Restriction level: minimal  Extension:  with pain Restriction level: maximal  Left rotation: 35 degrees  Right rotation: 27 degrees    with pain    Right Shoulder   Flexion: 85 degrees with pain  Abduction: 76 degrees with pain  External rotation 0°: 0 degrees with pain    Additional Active Range of Motion Details  R rot + flexion: no pain  R rot + extension: pain    Seated cervical retraction x10: improved pain with extension and R rotation    Passive Range of Motion     Right Shoulder   Flexion: 89 degrees with pain  Abduction: 75 degrees with pain    Tests     Right Shoulder   Positive belly press, empty can, full can and lift-off

## 2021-10-07 ENCOUNTER — TELEPHONE (OUTPATIENT)
Dept: INTERNAL MEDICINE CLINIC | Facility: CLINIC | Age: 81
End: 2021-10-07

## 2021-10-12 ENCOUNTER — RA CDI HCC (OUTPATIENT)
Dept: OTHER | Facility: HOSPITAL | Age: 81
End: 2021-10-12

## 2021-10-12 ENCOUNTER — APPOINTMENT (OUTPATIENT)
Dept: LAB | Facility: CLINIC | Age: 81
End: 2021-10-12
Payer: COMMERCIAL

## 2021-10-12 DIAGNOSIS — E11.65 TYPE 2 DIABETES MELLITUS WITH HYPERGLYCEMIA, WITH LONG-TERM CURRENT USE OF INSULIN (HCC): ICD-10-CM

## 2021-10-12 DIAGNOSIS — Z79.4 TYPE 2 DIABETES MELLITUS WITH HYPERGLYCEMIA, WITH LONG-TERM CURRENT USE OF INSULIN (HCC): ICD-10-CM

## 2021-10-12 LAB
ANION GAP SERPL CALCULATED.3IONS-SCNC: 5 MMOL/L (ref 4–13)
BUN SERPL-MCNC: 12 MG/DL (ref 5–25)
CALCIUM SERPL-MCNC: 9.1 MG/DL (ref 8.3–10.1)
CHLORIDE SERPL-SCNC: 111 MMOL/L (ref 100–108)
CO2 SERPL-SCNC: 26 MMOL/L (ref 21–32)
CREAT SERPL-MCNC: 0.87 MG/DL (ref 0.6–1.3)
EST. AVERAGE GLUCOSE BLD GHB EST-MCNC: 192 MG/DL
GFR SERPL CREATININE-BSD FRML MDRD: 82 ML/MIN/1.73SQ M
GLUCOSE P FAST SERPL-MCNC: 139 MG/DL (ref 65–99)
HBA1C MFR BLD: 8.3 %
POTASSIUM SERPL-SCNC: 4 MMOL/L (ref 3.5–5.3)
SODIUM SERPL-SCNC: 142 MMOL/L (ref 136–145)

## 2021-10-12 PROCEDURE — 80048 BASIC METABOLIC PNL TOTAL CA: CPT

## 2021-10-12 PROCEDURE — 83036 HEMOGLOBIN GLYCOSYLATED A1C: CPT

## 2021-10-12 PROCEDURE — 36415 COLL VENOUS BLD VENIPUNCTURE: CPT

## 2021-10-14 PROBLEM — E11.3293 TYPE 2 DIABETES MELLITUS WITH MILD NONPROLIFERATIVE DIABETIC RETINOPATHY WITHOUT MACULAR EDEMA, BILATERAL (HCC): Status: ACTIVE | Noted: 2021-10-14

## 2021-10-18 ENCOUNTER — OFFICE VISIT (OUTPATIENT)
Dept: INTERNAL MEDICINE CLINIC | Facility: CLINIC | Age: 81
End: 2021-10-18
Payer: COMMERCIAL

## 2021-10-18 VITALS
RESPIRATION RATE: 16 BRPM | SYSTOLIC BLOOD PRESSURE: 118 MMHG | HEIGHT: 65 IN | TEMPERATURE: 97.4 F | OXYGEN SATURATION: 96 % | DIASTOLIC BLOOD PRESSURE: 72 MMHG | WEIGHT: 156.8 LBS | BODY MASS INDEX: 26.12 KG/M2 | HEART RATE: 62 BPM

## 2021-10-18 DIAGNOSIS — E11.65 TYPE 2 DIABETES MELLITUS WITH HYPERGLYCEMIA, WITH LONG-TERM CURRENT USE OF INSULIN (HCC): Primary | ICD-10-CM

## 2021-10-18 DIAGNOSIS — Z79.4 TYPE 2 DIABETES MELLITUS WITH HYPERGLYCEMIA, WITH LONG-TERM CURRENT USE OF INSULIN (HCC): Primary | ICD-10-CM

## 2021-10-18 PROCEDURE — 1160F RVW MEDS BY RX/DR IN RCRD: CPT | Performed by: INTERNAL MEDICINE

## 2021-10-18 PROCEDURE — 1036F TOBACCO NON-USER: CPT | Performed by: INTERNAL MEDICINE

## 2021-10-18 PROCEDURE — 99213 OFFICE O/P EST LOW 20 MIN: CPT | Performed by: INTERNAL MEDICINE

## 2021-11-04 ENCOUNTER — REMOTE DEVICE CLINIC VISIT (OUTPATIENT)
Dept: CARDIOLOGY CLINIC | Facility: CLINIC | Age: 81
End: 2021-11-04
Payer: COMMERCIAL

## 2021-11-04 DIAGNOSIS — Z95.810 AICD (AUTOMATIC CARDIOVERTER/DEFIBRILLATOR) PRESENT: Primary | ICD-10-CM

## 2021-11-04 PROCEDURE — 93295 DEV INTERROG REMOTE 1/2/MLT: CPT | Performed by: INTERNAL MEDICINE

## 2021-11-04 PROCEDURE — 93296 REM INTERROG EVL PM/IDS: CPT | Performed by: INTERNAL MEDICINE

## 2021-11-19 ENCOUNTER — TELEPHONE (OUTPATIENT)
Dept: OBGYN CLINIC | Facility: HOSPITAL | Age: 81
End: 2021-11-19

## 2021-11-19 RX ORDER — METHYLPREDNISOLONE ACETATE 40 MG/ML
2 INJECTION, SUSPENSION INTRA-ARTICULAR; INTRALESIONAL; INTRAMUSCULAR; SOFT TISSUE
Status: COMPLETED | OUTPATIENT
Start: 2021-08-20 | End: 2021-08-20

## 2021-11-19 RX ORDER — BUPIVACAINE HYDROCHLORIDE 2.5 MG/ML
2 INJECTION, SOLUTION INFILTRATION; PERINEURAL
Status: COMPLETED | OUTPATIENT
Start: 2021-08-20 | End: 2021-08-20

## 2021-11-19 RX ORDER — LIDOCAINE HYDROCHLORIDE 10 MG/ML
2 INJECTION, SOLUTION INFILTRATION; PERINEURAL
Status: COMPLETED | OUTPATIENT
Start: 2021-08-20 | End: 2021-08-20

## 2021-11-23 DIAGNOSIS — E11.65 TYPE 2 DIABETES MELLITUS WITH HYPERGLYCEMIA, WITH LONG-TERM CURRENT USE OF INSULIN (HCC): ICD-10-CM

## 2021-11-23 DIAGNOSIS — Z79.4 TYPE 2 DIABETES MELLITUS WITH HYPERGLYCEMIA, WITH LONG-TERM CURRENT USE OF INSULIN (HCC): ICD-10-CM

## 2021-11-24 RX ORDER — INSULIN GLARGINE 100 [IU]/ML
26 INJECTION, SOLUTION SUBCUTANEOUS DAILY
Qty: 15 ML | Refills: 1 | Status: SHIPPED | OUTPATIENT
Start: 2021-11-24 | End: 2022-02-09 | Stop reason: SDUPTHER

## 2021-12-08 ENCOUNTER — OFFICE VISIT (OUTPATIENT)
Dept: INTERNAL MEDICINE CLINIC | Facility: CLINIC | Age: 81
End: 2021-12-08
Payer: COMMERCIAL

## 2021-12-08 ENCOUNTER — OFFICE VISIT (OUTPATIENT)
Dept: OBGYN CLINIC | Facility: CLINIC | Age: 81
End: 2021-12-08
Payer: COMMERCIAL

## 2021-12-08 VITALS
WEIGHT: 150.8 LBS | TEMPERATURE: 97.5 F | BODY MASS INDEX: 25.12 KG/M2 | HEART RATE: 72 BPM | SYSTOLIC BLOOD PRESSURE: 118 MMHG | HEIGHT: 65 IN | DIASTOLIC BLOOD PRESSURE: 58 MMHG

## 2021-12-08 VITALS
SYSTOLIC BLOOD PRESSURE: 131 MMHG | HEART RATE: 67 BPM | DIASTOLIC BLOOD PRESSURE: 93 MMHG | BODY MASS INDEX: 24.96 KG/M2 | HEIGHT: 65 IN | WEIGHT: 149.8 LBS

## 2021-12-08 DIAGNOSIS — M75.101 ROTATOR CUFF TEAR ARTHROPATHY OF RIGHT SHOULDER: ICD-10-CM

## 2021-12-08 DIAGNOSIS — M19.011 PRIMARY OSTEOARTHRITIS OF RIGHT SHOULDER: ICD-10-CM

## 2021-12-08 DIAGNOSIS — E11.65 TYPE 2 DIABETES MELLITUS WITH HYPERGLYCEMIA, WITH LONG-TERM CURRENT USE OF INSULIN (HCC): ICD-10-CM

## 2021-12-08 DIAGNOSIS — Z79.4 TYPE 2 DIABETES MELLITUS WITH HYPERGLYCEMIA, WITH LONG-TERM CURRENT USE OF INSULIN (HCC): ICD-10-CM

## 2021-12-08 DIAGNOSIS — M75.81 RIGHT ROTATOR CUFF TENDINITIS: ICD-10-CM

## 2021-12-08 DIAGNOSIS — M12.811 ROTATOR CUFF TEAR ARTHROPATHY OF RIGHT SHOULDER: ICD-10-CM

## 2021-12-08 DIAGNOSIS — M75.01 ADHESIVE CAPSULITIS OF RIGHT SHOULDER: Primary | ICD-10-CM

## 2021-12-08 PROCEDURE — 1160F RVW MEDS BY RX/DR IN RCRD: CPT | Performed by: INTERNAL MEDICINE

## 2021-12-08 PROCEDURE — 1036F TOBACCO NON-USER: CPT | Performed by: INTERNAL MEDICINE

## 2021-12-08 PROCEDURE — 99213 OFFICE O/P EST LOW 20 MIN: CPT | Performed by: INTERNAL MEDICINE

## 2021-12-08 PROCEDURE — 99213 OFFICE O/P EST LOW 20 MIN: CPT | Performed by: ORTHOPAEDIC SURGERY

## 2021-12-08 PROCEDURE — 1160F RVW MEDS BY RX/DR IN RCRD: CPT | Performed by: ORTHOPAEDIC SURGERY

## 2021-12-08 NOTE — PROGRESS NOTES
SUBJECTIVE  Right shoulder pain  Patient has been treating for adhesive capsulitis and suspected chronic rotator cuff tear of the right shoulder  He was given a repeat cortisone injection on 08/20/2021 with appreciable relief for a few weeks  He states that he attended about 7 weeks of formal physical therapy that caused him significant pain afterward  He states that he did try to work on his own to perform the activities  He states that he has continued limited range of motion of his right shoulder and continued upper arm pain        ROS:   General: No fever, no chills, no weight loss, no weight gain  HEENT:  No loss of hearing, no nose bleeds, no sore throat  Eyes:  No eye pain, no red eyes, no visual disturbance  Respiratory:  No cough, no shortness of breath, no wheezing  Cardiovascular:  No chest pain, no palpitations, no edema  GI: No abdominal pain, no nausea, no vomiting  Endocrine: No frequent urination, no excessive thirst  Urinary:  No dysuria, no hematuria, no incontinence  Musculoskeletal: see HPI and PE  Skin:  No rash, no wounds  Neurological:  No dizziness, no headache, no numbness  Psychiatric:  No difficulty concentrating, no depression, no suicide thoughts, no anxiety  Review of all other systems is negative    PMH:  Past Medical History:   Diagnosis Date    Acquired cyst of kidney     Anemia     Benign paroxysmal vertigo, unspecified ear     Cellulitis of leg     Cervical spinal stenosis     Chest pain     Coronary atherosclerosis of native coronary artery     Enlarged prostate     Esophageal candidiasis (HCC)     Hematuria     Herpes zoster     Hyperlipidemia     Hypertension     Incomplete emptying of bladder     Inflamed seborrheic keratosis     Internal hemorrhoids     Malignant neoplasm of skin of trunk     Myocardial infarction ([facility] [address] )     Nonmelanoma skin cancer     LAST ASSESSED: 8/9/17    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 infarction     Paroxysmal tachycardia (HCC)     Shortness of breath     Vitamin B deficiency        PSH:  Past Surgical History:   Procedure Laterality Date    CARDIAC DEFIBRILLATOR PLACEMENT      COLONOSCOPY  10/07/2008    FIBEROPTIC SCREENING; NO FURTHER RECOMMENDATIONS    CORONARY ANGIOPLASTY WITH STENT PLACEMENT      CORONARY ANGIOPLASTY WITH STENT PLACEMENT      CYSTOSCOPY  11/06/2015    DIAGNOSTIC; MANAGED BY: Kamille Zuñiga    EGD AND COLONOSCOPY N/A 2/12/2018    Procedure: EGD AND COLONOSCOPY;  Surgeon: Evan Ortez MD;  Location: [facility];   Service: Gastroenterology    FL INJECTION RIGHT SHOULDER (ARTHROGRAM)  1/4/2022    HIP ARTHROPLASTY Right     INSERT / REPLACE / REMOVE PACEMAKER      JOINT REPLACEMENT Right     TRUNK SKIN LESION EXCISIONAL BIOPSY  08/22/2007    MALIGNANT; BCC CHEST       Medications:  Current Outpatient Medications   Medication Sig Dispense Refill    aspirin 81 MG tablet Take 1 tablet by mouth daily      cyclobenzaprine (FLEXERIL) 10 mg tablet Take 1 tablet (10 mg total) by mouth 3 (three) times a day as needed for muscle spasms 30 tablet 1    finasteride (PROSCAR) 5 mg tablet Take 1 tablet (5 mg total) by mouth daily 90 tablet 3    HumaLOG Albert KwikPen 100 units/mL injection pen Inject 20 Units under the skin 2 (two) times a day with meals 15 pen 3    Lantus SoloStar 100 units/mL injection pen INJECT 26 UNITS UNDER THE SKIN DAILY 15 mL 1    levothyroxine 88 mcg tablet Take 1 tablet (88 mcg total) by mouth daily 90 tablet 3    metoprolol succinate (TOPROL-XL) 50 mg 24 hr tablet TAKE 1 TABLET BY MOUTH EVERY DAY 90 tablet 2    rosuvastatin (CRESTOR) 20 MG tablet Take 1 tablet (20 mg total) by mouth daily 90 tablet 3    tamsulosin (FLOMAX) 0 4 mg Take 1 capsule (0 4 mg total) by mouth daily 90 capsule 3    Canagliflozin (Invokana) 300 MG TABS Take 1 tablet (300 mg total) by mouth daily before breakfast 100 tablet 1    glipiZIDE (GLUCOTROL) 5 mg tablet Take 1 tablet (5 mg total) by mouth 2 (two) times a day 180 tablet 3    Insulin Pen Needle (Pen Needles) 32G X 4 MM MISC Check blood glucose at home [facility] and  4 times a day 200 each 3     No current facility-administered medications for this visit  Allergies: Allergies   Allergen Reactions    Atorvastatin Other (See Comments)     Pt unsure      Percocet [Oxycodone-Acetaminophen] Nausea Only and GI Intolerance       Family History:  Family History   Problem Relation Age of Onset    Heart disease Mother     Coronary artery disease Mother     Sudden death Mother     Cancer Father         throat; MALIGNANT NEOPLASM OF HEAD, FACE OR NECK    Throat cancer Father     Cancer Family     Coronary artery disease Family        Social History:  Social History     Occupational History    Occupation: RETIRED   Tobacco Use    Smoking status: Former Smoker     Packs/day: 1 00     Years: 10 00     Pack years: 10 00     Types: Cigarettes     Quit date: 1978     Years since quittin 9    Smokeless tobacco: Never Used   Vaping Use    Vaping Use: Never used   Substance and Sexual Activity    Alcohol use: Yes     Comment: occasionally 1-2 per month     Drug use: No    Sexual activity: Not Currently     Partners: Female       Physical Exam:  General :  Alert, cooperative, no distress, appears stated age  Blood pressure 131/93, pulse 67, height 5' 5" (1 651 m), weight 67 9 kg (149 lb 12 8 oz)  Head:  Normocephalic, without obvious abnormality, atraumatic   Eyes:  Conjunctiva/corneas clear, EOM's intact,   Ears: Both ears normal appearance, no hearing deficits      Nose: Nares normal, septum midline, no drainage    Neck: Supple,  trachea midline, no adenopathy, no tenderness, no mass   Back:   Symmetric, no curvature, ROM normal, no tenderness   Lungs:   Respirations unlabored   Chest Wall:  No tenderness or deformity   Extremities: Extremities normal, atraumatic, no cyanosis or edema      Pulses: 2+ and symmetric   Skin: Skin color, texture, turgor normal, no rashes or lesions      Neurologic: Normal           Right Shoulder Exam     Other   Pulse: present    Comments:  Passive motion   90° forward flexion   90° abduction   Minimal external rotation     Passive greater than active range of motion            Imaging Studies: The following imaging studies were reviewed in office today  My findings are noted  No new images   Assessment  Encounter Diagnoses   Name Primary?  Adhesive capsulitis of right shoulder Yes    Rotator cuff tear arthropathy of right shoulder     Right rotator cuff tendinitis     Primary osteoarthritis of right shoulder          Plan:    [age] [facility] yo male with right shoulder osteoarthritis, adhesive capsulitis, possible rotator cuff tear although less likely  * CT arthrogram of the right shoulder was ordered to further evaluate the rotator cuff because patient has a pacemaker and is unable to MRI of the right shoulder  * patient was advised that his pain and limitations are likely due to adhesive capsulitis due to limited passive as well as active range of motion    * he was advised that his right shoulder osteoarthritis is not severe enough to be causing these limitations and rotator cuff tear would not limit his passive range of motion in this fashion  * patient was advised that surgical intervention for adhesive capsulitis is not recommended and after CT scan if no significant RCT he would benefit from more formal physical therapy as well as consistent home exercise program focusing on stretching and avoidance of strengthening    Scribe Attestation    I,:  Young Ragsdale am acting as a scribe while in the presence of the attending physician :       I,:  Beatrice Squires MD personally performed the services described in this documentation    as scribed in my presence :

## 2021-12-30 DIAGNOSIS — E11.65 TYPE 2 DIABETES MELLITUS WITH HYPERGLYCEMIA, WITHOUT LONG-TERM CURRENT USE OF INSULIN (HCC): ICD-10-CM

## 2021-12-30 DIAGNOSIS — Z79.4 TYPE 2 DIABETES MELLITUS WITH HYPERGLYCEMIA, WITH LONG-TERM CURRENT USE OF INSULIN (HCC): ICD-10-CM

## 2021-12-30 DIAGNOSIS — E11.65 TYPE 2 DIABETES MELLITUS WITH HYPERGLYCEMIA, WITH LONG-TERM CURRENT USE OF INSULIN (HCC): ICD-10-CM

## 2021-12-30 RX ORDER — GLIPIZIDE 5 MG/1
5 TABLET ORAL 2 TIMES DAILY
Qty: 180 TABLET | Refills: 3 | Status: SHIPPED | OUTPATIENT
Start: 2021-12-30 | End: 2022-05-03 | Stop reason: SDUPTHER

## 2021-12-30 RX ORDER — PEN NEEDLE, DIABETIC 30 GX3/16"
NEEDLE, DISPOSABLE MISCELLANEOUS
Qty: 200 EACH | Refills: 3 | Status: SHIPPED | OUTPATIENT
Start: 2021-12-30

## 2021-12-30 NOTE — NURSING NOTE
Unable to reach patient, no return call as of yet  Sent message below via e-mail to [email or web address]  [email or web address],   I have attempted to call you to inform you of your upcoming radiology appointment at [place] [address]  We are located at [address]  Your appointment is scheduled for 1/4/22  @ 1pm, you are scheduled for a right shoulder CT arthrogram  You will need to enter Building B, come up to the 1st floor and locate the Radiology department  Registration is in the Radiology department, please arrive 15 minutes prior so you may go through the registration process  You will have your Fluoro guided procedure then be guided to our CT suite for your images  For this test there are no restrictions, you may eat, drink and take all your usual medications  If you have any question or concerns please feel free to contact me at the number below,     Have a good day, Stay warm out there  [address]  Radiology RN  [address] [place] [address]  [phone] (Office)  [phone] (Fax)  [email or web address]

## 2022-01-01 NOTE — PROGRESS NOTES
General Cardiology   Progress Note   Kailyn Talley 69 y o  male MRN: [id number]  Unit/Bed#: -01 Encounter: [id number]        Subjective:    No significant events since the last encounter  Denies chest pain, shortness of breath, palpitations, lightheadedness, leg swelling, syncope  Objective:   Vitals:  Vitals:    02/14/18 0925   BP: (!) 102/48   Pulse: 78   Resp: 18   Temp:    SpO2: 97%       There is no height or weight on file to calculate BMI  Systolic (03KKX), NID:466 , Min:100 , NXP:955     Diastolic (22VFH), QRP:04, Min:48, Max:78      Intake/Output Summary (Last 24 hours) at 02/14/18 1047  Last data filed at 02/14/18 0900   Gross per 24 hour   Intake              560 ml   Output             3050 ml   Net            -2490 ml     Weight (last 2 days)     None          PHYSICAL EXAMS:  General:  Patient is not in acute distress, laying in the bed comfortably, awake, alert responding to commands  Head: Normocephalic, Atraumatic  HEENT: White sclera, pink conjunctiva,  PERRLA,pharynx benign  Neck:  Supple, no neck vein distention, carotids+2/+2 no bruits, thyromegaly, adenopathy  Respiratory: clear to P/A  Cardiovascular:  PMI normal, S1-S2 normal, No  Murmurs, thrills, gallops, rubs   Regular rhythm  GI:  Abdomen soft nontender   No hepatosplenomegaly, adenopathy, ascites,or rebound tenderness  Extremities: No edema, normal pulses, no calf tenderness, no joint deformities, no venous disease   Integument:  No skin rashes or ulceration  Lymphatic:  No cervical or inguinal lymphadenopathy  Neurologic:  Patient is awake alert, responding to command, well-oriented to time and place and person moving all extremities      LABORATORY RESULTS:      CBC with diff:   Results from last 7 days  Lab Units 02/14/18  0502 02/13/18  0545 02/12/18  0553   WBC Thousand/uL 6 01 6 77 5 03   HEMOGLOBIN g/dL 8 4* 8 9* 8 1*   HEMATOCRIT % 25 3* 26 3* 24 3*   MCV fL 106* 105* 106*   PLATELETS Thousands/uL 204 207 184   MCH pg 35 3* 35 6* 35 4*   MCHC g/dL 33 2 33 8 33 3   RDW % 18 1* 18 1* 18 3*   MPV fL 10 2 10 1 10 1   NRBC AUTO /100 WBCs 0 0 0       CMP:  Results from last 7 days  Lab Units 18  0502 18  0545 18  0553   SODIUM mmol/L 141 142 143   POTASSIUM mmol/L 3 1* 3 5 3 3*   CHLORIDE mmol/L 104 105 105   CO2 mmol/L 29 29 28   ANION GAP mmol/L 8 8 10   BUN mg/dL 10 14 16   CREATININE mg/dL 1 52* 1 62* 1 70*   GLUCOSE RANDOM mg/dL 176* 179* 117   CALCIUM mg/dL 8 3 8 5 8 4   AST U/L 18 32 69*   ALT U/L 47 68 95*   ALK PHOS U/L 110 127* 130*   TOTAL PROTEIN g/dL 6 3* 6 6 6 3*   BILIRUBIN TOTAL mg/dL 0 60 0 60 0 70   EGFR ml/min/1 73sq m 44 41 38       BMP:  Results from last 7 days  Lab Units 18  0502 18  0545 18  0553   SODIUM mmol/L 141 142 143   POTASSIUM mmol/L 3 1* 3 5 3 3*   CHLORIDE mmol/L 104 105 105   CO2 mmol/L 29 29 28   BUN mg/dL 10 14 16   CREATININE mg/dL 1 52* 1 62* 1 70*   GLUCOSE RANDOM mg/dL 176* 179* 117   CALCIUM mg/dL 8 3 8 5 8 4                Results from last 7 days  Lab Units 02/10/18  1219   MAGNESIUM mg/dL 2 0       Results from last 7 days  Lab Units 18  0553 18  1300   HEMOGLOBIN A1C % 7 3* 7 4*               Lipid Profile:   Lab Results   Component Value Date    CHOL 144 2017    CHOL 142 2015     Lab Results   Component Value Date    HDL 60 2017    HDL 57 2015     Lab Results   Component Value Date    LDLCALC 66 2015     Lab Results   Component Value Date    TRIG 95 2015       Cardiac testing:   Results for orders placed during the hospital encounter of 17   Echo complete with contrast if indicated    Narrative [facility], [address] [facility]  [phone]    Transthoracic Echocardiogram  2D, M-mode, and Color Doppler    Study date:  2017    Patient: Jeison Odonnell  MR number: [id number]  Account number: [de-identified]  : 1940  Age: 77 years  Gender: Male  Status: Outpatient  Location: [facility]  Height: 66 in  Weight: 164 lb  BP: 120/ 60 mmHg    Indications: Shortness of Breath  Diagnoses: R06 02 - Shortness of breath    Sonographer:  Ling RCS  Interpreting Physician:  Baltazar Mccoy MD  Primary Physician:  Claribel Goodrich MD  Referring Physician:  Baltazar Mccoy MD  Group:  [facility]    LEFT VENTRICLE:  The ventricle was mildly dilated  Ejection fraction was estimated to be 25 %  There is severe hypokinesis of the septum, anterior wall and apex  MITRAL VALVE:  There was trace regurgitation  TRICUSPID VALVE:  There was trace regurgitation  HISTORY: PRIOR HISTORY: CAD  s/p angioplasty  Hyperlipidemia  Tachycardia  Former smoker  Previous (remote) myocardial infarction  Risk factors: hypertension, oral hypoglycemic-treated diabetes, and a family history of coronary artery  disease  PRIOR PROCEDURES: PTCA  PROCEDURE: The study was performed in the [address] [place] [address]  This was a routine study  The transthoracic approach was used  The study included complete 2D imaging, M-mode, and color Doppler  The heart rate was 69 bpm, at the  start of the study  Images were obtained from the parasternal, apical, subcostal, and suprasternal notch acoustic windows  Echocardiographic views were limited due to poor acoustic window availability, decreased penetration, and lung  interference  This was a technically difficult study  LEFT VENTRICLE: The ventricle was mildly dilated  Ejection fraction was estimated to be 25 %  There is severe hypokinesis of the septum, anterior wall and apex  RIGHT VENTRICLE: The size was normal  Systolic function was normal  Wall thickness was normal  ICD lead noted  LEFT ATRIUM: Size was normal     RIGHT ATRIUM: Size was normal     MITRAL VALVE: There was annular calcification  Valve structure was normal  There was normal leaflet separation  DOPPLER: The transmitral velocity was within the normal range  There was no evidence for stenosis  There was trace  regurgitation  AORTIC VALVE: The valve was not well visualized  DOPPLER: There was no evidence for stenosis  TRICUSPID VALVE: The valve structure was normal  There was normal leaflet separation  DOPPLER: The transtricuspid velocity was within the normal range  There was no evidence for stenosis  There was trace regurgitation  PULMONIC VALVE: Leaflets exhibited normal thickness, no calcification, and normal cuspal separation  DOPPLER: The transpulmonic velocity was within the normal range  There was no regurgitation  PERICARDIUM: There was no pericardial effusion  The pericardium was normal in appearance  AORTA: The root exhibited normal size  SYSTEM MEASUREMENT TABLES    Apical four chamber  4 chamber Left Atrium Volume Index; Planimetry; End Systole; Apical four chamber;: 27 1 cm2  Left Ventricular Diastolic Area; Method of Disks, Single Plane; End Diastole; Apical four chamber;: 47 77 cm2  Left Ventricular Ejection Fraction; Method of Disks, Single Plane; Apical four chamber;: 26 7 %  Left Ventricular systolic Area; Method of Disks, Single Plane; End Systole; Apical four chamber;: 38 84 cm2  Right Atrium Systolic Area; Planimetry; End Systole; Apical four chamber;: 20 21 cm2  Right Ventricular Internal Diastolic Dimension; End Diastole; Apical four chamber;: 32 1 mm  TAPSE: 24 3 mm    Unspecified Scan Mode  Aortic Root Diameter; End Systole;: 35 2 mm  Gradient Pressure, Peak; Simplified Bernoulli; Antegrade Flow; Systole;: 6 8 mm[Hg]  Gradient pressure, average; Simplified Bernoulli; Antegrade Flow; Systole;: 4 2 mm[Hg]  Left Atrium to Aortic Root Ratio;: 1 02  Left atrial diameter; End Diastole;: 35 8 mm  Cardiac Output; Teichholz; Systole;: 7 24 L/min  Heart rate; Teichholz;: 72 HB/min  Interventricular Septum Diastolic Thickness;  Teichholz; End Diastole;: 7 5 mm  Left Ventricle Internal End Diastolic Dimension; Teichholz;: 61 4 mm  Left Ventricle Internal Systolic Dimension; Teichholz; End Systole;: 44 3 mm  Left Ventricle Mass; Mass AVCube with Teichholz; End Diastole;: 180 g  Left Ventricle Posterior Wall Diastolic Thickness; Teichholz; End Diastole;: 7 6 mm  Left Ventricular Ejection Fraction; Teichholz;: 53 %  Left Ventricular End Diastolic Volume; Teichholz;: 189 7 ml  Left Ventricular End Systolic Volume; Teichholz;: 89 1 ml  Left Ventricular Fractional Shortening;: 27 9 %  Stroke volume;  Teichholz; Systole;: 100 6 ml  Mitral Valve Area; Area by Pressure Half-Time; Systole;: 3 73 cm2  Mitral Valve E to A Ratio; Systole;: 0 46  Pressure half time; Diastole;: 0 06 s    [address] Accredited Echocardiography Laboratory    Prepared and electronically signed by    Florida Sadler MD  Signed 07-Jul-2017 15:35:37         Meds/Allergies   all current active meds have been reviewed and current meds:   Current Facility-Administered Medications   Medication Dose Route Frequency    aspirin chewable tablet 81 mg  81 mg Oral Daily    atorvastatin (LIPITOR) tablet 40 mg  40 mg Oral Daily With Dinner    clopidogrel (PLAVIX) tablet 75 mg  75 mg Oral Daily    finasteride (PROSCAR) tablet 5 mg  5 mg Oral Daily    insulin lispro (HumaLOG) 100 units/mL subcutaneous injection 1-5 Units  1-5 Units Subcutaneous TID AC    insulin lispro (HumaLOG) 100 units/mL subcutaneous injection 1-5 Units  1-5 Units Subcutaneous HS    levothyroxine tablet 88 mcg  88 mcg Oral Early Morning    metoprolol tartrate (LOPRESSOR) partial tablet 12 5 mg  12 5 mg Oral BID    ondansetron (ZOFRAN) injection 4 mg  4 mg Intravenous Q6H PRN    pantoprazole (PROTONIX) EC tablet 40 mg  40 mg Oral BID AC    sitaGLIPtin (JANUVIA) tablet 50 mg  50 mg Oral Daily    tamsulosin (FLOMAX) capsule 0 4 mg  0 4 mg Oral Daily     Prescriptions Prior to Admission   Medication    aspirin 81 MG tablet    clopidogrel (PLAVIX) 75 mg tablet    finasteride (PROSCAR) 5 mg tablet    glipiZIDE (GLUCOTROL) 5 mg tablet    levothyroxine 88 mcg tablet    metFORMIN (GLUCOPHAGE) 1000 MG tablet    metoprolol tartrate (LOPRESSOR) 25 mg tablet    rosuvastatin (CRESTOR) 20 MG tablet    tamsulosin (FLOMAX) 0 4 mg    pioglitazone (ACTOS) 30 mg tablet       Assessment/Plan:  1   History of CAD/PCI:    -Recent stress test done at [facility] last week showed large fixed defect but no ischemia, EF 26%     -Aspirin and plavix have been restarted  Continue beta-blocker     -LFTs normalized, statin restarted      2   Anemia, heme-positive stool:    -Aspirin and plavix have been restarted  Management per hospitalist Medicine  Adline Purchase workup unremarkable      3   Elevated LFTs: Management per hospitalist Medicine  Adline Purchase following      4   Chronic systolic CHF, cardiomyopathy:  EF per stress test last week 26%   Patient has ICD   Euvolemic on exam  Ann Elizabeth does not take diuretics at home   Continue beta-blocker   No ACEI/ARB given elevated creatinine        5   Hypertension:  Last recorded /48  Continue present regimen  BB dose decreased      6   Hyperlipidemia:  LFTs normalized, statin restarted  Patient stable from a cardiac standpoint  Will sign off     ** Please Note: Dragon 360 Dictation voice to text software may have been used in the creation of this document   ** (2) more than 100 beats/min

## 2022-01-04 ENCOUNTER — HOSPITAL ENCOUNTER (OUTPATIENT)
Dept: RADIOLOGY | Facility: HOSPITAL | Age: 82
Discharge: HOME | End: 2022-01-04
Attending: ORTHOPAEDIC SURGERY
Payer: COMMERCIAL

## 2022-01-04 DIAGNOSIS — M19.011 PRIMARY OSTEOARTHRITIS OF RIGHT SHOULDER: ICD-10-CM

## 2022-01-04 DIAGNOSIS — M75.101 ROTATOR CUFF TEAR ARTHROPATHY OF RIGHT SHOULDER: ICD-10-CM

## 2022-01-04 DIAGNOSIS — M75.01 ADHESIVE CAPSULITIS OF RIGHT SHOULDER: ICD-10-CM

## 2022-01-04 DIAGNOSIS — M12.811 ROTATOR CUFF TEAR ARTHROPATHY OF RIGHT SHOULDER: ICD-10-CM

## 2022-01-04 DIAGNOSIS — M75.81 RIGHT ROTATOR CUFF TENDINITIS: ICD-10-CM

## 2022-01-04 PROCEDURE — 77002 NEEDLE LOCALIZATION BY XRAY: CPT

## 2022-01-04 PROCEDURE — 73200 CT UPPER EXTREMITY W/O DYE: CPT

## 2022-01-04 PROCEDURE — G1004 CDSM NDSC: HCPCS

## 2022-01-04 PROCEDURE — 23350 INJECTION FOR SHOULDER X-RAY: CPT

## 2022-01-04 RX ORDER — LIDOCAINE HYDROCHLORIDE 10 MG/ML
10 INJECTION, SOLUTION EPIDURAL; INFILTRATION; INTRACAUDAL; PERINEURAL
Status: COMPLETED | OUTPATIENT
Start: 2022-01-04 | End: 2022-01-04

## 2022-01-04 RX ADMIN — LIDOCAINE HYDROCHLORIDE 10 ML: 10 INJECTION, SOLUTION EPIDURAL; INFILTRATION; INTRACAUDAL; PERINEURAL at 13:38

## 2022-01-04 RX ADMIN — IOHEXOL 6 ML: 300 INJECTION, SOLUTION INTRAVENOUS at 13:38

## 2022-01-12 ENCOUNTER — OFFICE VISIT (OUTPATIENT)
Dept: OBGYN CLINIC | Facility: CLINIC | Age: 82
End: 2022-01-12
Payer: COMMERCIAL

## 2022-01-12 VITALS
HEIGHT: 65 IN | WEIGHT: 143.8 LBS | DIASTOLIC BLOOD PRESSURE: 63 MMHG | HEART RATE: 68 BPM | SYSTOLIC BLOOD PRESSURE: 99 MMHG | RESPIRATION RATE: 20 BRPM | BODY MASS INDEX: 23.96 KG/M2

## 2022-01-12 DIAGNOSIS — M75.01 ADHESIVE CAPSULITIS OF RIGHT SHOULDER: Primary | ICD-10-CM

## 2022-01-12 PROCEDURE — 99214 OFFICE O/P EST MOD 30 MIN: CPT | Performed by: ORTHOPAEDIC SURGERY

## 2022-01-12 PROCEDURE — 20610 DRAIN/INJ JOINT/BURSA W/O US: CPT | Performed by: ORTHOPAEDIC SURGERY

## 2022-01-12 RX ADMIN — METHYLPREDNISOLONE ACETATE 2 ML: 40 INJECTION, SUSPENSION INTRA-ARTICULAR; INTRALESIONAL; INTRAMUSCULAR; SOFT TISSUE at 12:07

## 2022-01-12 RX ADMIN — LIDOCAINE HYDROCHLORIDE 2 ML: 10 INJECTION, SOLUTION INFILTRATION; PERINEURAL at 12:07

## 2022-01-12 RX ADMIN — BUPIVACAINE HYDROCHLORIDE 2 ML: 2.5 INJECTION, SOLUTION INFILTRATION; PERINEURAL at 12:07

## 2022-01-12 NOTE — PROGRESS NOTES
SUBJECTIVE  Right shoulder pain  Patient has been treating for adhesive capsulitis and suspected chronic rotator cuff tear of the right shoulder  He was able to obtain a CT scan of the right shoulder with contrast   He states he still has difficulty with range of motion of the shoulder  He also notes pain over the top of the shoulder  He denies any numbness or tingling        ROS:   General: No fever, no chills, no weight loss, no weight gain  HEENT:  No loss of hearing, no nose bleeds, no sore throat  Eyes:  No eye pain, no red eyes, no visual disturbance  Respiratory:  No cough, no shortness of breath, no wheezing  Cardiovascular:  No chest pain, no palpitations, no edema  GI: No abdominal pain, no nausea, no vomiting  Endocrine: No frequent urination, no excessive thirst  Urinary:  No dysuria, no hematuria, no incontinence  Musculoskeletal: see HPI and PE  Skin:  No rash, no wounds  Neurological:  No dizziness, no headache, no numbness  Psychiatric:  No difficulty concentrating, no depression, no suicide thoughts, no anxiety  Review of all other systems is negative    PMH:  Past Medical History:   Diagnosis Date    Acquired cyst of kidney     Anemia     Benign paroxysmal vertigo, unspecified ear     Cellulitis of leg     Cervical spinal stenosis     Chest pain     Coronary atherosclerosis of native coronary artery     Enlarged prostate     Esophageal candidiasis (HCC)     Hematuria     Herpes zoster     Hyperlipidemia     Hypertension     Incomplete emptying of bladder     Inflamed seborrheic keratosis     Internal hemorrhoids     Malignant neoplasm of skin of trunk     Myocardial infarction ([facility] )     Nonmelanoma skin cancer     LAST ASSESSED: 8/9/17    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 infarction     Paroxysmal tachycardia (HCC)     Shortness of breath     Vitamin B deficiency        PSH:  Past Surgical History:   Procedure Laterality Date    CARDIAC DEFIBRILLATOR PLACEMENT      COLONOSCOPY 10/07/2008    FIBEROPTIC SCREENING; NO FURTHER RECOMMENDATIONS    CORONARY ANGIOPLASTY WITH STENT PLACEMENT      CORONARY ANGIOPLASTY WITH STENT PLACEMENT      CYSTOSCOPY  11/06/2015    DIAGNOSTIC; MANAGED BY: Watson Escalera    EGD AND COLONOSCOPY N/A 2/12/2018    Procedure: EGD AND COLONOSCOPY;  Surgeon: Froy Richardson MD;  Location: [facility];   Service: Gastroenterology    FL INJECTION RIGHT SHOULDER (ARTHROGRAM)  1/4/2022    HIP ARTHROPLASTY Right     INSERT / REPLACE / REMOVE PACEMAKER      JOINT REPLACEMENT Right     TRUNK SKIN LESION EXCISIONAL BIOPSY  08/22/2007    MALIGNANT; BCC CHEST       Medications:  Current Outpatient Medications   Medication Sig Dispense Refill    aspirin 81 MG tablet Take 1 tablet by mouth daily      Canagliflozin (Invokana) 300 MG TABS Take 1 tablet (300 mg total) by mouth daily before breakfast 100 tablet 1    cyclobenzaprine (FLEXERIL) 10 mg tablet Take 1 tablet (10 mg total) by mouth 3 (three) times a day as needed for muscle spasms 30 tablet 1    finasteride (PROSCAR) 5 mg tablet Take 1 tablet (5 mg total) by mouth daily 90 tablet 3    glipiZIDE (GLUCOTROL) 5 mg tablet Take 1 tablet (5 mg total) by mouth 2 (two) times a day 180 tablet 3    HumaLOG Albert KwikPen 100 units/mL injection pen Inject 20 Units under the skin 2 (two) times a day with meals 15 pen 3    Insulin Pen Needle (Pen Needles) 32G X 4 MM MISC Check blood glucose at home AC and HS 4 times a day 200 each 3    Lantus SoloStar 100 units/mL injection pen INJECT 26 UNITS UNDER THE SKIN DAILY 15 mL 1    levothyroxine 88 mcg tablet Take 1 tablet (88 mcg total) by mouth daily 90 tablet 3    metoprolol succinate (TOPROL-XL) 50 mg 24 hr tablet TAKE 1 TABLET BY MOUTH EVERY DAY 90 tablet 2    rosuvastatin (CRESTOR) 20 MG tablet Take 1 tablet (20 mg total) by mouth daily 90 tablet 3    tamsulosin (FLOMAX) 0 4 mg Take 1 capsule (0 4 mg total) by mouth daily 90 capsule 3     No current facility-administered medications for this visit  Allergies: Allergies   Allergen Reactions    Atorvastatin Other (See Comments)     Pt unsure      Percocet [Oxycodone-Acetaminophen] Nausea Only and GI Intolerance       Family History:  Family History   Problem Relation Age of Onset    Heart disease Mother     Coronary artery disease Mother     Sudden death Mother     Cancer Father         throat; MALIGNANT NEOPLASM OF HEAD, FACE OR NECK    Throat cancer Father     Cancer Family     Coronary artery disease Family        Social History:  Social History     Occupational History    Occupation: RETIRED   Tobacco Use    Smoking status: Former Smoker     Packs/day: 1 00     Years: 10      Pack years: 10 00     Types: Cigarettes     Quit date: 1978     Years since quittin 9    Smokeless tobacco: Never Used   Vaping Use    Vaping Use: Never used   Substance and Sexual Activity    Alcohol use: Yes     Comment: occasionally 1-2 per month     Drug use: No    Sexual activity: Not Currently     Partners: Female       Physical Exam:  General :  Alert, cooperative, no distress, appears stated age  Blood pressure 99/63, pulse 68, resp  rate 20, height 5' 5" (1 651 m), weight 65 2 kg (143 lb 12 8 oz)  Head:  Normocephalic, without obvious abnormality, atraumatic   Eyes:  Conjunctiva/corneas clear, EOM's intact,   Ears: Both ears normal appearance, no hearing deficits      Nose: Nares normal, septum midline, no drainage    Neck: Supple,  trachea midline, no adenopathy, no tenderness, no mass   Back:   Symmetric, no curvature, ROM normal, no tenderness   Lungs:   Respirations unlabored   Chest Wall:  No tenderness or deformity   Extremities: Extremities normal, atraumatic, no cyanosis or edema      Pulses: 2+ and symmetric   Skin: Skin color, texture, turgor normal, no rashes or lesions      Neurologic: Normal           Right Shoulder Exam     Other   Pulse: present    Comments:  Passive motion 90° forward flexion   90° abduction   Minimal external rotation     Passive greater than active range of motion            Imaging Studies: The following imaging studies were reviewed in office today  My findings are noted  Radiologist's read of the CT arthrogram of the right shoulder was a near full-thickness tear at the anterior leading edge of the supraspinatus  Per our interpretation, there is no full-thickness tear, signal seen at the articular images are more consistent with bone rather than fluid  Assessment  Encounter Diagnosis   Name Primary?  Adhesive capsulitis of right shoulder Yes         Plan:    [de-identified] yo male with right shoulder osteoarthritis, adhesive capsulitis  · CT arthrogram was reviewed with the patient  It was discussed that given his physical exam findings as well that there is low likelihood for a rotator cuff tear  · He was advised to continue physical therapy at this time   · He was given a cortisone injection today into the right shoulder of which he tolerated well   · He will follow up in 2 months for re-evaluation  Large joint arthrocentesis: R subacromial bursa  Universal Protocol:  Consent: Verbal consent obtained  Risks and benefits: risks, benefits and alternatives were discussed  Consent given by: patient  Time out: Immediately prior to procedure a "time out" was called to verify the correct patient, procedure, equipment, support staff and site/side marked as required    Patient understanding: patient states understanding of the procedure being performed  Patient consent: the patient's understanding of the procedure matches consent given  Site marked: the operative site was marked  Patient identity confirmed: verbally with patient    Supporting Documentation  Indications: pain   Procedure Details  Location: shoulder - R subacromial bursa  Preparation: Patient was prepped and draped in the usual sterile fashion  Needle size: 22 G  Approach: posterior  Medications administered: 2 mL bupivacaine 0 25 %; 2 mL lidocaine 1 %; 2 mL methylPREDNISolone acetate 40 mg/mL    Patient tolerance: patient tolerated the procedure well with no immediate complications  Dressing:  Sterile dressing applied            Scribe Attestation    I,:  Gemini Craig PA-C am acting as a scribe while in the presence of the attending physician :       I,:  Laverne Ridley MD personally performed the services described in this documentation    as scribed in my presence :

## 2022-01-24 DIAGNOSIS — D64.9 ANEMIA, UNSPECIFIED TYPE: ICD-10-CM

## 2022-01-24 DIAGNOSIS — R35.0 BENIGN PROSTATIC HYPERPLASIA WITH URINARY FREQUENCY: ICD-10-CM

## 2022-01-24 DIAGNOSIS — N40.1 BENIGN PROSTATIC HYPERPLASIA WITH URINARY FREQUENCY: ICD-10-CM

## 2022-01-24 DIAGNOSIS — E11.9 TYPE 2 DIABETES MELLITUS WITHOUT COMPLICATION, WITHOUT LONG-TERM CURRENT USE OF INSULIN (HCC): ICD-10-CM

## 2022-01-24 RX ORDER — LEVOTHYROXINE SODIUM 88 UG/1
88 TABLET ORAL DAILY
Qty: 90 TABLET | Refills: 3 | Status: SHIPPED | OUTPATIENT
Start: 2022-01-24 | End: 2022-05-03 | Stop reason: SDUPTHER

## 2022-01-24 RX ORDER — FINASTERIDE 5 MG/1
5 TABLET, FILM COATED ORAL DAILY
Qty: 90 TABLET | Refills: 3 | Status: SHIPPED | OUTPATIENT
Start: 2022-01-24 | End: 2022-05-03 | Stop reason: SDUPTHER

## 2022-01-24 RX ORDER — TAMSULOSIN HYDROCHLORIDE 0.4 MG/1
0.4 CAPSULE ORAL DAILY
Qty: 90 CAPSULE | Refills: 3 | Status: SHIPPED | OUTPATIENT
Start: 2022-01-24 | End: 2022-05-03 | Stop reason: SDUPTHER

## 2022-01-25 ENCOUNTER — EVALUATION (OUTPATIENT)
Dept: PHYSICAL THERAPY | Facility: CLINIC | Age: 82
End: 2022-01-25
Payer: COMMERCIAL

## 2022-01-25 DIAGNOSIS — I50.22 CHRONIC SYSTOLIC CONGESTIVE HEART FAILURE (HCC): ICD-10-CM

## 2022-01-25 DIAGNOSIS — M75.01 ADHESIVE CAPSULITIS OF RIGHT SHOULDER: Primary | ICD-10-CM

## 2022-01-25 PROCEDURE — 97162 PT EVAL MOD COMPLEX 30 MIN: CPT | Performed by: PHYSICAL THERAPIST

## 2022-01-25 PROCEDURE — 97110 THERAPEUTIC EXERCISES: CPT | Performed by: PHYSICAL THERAPIST

## 2022-01-25 RX ORDER — METOPROLOL SUCCINATE 50 MG/1
50 TABLET, EXTENDED RELEASE ORAL DAILY
Qty: 90 TABLET | Refills: 2 | Status: SHIPPED | OUTPATIENT
Start: 2022-01-25 | End: 2022-02-02 | Stop reason: SDUPTHER

## 2022-01-25 NOTE — PROGRESS NOTES
PT Evaluation     Today's date: 2022  Patient name: Chani Pappas  :   MRN: [id number]  Referring provider: Aida Dodge  Dx:   Encounter Diagnosis     ICD-10-CM    1  Adhesive capsulitis of right shoulder  M75 01 Ambulatory Referral to Physical Therapy       Start Time: 1225  Stop Time: 1255  Total time in clinic (min): 30 minutes    Assessment  Assessment details: Pt is an [de-identified] y/o male presenting to physical therapy with chief complaint of chronic R shoulder pain, which has improved somewhat from an injection about 3 weeks ago  He presents with significantly limited R shoulder A/PROM, with flex and abd PROM being less than AROM  His loss of motion is also in a capsular pattern, consistent with diagnosis of frozen shoulder  His CT also showed a significant supraspinatus tear, which likely lead to his immobility and frozen shoulder  Pt would benefit from physical therapy in order to improve A/PROM, pain, and function in order to reach maximal therapeutic potential    Impairments: abnormal gait, abnormal or restricted ROM, activity intolerance, impaired balance, impaired physical strength, lacks appropriate home exercise program and pain with function  Functional limitations: reaching, carrying, lifting  Symptom irritability: moderateUnderstanding of Dx/Px/POC: good   Prognosis: fair    Goals  STG: 3 weeks  1  Pt will demonstrate independence with HEP  2  Pt will improve R shoulder AROM by at least 10%  3  Pt will improve R shoulder strength by at least 1/2 grade  4  Pt will report pain no more than 5/10  5  Pt will have minimal TTP over R supraspinatus insertion    LT weeks  1  Pt will be able to reach [place] with less pain  2  Pt will not require assistance with dressing  3   Pt will report improvement in pain no more than 2/10    Plan  Patient would benefit from: skilled physical therapy  Planned modality interventions: cryotherapy and thermotherapy: hydrocollator packs  Planned therapy interventions: therapeutic exercise, therapeutic activities, stretching, strengthening, patient education, neuromuscular re-education, massage, manual therapy, balance, gait training and home exercise program  Frequency: 2x week  Duration in weeks: 6  Treatment plan discussed with: patient        Subjective Evaluation    History of Present Illness  Mechanism of injury: Pt had an injection in the R shoulder about 3 weeks ago, which he feels helped in the beginning but now is starting to wear off  He reports he got a CT of his R shoulder and the doctor told him it was mainly arthritis  He cannot reach [place], picking up objects, getting dressed, combing his hair, and tying his shoes  Recurrent probem    Quality of life: fair    Pain  At best pain ratin  At worst pain ratin  Quality: sharp  Relieving factors: ice  Aggravating factors: overhead activity and lifting  Progression: improved    Treatments  Previous treatment: physical therapy and injection treatment  Patient Goals  Patient goals for therapy: increased strength, independence with ADLs/IADLs, decreased pain and increased motion          Objective     Tenderness     Right Shoulder  Tenderness in the [facility] joint and supraspinatus tendon  Active Range of Motion     Right Shoulder   Flexion: 97 degrees with pain  Abduction: 89 degrees with pain  External rotation 0°: -10 degrees with pain    Passive Range of Motion     Right Shoulder   Flexion: 90 degrees with pain  Abduction: 86 degrees with pain  External rotation 45°: 14 degrees with pain    Strength/Myotome Testing     Right Shoulder     Planes of Motion   Flexion: 3+   Abduction: 3+   External rotation at 0°: 3+   External rotation BTH: 3+     Additional Strength Details  In available range             Precautions: has defibrillator, HTN, HLD, hx of MI    Access Code: [email or web address]  URL: [email or web address]         Manuals             R shoulder PROM Neuro Re-Ed             Scap ret             Cervical ret                                                                              Ther Ex             Pulleys             Wall slides HEP            Hi-lo walkaway seated HEP            Cane flex/abd/er                                                                 Ther Activity                                       Gait Training                                       Modalities

## 2022-01-28 ENCOUNTER — OFFICE VISIT (OUTPATIENT)
Dept: PHYSICAL THERAPY | Facility: CLINIC | Age: 82
End: 2022-01-28
Payer: COMMERCIAL

## 2022-01-28 ENCOUNTER — RA CDI HCC (OUTPATIENT)
Dept: OTHER | Facility: HOSPITAL | Age: 82
End: 2022-01-28

## 2022-01-28 DIAGNOSIS — M75.01 ADHESIVE CAPSULITIS OF RIGHT SHOULDER: Primary | ICD-10-CM

## 2022-01-28 PROCEDURE — 97112 NEUROMUSCULAR REEDUCATION: CPT

## 2022-01-28 PROCEDURE — 97140 MANUAL THERAPY 1/> REGIONS: CPT

## 2022-01-28 PROCEDURE — 97110 THERAPEUTIC EXERCISES: CPT

## 2022-01-28 NOTE — PROGRESS NOTES
Daily Note     Today's date: 2022  Patient name: Segundo Martínez  :   MRN: [id number]  Referring provider: Ginette Saint  Dx:   Encounter Diagnosis     ICD-10-CM    1  Adhesive capsulitis of right shoulder  M75 01                   Subjective: Pt notes his shoulder is a little sore today  He reports compliance with HEP  Objective: See treatment diary below      Assessment: Tolerated treatment fair  Cuing not to hold breath and decrease mm contraction by about 50% during scap retractions  Pt also with slight posterior trunk lean as compensation during retractions  Good form with remainder of TE following VCs/TCs  PROM most limited in ER with empty end feel  Painful end feel with flex and abd  Pt demonstrated fatigue post treatment and would benefit from continued skilled treatment to improve shoulder ROM and strength, increasing function with ADLs  Plan: Continue per plan of care  Precautions: has defibrillator, HTN, HLD, hx of MI    Access Code: [email or web address]  URL: [email or web address]         Manuals            R shoulder PROM  12'                                                  Neuro Re-Ed             Scap ret  5"x10           Cervical ret  5"x10                                                                            Ther Ex             Pulleys  5"x20           Wall slides HEP 5"x10           Hi-lo walkaway seated HEP 10"x10           Cane flex/abd/er  5"x10 ea                                                               Ther Activity                                       Gait Training                                       Modalities

## 2022-01-29 NOTE — PROGRESS NOTES
[facility]  coding opportunities     DX: [id number]  DX: [phone]     Number of diagnosis code(s) already on the problem list added to FYI fla     Chart Reviewed * (Number of) Inbasket suggestions sent to Provider: 1                  Patients insurance company: [employer]

## 2022-01-31 ENCOUNTER — OFFICE VISIT (OUTPATIENT)
Dept: PHYSICAL THERAPY | Facility: CLINIC | Age: 82
End: 2022-01-31
Payer: COMMERCIAL

## 2022-01-31 DIAGNOSIS — M75.01 ADHESIVE CAPSULITIS OF RIGHT SHOULDER: Primary | ICD-10-CM

## 2022-01-31 PROCEDURE — 97110 THERAPEUTIC EXERCISES: CPT

## 2022-01-31 NOTE — PROGRESS NOTES
Daily Note     Today's date: 2022  Patient name: Jazmyn Bell  : [id number]  MRN: [id number]  Referring provider: Ramona Del Angel  Dx:   Encounter Diagnosis     ICD-10-CM    1  Adhesive capsulitis of right shoulder  M75 01                   Subjective:  Patient reports his R shoulder is "a little better but still sore" upon arrival       Objective: See treatment diary below      Assessment: Significant muscle guarding during PROM in all planes  Patient is unable to perform cervical retraction without performing cervical extension  He requires max A tactile cuing to perform cervical retractions  Patient did report increased R shoulder soreness post session  Plan: Continue per plan of care  Precautions: has defibrillator, HTN, HLD, hx of MI    Access Code: [email or web address]  URL: [email or web address]         Manuals           R shoulder PROM  12' 10'                                                 Neuro Re-Ed             Scap ret  5"x10 5"x20          Cervical ret  5"x10 unable                                                                           Ther Ex             Pulleys  5"x20 3'          Wall slides HEP 5"x10 5"x10          Hi-lo walkaway seated HEP 10"x10           Cane flex/abd/er  5"x10 ea 10"x10          pball roll out   x20                                                 Ther Activity                                       Gait Training                                       Modalities

## 2022-02-02 ENCOUNTER — APPOINTMENT (OUTPATIENT)
Dept: LAB | Facility: CLINIC | Age: 82
End: 2022-02-02
Payer: COMMERCIAL

## 2022-02-02 ENCOUNTER — TELEPHONE (OUTPATIENT)
Dept: ADMINISTRATIVE | Facility: OTHER | Age: 82
End: 2022-02-02

## 2022-02-02 ENCOUNTER — OFFICE VISIT (OUTPATIENT)
Dept: INTERNAL MEDICINE CLINIC | Facility: CLINIC | Age: 82
End: 2022-02-02
Payer: COMMERCIAL

## 2022-02-02 VITALS
WEIGHT: 244.2 LBS | TEMPERATURE: 98.4 F | HEIGHT: 65 IN | OXYGEN SATURATION: 99 % | SYSTOLIC BLOOD PRESSURE: 124 MMHG | BODY MASS INDEX: 40.69 KG/M2 | DIASTOLIC BLOOD PRESSURE: 60 MMHG | HEART RATE: 70 BPM

## 2022-02-02 DIAGNOSIS — Z79.4 TYPE 2 DIABETES MELLITUS WITH HYPERGLYCEMIA, WITH LONG-TERM CURRENT USE OF INSULIN (HCC): ICD-10-CM

## 2022-02-02 DIAGNOSIS — I50.22 CHRONIC SYSTOLIC CONGESTIVE HEART FAILURE (HCC): ICD-10-CM

## 2022-02-02 DIAGNOSIS — E11.9 TYPE 2 DIABETES MELLITUS WITHOUT COMPLICATION, WITHOUT LONG-TERM CURRENT USE OF INSULIN (HCC): Primary | ICD-10-CM

## 2022-02-02 DIAGNOSIS — E11.9 TYPE 2 DIABETES MELLITUS WITHOUT COMPLICATION, WITHOUT LONG-TERM CURRENT USE OF INSULIN (HCC): ICD-10-CM

## 2022-02-02 DIAGNOSIS — E11.65 TYPE 2 DIABETES MELLITUS WITH HYPERGLYCEMIA, WITH LONG-TERM CURRENT USE OF INSULIN (HCC): ICD-10-CM

## 2022-02-02 DIAGNOSIS — J44.9 CHRONIC OBSTRUCTIVE PULMONARY DISEASE, UNSPECIFIED COPD TYPE (HCC): ICD-10-CM

## 2022-02-02 LAB
ALBUMIN SERPL BCP-MCNC: 4 G/DL (ref 3.5–5)
ALP SERPL-CCNC: 57 U/L (ref 46–116)
ALT SERPL W P-5'-P-CCNC: 31 U/L (ref 12–78)
ANION GAP SERPL CALCULATED.3IONS-SCNC: 4 MMOL/L (ref 4–13)
AST SERPL W P-5'-P-CCNC: 22 U/L (ref 5–45)
BASOPHILS # BLD AUTO: 0.04 THOUSANDS/ΜL (ref 0–0.1)
BASOPHILS NFR BLD AUTO: 1 % (ref 0–1)
BILIRUB SERPL-MCNC: 1.08 MG/DL (ref 0.2–1)
BUN SERPL-MCNC: 15 MG/DL (ref 5–25)
CALCIUM SERPL-MCNC: 9.2 MG/DL (ref 8.3–10.1)
CHLORIDE SERPL-SCNC: 106 MMOL/L (ref 100–108)
CHOLEST SERPL-MCNC: 150 MG/DL
CO2 SERPL-SCNC: 28 MMOL/L (ref 21–32)
CREAT SERPL-MCNC: 1.14 MG/DL (ref 0.6–1.3)
EOSINOPHIL # BLD AUTO: 0.09 THOUSAND/ΜL (ref 0–0.61)
EOSINOPHIL NFR BLD AUTO: 2 % (ref 0–6)
ERYTHROCYTE [DISTWIDTH] IN BLOOD BY AUTOMATED COUNT: 16.8 % (ref 11.6–15.1)
EST. AVERAGE GLUCOSE BLD GHB EST-MCNC: 189 MG/DL
GFR SERPL CREATININE-BSD FRML MDRD: 60 ML/MIN/1.73SQ M
GLUCOSE P FAST SERPL-MCNC: 243 MG/DL (ref 65–99)
HBA1C MFR BLD: 8.2 %
HCT VFR BLD AUTO: 42.3 % (ref 36.5–49.3)
HDLC SERPL-MCNC: 52 MG/DL
HGB BLD-MCNC: 14.3 G/DL (ref 12–17)
IMM GRANULOCYTES # BLD AUTO: 0.02 THOUSAND/UL (ref 0–0.2)
IMM GRANULOCYTES NFR BLD AUTO: 0 % (ref 0–2)
LDLC SERPL CALC-MCNC: 81 MG/DL (ref 0–100)
LYMPHOCYTES # BLD AUTO: 1.26 THOUSANDS/ΜL (ref 0.6–4.47)
LYMPHOCYTES NFR BLD AUTO: 21 % (ref 14–44)
MCH RBC QN AUTO: 37.8 PG (ref 26.8–34.3)
MCHC RBC AUTO-ENTMCNC: 33.8 G/DL (ref 31.4–37.4)
MCV RBC AUTO: 112 FL (ref 82–98)
MONOCYTES # BLD AUTO: 0.5 THOUSAND/ΜL (ref 0.17–1.22)
MONOCYTES NFR BLD AUTO: 8 % (ref 4–12)
NEUTROPHILS # BLD AUTO: 4.05 THOUSANDS/ΜL (ref 1.85–7.62)
NEUTS SEG NFR BLD AUTO: 68 % (ref 43–75)
NRBC BLD AUTO-RTO: 0 /100 WBCS
PLATELET # BLD AUTO: 199 THOUSANDS/UL (ref 149–390)
PMV BLD AUTO: 10.3 FL (ref 8.9–12.7)
POTASSIUM SERPL-SCNC: 4.7 MMOL/L (ref 3.5–5.3)
PROT SERPL-MCNC: 7.5 G/DL (ref 6.4–8.2)
RBC # BLD AUTO: 3.78 MILLION/UL (ref 3.88–5.62)
SL AMB POCT HEMOGLOBIN AIC: 8.1 (ref ?–6.5)
SODIUM SERPL-SCNC: 138 MMOL/L (ref 136–145)
T4 FREE SERPL-MCNC: 1.38 NG/DL (ref 0.76–1.46)
TRIGL SERPL-MCNC: 83 MG/DL
TSH SERPL DL<=0.05 MIU/L-ACNC: 0.02 UIU/ML (ref 0.36–3.74)
WBC # BLD AUTO: 5.96 THOUSAND/UL (ref 4.31–10.16)

## 2022-02-02 PROCEDURE — 85025 COMPLETE CBC W/AUTO DIFF WBC: CPT

## 2022-02-02 PROCEDURE — 99214 OFFICE O/P EST MOD 30 MIN: CPT | Performed by: INTERNAL MEDICINE

## 2022-02-02 PROCEDURE — 84439 ASSAY OF FREE THYROXINE: CPT

## 2022-02-02 PROCEDURE — 84443 ASSAY THYROID STIM HORMONE: CPT

## 2022-02-02 PROCEDURE — 80053 COMPREHEN METABOLIC PANEL: CPT

## 2022-02-02 PROCEDURE — 83036 HEMOGLOBIN GLYCOSYLATED A1C: CPT | Performed by: INTERNAL MEDICINE

## 2022-02-02 PROCEDURE — 80061 LIPID PANEL: CPT

## 2022-02-02 PROCEDURE — 1160F RVW MEDS BY RX/DR IN RCRD: CPT | Performed by: INTERNAL MEDICINE

## 2022-02-02 PROCEDURE — 1036F TOBACCO NON-USER: CPT | Performed by: INTERNAL MEDICINE

## 2022-02-02 PROCEDURE — 83036 HEMOGLOBIN GLYCOSYLATED A1C: CPT

## 2022-02-02 PROCEDURE — 36415 COLL VENOUS BLD VENIPUNCTURE: CPT

## 2022-02-02 RX ORDER — FLASH GLUCOSE SENSOR
KIT MISCELLANEOUS
Qty: 6 EACH | Refills: 3 | Status: SHIPPED | OUTPATIENT
Start: 2022-02-02

## 2022-02-02 RX ORDER — FLASH GLUCOSE SCANNING READER
EACH MISCELLANEOUS
Qty: 1 EACH | Refills: 0 | Status: SHIPPED | OUTPATIENT
Start: 2022-02-02

## 2022-02-02 RX ORDER — METOPROLOL SUCCINATE 50 MG/1
50 TABLET, EXTENDED RELEASE ORAL DAILY
Qty: 90 TABLET | Refills: 2 | Status: SHIPPED | OUTPATIENT
Start: 2022-02-02 | End: 2022-05-03 | Stop reason: SDUPTHER

## 2022-02-02 NOTE — PROGRESS NOTES
Assessment/Plan:       Diagnoses and all orders for this visit:    Type 2 diabetes mellitus without complication, without long-term current use of insulin (HCC)  -     IRIS Diabetic eye exam  -     POCT hemoglobin A1c  -     Continuous Blood Gluc Sensor (FreeStyle Maris 14 Day Sensor) MISC; Continuous monitering for pt on insulin  -     Continuous Blood Gluc  (FreeStyle Maris 14 Day [place]) ANTONELLA; Continuous monitering for pt on insulin  -     Lipid Panel with Direct LDL reflex; Future  -     CBC and differential; Future  -     Comprehensive metabolic panel; Future  -     TSH, 3rd generation with Free T4 reflex; Future  -     UA (URINE) with reflex to Scope  -     Microalbumin / creatinine urine ratio    Chronic systolic congestive heart failure (HCC)  -     metoprolol succinate (TOPROL-XL) 50 mg 24 hr tablet; Take 1 tablet (50 mg total) by mouth daily    Chronic obstructive pulmonary disease, unspecified COPD type ([facility] )                Subjective:      Patient ID: Susan Mendez is a [de-identified] y o  male  27-year-old male will multiple problems  Dabetic with poor control Hgb A1C 8 1    Chronic systolic heart failure limits choice of medication  Symptom of dyspnea on exertion noted      Chronic problems of hyperlipidemia, hypertension, coronary artery disease status post angioplasty, systolic heart failure  Recent ejection fraction of 25%  He continues to have chronic symptomatology regarding his low EF  He has exertional dyspnea and has a sensation of fatigue on exertion  However, this is at a baseline status with no recent decompensation and his examination is normal with clear breath sounds and no edema      Anemia noted  Hemoglobin between had been 8 and 10  Heme-positive stool  EGD was done documenting severe gastritis and he was placed on PPI double dose  Capsule enteroscopy follow-up reported by the patient    He was told was normal  Colonoscopy reported to be normal At the same time, MCV elevation of 111 so there may be a component of B12 deficiency or myelodysplasia  The most recent hemoglobin was up to 12 3 again with the macrocytosis   For recheck today      Right shoulder pain exacerbated by motion and relieved by rest is chronic      Urinary retention with resolution:  He had been treated for this about   5 years ago  He had an indwelling Mckeon catheter for number of months but it has finally been removed  Mejia Kind with Urology  Working diagnosis is urinary retention secondary to prostatic hyperplasia  No recurrene of the retention     The patient has osteoarthritis and he is complaining particularly of his fingers  He has both [place] and Rupesh's nodes of all his fingers  Additionally, he has a tendon flexion problem with the 4th tendon of the right hand and has some contraction of that finger    Sees urology for BPH    Sees ophthalmology; told he has cataract             The following portions of the patient's history were reviewed and updated as appropriate:   He has a past medical history of Acquired cyst of kidney, Anemia, Benign paroxysmal vertigo, unspecified ear, Cellulitis of leg, Cervical spinal stenosis, Chest pain, Coronary atherosclerosis of native coronary artery, Enlarged prostate, Esophageal candidiasis ([address] ), Hematuria, Herpes zoster, Hyperlipidemia, Hypertension, Incomplete emptying of bladder, Inflamed seborrheic keratosis, Internal hemorrhoids, Malignant neoplasm of skin of trunk, Myocardial infarction ([address] ), Nonmelanoma skin cancer, 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 infarction, Paroxysmal tachycardia ([address] ), Shortness of breath, and Vitamin B deficiency  ,  does not have any pertinent problems on file  ,   has a past surgical history that includes Hip Arthroplasty (Right); Coronary angioplasty with stent; Joint replacement (Right); Insert / replace / remove pacemaker; EGD AND COLONOSCOPY (N/A, 2/12/2018); Colonoscopy (10/07/2008); Cystoscopy (11/06/2015);  Trunk skin lesion excisional biopsy (08/22/2007); Cardiac defibrillator placement; Coronary angioplasty with stent; and FL injection right shoulder (arthrogram) (1/4/2022)  ,  family history includes Cancer in his family and father; Coronary artery disease in his family and mother; Heart disease in his mother; Sudden death in his mother; Throat cancer in his father  ,   reports that he quit smoking about 43 years ago  His smoking use included cigarettes  He has a 10 00 pack-year smoking history  He has never used smokeless tobacco  He reports current alcohol use  He reports that he does not use drugs  ,  is allergic to atorvastatin and percocet [oxycodone-acetaminophen]     Current Outpatient Medications   Medication Sig Dispense Refill    aspirin 81 MG tablet Take 1 tablet by mouth daily      Canagliflozin (Invokana) 300 MG TABS Take 1 tablet (300 mg total) by mouth daily before breakfast 100 tablet 1    cyclobenzaprine (FLEXERIL) 10 mg tablet Take 1 tablet (10 mg total) by mouth 3 (three) times a day as needed for muscle spasms 30 tablet 1    finasteride (PROSCAR) 5 mg tablet Take 1 tablet (5 mg total) by mouth daily 90 tablet 3    glipiZIDE (GLUCOTROL) 5 mg tablet Take 1 tablet (5 mg total) by mouth 2 (two) times a day 180 tablet 3    HumaLOG Albert KwikPen 100 units/mL injection pen Inject 20 Units under the skin 2 (two) times a day with meals 15 pen 3    Insulin Pen Needle (Pen Needles) 32G X 4 MM MISC Check blood glucose at home AC and HS 4 times a day 200 each 3    Lantus SoloStar 100 units/mL injection pen INJECT 26 UNITS UNDER THE SKIN DAILY 15 mL 1    levothyroxine 88 mcg tablet Take 1 tablet (88 mcg total) by mouth daily 90 tablet 3    metoprolol succinate (TOPROL-XL) 50 mg 24 hr tablet Take 1 tablet (50 mg total) by mouth daily 90 tablet 2    rosuvastatin (CRESTOR) 20 MG tablet Take 1 tablet (20 mg total) by mouth daily 90 tablet 3    tamsulosin (FLOMAX) 0 4 mg Take 1 capsule (0 4 mg total) by mouth daily 90 capsule 3    Continuous Blood Gluc  (FreeStyle Maris 14 Day [place]) ANTONELLA Continuous monitering for pt on insulin 1 each 0    Continuous Blood Gluc Sensor (FreeStyle Maris 14 Day Sensor) MISC Continuous monitering for pt on insulin 6 each 3     No current facility-administered medications for this visit  Review of Systems   Constitutional: Positive for fatigue  Respiratory: Positive for shortness of breath  Musculoskeletal: Positive for arthralgias  All other systems reviewed and are negative  Objective:  Vitals:    02/02/22 0947   BP: 124/60   Pulse: 70   Temp: 98 4 °F (36 9 °C)   SpO2: 99%      Physical Exam  Constitutional:       General: He is not in acute distress  Appearance: He is not toxic-appearing or diaphoretic  Comments: An elderly patient who appears to be stated age  Not in any distress  Cardiovascular:      Rate and Rhythm: Normal rate  Pulses: Normal pulses  Pulmonary:      Effort: Pulmonary effort is normal    Neurological:      General: No focal deficit present  Mental Status: He is alert  Psychiatric:         Mood and Affect: Mood normal          Behavior: Behavior normal            Patient Instructions    Type 2 diabetes with mediocre control   1st step is to increase the Lantus to 30 units  I would like to see with a Maris monitor; I believe this is indicated and a diabetic with multiple injections particularly to avoid hypoglycemia     Laboratory parameters regarding kidney and liver  Revisit in about 1 month

## 2022-02-02 NOTE — PROGRESS NOTES
Diabetic Foot Exam    Patient's shoes and socks removed  Right Foot/Ankle   Right Foot Inspection  Skin Exam: skin intact  Sensory   Monofilament testing: diminished    Vascular  The right DP pulse is 1+  The right PT pulse is 2+  Left Foot/Ankle  Left Foot Inspection  Skin Exam: skin intact  Sensory   Monofilament testing: diminished    Vascular  The left DP pulse is 1+  The left PT pulse is 2+       Assign Risk Category  No deformity present  Loss of protective sensation  No weak pulses  Risk: 1

## 2022-02-02 NOTE — PATIENT INSTRUCTIONS
Type 2 diabetes with mediocre control   1st step is to increase the Lantus to 30 units  I would like to see with a Maris monitor; I believe this is indicated and a diabetic with multiple injections particularly to avoid hypoglycemia     Laboratory parameters regarding kidney and liver  Revisit in about 1 month

## 2022-02-02 NOTE — TELEPHONE ENCOUNTER
Upon review of the In Basket request we were able to note that no further action is required  The patient chart is up to date as a result of a previous request       Any additional questions or concerns should be emailed to the Practice Liaisons via [email or web address] email, please do not reply via In Basket      Thank you  Sahara Reyes

## 2022-02-02 NOTE — TELEPHONE ENCOUNTER
----- Message from Yariel Tamayo sent at 2/2/2022 10:24 AM EST -----  Regarding: Eye Exam  02/02/22 10:25 AM    Hello, our patient Jazmyn Bell has had and Eye Exam completed/performed  Please assist in updating the patient chart by contacting [facility] in [employer], they are located at [address]  The date of service was about 6 months ago       Thank you,  Heriberto Calvin MA  [facility]  [address]

## 2022-02-07 ENCOUNTER — OFFICE VISIT (OUTPATIENT)
Dept: PHYSICAL THERAPY | Facility: CLINIC | Age: 82
End: 2022-02-07
Payer: COMMERCIAL

## 2022-02-07 DIAGNOSIS — M75.01 ADHESIVE CAPSULITIS OF RIGHT SHOULDER: Primary | ICD-10-CM

## 2022-02-07 PROCEDURE — 97140 MANUAL THERAPY 1/> REGIONS: CPT

## 2022-02-07 PROCEDURE — 97110 THERAPEUTIC EXERCISES: CPT

## 2022-02-07 NOTE — PROGRESS NOTES
Daily Note     Today's date: 2022  Patient name: Nery Harley  : [birth year]  MRN: [id number]  Referring provider: Auther Duverney  Dx:   Encounter Diagnosis     ICD-10-CM    1  Adhesive capsulitis of right shoulder  M75 01                   Subjective: Patient reports his shoulder is feeling better than it was in , notes he is able to do more using that arm  Objective: See treatment diary below      Assessment: Tolerated treatment fair  Cont to have tightness in capsular pattern  Slight improvement in PROM following manuals  Some discomfort with all planes PROM and AAROM  Added scaption to roll outs with some discomfort felt at available end ROM  Attempted chin tucks again this visit, patient was better able to perform following VC/TC however after a few reps reverts to extension or flexion  Patient demonstrated fatigue post treatment and would benefit from continued PT      Plan: Progress treatment as tolerated  Precautions: has defibrillator, HTN, HLD, hx of MI    Access Code: [email or web address]  URL: [email or web address]         Manuals          R shoulder PROM  12' 10' 10'                                                 Neuro Re-Ed             Scap ret  5"x10 5"x20 5"x20         Cervical ret  5"x10 unable 10x                                                                          Ther Ex             Pulleys  5"x20 3' 3'         Wall slides HEP 5"x10 5"x10 5"x15         Hi-lo walkaway seated HEP 10"x10           Cane flex/abd/er  5"x10 ea 10"x10 10"x10          pball roll out   x20 x20 5" ea  Flex  /scap                                                Ther Activity                                       Gait Training                                       Modalities

## 2022-02-09 DIAGNOSIS — E11.65 TYPE 2 DIABETES MELLITUS WITH HYPERGLYCEMIA, WITH LONG-TERM CURRENT USE OF INSULIN (HCC): ICD-10-CM

## 2022-02-09 DIAGNOSIS — Z79.4 TYPE 2 DIABETES MELLITUS WITH HYPERGLYCEMIA, WITH LONG-TERM CURRENT USE OF INSULIN (HCC): ICD-10-CM

## 2022-02-09 NOTE — TELEPHONE ENCOUNTER
PT  SAID  TYLER  ORDER  HIM  A  METER  THAT  GOES  ON HIS   ARM   BUT  HIS  INSURANCE   DOESN'T PAY  FOR   THAT   SO  HE  NEEDS  A  REFILL   OF  LANTUS [address]  PEN    [facility]  [address]     ANY  QUESTIONS   Gaurav Larson    [phone]

## 2022-02-10 ENCOUNTER — REMOTE DEVICE CLINIC VISIT (OUTPATIENT)
Dept: CARDIOLOGY CLINIC | Facility: CLINIC | Age: 82
End: 2022-02-10
Payer: COMMERCIAL

## 2022-02-10 DIAGNOSIS — Z95.810 AICD (AUTOMATIC CARDIOVERTER/DEFIBRILLATOR) PRESENT: Primary | ICD-10-CM

## 2022-02-10 PROCEDURE — 93295 DEV INTERROG REMOTE 1/2/MLT: CPT | Performed by: INTERNAL MEDICINE

## 2022-02-10 PROCEDURE — 93296 REM INTERROG EVL PM/IDS: CPT | Performed by: INTERNAL MEDICINE

## 2022-02-10 RX ORDER — INSULIN GLARGINE 100 [IU]/ML
26 INJECTION, SOLUTION SUBCUTANEOUS DAILY
Qty: 15 ML | Refills: 1 | Status: SHIPPED | OUTPATIENT
Start: 2022-02-10 | End: 2022-06-14 | Stop reason: SDUPTHER

## 2022-02-10 NOTE — PROGRESS NOTES
Results for orders placed or performed in visit on 02/10/22   Cardiac EP device report    Narrative    BSC SINGLE CHAMBER ICD - NOT MRI CONDITIONAL  LATITUDE TRANSMISSION: BATTERY VOLTAGE ADEQUATE (4 5 YRS)   0%  ALL AVAILABLE LEAD PARAMETERS WITHIN NORMAL LIMITS  8 UNREPORTED VHRS, 2  VT-1,6  VT-NS EPISODES, MOST RECENT FOR NSVT W/DURATION 9 BEATS @ 194 BPM  NO THERAPIES DELIVERED  PT ON ASA 81MG, METOPROLOL SUCC  NORMAL DEVICE FUNCTION    ES

## 2022-02-11 ENCOUNTER — OFFICE VISIT (OUTPATIENT)
Dept: PHYSICAL THERAPY | Facility: CLINIC | Age: 82
End: 2022-02-11
Payer: COMMERCIAL

## 2022-02-11 DIAGNOSIS — M75.01 ADHESIVE CAPSULITIS OF RIGHT SHOULDER: Primary | ICD-10-CM

## 2022-02-11 PROCEDURE — 97110 THERAPEUTIC EXERCISES: CPT

## 2022-02-11 NOTE — PROGRESS NOTES
Daily Note     Today's date: 2022  Patient name: Tk Balderrama  :   MRN: [id number]  Referring provider: Nghia Noguera  Dx:   Encounter Diagnosis     ICD-10-CM    1  Adhesive capsulitis of right shoulder  M75 01                   Subjective: Patient denies R shoulder pain at rest   He states he still has difficulty reaching up into cabinets but he is not having difficulty carrying objects  Objective: See treatment diary below      Assessment: Muscle guarding during PROM but able to achieve firm end feel  PROM is pain free however significant ROM limitations remain  Patient is able to perform ROM exercises within self tolerance  Pt would benefit from continued PT in order to improve R shoulder ROM for improved function during ADLs  Plan: Continue per plan of care  Precautions: has defibrillator, HTN, HLD, hx of MI    Access Code: [email or web address]  URL: [email or web address]         Manuals         R shoulder PROM  12' 10' 10'  10'                                               Neuro Re-Ed             Scap ret  5"x10 5"x20 5"x20 5"x20        Cervical ret  5"x10 unable 10x                                                                          Ther Ex             Pulleys  5"x20 3' 3' 5'        Wall slides HEP 5"x10 5"x10 5"x15 5"x20        Hi-lo walkaway seated HEP 10"x10           Cane flex/abd/er  5"x10 ea 10"x10 10"x10  5"x20        pball roll out   x20 x20 5" ea  Flex  /scap 5"x20 ea        S/l ER/abd     x20                                  Ther Activity                                       Gait Training                                       Modalities

## 2022-02-14 ENCOUNTER — OFFICE VISIT (OUTPATIENT)
Dept: PHYSICAL THERAPY | Facility: CLINIC | Age: 82
End: 2022-02-14
Payer: COMMERCIAL

## 2022-02-14 DIAGNOSIS — M75.01 ADHESIVE CAPSULITIS OF RIGHT SHOULDER: Primary | ICD-10-CM

## 2022-02-14 PROCEDURE — 97110 THERAPEUTIC EXERCISES: CPT | Performed by: PHYSICAL THERAPIST

## 2022-02-14 NOTE — PROGRESS NOTES
Daily Note     Today's date: 2022  Patient name: Ventura Shields  :   MRN: [id number]  Referring provider: Blayne Lovell  Dx:   Encounter Diagnosis     ICD-10-CM    1  Adhesive capsulitis of right shoulder  M75 01        Start Time: 390  Stop Time:   Total time in clinic (min): 36 minutes    Subjective: Pt reports his shoulder is sore today, but he feels PT is helping overall  Objective: See treatment diary below      Assessment: Difficulty tolerating PROM in all planes beyond about assisted through the range  Most limitation in ER when in 0* abduction  Program not progressed today, as pt had continued soreness throughout session  Plan: Progress as tolerated  Precautions: has defibrillator, HTN, HLD, hx of MI    Access Code: [email or web address]  URL: [email or web address]         Manuals        R shoulder PROM  12' 10' 10'  10' 10'                                              Neuro Re-Ed             Scap ret  5"x10 5"x20 5"x20 5"x20 5"x30       Cervical ret  5"x10 unable 10x                                                                          Ther Ex             Pulleys  5"x20 3' 3' 5' 5'       Wall slides HEP 5"x10 5"x10 5"x15 5"x20        Hi-lo walkaway seated HEP 10"x10           Cane flex/abd/er  5"x10 ea 10"x10 10"x10  5"x20 5"x20 ea       pball roll out   x20 x20 5" ea  Flex  /scap 5"x20 ea 5"x20 ea       S/l ER/abd     x20 20x ea                                 Ther Activity                                       Gait Training                                       Modalities

## 2022-02-21 ENCOUNTER — OFFICE VISIT (OUTPATIENT)
Dept: PHYSICAL THERAPY | Facility: CLINIC | Age: 82
End: 2022-02-21
Payer: COMMERCIAL

## 2022-02-21 DIAGNOSIS — M75.01 ADHESIVE CAPSULITIS OF RIGHT SHOULDER: Primary | ICD-10-CM

## 2022-02-21 PROCEDURE — 97110 THERAPEUTIC EXERCISES: CPT

## 2022-02-21 NOTE — PROGRESS NOTES
Daily Note     Today's date: 2022  Patient name: Adi Mcdaniel  :   MRN: [id number]  Referring provider: Sofia Murcia  Dx:   Encounter Diagnosis     ICD-10-CM    1  Adhesive capsulitis of right shoulder  M75 01                   Subjective: Pt reports he still has difficulty putting his belt on behind his back  He states he is able to reach into his R back pocket but it is hard to do so  Objective: See treatment diary below      Assessment: Patient continues to have a lack of ROM in all planes  PROM R shoulder flexion is 120* although abd is 82* today  Pt completes charted exercises within pain tolerance  He would benefit from continued PT in order to improve R shoulder ROM for improved function during ADLs  Plan: Continue per plan of care  Precautions: has defibrillator, HTN, HLD, hx of MI    Access Code: [email or web address]  URL: [email or web address]         Manuals       R shoulder PROM  12' 10' 10'  10' 10' 10'                                             Neuro Re-Ed             Scap ret  5"x10 5"x20 5"x20 5"x20 5"x30       Cervical ret  5"x10 unable 10x                                                                          Ther Ex             Pulleys  5"x20 3' 3' 5' 5' 5'      Wall slides HEP 5"x10 5"x10 5"x15 5"x20  5"x20      Hi-lo walkaway seated HEP 10"x10           Cane flex/abd/er  5"x10 ea 10"x10 10"x10  5"x20 5"x20 ea 5"x20      pball roll out   x20 x20 5" ea  Flex  /scap 5"x20 ea 5"x20 ea       S/l ER/abd     x20 20x ea x20 ea      Supine flex/abd       2x10      BTB IR stretch c strap       30"x3      Post capsule stretch       10"x10      Ther Activity                                       Gait Training                                       Modalities

## 2022-02-24 ENCOUNTER — OFFICE VISIT (OUTPATIENT)
Dept: PHYSICAL THERAPY | Facility: CLINIC | Age: 82
End: 2022-02-24
Payer: COMMERCIAL

## 2022-02-24 DIAGNOSIS — M75.01 ADHESIVE CAPSULITIS OF RIGHT SHOULDER: Primary | ICD-10-CM

## 2022-02-24 PROCEDURE — 97110 THERAPEUTIC EXERCISES: CPT

## 2022-02-24 PROCEDURE — 97140 MANUAL THERAPY 1/> REGIONS: CPT

## 2022-02-24 NOTE — PROGRESS NOTES
Daily Note     Today's date: 2022  Patient name: Chani Pappas  :   MRN: [id number]  Referring provider: Aida Dodge  Dx:   Encounter Diagnosis     ICD-10-CM    1  Adhesive capsulitis of right shoulder  M75 01                   Subjective: " I still can't comb my hair  "     Objective: See treatment diary below      Assessment: Significant ROM deficits in all planes  He would benefit from continued PT in order to improve R shoulder ROM for improved function during ADLs  Plan: Continue per plan of care  Precautions: has defibrillator, HTN, HLD, hx of MI    Access Code: [email or web address]  URL: [email or web address]         Manuals      R shoulder PROM  12' 10' 10'  10' 10' 10' CM  10'                                             Neuro Re-Ed             Scap ret  5"x10 5"x20 5"x20 5"x20 5"x30  5"x30     Cervical ret  5"x10 unable 10x                                                                          Ther Ex             Pulleys  5"x20 3' 3' 5' 5' 5' 5'     Wall slides HEP 5"x10 5"x10 5"x15 5"x20  5"x20 5"x20     Hi-lo walkaway seated HEP 10"x10           Cane flex/abd/er  5"x10 ea 10"x10 10"x10  5"x20 5"x20 ea 5"x20 5"x20     pball roll out   x20 x20 5" ea  Flex  /scap 5"x20 ea 5"x20 ea       S/l ER/abd     x20 20x ea x20 ea 20 ea       Supine flex/abd       2x10 2x10     BTB IR stretch c strap       30"x3 30"x3     Post capsule stretch       10"x10 10"x10     Ther Activity                                       Gait Training                                       Modalities

## 2022-02-28 ENCOUNTER — OFFICE VISIT (OUTPATIENT)
Dept: PHYSICAL THERAPY | Facility: CLINIC | Age: 82
End: 2022-02-28
Payer: COMMERCIAL

## 2022-02-28 DIAGNOSIS — M75.01 ADHESIVE CAPSULITIS OF RIGHT SHOULDER: Primary | ICD-10-CM

## 2022-02-28 PROCEDURE — 97110 THERAPEUTIC EXERCISES: CPT

## 2022-02-28 NOTE — PROGRESS NOTES
Daily Note     Today's date: 2022  Patient name: Chani Pappas  :   MRN: [id number]  Referring provider: Aida Dodge  Dx:   Encounter Diagnosis     ICD-10-CM    1  Adhesive capsulitis of right shoulder  M75 01                   Subjective: Pt reports he is able to reach in overhead shelf and in his back pocket now  Objective: See treatment diary below      Assessment: Patient still presents with ROM limitations but difficulty achieving end feel secondary to muscle guarding  He is able to set scapula with improved control today in standing  Pt's HEP was updated today and pt was instructed to stop performing HEP if increased pain or discomfort occurs  Pt demonstrated verbal understanding  Plan: Continue per plan of care  Precautions: has defibrillator, HTN, HLD, hx of MI    Access Code: [email or web address]  URL: [email or web address]         Manuals     R shoulder PROM  12' 10' 10'  10' 10' 10' CM  10'  10'                                           Neuro Re-Ed             Scap ret  5"x10 5"x20 5"x20 5"x20 5"x30  5"x30 5"x30    Cervical ret  5"x10 unable 10x                                                                          Ther Ex             Pulleys  5"x20 3' 3' 5' 5' 5' 5' 5'    Wall slides HEP 5"x10 5"x10 5"x15 5"x20  5"x20 5"x20 5"x20    Hi-lo walkaway seated HEP 10"x10           Cane flex/abd/er  5"x10 ea 10"x10 10"x10  5"x20 5"x20 ea 5"x20 5"x20 5"x20    pball roll out   x20 x20 5" ea  Flex  /scap 5"x20 ea 5"x20 ea       S/l ER/abd     x20 20x ea x20 ea 20 ea   x20    Supine flex/abd       2x10 2x10 2x10    BTB IR stretch c strap       30"x3 30"x3 30"x3    Post capsule stretch       10"x10 10"x10 10"x10    Ther Activity                                       Gait Training                                       Modalities

## 2022-03-09 RX ORDER — METHYLPREDNISOLONE ACETATE 40 MG/ML
2 INJECTION, SUSPENSION INTRA-ARTICULAR; INTRALESIONAL; INTRAMUSCULAR; SOFT TISSUE
Status: COMPLETED | OUTPATIENT
Start: 2022-01-12 | End: 2022-01-12

## 2022-03-09 RX ORDER — LIDOCAINE HYDROCHLORIDE 10 MG/ML
2 INJECTION, SOLUTION INFILTRATION; PERINEURAL
Status: COMPLETED | OUTPATIENT
Start: 2022-01-12 | End: 2022-01-12

## 2022-03-09 RX ORDER — BUPIVACAINE HYDROCHLORIDE 2.5 MG/ML
2 INJECTION, SOLUTION INFILTRATION; PERINEURAL
Status: COMPLETED | OUTPATIENT
Start: 2022-01-12 | End: 2022-01-12

## 2022-03-11 PROBLEM — I50.22 CHRONIC SYSTOLIC CONGESTIVE HEART FAILURE (HCC): Status: RESOLVED | Noted: 2018-02-10 | Resolved: 2022-03-11

## 2022-03-14 DIAGNOSIS — E78.2 MIXED HYPERLIPIDEMIA: ICD-10-CM

## 2022-03-14 RX ORDER — ROSUVASTATIN CALCIUM 20 MG/1
20 TABLET, COATED ORAL DAILY
Qty: 90 TABLET | Refills: 3 | Status: SHIPPED | OUTPATIENT
Start: 2022-03-14 | End: 2022-05-03 | Stop reason: SDUPTHER

## 2022-03-21 NOTE — PROGRESS NOTES
PT Discharge    Today's date: 3/21/2022  Patient name: Jazmyn Bell  :   MRN: [id number]  Referring provider: Ramona Del Angel  Dx:   Encounter Diagnosis     ICD-10-CM    1  Adhesive capsulitis of right shoulder  M75 01      Assessment  Assessment details: At last visit, pt reported he would like to continue with HEP since this was his last scheduled visit  Subjective and objective information and goals unable to be updated at this time  Pt DC from skilled therapy  Impairments: abnormal gait, abnormal or restricted ROM, activity intolerance, impaired balance, impaired physical strength, lacks appropriate home exercise program and pain with function  Functional limitations: reaching, carrying, lifting  Symptom irritability: moderateUnderstanding of Dx/Px/POC: good   Prognosis: fair    Goals  STG: 3 weeks - unable to assess  1  Pt will demonstrate independence with HEP  2  Pt will improve R shoulder AROM by at least 10%  3  Pt will improve R shoulder strength by at least 1/2 grade  4  Pt will report pain no more than 5/10  5  Pt will have minimal TTP over R supraspinatus insertion    LT weeks - unable to assess  1  Pt will be able to reach [place] with less pain  2  Pt will not require assistance with dressing  3  Pt will report improvement in pain no more than 2/10    Plan  Patient would benefit from: skilled physical therapy  Planned modality interventions: cryotherapy and thermotherapy: hydrocollator packs  Planned therapy interventions: therapeutic exercise, therapeutic activities, stretching, strengthening, patient education, neuromuscular re-education, massage, manual therapy, balance, gait training and home exercise program  Treatment plan discussed with: patient        Subjective Evaluation    History of Present Illness  Mechanism of injury: Pt had an injection in the R shoulder about 3 weeks ago, which he feels helped in the beginning but now is starting to wear off   He reports he got a CT of his R shoulder and the doctor told him it was mainly arthritis  He cannot reach [place], picking up objects, getting dressed, combing his hair, and tying his shoes  Recurrent probem    Quality of life: fair    Pain  At best pain ratin  At worst pain ratin  Quality: sharp  Relieving factors: ice  Aggravating factors: overhead activity and lifting  Progression: improved    Treatments  Previous treatment: physical therapy and injection treatment  Patient Goals  Patient goals for therapy: increased strength, independence with ADLs/IADLs, decreased pain and increased motion          Objective     Tenderness     Right Shoulder  Tenderness in the [facility] joint and supraspinatus tendon       Active Range of Motion     Right Shoulder   Flexion: 97 degrees with pain  Abduction: 89 degrees with pain  External rotation 0°: -10 degrees with pain    Passive Range of Motion     Right Shoulder   Flexion: 90 degrees with pain  Abduction: 86 degrees with pain  External rotation 45°: 14 degrees with pain    Strength/Myotome Testing     Right Shoulder     Planes of Motion   Flexion: 3+   Abduction: 3+   External rotation at 0°: 3+   External rotation BTH: 3+     Additional Strength Details  In available range

## 2022-05-02 DIAGNOSIS — N40.1 BENIGN PROSTATIC HYPERPLASIA WITH URINARY FREQUENCY: ICD-10-CM

## 2022-05-02 DIAGNOSIS — R35.0 BENIGN PROSTATIC HYPERPLASIA WITH URINARY FREQUENCY: ICD-10-CM

## 2022-05-02 DIAGNOSIS — E11.65 TYPE 2 DIABETES MELLITUS WITH HYPERGLYCEMIA, WITH LONG-TERM CURRENT USE OF INSULIN (HCC): ICD-10-CM

## 2022-05-02 DIAGNOSIS — E11.9 TYPE 2 DIABETES MELLITUS WITHOUT COMPLICATION, WITHOUT LONG-TERM CURRENT USE OF INSULIN (HCC): ICD-10-CM

## 2022-05-02 DIAGNOSIS — M54.12 CERVICAL RADICULOPATHY: ICD-10-CM

## 2022-05-02 DIAGNOSIS — M77.8 TENDONITIS OF SHOULDER, RIGHT: ICD-10-CM

## 2022-05-02 DIAGNOSIS — D64.9 ANEMIA, UNSPECIFIED TYPE: ICD-10-CM

## 2022-05-02 DIAGNOSIS — M54.2 CERVICALGIA: ICD-10-CM

## 2022-05-02 DIAGNOSIS — E78.2 MIXED HYPERLIPIDEMIA: ICD-10-CM

## 2022-05-02 DIAGNOSIS — I50.22 CHRONIC SYSTOLIC CONGESTIVE HEART FAILURE (HCC): ICD-10-CM

## 2022-05-02 DIAGNOSIS — Z79.4 TYPE 2 DIABETES MELLITUS WITH HYPERGLYCEMIA, WITH LONG-TERM CURRENT USE OF INSULIN (HCC): ICD-10-CM

## 2022-05-03 RX ORDER — TAMSULOSIN HYDROCHLORIDE 0.4 MG/1
0.4 CAPSULE ORAL DAILY
Qty: 90 CAPSULE | Refills: 3 | Status: SHIPPED | OUTPATIENT
Start: 2022-05-03 | End: 2023-04-13

## 2022-05-03 RX ORDER — FINASTERIDE 5 MG/1
5 TABLET, FILM COATED ORAL DAILY
Qty: 90 TABLET | Refills: 3 | Status: SHIPPED | OUTPATIENT
Start: 2022-05-03 | End: 2023-02-14

## 2022-05-03 RX ORDER — ROSUVASTATIN CALCIUM 20 MG/1
20 TABLET, COATED ORAL DAILY
Qty: 90 TABLET | Refills: 3 | Status: SHIPPED | OUTPATIENT
Start: 2022-05-03 | End: 2023-04-27

## 2022-05-03 RX ORDER — METOPROLOL SUCCINATE 50 MG/1
50 TABLET, EXTENDED RELEASE ORAL DAILY
Qty: 90 TABLET | Refills: 3 | Status: SHIPPED | OUTPATIENT
Start: 2022-05-03 | End: 2022-07-14

## 2022-05-03 RX ORDER — LEVOTHYROXINE SODIUM 88 UG/1
88 TABLET ORAL DAILY
Qty: 90 TABLET | Refills: 3 | Status: SHIPPED | OUTPATIENT
Start: 2022-05-03 | End: 2022-11-03 | Stop reason: SDUPTHER

## 2022-05-03 RX ORDER — GLIPIZIDE 5 MG/1
5 TABLET ORAL 2 TIMES DAILY
Qty: 180 TABLET | Refills: 3 | Status: SHIPPED | OUTPATIENT
Start: 2022-05-03 | End: 2023-04-03

## 2022-05-03 NOTE — TELEPHONE ENCOUNTER
[facility] wants all current scripts that pt has sent elsewhere to be now sent to [facility].    Received the list of scripts yesterday via fax but needs the Rx actually change from current pharm ([facility]) to [facility] for the blister paks.    Please call if questions: [phone]

## 2022-06-14 DIAGNOSIS — Z79.4 TYPE 2 DIABETES MELLITUS WITH HYPERGLYCEMIA, WITH LONG-TERM CURRENT USE OF INSULIN (HCC): ICD-10-CM

## 2022-06-14 DIAGNOSIS — E11.65 TYPE 2 DIABETES MELLITUS WITH HYPERGLYCEMIA, WITH LONG-TERM CURRENT USE OF INSULIN (HCC): ICD-10-CM

## 2022-06-15 RX ORDER — INSULIN GLARGINE 100 [IU]/ML
26 INJECTION, SOLUTION SUBCUTANEOUS DAILY
Qty: 15 ML | Refills: 1 | Status: SHIPPED | OUTPATIENT
Start: 2022-06-15 | End: 2022-07-14

## 2022-06-30 ENCOUNTER — APPOINTMENT (EMERGENCY)
Dept: CT IMAGING | Facility: HOSPITAL | Age: 82
DRG: 177 | End: 2022-06-30
Payer: COMMERCIAL

## 2022-06-30 ENCOUNTER — APPOINTMENT (EMERGENCY)
Dept: RADIOLOGY | Facility: HOSPITAL | Age: 82
DRG: 177 | End: 2022-06-30
Payer: COMMERCIAL

## 2022-06-30 ENCOUNTER — HOSPITAL ENCOUNTER (INPATIENT)
Facility: HOSPITAL | Age: 82
LOS: 14 days | Discharge: NURSING, REHAB OR LONG-TERM CARE | DRG: 177 | End: 2022-07-14
Attending: EMERGENCY MEDICINE | Admitting: ANESTHESIOLOGY
Payer: COMMERCIAL

## 2022-06-30 DIAGNOSIS — N17.9 ACUTE KIDNEY INJURY (HCC): ICD-10-CM

## 2022-06-30 DIAGNOSIS — U07.1 COVID-19: ICD-10-CM

## 2022-06-30 DIAGNOSIS — I25.5 ISCHEMIC CARDIOMYOPATHY: ICD-10-CM

## 2022-06-30 DIAGNOSIS — E11.3293 TYPE 2 DIABETES MELLITUS WITH MILD NONPROLIFERATIVE DIABETIC RETINOPATHY WITHOUT MACULAR EDEMA, BILATERAL (HCC): ICD-10-CM

## 2022-06-30 DIAGNOSIS — R13.10 DYSPHAGIA, UNSPECIFIED TYPE: ICD-10-CM

## 2022-06-30 DIAGNOSIS — E11.10 DKA (DIABETIC KETOACIDOSIS) (HCC): Primary | ICD-10-CM

## 2022-06-30 PROBLEM — E87.2 HIGH ANION GAP METABOLIC ACIDOSIS: Status: ACTIVE | Noted: 2022-06-30

## 2022-06-30 PROBLEM — E87.29 HIGH ANION GAP METABOLIC ACIDOSIS: Status: ACTIVE | Noted: 2022-06-30

## 2022-06-30 LAB
2HR DELTA HS TROPONIN: -11 NG/L
4HR DELTA HS TROPONIN: 8 NG/L
ALBUMIN SERPL BCP-MCNC: 3.3 G/DL (ref 3.5–5)
ALP SERPL-CCNC: 39 U/L (ref 46–116)
ALT SERPL W P-5'-P-CCNC: 34 U/L (ref 12–78)
ANION GAP SERPL CALCULATED.3IONS-SCNC: 16 MMOL/L (ref 4–13)
ANION GAP SERPL CALCULATED.3IONS-SCNC: 26 MMOL/L (ref 4–13)
AST SERPL W P-5'-P-CCNC: 33 U/L (ref 5–45)
ATRIAL RATE: 111 BPM
ATRIAL RATE: 113 BPM
BACTERIA UR QL AUTO: NORMAL /HPF
BASE EX.OXY STD BLDV CALC-SCNC: 75.8 % (ref 60–80)
BASE EX.OXY STD BLDV CALC-SCNC: 85.3 % (ref 60–80)
BASE EXCESS BLDV CALC-SCNC: -11.3 MMOL/L
BASE EXCESS BLDV CALC-SCNC: -11.9 MMOL/L
BASOPHILS # BLD AUTO: 0.02 THOUSANDS/ΜL (ref 0–0.1)
BASOPHILS NFR BLD AUTO: 0 % (ref 0–1)
BETA-HYDROXYBUTYRATE: 6.6 MMOL/L
BILIRUB SERPL-MCNC: 1.37 MG/DL (ref 0.2–1)
BILIRUB UR QL STRIP: NEGATIVE
BUN SERPL-MCNC: 35 MG/DL (ref 5–25)
BUN SERPL-MCNC: 38 MG/DL (ref 5–25)
CA-I BLD-SCNC: 1.09 MMOL/L (ref 1.12–1.32)
CALCIUM ALBUM COR SERPL-MCNC: 9.7 MG/DL (ref 8.3–10.1)
CALCIUM SERPL-MCNC: 8.2 MG/DL (ref 8.3–10.1)
CALCIUM SERPL-MCNC: 9.1 MG/DL (ref 8.3–10.1)
CARDIAC TROPONIN I PNL SERPL HS: 24 NG/L
CARDIAC TROPONIN I PNL SERPL HS: 35 NG/L
CARDIAC TROPONIN I PNL SERPL HS: 43 NG/L
CHLORIDE SERPL-SCNC: 101 MMOL/L (ref 100–108)
CHLORIDE SERPL-SCNC: 107 MMOL/L (ref 100–108)
CLARITY UR: CLEAR
CO2 SERPL-SCNC: 17 MMOL/L (ref 21–32)
CO2 SERPL-SCNC: 17 MMOL/L (ref 21–32)
COLOR UR: YELLOW
CREAT SERPL-MCNC: 1.22 MG/DL (ref 0.6–1.3)
CREAT SERPL-MCNC: 1.57 MG/DL (ref 0.6–1.3)
EOSINOPHIL # BLD AUTO: 0 THOUSAND/ΜL (ref 0–0.61)
EOSINOPHIL NFR BLD AUTO: 0 % (ref 0–6)
ERYTHROCYTE [DISTWIDTH] IN BLOOD BY AUTOMATED COUNT: 16.5 % (ref 11.6–15.1)
FLUAV RNA RESP QL NAA+PROBE: NEGATIVE
FLUBV RNA RESP QL NAA+PROBE: NEGATIVE
GFR SERPL CREATININE-BSD FRML MDRD: 41 ML/MIN/1.73SQ M
GFR SERPL CREATININE-BSD FRML MDRD: 55 ML/MIN/1.73SQ M
GLUCOSE SERPL-MCNC: 106 MG/DL (ref 65–140)
GLUCOSE SERPL-MCNC: 183 MG/DL (ref 65–140)
GLUCOSE SERPL-MCNC: 184 MG/DL (ref 65–140)
GLUCOSE SERPL-MCNC: 200 MG/DL (ref 65–140)
GLUCOSE SERPL-MCNC: 240 MG/DL (ref 65–140)
GLUCOSE SERPL-MCNC: 265 MG/DL (ref 65–140)
GLUCOSE UR STRIP-MCNC: ABNORMAL MG/DL
HCO3 BLDV-SCNC: 13.2 MMOL/L (ref 24–30)
HCO3 BLDV-SCNC: 13.6 MMOL/L (ref 24–30)
HCT VFR BLD AUTO: 45.7 % (ref 36.5–49.3)
HGB BLD-MCNC: 15.7 G/DL (ref 12–17)
HGB UR QL STRIP.AUTO: ABNORMAL
IMM GRANULOCYTES # BLD AUTO: 0.04 THOUSAND/UL (ref 0–0.2)
IMM GRANULOCYTES NFR BLD AUTO: 0 % (ref 0–2)
KETONES UR STRIP-MCNC: ABNORMAL MG/DL
LACTATE SERPL-SCNC: 1.7 MMOL/L (ref 0.5–2)
LEUKOCYTE ESTERASE UR QL STRIP: ABNORMAL
LIPASE SERPL-CCNC: 105 U/L (ref 73–393)
LYMPHOCYTES # BLD AUTO: 0.7 THOUSANDS/ΜL (ref 0.6–4.47)
LYMPHOCYTES NFR BLD AUTO: 8 % (ref 14–44)
MAGNESIUM SERPL-MCNC: 2.7 MG/DL (ref 1.6–2.6)
MCH RBC QN AUTO: 37.5 PG (ref 26.8–34.3)
MCHC RBC AUTO-ENTMCNC: 34.4 G/DL (ref 31.4–37.4)
MCV RBC AUTO: 109 FL (ref 82–98)
MONOCYTES # BLD AUTO: 0.43 THOUSAND/ΜL (ref 0.17–1.22)
MONOCYTES NFR BLD AUTO: 5 % (ref 4–12)
NEUTROPHILS # BLD AUTO: 7.93 THOUSANDS/ΜL (ref 1.85–7.62)
NEUTS SEG NFR BLD AUTO: 87 % (ref 43–75)
NITRITE UR QL STRIP: NEGATIVE
NON-SQ EPI CELLS URNS QL MICRO: NORMAL /HPF
NRBC BLD AUTO-RTO: 0 /100 WBCS
O2 CT BLDV-SCNC: 14.2 ML/DL
O2 CT BLDV-SCNC: 17.6 ML/DL
P AXIS: 64 DEGREES
P AXIS: 70 DEGREES
PCO2 BLDV: 27.1 MM HG (ref 42–50)
PCO2 BLDV: 29.9 MM HG (ref 42–50)
PH BLDV: 7.28 [PH] (ref 7.3–7.4)
PH BLDV: 7.31 [PH] (ref 7.3–7.4)
PH UR STRIP.AUTO: 5.5 [PH]
PHOSPHATE SERPL-MCNC: 4.8 MG/DL (ref 2.3–4.1)
PLATELET # BLD AUTO: 219 THOUSANDS/UL (ref 149–390)
PMV BLD AUTO: 11.3 FL (ref 8.9–12.7)
PO2 BLDV: 46.2 MM HG (ref 35–45)
PO2 BLDV: 56.4 MM HG (ref 35–45)
POTASSIUM SERPL-SCNC: 4.4 MMOL/L (ref 3.5–5.3)
POTASSIUM SERPL-SCNC: 5.6 MMOL/L (ref 3.5–5.3)
PR INTERVAL: 176 MS
PR INTERVAL: 178 MS
PROT SERPL-MCNC: 7.5 G/DL (ref 6.4–8.2)
PROT UR STRIP-MCNC: ABNORMAL MG/DL
QRS AXIS: -67 DEGREES
QRS AXIS: -69 DEGREES
QRSD INTERVAL: 114 MS
QRSD INTERVAL: 116 MS
QT INTERVAL: 340 MS
QT INTERVAL: 384 MS
QTC INTERVAL: 466 MS
QTC INTERVAL: 526 MS
RBC # BLD AUTO: 4.19 MILLION/UL (ref 3.88–5.62)
RBC #/AREA URNS AUTO: NORMAL /HPF
RSV RNA RESP QL NAA+PROBE: NEGATIVE
SARS-COV-2 RNA RESP QL NAA+PROBE: POSITIVE
SODIUM SERPL-SCNC: 140 MMOL/L (ref 136–145)
SODIUM SERPL-SCNC: 144 MMOL/L (ref 136–145)
SP GR UR STRIP.AUTO: 1.02 (ref 1–1.03)
T WAVE AXIS: 86 DEGREES
T WAVE AXIS: 88 DEGREES
UROBILINOGEN UR QL STRIP.AUTO: 0.2 E.U./DL
VENTRICULAR RATE: 113 BPM
VENTRICULAR RATE: 113 BPM
WBC # BLD AUTO: 9.12 THOUSAND/UL (ref 4.31–10.16)
WBC #/AREA URNS AUTO: NORMAL /HPF

## 2022-06-30 PROCEDURE — 36415 COLL VENOUS BLD VENIPUNCTURE: CPT | Performed by: EMERGENCY MEDICINE

## 2022-06-30 PROCEDURE — 82948 REAGENT STRIP/BLOOD GLUCOSE: CPT

## 2022-06-30 PROCEDURE — 84100 ASSAY OF PHOSPHORUS: CPT | Performed by: NURSE PRACTITIONER

## 2022-06-30 PROCEDURE — 84484 ASSAY OF TROPONIN QUANT: CPT | Performed by: NURSE PRACTITIONER

## 2022-06-30 PROCEDURE — 83605 ASSAY OF LACTIC ACID: CPT | Performed by: EMERGENCY MEDICINE

## 2022-06-30 PROCEDURE — 80053 COMPREHEN METABOLIC PANEL: CPT | Performed by: EMERGENCY MEDICINE

## 2022-06-30 PROCEDURE — 84484 ASSAY OF TROPONIN QUANT: CPT | Performed by: EMERGENCY MEDICINE

## 2022-06-30 PROCEDURE — 74176 CT ABD & PELVIS W/O CONTRAST: CPT

## 2022-06-30 PROCEDURE — 96361 HYDRATE IV INFUSION ADD-ON: CPT

## 2022-06-30 PROCEDURE — 82330 ASSAY OF CALCIUM: CPT | Performed by: NURSE PRACTITIONER

## 2022-06-30 PROCEDURE — 96374 THER/PROPH/DIAG INJ IV PUSH: CPT

## 2022-06-30 PROCEDURE — 82010 KETONE BODYS QUAN: CPT | Performed by: EMERGENCY MEDICINE

## 2022-06-30 PROCEDURE — 82805 BLOOD GASES W/O2 SATURATION: CPT | Performed by: EMERGENCY MEDICINE

## 2022-06-30 PROCEDURE — 0241U HB NFCT DS VIR RESP RNA 4 TRGT: CPT | Performed by: EMERGENCY MEDICINE

## 2022-06-30 PROCEDURE — 93005 ELECTROCARDIOGRAM TRACING: CPT

## 2022-06-30 PROCEDURE — 93010 ELECTROCARDIOGRAM REPORT: CPT | Performed by: INTERNAL MEDICINE

## 2022-06-30 PROCEDURE — 85025 COMPLETE CBC W/AUTO DIFF WBC: CPT | Performed by: EMERGENCY MEDICINE

## 2022-06-30 PROCEDURE — 80048 BASIC METABOLIC PNL TOTAL CA: CPT | Performed by: NURSE PRACTITIONER

## 2022-06-30 PROCEDURE — 96375 TX/PRO/DX INJ NEW DRUG ADDON: CPT

## 2022-06-30 PROCEDURE — 99291 CRITICAL CARE FIRST HOUR: CPT | Performed by: EMERGENCY MEDICINE

## 2022-06-30 PROCEDURE — 82805 BLOOD GASES W/O2 SATURATION: CPT | Performed by: NURSE PRACTITIONER

## 2022-06-30 PROCEDURE — 99285 EMERGENCY DEPT VISIT HI MDM: CPT

## 2022-06-30 PROCEDURE — 0T9B70Z DRAINAGE OF BLADDER WITH DRAINAGE DEVICE, VIA NATURAL OR ARTIFICIAL OPENING: ICD-10-PCS | Performed by: INTERNAL MEDICINE

## 2022-06-30 PROCEDURE — 81001 URINALYSIS AUTO W/SCOPE: CPT | Performed by: EMERGENCY MEDICINE

## 2022-06-30 PROCEDURE — 83735 ASSAY OF MAGNESIUM: CPT | Performed by: EMERGENCY MEDICINE

## 2022-06-30 PROCEDURE — 83690 ASSAY OF LIPASE: CPT | Performed by: EMERGENCY MEDICINE

## 2022-06-30 PROCEDURE — 71046 X-RAY EXAM CHEST 2 VIEWS: CPT

## 2022-06-30 PROCEDURE — 99291 CRITICAL CARE FIRST HOUR: CPT | Performed by: NURSE PRACTITIONER

## 2022-06-30 RX ORDER — HEPARIN SODIUM 5000 [USP'U]/ML
5000 INJECTION, SOLUTION INTRAVENOUS; SUBCUTANEOUS EVERY 8 HOURS SCHEDULED
Status: DISCONTINUED | OUTPATIENT
Start: 2022-06-30 | End: 2022-07-02

## 2022-06-30 RX ORDER — ALBUMIN, HUMAN INJ 5% 5 %
12.5 SOLUTION INTRAVENOUS ONCE
Status: COMPLETED | OUTPATIENT
Start: 2022-06-30 | End: 2022-06-30

## 2022-06-30 RX ORDER — ASPIRIN 81 MG/1
81 TABLET, CHEWABLE ORAL DAILY
Status: DISCONTINUED | OUTPATIENT
Start: 2022-07-01 | End: 2022-07-14 | Stop reason: HOSPADM

## 2022-06-30 RX ORDER — MORPHINE SULFATE 4 MG/ML
4 INJECTION, SOLUTION INTRAMUSCULAR; INTRAVENOUS ONCE
Status: COMPLETED | OUTPATIENT
Start: 2022-06-30 | End: 2022-06-30

## 2022-06-30 RX ORDER — PRAVASTATIN SODIUM 80 MG/1
80 TABLET ORAL
Status: DISCONTINUED | OUTPATIENT
Start: 2022-06-30 | End: 2022-07-14 | Stop reason: HOSPADM

## 2022-06-30 RX ORDER — SODIUM CHLORIDE, SODIUM GLUCONATE, SODIUM ACETATE, POTASSIUM CHLORIDE, MAGNESIUM CHLORIDE, SODIUM PHOSPHATE, DIBASIC, AND POTASSIUM PHOSPHATE .53; .5; .37; .037; .03; .012; .00082 G/100ML; G/100ML; G/100ML; G/100ML; G/100ML; G/100ML; G/100ML
1000 INJECTION, SOLUTION INTRAVENOUS ONCE
Status: COMPLETED | OUTPATIENT
Start: 2022-06-30 | End: 2022-06-30

## 2022-06-30 RX ORDER — POTASSIUM CHLORIDE 14.9 MG/ML
20 INJECTION INTRAVENOUS ONCE
Status: COMPLETED | OUTPATIENT
Start: 2022-06-30 | End: 2022-06-30

## 2022-06-30 RX ORDER — SODIUM CHLORIDE 9 MG/ML
3 INJECTION INTRAVENOUS
Status: DISCONTINUED | OUTPATIENT
Start: 2022-06-30 | End: 2022-07-14 | Stop reason: HOSPADM

## 2022-06-30 RX ORDER — ONDANSETRON 2 MG/ML
4 INJECTION INTRAMUSCULAR; INTRAVENOUS ONCE
Status: COMPLETED | OUTPATIENT
Start: 2022-06-30 | End: 2022-06-30

## 2022-06-30 RX ORDER — LEVOTHYROXINE SODIUM 88 UG/1
88 TABLET ORAL DAILY
Status: DISCONTINUED | OUTPATIENT
Start: 2022-07-01 | End: 2022-07-14 | Stop reason: HOSPADM

## 2022-06-30 RX ORDER — SODIUM CHLORIDE, SODIUM GLUCONATE, SODIUM ACETATE, POTASSIUM CHLORIDE, MAGNESIUM CHLORIDE, SODIUM PHOSPHATE, DIBASIC, AND POTASSIUM PHOSPHATE .53; .5; .37; .037; .03; .012; .00082 G/100ML; G/100ML; G/100ML; G/100ML; G/100ML; G/100ML; G/100ML
75 INJECTION, SOLUTION INTRAVENOUS CONTINUOUS
Status: DISCONTINUED | OUTPATIENT
Start: 2022-06-30 | End: 2022-07-01

## 2022-06-30 RX ORDER — CALCIUM GLUCONATE 20 MG/ML
1 INJECTION, SOLUTION INTRAVENOUS ONCE
Status: COMPLETED | OUTPATIENT
Start: 2022-06-30 | End: 2022-07-01

## 2022-06-30 RX ORDER — TAMSULOSIN HYDROCHLORIDE 0.4 MG/1
0.4 CAPSULE ORAL DAILY
Status: DISCONTINUED | OUTPATIENT
Start: 2022-07-01 | End: 2022-07-14 | Stop reason: HOSPADM

## 2022-06-30 RX ORDER — FINASTERIDE 5 MG/1
5 TABLET, FILM COATED ORAL DAILY
Status: DISCONTINUED | OUTPATIENT
Start: 2022-07-01 | End: 2022-07-14 | Stop reason: HOSPADM

## 2022-06-30 RX ADMIN — SODIUM CHLORIDE, SODIUM GLUCONATE, SODIUM ACETATE, POTASSIUM CHLORIDE, MAGNESIUM CHLORIDE, SODIUM PHOSPHATE, DIBASIC, AND POTASSIUM PHOSPHATE 1000 ML: .53; .5; .37; .037; .03; .012; .00082 INJECTION, SOLUTION INTRAVENOUS at 22:05

## 2022-06-30 RX ADMIN — POTASSIUM CHLORIDE 20 MEQ: 14.9 INJECTION, SOLUTION INTRAVENOUS at 18:49

## 2022-06-30 RX ADMIN — ALBUMIN (HUMAN) 12.5 G: 12.5 INJECTION, SOLUTION INTRAVENOUS at 20:24

## 2022-06-30 RX ADMIN — CALCIUM GLUCONATE 1 G: 20 INJECTION, SOLUTION INTRAVENOUS at 22:57

## 2022-06-30 RX ADMIN — SODIUM CHLORIDE, SODIUM GLUCONATE, SODIUM ACETATE, POTASSIUM CHLORIDE, MAGNESIUM CHLORIDE, SODIUM PHOSPHATE, DIBASIC, AND POTASSIUM PHOSPHATE 75 ML/HR: .53; .5; .37; .037; .03; .012; .00082 INJECTION, SOLUTION INTRAVENOUS at 20:45

## 2022-06-30 RX ADMIN — PRAVASTATIN SODIUM 80 MG: 80 TABLET ORAL at 20:50

## 2022-06-30 RX ADMIN — MORPHINE SULFATE 4 MG: 4 INJECTION INTRAVENOUS at 17:14

## 2022-06-30 RX ADMIN — SODIUM CHLORIDE 1000 ML: 0.9 INJECTION, SOLUTION INTRAVENOUS at 17:11

## 2022-06-30 RX ADMIN — SODIUM CHLORIDE, SODIUM GLUCONATE, SODIUM ACETATE, POTASSIUM CHLORIDE, MAGNESIUM CHLORIDE, SODIUM PHOSPHATE, DIBASIC, AND POTASSIUM PHOSPHATE 75 ML/HR: .53; .5; .37; .037; .03; .012; .00082 INJECTION, SOLUTION INTRAVENOUS at 23:01

## 2022-06-30 RX ADMIN — ONDANSETRON 4 MG: 2 INJECTION INTRAMUSCULAR; INTRAVENOUS at 17:11

## 2022-06-30 RX ADMIN — SODIUM CHLORIDE 4 UNITS/HR: 9 INJECTION, SOLUTION INTRAVENOUS at 19:25

## 2022-06-30 RX ADMIN — SODIUM CHLORIDE 1000 ML: 0.9 INJECTION, SOLUTION INTRAVENOUS at 18:50

## 2022-06-30 RX ADMIN — HEPARIN SODIUM 5000 UNITS: 5000 INJECTION INTRAVENOUS; SUBCUTANEOUS at 21:55

## 2022-06-30 NOTE — ASSESSMENT & PLAN NOTE
· Patient not presently on oxygen  · Will order remdesivir for now  · Escalate therapy as needed  · Encourage patient to obtain booster

## 2022-06-30 NOTE — ASSESSMENT & PLAN NOTE
Lab Results   Component Value Date    HGBA1C 8 8 (H) 06/27/2022       No results for input(s): POCGLU in the last 72 hours      Blood Sugar Average: Last 72 hrs:     · Will order non-DKA insulin infusion  · Frequent labs  · Gentle volume resuscitation  · Clear liquid diet

## 2022-06-30 NOTE — ED PROVIDER NOTES
History  Chief Complaint   Patient presents with    Shortness of Breath     Pt c/o of sob, vomitting diarrhea, out insulin for 2 days      Patient is an 50-year-old male past medical history of hypertension, hyperlipidemia, COPD, CAD, diabetes, hypothyroid, skin cancer presenting with vomiting  Patient notes 5-6 episodes a day of nonbloody nonbilious vomit for the past 3 days and states he has been noncompliant with his medications as he is unable to keep down  He notes intermittent periumbilical nonradiating abdominal soreness in the same timeframe  Denies any bowel movement for the last 3 days and states that he had large bowel movements before that however denies any diarrhea  Notes a dry cough in the same timeframe as well as shortness of breath and left-sided intermittent chest pain and same timeframe  Denies any fevers, leg pain or swelling, rashes, vision changes, dysuria  Prior to Admission Medications   Prescriptions Last Dose Informant Patient Reported? Taking?    Canagliflozin (Invokana) 300 MG TABS   No No   Sig: Take 1 tablet (300 mg total) by mouth daily before breakfast   Continuous Blood Gluc  (FreeStyle Maris 14 Day [place]) ANTONELLA   No No   Sig: Continuous monitering for pt on insulin   Continuous Blood Gluc Sensor (FreeStyle Maris 14 Day Sensor) MISC   No No   Sig: Continuous monitering for pt on insulin   HumaLOG Albert KwikPen 100 units/mL injection pen  Self No No   Sig: Inject 20 Units under the skin 2 (two) times a day with meals   Insulin Pen Needle (Pen Needles) 32G X 4 MM MISC  Self No No   Sig: Check blood glucose at home [facility] and  4 times a day   aspirin 81 MG tablet  Self Yes No   Sig: Take 1 tablet by mouth daily   cyclobenzaprine (FLEXERIL) 10 mg tablet  Self No No   Sig: Take 1 tablet (10 mg total) by mouth 3 (three) times a day as needed for muscle spasms   finasteride (PROSCAR) 5 mg tablet   No No   Sig: Take 1 tablet (5 mg total) by mouth daily   glipiZIDE (GLUCOTROL) 5 mg tablet   No No   Sig: Take 1 tablet (5 mg total) by mouth 2 (two) times a day   insulin glargine (Lantus SoloStar) 100 units/mL injection pen   No No   Sig: Inject 26 Units under the skin daily   levothyroxine 88 mcg tablet   No No   Sig: Take 1 tablet (88 mcg total) by mouth daily   metoprolol succinate (TOPROL-XL) 50 mg 24 hr tablet   No No   Sig: Take 1 tablet (50 mg total) by mouth daily   rosuvastatin (CRESTOR) 20 MG tablet   No No   Sig: Take 1 tablet (20 mg total) by mouth daily   tamsulosin (FLOMAX) 0 4 mg   No No   Sig: Take 1 capsule (0 4 mg total) by mouth daily      Facility-Administered Medications: None       Past Medical History:   Diagnosis Date    Acquired cyst of kidney     Anemia     Benign paroxysmal vertigo, unspecified ear     Cellulitis of leg     Cervical spinal stenosis     Chest pain     Coronary atherosclerosis of native coronary artery     Enlarged prostate     Esophageal candidiasis (HCC)     Hematuria     Herpes zoster     Hyperlipidemia     Hypertension     Incomplete emptying of bladder     Inflamed seborrheic keratosis     Internal hemorrhoids     Malignant neoplasm of skin of trunk     Myocardial infarction ([address] )     Nonmelanoma skin cancer     LAST ASSESSED: 8/9/17    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 infarction     Paroxysmal tachycardia (HCC)     Shortness of breath     Vitamin B deficiency        Past Surgical History:   Procedure Laterality Date    CARDIAC DEFIBRILLATOR PLACEMENT      COLONOSCOPY  10/07/2008    FIBEROPTIC SCREENING; NO FURTHER RECOMMENDATIONS    CORONARY ANGIOPLASTY WITH STENT PLACEMENT      CORONARY ANGIOPLASTY WITH STENT PLACEMENT      CYSTOSCOPY  11/06/2015    DIAGNOSTIC; MANAGED BY: Bee Lutz    EGD AND COLONOSCOPY N/A 2/12/2018    Procedure: EGD AND COLONOSCOPY;  Surgeon: Trixie Kam MD;  Location: [facility];   Service: Gastroenterology    [facility] INJECTION RIGHT SHOULDER (ARTHROGRAM)  1/4/2022    HIP ARTHROPLASTY Right     INSERT / REPLACE / REMOVE PACEMAKER      JOINT REPLACEMENT Right     TRUNK SKIN LESION EXCISIONAL BIOPSY  2007    MALIGNANT; BCC CHEST       Family History   Problem Relation Age of Onset    Heart disease Mother     Coronary artery disease Mother     Sudden death Mother     Cancer Father         throat; MALIGNANT NEOPLASM OF HEAD, FACE OR NECK    Throat cancer Father     Cancer Family     Coronary artery disease Family      I have reviewed and agree with the history as documented  E-Cigarette/Vaping    E-Cigarette Use Never User      E-Cigarette/Vaping Substances    Nicotine No     THC No     CBD No     Flavoring No     Other No     Unknown No      Social History     Tobacco Use    Smoking status: Former Smoker     Packs/day: 1      Years: 10      Pack years: 10      Types: Cigarettes     Quit date: 1978     Years since quittin 3    Smokeless tobacco: Never Used   Vaping Use    Vaping Use: Never used   Substance Use Topics    Alcohol use: Yes     Comment: occasionally 1-2 per month     Drug use: No       Review of Systems   All other systems reviewed and are negative  Physical Exam  Physical Exam  Vitals reviewed  Constitutional:       General: He is not in acute distress  Appearance: Normal appearance  He is not ill-appearing  HENT:      Mouth/Throat:      Comments: Dry mucous membranes  Eyes:      Conjunctiva/sclera: Conjunctivae normal       Comments: Normal conjunctiva   Cardiovascular:      Rate and Rhythm: Normal rate and regular rhythm  Pulses: Normal pulses  Heart sounds: Normal heart sounds  Pulmonary:      Effort: Pulmonary effort is normal       Breath sounds: Normal breath sounds  Abdominal:      General: Abdomen is flat  Palpations: Abdomen is soft  Tenderness: There is abdominal tenderness  There is guarding        Comments: Left upper and lower quadrant tenderness with guarding with deep palpation Musculoskeletal:         General: No swelling  Normal range of motion  Cervical back: Neck supple  Right lower leg: No tenderness  No edema  Left lower leg: No tenderness  No edema  Skin:     General: Skin is warm and dry  Neurological:      General: No focal deficit present  Mental Status: He is alert     Psychiatric:         Mood and Affect: Mood normal          Vital Signs  ED Triage Vitals   Temperature Pulse Respirations Blood Pressure SpO2   06/30/22 1530 06/30/22 1530 06/30/22 1530 06/30/22 1530 06/30/22 1530   97 6 °F (36 4 °C) (!) 118 (!) 24 135/60 98 %      Temp Source Heart Rate Source Patient Position - Orthostatic VS BP Location FiO2 (%)   06/30/22 1530 06/30/22 1530 06/30/22 1800 06/30/22 1530 --   Oral Monitor Lying Right arm       Pain Score       06/30/22 1530       No Pain           Vitals:    06/30/22 1530 06/30/22 1800 06/30/22 2030 06/30/22 2051   BP: 135/60 131/60 129/60 129/60   Pulse: (!) 118 (!) 118 (!) 113 (!) 113   Patient Position - Orthostatic VS:  Lying  Sitting         Visual Acuity      ED Medications  Medications   sodium chloride (PF) 0 9 % injection 3 mL (has no administration in time range)   aspirin chewable tablet 81 mg (has no administration in time range)   finasteride (PROSCAR) tablet 5 mg (has no administration in time range)   levothyroxine tablet 88 mcg (has no administration in time range)   pravastatin (PRAVACHOL) tablet 80 mg (80 mg Oral Given 6/30/22 2050)   tamsulosin (FLOMAX) capsule 0 4 mg (has no administration in time range)   heparin (porcine) subcutaneous injection 5,000 Units (5,000 Units Subcutaneous Given 6/30/22 2155)   insulin regular (HumuLIN R,NovoLIN R) 1 Units/mL in sodium chloride 0 9 % 100 mL infusion (2 Units/hr Intravenous Rate/Dose Change 6/30/22 2005)   multi-electrolyte (PLASMALYTE-A/ISOLYTE-S PH 7 4) IV solution (75 mL/hr Intravenous New Bag 6/30/22 2045)   multi-electrolyte (ISOLYTE-S PH 7 4) bolus 1,000 mL (has no administration in time range)   calcium gluconate 1 g in sodium chloride 0 9% 50 mL (premix) (has no administration in time range)   sodium chloride 0 9 % bolus 1,000 mL (0 mL Intravenous Stopped 6/30/22 1811)   ondansetron (ZOFRAN) injection 4 mg (4 mg Intravenous Given 6/30/22 1711)   morphine injection 4 mg (4 mg Intravenous Given 6/30/22 1714)   potassium chloride 20 mEq IVPB (premix) (0 mEq Intravenous Stopped 6/30/22 2049)   albumin human (FLEXBUMIN) 5 % injection 12 5 g (0 g Intravenous Stopped 6/30/22 2045)       Diagnostic Studies  Results Reviewed     Procedure Component Value Units Date/Time    Blood gas, venous [[id number]]  (Abnormal) Collected: 06/30/22 2027    Lab Status: Final result Specimen: Blood from Arm, Right Updated: 06/30/22 2040     pH, Lei 7 275     pCO2, Lei 29 9 mm Hg      pO2, Lei 46 2 mm Hg      HCO3, Lei 13 6 mmol/L      Base Excess, Lei -11 9 mmol/L      O2 Content, Lei 14 2 ml/dL      O2 HGB, VENOUS 75 8 %     HS Troponin I 2hr [[id number]]  (Normal) Collected: 06/1941    Lab Status: Final result Specimen: Blood from Hand, Right Updated: 06/30/22 2034     hs TnI 2hr 24 ng/L      Delta 2hr hsTnI -11 ng/L     Fingerstick Glucose (POCT) [[id number]]  (Abnormal) Collected: 06/30/22 2002    Lab Status: Final result Updated: 06/30/22 2005     POC Glucose 184 mg/dl     Basic metabolic panel [[id number]]  (Abnormal) Collected: 06/1941    Lab Status: Final result Specimen: Blood from Hand, Right Updated: 06/30/22 2004     Sodium 140 mmol/L      Potassium 5 6 mmol/L      Chloride 107 mmol/L      CO2 17 mmol/L      ANION GAP 16 mmol/L      BUN 38 mg/dL      Creatinine 1 22 mg/dL      Glucose 200 mg/dL      Calcium 8 2 mg/dL      eGFR 55 ml/min/1 73sq m     Narrative:      [place] guidelines for Chronic Kidney Disease (CKD):     Stage 1 with normal or high GFR (GFR > 90 mL/min/1 73 square meters)    Stage 2 Mild CKD (GFR = 60-89 mL/min/1 73 square meters)   Stage 3A Moderate CKD (GFR = 45-59 mL/min/1 73 square meters)    Stage 3B Moderate CKD (GFR = 30-44 mL/min/1 73 square meters)    Stage 4 Severe CKD (GFR = 15-29 mL/min/1 73 square meters)    Stage 5 End Stage CKD (GFR <15 mL/min/1 73 square meters)  Note: GFR calculation is accurate only with a steady state creatinine    Phosphorus [[id number]]  (Abnormal) Collected: 06/1941    Lab Status: Final result Specimen: Blood from Hand, Right Updated: 06/30/22 2004     Phosphorus 4 8 mg/dL     Calcium, ionized [[id number]]  (Abnormal) Collected: 06/1941    Lab Status: Final result Specimen: Blood from Arm, Right Updated: 06/30/22 1952     Calcium, Ionized 1 09 mmol/L     Fingerstick Glucose (POCT) [[id number]]  (Abnormal) Collected: 06/30/22 1912    Lab Status: Final result Updated: 06/30/22 1913     POC Glucose 240 mg/dl     Lactic acid, plasma [[id number]]  (Normal) Collected: 06/30/22 1849    Lab Status: Final result Specimen: Blood from Arm, Left Updated: 06/30/22 1912     LACTIC ACID 1 7 mmol/L     Narrative:      Result may be elevated if tourniquet was used during collection      Beta Hydroxybutyrate [[id number]]  (Abnormal) Collected: 06/30/22 1730    Lab Status: Final result Specimen: Blood from Arm, Right Updated: 06/30/22 1741     BETA-HYDROXYBUTYRATE 6 6 mmol/L     Blood gas, venous [[id number]]  (Abnormal) Collected: 06/30/22 1730    Lab Status: Final result Specimen: Blood from Arm, Right Updated: 06/30/22 1740     pH, Lei 7 307     pCO2, Lei 27 1 mm Hg      pO2, Lei 56 4 mm Hg      HCO3, Lei 13 2 mmol/L      Base Excess, Lei -11 3 mmol/L      O2 Content, Lei 17 6 ml/dL      O2 HGB, VENOUS 85 3 %     COVID/FLU/RSV - 2 hour TAT [[id number]]  (Abnormal) Collected: 06/30/22 1643    Lab Status: Final result Specimen: Nares from Nose Updated: 06/30/22 1727     SARS-CoV-2 Positive     INFLUENZA A PCR Negative     INFLUENZA B PCR Negative     RSV PCR Negative    Narrative:      FOR PEDIATRIC PATIENTS - copy/paste COVID Guidelines URL to browser: [email or web address]    SARS-CoV-2 assay is a Nucleic Acid Amplification assay intended for the  qualitative detection of nucleic acid from SARS-CoV-2 in nasopharyngeal  swabs  Results are for the presumptive identification of SARS-CoV-2 RNA  Positive results are indicative of infection with SARS-CoV-2, the virus  causing COVID-19, but do not rule out bacterial infection or co-infection  with other viruses  Laboratories within the [place] and its  territories are required to report all positive results to the appropriate  public health authorities  Negative results do not preclude SARS-CoV-2  infection and should not be used as the sole basis for treatment or other  patient management decisions  Negative results must be combined with  clinical observations, patient history, and epidemiological information  This test has not been FDA cleared or approved  This test has been authorized by FDA under an Emergency Use Authorization  (EUA)  This test is only authorized for the duration of time the  declaration that circumstances exist justifying the authorization of the  emergency use of an in vitro diagnostic tests for detection of SARS-CoV-2  virus and/or diagnosis of COVID-19 infection under section 564(b)(1) of  the Act, 21 U  S C  538ZVF-5(D)(7), unless the authorization is terminated  or revoked sooner  The test has been validated but independent review by FDA  and CLIA is pending  Test performed using [employer] GeneXpert: This RT-PCR assay targets N2,  a region unique to SARS-CoV-2  A conserved region in the E-gene was chosen  for pan-Sarbecovirus detection which includes SARS-CoV-2      Urine Microscopic [[id number]]  (Normal) Collected: 06/30/22 1643    Lab Status: Final result Specimen: Urine, Clean Catch Updated: 06/30/22 1713     RBC, UA None Seen /hpf      WBC, UA 2-4 /hpf      Epithelial Cells None Seen /hpf      Bacteria, UA None Seen /hpf     UA w Reflex to Microscopic w Reflex to Culture [[id number]]  (Abnormal) Collected: 06/30/22 1643    Lab Status: Final result Specimen: Urine, Clean Catch Updated: 06/30/22 1659     Color, UA Yellow     Clarity, UA Clear     Specific Gravity, UA 1 025     pH, UA 5 5     Leukocytes, UA Elevated glucose may cause decreased leukocyte values   See urine microscopic for [facility] result/     Nitrite, UA Negative     Protein, UA 30 (1+) mg/dl      Glucose, UA >=1000 (1%) mg/dl      Ketones, UA 40 (2+) mg/dl      Urobilinogen, UA 0 2 E U /dl      Bilirubin, UA Negative     Occult Blood, UA Small    Comprehensive metabolic panel [[id number]]  (Abnormal) Collected: 06/30/22 1604    Lab Status: Final result Specimen: Blood Updated: 06/30/22 1647     Sodium 144 mmol/L      Potassium 4 4 mmol/L      Chloride 101 mmol/L      CO2 17 mmol/L      ANION GAP 26 mmol/L      BUN 35 mg/dL      Creatinine 1 57 mg/dL      Glucose 265 mg/dL      Calcium 9 1 mg/dL      Corrected Calcium 9 7 mg/dL      AST 33 U/L      ALT 34 U/L      Alkaline Phosphatase 39 U/L      Total Protein 7 5 g/dL      Albumin 3 3 g/dL      Total Bilirubin 1 37 mg/dL      eGFR 41 ml/min/1 73sq m     Narrative:      [place] guidelines for Chronic Kidney Disease (CKD):     Stage 1 with normal or high GFR (GFR > 90 mL/min/1 73 square meters)    Stage 2 Mild CKD (GFR = 60-89 mL/min/1 73 square meters)    Stage 3A Moderate CKD (GFR = 45-59 mL/min/1 73 square meters)    Stage 3B Moderate CKD (GFR = 30-44 mL/min/1 73 square meters)    Stage 4 Severe CKD (GFR = 15-29 mL/min/1 73 square meters)    Stage 5 End Stage CKD (GFR <15 mL/min/1 73 square meters)  Note: GFR calculation is accurate only with a steady state creatinine    Lipase [[id number]]  (Normal) Collected: 06/30/22 1604    Lab Status: Final result Specimen: Blood Updated: 06/30/22 1647     Lipase 105 u/L     Magnesium [[id number]]  (Abnormal) Collected: 06/30/22 1604    Lab Status: Final result Specimen: Blood Updated: 06/30/22 1647     Magnesium 2 7 mg/dL     HS Troponin 0hr (reflex protocol) [[id number]]  (Normal) Collected: 06/30/22 1604    Lab Status: Final result Specimen: Blood Updated: 06/30/22 1632     hs TnI 0hr 35 ng/L     HS Troponin I 4hr [[id number]]     Lab Status: No result Specimen: Blood     CBC and differential [[id number]]  (Abnormal) Collected: 06/30/22 1603    Lab Status: Final result Specimen: Blood Updated: 06/30/22 1603     WBC 9 12 Thousand/uL      RBC 4 19 Million/uL      Hemoglobin 15 7 g/dL      Hematocrit 45 7 %       fL      MCH 37 5 pg      MCHC 34 4 g/dL      RDW 16 5 %      MPV 11 3 fL      Platelets 791 Thousands/uL      nRBC 0 /100 WBCs      Neutrophils Relative 87 %      Immat GRANS % 0 %      Lymphocytes Relative 8 %      Monocytes Relative 5 %      Eosinophils Relative 0 %      Basophils Relative 0 %      Neutrophils Absolute 7 93 Thousands/µL      Immature Grans Absolute 0 04 Thousand/uL      Lymphocytes Absolute 0 70 Thousands/µL      Monocytes Absolute 0 43 Thousand/µL      Eosinophils Absolute 0 00 Thousand/µL      Basophils Absolute 0 02 Thousands/µL     Narrative: This is an appended report  These results have been appended to a previously verified report  XR chest 2 views   ED Interpretation by Giovani Ren DO (06/30 1626)   Unchanged from prior      CT abdomen pelvis wo contrast   Final Result by Bryn Kwon MD (06/30 1631)      No acute abdominopelvic pathology                 Workstation performed: [id number]                    Procedures  ECG 12 Lead Documentation Only    Date/Time: 6/30/2022 4:42 PM  Performed by: Giovani Ren DO  Authorized by: Giovani Ren DO     ECG reviewed by me, the ED Provider: yes    Patient location:  ED  Previous ECG:     Previous ECG:  Compared to current    Similarity:  No change  Interpretation:     Interpretation: non-specific    Rate:     ECG rate assessment: tachycardic    Rhythm:     Rhythm: sinus rhythm    Ectopy:     Ectopy: none    QRS:     QRS axis:  Left    QRS intervals: Wide  Conduction:     Conduction: abnormal      Abnormal conduction: LAFB    ST segments:     ST segments:  Normal  T waves:     T waves: inverted      Inverted:  AVL    CriticalCare Time  Performed by: Liliya Brandt DO  Authorized by: Liliya Brandt DO     Critical care provider statement:     Critical care time (minutes):  30    Critical care was necessary to treat or prevent imminent or life-threatening deterioration of the following conditions:  Metabolic crisis    Critical care was time spent personally by me on the following activities:  Obtaining history from patient or surrogate, development of treatment plan with patient or surrogate, discussions with consultants, evaluation of patient's response to treatment, examination of patient, interpretation of cardiac output measurements, ordering and performing treatments and interventions, ordering and review of laboratory studies, ordering and review of radiographic studies, review of old charts and re-evaluation of patient's condition             ED Course  ED Course as of 06/30/22 2202   Thu Jun 30, 2022   1745 Patient COVID positive and with evidence of JESSA, DKA, will begin insulin drip and potassium repletion, discussed with ICU and admit  SBIRT 20yo+    Flowsheet Row Most Recent Value   SBIRT (25 yo +)    In order to provide better care to our patients, we are screening all of our patients for alcohol and drug use  Would it be okay to ask you these screening questions? Yes Filed at: 06/30/2022 1721   Initial Alcohol Screen: US AUDIT-C     1  How often do you have a drink containing alcohol? 2 Filed at: 06/30/2022 1721   2  How many drinks containing alcohol do you have on a typical day you are drinking? 0 Filed at: 06/30/2022 1721   3a   Male UNDER 65: How often do you have five or more drinks on one occasion? 0 Filed at: 06/30/2022 1721   Audit-C Score 2 Filed at: 06/30/2022 1721   TRACY: How many times in the past year have you    Used an illegal drug or used a prescription medication for non-medical reasons? Never Filed at: 06/30/2022 1721                    MDM  Number of Diagnoses or Management Options  Diagnosis management comments: Patient is an 81-year-old male past medical history of hypertension, diabetes, hyperlipidemia, COPD, CAD, skin cancer, hypothyroid presenting with vomiting  Patient is in no acute distress at bedside however does appear fatigued with dry mucous membranes, low-grade tachycardia, left upper and lower quadrant tenderness with guarding with deep palpation  Will give pain control, antiemetic, fluids and obtain cardiac and abdominal labs, CT abdomen pelvis, chest x-ray and reassess  Also obtain COVID testing in the setting of cough and shortness of breath  Disposition  Final diagnoses:   DKA (diabetic ketoacidosis) ([facility] )   COVID-19     Time reflects when diagnosis was documented in both MDM as applicable and the Disposition within this note     Time User Action Codes Description Comment    6/30/2022  5:49 PM Ilya Toussaint Add [E11 10] DKA (diabetic ketoacidosis) ([facility] )     6/30/2022  5:50 PM Claudia Toussaint Add [U07 1] COVID-19     6/30/2022  6:20 PM Fermín Calero Add [N17 9] Acute kidney injury [facility])       ED Disposition     ED Disposition   Admit    Condition   Stable    Date/Time   Thu Jun 30, 2022  5:49 PM    Comment   Case was discussed with Dr Ángel Ramey and the patient's admission status was agreed to be Admission Status: inpatient status to the service of Dr Ángel Ramey              Follow-up Information    None         Current Discharge Medication List      CONTINUE these medications which have NOT CHANGED    Details   aspirin 81 MG tablet Take 1 tablet by mouth daily      Canagliflozin (Invokana) 300 MG TABS Take 1 tablet (300 mg total) by mouth daily before breakfast  Qty: 90 tablet, Refills: 3    Associated Diagnoses: Type 2 diabetes mellitus with hyperglycemia, with long-term current use of insulin ([facility])      Continuous Blood Gluc  (FreeStyle Maris 14 Day [place]) ANTONELLA Continuous monitering for pt on insulin  Qty: 1 each, Refills: 0    Associated Diagnoses: Type 2 diabetes mellitus without complication, without long-term current use of insulin ([facility])      Continuous Blood Gluc Sensor (FreeStyle Maris 14 Day Sensor) MISC Continuous monitering for pt on insulin  Qty: 6 each, Refills: 3    Associated Diagnoses: Type 2 diabetes mellitus without complication, without long-term current use of insulin ([facility])      cyclobenzaprine (FLEXERIL) 10 mg tablet Take 1 tablet (10 mg total) by mouth 3 (three) times a day as needed for muscle spasms  Qty: 30 tablet, Refills: 1    Associated Diagnoses: Tendonitis of shoulder, right; Cervical radiculopathy; Cervicalgia      finasteride (PROSCAR) 5 mg tablet Take 1 tablet (5 mg total) by mouth daily  Qty: 90 tablet, Refills: 3    Associated Diagnoses: Benign prostatic hyperplasia with urinary frequency      glipiZIDE (GLUCOTROL) 5 mg tablet Take 1 tablet (5 mg total) by mouth 2 (two) times a day  Qty: 180 tablet, Refills: 3    Associated Diagnoses: Type 2 diabetes mellitus with hyperglycemia, with long-term current use of insulin ([facility])      HumaLOG Albert KwikPen 100 units/mL injection pen Inject 20 Units under the skin 2 (two) times a day with meals  Qty: 15 pen, Refills: 3    Associated Diagnoses: Type 2 diabetes mellitus with hyperglycemia, with long-term current use of insulin ([facility])      insulin glargine (Lantus SoloStar) 100 units/mL injection pen Inject 26 Units under the skin daily  Qty: 15 mL, Refills: 1    Associated Diagnoses: Type 2 diabetes mellitus with hyperglycemia, with long-term current use of insulin ([facility])      Insulin Pen Needle (Pen Needles) 32G X 4 MM MISC Check blood glucose at home [facility] and HS 4 times a day  Qty: 200 each, Refills: 3    Associated Diagnoses: Type 2 diabetes mellitus with hyperglycemia, without long-term current use of insulin (HCC)      levothyroxine 88 mcg tablet Take 1 tablet (88 mcg total) by mouth daily  Qty: 90 tablet, Refills: 3    Associated Diagnoses: Anemia, unspecified type; Type 2 diabetes mellitus without complication, without long-term current use of insulin (HCC)      metoprolol succinate (TOPROL-XL) 50 mg 24 hr tablet Take 1 tablet (50 mg total) by mouth daily  Qty: 90 tablet, Refills: 3    Associated Diagnoses: Chronic systolic congestive heart failure (HCC)      rosuvastatin (CRESTOR) 20 MG tablet Take 1 tablet (20 mg total) by mouth daily  Qty: 90 tablet, Refills: 3    Associated Diagnoses: Mixed hyperlipidemia      tamsulosin (FLOMAX) 0 4 mg Take 1 capsule (0 4 mg total) by mouth daily  Qty: 90 capsule, Refills: 3    Associated Diagnoses: Anemia, unspecified type; Type 2 diabetes mellitus without complication, without long-term current use of insulin (HCC)             No discharge procedures on file      PDMP Review     None          ED Provider  Electronically Signed by           Tigist Booker DO  06/30/22 8472

## 2022-06-30 NOTE — ED NOTES
Patient unable to remember name of daily medications and dosages, patient states to try calling daughter to see if she knows  Patient also states brought back of pills with him to ED, but none found in room at this time  Will attempt to call EMS garage to see if medications were left accidentally       Didi Arreola RN  06/30/22 9578

## 2022-06-30 NOTE — ASSESSMENT & PLAN NOTE
· Last documented ejection fraction 25%  · Judicious volume resuscitation  · Will hold beta-blocker for now, if pressures stay stable, will restart

## 2022-06-30 NOTE — ASSESSMENT & PLAN NOTE
· Possibly related to volume and or COVID infection  · Will hold on aggressive DKA protocol volume resuscitation given significant cardiomyopathy  · Trend chemistries overnight and consider additional volume pending anion gap progress

## 2022-07-01 ENCOUNTER — APPOINTMENT (INPATIENT)
Dept: NON INVASIVE DIAGNOSTICS | Facility: HOSPITAL | Age: 82
DRG: 177 | End: 2022-07-01
Payer: COMMERCIAL

## 2022-07-01 PROBLEM — R31.9 HEMATURIA: Status: ACTIVE | Noted: 2022-07-01

## 2022-07-01 PROBLEM — E87.2 HIGH ANION GAP METABOLIC ACIDOSIS: Status: RESOLVED | Noted: 2022-06-30 | Resolved: 2022-07-01

## 2022-07-01 PROBLEM — N17.9 ACUTE KIDNEY INJURY (HCC): Status: RESOLVED | Noted: 2022-06-30 | Resolved: 2022-07-01

## 2022-07-01 PROBLEM — E87.29 HIGH ANION GAP METABOLIC ACIDOSIS: Status: RESOLVED | Noted: 2022-06-30 | Resolved: 2022-07-01

## 2022-07-01 LAB
ALBUMIN SERPL BCP-MCNC: 2.6 G/DL (ref 3.5–5)
ALP SERPL-CCNC: 24 U/L (ref 46–116)
ALT SERPL W P-5'-P-CCNC: 24 U/L (ref 12–78)
ANION GAP SERPL CALCULATED.3IONS-SCNC: 12 MMOL/L (ref 4–13)
ANION GAP SERPL CALCULATED.3IONS-SCNC: 14 MMOL/L (ref 4–13)
ANION GAP SERPL CALCULATED.3IONS-SCNC: 16 MMOL/L (ref 4–13)
AORTIC ROOT: 3.2 CM
APICAL FOUR CHAMBER EJECTION FRACTION: 47 %
AST SERPL W P-5'-P-CCNC: 20 U/L (ref 5–45)
BASOPHILS # BLD AUTO: 0.01 THOUSANDS/ΜL (ref 0–0.1)
BASOPHILS NFR BLD AUTO: 0 % (ref 0–1)
BILIRUB SERPL-MCNC: 1.15 MG/DL (ref 0.2–1)
BUN SERPL-MCNC: 25 MG/DL (ref 5–25)
BUN SERPL-MCNC: 29 MG/DL (ref 5–25)
BUN SERPL-MCNC: 32 MG/DL (ref 5–25)
CA-I BLD-SCNC: 1.13 MMOL/L (ref 1.12–1.32)
CA-I BLD-SCNC: 1.14 MMOL/L (ref 1.12–1.32)
CALCIUM ALBUM COR SERPL-MCNC: 8.9 MG/DL (ref 8.3–10.1)
CALCIUM SERPL-MCNC: 7.5 MG/DL (ref 8.3–10.1)
CALCIUM SERPL-MCNC: 7.8 MG/DL (ref 8.3–10.1)
CALCIUM SERPL-MCNC: 8 MG/DL (ref 8.3–10.1)
CHLORIDE SERPL-SCNC: 109 MMOL/L (ref 100–108)
CHLORIDE SERPL-SCNC: 113 MMOL/L (ref 100–108)
CHLORIDE SERPL-SCNC: 113 MMOL/L (ref 100–108)
CO2 SERPL-SCNC: 19 MMOL/L (ref 21–32)
CO2 SERPL-SCNC: 21 MMOL/L (ref 21–32)
CO2 SERPL-SCNC: 22 MMOL/L (ref 21–32)
CREAT SERPL-MCNC: 1.14 MG/DL (ref 0.6–1.3)
CREAT SERPL-MCNC: 1.14 MG/DL (ref 0.6–1.3)
CREAT SERPL-MCNC: 1.18 MG/DL (ref 0.6–1.3)
EOSINOPHIL # BLD AUTO: 0 THOUSAND/ΜL (ref 0–0.61)
EOSINOPHIL NFR BLD AUTO: 0 % (ref 0–6)
ERYTHROCYTE [DISTWIDTH] IN BLOOD BY AUTOMATED COUNT: 16.4 % (ref 11.6–15.1)
FRACTIONAL SHORTENING: 27 (ref 28–44)
GFR SERPL CREATININE-BSD FRML MDRD: 57 ML/MIN/1.73SQ M
GFR SERPL CREATININE-BSD FRML MDRD: 60 ML/MIN/1.73SQ M
GFR SERPL CREATININE-BSD FRML MDRD: 60 ML/MIN/1.73SQ M
GLUCOSE SERPL-MCNC: 107 MG/DL (ref 65–140)
GLUCOSE SERPL-MCNC: 109 MG/DL (ref 65–140)
GLUCOSE SERPL-MCNC: 111 MG/DL (ref 65–140)
GLUCOSE SERPL-MCNC: 123 MG/DL (ref 65–140)
GLUCOSE SERPL-MCNC: 127 MG/DL (ref 65–140)
GLUCOSE SERPL-MCNC: 136 MG/DL (ref 65–140)
GLUCOSE SERPL-MCNC: 159 MG/DL (ref 65–140)
GLUCOSE SERPL-MCNC: 220 MG/DL (ref 65–140)
GLUCOSE SERPL-MCNC: 233 MG/DL (ref 65–140)
GLUCOSE SERPL-MCNC: 238 MG/DL (ref 65–140)
GLUCOSE SERPL-MCNC: 238 MG/DL (ref 65–140)
GLUCOSE SERPL-MCNC: 270 MG/DL (ref 65–140)
GLUCOSE SERPL-MCNC: 55 MG/DL (ref 65–140)
GLUCOSE SERPL-MCNC: 82 MG/DL (ref 65–140)
GLUCOSE SERPL-MCNC: 91 MG/DL (ref 65–140)
HCT VFR BLD AUTO: 34.6 % (ref 36.5–49.3)
HGB BLD-MCNC: 11.6 G/DL (ref 12–17)
IMM GRANULOCYTES # BLD AUTO: 0.03 THOUSAND/UL (ref 0–0.2)
IMM GRANULOCYTES NFR BLD AUTO: 0 % (ref 0–2)
INTERVENTRICULAR SEPTUM IN DIASTOLE (PARASTERNAL SHORT AXIS VIEW): 0.8 CM
INTERVENTRICULAR SEPTUM: 0.8 CM (ref 0.6–1.1)
LAAS-AP2: 15.5 CM2
LAAS-AP4: 14.2 CM2
LEFT ATRIUM AREA SYSTOLE SINGLE PLANE A4C: 15.2 CM2
LEFT ATRIUM SIZE: 4.5 CM
LEFT INTERNAL DIMENSION IN SYSTOLE: 3.8 CM (ref 2.1–4)
LEFT VENTRICULAR INTERNAL DIMENSION IN DIASTOLE: 5.2 CM (ref 3.5–6)
LEFT VENTRICULAR POSTERIOR WALL IN END DIASTOLE: 1.1 CM
LEFT VENTRICULAR STROKE VOLUME: 70 ML
LVSV (TEICH): 70 ML
LYMPHOCYTES # BLD AUTO: 0.64 THOUSANDS/ΜL (ref 0.6–4.47)
LYMPHOCYTES NFR BLD AUTO: 9 % (ref 14–44)
MAGNESIUM SERPL-MCNC: 2.4 MG/DL (ref 1.6–2.6)
MAGNESIUM SERPL-MCNC: 2.4 MG/DL (ref 1.6–2.6)
MCH RBC QN AUTO: 36.5 PG (ref 26.8–34.3)
MCHC RBC AUTO-ENTMCNC: 33.2 G/DL (ref 31.4–37.4)
MCV RBC AUTO: 110 FL (ref 82–98)
MONOCYTES # BLD AUTO: 0.39 THOUSAND/ΜL (ref 0.17–1.22)
MONOCYTES NFR BLD AUTO: 5 % (ref 4–12)
NEUTROPHILS # BLD AUTO: 6.19 THOUSANDS/ΜL (ref 1.85–7.62)
NEUTS SEG NFR BLD AUTO: 86 % (ref 43–75)
NRBC BLD AUTO-RTO: 0 /100 WBCS
PHOSPHATE SERPL-MCNC: 2.6 MG/DL (ref 2.3–4.1)
PLATELET # BLD AUTO: 157 THOUSANDS/UL (ref 149–390)
PMV BLD AUTO: 11.1 FL (ref 8.9–12.7)
POTASSIUM SERPL-SCNC: 3.9 MMOL/L (ref 3.5–5.3)
POTASSIUM SERPL-SCNC: 4 MMOL/L (ref 3.5–5.3)
POTASSIUM SERPL-SCNC: 4.1 MMOL/L (ref 3.5–5.3)
PROT SERPL-MCNC: 5.6 G/DL (ref 6.4–8.2)
RBC # BLD AUTO: 3.15 MILLION/UL (ref 3.88–5.62)
RIGHT ATRIUM AREA SYSTOLE A4C: 12.2 CM2
RIGHT VENTRICLE ID DIMENSION: 4 CM
SL CV LEFT ATRIUM LENGTH A2C: 4.7 CM
SL CV PED ECHO LEFT VENTRICLE DIASTOLIC VOLUME (MOD BIPLANE) 2D: 131 ML
SL CV PED ECHO LEFT VENTRICLE SYSTOLIC VOLUME (MOD BIPLANE) 2D: 60 ML
SODIUM SERPL-SCNC: 143 MMOL/L (ref 136–145)
SODIUM SERPL-SCNC: 148 MMOL/L (ref 136–145)
SODIUM SERPL-SCNC: 148 MMOL/L (ref 136–145)
TR MAX PG: 32 MMHG
TR PEAK VELOCITY: 2.8 M/S
TRICUSPID VALVE PEAK REGURGITATION VELOCITY: 2.84 M/S
WBC # BLD AUTO: 7.26 THOUSAND/UL (ref 4.31–10.16)

## 2022-07-01 PROCEDURE — 97167 OT EVAL HIGH COMPLEX 60 MIN: CPT

## 2022-07-01 PROCEDURE — 82948 REAGENT STRIP/BLOOD GLUCOSE: CPT

## 2022-07-01 PROCEDURE — 84100 ASSAY OF PHOSPHORUS: CPT | Performed by: NURSE PRACTITIONER

## 2022-07-01 PROCEDURE — 93325 DOPPLER ECHO COLOR FLOW MAPG: CPT

## 2022-07-01 PROCEDURE — 82330 ASSAY OF CALCIUM: CPT | Performed by: NURSE PRACTITIONER

## 2022-07-01 PROCEDURE — 0T9B70Z DRAINAGE OF BLADDER WITH DRAINAGE DEVICE, VIA NATURAL OR ARTIFICIAL OPENING: ICD-10-PCS | Performed by: INTERNAL MEDICINE

## 2022-07-01 PROCEDURE — 85025 COMPLETE CBC W/AUTO DIFF WBC: CPT | Performed by: NURSE PRACTITIONER

## 2022-07-01 PROCEDURE — 80048 BASIC METABOLIC PNL TOTAL CA: CPT | Performed by: NURSE PRACTITIONER

## 2022-07-01 PROCEDURE — 99291 CRITICAL CARE FIRST HOUR: CPT | Performed by: STUDENT IN AN ORGANIZED HEALTH CARE EDUCATION/TRAINING PROGRAM

## 2022-07-01 PROCEDURE — 93306 TTE W/DOPPLER COMPLETE: CPT | Performed by: INTERNAL MEDICINE

## 2022-07-01 PROCEDURE — 93321 DOPPLER ECHO F-UP/LMTD STD: CPT

## 2022-07-01 PROCEDURE — 80053 COMPREHEN METABOLIC PANEL: CPT | Performed by: NURSE PRACTITIONER

## 2022-07-01 PROCEDURE — 83735 ASSAY OF MAGNESIUM: CPT | Performed by: NURSE PRACTITIONER

## 2022-07-01 PROCEDURE — 93308 TTE F-UP OR LMTD: CPT

## 2022-07-01 PROCEDURE — 97163 PT EVAL HIGH COMPLEX 45 MIN: CPT

## 2022-07-01 RX ORDER — INSULIN GLARGINE 100 [IU]/ML
26 INJECTION, SOLUTION SUBCUTANEOUS
Status: DISCONTINUED | OUTPATIENT
Start: 2022-07-02 | End: 2022-07-02

## 2022-07-01 RX ORDER — INSULIN GLARGINE 100 [IU]/ML
21 INJECTION, SOLUTION SUBCUTANEOUS
Status: COMPLETED | OUTPATIENT
Start: 2022-07-01 | End: 2022-07-01

## 2022-07-01 RX ORDER — INSULIN GLARGINE 100 [IU]/ML
5 INJECTION, SOLUTION SUBCUTANEOUS ONCE
Status: COMPLETED | OUTPATIENT
Start: 2022-07-01 | End: 2022-07-01

## 2022-07-01 RX ORDER — SODIUM CHLORIDE 450 MG/100ML
500 INJECTION, SOLUTION INTRAVENOUS ONCE
Status: COMPLETED | OUTPATIENT
Start: 2022-07-01 | End: 2022-07-01

## 2022-07-01 RX ORDER — INSULIN LISPRO 100 [IU]/ML
1-5 INJECTION, SOLUTION INTRAVENOUS; SUBCUTANEOUS
Status: DISCONTINUED | OUTPATIENT
Start: 2022-07-01 | End: 2022-07-14 | Stop reason: HOSPADM

## 2022-07-01 RX ORDER — DEXTROSE AND SODIUM CHLORIDE 5; .45 G/100ML; G/100ML
125 INJECTION, SOLUTION INTRAVENOUS CONTINUOUS
Status: DISCONTINUED | OUTPATIENT
Start: 2022-07-01 | End: 2022-07-01

## 2022-07-01 RX ADMIN — HEPARIN SODIUM 5000 UNITS: 5000 INJECTION INTRAVENOUS; SUBCUTANEOUS at 22:21

## 2022-07-01 RX ADMIN — METOPROLOL TARTRATE 25 MG: 25 TABLET, FILM COATED ORAL at 22:20

## 2022-07-01 RX ADMIN — INSULIN GLARGINE 21 UNITS: 100 INJECTION, SOLUTION SUBCUTANEOUS at 22:20

## 2022-07-01 RX ADMIN — HEPARIN SODIUM 5000 UNITS: 5000 INJECTION INTRAVENOUS; SUBCUTANEOUS at 15:41

## 2022-07-01 RX ADMIN — PRAVASTATIN SODIUM 80 MG: 80 TABLET ORAL at 15:47

## 2022-07-01 RX ADMIN — ASPIRIN 81 MG 81 MG: 81 TABLET ORAL at 08:21

## 2022-07-01 RX ADMIN — LEVOTHYROXINE SODIUM 88 MCG: 88 TABLET ORAL at 08:21

## 2022-07-01 RX ADMIN — HEPARIN SODIUM 5000 UNITS: 5000 INJECTION INTRAVENOUS; SUBCUTANEOUS at 06:30

## 2022-07-01 RX ADMIN — METOPROLOL TARTRATE 25 MG: 25 TABLET, FILM COATED ORAL at 11:15

## 2022-07-01 RX ADMIN — DEXTROSE AND SODIUM CHLORIDE 75 ML/HR: 5; .45 INJECTION, SOLUTION INTRAVENOUS at 01:34

## 2022-07-01 RX ADMIN — SODIUM CHLORIDE 500 ML: 0.45 INJECTION, SOLUTION INTRAVENOUS at 06:36

## 2022-07-01 RX ADMIN — DEXTROSE AND SODIUM CHLORIDE 125 ML/HR: 5; .45 INJECTION, SOLUTION INTRAVENOUS at 08:29

## 2022-07-01 RX ADMIN — INSULIN LISPRO 2 UNITS: 100 INJECTION, SOLUTION INTRAVENOUS; SUBCUTANEOUS at 15:44

## 2022-07-01 RX ADMIN — FINASTERIDE 5 MG: 5 TABLET, FILM COATED ORAL at 08:21

## 2022-07-01 RX ADMIN — INSULIN GLARGINE 5 UNITS: 100 INJECTION, SOLUTION SUBCUTANEOUS at 15:43

## 2022-07-01 RX ADMIN — TAMSULOSIN HYDROCHLORIDE 0.4 MG: 0.4 CAPSULE ORAL at 08:20

## 2022-07-01 NOTE — NURSING NOTE
The POC fingerstick blood sugar tests on 7/1/22 at 0007 (value 55) and 0008 (value 159) are inaccurate and not used for pt care

## 2022-07-01 NOTE — PHYSICAL THERAPY NOTE
Physical Therapy Evaluation     Patient's Name: Shayne Russo    Admitting Diagnosis  SOB (shortness of breath) [R06 02]  DKA (diabetic ketoacidosis) ([facility] [address] ) [E11 10]  Acute kidney injury ([facility] [address] ) [N17 9]  SAJBQ-91 [U07 1]    Problem List  Patient Active Problem List   Diagnosis    Anemia    Hyperlipidemia    BPH (benign prostatic hyperplasia)    Hypothyroidism    Foot pain, bilateral    Ischemic cardiomyopathy    Coronary atherosclerosis of native coronary artery    Urinary retention    Prostate cancer screening    Chronic obstructive pulmonary disease (HCC)    Type 2 diabetes mellitus with mild nonproliferative diabetic retinopathy without macular edema, bilateral (HCC)    Acute kidney injury ([facility] [address] )    High anion gap metabolic acidosis    CEFAY-20     Past Medical History  Past Medical History:   Diagnosis Date    Acquired cyst of kidney     Anemia     Benign paroxysmal vertigo, unspecified ear     Cellulitis of leg     Cervical spinal stenosis     Chest pain     Coronary atherosclerosis of native coronary artery     Enlarged prostate     Esophageal candidiasis (HCC)     Hematuria     Herpes zoster     Hyperlipidemia     Hypertension     Incomplete emptying of bladder     Inflamed seborrheic keratosis     Internal hemorrhoids     Malignant neoplasm of skin of trunk     Myocardial infarction ([facility] [address] )     Nonmelanoma skin cancer     LAST ASSESSED: 8/9/17    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 infarction     Paroxysmal tachycardia (HCC)     Shortness of breath     Vitamin B deficiency      Past Surgical History  Past Surgical History:   Procedure Laterality Date    CARDIAC DEFIBRILLATOR PLACEMENT      COLONOSCOPY  10/07/2008    FIBEROPTIC SCREENING; NO FURTHER RECOMMENDATIONS    CORONARY ANGIOPLASTY WITH STENT PLACEMENT      CORONARY ANGIOPLASTY WITH STENT PLACEMENT      CYSTOSCOPY  11/06/2015    DIAGNOSTIC; MANAGED BY: Nuha Baltazar    EGD AND COLONOSCOPY N/A 2/12/2018    Procedure: EGD AND COLONOSCOPY;  Surgeon: Jhonny Malone MD; Location: [facility]; Service: Gastroenterology    FL INJECTION RIGHT SHOULDER (ARTHROGRAM)  1/4/2022    HIP ARTHROPLASTY Right     INSERT / REPLACE / REMOVE PACEMAKER      JOINT REPLACEMENT Right     TRUNK SKIN LESION EXCISIONAL BIOPSY  08/22/2007    MALIGNANT; BCC CHEST      07/01/22 0744   PT Last Visit   PT Visit Date 07/01/22   Note Type   Note type Evaluation   Pain Assessment   Pain Assessment Tool 0-10   Pain Score No Pain   Restrictions/Precautions   Weight Bearing Precautions Per Order No   Braces or Orthoses Other (Comment)  (none per patient)   Other Precautions Contact/isolation; Airborne/isolation;Droplet precautions; Chair Alarm; Bed Alarm;Multiple lines;Telemetry; Fall Risk  (+COVID)   Home Living   Type of [address] [place] [address] Ave One level;Ramped entrance  (1 inside step)   Bathroom Shower/Tub Tub/shower unit   [employer] Raised   Bathroom Equipment Grab bars in shower; Shower chair   Bathroom Accessibility Accessible   Home Equipment Walker;Cane;Wheelchair-manual   Additional Comments Pt ambulates without an AD  Pt reports requiring increased assistance within the last week "   Prior Function   Level of [place] Independent with ADLs and functional mobility   Lives With Daughter   Receives Help From Family   ADL Assistance Independent   IADLs Needs assistance   Falls in the last 6 months 0   Vocational Retired   Comments (+) driving   General   Family/Caregiver Present No   Cognition   Overall Cognitive Status WFL   Arousal/Participation Alert   Orientation Level Oriented X4   Memory Within functional limits   Following Commands Follows all commands and directions without difficulty   Comments Pt agreeable to PT     Subjective   Subjective "I'm  hungry "   RUE Assessment   RUE Assessment   (defer to OT assessment)   LUE Assessment   LUE Assessment   (defer to OT assessment)   RLE Assessment   RLE Assessment X   Strength RLE   RLE Overall Strength 3+/5   LLE Assessment   LLE Assessment X Strength LLE   LLE Overall Strength 3+/5   Light Touch   RLE Light Touch Grossly intact   LLE Light Touch Grossly intact   Bed Mobility   Supine to Sit 4  Minimal assistance   Additional items Assist x 2;HOB elevated; Bedrails; Increased time required;Verbal cues;LE management   Transfers   Sit to Stand 4  Minimal assistance   Additional items Assist x 1; Increased time required;Verbal cues   Stand to Sit 4  Minimal assistance   Additional items Assist x 1; Increased time required;Verbal cues   Ambulation/Elevation   Gait pattern Decreased toe off;Decreased heel strike;Decreased hip extension; Excessively slow; Step to;Short stride; Shuffling   Gait Assistance 4  Minimal assist   Additional items Assist x 1;Verbal cues   Assistive Device Rolling walker   Distance 5 feet   Stair Management Assistance Not tested   Balance   Static Sitting Fair +   Dynamic Sitting Fair   Static Standing Fair -   Dynamic Standing Poor +   Ambulatory Poor +   Endurance Deficit   Endurance Deficit Yes   Endurance Deficit Description decreased activity tolerance; dypnea on exertion   Activity Tolerance   Activity Tolerance Patient limited by fatigue   Medical Staff Made Aware PATITO Murdock   Nurse Made Aware Discussed case with NICK Ch   Assessment   Prognosis Good   Problem List Decreased strength;Decreased endurance; Impaired balance;Decreased mobility   Assessment Pt is [de-identified]year old male seen for PT evaluation s/p admit to [facility] on 6/30/2022 with High anion gap metabolic acidosis  PT consulted to assess pt's functional mobility and d/c needs  Order placed for PT eval and tx, with up with assist and ambulate patient order  Comorbidities affecting pt's physical performance at time of assessment include hyperlipidemia, BPH, hypothyroidism, ischemic cardiomyopathy, coronary atherosclerosis of native coronary artery, type 2 DM, JESSA, and COVID-19  PTA, pt was independent with all functional mobility without an AD   Pt resides with his daughter in a one level house with a ramped entrance  Personal factors affecting pt at time of IE include ambulating with an assistive device, step inside the home, inability to ambulate household distances, inability to navigate community distances, inability to navigate level surfaces without external assistance, unable to perform dynamic tasks in community, inability to perform IADLs, and inability to perform ADLs  Please find objective findings from PT assessment regarding body systems outlined above with impairments and limitations including weakness, impaired balance, decreased endurance, gait deviations, decreased activity tolerance, decreased functional mobility tolerance, and fall risk  The following objective measures performed on IE also reveal limitations: Barthel Index: 45/100, Modified [place]: 4 (moderate/severe disability) and AM-PAC 6-Clicks: 00/79  Pt's clinical presentation is currently unstable/unpredictable seen in pt's presentation of need for ongoing medical management/monitoring, pt is a fall risk, pt is currently requiring use of RW for safe ambulation, and pt requires cues and assist for safety with functional mobility  Pt to benefit from continued PT tx to address deficits as defined above and maximize level of functional independent mobility and consistency  From PT/mobility standpoint, recommendation at time of d/c would be STR pending progress in order to facilitate return to PLOF  Barriers to Discharge Inaccessible home environment; Other (Comment)  (decline in functional status)   Goals   STG Expiration Date 07/11/22   Short Term Goal #1 In 10 days: Increase bilateral LE strength 1/2 grade to facilitate independent mobility, Perform all bed mobility tasks modified independent to decrease caregiver burden, Perform all transfers modified independent to improve independence, Ambulate > 100 ft  with RW with close supervision w/o LOB and w/ normalized gait pattern 100% of the time, Navigate 1 step with CG assist without handrail, with most appropriate AD, to facilitate return to previous living environment, and Increase all balance 1/2 grade to decrease risk for falls   Plan   Treatment/Interventions Functional transfer training;LE strengthening/ROM; Elevations; Therapeutic exercise; Endurance training;Patient/family training;Bed mobility;Gait training;Spoke to nursing;OT   PT Frequency 3-5x/wk   Recommendation   PT Discharge Recommendation Post acute rehabilitation services   AM-PAC Basic Mobility Inpatient   Turning in Bed Without Bedrails 3   Lying on Back to Sitting on Edge of Flat Bed 2   Moving Bed to Chair 3   Standing Up From Chair 3   Walk in Room 2   Climb 3-5 Stairs 1   Basic Mobility Inpatient Raw Score 14   Basic Mobility Standardized Score 35 55   Highest Level Of Mobility   -HLM Goal 4: Move to chair/commode   -HLM Achieved 4: Move to chair/commode   Modified Gilberto Scale   Modified Gilberto Scale 4   Barthel Index   Feeding 5   Bathing 0   Grooming Score 0   Dressing Score 5   Bladder Score 10   Bowels Score 10   Toilet Use Score 5   Transfers (Bed/Chair) Score 10   Mobility (Level Surface) Score 0   Stairs Score 0   Barthel Index Score 45     PT Evaluation Time: [id number]    Damion Hatch, PT, DPT

## 2022-07-01 NOTE — ASSESSMENT & PLAN NOTE
· Occurred after straight cath overnight  · Suspect related to trauma to prostate  · Mckeon catheter placed on 7/1  · Continue Flomax and finasteride

## 2022-07-01 NOTE — PROGRESS NOTES
[facility]  Progress Note - Doroteo Castellanos 1940, [de-identified] y o  male MRN: [id number]  Unit/Bed#:  Encounter: [id number]  Primary Care Provider: Isabel Graham MD   Date and time admitted to hospital: 6/30/2022  3:28 PM    COVID-19  Assessment & Plan  · Patient not presently on oxygen  · Will order remdesivir for now  · Escalate therapy as needed  · Encourage patient to obtain booster    High anion gap metabolic acidosis  Assessment & Plan  · Possibly related to volume and or COVID infection  · Will hold on aggressive DKA protocol volume resuscitation given significant cardiomyopathy  · Trend chemistries overnight and consider additional volume pending anion gap progress    Acute kidney injury ([facility] [address] )  Assessment & Plan  · Gentle volume resuscitation  · Close intake and output  · Daily weights  · Trend serum creatinine    Type 2 diabetes mellitus with mild nonproliferative diabetic retinopathy without macular edema, bilateral [facility])  Assessment & Plan  Lab Results   Component Value Date    HGBA1C 8 8 (H) 06/27/2022       Recent Labs     06/30/22  2203 07/01/22  0007 07/01/22  0008 07/01/22  0013   POCGLU 106 55* 159* 91       Blood Sugar Average: Last 72 hrs:  (P) 145 [id number]   · Continue non-DKA insulin infusion  · Frequent labs  · Gentle volume resuscitation  · Clear liquid diet    Coronary atherosclerosis of native coronary artery  Assessment & Plan  · Continue home statin  · Resume beta-blocker if pressures stay stable    Ischemic cardiomyopathy  Assessment & Plan  · Last documented ejection fraction 25%  · Judicious volume resuscitation  · Will hold beta-blocker for now, if pressures stay stable, will restart    Hypothyroidism  Assessment & Plan  · Continue home levothyroxine    BPH (benign prostatic hyperplasia)  Assessment & Plan  · Continue home finasteride and Flomax    Hyperlipidemia  Assessment & Plan  · Continue home statin      ----------------------------------------------------------------------------------------  HPI/24hr events: anion gap improving overnight with insulin at IVF     Patient appropriate for transfer out of the ICU today?: No  Disposition: Continue Stepdown Level 1 level of care   Code Status: Level 1 - Full Code  ---------------------------------------------------------------------------------------  SUBJECTIVE  "my mouth is dry"    Review of Systems  Review of systems was reviewed and negative unless stated above in HPI/24-hour events   ---------------------------------------------------------------------------------------  OBJECTIVE    Vitals   Vitals:    22 2300 22 0000 22 0100 22 0200   BP: 131/66 124/58 137/62 153/65   BP Location:  Left arm     Pulse: (!) 110 101 101 96   Resp: (!) 28 (!) 26 (!) 25 (!) 23   Temp:  98 °F (36 7 °C)     TempSrc:  Oral     SpO2: 96% 94% 95% 95%   Weight:       Height:         Temp (24hrs), Av 9 °F (36 6 °C), Min:97 6 °F (36 4 °C), Max:98 °F (36 7 °C)  Current: Temperature: 98 °F (36 7 °C)          Respiratory:  SpO2: SpO2: 95 %       Invasive/non-invasive ventilation settings   Respiratory  Report   Lab Data (Last 4 hours)    None         O2/Vent Data (Last 4 hours)    None                Physical Exam  Constitutional:       General: He is awake  Appearance: He is ill-appearing  HENT:      Head: Normocephalic and atraumatic  Mouth/Throat:      Mouth: Mucous membranes are dry  Dentition: Abnormal dentition  Eyes:      General: Lids are normal       Extraocular Movements: Extraocular movements intact  Conjunctiva/sclera: Conjunctivae normal    Neck:      Trachea: Trachea normal    Cardiovascular:      Rate and Rhythm: Normal rate  Pulses: Normal pulses  Pulmonary:      Effort: Pulmonary effort is normal       Breath sounds: Decreased breath sounds present  Abdominal:      Palpations: Abdomen is soft  Musculoskeletal:      Cervical back: Normal range of motion  Right lower leg: No edema  Left lower leg: No edema  Skin:     General: Skin is warm and dry  Neurological:      General: No focal deficit present  Mental Status: He is alert  GCS: GCS eye subscore is 4  GCS verbal subscore is 5  GCS motor subscore is 6  Sensory: Sensation is intact  Motor: Motor function is intact  Laboratory and Diagnostics:  Results from last 7 days   Lab Units 06/30/22  1603   WBC Thousand/uL 9 12   HEMOGLOBIN g/dL 15 7   HEMATOCRIT % 45 7   PLATELETS Thousands/uL 219   NEUTROS PCT % 87*   MONOS PCT % 5     Results from last 7 days   Lab Units 07/01/22  0009 06/30/22  1941/22  1604   SODIUM mmol/L 148* 140 144   POTASSIUM mmol/L 4 1 5 6* 4 4   CHLORIDE mmol/L 113* 107 101   CO2 mmol/L 21 17* 17*   ANION GAP mmol/L 14* 16* 26*   BUN mg/dL 32* 38* 35*   CREATININE mg/dL 1 14 1 22 1 57*   CALCIUM mg/dL 8 0* 8 2* 9 1   GLUCOSE RANDOM mg/dL 82 200* 265*   ALT U/L  --   --  34   AST U/L  --   --  33   ALK PHOS U/L  --   --  39*   ALBUMIN g/dL  --   --  3 3*   TOTAL BILIRUBIN mg/dL  --   --  1 37*     Results from last 7 days   Lab Units 07/01/22  0009 06/1940 06/30/22  1604   MAGNESIUM mg/dL 2 4  --  2 7*   PHOSPHORUS mg/dL  --  4 8*  --                Results from last 7 days   Lab Units 06/30/22  1849   LACTIC ACID mmol/L 1 7     ABG:    VBG:  Results from last 7 days   Lab Units 06/30/22 2027   PH MARY  7 275*   PCO2 MARY mm Hg 29 9*   PO2 MARY mm Hg 46 2*   HCO3 MARY mmol/L 13 6*   BASE EXC MARY mmol/L -11 9           Micro        EKG: ST   Imaging:   XR chest 2 views   ED Interpretation   Unchanged from prior      CT abdomen pelvis wo contrast   Final Result      No acute abdominopelvic pathology                 Workstation performed: [id number]           Intake and Output  I/O       06/29 0701 06/30 0700 06/30 0701 07/01 0700    I V  (mL/kg)  1454 9 (25 3)    IV Piggyback  2400 Total Intake(mL/kg)  3854 9 (67 2)    Urine (mL/kg/hr)  975    Total Output  975    Net  +2879 9              Height and Weights   Height: 5' 6" (167 6 cm)  IBW (Ideal Body Weight): 63 8 kg  Body mass index is 20 42 kg/m²  Weight (last 2 days)     Date/Time Weight    06/30/22 1535 57 4 (126 54)        Nutrition       Diet Orders   (From admission, onward)             Start     Ordered    06/30/22 2159  Diet NPO; Ice chips  Diet effective now        References:    Nutrtion Support Algorithm Enteral vs  Parenteral   Question Answer Comment   Diet Type NPO    NPO Except: Ice chips    RD to adjust diet per protocol?  Yes        06/30/22 2159              Active Medications  Scheduled Meds:  Current Facility-Administered Medications   Medication Dose Route Frequency Provider Last Rate    aspirin  81 mg Oral Daily Bryan Felipa, CRNP      dextrose 5 % and sodium chloride 0 45 %  75 mL/hr Intravenous Continuous Helga Troy, CRNP 75 mL/hr (07/01/22 0134)    finasteride  5 mg Oral Daily Bryan Felipa, CRNP      heparin (porcine)  5,000 Units Subcutaneous [facility] Bryan Felipa, CRNP      insulin regular (HumuLIN R,NovoLIN R) infusion  0 3-21 Units/hr Intravenous Titrated Bryan Felipa, CRNP 0 5 Units/hr (07/01/22 0153)    levothyroxine  88 mcg Oral Daily Bryan Felipa, CRNP      pravastatin  80 mg Oral Daily With [employer], CRNP      sodium chloride (PF)  3 mL Intravenous Q1H PRN Bryan Felipa, CRNP      tamsulosin  0 4 mg Oral Daily Bryan Felipa, CRNP       Continuous Infusions:  dextrose 5 % and sodium chloride 0 45 %, 75 mL/hr, Last Rate: 75 mL/hr (07/01/22 0134)  insulin regular (HumuLIN R,NovoLIN R) infusion, 0 3-21 Units/hr, Last Rate: 0 5 Units/hr (07/01/22 0153)      PRN Meds:   sodium chloride (PF), 3 mL, Q1H PRN      Invasive Devices Review  Invasive Devices  Report    Peripheral Intravenous Line  Duration           Peripheral IV 06/30/22 Left Antecubital <1 day Peripheral IV 06/30/22 Left Forearm <1 day    Peripheral IV 06/30/22 Proximal;Right;Ventral (anterior) Forearm <1 day                Rationale for remaining devices: NA   ---------------------------------------------------------------------------------------  Advance Directive and Living Will:      Power of :    POLST:    ---------------------------------------------------------------------------------------  Care Time Delivered:   No Critical Care time spent       MELODIE Harrison      Portions of the record may have been created with voice recognition software  Occasional wrong word or "sound a like" substitutions may have occurred due to the inherent limitations of voice recognition software    Read the chart carefully and recognize, using context, where substitutions have occurred

## 2022-07-01 NOTE — UTILIZATION REVIEW
Initial Clinical Review    Admission: Date/Time/Statement:   Admission Orders (From admission, onward)     Ordered        06/30/22 1750  Inpatient Admission  Once                      Orders Placed This Encounter   Procedures    Inpatient Admission     Standing Status:   Standing     Number of Occurrences:   1     Order Specific Question:   Level of Care     Answer:   Level 1 Stepdown [13]     Order Specific Question:   Estimated length of stay     Answer:   More than 2 Midnights     Order Specific Question:   Certification     Answer:   I certify that inpatient services are medically necessary for this patient for a duration of greater than two midnights  See H&P and MD Progress Notes for additional information about the patient's course of treatment  ED Arrival Information     Expected   -    Arrival   6/30/2022 15:28    Acuity   Emergent            Means of arrival   Ambulance    Escorted by   [facility]    Service   Anesthesiology    Admission type   Urgent            Arrival complaint   vomitting             Chief Complaint   Patient presents with    Shortness of Breath     Pt c/o of sob, vomitting diarrhea, out insulin for 2 days        Initial Presentation: [de-identified] y o  male  presents to the ED from home for evaluation of nausea and vomiting x3 days, associated with intermittent abdominal pain, chest pain, SOB and cough  PMH of DM, ischemic cardiomyopathy with AICD (EF 50%), chronic systolic congestive heart failure, HTN, HLD, BPH, COPD, acquired hypothyroidism, severe gastritis, and osteoarthritis   Patient has been unable to take his diabetic medications  ED labs revealed euglycemic DKA and JESSA (creatinine on 6/27/22 was 1 13)  Patient was incidentally found to have COVID-19 infection  Received two doses of vaccine  PE: Alert, GCS 15  Dry mucous membranes  Decreased breath sounds  Periumbilical abdominal tenderness       6/30 Plan: Inpatient admission for evaluation and treatment of  DM2, high anion gap metabolic acidosis,  JESSA and COVID 19:  Insulin infusion, gentle IV hydration  Hold aggressive DKA protocol volume resuscitation given significant cardiomyopathy, trend chemistries, clear liquid diet  7/1 Critical Care:  T2DM presented with hyperglycemia, elevated AG and pH 7 3 concerning for DKA  Admitted to ICU  Follow DKA protocol  AG still elevated at 16 today, however likely 2/2 hypernatremia  Will start diet today  Stop D5 1/2NS  Incidentally found to be COVID positive on admission  Received 2 doses of vaccine  Asymptomatic, on room air  Ischemic CM with EF 25% + AICD  Continue ASA  Re-start metoprolol today  BPH - traumatic straight cath x 2 yesterday  Has bloody urine output  Plan to place cortez back in today  PE: AOx3, lungs clear    Physical Therapy recommending post acute rehab at discharge           ED Triage Vitals   Temperature Pulse Respirations Blood Pressure SpO2   06/30/22 1530 06/30/22 1530 06/30/22 1530 06/30/22 1530 06/30/22 1530   97 6 °F (36 4 °C) (!) 118 (!) 24 135/60 98 %      Temp Source Heart Rate Source Patient Position - Orthostatic VS BP Location FiO2 (%)   06/30/22 1530 06/30/22 1530 06/30/22 1800 06/30/22 1530 --   Oral Monitor Lying Right arm       Pain Score       06/30/22 1530       No Pain          Wt Readings from Last 1 Encounters:   07/01/22 57 2 kg (126 lb)     Additional Vital Signs:         Date/Time Temp Pulse Resp BP MAP (mmHg) SpO2 O2 Device   07/01/22 0910 -- 140 Abnormal  -- 116/63 -- -- --   07/01/22 0600 -- 140 Abnormal  31 Abnormal  116/63 81 95 % --   07/01/22 0500 -- 93 24 Abnormal  136/62 89 94 % --   07/01/22 0400 98 3 °F (36 8 °C) 96 26 Abnormal  126/60 87 95 % None (Room air)   07/01/22 0300 -- 101 28 Abnormal  138/65 93 95 % --   07/01/22 0200 -- 96 23 Abnormal  153/65 94 95 % --   07/01/22 0100 -- 101 25 Abnormal  137/62 89 95 % --   07/01/22 0000 98 °F (36 7 °C) 101 26 Abnormal  124/58 83 94 % None (Room air)   06/30/22 2300 -- 110 Abnormal  28 Abnormal  131/66 89 96 % --   06/30/22 2200 -- 97 30 Abnormal  126/60 87 94 % --   06/30/22 2103 98 °F (36 7 °C) 109 Abnormal  24 Abnormal  123/57 82 98 % None (Room air)   06/30/22 2051 -- 113 Abnormal  22 129/60 -- 97 % None (Room air)   06/30/22 2030 -- 113 Abnormal  24 Abnormal  129/60 87 96 % --   06/30/22 1800 -- 118 Abnormal  28 Abnormal  131/60 86 96 % None (Room air)         Pertinent Labs/Diagnostic Test Results:     XR chest 2 views   ED Interpretation by Olu Coffman DO (06/30 1626)   Unchanged from prior      Final Result by Shy Brizuela MD (07/01 6105)      No active pulmonary disease  CT abdomen pelvis wo contrast   Final Result by Martinez Corcoran MD (06/30 1631)      No acute abdominopelvic pathology          6/30 repeat EKG:    Sinus tachycardia  Left anterior fascicular block  Septal infarct (cited on or before 10-FEB-2018)  Lateral infarct , age undetermined  Prolonged QT  Abnormal ECG    6/30 EKG:  Sinus tachycardia  Left axis deviation  Septal infarct (cited on or before 10-FEB-2018)  Abnormal ECG      Results from last 7 days   Lab Units 06/30/22  1643   SARS-COV-2  Positive*     Results from last 7 days   Lab Units 07/01/22  0445 06/30/22  1603   WBC Thousand/uL 7 26 9 12   HEMOGLOBIN g/dL 11 6* 15 7   HEMATOCRIT % 34 6* 45 7   PLATELETS Thousands/uL 157 219   NEUTROS ABS Thousands/µL 6 19 7 93*         Results from last 7 days   Lab Units 07/01/22  1403 07/01/22  0445 07/01/22  0009 06/30/22  1941/22  1604   SODIUM mmol/L 143 148* 148* 140 144   POTASSIUM mmol/L 4 0 3 9 4 1 5 6* 4 4   CHLORIDE mmol/L 109* 113* 113* 107 101   CO2 mmol/L 22 19* 21 17* 17*   ANION GAP mmol/L 12 16* 14* 16* 26*   BUN mg/dL 25 29* 32* 38* 35*   CREATININE mg/dL 1 18 1 14 1 14 1 22 1 57*   EGFR ml/min/1 73sq m 57 60 60 55 41   CALCIUM mg/dL 7 5* 7 8* 8 0* 8 2* 9 1   CALCIUM, IONIZED mmol/L  --  1 13 1 14 1 09*  --    MAGNESIUM mg/dL  --  2 4 2 4  --  2 7*   PHOSPHORUS mg/dL  --  2 6  -- 4 8*  --      Results from last 7 days   Lab Units 07/01/22  0445 06/30/22  1604   AST U/L 20 33   ALT U/L 24 34   ALK PHOS U/L 24* 39*   TOTAL PROTEIN g/dL 5 6* 7 5   ALBUMIN g/dL 2 6* 3 3*   TOTAL BILIRUBIN mg/dL 1 15* 1 37*     Component Ref Range & Units 7/1/22 1236   POC Glucose 65 - 140 mg/dl 127           Lab Units 07/01/22  0607 07/01/22  0406 07/01/22  0151 07/01/22  0013 06/30/22  2203 06/30/22  2114 06/30/22 2002 06/30/22  1912   POC GLUCOSE mg/dl 123 238* 107 91 106 183* 184* 240*     Results from last 7 days   Lab Units 07/01/22  1403 07/01/22  0445 07/01/22  0009 06/30/22  1941/22  1604   GLUCOSE RANDOM mg/dL 270* 136 82 200* 265*         Results from last 7 days   Lab Units 06/27/22  0942   HEMOGLOBIN A1C % 8 8*   EAG mg/dL 206*     BETA-HYDROXYBUTYRATE   Date Value Ref Range Status   06/30/2022 6 6 (H) <0 6 mmol/L Final          Results from last 7 days   Lab Units 06/30/22 2027 06/30/22  1730   PH MARY  7 275* 7 307   PCO2 MARY mm Hg 29 9* 27 1*   PO2 MARY mm Hg 46 2* 56 4*   HCO3 MARY mmol/L 13 6* 13 2*   BASE EXC MARY mmol/L -11 9 -11 3   O2 CONTENT MARY ml/dL 14 2 17 6   O2 HGB, VENOUS % 75 8 85 3*             Results from last 7 days   Lab Units 06/30/22 2205 06/30/22  1941/22  1604   HS TNI 0HR ng/L  --   --  35   HS TNI 2HR ng/L  --  24  --    HSTNI D2 ng/L  --  -11  --    HS TNI 4HR ng/L 43  --   --    HSTNI D4 ng/L 8  --   --        Results from last 7 days   Lab Units 06/30/22  1849   LACTIC ACID mmol/L 1 7             Results from last 7 days   Lab Units 06/30/22  1604   LIPASE u/L 105       Results from last 7 days   Lab Units 06/30/22  1643   CLARITY UA  Clear   COLOR UA  Yellow   SPEC GRAV UA  1 025   PH UA  5 5   GLUCOSE UA mg/dl >=1000 (1%)*   KETONES UA mg/dl 40 (2+)*   BLOOD UA  Small*   PROTEIN UA mg/dl 30 (1+)*   NITRITE UA  Negative   BILIRUBIN UA  Negative   UROBILINOGEN UA E U /dl 0 2   LEUKOCYTES UA  Elevated glucose may cause decreased leukocyte values   See urine microscopic for [facility] result/*   WBC UA /hpf 2-4   RBC UA /hpf None Seen   BACTERIA UA /hpf None Seen   EPITHELIAL CELLS WET PREP /hpf None Seen     Results from last 7 days   Lab Units 06/30/22  1643   INFLUENZA A PCR  Negative   INFLUENZA B PCR  Negative   RSV PCR  Negative             ED Treatment:   Medication Administration from 06/30/2022 1528 to 06/30/2022 2100       Date/Time Order Dose Route Action     06/30/2022 1811 sodium chloride 0 9 % bolus 1,000 mL 0 mL Intravenous Stopped     06/30/2022 1711 sodium chloride 0 9 % bolus 1,000 mL 1,000 mL Intravenous New Bag     06/30/2022 1711 ondansetron (ZOFRAN) injection 4 mg 4 mg Intravenous Given     06/30/2022 1714 morphine injection 4 mg 4 mg Intravenous Given     06/30/2022 2049 potassium chloride 20 mEq IVPB (premix) 0 mEq Intravenous Stopped     06/30/2022 1849 potassium chloride 20 mEq IVPB (premix) 20 mEq Intravenous New Bag     06/30/2022 1917 sodium chloride 0 9 % bolus 1,000 mL 0 mL Intravenous Stopped     06/30/2022 1850 sodium chloride 0 9 % bolus 1,000 mL 1,000 mL Intravenous New Bag     06/30/2022 2050 pravastatin (PRAVACHOL) tablet 80 mg 80 mg Oral Given     06/30/2022 2005 insulin regular (HumuLIN R,NovoLIN R) 1 Units/mL in sodium chloride 0 9 % 100 mL infusion 2 Units/hr Intravenous Rate/Dose Change     06/30/2022 1925 insulin regular (HumuLIN R,NovoLIN R) 1 Units/mL in sodium chloride 0 9 % 100 mL infusion 4 Units/hr Intravenous New Bag     06/30/2022 2045 albumin human (FLEXBUMIN) 5 % injection 12 5 g 0 g Intravenous Stopped     06/30/2022 2024 albumin human (FLEXBUMIN) 5 % injection 12 5 g 12 5 g Intravenous New Bag     06/30/2022 2045 multi-electrolyte (PLASMALYTE-A/ISOLYTE-S PH 7 4) IV solution 75 mL/hr Intravenous New Bag        Past Medical History:   Diagnosis Date    Acquired cyst of kidney     Anemia     Benign paroxysmal vertigo, unspecified ear     Cellulitis of leg     Cervical spinal stenosis     Chest pain     Coronary atherosclerosis of native coronary artery     Enlarged prostate     Esophageal candidiasis (HCC)     Hematuria     Herpes zoster     Hyperlipidemia     Hypertension     Incomplete emptying of bladder     Inflamed seborrheic keratosis     Internal hemorrhoids     Malignant neoplasm of skin of trunk     [facility] ([facility] )     Nonmelanoma skin cancer     LAST ASSESSED: 8/9/17    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 infarction     Paroxysmal tachycardia (HCC)     Shortness of breath     Vitamin B deficiency      Present on Admission:   (Resolved) Acute kidney injury ([place] [address] )   Ischemic cardiomyopathy   Coronary atherosclerosis of native coronary artery   Type 2 diabetes mellitus with mild nonproliferative diabetic retinopathy without macular edema, bilateral (HCC)   (Resolved) High anion gap metabolic acidosis   ONPEC-07   Hyperlipidemia   BPH (benign prostatic hyperplasia)   Hypothyroidism      Admitting Diagnosis: SOB (shortness of breath) [R06 02]  DKA (diabetic ketoacidosis) ([place] [address] ) [E11 10]  Acute kidney injury ([place] [address] ) [N17 9]  COVID-19 [U07 1]  Age/Sex: [de-identified] y o  male       Admission Orders: Cardio-Pulmonary monitoring, SCD, PT/OT, NPO with sips of clear liquids         Scheduled Medications:    aspirin, 81 mg, Oral, Daily  finasteride, 5 mg, Oral, Daily  heparin (porcine), 5,000 Units, Subcutaneous, Q8H GABRIEL  insulin glargine, 21 Units, Subcutaneous, HS  [START ON 7/2/2022] insulin glargine, 26 Units, Subcutaneous, HS  insulin glargine, 5 Units, Subcutaneous, Once  insulin lispro, 1-5 Units, Subcutaneous, TID AC  insulin lispro, 1-5 Units, Subcutaneous, HS  levothyroxine, 88 mcg, Oral, Daily  metoprolol tartrate, 25 mg, Oral, Q12H GABRIEL  pravastatin, 80 mg, Oral, Daily With Dinner  tamsulosin, 0 4 mg, Oral, Daily      Continuous IV Infusions:      dextrose 5 % and sodium chloride 0 45 % infusion  Rate: 125 mL/hr Dose: 125 mL/hr  Freq: Continuous Route: IV  Indications of Use: IV Hydration  Last Dose: 125 mL/hr (07/01/22 0829)  Start: 07/01/22 0130 End: 07/01/22 1432    insulin regular (HumuLIN R,NovoLIN R) 1 Units/mL in sodium chloride 0 9 % 100 mL infusion  Rate: 0 3-21 mL/hr Dose: 0 3-21 Units/hr  Freq: Titrated Route: IV  Last Dose: Stopped (07/01/22 1457)  Start: 06/30/22 1915 End: 07/01/22 1432    multi-electrolyte (PLASMALYTE-A/ISOLYTE-S PH 7 4) IV solution  Rate: 75 mL/hr Dose: 75 mL/hr  Freq: Continuous Route: IV  Indications of Use: IV Hydration  Last Dose: Stopped (07/01/22 0153)  Start: 06/30/22 1945 End: 07/01/22 0121      PRN Meds: None  IP CONSULT TO CASE MANAGEMENT    Network Utilization Review Department  ATTENTION: Please call with any questions or concerns to [phone] and carefully listen to the prompts so that you are directed to the right person  All voicemails are confidential   Lynette Kussmaul all requests for admission clinical reviews, approved or denied determinations and any other requests to dedicated fax number below belonging to the campus where the patient is receiving treatment   List of dedicated fax numbers for the Facilities:  [address] DENIALS (Administrative/Medical Necessity) [phone]   [address] (Maternity/NICU/Pediatrics) [phone]   [address]  [address] [facility] [place] [address] [address] [place] [address] [phone] [place] [address] [phone]

## 2022-07-01 NOTE — ASSESSMENT & PLAN NOTE
Lab Results   Component Value Date    HGBA1C 8 8 (H) 06/27/2022       Recent Labs     06/30/22 2203 07/01/22 0007 07/01/22  0008 07/01/22  0013   POCGLU 106 55* 159* 91       Blood Sugar Average: Last 72 hrs:  (P) 145 [id number]   · Continue non-DKA insulin infusion  · Frequent labs  · Gentle volume resuscitation  · Clear liquid diet

## 2022-07-01 NOTE — PLAN OF CARE
Problem: PHYSICAL THERAPY ADULT  Goal: Performs mobility at highest level of function for planned discharge setting  See evaluation for individualized goals  Description: Treatment/Interventions: Functional transfer training, LE strengthening/ROM, Elevations, Therapeutic exercise, Endurance training, Patient/family training, Bed mobility, Gait training, Spoke to nursing, OT          See flowsheet documentation for full assessment, interventions and recommendations  Note: Prognosis: Good  Problem List: Decreased strength, Decreased endurance, Impaired balance, Decreased mobility  Assessment: Pt is [de-identified]year old male seen for PT evaluation s/p admit to [facility] on 6/30/2022 with High anion gap metabolic acidosis  PT consulted to assess pt's functional mobility and d/c needs  Order placed for PT eval and tx, with up with assist and ambulate patient order  Comorbidities affecting pt's physical performance at time of assessment include hyperlipidemia, BPH, hypothyroidism, ischemic cardiomyopathy, coronary atherosclerosis of native coronary artery, type 2 DM, JESSA, and COVID-19  PTA, pt was independent with all functional mobility without an AD  Pt resides with his daughter in a one level house with a ramped entrance  Personal factors affecting pt at time of IE include ambulating with an assistive device, step inside the home, inability to ambulate household distances, inability to navigate community distances, inability to navigate level surfaces without external assistance, unable to perform dynamic tasks in community, inability to perform IADLs, and inability to perform ADLs  Please find objective findings from PT assessment regarding body systems outlined above with impairments and limitations including weakness, impaired balance, decreased endurance, gait deviations, decreased activity tolerance, decreased functional mobility tolerance, and fall risk   The following objective measures performed on IE also reveal limitations: Barthel Index: 45/100, Modified Amador: 4 (moderate/severe disability) and AM-PAC 6-Clicks: 20/58  Pt's clinical presentation is currently unstable/unpredictable seen in pt's presentation of need for ongoing medical management/monitoring, pt is a fall risk, pt is currently requiring use of RW for safe ambulation, and pt requires cues and assist for safety with functional mobility  Pt to benefit from continued PT tx to address deficits as defined above and maximize level of functional independent mobility and consistency  From PT/mobility standpoint, recommendation at time of d/c would be STR pending progress in order to facilitate return to PLOF  Barriers to Discharge: Inaccessible home environment, Other (Comment) (decline in functional status)     PT Discharge Recommendation: Post acute rehabilitation services     See flowsheet documentation for full assessment

## 2022-07-01 NOTE — ASSESSMENT & PLAN NOTE
· Patient presently not symptomatic  · Not on any treatment at the time of ICU downgraded  · No intervention indicated at this time  · Follow-up with inflammatory markers  · Continue incentive spirometry use, self proning as able  · Continue supportive care

## 2022-07-01 NOTE — ASSESSMENT & PLAN NOTE
Lab Results   Component Value Date    HGBA1C 8 8 (H) 06/27/2022       Recent Labs     07/01/22  0151 07/01/22  0406 07/01/22  0607 07/01/22  1236   POCGLU 107 238* 123 127       Blood Sugar Average: Last 72 hrs:  (P) 821 [id number]     · DKA has now resolved  · Downgraded form ICU  · Currently transition to subcu insulin  · Patient did receive Lantus 20 units last night and this morning noted with low fingerstick glucose  · Will decrease Lantus to 15 units q h s  Geovanna Marianna · Continue diabetic diet, sliding scale coverage  67

## 2022-07-01 NOTE — H&P
[facility]  H&P- Presley Vela 1940, [de-identified] y o  male MRN: [id number]  Unit/Bed#:  Encounter: [id number]  Primary Care Provider: Honey Garcia MD   Date and time admitted to hospital: 6/30/2022  3:28 PM    COVID-19  Assessment & Plan  · Patient not presently on oxygen  · Will order remdesivir for now  · Escalate therapy as needed  · Encourage patient to obtain booster    High anion gap metabolic acidosis  Assessment & Plan  · Possibly related to volume and or COVID infection  · Will hold on aggressive DKA protocol volume resuscitation given significant cardiomyopathy  · Trend chemistries overnight and consider additional volume pending anion gap progress    Acute kidney injury ([facility] [address] )  Assessment & Plan  · Gentle volume resuscitation  · Close intake and output  · Daily weights  · Trend serum creatinine    Type 2 diabetes mellitus with mild nonproliferative diabetic retinopathy without macular edema, bilateral ([facility] [address] )  Assessment & Plan  Lab Results   Component Value Date    HGBA1C 8 8 (H) 06/27/2022       No results for input(s): POCGLU in the last 72 hours      Blood Sugar Average: Last 72 hrs:     · Will order non-DKA insulin infusion  · Frequent labs  · Gentle volume resuscitation  · Clear liquid diet    Coronary atherosclerosis of native coronary artery  Assessment & Plan  · Continue home statin  · Resume beta-blocker if pressures stay stable    Ischemic cardiomyopathy  Assessment & Plan  · Last documented ejection fraction 25%  · Judicious volume resuscitation  · Will hold beta-blocker for now, if pressures stay stable, will restart    Hypothyroidism  Assessment & Plan  · Continue home levothyroxine    BPH (benign prostatic hyperplasia)  Assessment & Plan  · Continue home finasteride and Flomax    Hyperlipidemia  Assessment & Plan  · Continue home statin      -------------------------------------------------------------------------------------------------------------  Chief Complaint: nausea and vomiting     History of Present Illness   HX and PE limited by: none   Brenton Brizuela is a [de-identified] y o  male who presents with a past medical history of DM, ischemic cardiomyopathy with AICD, chronic systolic congestive heart failure, HTN, HLD, BPH, COPD, acquired hypothyroidism, severe gastritis, and osteoarthritis  He presents for evaluation of 3 days worth of nausea and vomiting  He has been unable to take his diabetic medications  The vomiting has been associated with intermittent abdominal pain, shortness of breath, cough and intermittent chest pain  He denies fever or chills, dysuria, or lower extremity edema  In the emergency department, laboratory data was notable for euglycemic DKA and incidentally found COVID-19 infection  He is referred to the step down unit for further management and care  History obtained from chart review and the patient   -------------------------------------------------------------------------------------------------------------  Dispo: Admit to Stepdown Level 1    Code Status: Level 1 - Full Code  --------------------------------------------------------------------------------------------------------------  Review of Systems   Constitutional: Positive for fatigue  Negative for chills and fever  HENT: Negative  Eyes: Negative  Respiratory: Positive for cough  Cardiovascular: Negative  Gastrointestinal: Positive for nausea and vomiting  Negative for diarrhea  Endocrine: Negative  Genitourinary: Negative  Musculoskeletal: Negative  Skin: Negative  Allergic/Immunologic: Negative  Neurological: Positive for dizziness and weakness  Hematological: Negative  Psychiatric/Behavioral: Negative  A 12-point, complete review of systems was reviewed and negative except as stated above     Physical Exam  Constitutional:       General: He is awake  Appearance: He is ill-appearing     HENT:      Head: Normocephalic and atraumatic  Mouth/Throat:      Mouth: Mucous membranes are dry  Dentition: Abnormal dentition  Eyes:      General: Lids are normal       Extraocular Movements: Extraocular movements intact  Conjunctiva/sclera: Conjunctivae normal    Neck:      Trachea: Trachea normal    Cardiovascular:      Rate and Rhythm: Normal rate  Pulses: Normal pulses  Pulmonary:      Effort: Pulmonary effort is normal       Breath sounds: Normal breath sounds  Abdominal:      Palpations: Abdomen is soft  Tenderness: There is abdominal tenderness in the periumbilical area  Musculoskeletal:      Cervical back: Normal range of motion  Right lower leg: No edema  Left lower leg: No edema  Skin:     General: Skin is warm and dry  Neurological:      General: No focal deficit present  Mental Status: He is alert  GCS: GCS eye subscore is 4  GCS verbal subscore is 5  GCS motor subscore is 6  Sensory: Sensation is intact  Motor: Motor function is intact        -------------------------------------------------------------------------------------------------------------Vitals:   Vitals:    06/30/22 1535 06/30/22 1800 06/30/22 2030 06/30/22 2051   BP:  131/60 129/60 129/60   BP Location:  Right arm  Right arm   Pulse:  (!) 118 (!) 113 (!) 113   Resp:  (!) 28 (!) 24 22   Temp:       TempSrc:       SpO2:  96% 96% 97%   Weight: 57 4 kg (126 lb 8 7 oz)      Height:         Temp  Min: 97 6 °F (36 4 °C)  Max: 97 6 °F (36 4 °C)  IBW (Ideal Body Weight): 63 8 kg  Height: 5' 6" (167 6 cm)  Body mass index is 20 42 kg/m²      Laboratory and Diagnostics:  Results from last 7 days   Lab Units 06/30/22  1603   WBC Thousand/uL 9 12   HEMOGLOBIN g/dL 15 7   HEMATOCRIT % 45 7   PLATELETS Thousands/uL 219   NEUTROS PCT % 87*   MONOS PCT % 5     Results from last 7 days   Lab Units 06/1940 06/30/22  1604   SODIUM mmol/L 140 144   POTASSIUM mmol/L 5 6* 4 4   CHLORIDE mmol/L 107 101   CO2 mmol/L 17* 17* ANION GAP mmol/L 16* 26*   BUN mg/dL 38* 35*   CREATININE mg/dL 1 22 1 57*   CALCIUM mg/dL 8 2* 9 1   GLUCOSE RANDOM mg/dL 200* 265*   ALT U/L  --  34   AST U/L  --  33   ALK PHOS U/L  --  39*   ALBUMIN g/dL  --  3 3*   TOTAL BILIRUBIN mg/dL  --  1 37*     Results from last 7 days   Lab Units 06/30/22  1941/22  1604   MAGNESIUM mg/dL  --  2 7*   PHOSPHORUS mg/dL 4 8*  --                Results from last 7 days   Lab Units 06/30/22  1849   LACTIC ACID mmol/L 1 7     ABG:    VBG:  Results from last 7 days   Lab Units 06/30/22 2027   PH MARY  7 275*   PCO2 MARY mm Hg 29 9*   PO2 MARY mm Hg 46 2*   HCO3 MARY mmol/L 13 6*   BASE EXC MARY mmol/L -11 9           Micro:        EKG: NSR   Imaging:   XR chest 2 views   ED Interpretation   Unchanged from prior      CT abdomen pelvis wo contrast   Final Result      No acute abdominopelvic pathology                 Workstation performed: [id number]               Historical Information   Past Medical History:   Diagnosis Date    Acquired cyst of kidney     Anemia     Benign paroxysmal vertigo, unspecified ear     Cellulitis of leg     Cervical spinal stenosis     Chest pain     Coronary atherosclerosis of native coronary artery     Enlarged prostate     Esophageal candidiasis (HCC)     Hematuria     Herpes zoster     Hyperlipidemia     Hypertension     Incomplete emptying of bladder     Inflamed seborrheic keratosis     Internal hemorrhoids     Malignant neoplasm of skin of trunk     Myocardial infarction ([facility] [address] )     Nonmelanoma skin cancer     LAST ASSESSED: 8/9/17    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 infarction     Paroxysmal tachycardia (HCC)     Shortness of breath     Vitamin B deficiency      Past Surgical History:   Procedure Laterality Date    CARDIAC DEFIBRILLATOR PLACEMENT      COLONOSCOPY  10/07/2008    FIBEROPTIC SCREENING; NO FURTHER RECOMMENDATIONS    CORONARY ANGIOPLASTY WITH STENT PLACEMENT      CORONARY ANGIOPLASTY WITH STENT PLACEMENT      CYSTOSCOPY 2015    DIAGNOSTIC; MANAGED BY: Jass Lou    EGD AND COLONOSCOPY N/A 2018    Procedure: EGD AND COLONOSCOPY;  Surgeon: Hiren Valenzuela MD;  Location: [facility];   Service: Gastroenterology    FL INJECTION RIGHT SHOULDER (ARTHROGRAM)  2022    HIP ARTHROPLASTY Right     INSERT / REPLACE / REMOVE PACEMAKER      JOINT REPLACEMENT Right     TRUNK SKIN LESION EXCISIONAL BIOPSY  2007    MALIGNANT; [address] CHEST     Social History   Social History     Substance and Sexual Activity   Alcohol Use Yes    Comment: occasionally 1-2 per month      Social History     Substance and Sexual Activity   Drug Use No     Social History     Tobacco Use   Smoking Status Former Smoker    Packs/day: 1 00    Years: 10 00    Pack years: 10 00    Types: Cigarettes    Quit date: 1978    Years since quittin 3   Smokeless Tobacco Never Used     Family History:   Family History   Problem Relation Age of Onset    Heart disease Mother     Coronary artery disease Mother     Sudden death Mother     Cancer Father         throat; MALIGNANT NEOPLASM OF HEAD, FACE OR NECK    Throat cancer Father     Cancer Family     Coronary artery disease Family      I have reviewed this patient's family history and commented on sigificant items within the HPI      Medications:  Current Facility-Administered Medications   Medication Dose Route Frequency    [START ON 2022] aspirin chewable tablet 81 mg  81 mg Oral Daily    [START ON 2022] finasteride (PROSCAR) tablet 5 mg  5 mg Oral Daily    heparin (porcine) subcutaneous injection 5,000 Units  5,000 Units Subcutaneous Q8H [address]    insulin regular (HumuLIN R,NovoLIN R) 1 Units/mL in sodium chloride 0 9 % 100 mL infusion  0 3-21 Units/hr Intravenous Titrated    [START ON 2022] levothyroxine tablet 88 mcg  88 mcg Oral Daily    multi-electrolyte (PLASMALYTE-A/ISOLYTE-S PH 7 4) IV solution  75 mL/hr Intravenous Continuous    pravastatin (PRAVACHOL) tablet 80 mg  80 mg Oral Daily With Dinner    sodium chloride (PF) 0 9 % injection 3 mL  3 mL Intravenous Q1H PRN    [START ON 7/1/2022] tamsulosin (FLOMAX) capsule 0 4 mg  0 4 mg Oral Daily     Home medications:  Prior to Admission Medications   Prescriptions Last Dose Informant Patient Reported? Taking? Canagliflozin (Invokana) 300 MG TABS   No No   Sig: Take 1 tablet (300 mg total) by mouth daily before breakfast   Continuous Blood Gluc  (FreeStyle Maris 14 Day [place]) ANTONELLA   No No   Sig: Continuous monitering for pt on insulin   Continuous Blood Gluc Sensor (FreeStyle Maris 14 Day Sensor) MISC   No No   Sig: Continuous monitering for pt on insulin   HumaLOG Albert KwikPen 100 units/mL injection pen  Self No No   Sig: Inject 20 Units under the skin 2 (two) times a day with meals   Insulin Pen Needle (Pen Needles) 32G X 4 MM MISC  Self No No   Sig: Check blood glucose at home [facility] and  4 times a day   aspirin 81 MG tablet  Self Yes No   Sig: Take 1 tablet by mouth daily   cyclobenzaprine (FLEXERIL) 10 mg tablet  Self No No   Sig: Take 1 tablet (10 mg total) by mouth 3 (three) times a day as needed for muscle spasms   finasteride (PROSCAR) 5 mg tablet   No No   Sig: Take 1 tablet (5 mg total) by mouth daily   glipiZIDE (GLUCOTROL) 5 mg tablet   No No   Sig: Take 1 tablet (5 mg total) by mouth 2 (two) times a day   insulin glargine (Lantus SoloStar) 100 units/mL injection pen   No No   Sig: Inject 26 Units under the skin daily   levothyroxine 88 mcg tablet   No No   Sig: Take 1 tablet (88 mcg total) by mouth daily   metoprolol succinate (TOPROL-XL) 50 mg 24 hr tablet   No No   Sig: Take 1 tablet (50 mg total) by mouth daily   rosuvastatin (CRESTOR) 20 MG tablet   No No   Sig: Take 1 tablet (20 mg total) by mouth daily   tamsulosin (FLOMAX) 0 4 mg   No No   Sig: Take 1 capsule (0 4 mg total) by mouth daily      Facility-Administered Medications: None     Allergies:   Allergies   Allergen Reactions    Atorvastatin Other (See Comments)     Pt unsure      Percocet [Oxycodone-Acetaminophen] Nausea Only and GI Intolerance       ------------------------------------------------------------------------------------------------------------  Advance Directive and Living Will:      Power of :    POLST:    ------------------------------------------------------------------------------------------------------------  Anticipated Length of Stay is > 2 midnights    Care Time Delivered:   Upon my evaluation, this patient had a high probability of imminent or life-threatening deterioration due to euglycemic DKA , which required my direct attention, intervention, and personal management  I have personally provided 39 minutes (2040 to 2109) of critical care time, exclusive of procedures, teaching, family meetings, and any prior time recorded by providers other than myself  MELODIE King        Portions of the record may have been created with voice recognition software  Occasional wrong word or "sound a like" substitutions may have occurred due to the inherent limitations of voice recognition software    Read the chart carefully and recognize, using context, where substitutions have occurred

## 2022-07-02 LAB
ALBUMIN SERPL BCP-MCNC: 2.4 G/DL (ref 3.5–5)
ANION GAP SERPL CALCULATED.3IONS-SCNC: 13 MMOL/L (ref 4–13)
BUN SERPL-MCNC: 24 MG/DL (ref 5–25)
CALCIUM SERPL-MCNC: 7.6 MG/DL (ref 8.3–10.1)
CHLORIDE SERPL-SCNC: 109 MMOL/L (ref 100–108)
CO2 SERPL-SCNC: 22 MMOL/L (ref 21–32)
CREAT SERPL-MCNC: 1.04 MG/DL (ref 0.6–1.3)
ERYTHROCYTE [DISTWIDTH] IN BLOOD BY AUTOMATED COUNT: 16.9 % (ref 11.6–15.1)
GFR SERPL CREATININE-BSD FRML MDRD: 67 ML/MIN/1.73SQ M
GLUCOSE SERPL-MCNC: 122 MG/DL (ref 65–140)
GLUCOSE SERPL-MCNC: 143 MG/DL (ref 65–140)
GLUCOSE SERPL-MCNC: 219 MG/DL (ref 65–140)
GLUCOSE SERPL-MCNC: 94 MG/DL (ref 65–140)
HCT VFR BLD AUTO: 32.8 % (ref 36.5–49.3)
HGB BLD-MCNC: 11.1 G/DL (ref 12–17)
MAGNESIUM SERPL-MCNC: 2.1 MG/DL (ref 1.6–2.6)
MCH RBC QN AUTO: 36.9 PG (ref 26.8–34.3)
MCHC RBC AUTO-ENTMCNC: 33.8 G/DL (ref 31.4–37.4)
MCV RBC AUTO: 109 FL (ref 82–98)
PHOSPHATE SERPL-MCNC: 2.8 MG/DL (ref 2.3–4.1)
PLATELET # BLD AUTO: 130 THOUSANDS/UL (ref 149–390)
PMV BLD AUTO: 11.5 FL (ref 8.9–12.7)
POTASSIUM SERPL-SCNC: 3.3 MMOL/L (ref 3.5–5.3)
RBC # BLD AUTO: 3.01 MILLION/UL (ref 3.88–5.62)
SODIUM SERPL-SCNC: 144 MMOL/L (ref 136–145)
WBC # BLD AUTO: 5.57 THOUSAND/UL (ref 4.31–10.16)

## 2022-07-02 PROCEDURE — 84100 ASSAY OF PHOSPHORUS: CPT | Performed by: NURSE PRACTITIONER

## 2022-07-02 PROCEDURE — 80048 BASIC METABOLIC PNL TOTAL CA: CPT | Performed by: NURSE PRACTITIONER

## 2022-07-02 PROCEDURE — 85027 COMPLETE CBC AUTOMATED: CPT | Performed by: NURSE PRACTITIONER

## 2022-07-02 PROCEDURE — 83735 ASSAY OF MAGNESIUM: CPT | Performed by: NURSE PRACTITIONER

## 2022-07-02 PROCEDURE — 82040 ASSAY OF SERUM ALBUMIN: CPT | Performed by: STUDENT IN AN ORGANIZED HEALTH CARE EDUCATION/TRAINING PROGRAM

## 2022-07-02 PROCEDURE — 99233 SBSQ HOSP IP/OBS HIGH 50: CPT | Performed by: STUDENT IN AN ORGANIZED HEALTH CARE EDUCATION/TRAINING PROGRAM

## 2022-07-02 PROCEDURE — 82948 REAGENT STRIP/BLOOD GLUCOSE: CPT

## 2022-07-02 RX ORDER — ENOXAPARIN SODIUM 100 MG/ML
40 INJECTION SUBCUTANEOUS
Status: DISCONTINUED | OUTPATIENT
Start: 2022-07-02 | End: 2022-07-14 | Stop reason: HOSPADM

## 2022-07-02 RX ORDER — POTASSIUM CHLORIDE 20MEQ/15ML
40 LIQUID (ML) ORAL ONCE
Status: DISCONTINUED | OUTPATIENT
Start: 2022-07-02 | End: 2022-07-14 | Stop reason: HOSPADM

## 2022-07-02 RX ORDER — INSULIN GLARGINE 100 [IU]/ML
15 INJECTION, SOLUTION SUBCUTANEOUS
Status: DISCONTINUED | OUTPATIENT
Start: 2022-07-02 | End: 2022-07-03

## 2022-07-02 RX ORDER — POTASSIUM CHLORIDE 20 MEQ/1
40 TABLET, EXTENDED RELEASE ORAL ONCE
Status: DISCONTINUED | OUTPATIENT
Start: 2022-07-02 | End: 2022-07-02

## 2022-07-02 RX ADMIN — LEVOTHYROXINE SODIUM 88 MCG: 88 TABLET ORAL at 09:02

## 2022-07-02 RX ADMIN — METOPROLOL TARTRATE 25 MG: 25 TABLET, FILM COATED ORAL at 21:32

## 2022-07-02 RX ADMIN — INSULIN GLARGINE 15 UNITS: 100 INJECTION, SOLUTION SUBCUTANEOUS at 21:32

## 2022-07-02 RX ADMIN — TAMSULOSIN HYDROCHLORIDE 0.4 MG: 0.4 CAPSULE ORAL at 09:02

## 2022-07-02 RX ADMIN — ENOXAPARIN SODIUM 40 MG: 40 INJECTION SUBCUTANEOUS at 13:34

## 2022-07-02 RX ADMIN — ASPIRIN 81 MG 81 MG: 81 TABLET ORAL at 09:02

## 2022-07-02 RX ADMIN — FINASTERIDE 5 MG: 5 TABLET, FILM COATED ORAL at 09:02

## 2022-07-02 RX ADMIN — METOPROLOL TARTRATE 25 MG: 25 TABLET, FILM COATED ORAL at 09:02

## 2022-07-02 RX ADMIN — PRAVASTATIN SODIUM 80 MG: 80 TABLET ORAL at 17:52

## 2022-07-02 RX ADMIN — TRIMETHOBENZAMIDE HYDROCHLORIDE 100 MG: 100 INJECTION INTRAMUSCULAR at 23:15

## 2022-07-02 RX ADMIN — HEPARIN SODIUM 5000 UNITS: 5000 INJECTION INTRAVENOUS; SUBCUTANEOUS at 05:16

## 2022-07-02 RX ADMIN — INSULIN LISPRO 2 UNITS: 100 INJECTION, SOLUTION INTRAVENOUS; SUBCUTANEOUS at 21:33

## 2022-07-02 NOTE — PROGRESS NOTES
[facility]  Progress Note - Susan Mendez 1940, [de-identified] y o  male MRN: [id number]  Unit/Bed#:  Encounter: [id number]  Primary Care Provider: iBn Pulido MD   Date and time admitted to hospital: 6/30/2022  3:28 PM    * Type 2 diabetes mellitus with mild nonproliferative diabetic retinopathy without macular edema, bilateral [facility])  Assessment & Plan  Lab Results   Component Value Date    HGBA1C 8 8 (H) 06/27/2022       Recent Labs     07/01/22  0151 07/01/22  0406 07/01/22  0607 07/01/22  1236   POCGLU 107 238* 123 127       Blood Sugar Average: Last 72 hrs:  (P) 294 [id number]     · DKA has now resolved  · Downgraded form ICU  · Currently transition to subcu insulin  · Patient did receive Lantus 20 units last night and this morning noted with low fingerstick glucose  · Will decrease Lantus to 15 units q h s  Geovanna Maria Stein · Continue diabetic diet, sliding scale coverage  Hematuria  Assessment & Plan  · Occurred after straight cath overnight  · Suspect related to trauma to prostate  · Mckeon catheter placed on 7/1  · Continue Flomax and finasteride    COVID-19  Assessment & Plan  · Patient presently not symptomatic  · Not on any treatment at the time of ICU downgraded  · No intervention indicated at this time  · Follow-up with inflammatory markers  · Continue incentive spirometry use, self proning as able  · Continue supportive care        Coronary atherosclerosis of native coronary artery  Assessment & Plan  · Continue aspirin/statin/beta-blocker    Ischemic cardiomyopathy  Assessment & Plan  · Ejection fraction 25% historically  · Repeat ECHO pending  · Continue home beta-blocker    Hypothyroidism  Assessment & Plan  · Continue home levothyroxine    BPH (benign prostatic hyperplasia)  Assessment & Plan  · Continue home finasteride and tamsulosin    Hyperlipidemia  Assessment & Plan  · Continue statin equivalent while inaptient, resume rosuvastatin on discharge        VTE Pharmacologic Prophylaxis: VTE Score: 5 Moderate Risk (Score 3-4) - Pharmacological DVT Prophylaxis Ordered: enoxaparin (Lovenox)  Patient Centered Rounds: I performed bedside rounds with nursing staff today  Education and Discussions with Family / Patient: Updated  (daughter) via phone  Spoke with daughter Lolita Castaneda over the phone at length about patient's care and gave her an update  Time Spent for Care: 30 minutes  More than 50% of total time spent on counseling and coordination of care as described above  Current Length of Stay: 2 day(s)  Current Patient Status: Inpatient   Certification Statement: The patient will continue to require additional inpatient hospital stay due to adjusting Insulin  Discharge Plan: Anticipate discharge in 24-48 hrs to rehab facility  Code Status: Level 1 - Full Code    Subjective:   ICU downgraded  Appears comfortable not in distress  Denies chest pain, dyspnea, fever, chills, nausea, vomiting, diarrhea  No other events reported  Objective:     Vitals:   Temp (24hrs), Av 6 °F (36 4 °C), Min:97 1 °F (36 2 °C), Max:98 4 °F (36 9 °C)    Temp:  [97 1 °F (36 2 °C)-98 4 °F (36 9 °C)] 97 1 °F (36 2 °C)  HR:  [] 73  Resp:  [21-39] 21  BP: (113-140)/(56-72) 113/72  SpO2:  [92 %-96 %] 94 %  Body mass index is 22 35 kg/m²  Input and Output Summary (last 24 hours): Intake/Output Summary (Last 24 hours) at 2022 1752  Last data filed at 2022 0601  Gross per 24 hour   Intake 300 ml   Output 1000 ml   Net -700 ml       Physical Exam:   Physical Exam  Constitutional:       General: He is not in acute distress  Appearance: Normal appearance  He is ill-appearing  Comments: Elderly male patient, ill-appearing, acutely nontoxic appearing  HENT:      Head: Normocephalic and atraumatic  Mouth/Throat:      Pharynx: No posterior oropharyngeal erythema  Eyes:      Pupils: Pupils are equal, round, and reactive to light  Cardiovascular:      Rate and Rhythm: Normal rate  Pulses: Normal pulses  Heart sounds: Normal heart sounds  Pulmonary:      Effort: Pulmonary effort is normal  No respiratory distress  Breath sounds: Normal breath sounds  No stridor  No wheezing or rhonchi  Abdominal:      General: Bowel sounds are normal  There is no distension  Palpations: Abdomen is soft  Tenderness: There is no abdominal tenderness  Musculoskeletal:      Cervical back: Normal range of motion and neck supple  Neurological:      General: No focal deficit present  Mental Status: He is alert  Comments: Patient is awake, alert  Psychiatric:         Behavior: Behavior normal          Additional Data:     Labs:  Results from last 7 days   Lab Units 07/02/22  0441 07/01/22  0445   WBC Thousand/uL 5 57 7 26   HEMOGLOBIN g/dL 11 1* 11 6*   HEMATOCRIT % 32 8* 34 6*   PLATELETS Thousands/uL 130* 157   NEUTROS PCT %  --  86*   LYMPHS PCT %  --  9*   MONOS PCT %  --  5   EOS PCT %  --  0     Results from last 7 days   Lab Units 07/02/22  0441 07/01/22  1403 07/01/22  0445   SODIUM mmol/L 144   < > 148*   POTASSIUM mmol/L 3 3*   < > 3 9   CHLORIDE mmol/L 109*   < > 113*   CO2 mmol/L 22   < > 19*   BUN mg/dL 24   < > 29*   CREATININE mg/dL 1 04   < > 1 14   ANION GAP mmol/L 13   < > 16*   CALCIUM mg/dL 7 6*   < > 7 8*   ALBUMIN g/dL 2 4*  --  2 6*   TOTAL BILIRUBIN mg/dL  --   --  1 15*   ALK PHOS U/L  --   --  24*   ALT U/L  --   --  24   AST U/L  --   --  20   GLUCOSE RANDOM mg/dL 122   < > 136    < > = values in this interval not displayed           Results from last 7 days   Lab Units 07/02/22  1708 07/02/22  1330 07/01/22  2223 07/01/22  1539 07/01/22  1408 07/01/22  1236 07/01/22  1011 07/01/22  0827 07/01/22  0607 07/01/22  0406 07/01/22  0151 07/01/22  0013   POC GLUCOSE mg/dl 143* 94 111 220* 238* 127 109 233* 123 238* 107 91     Results from last 7 days   Lab Units 06/27/22  0942   HEMOGLOBIN A1C % 8 8* Results from last 7 days   Lab Units 06/30/22  1849   LACTIC ACID mmol/L 1 7       Lines/Drains:  Invasive Devices  Report    Peripheral Intravenous Line  Duration           Peripheral IV 06/30/22 Left Antecubital 2 days    Peripheral IV 06/30/22 Left Forearm 1 day    Peripheral IV 06/30/22 Proximal;Right;Ventral (anterior) Forearm 1 day          Drain  Duration           Urethral Catheter Coude 1 day              Urinary Catheter:  Goal for removal: Remove after 48 hrs of I/O monitoring                 Recent Cultures (last 7 days):         Last 24 Hours Medication List:   Current Facility-Administered Medications   Medication Dose Route Frequency Provider Last Rate    aspirin  81 mg Oral Daily MELODIE Kent      enoxaparin  40 mg Subcutaneous Q24H [address] MELODIE Armstrong      finasteride  5 mg Oral Daily MELODIE Kent      insulin glargine  15 Units Subcutaneous HS Jose JONES MD      insulin lispro  1-5 Units Subcutaneous TID AC MELODIE Webber      insulin lispro  1-5 Units Subcutaneous HS MELODIE Kent      levothyroxine  88 mcg Oral Daily MELODIE Kent      metoprolol tartrate  25 mg Oral Q12H [address] MELODIE Armstrong      pravastatin  80 mg Oral Daily With [employer], CRNP      sodium chloride (PF)  3 mL Intravenous Q1H PRN MELODIE Kent      tamsulosin  0 4 mg Oral Daily MELODIE Kent          Today, Patient Was Seen By: Merissa Angulo MD    **Please Note: This note may have been constructed using a voice recognition system  **

## 2022-07-02 NOTE — CASE MANAGEMENT
Case Management Assessment & Discharge Planning Note    Patient name Kristofer Hernandez  Location / MRN [id number]  : 1940 Date 2022       Current Admission Date: 2022  Current Admission Diagnosis:Type 2 diabetes mellitus with mild nonproliferative diabetic retinopathy without macular edema, bilateral [facility])   Patient Active Problem List    Diagnosis Date Noted    Hematuria 2022    COVID-19 2022    Type 2 diabetes mellitus with mild nonproliferative diabetic retinopathy without macular edema, bilateral ([address] ) 10/14/2021    Chronic obstructive pulmonary disease ([facility] ) 2021    Prostate cancer screening 2020    Urinary retention 2019    Coronary atherosclerosis of native coronary artery 2019    Ischemic cardiomyopathy 2018    Hypothyroidism 10/08/2018    Foot pain, bilateral 10/08/2018    BPH (benign prostatic hyperplasia) 2018    Anemia 02/10/2018    Hyperlipidemia 02/10/2018      LOS (days): 2  Geometric Mean LOS (GMLOS) (days): 5 40  Days to GMLOS:3 6     OBJECTIVE:    Risk of Unplanned Readmission Score: 14 85         Current admission status: Inpatient  Referral Reason: Other (Dispo Planning)    Preferred Pharmacy:   [address], [facility] [address] [place] [address]  Phone: [phone] Fax: [phone]    Primary Care Provider: Barbara Rucker MD    Primary Insurance: [facility]  Secondary Insurance:     ASSESSMENT:  Nuria Siegel Proxies    There are no active Health Care Proxies on file         Advance Directives  Does patient have a Health Care POA?: Yes  Does patient have Advance Directives?: Yes  Advance Directives:  (Son Jenn Falcon and DIL are POA)  Primary Contact: Hipolito Schneider (Daughter)         Readmission Root Cause  30 Day Readmission: No    Patient Information  Admitted from[de-identified] Home  Mental Status: Alert  During Assessment patient was accompanied by: Not accompanied during assessment  Assessment information provided by[de-identified] Daughter (Patient is COVID + and CM was able to reach patient by phone)  Primary Caregiver: Self  Support Systems: Self, Family members, [address] Dr [facility] do you live in?: [address] [facility] entry access options  Select all that apply : Ramp  Type of Current Residence: Ranch (There is 1 step inside the house to the living room)  In the last 12 months, was there a time when you were not able to pay the mortgage or rent on time?: No  In the last 12 months, how many places have you lived?: 1  In the last 12 months, was there a time when you did not have a steady place to sleep or slept in a shelter (including now)?: No  Homeless/housing insecurity resource given?: N/A  Living Arrangements: Lives w/ Daughter  Is patient a ?: No    Activities of Daily Living Prior to Admission  Functional Status: Independent  Completes ADLs independently?: Yes  Ambulates independently?: Yes  Does patient use assisted devices?: Yes  Assisted Devices (DME) used:  Shower Chair, Straight Cane, Walker, Wheelchair, Other (Comment) (Raised toilet)  Does patient currently own DME?: Yes  What DME does the patient currently own?: Straight [place], Walker, Wheelchair, Other (Comment), Shower Chair (Raised toilet)  Does patient have a history of Outpatient Therapy (PT/OT)?: Yes  Does the patient have a history of Short-Term Rehab?: No  Does patient have a history of HHC?: Yes (Unsure of agency as it was about 15 years ago)  Does patient currently have [address]?: No         Patient Information Continued  Income Source: SSI/SSD  Does patient have prescription coverage?: Yes (No issues with getting or affording his medications)  Within the past 12 months, you worried that your food would run out before you got the money to buy more : Never true  Within the past 12 months, the food you bought just didn't last and you didn't have money to get more : Never true  Food insecurity resource given?: N/A  Does patient receive dialysis treatments?: No  Does patient have a history of substance abuse?: No  Does patient have a history of Mental Health Diagnosis?: No         Means of Transportation  Means of Transport to Appts[de-identified] Drives Self  In the past 12 months, has lack of transportation kept you from medical appointments or from getting medications?: No  In the past 12 months, has lack of transportation kept you from meetings, work, or from getting things needed for daily living?: No  Was application for public transport provided?: N/A        DISCHARGE DETAILS:    Discharge planning discussed with[de-identified] Debbie Jones (Daughter)  Freedom of Choice: Yes  Comments - Freedom of Choice: Discussed current recommendation of STR  CM contacted family/caregiver?: Yes  Were Treatment Team discharge recommendations reviewed with patient/caregiver?: Yes  Did patient/caregiver verbalize understanding of patient care needs?: Yes  Were patient/caregiver advised of the risks associated with not following Treatment Team discharge recommendations?: Yes    Contacts  Patient Contacts: Debbie Jones (Daughter)  Relationship to Patient[de-identified] Family  Contact Method: Phone  Phone Number: [phone] (B)  Reason/Outcome: Continuity of Care, Emergency Contact, Referral, Discharge [address]         Is the patient interested in [facility] at discharge?: Yes  [address] requested[de-identified] Nursing, Occupational Therapy, Physical Therapy, [address] Name[de-identified] Other  [address] [facility] Provider[de-identified] PCP  Home Health Services Needed[de-identified] Evaluate Functional Status and Safety, Gait/ADL Training, Urinary Incontinence Catheter Management  Homebound Criteria Met[de-identified] Requires the Assistance of Another Person for Safe Ambulation or to Leave the Home, Uses an Assist Device (i e  cane, walker, etc)  Supporting Clincal Findings[de-identified] Limited Endurance, Fatigues Easliy in [employer]    DME Referral Provided  Referral made for DME?: No    Other Referral/Resources/Interventions Provided:  Interventions: HHC, Short Term Rehab  Referral Comments: Discussed with patient's daughter current recommendation for STR  CM reviewed with her the option at this time of making referrals to both [facility] and [facility] facilites and see how patient is doing closer to [place]  Daughter is agreeable to this plan and referrals will be made via [facility]  Treatment Team Recommendation: Short Term Rehab                                            Additional Comments: Patient has had 2 COVID vaccinations and is currently COVID + so booster would likely not be appropriate  CM reviewed discharge planning process including the following: identifying caregivers at home and preference for d/c planning needs, CM will continue to follow for care coordination and update assessment as necessary

## 2022-07-03 PROBLEM — R53.81 PHYSICAL DECONDITIONING: Status: ACTIVE | Noted: 2022-07-03

## 2022-07-03 LAB
ANION GAP SERPL CALCULATED.3IONS-SCNC: 11 MMOL/L (ref 4–13)
BASOPHILS # BLD AUTO: 0.01 THOUSANDS/ΜL (ref 0–0.1)
BASOPHILS NFR BLD AUTO: 0 % (ref 0–1)
BUN SERPL-MCNC: 25 MG/DL (ref 5–25)
CALCIUM SERPL-MCNC: 8.6 MG/DL (ref 8.3–10.1)
CHLORIDE SERPL-SCNC: 111 MMOL/L (ref 100–108)
CO2 SERPL-SCNC: 25 MMOL/L (ref 21–32)
CREAT SERPL-MCNC: 0.98 MG/DL (ref 0.6–1.3)
CRP SERPL QL: 150.2 MG/L
EOSINOPHIL # BLD AUTO: 0.01 THOUSAND/ΜL (ref 0–0.61)
EOSINOPHIL NFR BLD AUTO: 0 % (ref 0–6)
ERYTHROCYTE [DISTWIDTH] IN BLOOD BY AUTOMATED COUNT: 16.7 % (ref 11.6–15.1)
ERYTHROCYTE [SEDIMENTATION RATE] IN BLOOD: 34 MM/HOUR (ref 0–19)
GFR SERPL CREATININE-BSD FRML MDRD: 72 ML/MIN/1.73SQ M
GLUCOSE SERPL-MCNC: 108 MG/DL (ref 65–140)
GLUCOSE SERPL-MCNC: 153 MG/DL (ref 65–140)
GLUCOSE SERPL-MCNC: 72 MG/DL (ref 65–140)
GLUCOSE SERPL-MCNC: 96 MG/DL (ref 65–140)
HCT VFR BLD AUTO: 38.5 % (ref 36.5–49.3)
HGB BLD-MCNC: 12.8 G/DL (ref 12–17)
IMM GRANULOCYTES # BLD AUTO: 0.05 THOUSAND/UL (ref 0–0.2)
IMM GRANULOCYTES NFR BLD AUTO: 1 % (ref 0–2)
LYMPHOCYTES # BLD AUTO: 0.66 THOUSANDS/ΜL (ref 0.6–4.47)
LYMPHOCYTES NFR BLD AUTO: 11 % (ref 14–44)
MCH RBC QN AUTO: 36.5 PG (ref 26.8–34.3)
MCHC RBC AUTO-ENTMCNC: 33.2 G/DL (ref 31.4–37.4)
MCV RBC AUTO: 110 FL (ref 82–98)
MONOCYTES # BLD AUTO: 0.12 THOUSAND/ΜL (ref 0.17–1.22)
MONOCYTES NFR BLD AUTO: 2 % (ref 4–12)
NEUTROPHILS # BLD AUTO: 5.12 THOUSANDS/ΜL (ref 1.85–7.62)
NEUTS SEG NFR BLD AUTO: 86 % (ref 43–75)
NRBC BLD AUTO-RTO: 0 /100 WBCS
PLATELET # BLD AUTO: 125 THOUSANDS/UL (ref 149–390)
PMV BLD AUTO: 11.2 FL (ref 8.9–12.7)
POTASSIUM SERPL-SCNC: 3.2 MMOL/L (ref 3.5–5.3)
RBC # BLD AUTO: 3.51 MILLION/UL (ref 3.88–5.62)
SODIUM SERPL-SCNC: 147 MMOL/L (ref 136–145)
WBC # BLD AUTO: 5.97 THOUSAND/UL (ref 4.31–10.16)

## 2022-07-03 PROCEDURE — 80048 BASIC METABOLIC PNL TOTAL CA: CPT | Performed by: STUDENT IN AN ORGANIZED HEALTH CARE EDUCATION/TRAINING PROGRAM

## 2022-07-03 PROCEDURE — 99232 SBSQ HOSP IP/OBS MODERATE 35: CPT | Performed by: STUDENT IN AN ORGANIZED HEALTH CARE EDUCATION/TRAINING PROGRAM

## 2022-07-03 PROCEDURE — 85652 RBC SED RATE AUTOMATED: CPT | Performed by: STUDENT IN AN ORGANIZED HEALTH CARE EDUCATION/TRAINING PROGRAM

## 2022-07-03 PROCEDURE — 85025 COMPLETE CBC W/AUTO DIFF WBC: CPT | Performed by: STUDENT IN AN ORGANIZED HEALTH CARE EDUCATION/TRAINING PROGRAM

## 2022-07-03 PROCEDURE — 82948 REAGENT STRIP/BLOOD GLUCOSE: CPT

## 2022-07-03 PROCEDURE — 86140 C-REACTIVE PROTEIN: CPT | Performed by: STUDENT IN AN ORGANIZED HEALTH CARE EDUCATION/TRAINING PROGRAM

## 2022-07-03 RX ORDER — POTASSIUM CHLORIDE 14.9 MG/ML
20 INJECTION INTRAVENOUS ONCE
Status: COMPLETED | OUTPATIENT
Start: 2022-07-03 | End: 2022-07-03

## 2022-07-03 RX ORDER — MAGNESIUM SULFATE 1 G/100ML
1 INJECTION INTRAVENOUS ONCE
Status: COMPLETED | OUTPATIENT
Start: 2022-07-03 | End: 2022-07-03

## 2022-07-03 RX ORDER — INSULIN GLARGINE 100 [IU]/ML
10 INJECTION, SOLUTION SUBCUTANEOUS
Status: DISCONTINUED | OUTPATIENT
Start: 2022-07-03 | End: 2022-07-04

## 2022-07-03 RX ORDER — POTASSIUM CHLORIDE 20 MEQ/1
40 TABLET, EXTENDED RELEASE ORAL 2 TIMES DAILY
Status: DISCONTINUED | OUTPATIENT
Start: 2022-07-03 | End: 2022-07-08

## 2022-07-03 RX ADMIN — ASPIRIN 81 MG 81 MG: 81 TABLET ORAL at 08:32

## 2022-07-03 RX ADMIN — ENOXAPARIN SODIUM 40 MG: 40 INJECTION SUBCUTANEOUS at 08:30

## 2022-07-03 RX ADMIN — METOPROLOL TARTRATE 25 MG: 25 TABLET, FILM COATED ORAL at 08:31

## 2022-07-03 RX ADMIN — MAGNESIUM SULFATE HEPTAHYDRATE 1 G: 1 INJECTION, SOLUTION INTRAVENOUS at 15:37

## 2022-07-03 RX ADMIN — INSULIN LISPRO 1 UNITS: 100 INJECTION, SOLUTION INTRAVENOUS; SUBCUTANEOUS at 08:01

## 2022-07-03 RX ADMIN — PRAVASTATIN SODIUM 80 MG: 80 TABLET ORAL at 15:37

## 2022-07-03 RX ADMIN — POTASSIUM CHLORIDE 40 MEQ: 1500 TABLET, EXTENDED RELEASE ORAL at 17:40

## 2022-07-03 RX ADMIN — INSULIN GLARGINE 10 UNITS: 100 INJECTION, SOLUTION SUBCUTANEOUS at 22:42

## 2022-07-03 RX ADMIN — METOPROLOL TARTRATE 25 MG: 25 TABLET, FILM COATED ORAL at 22:43

## 2022-07-03 RX ADMIN — POTASSIUM CHLORIDE 20 MEQ: 14.9 INJECTION, SOLUTION INTRAVENOUS at 15:37

## 2022-07-03 NOTE — PLAN OF CARE
Problem: OCCUPATIONAL THERAPY ADULT  Goal: Performs self-care activities at highest level of function for planned discharge setting  See evaluation for individualized goals  Description: Treatment Interventions: ADL retraining, Functional transfer training, UE strengthening/ROM, Endurance training, Patient/family training, Compensatory technique education, Energy conservation, Activityengagement          See flowsheet documentation for full assessment, interventions and recommendations  Note: Limitation: Decreased ADL status, Decreased UE strength, Decreased Safe judgement during ADL, Decreased endurance, Decreased self-care trans, Decreased high-level ADLs  Prognosis: Good  Assessment: Patient is a [de-identified] y o  male seen for OT evaluation s/p admit to [address] [facility] on 6/30/2022 w/Type 2 diabetes mellitus with mild nonproliferative diabetic retinopathy without macular edema, bilateral ([address] )  Commorbidities affecting patient's functional performance at time of assessment include:  hyperlipidemia, BPH, hypothyroidism, ischemic cardiomyopathy, coronary atherosclerosis of native coronary artery, type 2 DM, JESSA, and COVID-19  Orders placed for OT evaluation and treatment  Performed at least two patient identifiers during session including name and wristband  Prior to admission, Patien reported independent with basic self care, Pt ambulates without an AD  Pt reports requiring increased assistance within the last week  Patient lives with his daughter in a one story house with a ramped entrance  Personal factors affecting patient at time of initial evaluation include: limited caregiver support, decreased initiation and engagement, difficulty performing ADLs and difficulty performing IADLs  Upon evaluation, patient requires minimal  and moderate assist for UB ADLs, maximal assist for LB ADLs, transfers and functional ambulation in room and bathroom with moderate assist, with the use of Rolling Walker  Occupational performance is affected by the following deficits: decreased functional use of BUEs, decreased muscle strength, degenerative arthritic joint changes, impaired gross motor coordination, dynamic sit/ stand balance deficit with poor standing tolerance time for self care and functional mobility, decreased activity tolerance and postural control and postural alignment deficit, requiring external assistance to complete transitional movements  Patient to benefit from continued Occupational Therapy treatment while in the hospital to address deficits as defined above and maximize level of functional independence with ADLs and functional mobility  Occupational Performance areas to address include: bathing/ shower, dressing, toilet hygiene, transfer to all surfaces, functional mobility, health maintenance, IADLs: safety procedures and Leisure Participation  From OT standpoint, recommendation at time of d/c would be Short Term Rehab       OT Discharge Recommendation: Post acute rehabilitation services

## 2022-07-03 NOTE — PROGRESS NOTES
[facility]  Progress Note - Faye Mcmahon 1940, [de-identified] y o  male MRN: [id number]  Unit/Bed#:  Encounter: [id number]  Primary Care Provider: Lucía Milan MD   Date and time admitted to hospital: 6/30/2022  3:28 PM    * Type 2 diabetes mellitus with mild nonproliferative diabetic retinopathy without macular edema, bilateral [facility])  Assessment & Plan  Lab Results   Component Value Date    HGBA1C 8 8 (H) 06/27/2022       Recent Labs     07/02/22  1708 07/02/22  2100 07/03/22  0751 07/03/22  1115   POCGLU 143* 219* 153* 96       Blood Sugar Average: Last 72 hrs:  (P) 153 [id number]     · DKA has now resolved  · Downgraded form ICU  · Currently transition to subcu insulin  · Patient did receive Lantus 20 units last night and this morning noted with low fingerstick glucose  · Will decrease Lantus to 15 units q h kwadwo Kerr · Continue diabetic diet, sliding scale coverage  Physical deconditioning  Assessment & Plan  PT OT recommendation noted for post acute rehab  Cm to follow-up for placement  Hematuria  Assessment & Plan  · Occurred after straight cath overnight  · Suspect related to trauma to prostate  · Mckeon catheter placed on 7/1  · Continue Flomax and finasteride    COVID-19  Assessment & Plan  · Patient presently not symptomatic  · Not on any treatment at the time of ICU downgraded  · Currently O2 saturation 96% on room air  · No intervention indicated at this time  · Follow-up with inflammatory markers  · Continue incentive spirometry use, self proning as able  · Continue supportive care        Coronary atherosclerosis of native coronary artery  Assessment & Plan  · Continue aspirin/statin/beta-blocker    Ischemic cardiomyopathy  Assessment & Plan  · Ejection fraction 25% historically  · Repeat ECHO pending  · Continue home beta-blocker    Hypothyroidism  Assessment & Plan  · Continue home levothyroxine    BPH (benign prostatic hyperplasia)  Assessment & Plan  · Continue home finasteride and tamsulosin    Hyperlipidemia  Assessment & Plan  · Continue statin equivalent while inaptient, resume rosuvastatin on discharge        VTE Pharmacologic Prophylaxis: VTE Score: 5 Moderate Risk (Score 3-4) - Pharmacological DVT Prophylaxis Ordered: enoxaparin (Lovenox)  Patient Centered Rounds: I performed bedside rounds with nursing staff today  Time Spent for Care: 30 minutes  More than 50% of total time spent on counseling and coordination of care as described above  Current Length of Stay: 3 day(s)  Current Patient Status: Inpatient   Certification Statement: The patient will continue to require additional inpatient hospital stay due to rehab placement  Discharge Plan: rehab placement    Code Status: Level 1 - Full Code    Subjective:   Afebrile, hemodynamically stable  O2 saturation 96% on room air  Accu-Cheks noted improved  Patient appears comfortable not in distress  Denies any new complaints  No other events reported  Objective:     Vitals:   Temp (24hrs), Av 1 °F (36 7 °C), Min:97 6 °F (36 4 °C), Max:98 5 °F (36 9 °C)    Temp:  [97 6 °F (36 4 °C)-98 5 °F (36 9 °C)] 98 5 °F (36 9 °C)  HR:  [] 108  Resp:  [20-47] 25  BP: (101-138)/(52-81) 129/61  SpO2:  [90 %-96 %] 96 %  Body mass index is 22 35 kg/m²  Input and Output Summary (last 24 hours): Intake/Output Summary (Last 24 hours) at 7/3/2022 1504  Last data filed at 7/3/2022 0800  Gross per 24 hour   Intake --   Output 800 ml   Net -800 ml       Physical Exam:   Physical Exam  Constitutional:       General: He is not in acute distress  Appearance: Normal appearance  He is ill-appearing  Comments: Elderly male patient, ill-appearing, acutely nontoxic appearing  HENT:      Head: Normocephalic and atraumatic  Mouth/Throat:      Pharynx: No posterior oropharyngeal erythema  Eyes:      Pupils: Pupils are equal, round, and reactive to light     Cardiovascular:      Rate and Rhythm: Normal rate  Pulses: Normal pulses  Heart sounds: Normal heart sounds  Pulmonary:      Effort: Pulmonary effort is normal  No respiratory distress  Breath sounds: Normal breath sounds  No stridor  No wheezing or rhonchi  Abdominal:      General: Bowel sounds are normal  There is no distension  Palpations: Abdomen is soft  Tenderness: There is no abdominal tenderness  Musculoskeletal:      Cervical back: Normal range of motion and neck supple  Neurological:      General: No focal deficit present  Mental Status: He is alert  Comments: Patient is awake, alert  Psychiatric:         Behavior: Behavior normal          Additional Data:     Labs:  Results from last 7 days   Lab Units 07/03/22  0509   WBC Thousand/uL 5 97   HEMOGLOBIN g/dL 12 8   HEMATOCRIT % 38 5   PLATELETS Thousands/uL 125*   NEUTROS PCT % 86*   LYMPHS PCT % 11*   MONOS PCT % 2*   EOS PCT % 0     Results from last 7 days   Lab Units 07/03/22  0509 07/02/22  0441 07/01/22  1403 07/01/22  0445   SODIUM mmol/L 147* 144   < > 148*   POTASSIUM mmol/L 3 2* 3 3*   < > 3 9   CHLORIDE mmol/L 111* 109*   < > 113*   CO2 mmol/L 25 22   < > 19*   BUN mg/dL 25 24   < > 29*   CREATININE mg/dL 0 98 1 04   < > 1 14   ANION GAP mmol/L 11 13   < > 16*   CALCIUM mg/dL 8 6 7 6*   < > 7 8*   ALBUMIN g/dL  --  2 4*  --  2 6*   TOTAL BILIRUBIN mg/dL  --   --   --  1 15*   ALK PHOS U/L  --   --   --  24*   ALT U/L  --   --   --  24   AST U/L  --   --   --  20   GLUCOSE RANDOM mg/dL 72 122   < > 136    < > = values in this interval not displayed           Results from last 7 days   Lab Units 07/03/22  1115 07/03/22  0751 07/02/22  2100 07/02/22  1708 07/02/22  1330 07/01/22  2223 07/01/22  1539 07/01/22  1408 07/01/22  1236 07/01/22  1011 07/01/22  0827 07/01/22  0607   POC GLUCOSE mg/dl 96 153* 219* 143* 94 111 220* 238* 127 109 233* 123     Results from last 7 days   Lab Units 06/27/22  0942   HEMOGLOBIN A1C % 8 8*     Results from last 7 days   Lab Units 06/30/22  1849   LACTIC ACID mmol/L 1 7       Lines/Drains:  Invasive Devices  Report    Peripheral Intravenous Line  Duration           Peripheral IV 06/30/22 Left Antecubital 2 days    Peripheral IV 06/30/22 Left Forearm 2 days    Peripheral IV 06/30/22 Proximal;Right;Ventral (anterior) Forearm 2 days          Drain  Duration           Urethral Catheter Coude 2 days              Urinary Catheter:  Goal for removal: Remove after 48 hrs of I/O monitoring                 Recent Cultures (last 7 days):         Last 24 Hours Medication List:   Current Facility-Administered Medications   Medication Dose Route Frequency Provider Last Rate    aspirin  81 mg Oral Daily Flor Sale, CRNP      enoxaparin  40 mg Subcutaneous Q24H [address] MELODIE Armstrong      finasteride  5 mg Oral Daily Flor Sale, CRSIRISHA      insulin glargine  15 Units Subcutaneous HS Jose JONES MD      insulin lispro  1-5 Units Subcutaneous TID AC MELODIE Webber      insulin lispro  1-5 Units Subcutaneous HS Flor Sale, MELODIE      levothyroxine  88 mcg Oral Daily Flor Sale, CRSIRISHA      metoprolol tartrate  25 mg Oral Q12H [address] MELODIE Armstrong      potassium chloride  40 mEq Oral Once Sanjuana Calero PA-C      pravastatin  80 mg Oral Daily With [employer], CRNP      sodium chloride (PF)  3 mL Intravenous Q1H PRN Flor Sale, MELODIE      tamsulosin  0 4 mg Oral Daily Flor SaleMELODIE          Today, Patient Was Seen By: Sammi Whyte MD    **Please Note: This note may have been constructed using a voice recognition system  **

## 2022-07-03 NOTE — OCCUPATIONAL THERAPY NOTE
Occupational Therapy Evaluation        Patient Name: Sara PARIKH Date: 7/2/2022 07/01/22 1344   OT Last Visit   OT Visit Date 07/01/22   Note Type   Note type Evaluation   Restrictions/Precautions   Weight Bearing Precautions Per Order No   Braces or Orthoses Other (Comment)  (none per patient)   Pain Assessment   Pain Assessment Tool 0-10   Pain Score No Pain   Home Living   Type of [address] One level;Ramped entrance   Bathroom Shower/Tub Tub/shower unit   Bathroom Toilet Raised   Bathroom Equipment Grab bars in shower; Shower chair   Bathroom Accessibility Accessible   Home Equipment Walker;Cane;Wheelchair-manual   Additional Comments Pt ambulates without an AD  Pt reports requiring increased assistance within the last week "   Prior Function   Level of [place] Independent with ADLs and functional mobility   Lives With Daughter   Receives Help From Family   ADL Assistance Independent   IADLs Needs assistance   Falls in the last 6 months 0   Vocational Retired   Comments usually drives   Lifestyle   Autonomy Patien reported independent with basic self care, Pt ambulates without an AD  Pt reports requiring increased assistance within the last week  Patient lives with his daughter in a one story house with a ramped entrance  Reciprocal Relationships Supportive family   Service to Others Retired   Psychosocial   Psychosocial (WDL) [address]  5  [address] [place] 4  Minimal Assistance   [address] [address] 4  Minimal Assistance   LB [address] 2  Maximal [address] 4  [address] 2  Maximal [address]  3  Moderate Assistance   Functional Assistance 3  Moderate Assistance   Bed Mobility   Supine to Sit 4  Minimal assistance   Additional items Assist x 2; Increased time required;HOB elevated;Verbal cues;LE management   Transfers   Sit to Stand 4  Minimal assistance   Additional items Assist x 1;Verbal cues; Increased time required   Stand to Sit 4  Minimal assistance   Additional items Assist x 1; Increased time required;Verbal cues   Functional Mobility   Functional Mobility 4  Minimal assistance   Additional items Rolling walker   Balance   Static Sitting Fair +   Dynamic Sitting Fair   Static Standing Fair -   Dynamic Standing Poor +   Ambulatory Poor +   Activity Tolerance   Activity Tolerance Patient limited by fatigue   RUE Assessment   RUE Assessment X  (limited overhead movements, strength deficit)   RUE Strength   RUE Overall Strength Deficits  (3+/5)   LUE Assessment   LUE Assessment X  (limited overhead movements, strength deficit)   LUE Strength   LUE Overall Strength Deficits  (3+/5)   Hand Function   Gross Motor Coordination Impaired   Fine Motor Coordination Functional   Sensation   Light Touch No apparent deficits  (BUEs)   Vision-Basic Assessment   Current Vision Wears glasses only for reading   Cognition   Overall Cognitive Status [facility]   Arousal/Participation Alert; Responsive; Cooperative   Attention Attends with cues to redirect   Orientation Level Oriented X4   Memory Within functional limits   Following Commands Follows all commands and directions without difficulty   Assessment   Limitation Decreased ADL status; Decreased UE strength;Decreased Safe judgement during ADL;Decreased endurance;Decreased self-care trans;Decreased high-level ADLs   Prognosis Good   Assessment Patient is a [de-identified] y o  male seen for OT evaluation s/p admit to [address] [facility] on 6/30/2022 w/Type 2 diabetes mellitus with mild nonproliferative diabetic retinopathy without macular edema, bilateral ([address] )  Commorbidities affecting patient's functional performance at time of assessment include:  hyperlipidemia, BPH, hypothyroidism, ischemic cardiomyopathy, coronary atherosclerosis of native coronary artery, type 2 DM, JESSA, and COVID-19   Orders placed for OT evaluation and treatment  Performed at least two patient identifiers during session including name and wristband  Prior to admission, Patien reported independent with basic self care, Pt ambulates without an AD  Pt reports requiring increased assistance within the last week  Patient lives with his daughter in a one story house with a ramped entrance  Personal factors affecting patient at time of initial evaluation include: limited caregiver support, decreased initiation and engagement, difficulty performing ADLs and difficulty performing IADLs  Upon evaluation, patient requires minimal  and moderate assist for UB ADLs, maximal assist for LB ADLs, transfers and functional ambulation in room and bathroom with moderate assist, with the use of Rolling Walker  Occupational performance is affected by the following deficits: decreased functional use of BUEs, decreased muscle strength, degenerative arthritic joint changes, impaired gross motor coordination, dynamic sit/ stand balance deficit with poor standing tolerance time for self care and functional mobility, decreased activity tolerance and postural control and postural alignment deficit, requiring external assistance to complete transitional movements  Patient to benefit from continued Occupational Therapy treatment while in the hospital to address deficits as defined above and maximize level of functional independence with ADLs and functional mobility  Occupational Performance areas to address include: bathing/ shower, dressing, toilet hygiene, transfer to all surfaces, functional mobility, health maintenance, IADLs: safety procedures and Leisure Participation  From OT standpoint, recommendation at time of d/c would be Short Term Rehab  Plan   Treatment Interventions ADL retraining;Functional transfer training;UE strengthening/ROM; Endurance training;Patient/family training; Compensatory technique education; Energy conservation; Activityengagement   Goal Expiration Date 07/15/22   OT Frequency 3-5x/wk   Recommendation   OT Discharge Recommendation Post acute rehabilitation services   AM-PAC Daily Activity Inpatient   Lower Body Dressing 2   Bathing 2   Toileting 2   Upper Body Dressing 3   Grooming 3   Eating 4   Daily Activity Raw Score 16   Daily Activity Standardized Score (Calc for Raw Score >=11) 35 96   AM-PAC Applied Cognition Inpatient   Following a Speech/Presentation 4   Understanding Ordinary Conversation 4   Taking Medications 3   Remembering Where Things Are Placed or Put Away 3   Remembering List of 4-5 Errands 3   Taking Care of Complicated Tasks 3   Applied Cognition Raw Score 20   Applied Cognition Standardized Score 41 76   Barthel Index   Feeding 5   Bathing 0   Grooming Score 0   Dressing Score 5   Bladder Score 10   Bowels Score 10   Toilet Use Score 5   Transfers (Bed/Chair) Score 10   Mobility (Level Surface) Score 0   Stairs Score 0   Barthel Index Score 45         1 - Patient will verbalize and demonstrate use of energy conservation/ deep breathing technique and work simplification skills during functional activity with no verbal cues  2 - Patient will verbalize and demonstrate good body mechanics and joint protection techniques during  ADLs/ IADLs with no verbal cues  3 - Patient will increase OOB/ sitting tolerance to 2-4 hours per day for increased participation in self care and leisure tasks with no s/s of exertion  4 - Patient will increase standing tolerance time to 5  minutes with unilateral UE support to complete sink level ADLs@ mod I level  5 - Patient will increase sitting tolerance at edge of bed to 20 minutes to complete UB ADLs @ set up assist level  6 - Patient will transfer bed to Chair / toilet at Set up assist level with AD as indicated  7 - Patient will complete UB ADLs with set up assist      8 - Patient will complete LB ADLs with min assist with the use of adaptive equipment       9 - Patient will complete toileting hygiene with set up assist/ supervision for thoroughness    10 - Patient/ Family  will demonstrate competency with UE Home Exercise Program

## 2022-07-03 NOTE — ASSESSMENT & PLAN NOTE
Lab Results   Component Value Date    HGBA1C 8 8 (H) 06/27/2022       Recent Labs     07/02/22  1708 07/02/22  2100 07/03/22  0751 07/03/22  1115   POCGLU 143* 219* 153* 96       Blood Sugar Average: Last 72 hrs:  (P) 841 [id number]     · DKA has now resolved  · Downgraded form ICU  · Currently transition to subcu insulin  · Patient did receive Lantus 20 units last night and this morning noted with low fingerstick glucose  · Will decrease Lantus to 15 units q h s  Cristina Blend · Continue diabetic diet, sliding scale coverage

## 2022-07-03 NOTE — ASSESSMENT & PLAN NOTE
· Patient presently not symptomatic  · Not on any treatment at the time of ICU downgraded  · Currently O2 saturation 96% on room air  · No intervention indicated at this time  · Follow-up with inflammatory markers  · Continue incentive spirometry use, self proning as able  · Continue supportive care

## 2022-07-04 PROBLEM — R13.10 DYSPHAGIA: Status: ACTIVE | Noted: 2022-07-04

## 2022-07-04 LAB
ALBUMIN SERPL BCP-MCNC: 2.4 G/DL (ref 3.5–5)
ALP SERPL-CCNC: 37 U/L (ref 46–116)
ALT SERPL W P-5'-P-CCNC: 49 U/L (ref 12–78)
ANION GAP SERPL CALCULATED.3IONS-SCNC: 12 MMOL/L (ref 4–13)
AST SERPL W P-5'-P-CCNC: 99 U/L (ref 5–45)
BILIRUB SERPL-MCNC: 0.94 MG/DL (ref 0.2–1)
BUN SERPL-MCNC: 25 MG/DL (ref 5–25)
CALCIUM ALBUM COR SERPL-MCNC: 9.6 MG/DL (ref 8.3–10.1)
CALCIUM SERPL-MCNC: 8.3 MG/DL (ref 8.3–10.1)
CHLORIDE SERPL-SCNC: 107 MMOL/L (ref 100–108)
CO2 SERPL-SCNC: 24 MMOL/L (ref 21–32)
CREAT SERPL-MCNC: 1.05 MG/DL (ref 0.6–1.3)
ERYTHROCYTE [DISTWIDTH] IN BLOOD BY AUTOMATED COUNT: 16.8 % (ref 11.6–15.1)
GFR SERPL CREATININE-BSD FRML MDRD: 66 ML/MIN/1.73SQ M
GLUCOSE SERPL-MCNC: 130 MG/DL (ref 65–140)
GLUCOSE SERPL-MCNC: 200 MG/DL (ref 65–140)
GLUCOSE SERPL-MCNC: 242 MG/DL (ref 65–140)
GLUCOSE SERPL-MCNC: 308 MG/DL (ref 65–140)
GLUCOSE SERPL-MCNC: 335 MG/DL (ref 65–140)
GLUCOSE SERPL-MCNC: 60 MG/DL (ref 65–140)
GLUCOSE SERPL-MCNC: 91 MG/DL (ref 65–140)
HCT VFR BLD AUTO: 41.4 % (ref 36.5–49.3)
HGB BLD-MCNC: 13.8 G/DL (ref 12–17)
MAGNESIUM SERPL-MCNC: 2.4 MG/DL (ref 1.6–2.6)
MCH RBC QN AUTO: 36.6 PG (ref 26.8–34.3)
MCHC RBC AUTO-ENTMCNC: 33.3 G/DL (ref 31.4–37.4)
MCV RBC AUTO: 110 FL (ref 82–98)
PLATELET # BLD AUTO: 106 THOUSANDS/UL (ref 149–390)
PMV BLD AUTO: 11.6 FL (ref 8.9–12.7)
POTASSIUM SERPL-SCNC: 3.5 MMOL/L (ref 3.5–5.3)
PROT SERPL-MCNC: 6.2 G/DL (ref 6.4–8.2)
RBC # BLD AUTO: 3.77 MILLION/UL (ref 3.88–5.62)
SODIUM SERPL-SCNC: 143 MMOL/L (ref 136–145)
WBC # BLD AUTO: 9.19 THOUSAND/UL (ref 4.31–10.16)

## 2022-07-04 PROCEDURE — 83735 ASSAY OF MAGNESIUM: CPT | Performed by: STUDENT IN AN ORGANIZED HEALTH CARE EDUCATION/TRAINING PROGRAM

## 2022-07-04 PROCEDURE — 80053 COMPREHEN METABOLIC PANEL: CPT | Performed by: STUDENT IN AN ORGANIZED HEALTH CARE EDUCATION/TRAINING PROGRAM

## 2022-07-04 PROCEDURE — 99232 SBSQ HOSP IP/OBS MODERATE 35: CPT | Performed by: STUDENT IN AN ORGANIZED HEALTH CARE EDUCATION/TRAINING PROGRAM

## 2022-07-04 PROCEDURE — 85027 COMPLETE CBC AUTOMATED: CPT | Performed by: STUDENT IN AN ORGANIZED HEALTH CARE EDUCATION/TRAINING PROGRAM

## 2022-07-04 PROCEDURE — 82948 REAGENT STRIP/BLOOD GLUCOSE: CPT

## 2022-07-04 RX ORDER — DEXTROSE, SODIUM CHLORIDE, AND POTASSIUM CHLORIDE 5; .9; .15 G/100ML; G/100ML; G/100ML
50 INJECTION INTRAVENOUS CONTINUOUS
Status: DISCONTINUED | OUTPATIENT
Start: 2022-07-04 | End: 2022-07-05

## 2022-07-04 RX ORDER — INSULIN GLARGINE 100 [IU]/ML
5 INJECTION, SOLUTION SUBCUTANEOUS
Status: DISCONTINUED | OUTPATIENT
Start: 2022-07-04 | End: 2022-07-14 | Stop reason: HOSPADM

## 2022-07-04 RX ORDER — DEXTROSE AND SODIUM CHLORIDE 5; .9 G/100ML; G/100ML
50 INJECTION, SOLUTION INTRAVENOUS CONTINUOUS
Status: DISCONTINUED | OUTPATIENT
Start: 2022-07-04 | End: 2022-07-04

## 2022-07-04 RX ADMIN — METOPROLOL TARTRATE 25 MG: 25 TABLET, FILM COATED ORAL at 20:43

## 2022-07-04 RX ADMIN — PRAVASTATIN SODIUM 80 MG: 80 TABLET ORAL at 17:57

## 2022-07-04 RX ADMIN — INSULIN LISPRO 3 UNITS: 100 INJECTION, SOLUTION INTRAVENOUS; SUBCUTANEOUS at 22:27

## 2022-07-04 RX ADMIN — POTASSIUM CHLORIDE 40 MEQ: 1500 TABLET, EXTENDED RELEASE ORAL at 17:57

## 2022-07-04 RX ADMIN — ENOXAPARIN SODIUM 40 MG: 40 INJECTION SUBCUTANEOUS at 10:28

## 2022-07-04 RX ADMIN — INSULIN LISPRO 1 UNITS: 100 INJECTION, SOLUTION INTRAVENOUS; SUBCUTANEOUS at 18:01

## 2022-07-04 RX ADMIN — METOPROLOL TARTRATE 25 MG: 25 TABLET, FILM COATED ORAL at 13:08

## 2022-07-04 NOTE — PROGRESS NOTES
[facility]  Progress Note - Celester Sales 1940, [de-identified] y o  male MRN: [id number]  Unit/Bed#:  Encounter: [id number]  Primary Care Provider: Julia Rogers MD   Date and time admitted to hospital: 6/30/2022  3:28 PM    * Type 2 diabetes mellitus with mild nonproliferative diabetic retinopathy without macular edema, bilateral [facility])  Assessment & Plan  Lab Results   Component Value Date    HGBA1C 8 8 (H) 06/27/2022       Recent Labs     07/03/22  0751 07/03/22  1115 07/03/22  1611 07/04/22  0744   POCGLU 153* 96 108 91       Blood Sugar Average: Last 72 hrs:  (P) 142 [id number]     · DKA has now resolved  · Downgraded form ICU  · Currently transition to subcu insulin  · Currently patient is poor p o  Intake due to dysphagia  · Accu-Cheks noted on lower range  · Decrease Lantus to 5 units q h s  Almita Burow · Will start on IV D5+NS+KCL 20 meq 50cc/hr for now  · Follow-up with speech full and swallow eval   · Continue Accu-Cheks monitoring q 4 hours  · Continue diabetic diet, sliding scale coverage  Dysphagia  Assessment & Plan  Patient had episode of dysphagia this morning as per nursing notes  Currently on dysphagia puree diet empirically  Maintain aspiration precaution  Follow-up with speech and swallow eval     Physical deconditioning  Assessment & Plan  PT OT recommendation noted for post acute rehab  Cm to follow-up for placement  Hematuria  Assessment & Plan  · Occurred after straight cath overnight  · Suspect related to trauma to prostate  · Hematuria now resolved  · Mckeon catheter placed on 7/1  · Continue Flomax and finasteride  · Currently Mckeon catheter with clear yellow urine  COVID-19  Assessment & Plan  · Patient presently not symptomatic  · Not on any treatment at the time of ICU downgraded  · Currently O2 saturation 96% on room air  · No intervention indicated at this time  · Follow-up with inflammatory markers    · Continue incentive spirometry use, self proning as able  · Continue supportive care  Coronary atherosclerosis of native coronary artery  Assessment & Plan  · Continue aspirin/statin/beta-blocker    Ischemic cardiomyopathy  Assessment & Plan  · Ejection fraction 25% historically  · Repeat ECHO pending  · Continue home beta-blocker    Hypothyroidism  Assessment & Plan  · Continue home levothyroxine    BPH (benign prostatic hyperplasia)  Assessment & Plan  · Continue home finasteride and tamsulosin  · Currently patient has Mckeon catheter  · Recommended to remove Mckeon catheter and Attempt  voiding trial once ambulation improves at rehab  Hyperlipidemia  Assessment & Plan  · Continue statin equivalent while inaptient, resume rosuvastatin on discharge        VTE Pharmacologic Prophylaxis: VTE Score: 5 Moderate Risk (Score 3-4) - Pharmacological DVT Prophylaxis Ordered: enoxaparin (Lovenox)  Patient Centered Rounds: I performed bedside rounds with nursing staff today  Education and Discussions with Family / Patient: called daughter James Fernandes [phone] and left a voicemessage around 12:02 pm        Time Spent for Care: 30 minutes  More than 50% of total time spent on counseling and coordination of care as described above  Current Length of Stay: 4 day(s)  Current Patient Status: Inpatient   Certification Statement: The patient will continue to require additional inpatient hospital stay due to Now having dysphagia, awaiting rehab placement  Discharge Plan: Anticipate discharge in 24-48 hrs to rehab facility  Code Status: Level 1 - Full Code    Subjective:     Afebrile, hemodynamically stable  Patient had episode of dysphagia this morning as per nursing report  Inadequate p o  Intake  Accu-Cheks noted in lower range  No other events reported      Objective:     Vitals:   Temp (24hrs), Av 4 °F (36 9 °C), Min:98 1 °F (36 7 °C), Max:98 6 °F (37 °C)    Temp:  [98 1 °F (36 7 °C)-98 6 °F (37 °C)] 98 1 °F (36 7 °C)  HR:  [] 100  Resp:  [23-37] 32  BP: (120-144)/(57-67) 138/63  SpO2:  [92 %-96 %] 94 %  Body mass index is 22 35 kg/m²  Input and Output Summary (last 24 hours): Intake/Output Summary (Last 24 hours) at 7/4/2022 1200  Last data filed at 7/4/2022 0300  Gross per 24 hour   Intake 600 ml   Output 650 ml   Net -50 ml       Physical Exam:   Physical Exam  Constitutional:       General: He is not in acute distress  Appearance: Normal appearance  He is ill-appearing  Comments: Elderly male patient, ill-appearing, acutely nontoxic appearing  HENT:      Head: Normocephalic and atraumatic  Mouth/Throat:      Pharynx: No posterior oropharyngeal erythema  Eyes:      Pupils: Pupils are equal, round, and reactive to light  Cardiovascular:      Rate and Rhythm: Normal rate  Pulses: Normal pulses  Heart sounds: Normal heart sounds  Pulmonary:      Effort: Pulmonary effort is normal  No respiratory distress  Breath sounds: Normal breath sounds  No stridor  No wheezing or rhonchi  Abdominal:      General: Bowel sounds are normal  There is no distension  Palpations: Abdomen is soft  Tenderness: There is no abdominal tenderness  Musculoskeletal:      Cervical back: Normal range of motion and neck supple  Neurological:      General: No focal deficit present  Mental Status: He is alert  Comments: Patient is awake, alert     Psychiatric:         Behavior: Behavior normal          Additional Data:     Labs:  Results from last 7 days   Lab Units 07/04/22  0558 07/03/22  0509   WBC Thousand/uL 9 19 5 97   HEMOGLOBIN g/dL 13 8 12 8   HEMATOCRIT % 41 4 38 5   PLATELETS Thousands/uL 106* 125*   NEUTROS PCT %  --  86*   LYMPHS PCT %  --  11*   MONOS PCT %  --  2*   EOS PCT %  --  0     Results from last 7 days   Lab Units 07/04/22  0558   SODIUM mmol/L 143   POTASSIUM mmol/L 3 5   CHLORIDE mmol/L 107   CO2 mmol/L 24   BUN mg/dL 25   CREATININE mg/dL 1 05   ANION GAP mmol/L 12   CALCIUM mg/dL 8 3   ALBUMIN g/dL 2 4*   TOTAL BILIRUBIN mg/dL 0 94   ALK PHOS U/L 37*   ALT U/L 49   AST U/L 99*   GLUCOSE RANDOM mg/dL 60*         Results from last 7 days   Lab Units 07/04/22  0744 07/03/22  1611 07/03/22  1115 07/03/22  0751 07/02/22  2100 07/02/22  1708 07/02/22  1330 07/01/22  2223 07/01/22  1539 07/01/22  1408 07/01/22  1236 07/01/22  1011   POC GLUCOSE mg/dl 91 108 96 153* 219* 143* 94 111 220* 238* 127 109         Results from last 7 days   Lab Units 06/30/22  1849   LACTIC ACID mmol/L 1 7       Lines/Drains:  Invasive Devices  Report    Peripheral Intravenous Line  Duration           Peripheral IV 06/30/22 Left Antecubital 3 days    Peripheral IV 06/30/22 Left Forearm 3 days    Peripheral IV 06/30/22 Proximal;Right;Ventral (anterior) Forearm 3 days          Drain  Duration           Urethral Catheter Coude 3 days              Urinary Catheter:  Goal for removal: Voiding trial when ambulation improves    Recent Cultures (last 7 days):         Last 24 Hours Medication List:   Current Facility-Administered Medications   Medication Dose Route Frequency Provider Last Rate    aspirin  81 mg Oral Daily MELODIE Webber      dextrose 5 % and sodium chloride 0 9 % with KCl 20 mEq/L  50 mL/hr Intravenous Continuous Jose JONES MD      enoxaparin  40 mg Subcutaneous Q24H [facility] MELODIE Carbajal      finasteride  5 mg Oral Daily MELODIE Levy      insulin glargine  5 Units Subcutaneous HS Jose JONES MD      insulin lispro  1-5 Units Subcutaneous TID AC MELODIE Webber      insulin lispro  1-5 Units Subcutaneous HS MELODIE Levy      levothyroxine  88 mcg Oral Daily MELODIE Levy      metoprolol tartrate  25 mg Oral Q12H [facility] MELODIE Armstrong      potassium chloride  40 mEq Oral BID Veena JONES MD      potassium chloride  40 mEq Oral Once Kaelyn Vasquez PA-C      pravastatin  80 mg Oral Daily With MELODIE Raygoza      sodium chloride (PF)  3 mL Intravenous Q1H PRN MELODIE Riddle      tamsulosin  0 4 mg Oral Daily MELODIE Riddle          Today, Patient Was Seen By: Bria Leyva MD    **Please Note: This note may have been constructed using a voice recognition system  **

## 2022-07-04 NOTE — ASSESSMENT & PLAN NOTE
· Continue home finasteride and tamsulosin  · Currently patient has Mckeon catheter  · Recommended to remove Mckeon catheter and Attempt  voiding trial once ambulation improves at rehab

## 2022-07-04 NOTE — PLAN OF CARE
Problem: Potential for Falls  Goal: Patient will remain free of falls  Description: INTERVENTIONS:  - Educate patient/family on patient safety including physical limitations  - Instruct patient to call for assistance with activity   - Consult OT/PT to assist with strengthening/mobility   - Keep Call bell within reach  - Keep bed low and locked with side rails adjusted as appropriate  - Keep care items and personal belongings within reach  - Initiate and maintain comfort rounds  - Make Fall Risk Sign visible to staff  - Offer Toileting every  Hours, in advance of need  - Initiate/Maintain alarm  - Obtain necessary fall risk management equipment:   - Apply yellow socks and bracelet for high fall risk patients  - Consider moving patient to room near nurses station  Outcome: Progressing     Problem: MOBILITY - ADULT  Goal: Maintain or return to baseline ADL function  Description: INTERVENTIONS:  -  Assess patient's ability to carry out ADLs; assess patient's baseline for ADL function and identify physical deficits which impact ability to perform ADLs (bathing, care of mouth/teeth, toileting, grooming, dressing, etc )  - Assess/evaluate cause of self-care deficits   - Assess range of motion  - Assess patient's mobility; develop plan if impaired  - Assess patient's need for assistive devices and provide as appropriate  - Encourage maximum independence but intervene and supervise when necessary  - Involve family in performance of ADLs  - Assess for home care needs following discharge   - Consider OT consult to assist with ADL evaluation and planning for discharge  - Provide patient education as appropriate  Outcome: Progressing  Goal: Maintains/Returns to pre admission functional level  Description: INTERVENTIONS:  - Perform BMAT or MOVE assessment daily    - Set and communicate daily mobility goal to care team and patient/family/caregiver     - Collaborate with rehabilitation services on mobility goals if consulted  - Perform Range of Motion times a day  - Reposition patient every  hours    - Dangle patien times a day  - Stand patient  times a day  - Ambulate patient times a day  - Out of bed to chair  times a day   - Out of bed for me times a day  - Out of bed for toileting  - Record patient progress and toleration of activity level   Outcome: Progressing     Problem: Prexisting or High Potential for Compromised Skin Integrity  Goal: Skin integrity is maintained or improved  Description: INTERVENTIONS:  - Identify patients at risk for skin breakdown  - Assess and monitor skin integrity  - Assess and monitor nutrition and hydration status  - Monitor labs   - Assess for incontinence   - Turn and reposition patient  - Assist with mobility/ambulation  - Relieve pressure over bony prominences  - Avoid friction and shearing  - Provide appropriate hygiene as needed including keeping skin clean and dry  - Evaluate need for skin moisturizer/barrier cream  - Collaborate with interdisciplinary team   - Patient/family teaching  - Consider wound care consult   Outcome: Progressing     Problem: PAIN - ADULT  Goal: Verbalizes/displays adequate comfort level or baseline comfort level  Description: Interventions:  - Encourage patient to monitor pain and request assistance  - Assess pain using appropriate pain scale  - Administer analgesics based on type and severity of pain and evaluate response  - Implement non-pharmacological measures as appropriate and evaluate response  - Consider cultural and social influences on pain and pain management  - Notify physician/advanced practitioner if interventions unsuccessful or patient reports new pain  Outcome: Progressing     Problem: INFECTION - ADULT  Goal: Absence or prevention of progression during hospitalization  Description: INTERVENTIONS:  - Assess and monitor for signs and symptoms of infection  - Monitor lab/diagnostic results  - Monitor all insertion sites, i e  indwelling lines, tubes, and drains  - Monitor endotracheal if appropriate and nasal secretions for changes in amount and color  - [place] appropriate cooling/warming therapies per order  - Administer medications as ordered  - Instruct and encourage patient and family to use good hand hygiene technique  - Identify and instruct in appropriate isolation precautions for identified infection/condition  Outcome: Progressing  Goal: Absence of fever/infection during neutropenic period  Description: INTERVENTIONS:  - Monitor WBC    Outcome: Progressing     Problem: DISCHARGE PLANNING  Goal: Discharge to home or other facility with appropriate resources  Description: INTERVENTIONS:  - Identify barriers to discharge w/patient and caregiver  - Arrange for needed discharge resources and transportation as appropriate  - Identify discharge learning needs (meds, wound care, etc )  - Arrange for interpretive services to assist at discharge as needed  - Refer to Case Management Department for coordinating discharge planning if the patient needs post-hospital services based on physician/advanced practitioner order or complex needs related to functional status, cognitive ability, or social support system  Outcome: Progressing     Problem: Knowledge Deficit  Goal: Patient/family/caregiver demonstrates understanding of disease process, treatment plan, medications, and discharge instructions  Description: Complete learning assessment and assess knowledge base    Interventions:  - Provide teaching at level of understanding  - Provide teaching via preferred learning methods  Outcome: Progressing     Problem: RESPIRATORY - ADULT  Goal: Achieves optimal ventilation and oxygenation  Description: INTERVENTIONS:  - Assess for changes in respiratory status  - Assess for changes in mentation and behavior  - Position to facilitate oxygenation and minimize respiratory effort  - Oxygen administered by appropriate delivery if ordered  - Initiate smoking cessation education as indicated  - Encourage broncho-pulmonary hygiene including cough, deep breathe, Incentive Spirometry  - Assess the need for suctioning and aspirate as needed  - Assess and instruct to report SOB or any respiratory difficulty  - Respiratory Therapy support as indicated  Outcome: Progressing     Problem: METABOLIC, FLUID AND ELECTROLYTES - ADULT  Goal: Electrolytes maintained within normal limits  Description: INTERVENTIONS:  - Monitor labs and assess patient for signs and symptoms of electrolyte imbalances  - Administer electrolyte replacement as ordered  - Monitor response to electrolyte replacements, including repeat lab results as appropriate  - Instruct patient on fluid and nutrition as appropriate  Outcome: Progressing  Goal: Fluid balance maintained  Description: INTERVENTIONS:  - Monitor labs   - Monitor I/O and WT  - Instruct patient on fluid and nutrition as appropriate  - Assess for signs & symptoms of volume excess or deficit  Outcome: Progressing  Goal: Glucose maintained within target range  Description: INTERVENTIONS:  - Monitor Blood Glucose as ordered  - Assess for signs and symptoms of hyperglycemia and hypoglycemia  - Administer ordered medications to maintain glucose within target range  - Assess nutritional intake and initiate nutrition service referral as needed  Outcome: Progressing     Problem: Nutrition/Hydration-ADULT  Goal: Nutrient/Hydration intake appropriate for improving, restoring or maintaining nutritional needs  Description: Monitor and assess patient's nutrition/hydration status for malnutrition  Collaborate with interdisciplinary team and initiate plan and interventions as ordered  Monitor patient's weight and dietary intake as ordered or per policy  Utilize nutrition screening tool and intervene as necessary  Determine patient's food preferences and provide high-protein, high-caloric foods as appropriate       INTERVENTIONS:  - Monitor oral intake, urinary output, labs, and treatment plans  - Assess nutrition and hydration status and recommend course of action  - Evaluate amount of meals eaten  - Assist patient with eating if necessary   - Allow adequate time for meals  - Recommend/ encourage appropriate diets, oral nutritional supplements, and vitamin/mineral supplements  - Order, calculate, and assess calorie counts as needed  - Recommend, monitor, and adjust tube feedings based on assessed needs  - Assess need for intravenous fluids  - Provide specific nutrition/hydration education as appropriate  - Include patient/family/caregiver in decisions related to nutrition  Outcome: Progressing

## 2022-07-04 NOTE — ASSESSMENT & PLAN NOTE
Patient had episode of dysphagia this morning as per nursing notes  Currently on dysphagia puree diet empirically  Maintain aspiration precaution    Follow-up with speech and swallow eval

## 2022-07-04 NOTE — ASSESSMENT & PLAN NOTE
Lab Results   Component Value Date    HGBA1C 8 8 (H) 06/27/2022       Recent Labs     07/03/22  0751 07/03/22  1115 07/03/22  1611 07/04/22  0744   POCGLU 153* 96 108 91       Blood Sugar Average: Last 72 hrs:  (P) 142 [id number]     · DKA has now resolved  · Downgraded form ICU  · Currently transition to subcu insulin  · Currently patient is poor p o  Intake due to dysphagia  · Accu-Cheks noted on lower range  · Decrease Lantus to 5 units q h s  Almita Omi · Will start on IV D5+NS+KCL 20 meq 50cc/hr for now  · Follow-up with speech full and swallow eval   · Continue Accu-Cheks monitoring q 4 hours  · Continue diabetic diet, sliding scale coverage

## 2022-07-04 NOTE — ASSESSMENT & PLAN NOTE
· Occurred after straight cath overnight  · Suspect related to trauma to prostate  · Hematuria now resolved  · Mckeon catheter placed on 7/1  · Continue Flomax and finasteride  · Currently Mckeon catheter with clear yellow urine

## 2022-07-04 NOTE — PLAN OF CARE
Problem: Potential for Falls  Goal: Patient will remain free of falls  Description: INTERVENTIONS:  - Educate patient/family on patient safety including physical limitations  - Instruct patient to call for assistance with activity   - Consult OT/PT to assist with strengthening/mobility   - Keep Call bell within reach  - Keep bed low and locked with side rails adjusted as appropriate  - Keep care items and personal belongings within reach  - Initiate and maintain comfort rounds  - Make Fall Risk Sign visible to staff  - Offer Toileting every 2 Hours, in advance of need  - Initiate/Maintain bed alarm  - Apply yellow socks and bracelet for high fall risk patients  - Consider moving patient to room near nurses station  Outcome: Progressing     Problem: MOBILITY - ADULT  Goal: Maintain or return to baseline ADL function  Description: INTERVENTIONS:  -  Assess patient's ability to carry out ADLs; assess patient's baseline for ADL function and identify physical deficits which impact ability to perform ADLs (bathing, care of mouth/teeth, toileting, grooming, dressing, etc )  - Assess/evaluate cause of self-care deficits   - Assess range of motion  - Assess patient's mobility; develop plan if impaired  - Assess patient's need for assistive devices and provide as appropriate  - Encourage maximum independence but intervene and supervise when necessary  - Involve family in performance of ADLs  - Assess for home care needs following discharge   - Consider OT consult to assist with ADL evaluation and planning for discharge  - Provide patient education as appropriate  Outcome: Progressing  Goal: Maintains/Returns to pre admission functional level  Description: INTERVENTIONS:  - Perform BMAT or MOVE assessment daily    - Set and communicate daily mobility goal to care team and patient/family/caregiver  - Collaborate with rehabilitation services on mobility goals if consulted  - Perform Range of Motion 3 times a day    - Reposition patient every 2 hours    - Dangle patient 2 times a day  - Stand patient 2 times a day  - Ambulate patient 2 times a day  - Out of bed to chair 2 times a day   - Out of bed for meals 2 times a day  - Out of bed for toileting  - Record patient progress and toleration of activity level   Outcome: Progressing     Problem: Prexisting or High Potential for Compromised Skin Integrity  Goal: Skin integrity is maintained or improved  Description: INTERVENTIONS:  - Identify patients at risk for skin breakdown  - Assess and monitor skin integrity  - Assess and monitor nutrition and hydration status  - Monitor labs   - Assess for incontinence   - Turn and reposition patient  - Assist with mobility/ambulation  - Relieve pressure over bony prominences  - Avoid friction and shearing  - Provide appropriate hygiene as needed including keeping skin clean and dry  - Evaluate need for skin moisturizer/barrier cream  - Collaborate with interdisciplinary team   - Patient/family teaching  - Consider wound care consult   Outcome: Progressing     Problem: PAIN - ADULT  Goal: Verbalizes/displays adequate comfort level or baseline comfort level  Description: Interventions:  - Encourage patient to monitor pain and request assistance  - Assess pain using appropriate pain scale  - Administer analgesics based on type and severity of pain and evaluate response  - Implement non-pharmacological measures as appropriate and evaluate response  - Consider cultural and social influences on pain and pain management  - Notify physician/advanced practitioner if interventions unsuccessful or patient reports new pain  Outcome: Progressing     Problem: INFECTION - ADULT  Goal: Absence or prevention of progression during hospitalization  Description: INTERVENTIONS:  - Assess and monitor for signs and symptoms of infection  - Monitor lab/diagnostic results  - Monitor all insertion sites, i e  indwelling lines, tubes, and drains  - Monitor endotracheal if appropriate and nasal secretions for changes in amount and color  - [place] appropriate cooling/warming therapies per order  - Administer medications as ordered  - Instruct and encourage patient and family to use good hand hygiene technique  - Identify and instruct in appropriate isolation precautions for identified infection/condition  Outcome: Progressing  Goal: Absence of fever/infection during neutropenic period  Description: INTERVENTIONS:  - Monitor WBC    Outcome: Progressing     Problem: DISCHARGE PLANNING  Goal: Discharge to home or other facility with appropriate resources  Description: INTERVENTIONS:  - Identify barriers to discharge w/patient and caregiver  - Arrange for needed discharge resources and transportation as appropriate  - Identify discharge learning needs (meds, wound care, etc )  - Arrange for interpretive services to assist at discharge as needed  - Refer to Case Management Department for coordinating discharge planning if the patient needs post-hospital services based on physician/advanced practitioner order or complex needs related to functional status, cognitive ability, or social support system  Outcome: Progressing     Problem: Knowledge Deficit  Goal: Patient/family/caregiver demonstrates understanding of disease process, treatment plan, medications, and discharge instructions  Description: Complete learning assessment and assess knowledge base    Interventions:  - Provide teaching at level of understanding  - Provide teaching via preferred learning methods  Outcome: Progressing     Problem: RESPIRATORY - ADULT  Goal: Achieves optimal ventilation and oxygenation  Description: INTERVENTIONS:  - Assess for changes in respiratory status  - Assess for changes in mentation and behavior  - Position to facilitate oxygenation and minimize respiratory effort  - Oxygen administered by appropriate delivery if ordered  - Initiate smoking cessation education as indicated  - Encourage broncho-pulmonary hygiene including cough, deep breathe, Incentive Spirometry  - Assess the need for suctioning and aspirate as needed  - Assess and instruct to report SOB or any respiratory difficulty  - Respiratory Therapy support as indicated  Outcome: Progressing     Problem: METABOLIC, FLUID AND ELECTROLYTES - ADULT  Goal: Electrolytes maintained within normal limits  Description: INTERVENTIONS:  - Monitor labs and assess patient for signs and symptoms of electrolyte imbalances  - Administer electrolyte replacement as ordered  - Monitor response to electrolyte replacements, including repeat lab results as appropriate  - Instruct patient on fluid and nutrition as appropriate  Outcome: Progressing  Goal: Fluid balance maintained  Description: INTERVENTIONS:  - Monitor labs   - Monitor I/O and WT  - Instruct patient on fluid and nutrition as appropriate  - Assess for signs & symptoms of volume excess or deficit  Outcome: Progressing  Goal: Glucose maintained within target range  Description: INTERVENTIONS:  - Monitor Blood Glucose as ordered  - Assess for signs and symptoms of hyperglycemia and hypoglycemia  - Administer ordered medications to maintain glucose within target range  - Assess nutritional intake and initiate nutrition service referral as needed  Outcome: Progressing     Problem: Nutrition/Hydration-ADULT  Goal: Nutrient/Hydration intake appropriate for improving, restoring or maintaining nutritional needs  Description: Monitor and assess patient's nutrition/hydration status for malnutrition  Collaborate with interdisciplinary team and initiate plan and interventions as ordered  Monitor patient's weight and dietary intake as ordered or per policy  Utilize nutrition screening tool and intervene as necessary  Determine patient's food preferences and provide high-protein, high-caloric foods as appropriate       INTERVENTIONS:  - Monitor oral intake, urinary output, labs, and treatment plans  - Assess nutrition and hydration status and recommend course of action  - Evaluate amount of meals eaten  - Assist patient with eating if necessary   - Allow adequate time for meals  - Recommend/ encourage appropriate diets, oral nutritional supplements, and vitamin/mineral supplements  - Order, calculate, and assess calorie counts as needed  - Recommend, monitor, and adjust tube feedings and TPN/PPN based on assessed needs  - Assess need for intravenous fluids  - Provide specific nutrition/hydration education as appropriate  - Include patient/family/caregiver in decisions related to nutrition  Outcome: Progressing

## 2022-07-05 ENCOUNTER — APPOINTMENT (INPATIENT)
Dept: RADIOLOGY | Facility: HOSPITAL | Age: 82
DRG: 177 | End: 2022-07-05
Payer: COMMERCIAL

## 2022-07-05 LAB
ANION GAP SERPL CALCULATED.3IONS-SCNC: 11 MMOL/L (ref 4–13)
BUN SERPL-MCNC: 32 MG/DL (ref 5–25)
CALCIUM SERPL-MCNC: 8.5 MG/DL (ref 8.3–10.1)
CHLORIDE SERPL-SCNC: 108 MMOL/L (ref 100–108)
CO2 SERPL-SCNC: 27 MMOL/L (ref 21–32)
CREAT SERPL-MCNC: 1.22 MG/DL (ref 0.6–1.3)
ERYTHROCYTE [DISTWIDTH] IN BLOOD BY AUTOMATED COUNT: 16.6 % (ref 11.6–15.1)
GFR SERPL CREATININE-BSD FRML MDRD: 55 ML/MIN/1.73SQ M
GLUCOSE SERPL-MCNC: 153 MG/DL (ref 65–140)
GLUCOSE SERPL-MCNC: 154 MG/DL (ref 65–140)
GLUCOSE SERPL-MCNC: 155 MG/DL (ref 65–140)
GLUCOSE SERPL-MCNC: 173 MG/DL (ref 65–140)
GLUCOSE SERPL-MCNC: 185 MG/DL (ref 65–140)
GLUCOSE SERPL-MCNC: 202 MG/DL (ref 65–140)
GLUCOSE SERPL-MCNC: 221 MG/DL (ref 65–140)
GLUCOSE SERPL-MCNC: 241 MG/DL (ref 65–140)
HCT VFR BLD AUTO: 43.1 % (ref 36.5–49.3)
HGB BLD-MCNC: 14.6 G/DL (ref 12–17)
MCH RBC QN AUTO: 36.2 PG (ref 26.8–34.3)
MCHC RBC AUTO-ENTMCNC: 33.9 G/DL (ref 31.4–37.4)
MCV RBC AUTO: 107 FL (ref 82–98)
PLATELET # BLD AUTO: 99 THOUSANDS/UL (ref 149–390)
PMV BLD AUTO: 13.4 FL (ref 8.9–12.7)
POTASSIUM SERPL-SCNC: 4 MMOL/L (ref 3.5–5.3)
RBC # BLD AUTO: 4.03 MILLION/UL (ref 3.88–5.62)
SODIUM SERPL-SCNC: 146 MMOL/L (ref 136–145)
WBC # BLD AUTO: 7.28 THOUSAND/UL (ref 4.31–10.16)

## 2022-07-05 PROCEDURE — 80048 BASIC METABOLIC PNL TOTAL CA: CPT | Performed by: INTERNAL MEDICINE

## 2022-07-05 PROCEDURE — 85027 COMPLETE CBC AUTOMATED: CPT | Performed by: INTERNAL MEDICINE

## 2022-07-05 PROCEDURE — 82948 REAGENT STRIP/BLOOD GLUCOSE: CPT

## 2022-07-05 PROCEDURE — 92610 EVALUATE SWALLOWING FUNCTION: CPT

## 2022-07-05 PROCEDURE — 99233 SBSQ HOSP IP/OBS HIGH 50: CPT | Performed by: INTERNAL MEDICINE

## 2022-07-05 PROCEDURE — 71045 X-RAY EXAM CHEST 1 VIEW: CPT

## 2022-07-05 RX ADMIN — METOPROLOL TARTRATE 25 MG: 25 TABLET, FILM COATED ORAL at 21:24

## 2022-07-05 RX ADMIN — POTASSIUM CHLORIDE 40 MEQ: 1500 TABLET, EXTENDED RELEASE ORAL at 17:00

## 2022-07-05 RX ADMIN — LEVOTHYROXINE SODIUM 88 MCG: 88 TABLET ORAL at 08:42

## 2022-07-05 RX ADMIN — TAMSULOSIN HYDROCHLORIDE 0.4 MG: 0.4 CAPSULE ORAL at 08:41

## 2022-07-05 RX ADMIN — FINASTERIDE 5 MG: 5 TABLET, FILM COATED ORAL at 08:42

## 2022-07-05 RX ADMIN — INSULIN LISPRO 1 UNITS: 100 INJECTION, SOLUTION INTRAVENOUS; SUBCUTANEOUS at 11:49

## 2022-07-05 RX ADMIN — ENOXAPARIN SODIUM 40 MG: 40 INJECTION SUBCUTANEOUS at 08:41

## 2022-07-05 RX ADMIN — METOPROLOL TARTRATE 25 MG: 25 TABLET, FILM COATED ORAL at 08:41

## 2022-07-05 RX ADMIN — POTASSIUM CHLORIDE 40 MEQ: 1500 TABLET, EXTENDED RELEASE ORAL at 08:42

## 2022-07-05 RX ADMIN — INSULIN LISPRO 1 UNITS: 100 INJECTION, SOLUTION INTRAVENOUS; SUBCUTANEOUS at 17:01

## 2022-07-05 RX ADMIN — ASPIRIN 81 MG 81 MG: 81 TABLET ORAL at 08:42

## 2022-07-05 RX ADMIN — INSULIN GLARGINE 5 UNITS: 100 INJECTION, SOLUTION SUBCUTANEOUS at 01:58

## 2022-07-05 RX ADMIN — PRAVASTATIN SODIUM 80 MG: 80 TABLET ORAL at 16:38

## 2022-07-05 RX ADMIN — INSULIN LISPRO 1 UNITS: 100 INJECTION, SOLUTION INTRAVENOUS; SUBCUTANEOUS at 08:41

## 2022-07-05 RX ADMIN — INSULIN GLARGINE 5 UNITS: 100 INJECTION, SOLUTION SUBCUTANEOUS at 21:24

## 2022-07-05 NOTE — PLAN OF CARE
Problem: Potential for Falls  Goal: Patient will remain free of falls  Description: INTERVENTIONS:  - Educate patient/family on patient safety including physical limitations  - Instruct patient to call for assistance with activity   - Consult OT/PT to assist with strengthening/mobility   - Keep Call bell within reach  - Keep bed low and locked with side rails adjusted as appropriate  - Keep care items and personal belongings within reach  - Initiate and maintain comfort rounds  - Make Fall Risk Sign visible to staff  - Offer Toileting every 2 Hours, in advance of need  - Initiate/Maintain bed alarm  - Obtain necessary fall risk management equipment:   - Apply yellow socks and bracelet for high fall risk patients  - Consider moving patient to room near nurses station  Outcome: Progressing     Problem: MOBILITY - ADULT  Goal: Maintain or return to baseline ADL function  Description: INTERVENTIONS:  -  Assess patient's ability to carry out ADLs; assess patient's baseline for ADL function and identify physical deficits which impact ability to perform ADLs (bathing, care of mouth/teeth, toileting, grooming, dressing, etc )  - Assess/evaluate cause of self-care deficits   - Assess range of motion  - Assess patient's mobility; develop plan if impaired  - Assess patient's need for assistive devices and provide as appropriate  - Encourage maximum independence but intervene and supervise when necessary  - Involve family in performance of ADLs  - Assess for home care needs following discharge   - Consider OT consult to assist with ADL evaluation and planning for discharge  - Provide patient education as appropriate  Outcome: Progressing  Goal: Maintains/Returns to pre admission functional level  Description: INTERVENTIONS:  - Perform BMAT or MOVE assessment daily    - Set and communicate daily mobility goal to care team and patient/family/caregiver     - Collaborate with rehabilitation services on mobility goals if consulted  - Perform Range of Motion 2 times a day  - Reposition patient every 2 hours    - Dangle patient 2 times a day  - Stand patient 2 times a day  - Ambulate patient 2 times a day  - Out of bed to chair 2 times a day   - Out of bed for meals 2 times a day  - Out of bed for toileting  - Record patient progress and toleration of activity level   Outcome: Progressing     Problem: Prexisting or High Potential for Compromised Skin Integrity  Goal: Skin integrity is maintained or improved  Description: INTERVENTIONS:  - Identify patients at risk for skin breakdown  - Assess and monitor skin integrity  - Assess and monitor nutrition and hydration status  - Monitor labs   - Assess for incontinence   - Turn and reposition patient  - Assist with mobility/ambulation  - Relieve pressure over bony prominences  - Avoid friction and shearing  - Provide appropriate hygiene as needed including keeping skin clean and dry  - Evaluate need for skin moisturizer/barrier cream  - Collaborate with interdisciplinary team   - Patient/family teaching  - Consider wound care consult   Outcome: Progressing     Problem: PAIN - ADULT  Goal: Verbalizes/displays adequate comfort level or baseline comfort level  Description: Interventions:  - Encourage patient to monitor pain and request assistance  - Assess pain using appropriate pain scale  - Administer analgesics based on type and severity of pain and evaluate response  - Implement non-pharmacological measures as appropriate and evaluate response  - Consider cultural and social influences on pain and pain management  - Notify physician/advanced practitioner if interventions unsuccessful or patient reports new pain  Outcome: Progressing     Problem: INFECTION - ADULT  Goal: Absence or prevention of progression during hospitalization  Description: INTERVENTIONS:  - Assess and monitor for signs and symptoms of infection  - Monitor lab/diagnostic results  - Monitor all insertion sites, i e  indwelling lines, tubes, and drains  - Monitor endotracheal if appropriate and nasal secretions for changes in amount and color  - [place] appropriate cooling/warming therapies per order  - Administer medications as ordered  - Instruct and encourage patient and family to use good hand hygiene technique  - Identify and instruct in appropriate isolation precautions for identified infection/condition  Outcome: Progressing  Goal: Absence of fever/infection during neutropenic period  Description: INTERVENTIONS:  - Monitor WBC    Outcome: Progressing     Problem: DISCHARGE PLANNING  Goal: Discharge to home or other facility with appropriate resources  Description: INTERVENTIONS:  - Identify barriers to discharge w/patient and caregiver  - Arrange for needed discharge resources and transportation as appropriate  - Identify discharge learning needs (meds, wound care, etc )  - Arrange for interpretive services to assist at discharge as needed  - Refer to Case Management Department for coordinating discharge planning if the patient needs post-hospital services based on physician/advanced practitioner order or complex needs related to functional status, cognitive ability, or social support system  Outcome: Progressing     Problem: Knowledge Deficit  Goal: Patient/family/caregiver demonstrates understanding of disease process, treatment plan, medications, and discharge instructions  Description: Complete learning assessment and assess knowledge base    Interventions:  - Provide teaching at level of understanding  - Provide teaching via preferred learning methods  Outcome: Progressing     Problem: RESPIRATORY - ADULT  Goal: Achieves optimal ventilation and oxygenation  Description: INTERVENTIONS:  - Assess for changes in respiratory status  - Assess for changes in mentation and behavior  - Position to facilitate oxygenation and minimize respiratory effort  - Oxygen administered by appropriate delivery if ordered  - Initiate smoking cessation education as indicated  - Encourage broncho-pulmonary hygiene including cough, deep breathe, Incentive Spirometry  - Assess the need for suctioning and aspirate as needed  - Assess and instruct to report SOB or any respiratory difficulty  - Respiratory Therapy support as indicated  Outcome: Progressing     Problem: METABOLIC, FLUID AND ELECTROLYTES - ADULT  Goal: Electrolytes maintained within normal limits  Description: INTERVENTIONS:  - Monitor labs and assess patient for signs and symptoms of electrolyte imbalances  - Administer electrolyte replacement as ordered  - Monitor response to electrolyte replacements, including repeat lab results as appropriate  - Instruct patient on fluid and nutrition as appropriate  Outcome: Progressing  Goal: Fluid balance maintained  Description: INTERVENTIONS:  - Monitor labs   - Monitor I/O and WT  - Instruct patient on fluid and nutrition as appropriate  - Assess for signs & symptoms of volume excess or deficit  Outcome: Progressing  Goal: Glucose maintained within target range  Description: INTERVENTIONS:  - Monitor Blood Glucose as ordered  - Assess for signs and symptoms of hyperglycemia and hypoglycemia  - Administer ordered medications to maintain glucose within target range  - Assess nutritional intake and initiate nutrition service referral as needed  Outcome: Progressing     Problem: Nutrition/Hydration-ADULT  Goal: Nutrient/Hydration intake appropriate for improving, restoring or maintaining nutritional needs  Description: Monitor and assess patient's nutrition/hydration status for malnutrition  Collaborate with interdisciplinary team and initiate plan and interventions as ordered  Monitor patient's weight and dietary intake as ordered or per policy  Utilize nutrition screening tool and intervene as necessary  Determine patient's food preferences and provide high-protein, high-caloric foods as appropriate       INTERVENTIONS:  - Monitor oral intake, urinary output, labs, and treatment plans  - Assess nutrition and hydration status and recommend course of action  - Evaluate amount of meals eaten  - Assist patient with eating if necessary   - Allow adequate time for meals  - Recommend/ encourage appropriate diets, oral nutritional supplements, and vitamin/mineral supplements  - Order, calculate, and assess calorie counts as needed  - Recommend, monitor, and adjust tube feedings based on assessed needs  - Assess need for intravenous fluids  - Provide specific nutrition/hydration education as appropriate  - Include patient/family/caregiver in decisions related to nutrition  Outcome: Progressing

## 2022-07-05 NOTE — ASSESSMENT & PLAN NOTE
· Ejection fraction 25% historically  · Repeat echocardiogram showing EF of 25%  · Continue home beta-blocker

## 2022-07-05 NOTE — ASSESSMENT & PLAN NOTE
· Patient assessed by speech therapy today and recommend GI evaluation  · Diet-puree and honey thick liquid  · Maintain aspiration precaution    · Gastroenterology consulted for further evaluation

## 2022-07-05 NOTE — SPEECH THERAPY NOTE
Speech-Language Pathology Bedside Swallow Evaluation        Patient Name: Chani Pappas  Today's Date: 7/5/2022     Problem List  Principal Problem:    Type 2 diabetes mellitus with mild nonproliferative diabetic retinopathy without macular edema, bilateral (HCC)  Active Problems:    Hyperlipidemia    BPH (benign prostatic hyperplasia)    Hypothyroidism    Ischemic cardiomyopathy    Coronary atherosclerosis of native coronary artery    COVID-19    Hematuria    Physical deconditioning    Dysphagia       Past Medical History  Past Medical History:   Diagnosis Date    Acquired cyst of kidney     Anemia     Benign paroxysmal vertigo, unspecified ear     Cellulitis of leg     Cervical spinal stenosis     Chest pain     Coronary atherosclerosis of native coronary artery     Enlarged prostate     Esophageal candidiasis (HCC)     Hematuria     Herpes zoster     Hyperlipidemia     Hypertension     Incomplete emptying of bladder     Inflamed seborrheic keratosis     Internal hemorrhoids     Malignant neoplasm of skin of trunk     Myocardial infarction ([address] )     Nonmelanoma skin cancer     LAST ASSESSED: 8/9/17    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 infarction     Paroxysmal tachycardia (HCC)     Shortness of breath     Vitamin B deficiency        Past Surgical History  Past Surgical History:   Procedure Laterality Date    CARDIAC DEFIBRILLATOR PLACEMENT      COLONOSCOPY  10/07/2008    FIBEROPTIC SCREENING; NO FURTHER RECOMMENDATIONS    CORONARY ANGIOPLASTY WITH STENT PLACEMENT      CORONARY ANGIOPLASTY WITH STENT PLACEMENT      CYSTOSCOPY  11/06/2015    DIAGNOSTIC; MANAGED BY: Fannie Nicholson    EGD AND COLONOSCOPY N/A 2/12/2018    Procedure: EGD AND COLONOSCOPY;  Surgeon: Estefany Fields MD;  Location: [facility];   Service: Gastroenterology    [facility] INJECTION RIGHT SHOULDER (ARTHROGRAM)  1/4/2022    HIP ARTHROPLASTY Right     INSERT / REPLACE / REMOVE PACEMAKER      JOINT REPLACEMENT Right     TRUNK SKIN LESION EXCISIONAL BIOPSY  08/22/2007    MALIGNANT; BCC CHEST       Summary/Impressions:    Pt presents with mild oral and suspected pharyngeal/esophageal dysphagia  Prolonged oral processing and transfer of all textures  Mastication weak with poor control of all chewable solid boluses  Difficulty advancing  Note, dry cough in the absence of PO intake not suspected to be directly related to swallow function at this time  However, patient with increased gagging following nectar thick, gagging and regurgitation following thin liquid +prolonged coughing  Cannot r/o retrograde aspiration  Patient states this is 'typical' for him  No signs of distress or cough following small quantities of honey thick/puree  Recommendations:   Diet: puree/level 1 diet and honey thick liquids   Meds: whole with puree   Feeding assistance: as needed  Frequent Oral care: 2-4x/day  Aspiration precautions and compensatory swallowing strategies: upright posture, only feed when fully alert, slow rate of feeding, small bites/sips, alternating bites and sips and consider smaller, more frequent meals  Small quantities at a time  Slow feeding time  Other Recommendations/ considerations: Suggest GI consult  Current Medical Status  Pt is a [de-identified] y o  male who presented to [facility] with x3 days worth of nausea and vomiting  Found to be COVID-19 positive  Nursing report indicates episodic signs of dysphagia, specifically coughing on thin liquids  At present patient maintained on puree/thin liquid diet with orders for dysphagia evaluation  Past medical history:  Please see H&P for details    Special Studies:  CXR 7/5 pending  CXR 6/30:   No active pulmonary disease      Social/Education/Vocational Hx:  Pt lives with family    Swallow Information   Current Risks for Dysphagia & Aspiration: known history of dysphagia  Current Symptoms/Concerns: coughing with po  Current Diet: puree/level 1 diet and thin liquids   Baseline Diet: thin liquids and ?softer solids; pt report unclear     Baseline Assessment   Behavior/Cognition: alert  Speech/Language Status: able to participate in basic conversation  Patient Positioning: upright in bed    Swallow Mechanism Exam   Facial: symmetrical  Labial: WFL  Lingual: WFL  Velum: unable to visualize  Mandible: adequate ROM  Dentition: limited dentition  Vocal quality:clear/adequate   Volitional Cough: strong/productive   Respiratory: 2L NC    Consistencies Assessed and Performance   Consistencies Administered: thin liquids, nectar thick, honey thick, puree and mechanical soft solids    Oral Stage: Adequate acceptance and labial strip to spoon  Prolonged oral processing and transfer of all textures  Immature/incomplete mastication with reduced control of moistened solid boluses  Moderate residue  Pharyngeal Stage: Laryngeal rise noted upon palpation  Swallow initiation suspected to be delayed  Retching following nectar and thin liquid trials, with regurgitation of thins followed by prolonged cough  Cannot r/o aspiration of regurgitated material   No s/s suggestive of aspiration otherwise       Esophageal Concerns: Retching w/ regurgitation, delayed cough (thin liq)      Results Reviewed with: patient, RN and MD     Plan  Will continue to follow for 1-3x  Dysphagia Goals: pt will tolerate honey with puree without s/s of aspiration x1-3  Discharge recommendation: likely no follow up needed for swallowing    Speech Therapy Prognosis   Prognosis: fair  Prognosis Considerations: therapeutic potential        Tommy Quinn [address], [address] [facility]  Speech-Language Pathologist  [place] #[id number]  [place] #[id number]

## 2022-07-05 NOTE — PROGRESS NOTES
[address] [facility]  Progress Note - Michelle Brain 1940, [de-identified] y o  male MRN: [id number]  Unit/Bed#: -01 Encounter: [id number]  Primary Care Provider: Honey Garcia MD   Date and time admitted to hospital: 6/30/2022  3:28 PM    * Type 2 diabetes mellitus with mild nonproliferative diabetic retinopathy without macular edema, bilateral [facility])  Assessment & Plan  Lab Results   Component Value Date    HGBA1C 8 8 (H) 06/27/2022       Recent Labs     07/05/22  0154 07/05/22  0341 07/05/22  0550 07/05/22  1101   POCGLU 173* 154* 155* 202*       Blood Sugar Average: Last 72 hrs:  (P) 645 [id number]     · DKA has now resolved  · Currently transition to subcu insulin  · Currently patient is poor p o  Intake due to dysphagia  · Accu-Cheks noted on lower range  · Decrease Lantus to 5 units q h kwadwo Archer · Patient evaluated by speech therapy and diet changed accordingly  Patient also recommended to be seen by Gastroenterology  · Continue Accu-Cheks monitoring q 4 hours  · Continue diabetic diet, sliding scale coverage  Dysphagia  Assessment & Plan  · Patient assessed by speech therapy today and recommend GI evaluation  · Diet-puree and honey thick liquid  · Maintain aspiration precaution  · Gastroenterology consulted for further evaluation    Physical deconditioning  Assessment & Plan  · PT OT recommendation noted for post acute rehab  · Cm to follow-up for placement  Hematuria  Assessment & Plan  · Occurred after straight cath overnight  · Suspect related to trauma to prostate  · Hematuria now resolved  · Mckeon catheter placed on 7/1  · Continue Flomax and finasteride  · Currently Mckeon catheter with clear yellow urine  COVID-19  Assessment & Plan  · Patient presently not symptomatic  · Not on any treatment at the time of ICU downgraded  · Currently O2 saturation 96% on room air  · No intervention indicated at this time    · Continue incentive spirometry use, self proning as able   · Continue supportive care  Coronary atherosclerosis of native coronary artery  Assessment & Plan  · Continue aspirin/statin/beta-blocker    Ischemic cardiomyopathy  Assessment & Plan  · Ejection fraction 25% historically  · Repeat echocardiogram showing EF of 25%  · Continue home beta-blocker    Hypothyroidism  Assessment & Plan  · Continue home levothyroxine    BPH (benign prostatic hyperplasia)  Assessment & Plan  · Continue home finasteride and tamsulosin  · Currently patient has Mckeon catheter  · Recommended to remove Mckeon catheter and Attempt  voiding trial once ambulation improves at rehab  Hyperlipidemia  Assessment & Plan  · Continue statin equivalent while inaptient, resume rosuvastatin on discharge        VTE Pharmacologic Prophylaxis: VTE Score: 5 Moderate Risk (Score 3-4) - Pharmacological DVT Prophylaxis Ordered: enoxaparin (Lovenox)  Patient Centered Rounds: I performed bedside rounds with nursing staff today  Discussions with Specialists or Other Care Team Provider:  Case management    Education and Discussions with Family / Patient: Attempted to update  (daughter) via phone  Left voicemail  Time Spent for Care: 45 minutes  More than 50% of total time spent on counseling and coordination of care as described above  Current Length of Stay: 5 day(s)  Current Patient Status: Inpatient   Certification Statement: The patient will continue to require additional inpatient hospital stay due to Dysphagia  Discharge Plan: Anticipate discharge in 24-48 hrs to home  Code Status: Level 1 - Full Code    Subjective:   Patient resting comfortably on examination    Patient had no overnight events or complaints on exam     Objective:     Vitals:   Temp (24hrs), Av 2 °F (36 8 °C), Min:98 1 °F (36 7 °C), Max:98 3 °F (36 8 °C)    Temp:  [98 1 °F (36 7 °C)-98 3 °F (36 8 °C)] 98 3 °F (36 8 °C)  HR:  [108-128] 119  Resp:  [20-34] 22  BP: (107-128)/(56-72) 114/66  SpO2:  [91 %-95 %] 95 %  Body mass index is 22 35 kg/m²  Input and Output Summary (last 24 hours): Intake/Output Summary (Last 24 hours) at 7/5/2022 1409  Last data filed at 7/5/2022 0700  Gross per 24 hour   Intake 120 ml   Output 375 ml   Net -255 ml       Physical Exam:   Physical Exam  Vitals and nursing note reviewed  Constitutional:       General: He is not in acute distress  Appearance: He is well-developed  Comments: Chronically ill-appearing   HENT:      Head: Normocephalic and atraumatic  Mouth/Throat:      Comments: Poor dentition  Eyes:      General: No scleral icterus  Conjunctiva/sclera: Conjunctivae normal    Cardiovascular:      Rate and Rhythm: Normal rate and regular rhythm  Heart sounds: Normal heart sounds  No murmur heard  No friction rub  No gallop  Pulmonary:      Effort: Pulmonary effort is normal  No respiratory distress  Breath sounds: Normal breath sounds  No wheezing or rales  Abdominal:      General: Bowel sounds are normal  There is no distension  Palpations: Abdomen is soft  Tenderness: There is no abdominal tenderness  Musculoskeletal:         General: Normal range of motion  Skin:     General: Skin is warm  Findings: No rash  Neurological:      Mental Status: He is alert and oriented to person, place, and time  Additional Data:     Labs:  Results from last 7 days   Lab Units 07/05/22  0544 07/04/22  0558 07/03/22  0509   WBC Thousand/uL 7 28   < > 5 97   HEMOGLOBIN g/dL 14 6   < > 12 8   HEMATOCRIT % 43 1   < > 38 5   PLATELETS Thousands/uL 99*   < > 125*   NEUTROS PCT %  --   --  86*   LYMPHS PCT %  --   --  11*   MONOS PCT %  --   --  2*   EOS PCT %  --   --  0    < > = values in this interval not displayed       Results from last 7 days   Lab Units 07/05/22  0544 07/04/22  0558   SODIUM mmol/L 146* 143   POTASSIUM mmol/L 4 0 3 5   CHLORIDE mmol/L 108 107   CO2 mmol/L 27 24   BUN mg/dL 32* 25   CREATININE mg/dL 1 22 1  05   ANION GAP mmol/L 11 12   CALCIUM mg/dL 8 5 8 3   ALBUMIN g/dL  --  2 4*   TOTAL BILIRUBIN mg/dL  --  0 94   ALK PHOS U/L  --  37*   ALT U/L  --  49   AST U/L  --  99*   GLUCOSE RANDOM mg/dL 153* 60*         Results from last 7 days   Lab Units 07/05/22  1101 07/05/22  0550 07/05/22  0341 07/05/22  0154 07/04/22  2359 07/04/22  2146 07/04/22  2016 07/04/22  1628 07/04/22  1351 07/04/22  1254 07/04/22  0744 07/03/22  1611   POC GLUCOSE mg/dl 202* 155* 154* 173* 241* 308* 335* 200* 130 242* 91 108         Results from last 7 days   Lab Units 06/30/22  1849   LACTIC ACID mmol/L 1 7       Lines/Drains:  Invasive Devices  Report    Peripheral Intravenous Line  Duration           Peripheral IV 06/30/22 Left Antecubital 4 days    Peripheral IV 06/30/22 Left Forearm 4 days    Peripheral IV 07/05/22 Right Antecubital <1 day          Drain  Duration           Urethral Catheter Coude 4 days              Urinary Catheter:  Goal for removal: N/A- Discharging with Mckeon               Imaging: No pertinent imaging reviewed      Recent Cultures (last 7 days):         Last 24 Hours Medication List:   Current Facility-Administered Medications   Medication Dose Route Frequency Provider Last Rate    aspirin  81 mg Oral Daily Nilsa Paulson PA-C      enoxaparin  40 mg Subcutaneous Q24H [facility] Nilsa Paulson PA-C      finasteride  5 mg Oral Daily Nilsa Paulson PA-C      insulin glargine  5 Units Subcutaneous HS Nilsa Paulson PA-C      insulin lispro  1-5 Units Subcutaneous TID AC Nlisa Paulson PA-C      insulin lispro  1-5 Units Subcutaneous HS Nilsa Paulson PA-C      levothyroxine  88 mcg Oral Daily Nilsa Paulson PA-C      metoprolol tartrate  25 mg Oral Q12H [facility] Nilsa Paulson PA-C      potassium chloride  40 mEq Oral BID Lamonte Carolina PA-C      potassium chloride  40 mEq Oral Once Lamonte Ge, PA-C      pravastatin  80 mg Oral Daily With [employer], PA-C      sodium chloride (PF)  3 mL Intravenous Q1H PRN Epi Ye CHRISTINA Paulson      tamsulosin  0 4 mg Oral Daily May Gómez PA-C          Today, Patient Was Seen By: Whit Squires DO    **Please Note: This note may have been constructed using a voice recognition system  **

## 2022-07-05 NOTE — ASSESSMENT & PLAN NOTE
Lab Results   Component Value Date    HGBA1C 8 8 (H) 06/27/2022       Recent Labs     07/05/22  0154 07/05/22  0341 07/05/22  0550 07/05/22  1101   POCGLU 173* 154* 155* 202*       Blood Sugar Average: Last 72 hrs:  (P) 916 [id number]     · DKA has now resolved  · Currently transition to subcu insulin  · Currently patient is poor p o  Intake due to dysphagia  · Accu-Cheks noted on lower range  · Decrease Lantus to 5 units q h s  [place] Simple · Patient evaluated by speech therapy and diet changed accordingly  Patient also recommended to be seen by Gastroenterology  · Continue Accu-Cheks monitoring q 4 hours  · Continue diabetic diet, sliding scale coverage

## 2022-07-05 NOTE — PLAN OF CARE
Pt continues on covid precautions  Appetite fair at best   Pt able to sit self up in bed to eat  Frequent cough noted  Pt spending most of day sleeping in bed

## 2022-07-05 NOTE — PLAN OF CARE
Puree/honey  Small single sips  Upright x 45 min post mealtime   Reflux precautions  Aspiration precautions

## 2022-07-05 NOTE — ASSESSMENT & PLAN NOTE
· Patient presently not symptomatic  · Not on any treatment at the time of ICU downgraded  · Currently O2 saturation 96% on room air  · No intervention indicated at this time  · Continue incentive spirometry use, self proning as able  · Continue supportive care

## 2022-07-06 LAB
ANION GAP SERPL CALCULATED.3IONS-SCNC: 10 MMOL/L (ref 4–13)
BASOPHILS # BLD MANUAL: 0 THOUSAND/UL (ref 0–0.1)
BASOPHILS NFR MAR MANUAL: 0 % (ref 0–1)
BUN SERPL-MCNC: 37 MG/DL (ref 5–25)
CALCIUM SERPL-MCNC: 8.3 MG/DL (ref 8.3–10.1)
CHLORIDE SERPL-SCNC: 111 MMOL/L (ref 100–108)
CO2 SERPL-SCNC: 25 MMOL/L (ref 21–32)
CREAT SERPL-MCNC: 1.13 MG/DL (ref 0.6–1.3)
EOSINOPHIL # BLD MANUAL: 0 THOUSAND/UL (ref 0–0.4)
EOSINOPHIL NFR BLD MANUAL: 0 % (ref 0–6)
ERYTHROCYTE [DISTWIDTH] IN BLOOD BY AUTOMATED COUNT: 16.6 % (ref 11.6–15.1)
GFR SERPL CREATININE-BSD FRML MDRD: 61 ML/MIN/1.73SQ M
GLUCOSE SERPL-MCNC: 192 MG/DL (ref 65–140)
GLUCOSE SERPL-MCNC: 200 MG/DL (ref 65–140)
GLUCOSE SERPL-MCNC: 202 MG/DL (ref 65–140)
GLUCOSE SERPL-MCNC: 243 MG/DL (ref 65–140)
GLUCOSE SERPL-MCNC: 265 MG/DL (ref 65–140)
GLUCOSE SERPL-MCNC: 278 MG/DL (ref 65–140)
GLUCOSE SERPL-MCNC: 284 MG/DL (ref 65–140)
HCT VFR BLD AUTO: 39.7 % (ref 36.5–49.3)
HGB BLD-MCNC: 13.4 G/DL (ref 12–17)
LYMPHOCYTES # BLD AUTO: 0.89 THOUSAND/UL (ref 0.6–4.47)
LYMPHOCYTES # BLD AUTO: 8 % (ref 14–44)
MCH RBC QN AUTO: 35.8 PG (ref 26.8–34.3)
MCHC RBC AUTO-ENTMCNC: 33.8 G/DL (ref 31.4–37.4)
MCV RBC AUTO: 106 FL (ref 82–98)
MONOCYTES # BLD AUTO: 0 THOUSAND/UL (ref 0–1.22)
MONOCYTES NFR BLD: 0 % (ref 4–12)
NEUTROPHILS # BLD MANUAL: 10.26 THOUSAND/UL (ref 1.85–7.62)
NEUTS BAND NFR BLD MANUAL: 10 % (ref 0–8)
NEUTS SEG NFR BLD AUTO: 82 % (ref 43–75)
PLATELET # BLD AUTO: 105 THOUSANDS/UL (ref 149–390)
PLATELET BLD QL SMEAR: ABNORMAL
PMV BLD AUTO: 13.5 FL (ref 8.9–12.7)
POTASSIUM SERPL-SCNC: 4.3 MMOL/L (ref 3.5–5.3)
RBC # BLD AUTO: 3.74 MILLION/UL (ref 3.88–5.62)
RBC MORPH BLD: NORMAL
SODIUM SERPL-SCNC: 146 MMOL/L (ref 136–145)
WBC # BLD AUTO: 11.15 THOUSAND/UL (ref 4.31–10.16)

## 2022-07-06 PROCEDURE — 99232 SBSQ HOSP IP/OBS MODERATE 35: CPT | Performed by: INTERNAL MEDICINE

## 2022-07-06 PROCEDURE — 99223 1ST HOSP IP/OBS HIGH 75: CPT | Performed by: INTERNAL MEDICINE

## 2022-07-06 PROCEDURE — 85007 BL SMEAR W/DIFF WBC COUNT: CPT | Performed by: INTERNAL MEDICINE

## 2022-07-06 PROCEDURE — C9113 INJ PANTOPRAZOLE SODIUM, VIA: HCPCS | Performed by: PHYSICIAN ASSISTANT

## 2022-07-06 PROCEDURE — 82948 REAGENT STRIP/BLOOD GLUCOSE: CPT

## 2022-07-06 PROCEDURE — 80048 BASIC METABOLIC PNL TOTAL CA: CPT | Performed by: INTERNAL MEDICINE

## 2022-07-06 PROCEDURE — 85027 COMPLETE CBC AUTOMATED: CPT | Performed by: INTERNAL MEDICINE

## 2022-07-06 RX ORDER — FLUCONAZOLE 40 MG/ML
200 POWDER, FOR SUSPENSION ORAL DAILY
Status: COMPLETED | OUTPATIENT
Start: 2022-07-06 | End: 2022-07-12

## 2022-07-06 RX ORDER — PANTOPRAZOLE SODIUM 40 MG/10ML
40 INJECTION, POWDER, LYOPHILIZED, FOR SOLUTION INTRAVENOUS EVERY 12 HOURS
Status: DISCONTINUED | OUTPATIENT
Start: 2022-07-06 | End: 2022-07-09

## 2022-07-06 RX ADMIN — PRAVASTATIN SODIUM 80 MG: 80 TABLET ORAL at 17:55

## 2022-07-06 RX ADMIN — FINASTERIDE 5 MG: 5 TABLET, FILM COATED ORAL at 09:48

## 2022-07-06 RX ADMIN — POTASSIUM CHLORIDE 40 MEQ: 1500 TABLET, EXTENDED RELEASE ORAL at 09:48

## 2022-07-06 RX ADMIN — LEVOTHYROXINE SODIUM 88 MCG: 88 TABLET ORAL at 09:48

## 2022-07-06 RX ADMIN — FLUCONAZOLE 200 MG: 40 POWDER, FOR SUSPENSION ORAL at 17:54

## 2022-07-06 RX ADMIN — INSULIN LISPRO 1 UNITS: 100 INJECTION, SOLUTION INTRAVENOUS; SUBCUTANEOUS at 23:37

## 2022-07-06 RX ADMIN — INSULIN LISPRO 1 UNITS: 100 INJECTION, SOLUTION INTRAVENOUS; SUBCUTANEOUS at 09:45

## 2022-07-06 RX ADMIN — INSULIN LISPRO 3 UNITS: 100 INJECTION, SOLUTION INTRAVENOUS; SUBCUTANEOUS at 12:27

## 2022-07-06 RX ADMIN — INSULIN LISPRO 2 UNITS: 100 INJECTION, SOLUTION INTRAVENOUS; SUBCUTANEOUS at 01:19

## 2022-07-06 RX ADMIN — INSULIN LISPRO 1 UNITS: 100 INJECTION, SOLUTION INTRAVENOUS; SUBCUTANEOUS at 15:44

## 2022-07-06 RX ADMIN — PANTOPRAZOLE SODIUM 40 MG: 40 INJECTION, POWDER, FOR SOLUTION INTRAVENOUS at 14:22

## 2022-07-06 RX ADMIN — METOPROLOL TARTRATE 25 MG: 25 TABLET, FILM COATED ORAL at 09:48

## 2022-07-06 RX ADMIN — INSULIN GLARGINE 5 UNITS: 100 INJECTION, SOLUTION SUBCUTANEOUS at 23:36

## 2022-07-06 RX ADMIN — METOPROLOL TARTRATE 25 MG: 25 TABLET, FILM COATED ORAL at 23:35

## 2022-07-06 RX ADMIN — ASPIRIN 81 MG 81 MG: 81 TABLET ORAL at 09:48

## 2022-07-06 RX ADMIN — POTASSIUM CHLORIDE 40 MEQ: 1500 TABLET, EXTENDED RELEASE ORAL at 18:55

## 2022-07-06 RX ADMIN — ENOXAPARIN SODIUM 40 MG: 40 INJECTION SUBCUTANEOUS at 09:46

## 2022-07-06 RX ADMIN — TAMSULOSIN HYDROCHLORIDE 0.4 MG: 0.4 CAPSULE ORAL at 09:48

## 2022-07-06 NOTE — ASSESSMENT & PLAN NOTE
Lab Results   Component Value Date    HGBA1C 8 8 (H) 06/27/2022       Recent Labs     07/06/22  2045 07/07/22  0023 07/07/22  0540 07/07/22  1053   POCGLU 192* 356* 202* 188*       Blood Sugar Average: Last 72 hrs:  (P) 220 [id number]     · DKA has now resolved  · Currently patient is poor p o  Intake due to dysphagia  · Accu-Cheks noted on lower range  · Lantus to 5 units q h kwadwo Perez · Patient evaluated by speech therapy and diet changed accordingly  · Continue Accu-Cheks monitoring q 4 hours  · Continue diabetic diet, sliding scale coverage

## 2022-07-06 NOTE — CONSULTS
Consultation - [facility]  Adi Sandy [de-identified] y o  male MRN: [id number]  Unit/Bed#: -01 Encounter: [id number]         Reason for Consult / Principal Problem:  Dysphagia, unintentional weight loss    HPI:  Inga Moore is an 81-year-old male with history of hyperlipidemia, hypothyroidism, ischemic cardiomyopathy with an EF of 25% on aspirin, CAD, type 2 diabetes with a recent A1c of 8 8, and COPD  Patient was admitted 6 days ago for shortness of breath and vomiting  Patient was found to be in DKA, and his COVID test was positive  GI was consulted for dysphagia  Patient reports that he began having dysphagia 2 days prior to admission  He reports that he has lost about 40 lb since his wife  in November  Patient reports he is currently having odynophagia, as well  Patient was evaluated by speech, and he is cleared to have a dysphagia 1 pureed diet with honey thick liquid  Speech pathology also noting regurgitation and coughing with thin liquids  Patient reports that following discharge, he plans on living with his daughter-in-law  Last EGD and colonoscopy were in 2018  EGD revealing moderately severe gastritis  Colonoscopy revealing internal hemorrhoids  Review of Systems:    CONSTITUTIONAL: Denies any fever, chills, or rigors  Good appetite, and no recent weight loss  HEENT: No earache or tinnitus  Denies hearing loss or visual disturbances  CARDIOVASCULAR: No chest pain or palpitations  RESPIRATORY: Denies any cough, hemoptysis, shortness of breath or dyspnea on exertion  GASTROINTESTINAL: As noted in the History of Present Illness  GENITOURINARY: No problems with urination  Denies any hematuria or dysuria  NEUROLOGIC: No dizziness or vertigo, denies headaches  MUSCULOSKELETAL: Denies any muscle or joint pain  SKIN: Denies skin rashes or itching  ENDOCRINE: Denies excessive thirst  Denies intolerance to heat or cold  PSYCHOSOCIAL: Denies depression or anxiety   Denies any recent memory loss  Historical Information   Past Medical History:   Diagnosis Date    Acquired cyst of kidney     Anemia     Benign paroxysmal vertigo, unspecified ear     Cellulitis of leg     Cervical spinal stenosis     Chest pain     Coronary atherosclerosis of native coronary artery     Enlarged prostate     Esophageal candidiasis (HCC)     Hematuria     Herpes zoster     Hyperlipidemia     Hypertension     Incomplete emptying of bladder     Inflamed seborrheic keratosis     Internal hemorrhoids     Malignant neoplasm of skin of trunk     Myocardial infarction ([facility] [address] )     Nonmelanoma skin cancer     LAST ASSESSED: 17    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 infarction     Paroxysmal tachycardia (HCC)     Shortness of breath     Vitamin B deficiency      Past Surgical History:   Procedure Laterality Date    CARDIAC DEFIBRILLATOR PLACEMENT      COLONOSCOPY  10/07/2008    FIBEROPTIC SCREENING; NO FURTHER RECOMMENDATIONS    CORONARY ANGIOPLASTY WITH STENT PLACEMENT      CORONARY ANGIOPLASTY WITH STENT PLACEMENT      CYSTOSCOPY  2015    DIAGNOSTIC; MANAGED BY: Vaishnavi Brooks    EGD AND COLONOSCOPY N/A 2018    Procedure: EGD AND COLONOSCOPY;  Surgeon: Lilliam Norman MD;  Location: [facility];   Service: Gastroenterology    FL INJECTION RIGHT SHOULDER (ARTHROGRAM)  2022    HIP ARTHROPLASTY Right     INSERT / REPLACE / REMOVE PACEMAKER      JOINT REPLACEMENT Right     TRUNK SKIN LESION EXCISIONAL BIOPSY  2007    MALIGNANT; BCC CHEST     Social History   Social History     Substance and Sexual Activity   Alcohol Use Yes    Comment: occasionally 1-2 per month      Social History     Substance and Sexual Activity   Drug Use No     Social History     Tobacco Use   Smoking Status Former Smoker    Packs/day: 1 00    Years: 10 00    Pack years: 10 00    Types: Cigarettes    Quit date: 1978    Years since quittin 4   Smokeless Tobacco Never Used     Family History   Problem Relation Age of Onset    Heart disease Mother     Coronary artery disease Mother     Sudden death Mother     Cancer Father         throat; MALIGNANT NEOPLASM OF HEAD, FACE OR NECK    Throat cancer Father     Cancer Family     Coronary artery disease Family         Meds/Allergies     Current Facility-Administered Medications   Medication Dose Route Frequency    aspirin chewable tablet 81 mg  81 mg Oral Daily    enoxaparin (LOVENOX) subcutaneous injection 40 mg  40 mg Subcutaneous Q24H [address]    finasteride (PROSCAR) tablet 5 mg  5 mg Oral Daily    insulin glargine (LANTUS) subcutaneous injection 5 Units 0 05 mL  5 Units Subcutaneous HS    insulin lispro (HumaLOG) 100 units/mL subcutaneous injection 1-5 Units  1-5 Units Subcutaneous TID AC    insulin lispro (HumaLOG) 100 units/mL subcutaneous injection 1-5 Units  1-5 Units Subcutaneous HS    levothyroxine tablet 88 mcg  88 mcg Oral Daily    metoprolol tartrate (LOPRESSOR) tablet 25 mg  25 mg Oral Q12H [address]    pantoprazole (PROTONIX) injection 40 mg  40 mg Intravenous Q12H    potassium chloride (K-DUR,KLOR-CON) CR tablet 40 mEq  40 mEq Oral BID    potassium chloride oral solution 40 mEq  40 mEq Oral Once    pravastatin (PRAVACHOL) tablet 80 mg  80 mg Oral Daily With Dinner    sodium chloride (PF) 0 9 % injection 3 mL  3 mL Intravenous Q1H PRN    tamsulosin (FLOMAX) capsule 0 4 mg  0 4 mg Oral Daily       Allergies   Allergen Reactions    Atorvastatin Other (See Comments)     Pt unsure      Percocet [Oxycodone-Acetaminophen] Nausea Only and GI Intolerance         Objective     Blood pressure 115/68, pulse (!) 108, temperature 97 5 °F (36 4 °C), resp  rate 22, height 5' 6" (1 676 m), weight 62 8 kg (138 lb 7 2 oz), SpO2 94 %        Intake/Output Summary (Last 24 hours) at 7/6/2022 1448  Last data filed at 7/6/2022 0238  Gross per 24 hour   Intake 1988 85 ml   Output 1000 ml   Net 988 85 ml         PHYSICAL EXAM:      General Appearance:   Alert and oriented x 3  Cooperative, and in no respiratory distress  Cachectic appearing  HEENT:   Normocephalic, atraumatic, anicteric  Dry oral mucosa  No obvious white plaques indicate Candida in the mouth      Neck:  Supple, symmetrical, trachea midline   Lungs:   Clear to auscultation bilaterally; no rales, rhonchi or wheezing; respirations unlabored    Heart[de-identified]   S1 and S2 normal; regular rate and rhythm; no murmur, rub, or gallop  Abdomen:   Soft, non-tender, non-distended; normal bowel sounds; no masses, no organomegaly    Genitalia:   Deferred    Rectal:   Deferred    Extremities:  No cyanosis, clubbing or edema    Pulses:  2+ and symmetric all extremities    Skin:  Skin color, texture, turgor normal, no rashes or lesions    Lymph nodes:  No palpable cervical or supraclavicular lymphadenopathy        Lab Results:   Results from last 7 days   Lab Units 07/06/22  0508 07/04/22  0558 07/03/22  0509   WBC Thousand/uL 11 15*   < > 5 97   HEMOGLOBIN g/dL 13 4   < > 12 8   HEMATOCRIT % 39 7   < > 38 5   PLATELETS Thousands/uL 105*   < > 125*   NEUTROS PCT %  --   --  86*   LYMPHS PCT %  --   --  11*   LYMPHO PCT % 8*  --   --    MONOS PCT %  --   --  2*   MONO PCT % 0*  --   --    EOS PCT % 0  --  0    < > = values in this interval not displayed  Results from last 7 days   Lab Units 07/06/22  0508 07/05/22  0544 07/04/22  0558   POTASSIUM mmol/L 4 3   < > 3 5   CHLORIDE mmol/L 111*   < > 107   CO2 mmol/L 25   < > 24   BUN mg/dL 37*   < > 25   CREATININE mg/dL 1 13   < > 1 05   CALCIUM mg/dL 8 3   < > 8 3   ALK PHOS U/L  --   --  37*   ALT U/L  --   --  49   AST U/L  --   --  99*    < > = values in this interval not displayed  Results from last 7 days   Lab Units 06/30/22  1604   LIPASE u/L 105       Imaging Studies: I have personally reviewed pertinent imaging studies  CT abdomen pelvis wo contrast    Result Date: 6/30/2022  Impression: No acute abdominopelvic pathology  Workstation performed: [id number]     XR chest portable    Result Date: 2022  Impression: No acute cardiopulmonary disease  Workstation performed: [id number]     XR chest 2 views    Result Date: 2022  Impression: No active pulmonary disease  Workstation performed: [id number]       ASSESSMENT and PLAN:      1) Odynophagia/dysphagia, unintentional weight loss - Patient reports that he has lost about 40 lb since his wife  in November  He has history of type 2 diabetes, and presented in DKA  Patient reports that his dysphagia was acute in onset prior to admission  May be secondary to structural esophageal abnormality such as web or ring, Candida esophagitis, other viral esophagitis or reflux  Malignancy also in the differential   CT of the abdomen pelvis without contrast on admission was unremarkable  Albumin 2 4   - Will start with barium swallow  - Ensure patient is upright during and after meals for at least 3 hours  - Start PPI    The patient was seen and examined by Dr Mariella Blanco, all coon medical decisions were made with Dr Mariella Blanco  Thank you for allowing us to participate in the care of this pleasant patient  We will follow up with you closely

## 2022-07-06 NOTE — PLAN OF CARE
Problem: Potential for Falls  Goal: Patient will remain free of falls  Description: INTERVENTIONS:  - Educate patient/family on patient safety including physical limitations  - Instruct patient to call for assistance with activity   - Consult OT/PT to assist with strengthening/mobility   - Keep Call bell within reach  - Keep bed low and locked with side rails adjusted as appropriate  - Keep care items and personal belongings within reach  - Initiate and maintain comfort rounds  - Make Fall Risk Sign visible to staff  - Offer Toileting every 2 Hours, in advance of need  - Initiate/Maintain bed alarm  - Obtain necessary fall risk management equipment: call bell in reach, chair alarm bed alarm      - Apply yellow socks and bracelet for high fall risk patients  - Consider moving patient to room near nurses station  Outcome: Progressing     Problem: Potential for Falls  Goal: Patient will remain free of falls  Description: INTERVENTIONS:  - Educate patient/family on patient safety including physical limitations  - Instruct patient to call for assistance with activity   - Consult OT/PT to assist with strengthening/mobility   - Keep Call bell within reach  - Keep bed low and locked with side rails adjusted as appropriate  - Keep care items and personal belongings within reach  - Initiate and maintain comfort rounds  - Make Fall Risk Sign visible to staff  - Offer Toileting every 2 Hours, in advance of need  - Initiate/Maintain bed alarm  - Obtain necessary fall risk management equipment: call bell in reach, chair alarm bed alarm      - Apply yellow socks and bracelet for high fall risk patients  - Consider moving patient to room near nurses station  Outcome: Progressing     Problem: Prexisting or High Potential for Compromised Skin Integrity  Goal: Skin integrity is maintained or improved  Description: INTERVENTIONS:  - Identify patients at risk for skin breakdown  - Assess and monitor skin integrity  - Assess and monitor nutrition and hydration status  - Monitor labs   - Assess for incontinence   - Turn and reposition patient  - Assist with mobility/ambulation  - Relieve pressure over bony prominences  - Avoid friction and shearing  - Provide appropriate hygiene as needed including keeping skin clean and dry  - Evaluate need for skin moisturizer/barrier cream  - Collaborate with interdisciplinary team   - Patient/family teaching  - Consider wound care consult   Outcome: Progressing     Problem: INFECTION - ADULT  Goal: Absence or prevention of progression during hospitalization  Description: INTERVENTIONS:  - Assess and monitor for signs and symptoms of infection  - Monitor lab/diagnostic results  - Monitor all insertion sites, i e  indwelling lines, tubes, and drains  - Monitor endotracheal if appropriate and nasal secretions for changes in amount and color  - [place] appropriate cooling/warming therapies per order  - Administer medications as ordered  - Instruct and encourage patient and family to use good hand hygiene technique  - Identify and instruct in appropriate isolation precautions for identified infection/condition  Outcome: Progressing     Problem: DISCHARGE PLANNING  Goal: Discharge to home or other facility with appropriate resources  Description: INTERVENTIONS:  - Identify barriers to discharge w/patient and caregiver  - Arrange for needed discharge resources and transportation as appropriate  - Identify discharge learning needs (meds, wound care, etc )  - Arrange for interpretive services to assist at discharge as needed  - Refer to Case Management Department for coordinating discharge planning if the patient needs post-hospital services based on physician/advanced practitioner order or complex needs related to functional status, cognitive ability, or social support system  Outcome: Progressing

## 2022-07-06 NOTE — ASSESSMENT & PLAN NOTE
· Patient assessed by speech therapy today and recommend GI evaluation  · Diet-puree and honey thick liquid  · Maintain aspiration precaution    · Gastroenterology following  · Video barium swallow completed; read pending

## 2022-07-06 NOTE — PROGRESS NOTES
[facility]  Progress Note - Terrilyn Nearing 1940, [de-identified] y o  male MRN: [id number]  Unit/Bed#: MS 418Elaina Encounter: [id number]  Primary Care Provider: Iker Cuevas MD   Date and time admitted to hospital: 6/30/2022  3:28 PM    * Type 2 diabetes mellitus with mild nonproliferative diabetic retinopathy without macular edema, bilateral [facility])  Assessment & Plan  Lab Results   Component Value Date    HGBA1C 8 8 (H) 06/27/2022       Recent Labs     07/05/22  2327 07/06/22  0123 07/06/22  0413 07/06/22  0540   POCGLU 278* 243* 265* 200*       Blood Sugar Average: Last 72 hrs:  (P) 199     · DKA has now resolved  · Currently transition to subcu insulin  · Currently patient is poor p o  Intake due to dysphagia  · Accu-Cheks noted on lower range  · Decrease Lantus to 5 units q h s  Cristina Blend · Patient evaluated by speech therapy and diet changed accordingly  Patient also recommended to be seen by Gastroenterology  · Continue Accu-Cheks monitoring q 4 hours  · Continue diabetic diet, sliding scale coverage  Dysphagia  Assessment & Plan  · Patient assessed by speech therapy today and recommend GI evaluation  · Diet-puree and honey thick liquid  · Maintain aspiration precaution  · Gastroenterology following  · Video barium swallow ordered    Physical deconditioning  Assessment & Plan  · PT OT recommendation noted for post acute rehab  · Cm to follow-up for placement  Hematuria  Assessment & Plan  · Occurred after straight cath overnight  · Suspect related to trauma to prostate  · Hematuria now resolved  · Mckeon catheter placed on 7/1  · Continue Flomax and finasteride  · Currently Mckeon catheter with clear yellow urine  COVID-19  Assessment & Plan  · Patient presently not symptomatic  · Not on any treatment at the time of ICU downgraded  · Currently O2 saturation 96% on room air  · No intervention indicated at this time  · Continue incentive spirometry use, self proning as able    · Continue supportive care  Coronary atherosclerosis of native coronary artery  Assessment & Plan  · Continue aspirin/statin/beta-blocker    Ischemic cardiomyopathy  Assessment & Plan  · Ejection fraction 25% historically  · Repeat echocardiogram showing EF of 25%  · Continue home beta-blocker    Hypothyroidism  Assessment & Plan  · Continue home levothyroxine    BPH (benign prostatic hyperplasia)  Assessment & Plan  · Continue home finasteride and tamsulosin  · Currently patient has Mckeon catheter  · Recommended to remove Mckeon catheter and Attempt  voiding trial once ambulation improves at rehab  Hyperlipidemia  Assessment & Plan  · Continue statin equivalent while inaptient, resume rosuvastatin on discharge        VTE Pharmacologic Prophylaxis: VTE Score: 5 High Risk (Score >/= 5) - Pharmacological DVT Prophylaxis Ordered: enoxaparin (Lovenox)  Sequential Compression Devices Ordered  Patient Centered Rounds: I performed bedside rounds with nursing staff today  Discussions with Specialists or Other Care Team Provider: GI, case management    Education and Discussions with Family / Patient: Patient declined call to   Time Spent for Care: 30 minutes  More than 50% of total time spent on counseling and coordination of care as described above  Current Length of Stay: 6 day(s)  Current Patient Status: Inpatient   Certification Statement: The patient will continue to require additional inpatient hospital stay due to Dysphagia  Discharge Plan: Anticipate discharge in 24-48 hrs to home with home services  Code Status: Level 1 - Full Code    Subjective:   Patient resting comfortably on examination  Patient had no overnight events or complaints on exam this morning      Objective:     Vitals:   Temp (24hrs), Av 5 °F (36 4 °C), Min:97 5 °F (36 4 °C), Max:97 5 °F (36 4 °C)    Temp:  [97 5 °F (36 4 °C)] 97 5 °F (36 4 °C)  HR:  [108-134] 108  BP: (115)/(68) 115/68  SpO2:  [93 %-95 %] 94 %  Body mass index is 22 35 kg/m²  Input and Output Summary (last 24 hours): Intake/Output Summary (Last 24 hours) at 7/6/2022 1815  Last data filed at 7/6/2022 0238  Gross per 24 hour   Intake 548 85 ml   Output 200 ml   Net 348 85 ml       Physical Exam:   Physical Exam  Vitals and nursing note reviewed  Constitutional:       General: He is not in acute distress  Appearance: He is well-developed  HENT:      Head: Normocephalic and atraumatic  Eyes:      General: No scleral icterus  Conjunctiva/sclera: Conjunctivae normal    Cardiovascular:      Rate and Rhythm: Normal rate and regular rhythm  Heart sounds: Normal heart sounds  No murmur heard  No friction rub  No gallop  Pulmonary:      Effort: Pulmonary effort is normal  No respiratory distress  Breath sounds: Normal breath sounds  No wheezing or rales  Abdominal:      General: Bowel sounds are normal  There is no distension  Palpations: Abdomen is soft  Tenderness: There is no abdominal tenderness  Musculoskeletal:         General: Normal range of motion  Skin:     General: Skin is warm  Findings: No rash  Neurological:      Mental Status: He is alert and oriented to person, place, and time  Additional Data:     Labs:  Results from last 7 days   Lab Units 07/06/22  0508 07/04/22  0558 07/03/22  0509   WBC Thousand/uL 11 15*   < > 5 97   HEMOGLOBIN g/dL 13 4   < > 12 8   HEMATOCRIT % 39 7   < > 38 5   PLATELETS Thousands/uL 105*   < > 125*   BANDS PCT % 10*  --   --    NEUTROS PCT %  --   --  86*   LYMPHS PCT %  --   --  11*   LYMPHO PCT % 8*  --   --    MONOS PCT %  --   --  2*   MONO PCT % 0*  --   --    EOS PCT % 0  --  0    < > = values in this interval not displayed       Results from last 7 days   Lab Units 07/06/22  0508 07/05/22  0544 07/04/22  0558   SODIUM mmol/L 146*   < > 143   POTASSIUM mmol/L 4 3   < > 3 5   CHLORIDE mmol/L 111*   < > 107   CO2 mmol/L 25   < > 24   BUN mg/dL 37*   < > 25 CREATININE mg/dL 1 13   < > 1 05   ANION GAP mmol/L 10   < > 12   CALCIUM mg/dL 8 3   < > 8 3   ALBUMIN g/dL  --   --  2 4*   TOTAL BILIRUBIN mg/dL  --   --  0 94   ALK PHOS U/L  --   --  37*   ALT U/L  --   --  49   AST U/L  --   --  99*   GLUCOSE RANDOM mg/dL 202*   < > 60*    < > = values in this interval not displayed  Results from last 7 days   Lab Units 07/06/22  1055 07/06/22  0540 07/06/22  0413 07/06/22  0123 07/05/22  2327 07/05/22  2122 07/05/22  1646 07/05/22  1101 07/05/22  0550 07/05/22  0341 07/05/22  0154 07/04/22  2359   POC GLUCOSE mg/dl 284* 200* 265* 243* 278* 221* 185* 202* 155* 154* 173* 241*         Results from last 7 days   Lab Units 06/30/22  1849   LACTIC ACID mmol/L 1 7       Lines/Drains:  Invasive Devices  Report    Peripheral Intravenous Line  Duration           Peripheral IV 06/30/22 Left Antecubital 6 days    Peripheral IV 06/30/22 Left Forearm 5 days    Peripheral IV 07/05/22 Right Antecubital 1 day          Drain  Duration           Urethral Catheter Coude 5 days              Urinary Catheter:  Goal for removal: N/A- Discharging with Mckeon               Imaging: No pertinent imaging reviewed      Recent Cultures (last 7 days):         Last 24 Hours Medication List:   Current Facility-Administered Medications   Medication Dose Route Frequency Provider Last Rate    aspirin  81 mg Oral Daily Nilsa Paulson PA-C      enoxaparin  40 mg Subcutaneous Q24H [address] Nilsa Paulson PA-C      finasteride  5 mg Oral Daily Nilsa Paulson PA-C      fluconazole  200 mg Oral Daily Alexey Arnett PA-C      insulin glargine  5 Units Subcutaneous HS Nilsa Paulson PA-C      insulin lispro  1-5 Units Subcutaneous TID AC Nilsa Paulson PA-C      insulin lispro  1-5 Units Subcutaneous HS Nilsa Paulson PA-C      levothyroxine  88 mcg Oral Daily Nilsa Paulson PA-C      metoprolol tartrate  25 mg Oral Q12H [address] Nilsa Paulson PA-C      pantoprazole  40 mg Intravenous Q12H Alexey Arnett PA-C  potassium chloride  40 mEq Oral BID Marylou Martinez PA-C      potassium chloride  40 mEq Oral Once Marylou Martinez PA-C      pravastatin  80 mg Oral Daily With [employer], PA-C      sodium chloride (PF)  3 mL Intravenous Q1H PRN Marylou Martinez PA-C      tamsulosin  0 4 mg Oral Daily Marylou Martinez PA-C          Today, Patient Was Seen By: Jasmina Vasquez DO    **Please Note: This note may have been constructed using a voice recognition system  **

## 2022-07-06 NOTE — ASSESSMENT & PLAN NOTE
Lab Results   Component Value Date    HGBA1C 8 8 (H) 06/27/2022       Recent Labs     07/05/22  2327 07/06/22  0123 07/06/22  0413 07/06/22  0540   POCGLU 278* 243* 265* 200*       Blood Sugar Average: Last 72 hrs:  (P) 199     · DKA has now resolved  · Currently transition to subcu insulin  · Currently patient is poor p o  Intake due to dysphagia  · Accu-Cheks noted on lower range  · Decrease Lantus to 5 units q h kwadwo Anne · Patient evaluated by speech therapy and diet changed accordingly  Patient also recommended to be seen by Gastroenterology  · Continue Accu-Cheks monitoring q 4 hours  · Continue diabetic diet, sliding scale coverage

## 2022-07-06 NOTE — ASSESSMENT & PLAN NOTE
· Patient assessed by speech therapy today and recommend GI evaluation  · Diet-puree and honey thick liquid  · Maintain aspiration precaution    · Gastroenterology following  · Video barium swallow ordered

## 2022-07-07 ENCOUNTER — APPOINTMENT (INPATIENT)
Dept: RADIOLOGY | Facility: HOSPITAL | Age: 82
DRG: 177 | End: 2022-07-07
Payer: COMMERCIAL

## 2022-07-07 LAB
ANION GAP SERPL CALCULATED.3IONS-SCNC: 7 MMOL/L (ref 4–13)
BASOPHILS # BLD MANUAL: 0 THOUSAND/UL (ref 0–0.1)
BASOPHILS NFR MAR MANUAL: 0 % (ref 0–1)
BUN SERPL-MCNC: 34 MG/DL (ref 5–25)
CALCIUM SERPL-MCNC: 8.4 MG/DL (ref 8.3–10.1)
CHLORIDE SERPL-SCNC: 113 MMOL/L (ref 100–108)
CO2 SERPL-SCNC: 26 MMOL/L (ref 21–32)
CREAT SERPL-MCNC: 1.05 MG/DL (ref 0.6–1.3)
EOSINOPHIL # BLD MANUAL: 0.08 THOUSAND/UL (ref 0–0.4)
EOSINOPHIL NFR BLD MANUAL: 1 % (ref 0–6)
ERYTHROCYTE [DISTWIDTH] IN BLOOD BY AUTOMATED COUNT: 16.9 % (ref 11.6–15.1)
GFR SERPL CREATININE-BSD FRML MDRD: 66 ML/MIN/1.73SQ M
GLUCOSE SERPL-MCNC: 159 MG/DL (ref 65–140)
GLUCOSE SERPL-MCNC: 182 MG/DL (ref 65–140)
GLUCOSE SERPL-MCNC: 188 MG/DL (ref 65–140)
GLUCOSE SERPL-MCNC: 190 MG/DL (ref 65–140)
GLUCOSE SERPL-MCNC: 202 MG/DL (ref 65–140)
GLUCOSE SERPL-MCNC: 356 MG/DL (ref 65–140)
HCT VFR BLD AUTO: 36.2 % (ref 36.5–49.3)
HGB BLD-MCNC: 12 G/DL (ref 12–17)
LYMPHOCYTES # BLD AUTO: 0.83 THOUSAND/UL (ref 0.6–4.47)
LYMPHOCYTES # BLD AUTO: 11 % (ref 14–44)
MCH RBC QN AUTO: 35.9 PG (ref 26.8–34.3)
MCHC RBC AUTO-ENTMCNC: 33.1 G/DL (ref 31.4–37.4)
MCV RBC AUTO: 108 FL (ref 82–98)
MONOCYTES # BLD AUTO: 0.08 THOUSAND/UL (ref 0–1.22)
MONOCYTES NFR BLD: 1 % (ref 4–12)
NEUTROPHILS # BLD MANUAL: 6.6 THOUSAND/UL (ref 1.85–7.62)
NEUTS SEG NFR BLD AUTO: 87 % (ref 43–75)
PLATELET # BLD AUTO: 132 THOUSANDS/UL (ref 149–390)
PLATELET BLD QL SMEAR: ABNORMAL
PMV BLD AUTO: 13.2 FL (ref 8.9–12.7)
POTASSIUM SERPL-SCNC: 4.7 MMOL/L (ref 3.5–5.3)
RBC # BLD AUTO: 3.34 MILLION/UL (ref 3.88–5.62)
RBC MORPH BLD: PRESENT
SODIUM SERPL-SCNC: 146 MMOL/L (ref 136–145)
WBC # BLD AUTO: 7.59 THOUSAND/UL (ref 4.31–10.16)

## 2022-07-07 PROCEDURE — 80048 BASIC METABOLIC PNL TOTAL CA: CPT | Performed by: INTERNAL MEDICINE

## 2022-07-07 PROCEDURE — 74220 X-RAY XM ESOPHAGUS 1CNTRST: CPT

## 2022-07-07 PROCEDURE — 85007 BL SMEAR W/DIFF WBC COUNT: CPT | Performed by: INTERNAL MEDICINE

## 2022-07-07 PROCEDURE — 99232 SBSQ HOSP IP/OBS MODERATE 35: CPT | Performed by: INTERNAL MEDICINE

## 2022-07-07 PROCEDURE — C9113 INJ PANTOPRAZOLE SODIUM, VIA: HCPCS | Performed by: PHYSICIAN ASSISTANT

## 2022-07-07 PROCEDURE — 82948 REAGENT STRIP/BLOOD GLUCOSE: CPT

## 2022-07-07 PROCEDURE — 85027 COMPLETE CBC AUTOMATED: CPT | Performed by: INTERNAL MEDICINE

## 2022-07-07 PROCEDURE — BD11ZZZ FLUOROSCOPY OF ESOPHAGUS: ICD-10-PCS | Performed by: RADIOLOGY

## 2022-07-07 RX ADMIN — PANTOPRAZOLE SODIUM 40 MG: 40 INJECTION, POWDER, FOR SOLUTION INTRAVENOUS at 03:01

## 2022-07-07 RX ADMIN — POTASSIUM CHLORIDE 40 MEQ: 1500 TABLET, EXTENDED RELEASE ORAL at 10:28

## 2022-07-07 RX ADMIN — FLUCONAZOLE 200 MG: 40 POWDER, FOR SUSPENSION ORAL at 16:09

## 2022-07-07 RX ADMIN — PRAVASTATIN SODIUM 80 MG: 80 TABLET ORAL at 16:07

## 2022-07-07 RX ADMIN — INSULIN GLARGINE 5 UNITS: 100 INJECTION, SOLUTION SUBCUTANEOUS at 21:08

## 2022-07-07 RX ADMIN — PANTOPRAZOLE SODIUM 40 MG: 40 INJECTION, POWDER, FOR SOLUTION INTRAVENOUS at 16:07

## 2022-07-07 RX ADMIN — METOPROLOL TARTRATE 25 MG: 25 TABLET, FILM COATED ORAL at 21:08

## 2022-07-07 RX ADMIN — ASPIRIN 81 MG 81 MG: 81 TABLET ORAL at 09:10

## 2022-07-07 RX ADMIN — INSULIN LISPRO 1 UNITS: 100 INJECTION, SOLUTION INTRAVENOUS; SUBCUTANEOUS at 16:13

## 2022-07-07 RX ADMIN — INSULIN LISPRO 1 UNITS: 100 INJECTION, SOLUTION INTRAVENOUS; SUBCUTANEOUS at 21:24

## 2022-07-07 RX ADMIN — INSULIN LISPRO 1 UNITS: 100 INJECTION, SOLUTION INTRAVENOUS; SUBCUTANEOUS at 07:45

## 2022-07-07 RX ADMIN — FINASTERIDE 5 MG: 5 TABLET, FILM COATED ORAL at 09:10

## 2022-07-07 RX ADMIN — LEVOTHYROXINE SODIUM 88 MCG: 88 TABLET ORAL at 05:28

## 2022-07-07 RX ADMIN — POTASSIUM CHLORIDE 40 MEQ: 1500 TABLET, EXTENDED RELEASE ORAL at 17:38

## 2022-07-07 RX ADMIN — INSULIN LISPRO 1 UNITS: 100 INJECTION, SOLUTION INTRAVENOUS; SUBCUTANEOUS at 13:25

## 2022-07-07 RX ADMIN — TAMSULOSIN HYDROCHLORIDE 0.4 MG: 0.4 CAPSULE ORAL at 09:10

## 2022-07-07 RX ADMIN — METOPROLOL TARTRATE 25 MG: 25 TABLET, FILM COATED ORAL at 09:10

## 2022-07-07 RX ADMIN — ENOXAPARIN SODIUM 40 MG: 40 INJECTION SUBCUTANEOUS at 09:10

## 2022-07-07 NOTE — ASSESSMENT & PLAN NOTE
Lab Results   Component Value Date    HGBA1C 8 8 (H) 06/27/2022       Recent Labs     07/07/22 2056 07/08/22  0004 07/08/22  0641 07/08/22  1205   POCGLU 182* 101 210* 219*       Blood Sugar Average: Last 72 hrs:  (P) 208 45     · DKA has now resolved  · Currently patient is poor p o  Intake due to dysphagia  · Accu-Cheks noted on lower range  · Lantus to 5 units q h kwadwo Braga · Patient evaluated by speech therapy and diet changed accordingly  · Continue Accu-Cheks monitoring q 4 hours  · Continue diabetic diet, sliding scale coverage

## 2022-07-07 NOTE — PROGRESS NOTES
[address] [facility]  Progress Note - Mike Pope 1940, [de-identified] y o  male MRN: [id number]  Unit/Bed#: MS [facility]-01 Encounter: [id number]  Primary Care Provider: Gerald Rodriguez MD   Date and time admitted to hospital: 6/30/2022  3:28 PM    * Type 2 diabetes mellitus with mild nonproliferative diabetic retinopathy without macular edema, bilateral [facility])  Assessment & Plan  Lab Results   Component Value Date    HGBA1C 8 8 (H) 06/27/2022       Recent Labs     07/06/22  2045 07/07/22  0023 07/07/22  0540 07/07/22  1053   POCGLU 192* 356* 202* 188*       Blood Sugar Average: Last 72 hrs:  (P) 220 [id number]     · DKA has now resolved  · Currently patient is poor p o  Intake due to dysphagia  · Accu-Cheks noted on lower range  · Lantus to 5 units q h s  Iesha Cruel · Patient evaluated by speech therapy and diet changed accordingly  · Continue Accu-Cheks monitoring q 4 hours  · Continue diabetic diet, sliding scale coverage  Dysphagia  Assessment & Plan  · Patient assessed by speech therapy today and recommend GI evaluation  · Diet-puree and honey thick liquid  · Maintain aspiration precaution  · Gastroenterology following  · Video barium swallow completed; read pending    Physical deconditioning  Assessment & Plan  · PT OT recommendation noted for post acute rehab  · Cm to follow-up for placement  Hematuria  Assessment & Plan  · Occurred after straight cath overnight  · Suspect related to trauma to prostate  · Hematuria now resolved  · Mckeon catheter placed on 7/1  · Continue Flomax and finasteride  · Currently Mckeon catheter with clear yellow urine  COVID-19  Assessment & Plan  · Patient presently not symptomatic  · Not on any treatment at the time of ICU downgraded  · Currently O2 saturation 96% on room air  · No intervention indicated at this time  · Continue incentive spirometry use, self proning as able  · Continue supportive care        Coronary atherosclerosis of native coronary artery  Assessment & Plan  · Continue aspirin/statin/beta-blocker    Ischemic cardiomyopathy  Assessment & Plan  · Ejection fraction 25% historically  · Repeat echocardiogram showing EF of 25%  · Continue home beta-blocker    Hypothyroidism  Assessment & Plan  · Continue home levothyroxine    BPH (benign prostatic hyperplasia)  Assessment & Plan  · Continue home finasteride and tamsulosin  · Currently patient has Mckeon catheter  · Recommended to remove Mckeon catheter and Attempt  voiding trial once ambulation improves at rehab  Hyperlipidemia  Assessment & Plan  · Continue statin equivalent while inaptient, resume rosuvastatin on discharge        VTE Pharmacologic Prophylaxis: VTE Score: 5 High Risk (Score >/= 5) - Pharmacological DVT Prophylaxis Ordered: enoxaparin (Lovenox)  Sequential Compression Devices Ordered  Patient Centered Rounds: I performed bedside rounds with nursing staff today  Discussions with Specialists or Other Care Team Provider: case management, GI    Education and Discussions with Family / Patient: Attempted to update  (daughter in law) via phone  Left voicemail  Time Spent for Care: 30 minutes  More than 50% of total time spent on counseling and coordination of care as described above  Current Length of Stay: 7 day(s)  Current Patient Status: Inpatient   Certification Statement: The patient will continue to require additional inpatient hospital stay due to dysphagia   Discharge Plan: Anticipate discharge in 24-48 hrs to rehab facility  Code Status: Level 1 - Full Code    Subjective:   Patient resting comfortably on examination  Patient had no overnight events or complaints on exam this morning      Objective:     Vitals:   Temp (24hrs), Av 2 °F (36 2 °C), Min:97 °F (36 1 °C), Max:97 4 °F (36 3 °C)    Temp:  [97 °F (36 1 °C)-97 4 °F (36 3 °C)] 97 °F (36 1 °C)  HR:  [103-117] 103  Resp:  [17] 17  BP: (101-115)/(62-66) 113/62  SpO2:  [94 %-95 %] 95 %  Body mass index is 20 78 kg/m²  Input and Output Summary (last 24 hours): Intake/Output Summary (Last 24 hours) at 7/7/2022 1353  Last data filed at 7/7/2022 0612  Gross per 24 hour   Intake 100 ml   Output 1100 ml   Net -1000 ml       Physical Exam:   Physical Exam  Vitals and nursing note reviewed  Constitutional:       General: He is not in acute distress  Appearance: He is well-developed  Comments: Chronically ill-appearing   HENT:      Head: Normocephalic and atraumatic  Eyes:      General: No scleral icterus  Conjunctiva/sclera: Conjunctivae normal    Cardiovascular:      Rate and Rhythm: Normal rate and regular rhythm  Heart sounds: Normal heart sounds  No murmur heard  No friction rub  No gallop  Pulmonary:      Effort: Pulmonary effort is normal  No respiratory distress  Breath sounds: Normal breath sounds  No wheezing or rales  Abdominal:      General: Bowel sounds are normal  There is no distension  Palpations: Abdomen is soft  Tenderness: There is no abdominal tenderness  Musculoskeletal:         General: Normal range of motion  Skin:     General: Skin is warm  Findings: No rash  Neurological:      Mental Status: He is alert and oriented to person, place, and time  Additional Data:     Labs:  Results from last 7 days   Lab Units 07/07/22  0510 07/06/22  0508 07/04/22  0558 07/03/22  0509   WBC Thousand/uL 7 59 11 15*   < > 5 97   HEMOGLOBIN g/dL 12 0 13 4   < > 12 8   HEMATOCRIT % 36 2* 39 7   < > 38 5   PLATELETS Thousands/uL 132* 105*   < > 125*   BANDS PCT %  --  10*  --   --    NEUTROS PCT %  --   --   --  86*   LYMPHS PCT %  --   --   --  11*   LYMPHO PCT % 11* 8*   < >  --    MONOS PCT %  --   --   --  2*   MONO PCT % 1* 0*   < >  --    EOS PCT % 1 0   < > 0    < > = values in this interval not displayed       Results from last 7 days   Lab Units 07/07/22  0510 07/05/22  0544 07/04/22  0558   SODIUM mmol/L 146*   < > 143   POTASSIUM mmol/L 4 7   < > 3 5   CHLORIDE mmol/L 113*   < > 107   CO2 mmol/L 26   < > 24   BUN mg/dL 34*   < > 25   CREATININE mg/dL 1 05   < > 1 05   ANION GAP mmol/L 7   < > 12   CALCIUM mg/dL 8 4   < > 8 3   ALBUMIN g/dL  --   --  2 4*   TOTAL BILIRUBIN mg/dL  --   --  0 94   ALK PHOS U/L  --   --  37*   ALT U/L  --   --  49   AST U/L  --   --  99*   GLUCOSE RANDOM mg/dL 190*   < > 60*    < > = values in this interval not displayed  Results from last 7 days   Lab Units 07/07/22  1053 07/07/22  0540 07/07/22  0023 07/06/22  2045 07/06/22  1055 07/06/22  0540 07/06/22  0413 07/06/22  0123 07/05/22  2327 07/05/22  2122 07/05/22  1646 07/05/22  1101   POC GLUCOSE mg/dl 188* 202* 356* 192* 284* 200* 265* 243* 278* 221* 185* 202*         Results from last 7 days   Lab Units 06/30/22  1849   LACTIC ACID mmol/L 1 7       Lines/Drains:  Invasive Devices  Report    Peripheral Intravenous Line  Duration           Peripheral IV 07/05/22 Right Antecubital 2 days          Drain  Duration           Urethral Catheter Coude 6 days              Urinary Catheter:  Goal for removal: N/A - Chronic Mckeon               Imaging: No pertinent imaging reviewed      Recent Cultures (last 7 days):         Last 24 Hours Medication List:   Current Facility-Administered Medications   Medication Dose Route Frequency Provider Last Rate    aspirin  81 mg Oral Daily Nilsa Paulson PA-C      enoxaparin  40 mg Subcutaneous Q24H [facility] Nilsa Paulson PA-C      finasteride  5 mg Oral Daily Nilsa Paulson PA-C      fluconazole  200 mg Oral Daily Kalli Duke PA-C      insulin glargine  5 Units Subcutaneous  Nilsa Paulson PA-C      insulin lispro  1-5 Units Subcutaneous TID AC Nilsa Paulson PA-C      insulin lispro  1-5 Units Subcutaneous HS Nilsa Paulson PA-C      levothyroxine  88 mcg Oral Daily Nilsa Paulson PA-C      metoprolol tartrate  25 mg Oral Q12H [facility] Nilsa Paulson PA-C      pantoprazole  40 mg Intravenous Q12H Kalli Duke PA-C  potassium chloride  40 mEq Oral BID Garlon CHRISTINA Fernandes      potassium chloride  40 mEq Oral Once Garlon CHRISTINA Fernandes      pravastatin  80 mg Oral Daily With [employer]STINA      sodium chloride (PF)  3 mL Intravenous Q1H PRN Garlon CHRISTINA Fernandes      tamsulosin  0 4 mg Oral Daily Garloitzel Fernandes PA-C          Today, Patient Was Seen By: Levon Ford DO    **Please Note: This note may have been constructed using a voice recognition system  **

## 2022-07-07 NOTE — ASSESSMENT & PLAN NOTE
· Patient assessed by speech therapy today and recommend GI evaluation  · Diet-puree and honey thick liquid  · Maintain aspiration precaution    · Gastroenterology following-continue fluconazole and will need EGD as outpatient  · Patient had esophagram which showed no strictures or lesions  · Plan for video barium swallow on Monday

## 2022-07-07 NOTE — PLAN OF CARE
Problem: MOBILITY - ADULT  Goal: Maintain or return to baseline ADL function  Description: INTERVENTIONS:  -  Assess patient's ability to carry out ADLs; assess patient's baseline for ADL function and identify physical deficits which impact ability to perform ADLs (bathing, care of mouth/teeth, toileting, grooming, dressing, etc )  - Assess/evaluate cause of self-care deficits   - Assess range of motion  - Assess patient's mobility; develop plan if impaired  - Assess patient's need for assistive devices and provide as appropriate  - Encourage maximum independence but intervene and supervise when necessary  - Involve family in performance of ADLs  - Assess for home care needs following discharge   - Consider OT consult to assist with ADL evaluation and planning for discharge  - Provide patient education as appropriate  Outcome: Progressing     Problem: INFECTION - ADULT  Goal: Absence or prevention of progression during hospitalization  Description: INTERVENTIONS:  - Assess and monitor for signs and symptoms of infection  - Monitor lab/diagnostic results  - Monitor all insertion sites, i e  indwelling lines, tubes, and drains  - Monitor endotracheal if appropriate and nasal secretions for changes in amount and color  - [place] appropriate cooling/warming therapies per order  - Administer medications as ordered  - Instruct and encourage patient and family to use good hand hygiene technique  - Identify and instruct in appropriate isolation precautions for identified infection/condition  Outcome: Progressing     Problem: DISCHARGE PLANNING  Goal: Discharge to home or other facility with appropriate resources  Description: INTERVENTIONS:  - Identify barriers to discharge w/patient and caregiver  - Arrange for needed discharge resources and transportation as appropriate  - Identify discharge learning needs (meds, wound care, etc )  - Arrange for interpretive services to assist at discharge as needed  - Refer to Case Management Department for coordinating discharge planning if the patient needs post-hospital services based on physician/advanced practitioner order or complex needs related to functional status, cognitive ability, or social support system  Outcome: Progressing     Problem: Knowledge Deficit  Goal: Patient/family/caregiver demonstrates understanding of disease process, treatment plan, medications, and discharge instructions  Description: Complete learning assessment and assess knowledge base    Interventions:  - Provide teaching at level of understanding  - Provide teaching via preferred learning methods  Outcome: Progressing

## 2022-07-07 NOTE — PLAN OF CARE
Pt consumed 50% of lunch at best   Pt refusing to get up and sit in chair when encouraged by staff  Pt reports having no energy

## 2022-07-08 PROBLEM — E87.0 HYPERNATREMIA: Status: ACTIVE | Noted: 2022-07-08

## 2022-07-08 PROBLEM — R53.1 WEAKNESS: Status: ACTIVE | Noted: 2022-07-08

## 2022-07-08 LAB
ANION GAP SERPL CALCULATED.3IONS-SCNC: 7 MMOL/L (ref 4–13)
BUN SERPL-MCNC: 32 MG/DL (ref 5–25)
CALCIUM SERPL-MCNC: 8.3 MG/DL (ref 8.3–10.1)
CHLORIDE SERPL-SCNC: 115 MMOL/L (ref 100–108)
CO2 SERPL-SCNC: 26 MMOL/L (ref 21–32)
CREAT SERPL-MCNC: 1.02 MG/DL (ref 0.6–1.3)
ERYTHROCYTE [DISTWIDTH] IN BLOOD BY AUTOMATED COUNT: 17.1 % (ref 11.6–15.1)
GFR SERPL CREATININE-BSD FRML MDRD: 69 ML/MIN/1.73SQ M
GLUCOSE SERPL-MCNC: 101 MG/DL (ref 65–140)
GLUCOSE SERPL-MCNC: 147 MG/DL (ref 65–140)
GLUCOSE SERPL-MCNC: 184 MG/DL (ref 65–140)
GLUCOSE SERPL-MCNC: 210 MG/DL (ref 65–140)
GLUCOSE SERPL-MCNC: 219 MG/DL (ref 65–140)
GLUCOSE SERPL-MCNC: 219 MG/DL (ref 65–140)
GLUCOSE SERPL-MCNC: 98 MG/DL (ref 65–140)
HCT VFR BLD AUTO: 36.2 % (ref 36.5–49.3)
HGB BLD-MCNC: 11.9 G/DL (ref 12–17)
MCH RBC QN AUTO: 36.1 PG (ref 26.8–34.3)
MCHC RBC AUTO-ENTMCNC: 32.9 G/DL (ref 31.4–37.4)
MCV RBC AUTO: 110 FL (ref 82–98)
NRBC BLD AUTO-RTO: 0 /100 WBCS
PLATELET # BLD AUTO: 179 THOUSANDS/UL (ref 149–390)
PMV BLD AUTO: 11.8 FL (ref 8.9–12.7)
POTASSIUM SERPL-SCNC: 4.9 MMOL/L (ref 3.5–5.3)
RBC # BLD AUTO: 3.3 MILLION/UL (ref 3.88–5.62)
SODIUM SERPL-SCNC: 148 MMOL/L (ref 136–145)
WBC # BLD AUTO: 6.69 THOUSAND/UL (ref 4.31–10.16)

## 2022-07-08 PROCEDURE — 85027 COMPLETE CBC AUTOMATED: CPT | Performed by: INTERNAL MEDICINE

## 2022-07-08 PROCEDURE — 99232 SBSQ HOSP IP/OBS MODERATE 35: CPT | Performed by: INTERNAL MEDICINE

## 2022-07-08 PROCEDURE — 80048 BASIC METABOLIC PNL TOTAL CA: CPT | Performed by: INTERNAL MEDICINE

## 2022-07-08 PROCEDURE — 92526 ORAL FUNCTION THERAPY: CPT

## 2022-07-08 PROCEDURE — 97530 THERAPEUTIC ACTIVITIES: CPT

## 2022-07-08 PROCEDURE — 82948 REAGENT STRIP/BLOOD GLUCOSE: CPT

## 2022-07-08 PROCEDURE — C9113 INJ PANTOPRAZOLE SODIUM, VIA: HCPCS | Performed by: PHYSICIAN ASSISTANT

## 2022-07-08 RX ORDER — ACETAMINOPHEN 325 MG/1
650 TABLET ORAL EVERY 6 HOURS PRN
Status: DISCONTINUED | OUTPATIENT
Start: 2022-07-08 | End: 2022-07-14 | Stop reason: HOSPADM

## 2022-07-08 RX ORDER — SODIUM CHLORIDE, SODIUM GLUCONATE, SODIUM ACETATE, POTASSIUM CHLORIDE, MAGNESIUM CHLORIDE, SODIUM PHOSPHATE, DIBASIC, AND POTASSIUM PHOSPHATE .53; .5; .37; .037; .03; .012; .00082 G/100ML; G/100ML; G/100ML; G/100ML; G/100ML; G/100ML; G/100ML
75 INJECTION, SOLUTION INTRAVENOUS CONTINUOUS
Status: DISCONTINUED | OUTPATIENT
Start: 2022-07-08 | End: 2022-07-09

## 2022-07-08 RX ORDER — AMOXICILLIN 250 MG
1 CAPSULE ORAL
Status: DISCONTINUED | OUTPATIENT
Start: 2022-07-08 | End: 2022-07-14 | Stop reason: HOSPADM

## 2022-07-08 RX ADMIN — INSULIN LISPRO 2 UNITS: 100 INJECTION, SOLUTION INTRAVENOUS; SUBCUTANEOUS at 12:06

## 2022-07-08 RX ADMIN — SODIUM CHLORIDE, SODIUM GLUCONATE, SODIUM ACETATE, POTASSIUM CHLORIDE, MAGNESIUM CHLORIDE, SODIUM PHOSPHATE, DIBASIC, AND POTASSIUM PHOSPHATE 75 ML/HR: .53; .5; .37; .037; .03; .012; .00082 INJECTION, SOLUTION INTRAVENOUS at 12:18

## 2022-07-08 RX ADMIN — METOPROLOL TARTRATE 25 MG: 25 TABLET, FILM COATED ORAL at 22:36

## 2022-07-08 RX ADMIN — ASPIRIN 81 MG 81 MG: 81 TABLET ORAL at 10:37

## 2022-07-08 RX ADMIN — TAMSULOSIN HYDROCHLORIDE 0.4 MG: 0.4 CAPSULE ORAL at 10:37

## 2022-07-08 RX ADMIN — PANTOPRAZOLE SODIUM 40 MG: 40 INJECTION, POWDER, FOR SOLUTION INTRAVENOUS at 04:00

## 2022-07-08 RX ADMIN — INSULIN LISPRO 1 UNITS: 100 INJECTION, SOLUTION INTRAVENOUS; SUBCUTANEOUS at 22:36

## 2022-07-08 RX ADMIN — SENNOSIDES AND DOCUSATE SODIUM 1 TABLET: 50; 8.6 TABLET ORAL at 22:36

## 2022-07-08 RX ADMIN — FLUCONAZOLE 200 MG: 40 POWDER, FOR SUSPENSION ORAL at 15:25

## 2022-07-08 RX ADMIN — FINASTERIDE 5 MG: 5 TABLET, FILM COATED ORAL at 10:37

## 2022-07-08 RX ADMIN — METOPROLOL TARTRATE 25 MG: 25 TABLET, FILM COATED ORAL at 10:37

## 2022-07-08 RX ADMIN — INSULIN LISPRO 2 UNITS: 100 INJECTION, SOLUTION INTRAVENOUS; SUBCUTANEOUS at 07:00

## 2022-07-08 RX ADMIN — PRAVASTATIN SODIUM 80 MG: 80 TABLET ORAL at 15:25

## 2022-07-08 RX ADMIN — INSULIN GLARGINE 5 UNITS: 100 INJECTION, SOLUTION SUBCUTANEOUS at 22:36

## 2022-07-08 RX ADMIN — PANTOPRAZOLE SODIUM 40 MG: 40 INJECTION, POWDER, FOR SOLUTION INTRAVENOUS at 15:25

## 2022-07-08 RX ADMIN — LEVOTHYROXINE SODIUM 88 MCG: 88 TABLET ORAL at 06:07

## 2022-07-08 RX ADMIN — ENOXAPARIN SODIUM 40 MG: 40 INJECTION SUBCUTANEOUS at 10:37

## 2022-07-08 NOTE — PHYSICAL THERAPY NOTE
Physical Therapy Treatment Note    Patient's Name: Susan Mendez    Admitting Diagnosis  SOB (shortness of breath) [R06 02]  DKA (diabetic ketoacidosis) ([place] [address] ) [E11 10]  Acute kidney injury ([place] [address] ) [N17 9]  COVID-19 [U07 1]    Problem List  Patient Active Problem List   Diagnosis    Anemia    Hyperlipidemia    BPH (benign prostatic hyperplasia)    Hypothyroidism    Foot pain, bilateral    Ischemic cardiomyopathy    Coronary atherosclerosis of native coronary artery    Urinary retention    Prostate cancer screening    Chronic obstructive pulmonary disease ([place] [address] )    Type 2 diabetes mellitus with mild nonproliferative diabetic retinopathy without macular edema, bilateral ([facility] )    COVID-19    Hematuria    Physical deconditioning    Dysphagia        07/08/22 1034   PT Last Visit   PT Visit Date 07/08/22   Note Type   Note Type Treatment   Pain Assessment   Pain Assessment Tool 0-10   Pain Score No Pain   Restrictions/Precautions   Weight Bearing Precautions Per Order No   Braces or Orthoses Other (Comment)  (none per patient)   Other Precautions Airborne/isolation;Contact/isolation; Chair Alarm; Bed Alarm;O2;Fall Risk;Pain   General   Chart Reviewed Yes   Response to Previous Treatment Patient with no complaints from previous session  Family/Caregiver Present No   Cognition   Overall Cognitive Status Impaired   Arousal/Participation Responsive   Orientation Level Oriented to person;Disoriented to place; Disoriented to time;Disoriented to situation   Memory Decreased recall of recent events;Decreased short term memory   Following Commands Follows one step commands without difficulty   Comments Pt agreeable to PT   Bed Mobility   Supine to Sit 3  Moderate assistance   Additional items Assist x 1;Bedrails; Increased time required;LE management   Additional Comments Pt received at start of session supine in bed with HOB elevated   Transfers   Sit to Stand 4  Minimal assistance   Additional items Assist x 1; Increased time required;Armrests; Verbal cues   Stand to Sit 4  Minimal assistance   Additional items Assist x 1; Increased time required;Armrests; Verbal cues   Additional Comments with use of RW   Ambulation/Elevation   Gait pattern Decreased foot clearance; Step to;Short stride; Inconsistent brandon; Foward flexed; Shuffling   Gait Assistance 4  Minimal assist   Additional items Assist x 1;Verbal cues   Assistive Device Rolling walker   Distance 3' to bedside chair   Stair Management Assistance Not tested   Balance   Static Sitting Fair   Dynamic Sitting Fair -   Static Standing Poor +   Dynamic Standing Poor   Ambulatory Poor   Endurance Deficit   Endurance Deficit Yes   Endurance Deficit Description decreased activity tolerance   Activity Tolerance   Activity Tolerance Patient limited by fatigue   Nurse Made Aware RN Eli   Exercises   Hip Flexion Sitting;10 reps;AAROM; Bilateral   Knee AROM Long Arc Quad Sitting;10 reps;AAROM; Bilateral   Ankle Pumps Sitting;10 reps;AAROM; Bilateral   Assessment   Prognosis Fair   Problem List Decreased strength;Decreased endurance; Impaired balance;Decreased mobility; Decreased cognition   Assessment Pt seen for PT treatment session this date with interventions consisting of gait training w/ emphasis on improving pt's ability to ambulate level surfaces x 3' with min A provided by therapist with RW, Therapeutic exercise consisting of: bilateral LE exercises in seated position and therapeutic activity consisting of training: bed mobility, supine<>sit transfers and sit<>stand transfers  Pt agreeable to PT treatment session upon arrival, pt found supine in bed w/ HOB elevated, in no apparent distress and responsive  In comparison to previous session, pt with no improvements as evidenced by increased assistance required for mobility, decreased activity tolerance  Post session: pt returned back to recliner, chair alarm engaged, all needs in reach and RN notified of session findings/recommendations   Continue to recommend post acute rehabilitation services at time of d/c in order to maximize pt's functional independence and safety w/ mobility  Pt continues to be functioning below baseline level, and remains limited 2* factors listed above and including continued need for medical management and monitoring, decreased strength and balance resulting in an increased risk for falls, impaired endurance and activity tolerance limiting overall mobility, decreased cognition and command following  PT will continue to see pt during current hospitalization in order to address the deficits listed above and provide interventions consistent w/ POC in effort to achieve STGs  Barriers to Discharge Inaccessible home environment   Goals   Patient Goals None stated   STG Expiration Date 07/11/22   PT Treatment Day 1   Plan   Treatment/Interventions LE strengthening/ROM; Functional transfer training; Therapeutic exercise; Endurance training;Patient/family training;Bed mobility;Gait training; Compensatory technique education;Spoke to nursing;OT   Progress Slow progress, decreased activity tolerance   PT Frequency 3-5x/wk   Recommendation   PT Discharge Recommendation Post acute rehabilitation services   AM-PAC Basic Mobility Inpatient   Turning in Bed Without Bedrails 2   Lying on Back to Sitting on Edge of Flat Bed 2   Moving Bed to Chair 3   Standing Up From Chair 3   Walk in Room 1   Climb 3-5 Stairs 1   Basic Mobility Inpatient Raw Score 12   Basic Mobility Standardized Score 32 23   Highest Level Of Mobility   JH-HLM Goal 4: Move to chair/commode   JH-HLM Achieved 4: Move to chair/commode   Education   Education Provided Mobility training   Patient Reinforcement needed   End of Consult   Patient Position at End of Consult Seated edge of bed;Bed/Chair alarm activated; All needs within reach       Ekaterina Almanza PT    Time In: 10:34  Time Out: 10:50  Total Time: 16 mins

## 2022-07-08 NOTE — PLAN OF CARE
Problem: Potential for Falls  Goal: Patient will remain free of falls  Description: INTERVENTIONS:  - Educate patient/family on patient safety including physical limitations  - Instruct patient to call for assistance with activity   - Consult OT/PT to assist with strengthening/mobility   - Keep Call bell within reach  - Keep bed low and locked with side rails adjusted as appropriate  - Keep care items and personal belongings within reach  - Initiate and maintain comfort rounds  - Make Fall Risk Sign visible to staff  - Offer Toileting every  Hours, in advance of need  - Initiate/Maintain alarm  - Obtain necessary fall risk management equipment:   - Apply yellow socks and bracelet for high fall risk patients  - Consider moving patient to room near nurses station  Outcome: Progressing     Problem: MOBILITY - ADULT  Goal: Maintain or return to baseline ADL function  Description: INTERVENTIONS:  -  Assess patient's ability to carry out ADLs; assess patient's baseline for ADL function and identify physical deficits which impact ability to perform ADLs (bathing, care of mouth/teeth, toileting, grooming, dressing, etc )  - Assess/evaluate cause of self-care deficits   - Assess range of motion  - Assess patient's mobility; develop plan if impaired  - Assess patient's need for assistive devices and provide as appropriate  - Encourage maximum independence but intervene and supervise when necessary  - Involve family in performance of ADLs  - Assess for home care needs following discharge   - Consider OT consult to assist with ADL evaluation and planning for discharge  - Provide patient education as appropriate  Outcome: Progressing  Goal: Maintains/Returns to pre admission functional level  Description: INTERVENTIONS:  - Perform BMAT or MOVE assessment daily    - Set and communicate daily mobility goal to care team and patient/family/caregiver     - Collaborate with rehabilitation services on mobility goals if consulted  - Perform Range of Motion  times a day  - Reposition patient every  hours    - Dangle patient  times a day  - Stand patient times a day  - Ambulate patient  times a day  - Out of bed to chair  times a day   - Out of bed for meals times a day  - Out of bed for toileting  - Record patient progress and toleration of activity level   Outcome: Progressing     Problem: Prexisting or High Potential for Compromised Skin Integrity  Goal: Skin integrity is maintained or improved  Description: INTERVENTIONS:  - Identify patients at risk for skin breakdown  - Assess and monitor skin integrity  - Assess and monitor nutrition and hydration status  - Monitor labs   - Assess for incontinence   - Turn and reposition patient  - Assist with mobility/ambulation  - Relieve pressure over bony prominences  - Avoid friction and shearing  - Provide appropriate hygiene as needed including keeping skin clean and dry  - Evaluate need for skin moisturizer/barrier cream  - Collaborate with interdisciplinary team   - Patient/family teaching  - Consider wound care consult   Outcome: Progressing     Problem: PAIN - ADULT  Goal: Verbalizes/displays adequate comfort level or baseline comfort level  Description: Interventions:  - Encourage patient to monitor pain and request assistance  - Assess pain using appropriate pain scale  - Administer analgesics based on type and severity of pain and evaluate response  - Implement non-pharmacological measures as appropriate and evaluate response  - Consider cultural and social influences on pain and pain management  - Notify physician/advanced practitioner if interventions unsuccessful or patient reports new pain  Outcome: Progressing     Problem: INFECTION - ADULT  Goal: Absence or prevention of progression during hospitalization  Description: INTERVENTIONS:  - Assess and monitor for signs and symptoms of infection  - Monitor lab/diagnostic results  - Monitor all insertion sites, i e  indwelling lines, tubes, and drains  - Monitor endotracheal if appropriate and nasal secretions for changes in amount and color  - [place] appropriate cooling/warming therapies per order  - Administer medications as ordered  - Instruct and encourage patient and family to use good hand hygiene technique  - Identify and instruct in appropriate isolation precautions for identified infection/condition  Outcome: Progressing  Goal: Absence of fever/infection during neutropenic period  Description: INTERVENTIONS:  - Monitor WBC    Outcome: Progressing     Problem: DISCHARGE PLANNING  Goal: Discharge to home or other facility with appropriate resources  Description: INTERVENTIONS:  - Identify barriers to discharge w/patient and caregiver  - Arrange for needed discharge resources and transportation as appropriate  - Identify discharge learning needs (meds, wound care, etc )  - Arrange for interpretive services to assist at discharge as needed  - Refer to Case Management Department for coordinating discharge planning if the patient needs post-hospital services based on physician/advanced practitioner order or complex needs related to functional status, cognitive ability, or social support system  Outcome: Progressing     Problem: Knowledge Deficit  Goal: Patient/family/caregiver demonstrates understanding of disease process, treatment plan, medications, and discharge instructions  Description: Complete learning assessment and assess knowledge base    Interventions:  - Provide teaching at level of understanding  - Provide teaching via preferred learning methods  Outcome: Progressing     Problem: RESPIRATORY - ADULT  Goal: Achieves optimal ventilation and oxygenation  Description: INTERVENTIONS:  - Assess for changes in respiratory status  - Assess for changes in mentation and behavior  - Position to facilitate oxygenation and minimize respiratory effort  - Oxygen administered by appropriate delivery if ordered  - Initiate smoking cessation education as indicated  - Encourage broncho-pulmonary hygiene including cough, deep breathe, Incentive Spirometry  - Assess the need for suctioning and aspirate as needed  - Assess and instruct to report SOB or any respiratory difficulty  - Respiratory Therapy support as indicated  Outcome: Progressing     Problem: METABOLIC, FLUID AND ELECTROLYTES - ADULT  Goal: Electrolytes maintained within normal limits  Description: INTERVENTIONS:  - Monitor labs and assess patient for signs and symptoms of electrolyte imbalances  - Administer electrolyte replacement as ordered  - Monitor response to electrolyte replacements, including repeat lab results as appropriate  - Instruct patient on fluid and nutrition as appropriate  Outcome: Progressing  Goal: Fluid balance maintained  Description: INTERVENTIONS:  - Monitor labs   - Monitor I/O and WT  - Instruct patient on fluid and nutrition as appropriate  - Assess for signs & symptoms of volume excess or deficit  Outcome: Progressing  Goal: Glucose maintained within target range  Description: INTERVENTIONS:  - Monitor Blood Glucose as ordered  - Assess for signs and symptoms of hyperglycemia and hypoglycemia  - Administer ordered medications to maintain glucose within target range  - Assess nutritional intake and initiate nutrition service referral as needed  Outcome: Progressing     Problem: Nutrition/Hydration-ADULT  Goal: Nutrient/Hydration intake appropriate for improving, restoring or maintaining nutritional needs  Description: Monitor and assess patient's nutrition/hydration status for malnutrition  Collaborate with interdisciplinary team and initiate plan and interventions as ordered  Monitor patient's weight and dietary intake as ordered or per policy  Utilize nutrition screening tool and intervene as necessary  Determine patient's food preferences and provide high-protein, high-caloric foods as appropriate       INTERVENTIONS:  - Monitor oral intake, urinary output, labs, and treatment plans  - Assess nutrition and hydration status and recommend course of action  - Evaluate amount of meals eaten  - Assist patient with eating if necessary   - Allow adequate time for meals  - Recommend/ encourage appropriate diets, oral nutritional supplements, and vitamin/mineral supplements  - Order, calculate, and assess calorie counts as needed  - Recommend, monitor, and adjust tube feedings based on assessed needs  - Assess need for intravenous fluids  - Provide specific nutrition/hydration education as appropriate  - Include patient/family/caregiver in decisions related to nutrition  Outcome: Progressing

## 2022-07-08 NOTE — PLAN OF CARE
Problem: Knowledge Deficit  Goal: Patient/family/caregiver demonstrates understanding of disease process, treatment plan, medications, and discharge instructions  Description: Complete learning assessment and assess knowledge base  Interventions:  - Provide teaching at level of understanding  - Provide teaching via preferred learning methods  Outcome: Progressing     Problem: RESPIRATORY - ADULT  Goal: Achieves optimal ventilation and oxygenation  Description: INTERVENTIONS:  - Assess for changes in respiratory status  - Assess for changes in mentation and behavior  - Position to facilitate oxygenation and minimize respiratory effort  - Oxygen administered by appropriate delivery if ordered  - Initiate smoking cessation education as indicated  - Encourage broncho-pulmonary hygiene including cough, deep breathe, Incentive Spirometry  - Assess the need for suctioning and aspirate as needed  - Assess and instruct to report SOB or any respiratory difficulty  - Respiratory Therapy support as indicated  Outcome: Progressing     Problem: Nutrition/Hydration-ADULT  Goal: Nutrient/Hydration intake appropriate for improving, restoring or maintaining nutritional needs  Description: Monitor and assess patient's nutrition/hydration status for malnutrition  Collaborate with interdisciplinary team and initiate plan and interventions as ordered  Monitor patient's weight and dietary intake as ordered or per policy  Utilize nutrition screening tool and intervene as necessary  Determine patient's food preferences and provide high-protein, high-caloric foods as appropriate       INTERVENTIONS:  - Monitor oral intake, urinary output, labs, and treatment plans  - Assess nutrition and hydration status and recommend course of action  - Evaluate amount of meals eaten  - Assist patient with eating if necessary   - Allow adequate time for meals  - Recommend/ encourage appropriate diets, oral nutritional supplements, and vitamin/mineral supplements  - Order, calculate, and assess calorie counts as needed  - Recommend, monitor, and adjust tube feedings based on assessed needs  - Assess need for intravenous fluids  - Provide specific nutrition/hydration education as appropriate  - Include patient/family/caregiver in decisions related to nutrition  Outcome: Progressing

## 2022-07-08 NOTE — ASSESSMENT & PLAN NOTE
· Sodium 148 today  · Likely from decreased fluid intake  · Will place on gentle hydration  · Continue to monitor

## 2022-07-08 NOTE — PROGRESS NOTES
[facility]  Progress Note - Karthikeyan Henson 1940, [de-identified] y o  male MRN: [id number]  Unit/Bed#: -01 Encounter: [id number]  Primary Care Provider: Yusra Winkler MD   Date and time admitted to hospital: 6/30/2022  3:28 PM    * Type 2 diabetes mellitus with mild nonproliferative diabetic retinopathy without macular edema, bilateral [facility])  Assessment & Plan  Lab Results   Component Value Date    HGBA1C 8 8 (H) 06/27/2022       Recent Labs     07/07/22 2056 07/08/22  0004 07/08/22  0641 07/08/22  1205   POCGLU 182* 101 210* 219*       Blood Sugar Average: Last 72 hrs:  (P) 208 45     · DKA has now resolved  · Currently patient is poor p o  Intake due to dysphagia  · Accu-Cheks noted on lower range  · Lantus to 5 units q h s  Carlota Soulier · Patient evaluated by speech therapy and diet changed accordingly  · Continue Accu-Cheks monitoring q 4 hours  · Continue diabetic diet, sliding scale coverage  Dysphagia  Assessment & Plan  · Patient assessed by speech therapy today and recommend GI evaluation  · Diet-puree and honey thick liquid  · Maintain aspiration precaution  · Gastroenterology following-continue fluconazole and will need EGD as outpatient  · Patient had esophagram which showed no strictures or lesions  · Plan for video barium swallow on Monday    Hypernatremia  Assessment & Plan  · Sodium 148 today  · Likely from decreased fluid intake  · Will place on gentle hydration  · Continue to monitor    Weakness  Assessment & Plan  · PT OT recommending rehab  · Patient amenable to post acute rehab    Physical deconditioning  Assessment & Plan  · PT OT recommendation noted for post acute rehab  · Cm to follow-up for placement      Hematuria  Assessment & Plan  · Occurred after straight cath overnight  · Suspect related to trauma to prostate  · Hematuria now resolved  · Mckeon catheter placed on 7/1  · Continue Flomax and finasteride  · Currently Mckeon catheter with clear yellow urine     COVID-19  Assessment & Plan  · Patient presently not symptomatic  · Not on any treatment at the time of ICU downgraded  · Currently O2 saturation 96% on room air  · No intervention indicated at this time  · Continue incentive spirometry use, self proning as able  · Continue supportive care  Coronary atherosclerosis of native coronary artery  Assessment & Plan  · Continue aspirin/statin/beta-blocker    Ischemic cardiomyopathy  Assessment & Plan  · Ejection fraction 25% historically  · Repeat echocardiogram showing EF of 25%  · Continue home beta-blocker    Hypothyroidism  Assessment & Plan  · Continue home levothyroxine    BPH (benign prostatic hyperplasia)  Assessment & Plan  · Continue home finasteride and tamsulosin  · Currently patient has Mckeon catheter  · Recommended to remove Mckeon catheter and Attempt  voiding trial once ambulation improves at rehab  Hyperlipidemia  Assessment & Plan  · Continue statin equivalent while inaptient, resume rosuvastatin on discharge        VTE Pharmacologic Prophylaxis: VTE Score: 5 High Risk (Score >/= 5) - Pharmacological DVT Prophylaxis Ordered: enoxaparin (Lovenox)  Sequential Compression Devices Ordered  Patient Centered Rounds: I performed bedside rounds with nursing staff today  Discussions with Specialists or Other Care Team Provider:  Gastroenterology, case management    Education and Discussions with Family / Patient: Updated  (daughter in law) via phone  Time Spent for Care: 30 minutes  More than 50% of total time spent on counseling and coordination of care as described above  Current Length of Stay: 8 day(s)  Current Patient Status: Inpatient   Certification Statement: The patient will continue to require additional inpatient hospital stay due to Dysphagia and hypernatremia  Discharge Plan: Anticipate discharge in 24-48 hrs to rehab facility      Code Status: Level 1 - Full Code    Subjective:   Patient resting comfortably on examination  Patient had no overnight events or complaints on exam this morning  Objective:     Vitals:   Temp (24hrs), Av 3 °F (36 3 °C), Min:96 9 °F (36 1 °C), Max:97 7 °F (36 5 °C)    Temp:  [96 9 °F (36 1 °C)-97 7 °F (36 5 °C)] 96 9 °F (36 1 °C)  HR:  [] 90  Resp:  [18] 18  BP: ()/(53-64) 96/53  SpO2:  [92 %-93 %] 93 %  Body mass index is 20 85 kg/m²  Input and Output Summary (last 24 hours): Intake/Output Summary (Last 24 hours) at 2022 1531  Last data filed at 2022 1239  Gross per 24 hour   Intake 1300 ml   Output 1000 ml   Net 300 ml       Physical Exam:   Physical Exam  Vitals and nursing note reviewed  Constitutional:       General: He is not in acute distress  Appearance: He is well-developed  Comments: Chronically ill-appearing   HENT:      Head: Normocephalic and atraumatic  Eyes:      General: No scleral icterus  Conjunctiva/sclera: Conjunctivae normal    Cardiovascular:      Rate and Rhythm: Normal rate and regular rhythm  Heart sounds: Normal heart sounds  No murmur heard  No friction rub  No gallop  Pulmonary:      Effort: Pulmonary effort is normal  No respiratory distress  Breath sounds: Normal breath sounds  No wheezing or rales  Abdominal:      General: Bowel sounds are normal  There is no distension  Palpations: Abdomen is soft  Tenderness: There is no abdominal tenderness  Musculoskeletal:         General: Normal range of motion  Skin:     General: Skin is warm  Findings: No rash  Neurological:      Mental Status: He is alert and oriented to person, place, and time            Additional Data:     Labs:  Results from last 7 days   Lab Units 22  0455 22  0510 22  0508 22  0558 22  0509   WBC Thousand/uL 6 69 7 59 11 15*   < > 5 97   HEMOGLOBIN g/dL 11 9* 12 0 13 4   < > 12 8   HEMATOCRIT % 36 2* 36 2* 39 7   < > 38 5   PLATELETS Thousands/uL 179 132* 105*   < > 125*   BANDS PCT %  --   --  10*  --   --    NEUTROS PCT %  --   --   --   --  86*   LYMPHS PCT %  --   --   --   --  11*   LYMPHO PCT %  --  11* 8*   < >  --    MONOS PCT %  --   --   --   --  2*   MONO PCT %  --  1* 0*   < >  --    EOS PCT %  --  1 0   < > 0    < > = values in this interval not displayed  Results from last 7 days   Lab Units 07/08/22  0455 07/05/22  0544 07/04/22  0558   SODIUM mmol/L 148*   < > 143   POTASSIUM mmol/L 4 9   < > 3 5   CHLORIDE mmol/L 115*   < > 107   CO2 mmol/L 26   < > 24   BUN mg/dL 32*   < > 25   CREATININE mg/dL 1 02   < > 1 05   ANION GAP mmol/L 7   < > 12   CALCIUM mg/dL 8 3   < > 8 3   ALBUMIN g/dL  --   --  2 4*   TOTAL BILIRUBIN mg/dL  --   --  0 94   ALK PHOS U/L  --   --  37*   ALT U/L  --   --  49   AST U/L  --   --  99*   GLUCOSE RANDOM mg/dL 147*   < > 60*    < > = values in this interval not displayed  Results from last 7 days   Lab Units 07/08/22  1205 07/08/22  0641 07/08/22  0004 07/07/22  2056 07/07/22  1602 07/07/22  1053 07/07/22  0540 07/07/22  0023 07/06/22  2045 07/06/22  1055 07/06/22  0540 07/06/22  0413   POC GLUCOSE mg/dl 219* 210* 101 182* 159* 188* 202* 356* 192* 284* 200* 265*               Lines/Drains:  Invasive Devices  Report    Peripheral Intravenous Line  Duration           Peripheral IV 07/05/22 Right Antecubital 3 days          Drain  Duration           Urethral Catheter Coude 7 days              Urinary Catheter:  Goal for removal: N/A - Chronic Mckeon               Imaging: No pertinent imaging reviewed      Recent Cultures (last 7 days):         Last 24 Hours Medication List:   Current Facility-Administered Medications   Medication Dose Route Frequency Provider Last Rate    acetaminophen  650 mg Oral Q6H PRN Jennifer Mitchell DO      aspirin  81 mg Oral Daily Nilsa Paulson PA-C      enoxaparin  40 mg Subcutaneous Q24H [facility] Nilsa Paulson PA-C      finasteride  5 mg Oral Daily Nilsa Paulson PA-C      fluconazole  200 mg Oral Daily Danya Chow PA-C      insulin glargine  5 Units Subcutaneous HS Nilsa Paulson PA-C      insulin lispro  1-5 Units Subcutaneous TID AC Nilsa Paulson PA-C      insulin lispro  1-5 Units Subcutaneous HS Gene Kendall PA-C      levothyroxine  88 mcg Oral Daily Nilsa Paulson PA-C      metoprolol tartrate  25 mg Oral Q12H [facility] Nilsa Paulson PA-C      multi-electrolyte  75 mL/hr Intravenous Continuous Monty Lao DO 75 mL/hr (07/08/22 1218)    pantoprazole  40 mg Intravenous Q12H Danya Chow PA-C      potassium chloride  40 mEq Oral Once Gene Kendall PA-C      pravastatin  80 mg Oral Daily With [employer], PA-C      sodium chloride (PF)  3 mL Intravenous Q1H PRN Gene Kendall PA-C      tamsulosin  0 4 mg Oral Daily Gene Kendall PA-C          Today, Patient Was Seen By: Monty Lao DO    **Please Note: This note may have been constructed using a voice recognition system  **

## 2022-07-08 NOTE — PROGRESS NOTES
Progress Note - Segundo Martínez [de-identified] y o  male MRN: [id number]    Unit/Bed#: -01 Encounter: [id number]        Subjective:     Patient is on day 3 of fluconazole  He feels it is helping his swallowing and it is less painful  We went over barium swallow results  He reports he is willing to go to rehab upon discharge, and that he would like to live with his daughter in law when he is stronger  ROS: As noted in the HPI, otherwise all others negative  Objective:     Vitals: Blood pressure 96/53, pulse 90, temperature (!) 96 9 °F (36 1 °C), resp  rate 18, height 5' 6" (1 676 m), weight 58 6 kg (129 lb 3 oz), SpO2 93 %  ,Body mass index is 20 85 kg/m²  Intake/Output Summary (Last 24 hours) at 7/8/2022 1424  Last data filed at 7/8/2022 1239  Gross per 24 hour   Intake 1300 ml   Output 1000 ml   Net 300 ml       Physical Exam:     General Appearance: Alert and oriented x 3  In no respiratory distress  Lungs: Clear to auscultation bilaterally, no rales or rhonchi  Heart: Regular rate and rhythm, S1, S2 normal, no murmur, click, rub or gallop  Abdomen: Soft, non-tender, non-distended; bowel sounds normal; no masses or no organomegaly  Extremities: No cyanosis, edema    Invasive Devices  Report    Peripheral Intravenous Line  Duration           Peripheral IV 07/05/22 Right Antecubital 3 days          Drain  Duration           Urethral Catheter Coude 7 days                Lab Results:  Results from last 7 days   Lab Units 07/08/22  0455 07/07/22  0510 07/04/22  0558 07/03/22  0509   WBC Thousand/uL 6 69 7 59   < > 5 97   HEMOGLOBIN g/dL 11 9* 12 0   < > 12 8   HEMATOCRIT % 36 2* 36 2*   < > 38 5   PLATELETS Thousands/uL 179 132*   < > 125*   NEUTROS PCT %  --   --   --  86*   LYMPHS PCT %  --   --   --  11*   LYMPHO PCT %  --  11*   < >  --    MONOS PCT %  --   --   --  2*   MONO PCT %  --  1*   < >  --    EOS PCT %  --  1   < > 0    < > = values in this interval not displayed       Results from last 7 days   Lab Units 22  0455 22  0544 22  0558   POTASSIUM mmol/L 4 9   < > 3 5   CHLORIDE mmol/L 115*   < > 107   CO2 mmol/L 26   < > 24   BUN mg/dL 32*   < > 25   CREATININE mg/dL 1 02   < > 1 05   CALCIUM mg/dL 8 3   < > 8 3   ALK PHOS U/L  --   --  37*   ALT U/L  --   --  49   AST U/L  --   --  99*    < > = values in this interval not displayed  Imaging Studies: I have personally reviewed pertinent imaging studies  CT abdomen pelvis wo contrast    Result Date: 2022  Impression: No acute abdominopelvic pathology  Workstation performed: [id number]     XR chest portable    Result Date: 2022  Impression: No acute cardiopulmonary disease  Workstation performed: [id number]     XR chest 2 views    Result Date: 2022  Impression: No active pulmonary disease  Workstation performed: [id number]         Assessment and Plan:     1) Odynophagia/dysphagia, unintentional weight loss - Patient reports that he has lost about 40 lb since his wife  in November  He has history of type 2 diabetes, and presented in DKA  Patient reports that his dysphagia was acute in onset prior to admission  May be secondary to structural esophageal abnormality such as web or ring, Candida esophagitis, other viral esophagitis or reflux  Malignancy also in the differential   CT of the abdomen pelvis without contrast on admission was unremarkable  Albumin 2 4  We started him on fluconazole empirically, he reports that it has made a difference  He attempted barium swallow, which was markedly limited  No obstructing or constricting lesions seen  Pulling of contrast in the vallecula with coughing noted    - Continue fluconazole for a total of 7 days  - Consider VBS to assess oral pharyngeal function as there appears to be noted impairment on esophagram  - Appreciate speech recommendations, continue diet and thickening of liquid  - Ensure patient is upright during and after meals for at least 3 hours  - If patient has continued aspiration, could consider inpatient EGD  - Otherwise, would preferrably wait until he is 1 month after COVID positive result per recommendations made by anesthesia   - Discussed with Dr Zohra Valentin

## 2022-07-08 NOTE — CASE MANAGEMENT
Case Management Discharge Planning Note    Patient name Nery Harley  Location [address] [id number] MRN [id number]  : 1940 Date 2022       Current Admission Date: 2022  Current Admission Diagnosis:Type 2 diabetes mellitus with mild nonproliferative diabetic retinopathy without macular edema, bilateral [facility])   Patient Active Problem List    Diagnosis Date Noted    Hypernatremia 2022    Dysphagia 2022    Physical deconditioning 2022    Hematuria 2022    COVID-19 2022    Type 2 diabetes mellitus with mild nonproliferative diabetic retinopathy without macular edema, bilateral ([facility] [facility] ) 10/14/2021    Chronic obstructive pulmonary disease ([facility] [address] ) 2021    Prostate cancer screening 2020    Urinary retention 2019    Coronary atherosclerosis of native coronary artery 2019    Ischemic cardiomyopathy 2018    Hypothyroidism 10/08/2018    Foot pain, bilateral 10/08/2018    BPH (benign prostatic hyperplasia) 2018    Anemia 02/10/2018    Hyperlipidemia 02/10/2018      LOS (days): 8  Geometric Mean LOS (GMLOS) (days): 5 40  Days to GMLOS:-2 5     OBJECTIVE:  Risk of Unplanned Readmission Score: 15 53         Current admission status: Inpatient   Preferred Pharmacy:   [address] [facility] [place] [id number] [address] [address]  Phone: [phone] Fax: [phone]    Primary Care Provider: Dominga Farmer MD    Primary Insurance: [facility]  Secondary Insurance:     DISCHARGE DETAILS:    Comments - Freedom of Choice: The pt and family are agreeable to the pt going to STR  GI states that they will not be providing any treatment to for the pt  Referrals are pended  Other Referral/Resources/Interventions Provided:  Referral Comments: Referrals for STR pended  The pt and family decided today that he will go to STR

## 2022-07-08 NOTE — PLAN OF CARE
Problem: PHYSICAL THERAPY ADULT  Goal: Performs mobility at highest level of function for planned discharge setting  See evaluation for individualized goals  Description: Treatment/Interventions: Functional transfer training, LE strengthening/ROM, Elevations, Therapeutic exercise, Endurance training, Patient/family training, Bed mobility, Gait training, Spoke to nursing, OT          See flowsheet documentation for full assessment, interventions and recommendations  Outcome: Progressing  Note: Prognosis: Fair  Problem List: Decreased strength, Decreased endurance, Impaired balance, Decreased mobility, Decreased cognition  Assessment: Pt seen for PT treatment session this date with interventions consisting of gait training w/ emphasis on improving pt's ability to ambulate level surfaces x 3' with min A provided by therapist with RW, Therapeutic exercise consisting of: bilateral LE exercises in seated position and therapeutic activity consisting of training: bed mobility, supine<>sit transfers and sit<>stand transfers  Pt agreeable to PT treatment session upon arrival, pt found supine in bed w/ HOB elevated, in no apparent distress and responsive  In comparison to previous session, pt with no improvements as evidenced by increased assistance required for mobility, decreased activity tolerance  Post session: pt returned back to recliner, chair alarm engaged, all needs in reach and RN notified of session findings/recommendations  Continue to recommend post acute rehabilitation services at time of d/c in order to maximize pt's functional independence and safety w/ mobility  Pt continues to be functioning below baseline level, and remains limited 2* factors listed above and including continued need for medical management and monitoring, decreased strength and balance resulting in an increased risk for falls, impaired endurance and activity tolerance limiting overall mobility, decreased cognition and command following  PT will continue to see pt during current hospitalization in order to address the deficits listed above and provide interventions consistent w/ POC in effort to achieve STGs  Barriers to Discharge: Inaccessible home environment        PT Discharge Recommendation: Post acute rehabilitation services          See flowsheet documentation for full assessment

## 2022-07-08 NOTE — SPEECH THERAPY NOTE
Speech Language/Pathology     Speech/Language Pathology Progress Note     Patient Name: Ventura Shields    Today's Date: 7/8/2022     Problem List  Principal Problem:    Type 2 diabetes mellitus with mild nonproliferative diabetic retinopathy without macular edema, bilateral (HCC)  Active Problems:    Hyperlipidemia    BPH (benign prostatic hyperplasia)    Hypothyroidism    Ischemic cardiomyopathy    Coronary atherosclerosis of native coronary artery    COVID-19    Hematuria    Physical deconditioning    Dysphagia     Subjective:  Patient received awake, alert; clinician arrival w/ breakfast tray  Agitated initially with 39' HOB elevation  Rationale provided and pt willing to remain upright for oral intake     Previous/current diet: puree/honey  Esophagram 7/8/22:   No obstructing or constricting lesions seen      Markedly limited study due to markedly impaired mobility of the patient,  Pooling of contrast into the vallecula with some coughing during the evaluation, consider correlation with the speech pathology evaluation for assessing aspiration risk    Objective:  Esophagram significant for pooling in valleculae; I suspect thin liquid barium was used and pt does continue to present with s/s of aspiration following thin liquid trials  Provided nectar thick trials for possible advancement; pt w/ immediate overt coughing, delayed cough noted as well  No overt s/s of aspiration w/ puree solids and honey thick liquids  Occasional gagging/retching throughout session; improved from prev encounter  Note, pt c/o puree solids to nursing staff  However when attempting to trial for advancement pt ejects moistened solid (mech soft) stating 'I cant chew this, I have no teeth'      Assessment:  Cannot r/o aspiration at bedside alone  Will continue to follow/determine need for instrumental measures (diet adv) once COVID precautions lifted  Pharyngeal dysphagia/aspiration suspected  Continue on puree/hoeny in meantime  Aspiration precautions w/ 45' HOB elevation  Plan:  Continue puree/honey thick ? VBS  Will continue follow as indicated         Tommy Quinn [address], [address] [facility]  Speech-Language Pathologist  Joycema #[id number]  [place] #[id number]

## 2022-07-09 ENCOUNTER — APPOINTMENT (INPATIENT)
Dept: RADIOLOGY | Facility: HOSPITAL | Age: 82
DRG: 177 | End: 2022-07-09
Payer: COMMERCIAL

## 2022-07-09 LAB
ANION GAP SERPL CALCULATED.3IONS-SCNC: 6 MMOL/L (ref 4–13)
BUN SERPL-MCNC: 24 MG/DL (ref 5–25)
CALCIUM SERPL-MCNC: 7.9 MG/DL (ref 8.3–10.1)
CHLORIDE SERPL-SCNC: 114 MMOL/L (ref 100–108)
CO2 SERPL-SCNC: 29 MMOL/L (ref 21–32)
CREAT SERPL-MCNC: 0.9 MG/DL (ref 0.6–1.3)
ERYTHROCYTE [DISTWIDTH] IN BLOOD BY AUTOMATED COUNT: 17.4 % (ref 11.6–15.1)
GFR SERPL CREATININE-BSD FRML MDRD: 80 ML/MIN/1.73SQ M
GLUCOSE SERPL-MCNC: 112 MG/DL (ref 65–140)
GLUCOSE SERPL-MCNC: 125 MG/DL (ref 65–140)
GLUCOSE SERPL-MCNC: 228 MG/DL (ref 65–140)
GLUCOSE SERPL-MCNC: 258 MG/DL (ref 65–140)
GLUCOSE SERPL-MCNC: 279 MG/DL (ref 65–140)
HCT VFR BLD AUTO: 35.2 % (ref 36.5–49.3)
HGB BLD-MCNC: 11.4 G/DL (ref 12–17)
MCH RBC QN AUTO: 36.2 PG (ref 26.8–34.3)
MCHC RBC AUTO-ENTMCNC: 32.4 G/DL (ref 31.4–37.4)
MCV RBC AUTO: 112 FL (ref 82–98)
PLATELET # BLD AUTO: 225 THOUSANDS/UL (ref 149–390)
PMV BLD AUTO: 10.9 FL (ref 8.9–12.7)
POTASSIUM SERPL-SCNC: 5.2 MMOL/L (ref 3.5–5.3)
RBC # BLD AUTO: 3.15 MILLION/UL (ref 3.88–5.62)
SODIUM SERPL-SCNC: 149 MMOL/L (ref 136–145)
WBC # BLD AUTO: 6.08 THOUSAND/UL (ref 4.31–10.16)

## 2022-07-09 PROCEDURE — 71045 X-RAY EXAM CHEST 1 VIEW: CPT

## 2022-07-09 PROCEDURE — C9113 INJ PANTOPRAZOLE SODIUM, VIA: HCPCS | Performed by: INTERNAL MEDICINE

## 2022-07-09 PROCEDURE — 80048 BASIC METABOLIC PNL TOTAL CA: CPT | Performed by: INTERNAL MEDICINE

## 2022-07-09 PROCEDURE — 85027 COMPLETE CBC AUTOMATED: CPT | Performed by: INTERNAL MEDICINE

## 2022-07-09 PROCEDURE — 99232 SBSQ HOSP IP/OBS MODERATE 35: CPT | Performed by: INTERNAL MEDICINE

## 2022-07-09 PROCEDURE — 82948 REAGENT STRIP/BLOOD GLUCOSE: CPT

## 2022-07-09 RX ORDER — PANTOPRAZOLE SODIUM 40 MG/10ML
40 INJECTION, POWDER, LYOPHILIZED, FOR SOLUTION INTRAVENOUS EVERY 12 HOURS
Status: DISCONTINUED | OUTPATIENT
Start: 2022-07-09 | End: 2022-07-14 | Stop reason: HOSPADM

## 2022-07-09 RX ORDER — DEXTROSE MONOHYDRATE 50 MG/ML
75 INJECTION, SOLUTION INTRAVENOUS CONTINUOUS
Status: DISCONTINUED | OUTPATIENT
Start: 2022-07-09 | End: 2022-07-10

## 2022-07-09 RX ADMIN — FLUCONAZOLE 200 MG: 40 POWDER, FOR SUSPENSION ORAL at 17:00

## 2022-07-09 RX ADMIN — ENOXAPARIN SODIUM 40 MG: 40 INJECTION SUBCUTANEOUS at 08:23

## 2022-07-09 RX ADMIN — INSULIN LISPRO 3 UNITS: 100 INJECTION, SOLUTION INTRAVENOUS; SUBCUTANEOUS at 12:53

## 2022-07-09 RX ADMIN — PRAVASTATIN SODIUM 80 MG: 80 TABLET ORAL at 17:00

## 2022-07-09 RX ADMIN — INSULIN LISPRO 2 UNITS: 100 INJECTION, SOLUTION INTRAVENOUS; SUBCUTANEOUS at 17:00

## 2022-07-09 RX ADMIN — FINASTERIDE 5 MG: 5 TABLET, FILM COATED ORAL at 08:23

## 2022-07-09 RX ADMIN — ASPIRIN 81 MG 81 MG: 81 TABLET ORAL at 08:22

## 2022-07-09 RX ADMIN — DEXTROSE 75 ML/HR: 5 SOLUTION INTRAVENOUS at 08:21

## 2022-07-09 RX ADMIN — PANTOPRAZOLE SODIUM 40 MG: 40 INJECTION, POWDER, FOR SOLUTION INTRAVENOUS at 05:54

## 2022-07-09 RX ADMIN — LEVOTHYROXINE SODIUM 88 MCG: 88 TABLET ORAL at 05:13

## 2022-07-09 RX ADMIN — INSULIN GLARGINE 5 UNITS: 100 INJECTION, SOLUTION SUBCUTANEOUS at 21:35

## 2022-07-09 RX ADMIN — PANTOPRAZOLE SODIUM 40 MG: 40 INJECTION, POWDER, FOR SOLUTION INTRAVENOUS at 17:00

## 2022-07-09 RX ADMIN — INSULIN LISPRO 2 UNITS: 100 INJECTION, SOLUTION INTRAVENOUS; SUBCUTANEOUS at 21:35

## 2022-07-09 RX ADMIN — DEXTROSE 75 ML/HR: 5 SOLUTION INTRAVENOUS at 21:48

## 2022-07-09 RX ADMIN — SENNOSIDES AND DOCUSATE SODIUM 1 TABLET: 50; 8.6 TABLET ORAL at 21:36

## 2022-07-09 RX ADMIN — METOPROLOL TARTRATE 25 MG: 25 TABLET, FILM COATED ORAL at 21:36

## 2022-07-09 RX ADMIN — TAMSULOSIN HYDROCHLORIDE 0.4 MG: 0.4 CAPSULE ORAL at 08:22

## 2022-07-09 NOTE — ASSESSMENT & PLAN NOTE
· Sodium up trended to 149 today  · Will place on D5 water 75 mL/hour  · Likely from decreased fluid intake  · Continue to monitor

## 2022-07-09 NOTE — ASSESSMENT & PLAN NOTE
Lab Results   Component Value Date    HGBA1C 8 8 (H) 06/27/2022       Recent Labs     07/08/22 2059 07/08/22  2340 07/09/22  0503 07/09/22  1141   POCGLU 184* 219* 125 279*       Blood Sugar Average: Last 72 hrs:  (P) 791 [id number]     · DKA has now resolved  · Currently patient is poor p o  Intake due to dysphagia  · Accu-Cheks noted on lower range  · Lantus to 5 units q h kwadwo Glez · Patient evaluated by speech therapy and diet changed accordingly  · Continue Accu-Cheks monitoring q 4 hours  · Continue diabetic diet, sliding scale coverage

## 2022-07-09 NOTE — PLAN OF CARE
Pt refusing oob again today  Hearing aides placed  Appetite improving  Family in to visit and wave through window as pt continues on covid precautions

## 2022-07-09 NOTE — PLAN OF CARE
Problem: Prexisting or High Potential for Compromised Skin Integrity  Goal: Skin integrity is maintained or improved  Description: INTERVENTIONS:  - Identify patients at risk for skin breakdown  - Assess and monitor skin integrity  - Assess and monitor nutrition and hydration status  - Monitor labs   - Assess for incontinence   - Turn and reposition patient  - Assist with mobility/ambulation  - Relieve pressure over bony prominences  - Avoid friction and shearing  - Provide appropriate hygiene as needed including keeping skin clean and dry  - Evaluate need for skin moisturizer/barrier cream  - Collaborate with interdisciplinary team   - Patient/family teaching  - Consider wound care consult   Outcome: Progressing     Problem: PAIN - ADULT  Goal: Verbalizes/displays adequate comfort level or baseline comfort level  Description: Interventions:  - Encourage patient to monitor pain and request assistance  - Assess pain using appropriate pain scale  - Administer analgesics based on type and severity of pain and evaluate response  - Implement non-pharmacological measures as appropriate and evaluate response  - Consider cultural and social influences on pain and pain management  - Notify physician/advanced practitioner if interventions unsuccessful or patient reports new pain  Outcome: Progressing     Problem: INFECTION - ADULT  Goal: Absence or prevention of progression during hospitalization  Description: INTERVENTIONS:  - Assess and monitor for signs and symptoms of infection  - Monitor lab/diagnostic results  - Monitor all insertion sites, i e  indwelling lines, tubes, and drains  - Monitor endotracheal if appropriate and nasal secretions for changes in amount and color  - [place] appropriate cooling/warming therapies per order  - Administer medications as ordered  - Instruct and encourage patient and family to use good hand hygiene technique  - Identify and instruct in appropriate isolation precautions for identified infection/condition  Outcome: Progressing     Problem: DISCHARGE PLANNING  Goal: Discharge to home or other facility with appropriate resources  Description: INTERVENTIONS:  - Identify barriers to discharge w/patient and caregiver  - Arrange for needed discharge resources and transportation as appropriate  - Identify discharge learning needs (meds, wound care, etc )  - Arrange for interpretive services to assist at discharge as needed  - Refer to Case Management Department for coordinating discharge planning if the patient needs post-hospital services based on physician/advanced practitioner order or complex needs related to functional status, cognitive ability, or social support system  Outcome: Progressing     Problem: Knowledge Deficit  Goal: Patient/family/caregiver demonstrates understanding of disease process, treatment plan, medications, and discharge instructions  Description: Complete learning assessment and assess knowledge base    Interventions:  - Provide teaching at level of understanding  - Provide teaching via preferred learning methods  Outcome: Progressing

## 2022-07-09 NOTE — ASSESSMENT & PLAN NOTE
· Patient presently not symptomatic  · Not on any treatment at the time of ICU downgraded  · No intervention indicated at this time  · Continue incentive spirometry use, self proning as able  · Continue supportive care

## 2022-07-09 NOTE — PROGRESS NOTES
[facility]  Progress Note - Dell Ramirez 1940, [de-identified] y o  male MRN: [id number]  Unit/Bed#: -01 Encounter: [id number]  Primary Care Provider: Cherry Khan MD   Date and time admitted to hospital: 6/30/2022  3:28 PM    * Type 2 diabetes mellitus with mild nonproliferative diabetic retinopathy without macular edema, bilateral [facility])  Assessment & Plan  Lab Results   Component Value Date    HGBA1C 8 8 (H) 06/27/2022       Recent Labs     07/08/22  2059 07/08/22  2340 07/09/22  0503 07/09/22  1141   POCGLU 184* 219* 125 279*       Blood Sugar Average: Last 72 hrs:  (P) 128 [id number]     · DKA has now resolved  · Currently patient is poor p o  Intake due to dysphagia  · Accu-Cheks noted on lower range  · Lantus to 5 units q h kwadwo Gilbert · Patient evaluated by speech therapy and diet changed accordingly  · Continue Accu-Cheks monitoring q 4 hours  · Continue diabetic diet, sliding scale coverage  Dysphagia  Assessment & Plan  · Patient assessed by speech therapy today and recommend GI evaluation  · Diet-puree and honey thick liquid  · Maintain aspiration precaution  · Gastroenterology following-continue fluconazole and will need EGD as outpatient  · Patient had esophagram which showed no strictures or lesions  · Plan for video barium swallow on Monday    Hypernatremia  Assessment & Plan  · Sodium up trended to 149 today  · Will place on D5 water 75 mL/hour  · Likely from decreased fluid intake  · Continue to monitor    Weakness  Assessment & Plan  · PT OT recommending rehab  · Patient amenable to post acute rehab    Physical deconditioning  Assessment & Plan  · PT OT recommendation noted for post acute rehab  · Cm to follow-up for placement      Hematuria  Assessment & Plan  · Occurred after straight cath overnight  · Suspect related to trauma to prostate  · Hematuria now resolved  · Mckeon catheter placed on 7/1  · Continue Flomax and finasteride  · Currently Mckeon catheter with clear yellow urine  COVID-19  Assessment & Plan  · Patient presently not symptomatic  · Not on any treatment at the time of ICU downgraded  · No intervention indicated at this time  · Continue incentive spirometry use, self proning as able  · Continue supportive care  Coronary atherosclerosis of native coronary artery  Assessment & Plan  · Continue aspirin/statin/beta-blocker    Ischemic cardiomyopathy  Assessment & Plan  · Ejection fraction 25% historically  · Repeat echocardiogram showing EF of 25%  · Continue home beta-blocker    Hypothyroidism  Assessment & Plan  · Continue home levothyroxine    BPH (benign prostatic hyperplasia)  Assessment & Plan  · Continue home finasteride and tamsulosin  · Currently patient has Mckeon catheter  · Recommended to remove Mckeon catheter and Attempt  voiding trial once ambulation improves at rehab  Hyperlipidemia  Assessment & Plan  · Continue statin equivalent while inaptient, resume rosuvastatin on discharge        VTE Pharmacologic Prophylaxis: VTE Score: 5 Moderate Risk (Score 3-4) - Pharmacological DVT Prophylaxis Ordered: enoxaparin (Lovenox)  Patient Centered Rounds: I performed bedside rounds with nursing staff today  Discussions with Specialists or Other Care Team Provider:  Case management    Education and Discussions with Family / Patient: Updated  (daughter in law) via phone  Time Spent for Care: 45 minutes  More than 50% of total time spent on counseling and coordination of care as described above  Current Length of Stay: 9 day(s)  Current Patient Status: Inpatient   Certification Statement: The patient will continue to require additional inpatient hospital stay due to Dysphagia  Discharge Plan: Anticipate discharge in 24-48 hrs to rehab facility  Code Status: Level 1 - Full Code    Subjective:   Patient resting comfortably on examination  Patient had no overnight events or complaints on exam this morning    Patient is eating a little bit more of his diet at this time  Objective:     Vitals:   Temp (24hrs), Av 6 °F (36 4 °C), Min:97 4 °F (36 3 °C), Max:97 7 °F (36 5 °C)    Temp:  [97 4 °F (36 3 °C)-97 7 °F (36 5 °C)] 97 4 °F (36 3 °C)  HR:  [] 99  Resp:  [18] 18  BP: ()/(53-65) 108/59  SpO2:  [89 %-94 %] 94 %  Body mass index is 20 82 kg/m²  Input and Output Summary (last 24 hours): Intake/Output Summary (Last 24 hours) at 2022 1451  Last data filed at 2022 1401  Gross per 24 hour   Intake  75 ml   Output 1650 ml   Net 376 75 ml       Physical Exam:   Physical Exam  Vitals and nursing note reviewed  Constitutional:       General: He is not in acute distress  Appearance: He is well-developed  Comments: Chronically ill-appearing   HENT:      Head: Normocephalic and atraumatic  Eyes:      General: No scleral icterus  Conjunctiva/sclera: Conjunctivae normal    Cardiovascular:      Rate and Rhythm: Normal rate and regular rhythm  Heart sounds: Normal heart sounds  No murmur heard  No friction rub  No gallop  Pulmonary:      Effort: Pulmonary effort is normal  No respiratory distress  Breath sounds: Normal breath sounds  No wheezing or rales  Abdominal:      General: Bowel sounds are normal  There is no distension  Palpations: Abdomen is soft  Tenderness: There is no abdominal tenderness  Musculoskeletal:         General: Normal range of motion  Skin:     General: Skin is warm  Findings: No rash  Neurological:      Mental Status: He is alert and oriented to person, place, and time            Additional Data:     Labs:  Results from last 7 days   Lab Units 22  0445 22  0455 22  0510 22  0508 22  0558 22  0509   WBC Thousand/uL 6 08   < > 7 59 11 15*   < > 5 97   HEMOGLOBIN g/dL 11 4*   < > 12 0 13 4   < > 12 8   HEMATOCRIT % 35 2*   < > 36 2* 39 7   < > 38 5   PLATELETS Thousands/uL 225   < > 132* 105*   < > 125*   BANDS PCT %  --   --   --  10*  --   --    NEUTROS PCT %  --   --   --   --   --  86*   LYMPHS PCT %  --   --   --   --   --  11*   LYMPHO PCT %  --   --  11* 8*   < >  --    MONOS PCT %  --   --   --   --   --  2*   MONO PCT %  --   --  1* 0*   < >  --    EOS PCT %  --   --  1 0   < > 0    < > = values in this interval not displayed  Results from last 7 days   Lab Units 07/09/22  0445 07/05/22  0544 07/04/22  0558   SODIUM mmol/L 149*   < > 143   POTASSIUM mmol/L 5 2   < > 3 5   CHLORIDE mmol/L 114*   < > 107   CO2 mmol/L 29   < > 24   BUN mg/dL 24   < > 25   CREATININE mg/dL 0 90   < > 1 05   ANION GAP mmol/L 6   < > 12   CALCIUM mg/dL 7 9*   < > 8 3   ALBUMIN g/dL  --   --  2 4*   TOTAL BILIRUBIN mg/dL  --   --  0 94   ALK PHOS U/L  --   --  37*   ALT U/L  --   --  49   AST U/L  --   --  99*   GLUCOSE RANDOM mg/dL 112   < > 60*    < > = values in this interval not displayed  Results from last 7 days   Lab Units 07/09/22  1141 07/09/22  0503 07/08/22  2340 07/08/22  2059 07/08/22  1523 07/08/22  1205 07/08/22  9676 07/08/22  0004 07/07/22  2056 07/07/22  1602 07/07/22  1053 07/07/22  0540   POC GLUCOSE mg/dl 279* 125 219* 184* 98 219* 210* 101 182* 159* 188* 202*               Lines/Drains:  Invasive Devices  Report    Peripheral Intravenous Line  Duration           Peripheral IV 07/05/22 Right Antecubital 4 days          Drain  Duration           Urethral Catheter Coude 8 days              Urinary Catheter:  Goal for removal: N/A- Discharging with Mckeon               Imaging: No pertinent imaging reviewed      Recent Cultures (last 7 days):         Last 24 Hours Medication List:   Current Facility-Administered Medications   Medication Dose Route Frequency Provider Last Rate    acetaminophen  650 mg Oral Q6H PRN Pascual Arellano, DO      aspirin  81 mg Oral Daily Nilsa Paulson PA-C      dextrose  75 mL/hr Intravenous Continuous Pascual Arellano DO 75 mL/hr (07/09/22 0065)    enoxaparin  40 mg Subcutaneous Q24H [address] Nilsa Paulson PA-C      finasteride  5 mg Oral Daily Nilsa Paulson PA-C      fluconazole  200 mg Oral Daily Prosper Rankin PA-C      insulin glargine  5 Units Subcutaneous HS Nilsa Paulson PA-C      insulin lispro  1-5 Units Subcutaneous TID AC Nilsa Paulson PA-C      insulin lispro  1-5 Units Subcutaneous HS Ana Eaton PA-C      levothyroxine  88 mcg Oral Daily Nilsa Paulson PA-C      metoprolol tartrate  25 mg Oral Q12H [address] Nilsa Paulson PA-C      pantoprazole  40 mg Intravenous Q12H Cortes Haddad DO      potassium chloride  40 mEq Oral Once Ana Eaton PA-C      pravastatin  80 mg Oral Daily With [employer] IncCHRISTINA      senna-docusate sodium  1 tablet Oral HS Cortes Haddad DO      sodium chloride (PF)  3 mL Intravenous Q1H PRN Ana Eaton PA-C      tamsulosin  0 4 mg Oral Daily Ana Eaton PA-C          Today, Patient Was Seen By: Cortes Haddad DO    **Please Note: This note may have been constructed using a voice recognition system  **

## 2022-07-10 PROBLEM — R31.9 HEMATURIA: Status: RESOLVED | Noted: 2022-07-01 | Resolved: 2022-07-10

## 2022-07-10 LAB
ANION GAP SERPL CALCULATED.3IONS-SCNC: 7 MMOL/L (ref 4–13)
BASOPHILS # BLD AUTO: 0.03 THOUSANDS/ΜL (ref 0–0.1)
BASOPHILS NFR BLD AUTO: 1 % (ref 0–1)
BUN SERPL-MCNC: 16 MG/DL (ref 5–25)
CALCIUM SERPL-MCNC: 7.6 MG/DL (ref 8.3–10.1)
CHLORIDE SERPL-SCNC: 105 MMOL/L (ref 100–108)
CO2 SERPL-SCNC: 27 MMOL/L (ref 21–32)
CREAT SERPL-MCNC: 1.06 MG/DL (ref 0.6–1.3)
EOSINOPHIL # BLD AUTO: 0.07 THOUSAND/ΜL (ref 0–0.61)
EOSINOPHIL NFR BLD AUTO: 1 % (ref 0–6)
ERYTHROCYTE [DISTWIDTH] IN BLOOD BY AUTOMATED COUNT: 17.2 % (ref 11.6–15.1)
GFR SERPL CREATININE-BSD FRML MDRD: 65 ML/MIN/1.73SQ M
GLUCOSE SERPL-MCNC: 153 MG/DL (ref 65–140)
GLUCOSE SERPL-MCNC: 171 MG/DL (ref 65–140)
GLUCOSE SERPL-MCNC: 177 MG/DL (ref 65–140)
GLUCOSE SERPL-MCNC: 181 MG/DL (ref 65–140)
GLUCOSE SERPL-MCNC: 207 MG/DL (ref 65–140)
GLUCOSE SERPL-MCNC: 334 MG/DL (ref 65–140)
HCT VFR BLD AUTO: 31.7 % (ref 36.5–49.3)
HGB BLD-MCNC: 10.3 G/DL (ref 12–17)
IMM GRANULOCYTES # BLD AUTO: 0.07 THOUSAND/UL (ref 0–0.2)
IMM GRANULOCYTES NFR BLD AUTO: 1 % (ref 0–2)
LYMPHOCYTES # BLD AUTO: 0.89 THOUSANDS/ΜL (ref 0.6–4.47)
LYMPHOCYTES NFR BLD AUTO: 17 % (ref 14–44)
MCH RBC QN AUTO: 35.9 PG (ref 26.8–34.3)
MCHC RBC AUTO-ENTMCNC: 32.5 G/DL (ref 31.4–37.4)
MCV RBC AUTO: 111 FL (ref 82–98)
MONOCYTES # BLD AUTO: 0.13 THOUSAND/ΜL (ref 0.17–1.22)
MONOCYTES NFR BLD AUTO: 3 % (ref 4–12)
NEUTROPHILS # BLD AUTO: 3.97 THOUSANDS/ΜL (ref 1.85–7.62)
NEUTS SEG NFR BLD AUTO: 77 % (ref 43–75)
NRBC BLD AUTO-RTO: 0 /100 WBCS
PLATELET # BLD AUTO: 235 THOUSANDS/UL (ref 149–390)
PMV BLD AUTO: 10.8 FL (ref 8.9–12.7)
POTASSIUM SERPL-SCNC: 3.8 MMOL/L (ref 3.5–5.3)
RBC # BLD AUTO: 2.87 MILLION/UL (ref 3.88–5.62)
SODIUM SERPL-SCNC: 139 MMOL/L (ref 136–145)
WBC # BLD AUTO: 5.16 THOUSAND/UL (ref 4.31–10.16)

## 2022-07-10 PROCEDURE — C9113 INJ PANTOPRAZOLE SODIUM, VIA: HCPCS | Performed by: INTERNAL MEDICINE

## 2022-07-10 PROCEDURE — 80048 BASIC METABOLIC PNL TOTAL CA: CPT | Performed by: INTERNAL MEDICINE

## 2022-07-10 PROCEDURE — 99232 SBSQ HOSP IP/OBS MODERATE 35: CPT | Performed by: INTERNAL MEDICINE

## 2022-07-10 PROCEDURE — 85025 COMPLETE CBC W/AUTO DIFF WBC: CPT | Performed by: INTERNAL MEDICINE

## 2022-07-10 PROCEDURE — 82948 REAGENT STRIP/BLOOD GLUCOSE: CPT

## 2022-07-10 RX ADMIN — FLUCONAZOLE 200 MG: 40 POWDER, FOR SUSPENSION ORAL at 16:41

## 2022-07-10 RX ADMIN — INSULIN LISPRO 1 UNITS: 100 INJECTION, SOLUTION INTRAVENOUS; SUBCUTANEOUS at 12:00

## 2022-07-10 RX ADMIN — INSULIN GLARGINE 5 UNITS: 100 INJECTION, SOLUTION SUBCUTANEOUS at 23:02

## 2022-07-10 RX ADMIN — PANTOPRAZOLE SODIUM 40 MG: 40 INJECTION, POWDER, FOR SOLUTION INTRAVENOUS at 05:49

## 2022-07-10 RX ADMIN — INSULIN LISPRO 1 UNITS: 100 INJECTION, SOLUTION INTRAVENOUS; SUBCUTANEOUS at 23:03

## 2022-07-10 RX ADMIN — INSULIN LISPRO 1 UNITS: 100 INJECTION, SOLUTION INTRAVENOUS; SUBCUTANEOUS at 08:26

## 2022-07-10 RX ADMIN — INSULIN LISPRO 4 UNITS: 100 INJECTION, SOLUTION INTRAVENOUS; SUBCUTANEOUS at 16:42

## 2022-07-10 RX ADMIN — METOPROLOL TARTRATE 25 MG: 25 TABLET, FILM COATED ORAL at 22:55

## 2022-07-10 RX ADMIN — PRAVASTATIN SODIUM 80 MG: 80 TABLET ORAL at 16:42

## 2022-07-10 RX ADMIN — FINASTERIDE 5 MG: 5 TABLET, FILM COATED ORAL at 08:26

## 2022-07-10 RX ADMIN — ASPIRIN 81 MG 81 MG: 81 TABLET ORAL at 08:26

## 2022-07-10 RX ADMIN — LEVOTHYROXINE SODIUM 88 MCG: 88 TABLET ORAL at 05:49

## 2022-07-10 RX ADMIN — ENOXAPARIN SODIUM 40 MG: 40 INJECTION SUBCUTANEOUS at 08:25

## 2022-07-10 RX ADMIN — ACETAMINOPHEN 650 MG: 325 TABLET, FILM COATED ORAL at 05:49

## 2022-07-10 RX ADMIN — SENNOSIDES AND DOCUSATE SODIUM 1 TABLET: 50; 8.6 TABLET ORAL at 22:55

## 2022-07-10 RX ADMIN — METOPROLOL TARTRATE 25 MG: 25 TABLET, FILM COATED ORAL at 08:26

## 2022-07-10 RX ADMIN — PANTOPRAZOLE SODIUM 40 MG: 40 INJECTION, POWDER, FOR SOLUTION INTRAVENOUS at 16:42

## 2022-07-10 RX ADMIN — TAMSULOSIN HYDROCHLORIDE 0.4 MG: 0.4 CAPSULE ORAL at 08:25

## 2022-07-10 NOTE — ASSESSMENT & PLAN NOTE
· Patient presently not symptomatic  · No intervention indicated at this time  · Continue incentive spirometry use, self proning as able  · Continue supportive care

## 2022-07-10 NOTE — ASSESSMENT & PLAN NOTE
Lab Results   Component Value Date    HGBA1C 8 8 (H) 06/27/2022       Recent Labs     07/09/22  2113 07/10/22  0548 07/10/22  0622 07/10/22  1120   POCGLU 228* 181* 177* 207*       Blood Sugar Average: Last 72 hrs:  (P) 198 5     · DKA has now resolved  · Currently patient is poor p o  Intake due to dysphagia  · Lantus to 5 units q h kwadwo Braga · Patient evaluated by speech therapy and diet changed accordingly  · Continue Accu-Cheks monitoring q 4 hours  · Continue diabetic diet, sliding scale coverage

## 2022-07-10 NOTE — PLAN OF CARE
Problem: RESPIRATORY - ADULT  Goal: Achieves optimal ventilation and oxygenation  Description: INTERVENTIONS:  - Assess for changes in respiratory status  - Assess for changes in mentation and behavior  - Position to facilitate oxygenation and minimize respiratory effort  - Oxygen administered by appropriate delivery if ordered  - Initiate smoking cessation education as indicated  - Encourage broncho-pulmonary hygiene including cough, deep breathe, Incentive Spirometry  - Assess the need for suctioning and aspirate as needed  - Assess and instruct to report SOB or any respiratory difficulty  - Respiratory Therapy support as indicated  Outcome: Progressing     Problem: METABOLIC, FLUID AND ELECTROLYTES - ADULT  Goal: Electrolytes maintained within normal limits  Description: INTERVENTIONS:  - Monitor labs and assess patient for signs and symptoms of electrolyte imbalances  - Administer electrolyte replacement as ordered  - Monitor response to electrolyte replacements, including repeat lab results as appropriate  - Instruct patient on fluid and nutrition as appropriate  Outcome: Progressing  Goal: Fluid balance maintained  Description: INTERVENTIONS:  - Monitor labs   - Monitor I/O and WT  - Instruct patient on fluid and nutrition as appropriate  - Assess for signs & symptoms of volume excess or deficit  Outcome: Progressing  Goal: Glucose maintained within target range  Description: INTERVENTIONS:  - Monitor Blood Glucose as ordered  - Assess for signs and symptoms of hyperglycemia and hypoglycemia  - Administer ordered medications to maintain glucose within target range  - Assess nutritional intake and initiate nutrition service referral as needed  Outcome: Progressing     Problem: Nutrition/Hydration-ADULT  Goal: Nutrient/Hydration intake appropriate for improving, restoring or maintaining nutritional needs  Description: Monitor and assess patient's nutrition/hydration status for malnutrition   Collaborate with interdisciplinary team and initiate plan and interventions as ordered  Monitor patient's weight and dietary intake as ordered or per policy  Utilize nutrition screening tool and intervene as necessary  Determine patient's food preferences and provide high-protein, high-caloric foods as appropriate       INTERVENTIONS:  - Monitor oral intake, urinary output, labs, and treatment plans  - Assess nutrition and hydration status and recommend course of action  - Evaluate amount of meals eaten  - Assist patient with eating if necessary   - Allow adequate time for meals  - Recommend/ encourage appropriate diets, oral nutritional supplements, and vitamin/mineral supplements  - Order, calculate, and assess calorie counts as needed  - Recommend, monitor, and adjust tube feedings based on assessed needs  - Assess need for intravenous fluids  - Provide specific nutrition/hydration education as appropriate  - Include patient/family/caregiver in decisions related to nutrition  Outcome: Progressing

## 2022-07-10 NOTE — PROGRESS NOTES
[facility]  Progress Note - Makayla Jackson 1940, [de-identified] y o  male MRN: [id number]  Unit/Bed#: -Lilly Encounter: [id number]  Primary Care Provider: Rochelle Teague MD   Date and time admitted to hospital: 6/30/2022  3:28 PM    * Type 2 diabetes mellitus with mild nonproliferative diabetic retinopathy without macular edema, bilateral [facility])  Assessment & Plan  Lab Results   Component Value Date    HGBA1C 8 8 (H) 06/27/2022       Recent Labs     07/09/22  2113 07/10/22  0548 07/10/22  0622 07/10/22  1120   POCGLU 228* 181* 177* 207*       Blood Sugar Average: Last 72 hrs:  (P) 198 5     · DKA has now resolved  · Currently patient is poor p o  Intake due to dysphagia  · Lantus to 5 units q h s  [place] · Patient evaluated by speech therapy and diet changed accordingly  · Continue Accu-Cheks monitoring q 4 hours  · Continue diabetic diet, sliding scale coverage  Dysphagia  Assessment & Plan  · Patient assessed by speech therapy today and recommend GI evaluation  · Diet-puree and honey thick liquid  · Maintain aspiration precaution  · Gastroenterology following-continue fluconazole and will need EGD as outpatient  · Patient had esophagram which showed no strictures or lesions  · Plan for video barium swallow on Monday    Hypernatremia  Assessment & Plan  · Sodium 139 today  · Will discontinue IV fluids  · Likely from decreased fluid intake  · Continue to monitor    Weakness  Assessment & Plan  · PT OT recommending rehab  · Patient amenable to post acute rehab    Physical deconditioning  Assessment & Plan  · PT OT recommendation noted for post acute rehab  · Cm to follow-up for placement  COVID-19  Assessment & Plan  · Patient presently not symptomatic  · No intervention indicated at this time  · Continue incentive spirometry use, self proning as able  · Continue supportive care        Coronary atherosclerosis of native coronary artery  Assessment & Plan  · Continue aspirin/statin/beta-blocker    Ischemic cardiomyopathy  Assessment & Plan  · Ejection fraction 25% historically  · Repeat echocardiogram showing EF of 25%  · Continue home beta-blocker    Hypothyroidism  Assessment & Plan  · Continue home levothyroxine    BPH (benign prostatic hyperplasia)  Assessment & Plan  · Continue home finasteride and tamsulosin  · Currently patient has Mckeon catheter  · Recommended to remove Mckeon catheter and Attempt  voiding trial once ambulation improves at rehab  Hyperlipidemia  Assessment & Plan  · Continue statin equivalent while inaptient, resume rosuvastatin on discharge    Hematuria-resolved as of 7/10/2022  Assessment & Plan  · Occurred after straight cath overnight  · Suspect related to trauma to prostate  · Hematuria now resolved  · Mckeon catheter placed on   · Continue Flomax and finasteride  · Currently Mckeon catheter with clear yellow urine  VTE Pharmacologic Prophylaxis: VTE Score: 5 High Risk (Score >/= 5) - Pharmacological DVT Prophylaxis Ordered: enoxaparin (Lovenox)  Sequential Compression Devices Ordered  Patient Centered Rounds: I performed bedside rounds with nursing staff today  Discussions with Specialists or Other Care Team Provider:  Case management    Education and Discussions with Family / Patient: Updated  (daughter in law) via phone  Time Spent for Care: 30 minutes  More than 50% of total time spent on counseling and coordination of care as described above  Current Length of Stay: 10 day(s)  Current Patient Status: Inpatient   Certification Statement: The patient will continue to require additional inpatient hospital stay due to Dysphagia  Discharge Plan: Anticipate discharge tomorrow to rehab facility  Code Status: Level 1 - Full Code    Subjective:   Patient resting comfortably on examination  Patient had no overnight events or complaints on exam this morning      Objective:     Vitals:   Temp (24hrs), Av 8 °F (36 6 °C), Min:97 5 °F (36 4 °C), Max:98 1 °F (36 7 °C)    Temp:  [97 5 °F (36 4 °C)-98 1 °F (36 7 °C)] 97 5 °F (36 4 °C)  HR:  [] 84  Resp:  [16] 16  BP: (104-116)/(60-62) 104/62  SpO2:  [91 %-96 %] 95 %  Body mass index is 20 53 kg/m²  Input and Output Summary (last 24 hours): Intake/Output Summary (Last 24 hours) at 7/10/2022 1250  Last data filed at 7/10/2022 0900  Gross per 24 hour   Intake 3500 ml   Output 1800 ml   Net 1700 ml       Physical Exam:   Physical Exam  Vitals and nursing note reviewed  Constitutional:       General: He is not in acute distress  Appearance: He is well-developed  Comments: Chronically ill-appearing  Frail   HENT:      Head: Normocephalic and atraumatic  Eyes:      General: No scleral icterus  Conjunctiva/sclera: Conjunctivae normal    Cardiovascular:      Rate and Rhythm: Normal rate and regular rhythm  Heart sounds: Normal heart sounds  No murmur heard  No friction rub  No gallop  Pulmonary:      Effort: Pulmonary effort is normal  No respiratory distress  Breath sounds: Normal breath sounds  No wheezing or rales  Abdominal:      General: Bowel sounds are normal  There is no distension  Palpations: Abdomen is soft  Tenderness: There is no abdominal tenderness  Musculoskeletal:         General: Normal range of motion  Skin:     General: Skin is warm  Findings: No rash  Neurological:      Mental Status: He is alert and oriented to person, place, and time            Additional Data:     Labs:  Results from last 7 days   Lab Units 07/10/22  0506 07/07/22  0510 07/06/22  0508   WBC Thousand/uL 5 16   < > 11 15*   HEMOGLOBIN g/dL 10 3*   < > 13 4   HEMATOCRIT % 31 7*   < > 39 7   PLATELETS Thousands/uL 235   < > 105*   BANDS PCT %  --   --  10*   NEUTROS PCT % 77*  --   --    LYMPHS PCT % 17  --   --    LYMPHO PCT   --    < > 8*   MONOS PCT % 3*  --   --    MONO PCT   --    < > 0*   EOS PCT % 1   < > 0    < > = values in this interval not displayed  Results from last 7 days   Lab Units 07/10/22  0506 07/05/22  0544 07/04/22  0558   SODIUM mmol/L 139   < > 143   POTASSIUM mmol/L 3 8   < > 3 5   CHLORIDE mmol/L 105   < > 107   CO2 mmol/L 27   < > 24   BUN mg/dL 16   < > 25   CREATININE mg/dL 1 06   < > 1 05   ANION GAP mmol/L 7   < > 12   CALCIUM mg/dL 7 6*   < > 8 3   ALBUMIN g/dL  --   --  2 4*   TOTAL BILIRUBIN mg/dL  --   --  0 94   ALK PHOS U/L  --   --  37*   ALT U/L  --   --  49   AST U/L  --   --  99*   GLUCOSE RANDOM mg/dL 153*   < > 60*    < > = values in this interval not displayed  Results from last 7 days   Lab Units 07/10/22  1120 07/10/22  0622 07/10/22  0548 07/09/22  2113 07/09/22  1551 07/09/22  1141 07/09/22  0503 07/08/22  2340 07/08/22  2059 07/08/22  1523 07/08/22  1205 07/08/22  0641   POC GLUCOSE mg/dl 207* 177* 181* 228* 258* 279* 125 219* 184* 98 219* 210*               Lines/Drains:  Invasive Devices  Report    Peripheral Intravenous Line  Duration           Peripheral IV 07/10/22 Left;Ventral (anterior); Medial Forearm <1 day          Drain  Duration           Urethral Catheter Coude 9 days              Urinary Catheter:  Goal for removal: N/A- Discharging with Mckeon               Imaging: No pertinent imaging reviewed      Recent Cultures (last 7 days):         Last 24 Hours Medication List:   Current Facility-Administered Medications   Medication Dose Route Frequency Provider Last Rate    acetaminophen  650 mg Oral Q6H PRN Chaim Gong DO      aspirin  81 mg Oral Daily Nilsa Paulson PA-C      enoxaparin  40 mg Subcutaneous Q24H [facility] Nilsa Paulson PA-C      finasteride  5 mg Oral Daily Nilsa Paulson PA-C      fluconazole  200 mg Oral Daily Edgar Avelar PA-C      insulin glargine  5 Units Subcutaneous HS Nilsa Paulson PA-C      insulin lispro  1-5 Units Subcutaneous TID AC Nilsa Paulson PA-C      insulin lispro  1-5 Units Subcutaneous HS James Sun PA-C      levothyroxine 88 mcg Oral Daily Nilsa Paulson PA-C      metoprolol tartrate  25 mg Oral Q12H [facility] Nilsa Paulson PA-C      pantoprazole  40 mg Intravenous Q12H Eloisa Angelucci, DO      potassium chloride  40 mEq Oral Once Mira Felix PA-C      pravastatin  80 mg Oral Daily With [employer]CHRISTINA      senna-docusate sodium  1 tablet Oral HS Eloisa Angelucci, DO      sodium chloride (PF)  3 mL Intravenous Q1H PRN Mira Felix PA-C      tamsulosin  0 4 mg Oral Daily Mira Felix PA-C          Today, Patient Was Seen By: Eloisa Angelucci, DO    **Please Note: This note may have been constructed using a voice recognition system  **

## 2022-07-10 NOTE — ASSESSMENT & PLAN NOTE
· Sodium 139 today  · Will discontinue IV fluids  · Likely from decreased fluid intake  · Continue to monitor

## 2022-07-11 ENCOUNTER — TELEPHONE (OUTPATIENT)
Dept: GASTROENTEROLOGY | Facility: CLINIC | Age: 82
End: 2022-07-11

## 2022-07-11 ENCOUNTER — APPOINTMENT (INPATIENT)
Dept: RADIOLOGY | Facility: HOSPITAL | Age: 82
DRG: 177 | End: 2022-07-11
Payer: COMMERCIAL

## 2022-07-11 LAB
ANION GAP SERPL CALCULATED.3IONS-SCNC: 10 MMOL/L (ref 4–13)
BUN SERPL-MCNC: 18 MG/DL (ref 5–25)
CALCIUM SERPL-MCNC: 8.1 MG/DL (ref 8.3–10.1)
CHLORIDE SERPL-SCNC: 110 MMOL/L (ref 100–108)
CO2 SERPL-SCNC: 26 MMOL/L (ref 21–32)
CREAT SERPL-MCNC: 0.92 MG/DL (ref 0.6–1.3)
ERYTHROCYTE [DISTWIDTH] IN BLOOD BY AUTOMATED COUNT: 17 % (ref 11.6–15.1)
GFR SERPL CREATININE-BSD FRML MDRD: 78 ML/MIN/1.73SQ M
GLUCOSE SERPL-MCNC: 129 MG/DL (ref 65–140)
GLUCOSE SERPL-MCNC: 149 MG/DL (ref 65–140)
GLUCOSE SERPL-MCNC: 186 MG/DL (ref 65–140)
GLUCOSE SERPL-MCNC: 196 MG/DL (ref 65–140)
GLUCOSE SERPL-MCNC: 243 MG/DL (ref 65–140)
HCT VFR BLD AUTO: 35.4 % (ref 36.5–49.3)
HGB BLD-MCNC: 11.8 G/DL (ref 12–17)
MCH RBC QN AUTO: 36.9 PG (ref 26.8–34.3)
MCHC RBC AUTO-ENTMCNC: 33.3 G/DL (ref 31.4–37.4)
MCV RBC AUTO: 111 FL (ref 82–98)
PLATELET # BLD AUTO: 275 THOUSANDS/UL (ref 149–390)
PMV BLD AUTO: 10.9 FL (ref 8.9–12.7)
POTASSIUM SERPL-SCNC: 3.7 MMOL/L (ref 3.5–5.3)
RBC # BLD AUTO: 3.2 MILLION/UL (ref 3.88–5.62)
SODIUM SERPL-SCNC: 146 MMOL/L (ref 136–145)
WBC # BLD AUTO: 5.19 THOUSAND/UL (ref 4.31–10.16)

## 2022-07-11 PROCEDURE — 80048 BASIC METABOLIC PNL TOTAL CA: CPT | Performed by: INTERNAL MEDICINE

## 2022-07-11 PROCEDURE — 85027 COMPLETE CBC AUTOMATED: CPT | Performed by: INTERNAL MEDICINE

## 2022-07-11 PROCEDURE — 99232 SBSQ HOSP IP/OBS MODERATE 35: CPT | Performed by: INTERNAL MEDICINE

## 2022-07-11 PROCEDURE — C9113 INJ PANTOPRAZOLE SODIUM, VIA: HCPCS | Performed by: INTERNAL MEDICINE

## 2022-07-11 PROCEDURE — 82948 REAGENT STRIP/BLOOD GLUCOSE: CPT

## 2022-07-11 PROCEDURE — 74230 X-RAY XM SWLNG FUNCJ C+: CPT

## 2022-07-11 PROCEDURE — 92611 MOTION FLUOROSCOPY/SWALLOW: CPT

## 2022-07-11 RX ADMIN — LEVOTHYROXINE SODIUM 88 MCG: 88 TABLET ORAL at 06:06

## 2022-07-11 RX ADMIN — ENOXAPARIN SODIUM 40 MG: 40 INJECTION SUBCUTANEOUS at 08:52

## 2022-07-11 RX ADMIN — INSULIN LISPRO 1 UNITS: 100 INJECTION, SOLUTION INTRAVENOUS; SUBCUTANEOUS at 17:22

## 2022-07-11 RX ADMIN — INSULIN LISPRO 2 UNITS: 100 INJECTION, SOLUTION INTRAVENOUS; SUBCUTANEOUS at 12:06

## 2022-07-11 RX ADMIN — PRAVASTATIN SODIUM 80 MG: 80 TABLET ORAL at 15:38

## 2022-07-11 RX ADMIN — INSULIN LISPRO 1 UNITS: 100 INJECTION, SOLUTION INTRAVENOUS; SUBCUTANEOUS at 21:19

## 2022-07-11 RX ADMIN — INSULIN GLARGINE 5 UNITS: 100 INJECTION, SOLUTION SUBCUTANEOUS at 21:16

## 2022-07-11 RX ADMIN — FLUCONAZOLE 200 MG: 40 POWDER, FOR SUSPENSION ORAL at 15:38

## 2022-07-11 RX ADMIN — PANTOPRAZOLE SODIUM 40 MG: 40 INJECTION, POWDER, FOR SOLUTION INTRAVENOUS at 17:22

## 2022-07-11 RX ADMIN — TAMSULOSIN HYDROCHLORIDE 0.4 MG: 0.4 CAPSULE ORAL at 08:52

## 2022-07-11 RX ADMIN — SENNOSIDES AND DOCUSATE SODIUM 1 TABLET: 50; 8.6 TABLET ORAL at 21:16

## 2022-07-11 RX ADMIN — FINASTERIDE 5 MG: 5 TABLET, FILM COATED ORAL at 08:52

## 2022-07-11 RX ADMIN — ASPIRIN 81 MG 81 MG: 81 TABLET ORAL at 08:52

## 2022-07-11 RX ADMIN — PANTOPRAZOLE SODIUM 40 MG: 40 INJECTION, POWDER, FOR SOLUTION INTRAVENOUS at 06:06

## 2022-07-11 NOTE — ASSESSMENT & PLAN NOTE
· Patient assessed by speech therapy today and recommend GI evaluation  · Patient for video barium swallow today; did discuss with speech therapy  Continue with current diet  · Diet-puree and honey thick liquid  · Maintain aspiration precaution    · Gastroenterology following-continue fluconazole and will need EGD as outpatient  · Patient had esophagram which showed no strictures or lesions

## 2022-07-11 NOTE — PROGRESS NOTES
[address] [facility]  Progress Note - Dell Ramirez 1940, [de-identified] y o  male MRN: [id number]  Unit/Bed#: -01 Encounter: [id number]  Primary Care Provider: Cherry Khan MD   Date and time admitted to hospital: 6/30/2022  3:28 PM    * Type 2 diabetes mellitus with mild nonproliferative diabetic retinopathy without macular edema, bilateral [facility])  Assessment & Plan  Lab Results   Component Value Date    HGBA1C 8 8 (H) 06/27/2022       Recent Labs     07/10/22  1514 07/10/22  2052 07/11/22  0604 07/11/22  1202   POCGLU 334* 171* 149* 243*       Blood Sugar Average: Last 72 hrs:  (P) 199     · DKA has now resolved  · Currently patient is poor p o  Intake due to dysphagia  · Lantus to 5 units q h s  Elan Gilbert · Patient evaluated by speech therapy and diet changed accordingly  · Continue Accu-Cheks monitoring q 4 hours  · Continue diabetic diet, sliding scale coverage  Dysphagia  Assessment & Plan  · Patient assessed by speech therapy today and recommend GI evaluation  · Patient for video barium swallow today; did discuss with speech therapy  Continue with current diet  · Diet-puree and honey thick liquid  · Maintain aspiration precaution  · Gastroenterology following-continue fluconazole and will need EGD as outpatient  · Patient had esophagram which showed no strictures or lesions    Hypernatremia  Assessment & Plan  · Sodium 146  · Likely from decreased fluid intake  · Continue to monitor    Weakness  Assessment & Plan  · PT OT recommending rehab  · Patient amenable to post acute rehab    Physical deconditioning  Assessment & Plan  · PT OT recommendation noted for post acute rehab  · Cm to follow-up for placement  COVID-19  Assessment & Plan  · Unclear if patient was actually hypoxic this morning when ambulating to the restroom as this may have been a bad reading on patient's pulse ox    Will continue to monitor at this time  · Chest x-ray showing atelectasis  · Advised incentive spirometry  · No intervention indicated at this time  · Continue supportive care  Coronary atherosclerosis of native coronary artery  Assessment & Plan  · Continue aspirin/statin/beta-blocker    Ischemic cardiomyopathy  Assessment & Plan  · Ejection fraction 25% historically  · Repeat echocardiogram showing EF of 25%  · Continue home beta-blocker    Hypothyroidism  Assessment & Plan  · Continue home levothyroxine    BPH (benign prostatic hyperplasia)  Assessment & Plan  · Continue home finasteride and tamsulosin  · Currently patient has Mckeon catheter  · Recommended to remove Mckeon catheter and Attempt  voiding trial once ambulation improves at rehab  Hyperlipidemia  Assessment & Plan  · Continue statin equivalent while inaptient, resume rosuvastatin on discharge        VTE Pharmacologic Prophylaxis: VTE Score: 5 High Risk (Score >/= 5) - Pharmacological DVT Prophylaxis Ordered: enoxaparin (Lovenox)  Sequential Compression Devices Ordered  Patient Centered Rounds: I performed bedside rounds with nursing staff today  Discussions with Specialists or Other Care Team Provider: case management    Education and Discussions with Family / Patient: Updated  (daughter in law) via phone  Time Spent for Care: 30 minutes  More than 50% of total time spent on counseling and coordination of care as described above  Current Length of Stay: 11 day(s)  Current Patient Status: Inpatient   Certification Statement: Patient medically stable awaiting placement  Discharge Plan: Patient medically stable awaiting placement    Code Status: Level 1 - Full Code    Subjective:   Patient resting comfortably on examination    Patient had no overnight events or complaints on exam     Objective:     Vitals:   Temp (24hrs), Av 7 °F (36 5 °C), Min:97 6 °F (36 4 °C), Max:97 8 °F (36 6 °C)    Temp:  [97 6 °F (36 4 °C)-97 8 °F (36 6 °C)] 97 6 °F (36 4 °C)  HR:  [] 100  Resp:  [14-17] 17  BP: ()/(50-64) 102/59  SpO2:  [92 %-95 %] 94 %  Body mass index is 20 6 kg/m²  Input and Output Summary (last 24 hours): Intake/Output Summary (Last 24 hours) at 7/11/2022 1342  Last data filed at 7/11/2022 0857  Gross per 24 hour   Intake 530 ml   Output 1550 ml   Net -1020 ml       Physical Exam:   Physical Exam  Vitals and nursing note reviewed  Constitutional:       General: He is not in acute distress  Appearance: He is well-developed  Comments: Room air  Chronically ill-appearing   HENT:      Head: Normocephalic and atraumatic  Eyes:      General: No scleral icterus  Conjunctiva/sclera: Conjunctivae normal    Cardiovascular:      Rate and Rhythm: Normal rate and regular rhythm  Heart sounds: Normal heart sounds  No murmur heard  No friction rub  No gallop  Pulmonary:      Effort: Pulmonary effort is normal  No respiratory distress  Breath sounds: Normal breath sounds  No wheezing or rales  Abdominal:      General: Bowel sounds are normal  There is no distension  Palpations: Abdomen is soft  Tenderness: There is no abdominal tenderness  Musculoskeletal:         General: Normal range of motion  Skin:     General: Skin is warm  Findings: No rash  Neurological:      Mental Status: He is alert and oriented to person, place, and time  Additional Data:     Labs:  Results from last 7 days   Lab Units 07/11/22  0512 07/10/22  0506 07/07/22  0510 07/06/22  0508   WBC Thousand/uL 5 19 5 16   < > 11 15*   HEMOGLOBIN g/dL 11 8* 10 3*   < > 13 4   HEMATOCRIT % 35 4* 31 7*   < > 39 7   PLATELETS Thousands/uL 275 235   < > 105*   BANDS PCT %  --   --   --  10*   NEUTROS PCT %  --  77*  --   --    LYMPHS PCT %  --  17  --   --    LYMPHO PCT   --   --    < > 8*   MONOS PCT %  --  3*  --   --    MONO PCT   --   --    < > 0*   EOS PCT %  --  1   < > 0    < > = values in this interval not displayed       Results from last 7 days   Lab Units 07/11/22  0512   SODIUM mmol/L 146*   POTASSIUM mmol/L 3  7   CHLORIDE mmol/L 110*   CO2 mmol/L 26   BUN mg/dL 18   CREATININE mg/dL 0 92   ANION GAP mmol/L 10   CALCIUM mg/dL 8 1*   GLUCOSE RANDOM mg/dL 129         Results from last 7 days   Lab Units 07/11/22  1202 07/11/22  0604 07/10/22  2052 07/10/22  1514 07/10/22  1120 07/10/22  0622 07/10/22  0548 07/09/22  2113 07/09/22  1551 07/09/22  1141 07/09/22  0503 07/08/22  2340   POC GLUCOSE mg/dl 243* 149* 171* 334* 207* 177* 181* 228* 258* 279* 125 219*               Lines/Drains:  Invasive Devices  Report    Peripheral Intravenous Line  Duration           Peripheral IV 07/10/22 Left;Ventral (anterior); Medial Forearm 1 day          Drain  Duration           Urethral Catheter Coude 10 days              Urinary Catheter:  Goal for removal: N/A- Discharging with Mckeon               Imaging: Reviewed radiology reports from this admission including: chest xray    Recent Cultures (last 7 days):         Last 24 Hours Medication List:   Current Facility-Administered Medications   Medication Dose Route Frequency Provider Last Rate    acetaminophen  650 mg Oral Q6H PRN Liliana Ward DO      aspirin  81 mg Oral Daily Nilsa Paulson PA-C      enoxaparin  40 mg Subcutaneous Q24H [facility] Nilsa Paulson PA-C      finasteride  5 mg Oral Daily Nilsa Paulson PA-C      fluconazole  200 mg Oral Daily Reyes Porter, PA-C      insulin glargine  5 Units Subcutaneous HS Nilsa Paulson PA-C      insulin lispro  1-5 Units Subcutaneous TID AC Nilsa Paulson PA-C      insulin lispro  1-5 Units Subcutaneous HS Nilsa Paulson PA-C      levothyroxine  88 mcg Oral Daily Nilsa Paulson PA-C      metoprolol tartrate  25 mg Oral Q12H [facility] Nilsa Paulson PA-C      pantoprazole  40 mg Intravenous Q12H Liliana Ward DO      potassium chloride  40 mEq Oral Once Isidra Herrera PA-C      pravastatin  80 mg Oral Daily With [employer]RISTINA      senna-docusate sodium  1 tablet Oral HS Liliana Hermanns, DO      sodium chloride (PF)  3 mL Intravenous Q1H PRN Saintclair Levee, PA-C      tamsulosin  0 4 mg Oral Daily Saintclair Levee, PA-C          Today, Patient Was Seen By: Britney Toro DO    **Please Note: This note may have been constructed using a voice recognition system  **

## 2022-07-11 NOTE — PLAN OF CARE
Problem: PAIN - ADULT  Goal: Verbalizes/displays adequate comfort level or baseline comfort level  Description: Interventions:  - Encourage patient to monitor pain and request assistance  - Assess pain using appropriate pain scale  - Administer analgesics based on type and severity of pain and evaluate response  - Implement non-pharmacological measures as appropriate and evaluate response  - Consider cultural and social influences on pain and pain management  - Notify physician/advanced practitioner if interventions unsuccessful or patient reports new pain  Outcome: Progressing     Problem: INFECTION - ADULT  Goal: Absence or prevention of progression during hospitalization  Description: INTERVENTIONS:  - Assess and monitor for signs and symptoms of infection  - Monitor lab/diagnostic results  - Monitor all insertion sites, i e  indwelling lines, tubes, and drains  - Monitor endotracheal if appropriate and nasal secretions for changes in amount and color  - [place] appropriate cooling/warming therapies per order  - Administer medications as ordered  - Instruct and encourage patient and family to use good hand hygiene technique  - Identify and instruct in appropriate isolation precautions for identified infection/condition  Outcome: Progressing  Goal: Absence of fever/infection during neutropenic period  Description: INTERVENTIONS:  - Monitor WBC    Outcome: Progressing     Problem: DISCHARGE PLANNING  Goal: Discharge to home or other facility with appropriate resources  Description: INTERVENTIONS:  - Identify barriers to discharge w/patient and caregiver  - Arrange for needed discharge resources and transportation as appropriate  - Identify discharge learning needs (meds, wound care, etc )  - Arrange for interpretive services to assist at discharge as needed  - Refer to Case Management Department for coordinating discharge planning if the patient needs post-hospital services based on physician/advanced practitioner order or complex needs related to functional status, cognitive ability, or social support system  Outcome: Progressing     Problem: Potential for Falls  Goal: Patient will remain free of falls  Description: INTERVENTIONS:  - Educate patient/family on patient safety including physical limitations  - Instruct patient to call for assistance with activity   - Consult OT/PT to assist with strengthening/mobility   - Keep Call bell within reach  - Keep bed low and locked with side rails adjusted as appropriate  - Keep care items and personal belongings within reach  - Initiate and maintain comfort rounds  - Make Fall Risk Sign visible to staff  - Offer Toileting every 2 Hours, in advance of need  - Initiate/Maintain bed alarm  - Obtain necessary fall risk management equipment:   - Apply yellow socks and bracelet for high fall risk patients  - Consider moving patient to room near nurses station  Outcome: Progressing

## 2022-07-11 NOTE — TELEPHONE ENCOUNTER
----- Message from Lizbeth Anderson PA-C sent at 7/8/2022  3:01 PM EDT -----  EGD recall in 4 weeks with either Dr Gulshan Thomas for dysphagia  Please call Krista Starkey his daughter in law to schedule

## 2022-07-11 NOTE — SPEECH THERAPY NOTE
Speech-Language Pathology Video Barium Swallow Study      Patient Name: Medhat Lux    Today's Date: 7/11/2022     Problem List  Patient Active Problem List   Diagnosis    Anemia    Hyperlipidemia    BPH (benign prostatic hyperplasia)    Hypothyroidism    Foot pain, bilateral    Ischemic cardiomyopathy    Coronary atherosclerosis of native coronary artery    Urinary retention    Prostate cancer screening    Chronic obstructive pulmonary disease (HCC)    Type 2 diabetes mellitus with mild nonproliferative diabetic retinopathy without macular edema, bilateral ([address] )    COVID-19    Physical deconditioning    Dysphagia    Hypernatremia    Weakness       Past Medical History  Past Medical History:   Diagnosis Date    Acquired cyst of kidney     Anemia     Benign paroxysmal vertigo, unspecified ear     Cellulitis of leg     Cervical spinal stenosis     Chest pain     Coronary atherosclerosis of native coronary artery     Enlarged prostate     Esophageal candidiasis (HCC)     Hematuria     Herpes zoster     Hyperlipidemia     Hypertension     Incomplete emptying of bladder     Inflamed seborrheic keratosis     Internal hemorrhoids     Malignant neoplasm of skin of trunk     Myocardial infarction ([facility] )     Nonmelanoma skin cancer     LAST ASSESSED: 8/9/17    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 infarction     Paroxysmal tachycardia (HCC)     Shortness of breath     Vitamin B deficiency        Past Surgical History  Past Surgical History:   Procedure Laterality Date    CARDIAC DEFIBRILLATOR PLACEMENT      COLONOSCOPY  10/07/2008    FIBEROPTIC SCREENING; NO FURTHER RECOMMENDATIONS    CORONARY ANGIOPLASTY WITH STENT PLACEMENT      CORONARY ANGIOPLASTY WITH STENT PLACEMENT      CYSTOSCOPY  11/06/2015    DIAGNOSTIC; MANAGED BY: Xiao Holbrook    EGD AND COLONOSCOPY N/A 2/12/2018    Procedure: EGD AND COLONOSCOPY;  Surgeon: Luis Miguel Cruz MD;  [facility];  Service: Gastroenterology    [facility] INJECTION RIGHT SHOULDER (ARTHROGRAM)  1/4/2022    HIP ARTHROPLASTY Right     INSERT / REPLACE / REMOVE PACEMAKER      JOINT REPLACEMENT Right     TRUNK SKIN LESION EXCISIONAL BIOPSY  08/22/2007    MALIGNANT; [address] CHEST         General Information; Pt was seen for a BSE, with recommendations for puree/honey  VBS was recommended due to ongoing cough and MD concerns for aspiration  Pt was viewed in lateral position and was given trials of puree solids (politely declines hard solids 2/2 dental status and poor stamina), honey thick, nectar thick, thin liquids  Patient eventually c/o stomach discomfort requesting to stop study  Most trials were self-fed by patient in an attempt to represent functional feeding as best as possible while under fluoro  Results are as follows:   Oral stage; Pt presents with mild oral dysphagia characterized by reduced bolus cohesion, control and manipulation  Transfer prolonged with premature bolus loss over base of tongue (all textures)  Mild oral residue  Pharyngeal stage; Pt presents with mild-moderate pharyngeal dysphagia characterized by delayed swallow response (all material fills valleculae prior to initiation of pharyngeal swallow), mild overflow spillage of nectar thick to pyriforms, waterfall spillage of thin liquids with significant pooling in the valleculae, pyriforms and ?UES  Trace amount of thins noted to coat the underside of the epiglottis; x1 transient laryngeal penetration of puree on initial trial   Laryngeal protraction reduced with absent epiglottic inversion; epiglottis makes contact with posterior pharyngeal wall  Therefore, incomplete closure of the laryngeal vestibule (airway)  Reduced base of tongue retraction and pharyngeal constriction; mild coating of residue along the posterior pharyngeal wall  Cricopharyngeal prominence with minimal residue above the UES      At least moderate amounts of retention remains in the valleculae (thicker viscosities/solids > liquids) which was reduced with cues for liquid wash (nectar) and multiple swallows  However, some residue remains in the valleculae despite pt effort  Delayed cough following completion of the study  Cannot r/o post-swallow aspiration of residue or secretions given the above, in addition to reduced/delayed airway closure  Esophageal stage;  Esophageal screening was not completed  See chart for results of recent esophagram      Assessment Summary; Patient presents with mild-mod oropharyngeal dysphagia characterized by prolonged bolus transit, poor bolus cohesion, and delayed swallow response  This resulted in premature bolus loss over the base of tongue to the level of valleculae (all solids  honey thick), overflow spillage (nectar thick), with waterfall spillage to the upper esophagus on thin liquids  Delayed, incomplete epiglottic inversion with x1 transient penetration of puree solid (initial trial)  No evidence of mistimed airway protection noted otherwise, therefore no instances of zahra aspiration while under fluoro  At least moderate pharyngeal residue in the valleculae, mild-trace amounts coating posterior pharyngeal wall (thicker viscosities>thin)  Cannot r/o post-swallow aspiration of residue or poor secretion management given findings outlined above, in addition to incomplete airway closure/epiglottic inversion and delayed swallow  *Images available for direct review in PACS     Diagnosis/Prognosis;  mild-moderate oropharyngeal dysphagia  fair prognosis for continued safe po intake given findings outlined above  May be at risk for aspiration of secretions and/or post-swallow pharyngeal stasis   Prognosis Considerations: therapeutic potential    Recommendations;   Recommend puree/level 1 diet and nectar thick liquids, with upright posture, only feed when fully alert, slow rate of feeding, small bites/sips, alternating bites and sips and FREQUENT oral care; aspiration precautions; head of bed elevation   Recommended Form of Meds: crushed with puree   Intermittent Supervision  Aspiration precautions   Reflux precautions   Aspiration precautions posted  Results reviewed with patient and MD       Goals:  Pt will tolerate least restrictive diet w/out s/s aspiration or oral/pharyngeal difficulties      Dysphagia Goals: pt will tolerate puree solids with nectar thick liquids without s/s of aspiration x1-3  Pt will demonstrate understanding of aspiration precautions, importance of aggressive oral care, etc  Treatment Recommendations: Post acute rehab services at next level of care      Discharge recommendation: [facility] [address] [address], [address] Pathologist  [place] #[id number]  [place] #[id number]

## 2022-07-11 NOTE — PLAN OF CARE
Pt down for barium swallow at this time  Appetite remains fair  Pt ambulated to bathroom to have large bm  MD apprised spo2 dropped to 60's when ambulating without o2

## 2022-07-11 NOTE — ASSESSMENT & PLAN NOTE
Lab Results   Component Value Date    HGBA1C 8 8 (H) 06/27/2022       Recent Labs     07/10/22  1514 07/10/22  2052 07/11/22  0604 07/11/22  1202   POCGLU 334* 171* 149* 243*       Blood Sugar Average: Last 72 hrs:  (P) 199     · DKA has now resolved  · Currently patient is poor p o  Intake due to dysphagia  · Lantus to 5 units q h kwadwo Glez · Patient evaluated by speech therapy and diet changed accordingly  · Continue Accu-Cheks monitoring q 4 hours  · Continue diabetic diet, sliding scale coverage

## 2022-07-11 NOTE — CASE MANAGEMENT
Case Management Discharge Planning Note    Patient name Ina Hamilton  Location [id number] MRN [id number]  : 1940 Date 2022       Current Admission Date: 2022  Current Admission Diagnosis:Type 2 diabetes mellitus with mild nonproliferative diabetic retinopathy without macular edema, bilateral [facility])   Patient Active Problem List    Diagnosis Date Noted    Hypernatremia 2022    Weakness 2022    Dysphagia 2022    Physical deconditioning 2022    COVID-19 2022    Type 2 diabetes mellitus with mild nonproliferative diabetic retinopathy without macular edema, bilateral ([facility] ) 10/14/2021    Chronic obstructive pulmonary disease ([facility] ) 2021    Prostate cancer screening 2020    Urinary retention 2019    Coronary atherosclerosis of native coronary artery 2019    Ischemic cardiomyopathy 2018    Hypothyroidism 10/08/2018    Foot pain, bilateral 10/08/2018    BPH (benign prostatic hyperplasia) 2018    Anemia 02/10/2018    Hyperlipidemia 02/10/2018      LOS (days): 11  Geometric Mean LOS (GMLOS) (days): 5 40  Days to GMLOS:-5 5     OBJECTIVE:  Risk of Unplanned Readmission Score: 18 53         Current admission status: Inpatient   Preferred Pharmacy:   [address] [facility] [place] [id number] [address]  Phone: [phone] Fax: [phone]    Primary Care Provider: Rajendra Olvera MD    Primary Insurance: [facility]  Secondary Insurance:     DISCHARGE DETAILS:         Other Referral/Resources/Interventions Provided:  Referral Comments: The pt has been accepted at [facility] and [address] [place] [address]  Angel spoke with the family and they would like [facility]  Angel reached ou to [facility] to confirm the bed  The pt will need auth and PT/OT will need to re-assess the pt  The CM has updated the family and they are aware this may take a couple days    The pt had a video Barium Swallow done today

## 2022-07-11 NOTE — ASSESSMENT & PLAN NOTE
· Unclear if patient was actually hypoxic this morning when ambulating to the restroom as this may have been a bad reading on patient's pulse ox  Will continue to monitor at this time  · Chest x-ray showing atelectasis  · Advised incentive spirometry  · No intervention indicated at this time  · Continue supportive care

## 2022-07-12 LAB
ANION GAP SERPL CALCULATED.3IONS-SCNC: 9 MMOL/L (ref 4–13)
ATRIAL RATE: 121 BPM
BASOPHILS # BLD AUTO: 0.02 THOUSANDS/ΜL (ref 0–0.1)
BASOPHILS NFR BLD AUTO: 0 % (ref 0–1)
BUN SERPL-MCNC: 18 MG/DL (ref 5–25)
CALCIUM SERPL-MCNC: 8 MG/DL (ref 8.3–10.1)
CHLORIDE SERPL-SCNC: 108 MMOL/L (ref 100–108)
CO2 SERPL-SCNC: 26 MMOL/L (ref 21–32)
CREAT SERPL-MCNC: 0.85 MG/DL (ref 0.6–1.3)
EOSINOPHIL # BLD AUTO: 0.03 THOUSAND/ΜL (ref 0–0.61)
EOSINOPHIL NFR BLD AUTO: 1 % (ref 0–6)
ERYTHROCYTE [DISTWIDTH] IN BLOOD BY AUTOMATED COUNT: 17.2 % (ref 11.6–15.1)
GFR SERPL CREATININE-BSD FRML MDRD: 82 ML/MIN/1.73SQ M
GLUCOSE SERPL-MCNC: 128 MG/DL (ref 65–140)
GLUCOSE SERPL-MCNC: 141 MG/DL (ref 65–140)
GLUCOSE SERPL-MCNC: 146 MG/DL (ref 65–140)
GLUCOSE SERPL-MCNC: 166 MG/DL (ref 65–140)
GLUCOSE SERPL-MCNC: 176 MG/DL (ref 65–140)
GLUCOSE SERPL-MCNC: 256 MG/DL (ref 65–140)
GLUCOSE SERPL-MCNC: 271 MG/DL (ref 65–140)
HCT VFR BLD AUTO: 34 % (ref 36.5–49.3)
HGB BLD-MCNC: 11.3 G/DL (ref 12–17)
IMM GRANULOCYTES # BLD AUTO: 0.05 THOUSAND/UL (ref 0–0.2)
IMM GRANULOCYTES NFR BLD AUTO: 1 % (ref 0–2)
LYMPHOCYTES # BLD AUTO: 0.95 THOUSANDS/ΜL (ref 0.6–4.47)
LYMPHOCYTES NFR BLD AUTO: 16 % (ref 14–44)
MCH RBC QN AUTO: 36.6 PG (ref 26.8–34.3)
MCHC RBC AUTO-ENTMCNC: 33.2 G/DL (ref 31.4–37.4)
MCV RBC AUTO: 110 FL (ref 82–98)
MONOCYTES # BLD AUTO: 0.2 THOUSAND/ΜL (ref 0.17–1.22)
MONOCYTES NFR BLD AUTO: 3 % (ref 4–12)
NEUTROPHILS # BLD AUTO: 4.62 THOUSANDS/ΜL (ref 1.85–7.62)
NEUTS SEG NFR BLD AUTO: 79 % (ref 43–75)
NRBC BLD AUTO-RTO: 0 /100 WBCS
P AXIS: 68 DEGREES
PLATELET # BLD AUTO: 310 THOUSANDS/UL (ref 149–390)
PMV BLD AUTO: 10.5 FL (ref 8.9–12.7)
POTASSIUM SERPL-SCNC: 3.8 MMOL/L (ref 3.5–5.3)
PR INTERVAL: 168 MS
QRS AXIS: -31 DEGREES
QRSD INTERVAL: 84 MS
QT INTERVAL: 310 MS
QTC INTERVAL: 440 MS
RBC # BLD AUTO: 3.09 MILLION/UL (ref 3.88–5.62)
SODIUM SERPL-SCNC: 143 MMOL/L (ref 136–145)
T WAVE AXIS: 89 DEGREES
VENTRICULAR RATE: 121 BPM
WBC # BLD AUTO: 5.87 THOUSAND/UL (ref 4.31–10.16)

## 2022-07-12 PROCEDURE — 99232 SBSQ HOSP IP/OBS MODERATE 35: CPT | Performed by: INTERNAL MEDICINE

## 2022-07-12 PROCEDURE — 93010 ELECTROCARDIOGRAM REPORT: CPT | Performed by: INTERNAL MEDICINE

## 2022-07-12 PROCEDURE — 97530 THERAPEUTIC ACTIVITIES: CPT

## 2022-07-12 PROCEDURE — 93005 ELECTROCARDIOGRAM TRACING: CPT

## 2022-07-12 PROCEDURE — 92526 ORAL FUNCTION THERAPY: CPT

## 2022-07-12 PROCEDURE — C9113 INJ PANTOPRAZOLE SODIUM, VIA: HCPCS | Performed by: INTERNAL MEDICINE

## 2022-07-12 PROCEDURE — 80048 BASIC METABOLIC PNL TOTAL CA: CPT | Performed by: INTERNAL MEDICINE

## 2022-07-12 PROCEDURE — 97535 SELF CARE MNGMENT TRAINING: CPT

## 2022-07-12 PROCEDURE — 85025 COMPLETE CBC W/AUTO DIFF WBC: CPT | Performed by: INTERNAL MEDICINE

## 2022-07-12 PROCEDURE — 82948 REAGENT STRIP/BLOOD GLUCOSE: CPT

## 2022-07-12 RX ADMIN — METOPROLOL TARTRATE 25 MG: 25 TABLET, FILM COATED ORAL at 21:19

## 2022-07-12 RX ADMIN — SENNOSIDES AND DOCUSATE SODIUM 1 TABLET: 50; 8.6 TABLET ORAL at 21:19

## 2022-07-12 RX ADMIN — TAMSULOSIN HYDROCHLORIDE 0.4 MG: 0.4 CAPSULE ORAL at 08:45

## 2022-07-12 RX ADMIN — PANTOPRAZOLE SODIUM 40 MG: 40 INJECTION, POWDER, FOR SOLUTION INTRAVENOUS at 17:01

## 2022-07-12 RX ADMIN — LEVOTHYROXINE SODIUM 88 MCG: 88 TABLET ORAL at 05:25

## 2022-07-12 RX ADMIN — INSULIN LISPRO 1 UNITS: 100 INJECTION, SOLUTION INTRAVENOUS; SUBCUTANEOUS at 16:56

## 2022-07-12 RX ADMIN — INSULIN LISPRO 3 UNITS: 100 INJECTION, SOLUTION INTRAVENOUS; SUBCUTANEOUS at 11:38

## 2022-07-12 RX ADMIN — FINASTERIDE 5 MG: 5 TABLET, FILM COATED ORAL at 08:43

## 2022-07-12 RX ADMIN — INSULIN LISPRO 2 UNITS: 100 INJECTION, SOLUTION INTRAVENOUS; SUBCUTANEOUS at 21:20

## 2022-07-12 RX ADMIN — INSULIN GLARGINE 5 UNITS: 100 INJECTION, SOLUTION SUBCUTANEOUS at 21:18

## 2022-07-12 RX ADMIN — ENOXAPARIN SODIUM 40 MG: 40 INJECTION SUBCUTANEOUS at 08:52

## 2022-07-12 RX ADMIN — METOPROLOL TARTRATE 25 MG: 25 TABLET, FILM COATED ORAL at 09:48

## 2022-07-12 RX ADMIN — FLUCONAZOLE 200 MG: 40 POWDER, FOR SUSPENSION ORAL at 16:59

## 2022-07-12 RX ADMIN — ASPIRIN 81 MG 81 MG: 81 TABLET ORAL at 08:44

## 2022-07-12 RX ADMIN — PRAVASTATIN SODIUM 80 MG: 80 TABLET ORAL at 17:00

## 2022-07-12 RX ADMIN — PANTOPRAZOLE SODIUM 40 MG: 40 INJECTION, POWDER, FOR SOLUTION INTRAVENOUS at 05:25

## 2022-07-12 NOTE — PLAN OF CARE
Puree/nectar  Tray set up  Pills w/ puree (crush larger if mx cleared)  Dbl swallows  Alternate bites and sips  Slow eating/small quantities   90' for all intake and >45m following

## 2022-07-12 NOTE — ASSESSMENT & PLAN NOTE
· Patient assessed by speech therapy today and recommend GI evaluation  · Patient had video barium swallow; did discuss with speech therapy  Continue with current diet  · Diet-puree and nectar thick liquid  · Maintain aspiration precaution    · Gastroenterology following-continue fluconazole and will need EGD as outpatient  · Patient had esophagram which showed no strictures or lesions

## 2022-07-12 NOTE — PHYSICAL THERAPY NOTE
PHYSICAL THERAPY TREATMENT  NAME:  Segundo Martínez  DATE: 07/12/22    AGE:   [de-identified] y o    Mrn:   [id number]  ADMIT DX:  SOB (shortness of breath) [R06 02]  DKA (diabetic ketoacidosis) (HCC) [E11 10]  Ac[facility] ([facility] ) [N17 9]  COVID-19 [U07 1]  Problem List:   Patient Active Problem List   Diagnosis    Anemia    Hyperlipidemia    BPH (benign prostatic hyperplasia)    Hypothyroidism    Foot pain, bilateral    Ischemic cardiomyopathy    Coronary atherosclerosis of native coronary artery    Urinary retention    Prostate cancer screening    Chronic obstructive pulmonary disease (HCC)    Type 2 diabetes mellitus with mild nonproliferative diabetic retinopathy without macular edema, bilateral (HCC)    COVID-19    Physical deconditioning    Dysphagia    Hypernatremia    Weakness       Past Medical History  Past Medical History:   Diagnosis Date    Acquired cyst of kidney     Anemia     Benign paroxysmal vertigo, unspecified ear     Cellulitis of leg     Cervical spinal stenosis     Chest pain     Coronary atherosclerosis of native coronary artery     Enlarged prostate     Esophageal candidiasis (HCC)     Hematuria     Herpes zoster     Hyperlipidemia     Hypertension     Incomplete emptying of bladder     Inflamed seborrheic keratosis     Internal hemorrhoids     Malignant neoplasm of skin of trunk     Myocardial infarction ([facility] )     Nonmelanoma skin cancer     LAST ASSESSED: 8/9/17    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 infarction     Paroxysmal tachycardia (HCC)     Shortness of breath     Vitamin B deficiency        Past Surgical History  Past Surgical History:   Procedure Laterality Date    CARDIAC DEFIBRILLATOR PLACEMENT      COLONOSCOPY  10/07/2008    FIBEROPTIC SCREENING; NO FURTHER RECOMMENDATIONS    CORONARY ANGIOPLASTY WITH STENT PLACEMENT      CORONARY ANGIOPLASTY WITH STENT PLACEMENT      CYSTOSCOPY  11/06/2015    DIAGNOSTIC; MANAGED BY: Maritza Connor    EGD AND COLONOSCOPY N/A 2/12/2018    Procedure: EGD AND COLONOSCOPY; Surgeon: Izzy Chen MD;  Location: [facility]; Service: Gastroenterology    [place] INJECTION RIGHT SHOULDER (ARTHROGRAM)  1/4/2022    HIP ARTHROPLASTY Right     INSERT / REPLACE / REMOVE PACEMAKER      JOINT REPLACEMENT Right     TRUNK SKIN LESION EXCISIONAL BIOPSY  08/22/2007    MALIGNANT; [address] CHEST       Length Of Stay: 12  Performed at least 2 patient identifiers during session: Name and Birthday       07/12/22 0752   PT Last Visit   PT Visit Date 07/12/22   Note Type   Note Type Treatment  (NICK Burnette confirmed pt appropriate for PT session)   Pain Assessment   Pain Assessment Tool 0-10   Pain Score No Pain  (pt denied pain throughout session)   Restrictions/Precautions   Weight Bearing Precautions Per Order No   Other Precautions Fall Risk;O2;Hard of hearing; Chair Alarm; Bed Alarm;Cognitive  (2 Lo2)   General   Chart Reviewed Yes   Response to Previous Treatment Patient with no complaints from previous session  Family/Caregiver Present No   Cognition   Arousal/Participation Responsive; Cooperative   Attention Attends with cues to redirect   Following Commands Follows multistep commands with increased time or repetition   Comments pt agreed to PT session   Subjective   Subjective "I need to get to the chair"   Bed Mobility   Rolling L 3  Moderate assistance   Additional items Assist x 1;Bedrails; Increased time required;Verbal cues   Supine to Sit 3  Moderate assistance   Additional items Assist x 1;Bedrails; Increased time required;Verbal cues   Sit to Supine   (not tested as pt seated in bedside chair at end of session)   Transfers   Sit to Stand 4  Minimal assistance   Additional items Assist x 1; Increased time required;Verbal cues;Armrests   Stand to Sit 4  Minimal assistance   Additional items Assist x 1; Increased time required;Verbal cues;Armrests   Stand pivot 4  Minimal assistance   Additional items Assist x 1; Increased time required;Verbal cues;Armrests   Additional Comments RW used during transfers  Verbal cues for proper UE placement at walker   Ambulation/Elevation   Gait pattern Forward Flexion;Decreased heel strike;Knees flexed; Excessively slow; Step to; Foward flexed; Shuffling   Gait Assistance 4  Minimal assist   Additional items Assist x 1;Verbal cues   Assistive Device Rolling walker   Distance 4'  (to chair)   Balance   Static Sitting Fair -   Dynamic Sitting Fair -   Static Standing Fair -   Dynamic Standing Fair -   Ambulatory Fair -   Endurance Deficit   Endurance Deficit Yes   Endurance Deficit Description limited by fatigue and SOB   Activity Tolerance   Activity Tolerance Patient limited by fatigue   Medical Staff Made Aware physican present at end of session and made aware of SPo2 response   Nurse Made Aware RN made aware of session outcomes   Assessment   Prognosis Good   Problem List Decreased strength; Impaired balance;Decreased endurance;Decreased mobility; Impaired judgement;Decreased safety awareness; Impaired hearing   Assessment Pt seen for PT treatment session this date, consisting of ther act focused on transfer training, bed mobility, standing endurance  Since previous session, pt has made good progress in terms of activity tolerance and assist required for mobility  This date, patient greeted supine in bed on room air with SPo2 93% and HR: 114bpm; pt without SOB, pain, lightheadedness and agreed to PT session  PT observed O2 hooked to wall unit and on at 2 L with nc laying on bed - pt stating he removed it himself; PT assisted in donning nc for mobility as with sup > sit SPo2 dropped to 87% on RA  With rest and verbal cues for pursed lip breathing SPo2 increased to 94% and throughout remainder of session maintained 92-94% on 2 L  RN and physician aware of SPo2 response  Pt performed sup > sit modA with mod verbal and physical cues for UE placement on bed rails and LE placement   STS x4 Monik to rolling walker and standing tolerance up to 50 seconds before pt requiring seated rest due to fatigue  SPT bed > wc Monik with rolling walker and 4' forward/lateral steps to chair Monik  Pertinent barriers during this session include impulsivity and carry over and SIDDIQUI  Current goals and POC remain appropriate, pt continues to have rehab potential and is making progress towards STGs; goal date extended  Pt prognosis for achieving goals is good, pending pt progress with hospitalization/medical status improvements, and indicated by [facility], ability to follow directions, attention span, awareness and responsive to cues/strategies  Pt limited d/t inability to concentrate under maximum structure, fear of falling and non compliance  PT recommends post acute rehabilitation services upon discharge  Pt continues to be functioning below baseline level, and remains limited 2* factors listed above  PT will continue to see pt during current hospitalization in order to address the deficits listed above and provide interventions consistent w/ POC in effort to achieve STGs  Goals   Patient Goals To get stronger   STG Expiration Date 07/22/22   Short Term Goal #1 Pts goals remain appropriate  Continue per plan of care with goal date upgraded   PT Treatment Day 2   Plan   Treatment/Interventions Functional transfer training;LE strengthening/ROM; Elevations; Therapeutic exercise; Endurance training;Patient/family training;Bed mobility;Gait training;Spoke to nursing;Spoke to MD   Progress Progressing toward goals   Recommendation   PT Discharge Recommendation Post acute rehabilitation services   Equipment Recommended Walker   AM-PAC Basic Mobility Inpatient   Turning in Bed Without Bedrails 2   Lying on Back to Sitting on Edge of Flat Bed 2   Moving Bed to Chair 3   Standing Up From Chair 3   Walk in Room 3   Climb 3-5 Stairs 2   Basic Mobility Inpatient Raw Score 15   Basic Mobility Standardized Score 36 97   Highest Level Of Mobility   -[facility] Goal 4: Move to chair/commode   -HL Achieved 4: Move to chair/commode   End of Consult   Patient Position at End of Consult All needs within reach;Bed/Chair alarm activated; Bedside chair  (O2 donned)       Time In: 0752  Time Out: 0820  Total Treatment Minutes: [address], [place]

## 2022-07-12 NOTE — PLAN OF CARE
Problem: PHYSICAL THERAPY ADULT  Goal: Performs mobility at highest level of function for planned discharge setting  See evaluation for individualized goals  Description: Treatment/Interventions: Functional transfer training, LE strengthening/ROM, Elevations, Therapeutic exercise, Endurance training, Patient/family training, Bed mobility, Gait training, Spoke to nursing, OT          See flowsheet documentation for full assessment, interventions and recommendations  Outcome: Progressing  Note: Prognosis: Good  Problem List: Decreased strength, Impaired balance, Decreased endurance, Decreased mobility, Impaired judgement, Decreased safety awareness, Impaired hearing  Assessment: Pt seen for PT treatment session this date, consisting of ther act focused on transfer training, bed mobility, standing endurance  Since previous session, pt has made good progress in terms of activity tolerance and assist required for mobility  This date, patient greeted supine in bed on room air with SPo2 93% and HR: 114bpm; pt without SOB, pain, lightheadedness and agreed to PT session  PT observed O2 hooked to wall unit and on at 2 L with nc laying on bed - pt stating he removed it himself; PT assisted in donning nc for mobility as with sup > sit SPo2 dropped to 87% on RA  With rest and verbal cues for pursed lip breathing SPo2 increased to 94% and throughout remainder of session maintained 92-94% on 2 L  RN and physician aware of SPo2 response  Pt performed sup > sit modA with mod verbal and physical cues for UE placement on bed rails and LE placement  STS x4 Monik to rolling walker and standing tolerance up to 50 seconds before pt requiring seated rest due to fatigue  SPT bed > wc Monik with rolling walker and 4' forward/lateral steps to chair Monik  Pertinent barriers during this session include impulsivity and carry over and SIDDIQUI   Current goals and POC remain appropriate, pt continues to have rehab potential and is making progress towards STGs; goal date extended  Pt prognosis for achieving goals is good, pending pt progress with hospitalization/medical status improvements, and indicated by [facility], ability to follow directions, attention span, awareness and responsive to cues/strategies  Pt limited d/t inability to concentrate under maximum structure, fear of falling and non compliance  PT recommends post acute rehabilitation services upon discharge  Pt continues to be functioning below baseline level, and remains limited 2* factors listed above  PT will continue to see pt during current hospitalization in order to address the deficits listed above and provide interventions consistent w/ POC in effort to achieve STGs  Barriers to Discharge: Inaccessible home environment     PT Discharge Recommendation: Post acute rehabilitation services    See flowsheet documentation for full assessment        Dawson Jha, PT

## 2022-07-12 NOTE — ASSESSMENT & PLAN NOTE
Lab Results   Component Value Date    HGBA1C 8 8 (H) 06/27/2022       Recent Labs     07/12/22  0040 07/12/22  0503 07/12/22  1048 07/12/22  1545   POCGLU 166* 141* 271* 176*       Blood Sugar Average: Last 72 hrs:  (P) 205 [id number]     · DKA has now resolved  · Currently patient is poor p o  Intake due to dysphagia  · Lantus to 5 units q h kwadwo Ambrosio · Patient evaluated by speech therapy and diet changed accordingly  · Continue Accu-Cheks monitoring q 4 hours  · Continue diabetic diet, sliding scale coverage

## 2022-07-12 NOTE — ASSESSMENT & PLAN NOTE
Lab Results   Component Value Date    HGBA1C 8 8 (H) 06/27/2022       Recent Labs     07/11/22  1548 07/11/22  2104 07/12/22  0040 07/12/22  0503   POCGLU 196* 186* 166* 141*       Blood Sugar Average: Last 72 hrs:  (P) 727 [id number]     · DKA has now resolved  · Currently patient is poor p o  Intake due to dysphagia  · Lantus to 5 units q h kwadwo Hinojosa · Patient evaluated by speech therapy and diet changed accordingly  · Continue Accu-Cheks monitoring q 4 hours  · Continue diabetic diet, sliding scale coverage

## 2022-07-12 NOTE — ASSESSMENT & PLAN NOTE
· Patient saturating greater than 92% on exam  · Chest x-ray showing atelectasis  · Advised incentive spirometry  · No intervention indicated at this time  · Continue supportive care

## 2022-07-12 NOTE — PLAN OF CARE
Problem: OCCUPATIONAL THERAPY ADULT  Goal: Performs self-care activities at highest level of function for planned discharge setting  See evaluation for individualized goals  Description: Treatment Interventions: ADL retraining, Functional transfer training, Endurance training, Patient/family training          See flowsheet documentation for full assessment, interventions and recommendations  Outcome: Progressing  Note: Limitation: Decreased ADL status, Decreased UE strength, Decreased Safe judgement during ADL, Decreased endurance, Decreased self-care trans, Decreased high-level ADLs  Prognosis: Good  Assessment: Patient participated in Skilled OT session this date with interventions consisting of ADL re training with the use of correct body mechnaics,  therapeutic activities to: increase activity tolerance and increase OOB/ sitting tolerance  Patient agreeable to OT treatment session, upon arrival patient was found supine in bed and in no apparent distress  Patient completed bed mobility with mod assist of 1  Pt sat EOB ~10 minutes with supervision to complete ADLs  While seated at EOB, pt required sup -min assist for UB ADLs and mod-total assist for LB ADLs  In comparison to previous session, patient with improvements in sitting tolerance and sitting balance  Patient requiring verbal cues for correct technique, one step directives and frequent rest periods  Patient continues to be functioning below baseline level, occupational performance remains limited secondary to factors listed above and increased risk for falls and injury  From OT standpoint, recommendation at time of d/c would be Post acute rehabilitation services  Patient to benefit from continued Occupational Therapy treatment while in the hospital to address deficits as defined above and maximize level of functional independence with ADLs and functional mobility       OT Discharge Recommendation: Post acute rehabilitation services     Kali Ascencio OT

## 2022-07-12 NOTE — PROGRESS NOTES
[facility]  Progress Note - Shayne Russo 1940, [de-identified] y o  male MRN: [id number]  Unit/Bed#: -01 Encounter: [id number]  Primary Care Provider: Britton Mcdonald MD   Date and time admitted to hospital: 6/30/2022  3:28 PM    * Type 2 diabetes mellitus with mild nonproliferative diabetic retinopathy without macular edema, bilateral [facility])  Assessment & Plan  Lab Results   Component Value Date    HGBA1C 8 8 (H) 06/27/2022       Recent Labs     07/11/22  1548 07/11/22  2104 07/12/22  0040 07/12/22  0503   POCGLU 196* 186* 166* 141*       Blood Sugar Average: Last 72 hrs:  (P) 214 [id number]     · DKA has now resolved  · Currently patient is poor p o  Intake due to dysphagia  · Lantus to 5 units q h s  Ruiz Daniel · Patient evaluated by speech therapy and diet changed accordingly  · Continue Accu-Cheks monitoring q 4 hours  · Continue diabetic diet, sliding scale coverage  Dysphagia  Assessment & Plan  · Patient assessed by speech therapy today and recommend GI evaluation  · Patient had video barium swallow; did discuss with speech therapy  Continue with current diet  · Diet-puree and nectar thick liquid  · Maintain aspiration precaution  · Gastroenterology following-continue fluconazole and will need EGD as outpatient  · Patient had esophagram which showed no strictures or lesions    Hypernatremia  Assessment & Plan  · Resolved    Weakness  Assessment & Plan  · PT OT recommending rehab  · Patient amenable to post acute rehab    Physical deconditioning  Assessment & Plan  · PT OT recommendation noted for post acute rehab  · Cm to follow-up for placement  COVID-19  Assessment & Plan  · Patient saturating greater than 92% on exam  · Chest x-ray showing atelectasis  · Advised incentive spirometry  · No intervention indicated at this time  · Continue supportive care        Coronary atherosclerosis of native coronary artery  Assessment & Plan  · Continue aspirin/statin/beta-blocker    Ischemic cardiomyopathy  Assessment & Plan  · Ejection fraction 25% historically  · Repeat echocardiogram showing EF of 25%  · Continue home beta-blocker    Hypothyroidism  Assessment & Plan  · Continue home levothyroxine    BPH (benign prostatic hyperplasia)  Assessment & Plan  · Continue home finasteride and tamsulosin  · Currently patient has Mckeon catheter  · Recommended to remove Mckeon catheter and Attempt  voiding trial once ambulation improves at rehab  Hyperlipidemia  Assessment & Plan  · Continue statin equivalent while inaptient, resume rosuvastatin on discharge        VTE Pharmacologic Prophylaxis: VTE Score: 5 High Risk (Score >/= 5) - Pharmacological DVT Prophylaxis Ordered: enoxaparin (Lovenox)  Sequential Compression Devices Ordered  Patient Centered Rounds: I performed bedside rounds with nursing staff today  Discussions with Specialists or Other Care Team Provider: case management    Education and Discussions with Family / Patient: Updated  (daughter in law) via phone  Time Spent for Care: 30 minutes  More than 50% of total time spent on counseling and coordination of care as described above  Current Length of Stay: 12 day(s)  Current Patient Status: Inpatient   Certification Statement: Patient medically stable awaiting placement  Discharge Plan: Patient medically stable awaiting placement    Code Status: Level 1 - Full Code    Subjective:   Patient resting comfortably on examination  Patient had no overnight events or complaints on exam     Objective:     Vitals:   Temp (24hrs), Av 7 °F (36 5 °C), Min:97 5 °F (36 4 °C), Max:98 °F (36 7 °C)    Temp:  [97 5 °F (36 4 °C)-98 °F (36 7 °C)] 97 7 °F (36 5 °C)  HR:  [] 88  Resp:  [16-17] 17  BP: ()/(58-71) 113/61  SpO2:  [92 %-99 %] 99 %  Body mass index is 20 32 kg/m²  Input and Output Summary (last 24 hours):      Intake/Output Summary (Last 24 hours) at 2022 1209  Last data filed at 7/12/2022 0900  Gross per 24 hour   Intake 1140 ml   Output 600 ml   Net 540 ml       Physical Exam:   Physical Exam  Vitals and nursing note reviewed  Constitutional:       General: He is not in acute distress  Appearance: He is well-developed  Comments: Chronically ill-appearing   HENT:      Head: Normocephalic and atraumatic  Eyes:      General: No scleral icterus  Conjunctiva/sclera: Conjunctivae normal    Cardiovascular:      Rate and Rhythm: Normal rate and regular rhythm  Heart sounds: Normal heart sounds  No murmur heard  No friction rub  No gallop  Pulmonary:      Effort: Pulmonary effort is normal  No respiratory distress  Breath sounds: Normal breath sounds  No wheezing or rales  Abdominal:      General: Bowel sounds are normal  There is no distension  Palpations: Abdomen is soft  Tenderness: There is no abdominal tenderness  Musculoskeletal:         General: Normal range of motion  Skin:     General: Skin is warm  Findings: No rash  Neurological:      Mental Status: He is alert and oriented to person, place, and time  Additional Data:     Labs:  Results from last 7 days   Lab Units 07/12/22  0458 07/07/22  0510 07/06/22  0508   WBC Thousand/uL 5 87   < > 11 15*   HEMOGLOBIN g/dL 11 3*   < > 13 4   HEMATOCRIT % 34 0*   < > 39 7   PLATELETS Thousands/uL 310   < > 105*   BANDS PCT %  --   --  10*   NEUTROS PCT % 79*   < >  --    LYMPHS PCT % 16   < >  --    LYMPHO PCT   --    < > 8*   MONOS PCT % 3*   < >  --    MONO PCT   --    < > 0*   EOS PCT % 1   < > 0    < > = values in this interval not displayed       Results from last 7 days   Lab Units 07/12/22  0458   SODIUM mmol/L 143   POTASSIUM mmol/L 3 8   CHLORIDE mmol/L 108   CO2 mmol/L 26   BUN mg/dL 18   CREATININE mg/dL 0 85   ANION GAP mmol/L 9   CALCIUM mg/dL 8 0*   GLUCOSE RANDOM mg/dL 128         Results from last 7 days   Lab Units 07/12/22  1048 07/12/22  0503 07/12/22  0040 07/11/22  2104 07/11/22  1548 07/11/22  1202 07/11/22  0604 07/10/22  2052 07/10/22  1514 07/10/22  1120 07/10/22  0622 07/10/22  0548   POC GLUCOSE mg/dl 271* 141* 166* 186* 196* 243* 149* 171* 334* 207* 177* 181*               Lines/Drains:  Invasive Devices  Report    Peripheral Intravenous Line  Duration           Peripheral IV 07/10/22 Left;Ventral (anterior); Medial Forearm 2 days          Drain  Duration           Urethral Catheter Coude 11 days              Urinary Catheter:  Goal for removal: N/A- Discharging with Mckeon               Imaging: No pertinent imaging reviewed  Recent Cultures (last 7 days):         Last 24 Hours Medication List:   Current Facility-Administered Medications   Medication Dose Route Frequency Provider Last Rate    acetaminophen  650 mg Oral Q6H PRN Britney Toro DO      aspirin  81 mg Oral Daily Nilsa Paulson PA-C      enoxaparin  40 mg Subcutaneous Q24H [address] Nilsa Paulson PA-C      finasteride  5 mg Oral Daily Nilsa Paulson PA-C      fluconazole  200 mg Oral Daily Aruna Sahu PA-C      insulin glargine  5 Units Subcutaneous HS Nilsa Paulson PA-C      insulin lispro  1-5 Units Subcutaneous TID AC Nilsa Paulson PA-C      insulin lispro  1-5 Units Subcutaneous HS Saintclair Levee, PA-C      levothyroxine  88 mcg Oral Daily Nilsa Paulson PA-C      metoprolol tartrate  25 mg Oral Q12H [address] Nilsa Paulson PA-C      pantoprazole  40 mg Intravenous Q12H Britney Toro DO      potassium chloride  40 mEq Oral Once Saintclair Levee, PA-C      pravastatin  80 mg Oral Daily With [employer] IncCHRISTINA      senna-docusate sodium  1 tablet Oral HS Britney Toro DO      sodium chloride (PF)  3 mL Intravenous Q1H PRN Saintclair Levee, PA-C      tamsulosin  0 4 mg Oral Daily Saintclair Levee, PA-C          Today, Patient Was Seen By: Britney Toro DO    **Please Note: This note may have been constructed using a voice recognition system  **

## 2022-07-12 NOTE — SPEECH THERAPY NOTE
Speech Language/Pathology     Speech/Language Pathology Progress Note     Patient Name: Isabela Johnson    Today's Date: 7/12/2022     Problem List  Principal Problem:    Type 2 diabetes mellitus with mild nonproliferative diabetic retinopathy without macular edema, bilateral (HCC)  Active Problems:    Hyperlipidemia    BPH (benign prostatic hyperplasia)    Hypothyroidism    Ischemic cardiomyopathy    Coronary atherosclerosis of native coronary artery    COVID-19    Physical deconditioning    Dysphagia    Hypernatremia    Weakness        Recommendations:   Diet: puree/level 1 diet and nectar thick liquids - avoid meltable textures   Meds: crushed with puree   Feeding Assistance: tray set up  Frequent Oral care: 2-4x/day  Aspiration precautions and compensatory swallowing strategies: upright posture, only feed when fully alert, slow rate of feeding, small bites/sips, alternating bites and sips and dry/dbl swallow  Other Recommendations/ considerations: SLP follow-up for ongoing aspiration precautions at next level of care       Subjective:  Patient received awake and alert, self-feeding from breakfast tray  Seated in bedside chair  Previous/current diet: puree/nectar    Objective: The following consistencies were tested puree solids, nectar thick liquids  Patient requires max verbal cues to use strategies discussed at VBS yesterday  Continues to cough with meltable puree textures; advised fwd flexion to guide bolus to anterior portion of oral cavity  Max cues and assist required;minimal benefit  No overt s/s of aspiration or distress with controlled quantities and strict use of strategies  Assessment:  Patient continues to present with overt s/s of aspiration; this is specifically when taking meltable solid textures when mixed w/ secretions  ? Spillage w/ poor airway closure as seen on video swallow study    Suggest close adherence to diet recommendations and aspiration precautions for continued safe intake  Close follow-up at next level of care recommended  Plan: Will continue follow as indicated         Tommy Quinones [address], [address] [facility], [place], operated [facility]  Speech-Language Pathologist  [place] #[id number]  [place] #[id number]

## 2022-07-12 NOTE — DISCHARGE INSTR - DIET
Recommend:  puree/level 1 diet and nectar thick liquids, with upright posture, only feed when fully alert, slow rate of feeding, small bites/sips, alternating bites and sips and FREQUENT oral care; aspiration precautions; head of bed elevation      **Double swallows following each bite  **Alternate solids and liquids

## 2022-07-12 NOTE — OCCUPATIONAL THERAPY NOTE
Occupational Therapy Treatment Note     Patient Name: Yi Ruiz  ZFSRV'D Date: 7/12/2022  Problem List  Principal Problem:    Type 2 diabetes mellitus with mild nonproliferative diabetic retinopathy without macular edema, bilateral (HCC)  Active Problems:    Hyperlipidemia    BPH (benign prostatic hyperplasia)    Hypothyroidism    Ischemic cardiomyopathy    Coronary atherosclerosis of native coronary artery    COVID-19    Physical deconditioning    Dysphagia    Hypernatremia    Weakness        07/12/22 1142   OT Last Visit   OT Visit Date 07/12/22   Note Type   Note Type Treatment   Restrictions/Precautions   Weight Bearing Precautions Per Order No   Other Precautions Fall Risk;O2;Cognitive; Bed Alarm  (2 L O2 via NC)   General   Response to Previous Treatment Patient reporting fatigue but able to participate   Lifestyle   Autonomy Patient reporting being independent with ADLs, ambulatory with no AD and lives with daughter at baseline  Reciprocal Relationships Supportive family   Service to Others Retired   Pain Assessment   Pain Assessment Tool 0-10   Pain Score No Pain   ADL   Where Assessed Edge of bed   Eating Assistance 5  Supervision/Setup   Eating Deficit Setup;Supervision/safety; Increased time to complete; Thickened liquids;Pureed diet   Eating Comments At end of session, pt set-up with lunch tray and HOB elevated  Pt demosntrated ability to manage beverages and utensils c increased time  Grooming Assistance 5  Supervision/Setup   Grooming Deficit Setup;Verbal cueing;Supervision/safety; Increased time to complete   Grooming Comments Pt washed face with increased time  UB Bathing Assistance 5  Supervision/Setup   UB Bathing Deficit Setup;Verbal cueing;Supervision/safety; Increased time to complete   LB Bathing Assistance 3  Moderate Assistance   LB Bathing Deficit Setup;Steadying;Verbal cueing;Supervision/safety; Increased time to complete; Buttocks;Right lower leg including foot; Left lower leg including foot   UB Dressing Assistance 4  Minimal Assistance   UB Dressing Deficit Setup;Verbal cueing;Supervision/safety; Increased time to complete;Pull around back; Fasteners   UB Dressing Comments Pt donned hospital gown  LB Dressing Assistance 1  Total Assistance   LB Dressing Deficit Don/doff R sock; Don/doff L sock   LB Dressing Comments Pt donned socks only   Bed Mobility   Supine to Sit 3  Moderate assistance   Additional items Assist x 1;HOB elevated; Bedrails; Increased time required;Verbal cues;LE management   Sit to Supine 4  Minimal assistance   Additional items Assist x 1;HOB elevated; Bedrails; Increased time required;Verbal cues; Impulsive   Additional Comments Prior to mobility, BP: 144/66  Pt sat EOB ~10 minutes with supervision to maintain sitting balance  Pt completed bathing & dressing while seated at EOB  After ~10 minutes of sitting, pt reported (+) lightheadedness and requested to return BTB  Pt quickly returned self back to supine  BP supine in bed: 113/61  Pt repositioned in bed with assist of 2 to ensure safety while eating lunch  Transfers   Sit to Stand Unable to assess  (Pt declined at this time d/t lightheadedness & fatigue)   Cognition   Overall Cognitive Status Impaired   Arousal/Participation Alert; Responsive; Cooperative   Attention Attends with cues to redirect   Orientation Level Oriented X4   Memory Decreased recall of recent events;Decreased recall of precautions   Following Commands Follows one step commands with increased time or repetition   Comments Pt agreeable to OT session     Activity Tolerance   Activity Tolerance Patient limited by fatigue;Treatment limited secondary to medical complications (Comment)  (Lightheadedness)   Medical Staff Made Aware Spoke c NICK Myers   Assessment   Assessment Patient participated in Skilled OT session this date with interventions consisting of ADL re training with the use of correct body mechnaics,  therapeutic activities to: increase activity tolerance and increase OOB/ sitting tolerance  Patient agreeable to OT treatment session, upon arrival patient was found supine in bed and in no apparent distress  Patient completed bed mobility with mod assist of 1  Pt sat EOB ~10 minutes with supervision to complete ADLs  While seated at EOB, pt required sup -min assist for UB ADLs and mod-total assist for LB ADLs  In comparison to previous session, patient with improvements in sitting tolerance and sitting balance  Patient requiring verbal cues for correct technique, one step directives and frequent rest periods  Patient continues to be functioning below baseline level, occupational performance remains limited secondary to factors listed above and increased risk for falls and injury  From OT standpoint, recommendation at time of d/c would be Post acute rehabilitation services  Patient to benefit from continued Occupational Therapy treatment while in the hospital to address deficits as defined above and maximize level of functional independence with ADLs and functional mobility  Plan   Treatment Interventions ADL retraining;Functional transfer training; Endurance training;Patient/family training   Goal Expiration Date 07/15/22   OT Treatment Day 1   OT Frequency 3-5x/wk   Recommendation   OT Discharge Recommendation Post acute rehabilitation services   Additional Comments  At end of session, pt supine in bed with HOB elevated and RN present in room  Additional Comments 2 The patient's raw score on the -PAC Daily Activity inpatient short form is 16, standardized score is 35 96, less than 39 4  Patients at this level are likely to benefit from discharge to post-acute rehabilitation services  Please refer to the recommendation of the Occupational Therapist for safe discharge planning     AM-PAC Daily Activity Inpatient   Lower Body Dressing 1   Bathing 2   Toileting 2   Upper Body Dressing 3   Grooming 3   Eating 3   Daily Activity Raw Score 14 Daily Activity Standardized Score (Calc for Raw Score >=11) 33 39   AM-PAC Applied Cognition Inpatient   Following a Speech/Presentation 4   Understanding Ordinary Conversation 4   Taking Medications 3   Remembering Where Things Are Placed or Put Away 3   Remembering List of 4-5 Errands 3   Taking Care of Complicated Tasks 3   Applied Cognition Raw Score 20   Applied Cognition Standardized Score 41 76     Obey Crisostomo, OT

## 2022-07-13 LAB
GLUCOSE SERPL-MCNC: 131 MG/DL (ref 65–140)
GLUCOSE SERPL-MCNC: 134 MG/DL (ref 65–140)
GLUCOSE SERPL-MCNC: 186 MG/DL (ref 65–140)
GLUCOSE SERPL-MCNC: 188 MG/DL (ref 65–140)
GLUCOSE SERPL-MCNC: 267 MG/DL (ref 65–140)

## 2022-07-13 PROCEDURE — 82948 REAGENT STRIP/BLOOD GLUCOSE: CPT

## 2022-07-13 PROCEDURE — 99232 SBSQ HOSP IP/OBS MODERATE 35: CPT | Performed by: INTERNAL MEDICINE

## 2022-07-13 PROCEDURE — C9113 INJ PANTOPRAZOLE SODIUM, VIA: HCPCS | Performed by: INTERNAL MEDICINE

## 2022-07-13 RX ADMIN — INSULIN LISPRO 1 UNITS: 100 INJECTION, SOLUTION INTRAVENOUS; SUBCUTANEOUS at 17:47

## 2022-07-13 RX ADMIN — INSULIN GLARGINE 5 UNITS: 100 INJECTION, SOLUTION SUBCUTANEOUS at 21:51

## 2022-07-13 RX ADMIN — FINASTERIDE 5 MG: 5 TABLET, FILM COATED ORAL at 09:33

## 2022-07-13 RX ADMIN — METOPROLOL TARTRATE 25 MG: 25 TABLET, FILM COATED ORAL at 21:54

## 2022-07-13 RX ADMIN — INSULIN LISPRO 1 UNITS: 100 INJECTION, SOLUTION INTRAVENOUS; SUBCUTANEOUS at 21:51

## 2022-07-13 RX ADMIN — PANTOPRAZOLE SODIUM 40 MG: 40 INJECTION, POWDER, FOR SOLUTION INTRAVENOUS at 17:47

## 2022-07-13 RX ADMIN — PANTOPRAZOLE SODIUM 40 MG: 40 INJECTION, POWDER, FOR SOLUTION INTRAVENOUS at 05:36

## 2022-07-13 RX ADMIN — ASPIRIN 81 MG 81 MG: 81 TABLET ORAL at 09:33

## 2022-07-13 RX ADMIN — SENNOSIDES AND DOCUSATE SODIUM 1 TABLET: 50; 8.6 TABLET ORAL at 21:52

## 2022-07-13 RX ADMIN — INSULIN LISPRO 2 UNITS: 100 INJECTION, SOLUTION INTRAVENOUS; SUBCUTANEOUS at 12:15

## 2022-07-13 RX ADMIN — ENOXAPARIN SODIUM 40 MG: 40 INJECTION SUBCUTANEOUS at 09:33

## 2022-07-13 RX ADMIN — TAMSULOSIN HYDROCHLORIDE 0.4 MG: 0.4 CAPSULE ORAL at 09:33

## 2022-07-13 RX ADMIN — LEVOTHYROXINE SODIUM 88 MCG: 88 TABLET ORAL at 05:36

## 2022-07-13 RX ADMIN — PRAVASTATIN SODIUM 80 MG: 80 TABLET ORAL at 17:47

## 2022-07-13 NOTE — CASE MANAGEMENT
Case Management Discharge Planning Note    Patient name Josy Msoqueda  Location [address] [id number]/-[id number] MRN [id number]  : 1940 Date 2022       Current Admission Date: 2022  Current Admission Diagnosis:Type 2 diabetes mellitus with mild nonproliferative diabetic retinopathy without macular edema, bilateral [facility])   Patient Active Problem List    Diagnosis Date Noted    Hypernatremia 2022    Weakness 2022    Dysphagia 2022    Physical deconditioning 2022    COVID-19 2022    Type 2 diabetes mellitus with mild nonproliferative diabetic retinopathy without macular edema, bilateral ([facility] [address] ) 10/14/2021    Chronic obstructive pulmonary disease ([facility] [address] ) 2021    Prostate cancer screening 2020    Urinary retention 2019    Coronary atherosclerosis of native coronary artery 2019    Ischemic cardiomyopathy 2018    Hypothyroidism 10/08/2018    Foot pain, bilateral 10/08/2018    BPH (benign prostatic hyperplasia) 2018    Anemia 02/10/2018    Hyperlipidemia 02/10/2018      LOS (days): 13  Geometric Mean LOS (GMLOS) (days): 4 60  Days to GMLOS:-8 2     OBJECTIVE:  Risk of Unplanned Readmission Score: 18 87         Current admission status: Inpatient   Preferred Pharmacy:   [address] [place], [place] [address] [address]  Phone: [phone] Fax: [phone]    Primary Care Provider: Kyaw Celestin MD    Primary Insurance: Celina Magaña [facility]  Secondary Insurance:     DISCHARGE DETAILS:    Discharge planning discussed with[de-identified] Patient and Patient's Daughter in law  Freedom of Choice: Yes  Comments - Freedom of Choice: CM spoke with patient and DIL regarding accepting facilities  CM reported that [facility] can no longer accepts, but [facility] and [facility] are able to accept  Patient and family prefers [facility]    CM contacted family/caregiver?: Yes  Were Treatment Team discharge recommendations reviewed with patient/caregiver?: Yes  Did patient/caregiver verbalize understanding of patient care needs?: Yes  Were patient/caregiver advised of the risks associated with not following Treatment Team discharge recommendations?: Yes    Contacts  Patient Contacts: Crissie Prom  Relationship to Patient[de-identified] Family  Contact Method: In Person  Reason/Outcome: Continuity of Care, Discharge [address]         Is the patient interested in [facility] at discharge?: No    Other Referral/Resources/Interventions Provided:  Interventions: Short Term Rehab  Referral Comments: CM received phone call from DENNIS at [facility] reporting that she can accept patient and will need auth  CM agreeable to start the auth via DC support team  CM sent a task to the [place] [id number] Team via [employer] requesting auth support  Would you like to participate in our [address] service program?  : No - Declined    Treatment Team Recommendation: Short Term Rehab  Discharge Destination Plan[de-identified] Short Term Rehab  Transport at Discharge : BLS Ambulance     IMM Given (Date):: 07/13/22  IMM Given to[de-identified] Family (IMM reviewed with patient and DIL  Both reported understanding of these rights and were agreeable with DC determination   IMM placed in medical records )

## 2022-07-13 NOTE — CASE MANAGEMENT
Case Management Discharge Planning Note    Patient name Doroteo Castellanos  Location [address] 418/-14 MRN [id number]  : 1940 Date 2022       Current Admission Date: 2022  Current Admission Diagnosis:Type 2 diabetes mellitus with mild nonproliferative diabetic retinopathy without macular edema, bilateral [facility])   Patient Active Problem List    Diagnosis Date Noted    Hypernatremia 2022    Weakness 2022    Dysphagia 2022    Physical deconditioning 2022    COVID-19 2022    Type 2 diabetes mellitus with mild nonproliferative diabetic retinopathy without macular edema, bilateral ([facility] [address] ) 10/14/2021    Chronic obstructive pulmonary disease ([facility] [address] ) 2021    Prostate cancer screening 2020    Urinary retention 2019    Coronary atherosclerosis of native coronary artery 2019    Ischemic cardiomyopathy 2018    Hypothyroidism 10/08/2018    Foot pain, bilateral 10/08/2018    BPH (benign prostatic hyperplasia) 2018    Anemia 02/10/2018    Hyperlipidemia 02/10/2018      LOS (days): 13  Geometric Mean LOS (GMLOS) (days): 4 60  Days to GMLOS:-8 2     OBJECTIVE:  Risk of Unplanned Readmission Score: 18 87         Current admission status: Inpatient   Preferred Pharmacy:   [address], [address] [place] [address] [address] [place], [place] [address] [address]  Phone: [phone] Fax: [phone]    Primary Care Provider: Isabel Graham MD    Primary Insurance: [facility]  Secondary Insurance:     [address] [place] [address] Number: submitted SNF auth request to [employer]  pending ref #[id number]  for [facility]  NPI: [id number]  Dr Amber Graves  NPI: [id number]   Clinicals faxed to [phone]

## 2022-07-13 NOTE — CASE MANAGEMENT
Case Management Discharge Planning Note    Patient name Susan Mendez  Location /-78 MRN [id number]  : 1940 Date 2022       Current Admission Date: 2022  Current Admission Diagnosis:Type 2 diabetes mellitus with mild nonproliferative diabetic retinopathy without macular edema, bilateral [facility])   Patient Active Problem List    Diagnosis Date Noted    Hypernatremia 2022    Weakness 2022    Dysphagia 2022    Physical deconditioning 2022    COVID-19 2022    Type 2 diabetes mellitus with mild nonproliferative diabetic retinopathy without macular edema, bilateral ([facility] ) 10/14/2021    Chronic obstructive pulmonary disease ([facility] ) 2021    Prostate cancer screening 2020    Urinary retention 2019    Coronary atherosclerosis of native coronary artery 2019    Ischemic cardiomyopathy 2018    Hypothyroidism 10/08/2018    Foot pain, bilateral 10/08/2018    BPH (benign prostatic hyperplasia) 2018    Anemia 02/10/2018    Hyperlipidemia 02/10/2018      LOS (days): 13  Geometric Mean LOS (GMLOS) (days): 4 60  Days to GMLOS:-8 2     OBJECTIVE:  Risk of Unplanned Readmission Score: 18 83         Current admission status: Inpatient   Preferred Pharmacy:   [address] [place] [address]  Phone: [phone] Fax: [phone]    Primary Care Provider: Bin Pulido MD    Primary Insurance: [facility]  Secondary Insurance:     DISCHARGE DETAILS:    Other Referral/Resources/Interventions Provided:  Interventions: Short Term Rehab  Referral Comments: CM sent a TT to Brendan from [facility] requesting an update on acceptance  CM reported that patient is medically stable for discharge but will need auth  [place] confirmed that [facility] can accept, but they cannot admit today due to reaching capacity for admissions   CM asked Alvin Gomez if CM could initiate auth  Alvin Gomez reported that she will confirm after 12:00 today and TT CM when she knows  CM sent a TT to PT/OT asking if they can see patient tomorrow for updated notes for auth  Both PT/OT were agreeable to see patient tomorrow

## 2022-07-13 NOTE — PLAN OF CARE
Problem: Potential for Falls  Goal: Patient will remain free of falls  Description: INTERVENTIONS:  - Educate patient/family on patient safety including physical limitations  - Instruct patient to call for assistance with activity   - Consult OT/PT to assist with strengthening/mobility   - Keep Call bell within reach  - Keep bed low and locked with side rails adjusted as appropriate  - Keep care items and personal belongings within reach  - Initiate and maintain comfort rounds  - Make Fall Risk Sign visible to staff  - Offer Toileting every 2 Hours, in advance of need  - Initiate/Maintain alarm  - Obtain necessary fall risk management equipment:   - Apply yellow socks and bracelet for high fall risk patients  - Consider moving patient to room near nurses station  Outcome: Progressing     Problem: MOBILITY - ADULT  Goal: Maintain or return to baseline ADL function  Description: INTERVENTIONS:  -  Assess patient's ability to carry out ADLs; assess patient's baseline for ADL function and identify physical deficits which impact ability to perform ADLs (bathing, care of mouth/teeth, toileting, grooming, dressing, etc )  - Assess/evaluate cause of self-care deficits   - Assess range of motion  - Assess patient's mobility; develop plan if impaired  - Assess patient's need for assistive devices and provide as appropriate  - Encourage maximum independence but intervene and supervise when necessary  - Involve family in performance of ADLs  - Assess for home care needs following discharge   - Consider OT consult to assist with ADL evaluation and planning for discharge  - Provide patient education as appropriate  Outcome: Progressing  Goal: Maintains/Returns to pre admission functional level  Description: INTERVENTIONS:  - Perform BMAT or MOVE assessment daily    - Set and communicate daily mobility goal to care team and patient/family/caregiver     - Collaborate with rehabilitation services on mobility goals if consulted  - Perform Range of Motion 2 times a day  - Reposition patient every 2 hours    - Dangle patient 2 times a day  - Stand patient 2 times a day  - Ambulate patient 2 times a day  - Out of bed to chair 2 times a day   - Out of bed for meals 2 times a day  - Out of bed for toileting  - Record patient progress and toleration of activity level   Outcome: Progressing

## 2022-07-13 NOTE — PROGRESS NOTES
[facility]  Progress Note - Jeannette Fitzgerald 1940, [de-identified] y o  male MRN: [id number]  Unit/Bed#: -Lilly Encounter: [id number]  Primary Care Provider: Balbir Solano MD   Date and time admitted to hospital: 6/30/2022  3:28 PM    * Type 2 diabetes mellitus with mild nonproliferative diabetic retinopathy without macular edema, bilateral [facility])  Assessment & Plan  Lab Results   Component Value Date    HGBA1C 8 8 (H) 06/27/2022       Recent Labs     07/12/22  0040 07/12/22  0503 07/12/22  1048 07/12/22  1545   POCGLU 166* 141* 271* 176*       Blood Sugar Average: Last 72 hrs:  (P) 205 [id number]     · DKA has now resolved  · Currently patient is poor p o  Intake due to dysphagia  · Lantus to 5 units q h s  Jeremy Ambrosio · Patient evaluated by speech therapy and diet changed accordingly  · Continue Accu-Cheks monitoring q 4 hours  · Continue diabetic diet, sliding scale coverage  Dysphagia  Assessment & Plan  · Patient assessed by speech therapy today and recommend GI evaluation  · Patient had video barium swallow; did discuss with speech therapy  Continue with current diet  · Diet-puree and nectar thick liquid  · Maintain aspiration precaution  · Gastroenterology following-continue fluconazole and will need EGD as outpatient  · Patient had esophagram which showed no strictures or lesions    Hypernatremia  Assessment & Plan  · Resolved    Weakness  Assessment & Plan  · PT OT recommending rehab  · Patient amenable to post acute rehab    Physical deconditioning  Assessment & Plan  · PT OT recommendation noted for post acute rehab  · Cm to follow-up for placement  COVID-19  Assessment & Plan  · Patient saturating greater than 92% on exam  · Chest x-ray showing atelectasis  · Advised incentive spirometry  · No intervention indicated at this time  · Continue supportive care        Coronary atherosclerosis of native coronary artery  Assessment & Plan  · Continue aspirin/statin/beta-blocker    Ischemic cardiomyopathy  Assessment & Plan  · Ejection fraction 25% historically  · Repeat echocardiogram showing EF of 25%  · Continue home beta-blocker    Hypothyroidism  Assessment & Plan  · Continue home levothyroxine    BPH (benign prostatic hyperplasia)  Assessment & Plan  · Continue home finasteride and tamsulosin  · Currently patient has Mckeon catheter  · Recommended to remove Mckeon catheter and Attempt  voiding trial once ambulation improves at rehab  Hyperlipidemia  Assessment & Plan  · Continue statin equivalent while inaptient, resume rosuvastatin on discharge        VTE Pharmacologic Prophylaxis: VTE Score: 5 High Risk (Score >/= 5) - Pharmacological DVT Prophylaxis Ordered: enoxaparin (Lovenox)  Sequential Compression Devices Ordered  Patient Centered Rounds: I performed bedside rounds with nursing staff today  Discussions with Specialists or Other Care Team Provider:  Case management    Education and Discussions with Family / Patient: Updated  (daughter in law) via phone  Time Spent for Care: 30 minutes  More than 50% of total time spent on counseling and coordination of care as described above  Current Length of Stay: 13 day(s)  Current Patient Status: Inpatient   Certification Statement: Patient medically stable awaiting placement  Discharge Plan: Patient medically stable awaiting placement    Code Status: Level 1 - Full Code    Subjective:   Patient resting comfortably on examination  Patient had no overnight events or complaints on exam this morning  Objective:     Vitals:   Temp (24hrs), Av 7 °F (36 5 °C), Min:97 4 °F (36 3 °C), Max:98 °F (36 7 °C)    Temp:  [97 4 °F (36 3 °C)-98 °F (36 7 °C)] 97 6 °F (36 4 °C)  HR:  [] 102  Resp:  [17-18] 17  BP: (107-114)/(57-67) 107/58  SpO2:  [91 %-97 %] 97 %  Body mass index is 20 71 kg/m²  Input and Output Summary (last 24 hours):      Intake/Output Summary (Last 24 hours) at 7/13/2022 1430  Last data filed at 7/13/2022 0900  Gross per 24 hour   Intake 280 ml   Output 475 ml   Net -195 ml       Physical Exam:   Physical Exam  Vitals and nursing note reviewed  Constitutional:       General: He is not in acute distress  Appearance: He is well-developed  Comments: Chronically ill-appearing   HENT:      Head: Normocephalic and atraumatic  Mouth/Throat:      Comments: Poor dentition  Eyes:      General: No scleral icterus  Conjunctiva/sclera: Conjunctivae normal    Cardiovascular:      Rate and Rhythm: Normal rate and regular rhythm  Heart sounds: Normal heart sounds  No murmur heard  No friction rub  No gallop  Pulmonary:      Effort: Pulmonary effort is normal  No respiratory distress  Breath sounds: Normal breath sounds  No wheezing or rales  Abdominal:      General: Bowel sounds are normal  There is no distension  Palpations: Abdomen is soft  Tenderness: There is no abdominal tenderness  Musculoskeletal:         General: Normal range of motion  Skin:     General: Skin is warm  Findings: No rash  Neurological:      Mental Status: He is alert and oriented to person, place, and time            Additional Data:     Labs:  Results from last 7 days   Lab Units 07/12/22  0458   WBC Thousand/uL 5 87   HEMOGLOBIN g/dL 11 3*   HEMATOCRIT % 34 0*   PLATELETS Thousands/uL 310   NEUTROS PCT % 79*   LYMPHS PCT % 16   MONOS PCT % 3*   EOS PCT % 1     Results from last 7 days   Lab Units 07/12/22  0458   SODIUM mmol/L 143   POTASSIUM mmol/L 3 8   CHLORIDE mmol/L 108   CO2 mmol/L 26   BUN mg/dL 18   CREATININE mg/dL 0 85   ANION GAP mmol/L 9   CALCIUM mg/dL 8 0*   GLUCOSE RANDOM mg/dL 128         Results from last 7 days   Lab Units 07/13/22  1049 07/13/22  0554 07/13/22  0406 07/12/22  2350 07/12/22  2106 07/12/22  1545 07/12/22  1048 07/12/22  0503 07/12/22  0040 07/11/22  2104 07/11/22  1548 07/11/22  1202   POC GLUCOSE mg/dl 267* 131 134 146* 256* 176* 271* 141* 166* 186* 196* 243*               Lines/Drains:  Invasive Devices  Report    Peripheral Intravenous Line  Duration           Peripheral IV 07/10/22 Left;Ventral (anterior); Medial Forearm 3 days          Drain  Duration           Urethral Catheter Coude 12 days              Urinary Catheter:  Goal for removal: N/A- Discharging with Mckeon               Imaging: No pertinent imaging reviewed  Recent Cultures (last 7 days):         Last 24 Hours Medication List:   Current Facility-Administered Medications   Medication Dose Route Frequency Provider Last Rate    acetaminophen  650 mg Oral Q6H PRN Arcadio Yanez DO      aspirin  81 mg Oral Daily Nilsa Paulson PA-C      enoxaparin  40 mg Subcutaneous Q24H [address] Nilsa Paulson PA-C      finasteride  5 mg Oral Daily Nilsa Paulson PA-C      insulin glargine  5 Units Subcutaneous HS Nilsa Paulson PA-C      insulin lispro  1-5 Units Subcutaneous TID AC Nilsa Paulson PA-C      insulin lispro  1-5 Units Subcutaneous HS Serafin Forbes PA-C      levothyroxine  88 mcg Oral Daily Nilsa Paulson PA-C      metoprolol tartrate  25 mg Oral Q12H [address] Nilsa Paulson PA-C      pantoprazole  40 mg Intravenous Q12H Arcadio Yanez DO      potassium chloride  40 mEq Oral Once Serafin Forbes PA-C      pravastatin  80 mg Oral Daily With [employer]      senna-docusate sodium  1 tablet Oral HS Arcadio Yanez DO      sodium chloride (PF)  3 mL Intravenous Q1H PRN Serafin Forbes PA-C      tamsulosin  0 4 mg Oral Daily Serafin Forbes PA-C          Today, Patient Was Seen By: Arcadio Yanez DO    **Please Note: This note may have been constructed using a voice recognition system  **

## 2022-07-13 NOTE — PLAN OF CARE
Problem: Potential for Falls  Goal: Patient will remain free of falls  Description: INTERVENTIONS:  - Educate patient/family on patient safety including physical limitations  - Instruct patient to call for assistance with activity   - Consult OT/PT to assist with strengthening/mobility   - Keep Call bell within reach  - Keep bed low and locked with side rails adjusted as appropriate  - Keep care items and personal belongings within reach  - Initiate and maintain comfort rounds  - Make Fall Risk Sign visible to staff  - Offer Toileting every  Hours, in advance of need  - Initiate/Maintain alarm  - Obtain necessary fall risk management equipment:   - Apply yellow socks and bracelet for high fall risk patients  - Consider moving patient to room near nurses station  Outcome: Progressing     Problem: MOBILITY - ADULT  Goal: Maintain or return to baseline ADL function  Description: INTERVENTIONS:  -  Assess patient's ability to carry out ADLs; assess patient's baseline for ADL function and identify physical deficits which impact ability to perform ADLs (bathing, care of mouth/teeth, toileting, grooming, dressing, etc )  - Assess/evaluate cause of self-care deficits   - Assess range of motion  - Assess patient's mobility; develop plan if impaired  - Assess patient's need for assistive devices and provide as appropriate  - Encourage maximum independence but intervene and supervise when necessary  - Involve family in performance of ADLs  - Assess for home care needs following discharge   - Consider OT consult to assist with ADL evaluation and planning for discharge  - Provide patient education as appropriate  Outcome: Progressing  Goal: Maintains/Returns to pre admission functional level  Description: INTERVENTIONS:  - Perform BMAT or MOVE assessment daily    - Set and communicate daily mobility goal to care team and patient/family/caregiver     - Collaborate with rehabilitation services on mobility goals if consulted  - Perform Range of Motion  times a day  - Reposition patient every  hours    - Dangle patient  times a day  - Stand patient  times a day  - Ambulate patient  times a day  - Out of bed to chair  times a day   - Out of bed for meal times a day  - Out of bed for toileting  - Record patient progress and toleration of activity level   Outcome: Progressing     Problem: Prexisting or High Potential for Compromised Skin Integrity  Goal: Skin integrity is maintained or improved  Description: INTERVENTIONS:  - Identify patients at risk for skin breakdown  - Assess and monitor skin integrity  - Assess and monitor nutrition and hydration status  - Monitor labs   - Assess for incontinence   - Turn and reposition patient  - Assist with mobility/ambulation  - Relieve pressure over bony prominences  - Avoid friction and shearing  - Provide appropriate hygiene as needed including keeping skin clean and dry  - Evaluate need for skin moisturizer/barrier cream  - Collaborate with interdisciplinary team   - Patient/family teaching  - Consider wound care consult   Outcome: Progressing     Problem: PAIN - ADULT  Goal: Verbalizes/displays adequate comfort level or baseline comfort level  Description: Interventions:  - Encourage patient to monitor pain and request assistance  - Assess pain using appropriate pain scale  - Administer analgesics based on type and severity of pain and evaluate response  - Implement non-pharmacological measures as appropriate and evaluate response  - Consider cultural and social influences on pain and pain management  - Notify physician/advanced practitioner if interventions unsuccessful or patient reports new pain  Outcome: Progressing     Problem: INFECTION - ADULT  Goal: Absence or prevention of progression during hospitalization  Description: INTERVENTIONS:  - Assess and monitor for signs and symptoms of infection  - Monitor lab/diagnostic results  - Monitor all insertion sites, i e  indwelling lines, tubes, and drains  - Monitor endotracheal if appropriate and nasal secretions for changes in amount and color  - Idamay appropriate cooling/warming therapies per order  - Administer medications as ordered  - Instruct and encourage patient and family to use good hand hygiene technique  - Identify and instruct in appropriate isolation precautions for identified infection/condition  Outcome: Progressing  Goal: Absence of fever/infection during neutropenic period  Description: INTERVENTIONS:  - Monitor WBC    Outcome: Progressing     Problem: DISCHARGE PLANNING  Goal: Discharge to home or other facility with appropriate resources  Description: INTERVENTIONS:  - Identify barriers to discharge w/patient and caregiver  - Arrange for needed discharge resources and transportation as appropriate  - Identify discharge learning needs (meds, wound care, etc )  - Arrange for interpretive services to assist at discharge as needed  - Refer to Case Management Department for coordinating discharge planning if the patient needs post-hospital services based on physician/advanced practitioner order or complex needs related to functional status, cognitive ability, or social support system  Outcome: Progressing     Problem: Knowledge Deficit  Goal: Patient/family/caregiver demonstrates understanding of disease process, treatment plan, medications, and discharge instructions  Description: Complete learning assessment and assess knowledge base    Interventions:  - Provide teaching at level of understanding  - Provide teaching via preferred learning methods  Outcome: Progressing     Problem: RESPIRATORY - ADULT  Goal: Achieves optimal ventilation and oxygenation  Description: INTERVENTIONS:  - Assess for changes in respiratory status  - Assess for changes in mentation and behavior  - Position to facilitate oxygenation and minimize respiratory effort  - Oxygen administered by appropriate delivery if ordered  - Initiate smoking cessation education as indicated  - Encourage broncho-pulmonary hygiene including cough, deep breathe, Incentive Spirometry  - Assess the need for suctioning and aspirate as needed  - Assess and instruct to report SOB or any respiratory difficulty  - Respiratory Therapy support as indicated  Outcome: Progressing     Problem: METABOLIC, FLUID AND ELECTROLYTES - ADULT  Goal: Electrolytes maintained within normal limits  Description: INTERVENTIONS:  - Monitor labs and assess patient for signs and symptoms of electrolyte imbalances  - Administer electrolyte replacement as ordered  - Monitor response to electrolyte replacements, including repeat lab results as appropriate  - Instruct patient on fluid and nutrition as appropriate  Outcome: Progressing  Goal: Fluid balance maintained  Description: INTERVENTIONS:  - Monitor labs   - Monitor I/O and WT  - Instruct patient on fluid and nutrition as appropriate  - Assess for signs & symptoms of volume excess or deficit  Outcome: Progressing  Goal: Glucose maintained within target range  Description: INTERVENTIONS:  - Monitor Blood Glucose as ordered  - Assess for signs and symptoms of hyperglycemia and hypoglycemia  - Administer ordered medications to maintain glucose within target range  - Assess nutritional intake and initiate nutrition service referral as needed  Outcome: Progressing     Problem: Nutrition/Hydration-ADULT  Goal: Nutrient/Hydration intake appropriate for improving, restoring or maintaining nutritional needs  Description: Monitor and assess patient's nutrition/hydration status for malnutrition  Collaborate with interdisciplinary team and initiate plan and interventions as ordered  Monitor patient's weight and dietary intake as ordered or per policy  Utilize nutrition screening tool and intervene as necessary  Determine patient's food preferences and provide high-protein, high-caloric foods as appropriate       INTERVENTIONS:  - Monitor oral intake, urinary output, labs, and treatment plans  - Assess nutrition and hydration status and recommend course of action  - Evaluate amount of meals eaten  - Assist patient with eating if necessary   - Allow adequate time for meals  - Recommend/ encourage appropriate diets, oral nutritional supplements, and vitamin/mineral supplements  - Order, calculate, and assess calorie counts as needed  - Recommend, monitor, and adjust tube feedings based on assessed needs  - Assess need for intravenous fluids  - Provide specific nutrition/hydration education as appropriate  - Include patient/family/caregiver in decisions related to nutrition  Outcome: Progressing

## 2022-07-14 VITALS
WEIGHT: 126.32 LBS | DIASTOLIC BLOOD PRESSURE: 60 MMHG | SYSTOLIC BLOOD PRESSURE: 116 MMHG | BODY MASS INDEX: 20.3 KG/M2 | RESPIRATION RATE: 18 BRPM | OXYGEN SATURATION: 97 % | TEMPERATURE: 97.6 F | HEIGHT: 66 IN | HEART RATE: 80 BPM

## 2022-07-14 LAB
GLUCOSE SERPL-MCNC: 147 MG/DL (ref 65–140)
GLUCOSE SERPL-MCNC: 151 MG/DL (ref 65–140)
GLUCOSE SERPL-MCNC: 207 MG/DL (ref 65–140)
GLUCOSE SERPL-MCNC: 220 MG/DL (ref 65–140)

## 2022-07-14 PROCEDURE — 82948 REAGENT STRIP/BLOOD GLUCOSE: CPT

## 2022-07-14 PROCEDURE — 99239 HOSP IP/OBS DSCHRG MGMT >30: CPT | Performed by: PHYSICIAN ASSISTANT

## 2022-07-14 PROCEDURE — C9113 INJ PANTOPRAZOLE SODIUM, VIA: HCPCS | Performed by: INTERNAL MEDICINE

## 2022-07-14 PROCEDURE — 92526 ORAL FUNCTION THERAPY: CPT

## 2022-07-14 PROCEDURE — 97530 THERAPEUTIC ACTIVITIES: CPT

## 2022-07-14 RX ORDER — INSULIN LISPRO 100 [IU]/ML
5 INJECTION, SOLUTION SUBCUTANEOUS
Qty: 15 ML | Refills: 0 | Status: SHIPPED | OUTPATIENT
Start: 2022-07-14 | End: 2022-08-11 | Stop reason: ALTCHOICE

## 2022-07-14 RX ORDER — INSULIN GLARGINE 100 [IU]/ML
5 INJECTION, SOLUTION SUBCUTANEOUS
Qty: 10 ML | Refills: 0 | Status: SHIPPED | OUTPATIENT
Start: 2022-07-14

## 2022-07-14 RX ORDER — AMOXICILLIN 250 MG
1 CAPSULE ORAL
Qty: 30 TABLET | Refills: 0 | Status: SHIPPED | OUTPATIENT
Start: 2022-07-14

## 2022-07-14 RX ADMIN — LEVOTHYROXINE SODIUM 88 MCG: 88 TABLET ORAL at 05:18

## 2022-07-14 RX ADMIN — PANTOPRAZOLE SODIUM 40 MG: 40 INJECTION, POWDER, FOR SOLUTION INTRAVENOUS at 05:18

## 2022-07-14 RX ADMIN — ASPIRIN 81 MG 81 MG: 81 TABLET ORAL at 09:20

## 2022-07-14 RX ADMIN — METOPROLOL TARTRATE 25 MG: 25 TABLET, FILM COATED ORAL at 09:20

## 2022-07-14 RX ADMIN — FINASTERIDE 5 MG: 5 TABLET, FILM COATED ORAL at 09:20

## 2022-07-14 RX ADMIN — INSULIN LISPRO 2 UNITS: 100 INJECTION, SOLUTION INTRAVENOUS; SUBCUTANEOUS at 11:47

## 2022-07-14 RX ADMIN — TAMSULOSIN HYDROCHLORIDE 0.4 MG: 0.4 CAPSULE ORAL at 09:20

## 2022-07-14 RX ADMIN — ENOXAPARIN SODIUM 40 MG: 40 INJECTION SUBCUTANEOUS at 09:25

## 2022-07-14 NOTE — PLAN OF CARE
Problem: Potential for Falls  Goal: Patient will remain free of falls  Description: INTERVENTIONS:  - Educate patient/family on patient safety including physical limitations  - Instruct patient to call for assistance with activity   - Consult OT/PT to assist with strengthening/mobility   - Keep Call bell within reach  - Keep bed low and locked with side rails adjusted as appropriate  - Keep care items and personal belongings within reach  - Initiate and maintain comfort rounds  - Make Fall Risk Sign visible to staff  - Apply yellow socks and bracelet for high fall risk patients  - Consider moving patient to room near nurses station  Outcome: Progressing     Problem: MOBILITY - ADULT  Goal: Maintain or return to baseline ADL function  Description: INTERVENTIONS:  -  Assess patient's ability to carry out ADLs; assess patient's baseline for ADL function and identify physical deficits which impact ability to perform ADLs (bathing, care of mouth/teeth, toileting, grooming, dressing, etc )  - Assess/evaluate cause of self-care deficits   - Assess range of motion  - Assess patient's mobility; develop plan if impaired  - Assess patient's need for assistive devices and provide as appropriate  - Encourage maximum independence but intervene and supervise when necessary  - Involve family in performance of ADLs  - Assess for home care needs following discharge   - Consider OT consult to assist with ADL evaluation and planning for discharge  - Provide patient education as appropriate  Outcome: Progressing  Goal: Maintains/Returns to pre admission functional level  Description: INTERVENTIONS:  - Perform BMAT or MOVE assessment daily    - Set and communicate daily mobility goal to care team and patient/family/caregiver  - Collaborate with rehabilitation services on mobility goals if consulted  - Perform Range of Motion 2 times a day  - Reposition patient every 2 hours    - Dangle patient   - Stand patient   - Ambulate patient - Out of bed to chair   - Out of bed for meals   - Out of bed for toileting  - Record patient progress and toleration of activity level   Outcome: Progressing     Problem: Prexisting or High Potential for Compromised Skin Integrity  Goal: Skin integrity is maintained or improved  Description: INTERVENTIONS:  - Identify patients at risk for skin breakdown  - Assess and monitor skin integrity  - Assess and monitor nutrition and hydration status  - Monitor labs   - Assess for incontinence   - Turn and reposition patient  - Assist with mobility/ambulation  - Relieve pressure over bony prominences  - Avoid friction and shearing  - Provide appropriate hygiene as needed including keeping skin clean and dry  - Evaluate need for skin moisturizer/barrier cream  - Collaborate with interdisciplinary team   - Patient/family teaching  - Consider wound care consult   Outcome: Progressing     Problem: PAIN - ADULT  Goal: Verbalizes/displays adequate comfort level or baseline comfort level  Description: Interventions:  - Encourage patient to monitor pain and request assistance  - Assess pain using appropriate pain scale  - Administer analgesics based on type and severity of pain and evaluate response  - Implement non-pharmacological measures as appropriate and evaluate response  - Consider cultural and social influences on pain and pain management  - Notify physician/advanced practitioner if interventions unsuccessful or patient reports new pain  Outcome: Progressing     Problem: INFECTION - ADULT  Goal: Absence or prevention of progression during hospitalization  Description: INTERVENTIONS:  - Assess and monitor for signs and symptoms of infection  - Monitor lab/diagnostic results  - Monitor all insertion sites, i e  indwelling lines, tubes, and drains  - Monitor endotracheal if appropriate and nasal secretions for changes in amount and color  - [place] appropriate cooling/warming therapies per order  - Administer medications as ordered  - Instruct and encourage patient and family to use good hand hygiene technique  - Identify and instruct in appropriate isolation precautions for identified infection/condition  Outcome: Progressing  Goal: Absence of fever/infection during neutropenic period  Description: INTERVENTIONS:  - Monitor WBC    Outcome: Progressing     Problem: DISCHARGE PLANNING  Goal: Discharge to home or other facility with appropriate resources  Description: INTERVENTIONS:  - Identify barriers to discharge w/patient and caregiver  - Arrange for needed discharge resources and transportation as appropriate  - Identify discharge learning needs (meds, wound care, etc )  - Arrange for interpretive services to assist at discharge as needed  - Refer to Case Management Department for coordinating discharge planning if the patient needs post-hospital services based on physician/advanced practitioner order or complex needs related to functional status, cognitive ability, or social support system  Outcome: Progressing     Problem: Knowledge Deficit  Goal: Patient/family/caregiver demonstrates understanding of disease process, treatment plan, medications, and discharge instructions  Description: Complete learning assessment and assess knowledge base    Interventions:  - Provide teaching at level of understanding  - Provide teaching via preferred learning methods  Outcome: Progressing     Problem: RESPIRATORY - ADULT  Goal: Achieves optimal ventilation and oxygenation  Description: INTERVENTIONS:  - Assess for changes in respiratory status  - Assess for changes in mentation and behavior  - Position to facilitate oxygenation and minimize respiratory effort  - Oxygen administered by appropriate delivery if ordered  - Initiate smoking cessation education as indicated  - Encourage broncho-pulmonary hygiene including cough, deep breathe, Incentive Spirometry  - Assess the need for suctioning and aspirate as needed  - Assess and instruct to report SOB or any respiratory difficulty  - Respiratory Therapy support as indicated  Outcome: Progressing     Problem: METABOLIC, FLUID AND ELECTROLYTES - ADULT  Goal: Electrolytes maintained within normal limits  Description: INTERVENTIONS:  - Monitor labs and assess patient for signs and symptoms of electrolyte imbalances  - Administer electrolyte replacement as ordered  - Monitor response to electrolyte replacements, including repeat lab results as appropriate  - Instruct patient on fluid and nutrition as appropriate  Outcome: Progressing  Goal: Fluid balance maintained  Description: INTERVENTIONS:  - Monitor labs   - Monitor I/O and WT  - Instruct patient on fluid and nutrition as appropriate  - Assess for signs & symptoms of volume excess or deficit  Outcome: Progressing  Goal: Glucose maintained within target range  Description: INTERVENTIONS:  - Monitor Blood Glucose as ordered  - Assess for signs and symptoms of hyperglycemia and hypoglycemia  - Administer ordered medications to maintain glucose within target range  - Assess nutritional intake and initiate nutrition service referral as needed  Outcome: Progressing     Problem: Nutrition/Hydration-ADULT  Goal: Nutrient/Hydration intake appropriate for improving, restoring or maintaining nutritional needs  Description: Monitor and assess patient's nutrition/hydration status for malnutrition  Collaborate with interdisciplinary team and initiate plan and interventions as ordered  Monitor patient's weight and dietary intake as ordered or per policy  Utilize nutrition screening tool and intervene as necessary  Determine patient's food preferences and provide high-protein, high-caloric foods as appropriate       INTERVENTIONS:  - Monitor oral intake, urinary output, labs, and treatment plans  - Assess nutrition and hydration status and recommend course of action  - Evaluate amount of meals eaten  - Assist patient with eating if necessary   - Allow adequate time for meals  - Recommend/ encourage appropriate diets, oral nutritional supplements, and vitamin/mineral supplements  - Order, calculate, and assess calorie counts as needed  - Recommend, monitor, and adjust tube feedings based on assessed needs  - Assess need for intravenous fluids  - Provide specific nutrition/hydration education as appropriate  - Include patient/family/caregiver in decisions related to nutrition  Outcome: Progressing

## 2022-07-14 NOTE — SPEECH THERAPY NOTE
Speech Language/Pathology     Speech/Language Pathology Progress Note     Patient Name: Dell Ramirez    Today's Date: 7/14/2022     Problem List  Principal Problem:    Type 2 diabetes mellitus with mild nonproliferative diabetic retinopathy without macular edema, bilateral (HCC)  Active Problems:    Hyperlipidemia    BPH (benign prostatic hyperplasia)    Hypothyroidism    Ischemic cardiomyopathy    Coronary atherosclerosis of native coronary artery    COVID-19    Physical deconditioning    Dysphagia    Hypernatremia    Weakness       Subjective:  Patient received awake and alert, self feeding breakfast <45' hob elevation  Agreeable to SLP assist for re-positioning   "This is better"    Previous/current diet: puree/nectar thick    Objective:  Pt provided with safe feeding education  Strategies as per VBS relayed for reinforcement  Self-feeds puree solids by small bite, alternating with small sips of nectar thick liquid  Minimal verbal cues required  Delayed cough x1  No overt s/s of aspiration or distress otherwise  Assessment:  Patient appears to be managing on present diet when following strict aspiration precautions and adherence to mealtime strategies; remains at high risk for aspiration  Recommend ongoing SLP follow-up at next level of care  Plan: Will continue follow as indicated         Tommy Orourke [address], [address] [facility], [place], operated by [facility]  Speech-Language Pathologist  [place] #[id number]  [place] #[id number]

## 2022-07-14 NOTE — ASSESSMENT & PLAN NOTE
· ST consult appreciated; Diet-puree and nectar thick liquid  · Maintain aspiration precaution    · Gastroenterology following; continue fluconazole and will need EGD as outpatient  · Patient had esophagram which showed no strictures or lesions

## 2022-07-14 NOTE — ASSESSMENT & PLAN NOTE
· Ejection fraction 25% historically  · Repeat echocardiogram showing EF of 25%  · Sub home toprol for lopressor

## 2022-07-14 NOTE — CASE MANAGEMENT
Case Management Discharge Planning Note    Patient name Nery Harley  Location [address] [id number] MRN [id number]  : 1940 Date 2022       Current Admission Date: 2022  Current Admission Diagnosis:Type 2 diabetes mellitus with mild nonproliferative diabetic retinopathy without macular edema, bilateral [facility])   Patient Active Problem List    Diagnosis Date Noted    Dysphagia 2022    Physical deconditioning 2022    H/o COVID-19 2022    Type 2 diabetes mellitus with mild nonproliferative diabetic retinopathy without macular edema, bilateral ([facility] ) 10/14/2021    Chronic obstructive pulmonary disease ([facility] ) 2021    Prostate cancer screening 2020    Urinary retention 2019    Coronary atherosclerosis of native coronary artery 2019    Ischemic cardiomyopathy 2018    Foot pain, bilateral 10/08/2018    BPH (benign prostatic hyperplasia) 2018    Anemia 02/10/2018      LOS (days): 14  Geometric Mean LOS (GMLOS) (days): 4 60  Days to GMLOS:-9 2     OBJECTIVE:  Risk of Unplanned Readmission Score: 17 83         Current admission status: Inpatient   Preferred Pharmacy:   [address], [facility] [address] [place] [address]  Phone: [phone] Fax: [phone]    Primary Care Provider: Dominga Farmer MD    Primary Insurance: [facility]  Secondary Insurance:     DISCHARGE DETAILS:    Discharge planning discussed with[de-identified] Patient's DIL  Freedom of Choice: Yes  Comments - Freedom of Choice: CM received phone call from patient's DIL reporting that she and patient's son can transport at 2:15 today  CM confirmed that this was okay with SLIM, and SLIM agreeable with transport    CM contacted family/caregiver?: Yes  Were Treatment Team discharge recommendations reviewed with patient/caregiver?: Yes  Did patient/caregiver verbalize understanding of patient care needs?: Yes  Were patient/caregiver advised of the risks associated with not following Treatment Team discharge recommendations?: Yes    Contacts  Patient Contacts: Letty Byrne  Relationship to Patient[de-identified] Family  Contact Method: Phone  Phone Number: [phone] (V)  Reason/Outcome: Continuity of Care, Discharge Planning    Other Referral/Resources/Interventions Provided:  Interventions: Short Term Rehab  Referral Comments: CM completed the PASRR and faxed to [facility] at [phone]  CM informed Lisseth Haq from [facility] of the transport time via ecin  CM informed SLIM and RN of the transport time via TT      Treatment Team Recommendation: Short Term Rehab  Discharge Destination Plan[de-identified] Short Term Rehab  Transport at Discharge : Family  Dispatcher Contacted: No    ETA of Transport (Date): 07/14/22  ETA of Transport (Time): [phone]

## 2022-07-14 NOTE — ASSESSMENT & PLAN NOTE
Lab Results   Component Value Date    HGBA1C 8 8 (H) 06/27/2022       Recent Labs     07/13/22  2115 07/14/22  0004 07/14/22  0404 07/14/22  0609   POCGLU 186* 151* 207* 147*       Blood Sugar Average: Last 72 hrs:  (P) 185 [id number]     · Currently patient is poor p o  Intake due to dysphagia    · Lantus to 5 units q h s , Lispro 5 u ac

## 2022-07-14 NOTE — PHYSICAL THERAPY NOTE
PHYSICAL THERAPY TREATMENT  NAME:  Nery Harley  DATE: 07/14/22    AGE:   [de-identified] y o  Mrn:   [id number]  ADMIT DX:  SOB (shortness of breath) [R06 02]  DKA (diabetic ketoacidosis) (HCC) [E11 10]  Acute kidney injury ([facility] ) [N17 9]  COVID-19 [U07 1]  Problem List:   Patient Active Problem List   Diagnosis    Anemia    BPH (benign prostatic hyperplasia)    Foot pain, bilateral    Ischemic cardiomyopathy    Coronary atherosclerosis of native coronary artery    Urinary retention    Prostate cancer screening    Chronic obstructive pulmonary disease (HCC)    Type 2 diabetes mellitus with mild nonproliferative diabetic retinopathy without macular edema, bilateral (HCC)    H/o COVID-19    Physical deconditioning    Dysphagia       Past Medical History  Past Medical History:   Diagnosis Date    Acquired cyst of kidney     Anemia     Benign paroxysmal vertigo, unspecified ear     Cellulitis of leg     Cervical spinal stenosis     Chest pain     Coronary atherosclerosis of native coronary artery     Enlarged prostate     Esophageal candidiasis (HCC)     Hematuria     Herpes zoster     Hyperlipidemia     Hypertension     Incomplete emptying of bladder     Inflamed seborrheic keratosis     Internal hemorrhoids     Malignant neoplasm of skin of trunk     Myocardial infarction ([facility] )     Nonmelanoma skin cancer     LAST ASSESSED: 8/9/17    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 infarction     Paroxysmal tachycardia (HCC)     Shortness of breath     Vitamin B deficiency        Past Surgical History  Past Surgical History:   Procedure Laterality Date    CARDIAC DEFIBRILLATOR PLACEMENT      COLONOSCOPY  10/07/2008    FIBEROPTIC SCREENING; NO FURTHER RECOMMENDATIONS    CORONARY ANGIOPLASTY WITH STENT PLACEMENT      CORONARY ANGIOPLASTY WITH STENT PLACEMENT      CYSTOSCOPY  11/06/2015    DIAGNOSTIC; MANAGED BY: Génesis Zaman    EGD AND COLONOSCOPY N/A 2/12/2018    Procedure: EGD AND COLONOSCOPY;  Surgeon: Boone Tomlinson MD;  Location: [facility];   Service: Gastroenterology    FL INJECTION RIGHT SHOULDER (ARTHROGRAM)  1/4/2022    HIP ARTHROPLASTY Right     INSERT / REPLACE / REMOVE PACEMAKER      JOINT REPLACEMENT Right     TRUNK SKIN LESION EXCISIONAL BIOPSY  08/22/2007    MALIGNANT; [address] CHEST       Length Of Stay: 14  Performed at least 2 patient identifiers during session: Name and Birthday       07/14/22 1045   PT Last Visit   PT Visit Date 07/14/22   Note Type   Note Type Treatment  NICK Copeland confirmed pt appropriate for PT session   Pain Assessment   Pain Assessment Tool 0-10   Pain Score No Pain   Restrictions/Precautions   Weight Bearing Precautions Per Order No   Other Precautions Fall Risk;Cognitive; Bed Alarm;O2   General   Chart Reviewed Yes   Response to Previous Treatment Patient with no complaints from previous session  Family/Caregiver Present No   Cognition   Overall Cognitive Status Impaired   Arousal/Participation Alert; Responsive   Attention Attends with cues to redirect   Orientation Level Oriented X4   Memory Decreased recall of precautions;Decreased recall of recent events   Following Commands Follows one step commands with increased time or repetition   Comments Pt agreed to PT session   Subjective   Subjective "I feel a little dizzy now" after sitting EOB   Bed Mobility   Supine to Sit 3  Moderate assistance   Additional items Assist x 1;HOB elevated; Bedrails; Increased time required;Verbal cues   Sit to Supine 3  Moderate assistance   Additional items Assist x 1;HOB elevated; Bedrails; Increased time required;Verbal cues   Additional Comments Vitals at start of session at rest Hr: 82bpm, SPo2: 95% on RA, BP: 115/58mmHg  BP post sit EOB 97/56mmHg  BP post stand 85/52mmHg, BP post return to supine 116/60mmHg   Transfers   Sit to Stand 3  Moderate assistance   Additional items Assist x 1;Verbal cues; Increased time required   Stand to Sit 3  Moderate assistance  (3 small side steps to [address] [address] with rolling walker to position better in bed) Additional items Assist x 1; Increased time required;Verbal cues   Additional Comments RW used during STS x1 from EOB - did not complete pivot transfer to chair dur to hypotension and dizziness upon standing  Pt positioned to [address] and returned to supine - dizziness subsided "I feel back to normal"   Balance   Static Sitting Fair -   Dynamic Sitting Fair -   Static Standing Fair -   Endurance Deficit   Endurance Deficit Yes   Endurance Deficit Description limited by hypotension and reports of dizziness   Activity Tolerance   Activity Tolerance Patient limited by fatigue; Other (Comment)  (dizziness)   Nurse Made Aware RN made aware of BP and session outcomes   Assessment   Prognosis Good   Problem List Decreased strength;Decreased endurance; Impaired balance;Decreased mobility; Impaired judgement;Decreased cognition;Decreased safety awareness   Assessment Pt seen for PT treatment session this date, consisting of ther act focused on transfer training and education  Since previous session, pt has made fair progress in terms of activity tolerance and assist required for mobility  This date, activity tolerance limited to hypotension post transitional movements to EOB and standing at EOB with BP supine 115/58, seated 97/56, standing 85/52 with pt reports of dizziness  Patient immediately returned to supine with BP returning to 116/60 and reports of dizziness subsiding "I feel back to normal " Patient required modA for sup <> sit and STS with mod verbal cues for safe UE placement on walker  Based on BP and symptoms, no further out of bed mobility attempted at this time due to safety concerns  Pertinent barriers during this session include dizziness, hypotension  Current goals and POC remain appropriate, pt continues to have rehab potential and is making progress towards STGs   Pt prognosis for achieving goals is good, pending pt progress with hospitalization/medical status improvements, and indicated by Stimulability, orientation, ability to follow directions, medical stability, motivation and awareness  Pt limited d/t inability to concentrate under maximum structure, fear of falling, medical instability and dizziness  PT recommends post acute rehabilitation services upon discharge  Pt continues to be functioning below baseline level, and remains limited 2* factors listed above  PT will continue to see pt during current hospitalization in order to address the deficits listed above and provide interventions consistent w/ POC in effort to achieve STGs  Barriers to Discharge Inaccessible home environment   Goals   Patient Goals "I would like to walk today"   Short Term Goal #1 Pts goals remain appropriate  Continue with plan of care   PT Treatment Day 3   Plan   Treatment/Interventions Functional transfer training;LE strengthening/ROM; Therapeutic exercise; Endurance training;Patient/family training;Gait training;Bed mobility;Spoke to nursing   Progress Slow progress, medical status limitations   PT Frequency 3-5x/wk   Recommendation   PT Discharge Recommendation Post acute rehabilitation services   Equipment Recommended Walker   AM-PAC Basic Mobility Inpatient   Turning in Bed Without Bedrails 2   Lying on Back to Sitting on Edge of Flat Bed 2   Moving Bed to Chair 3   Standing Up From Chair 2   Walk in Room 3   Climb 3-5 Stairs 2   Basic Mobility Inpatient Raw Score 14   Basic Mobility Standardized Score 35 55   Highest Level Of Mobility   JH-HLM Goal 4: Move to chair/commode   JH-HLM Achieved 3: Sit at edge of bed   End of Consult   Patient Position at End of Consult All needs within reach;Bed/Chair alarm activated;Supine  (HOB elevated)       Time In: 1043  Time Out: 1106  Total Treatment Minutes: 23    Erlinda Cartwright, PT

## 2022-07-14 NOTE — PLAN OF CARE
Problem: PHYSICAL THERAPY ADULT  Goal: Performs mobility at highest level of function for planned discharge setting  See evaluation for individualized goals  Description: Treatment/Interventions: Functional transfer training, LE strengthening/ROM, Elevations, Therapeutic exercise, Endurance training, Patient/family training, Bed mobility, Gait training, Spoke to nursing, OT          See flowsheet documentation for full assessment, interventions and recommendations  Outcome: Progressing  Note: Prognosis: Good  Problem List: Decreased strength, Decreased endurance, Impaired balance, Decreased mobility, Impaired judgement, Decreased cognition, Decreased safety awareness  Assessment: Pt seen for PT treatment session this date, consisting of ther act focused on transfer training and education  Since previous session, pt has made fair progress in terms of activity tolerance and assist required for mobility  This date, activity tolerance limited to hypotension post transitional movements to EOB and standing at EOB with BP supine 115/58, seated 97/56, standing 85/52 with pt reports of dizziness  Patient immediately returned to supine with BP returning to 116/60 and reports of dizziness subsiding "I feel back to normal " Patient required modA for sup <> sit and STS with mod verbal cues for safe UE placement on walker  Based on BP and symptoms, no further out of bed mobility attempted at this time due to safety concerns  Pertinent barriers during this session include dizziness, hypotension  Current goals and POC remain appropriate, pt continues to have rehab potential and is making progress towards STGs  Pt prognosis for achieving goals is good, pending pt progress with hospitalization/medical status improvements, and indicated by Stimulability, orientation, ability to follow directions, medical stability, motivation and awareness   Pt limited d/t inability to concentrate under maximum structure, fear of falling, medical instability and dizziness  PT recommends post acute rehabilitation services upon discharge  Pt continues to be functioning below baseline level, and remains limited 2* factors listed above  PT will continue to see pt during current hospitalization in order to address the deficits listed above and provide interventions consistent w/ POC in effort to achieve STGs  Barriers to Discharge: Inaccessible home environment     PT Discharge Recommendation: Post acute rehabilitation services    See flowsheet documentation for full assessment     Betsy Rodriguez, PT

## 2022-07-14 NOTE — QUICK NOTE
Attempted x2 to call [address] [place] [address] to give report  Both attempts were unsuccessful of reaching anyone

## 2022-07-14 NOTE — PLAN OF CARE
Problem: Potential for Falls  Goal: Patient will remain free of falls  Description: INTERVENTIONS:  - Educate patient/family on patient safety including physical limitations  - Instruct patient to call for assistance with activity   - Consult OT/PT to assist with strengthening/mobility   - Keep Call bell within reach  - Keep bed low and locked with side rails adjusted as appropriate  - Keep care items and personal belongings within reach  - Initiate and maintain comfort rounds  - Make Fall Risk Sign visible to staff  - Offer Toileting every  Hours, in advance of need  - Initiate/Maintain alarm  - Obtain necessary fall risk management equipment:   - Apply yellow socks and bracelet for high fall risk patients  - Consider moving patient to room near nurses station  Outcome: Adequate for Discharge     Problem: MOBILITY - ADULT  Goal: Maintain or return to baseline ADL function  Description: INTERVENTIONS:  -  Assess patient's ability to carry out ADLs; assess patient's baseline for ADL function and identify physical deficits which impact ability to perform ADLs (bathing, care of mouth/teeth, toileting, grooming, dressing, etc )  - Assess/evaluate cause of self-care deficits   - Assess range of motion  - Assess patient's mobility; develop plan if impaired  - Assess patient's need for assistive devices and provide as appropriate  - Encourage maximum independence but intervene and supervise when necessary  - Involve family in performance of ADLs  - Assess for home care needs following discharge   - Consider OT consult to assist with ADL evaluation and planning for discharge  - Provide patient education as appropriate  Outcome: Adequate for Discharge  Goal: Maintains/Returns to pre admission functional level  Description: INTERVENTIONS:  - Perform BMAT or MOVE assessment daily    - Set and communicate daily mobility goal to care team and patient/family/caregiver     - Collaborate with rehabilitation services on mobility goals if consulted  - Perform Range of Motion  times a day  - Reposition patient every  hours    - Dangle patient  times a day  - Stand patient times a day  - Ambulate patient  times a day  - Out of bed to chair  times a day   - Out of bed for meal times a day  - Out of bed for toileting  - Record patient progress and toleration of activity level   Outcome: Adequate for Discharge     Problem: Prexisting or High Potential for Compromised Skin Integrity  Goal: Skin integrity is maintained or improved  Description: INTERVENTIONS:  - Identify patients at risk for skin breakdown  - Assess and monitor skin integrity  - Assess and monitor nutrition and hydration status  - Monitor labs   - Assess for incontinence   - Turn and reposition patient  - Assist with mobility/ambulation  - Relieve pressure over bony prominences  - Avoid friction and shearing  - Provide appropriate hygiene as needed including keeping skin clean and dry  - Evaluate need for skin moisturizer/barrier cream  - Collaborate with interdisciplinary team   - Patient/family teaching  - Consider wound care consult   Outcome: Adequate for Discharge     Problem: PAIN - ADULT  Goal: Verbalizes/displays adequate comfort level or baseline comfort level  Description: Interventions:  - Encourage patient to monitor pain and request assistance  - Assess pain using appropriate pain scale  - Administer analgesics based on type and severity of pain and evaluate response  - Implement non-pharmacological measures as appropriate and evaluate response  - Consider cultural and social influences on pain and pain management  - Notify physician/advanced practitioner if interventions unsuccessful or patient reports new pain  Outcome: Adequate for Discharge     Problem: INFECTION - ADULT  Goal: Absence or prevention of progression during hospitalization  Description: INTERVENTIONS:  - Assess and monitor for signs and symptoms of infection  - Monitor lab/diagnostic results  - Monitor all insertion sites, i e  indwelling lines, tubes, and drains  - Monitor endotracheal if appropriate and nasal secretions for changes in amount and color  - [place] appropriate cooling/warming therapies per order  - Administer medications as ordered  - Instruct and encourage patient and family to use good hand hygiene technique  - Identify and instruct in appropriate isolation precautions for identified infection/condition  Outcome: Adequate for Discharge  Goal: Absence of fever/infection during neutropenic period  Description: INTERVENTIONS:  - Monitor WBC    Outcome: Adequate for Discharge     Problem: DISCHARGE PLANNING  Goal: Discharge to home or other facility with appropriate resources  Description: INTERVENTIONS:  - Identify barriers to discharge w/patient and caregiver  - Arrange for needed discharge resources and transportation as appropriate  - Identify discharge learning needs (meds, wound care, etc )  - Arrange for interpretive services to assist at discharge as needed  - Refer to Case Management Department for coordinating discharge planning if the patient needs post-hospital services based on physician/advanced practitioner order or complex needs related to functional status, cognitive ability, or social support system  Outcome: Adequate for Discharge     Problem: Knowledge Deficit  Goal: Patient/family/caregiver demonstrates understanding of disease process, treatment plan, medications, and discharge instructions  Description: Complete learning assessment and assess knowledge base    Interventions:  - Provide teaching at level of understanding  - Provide teaching via preferred learning methods  Outcome: Adequate for Discharge     Problem: RESPIRATORY - ADULT  Goal: Achieves optimal ventilation and oxygenation  Description: INTERVENTIONS:  - Assess for changes in respiratory status  - Assess for changes in mentation and behavior  - Position to facilitate oxygenation and minimize respiratory effort  - Oxygen administered by appropriate delivery if ordered  - Initiate smoking cessation education as indicated  - Encourage broncho-pulmonary hygiene including cough, deep breathe, Incentive Spirometry  - Assess the need for suctioning and aspirate as needed  - Assess and instruct to report SOB or any respiratory difficulty  - Respiratory Therapy support as indicated  Outcome: Adequate for Discharge     Problem: METABOLIC, FLUID AND ELECTROLYTES - ADULT  Goal: Electrolytes maintained within normal limits  Description: INTERVENTIONS:  - Monitor labs and assess patient for signs and symptoms of electrolyte imbalances  - Administer electrolyte replacement as ordered  - Monitor response to electrolyte replacements, including repeat lab results as appropriate  - Instruct patient on fluid and nutrition as appropriate  Outcome: Adequate for Discharge  Goal: Fluid balance maintained  Description: INTERVENTIONS:  - Monitor labs   - Monitor I/O and WT  - Instruct patient on fluid and nutrition as appropriate  - Assess for signs & symptoms of volume excess or deficit  Outcome: Adequate for Discharge  Goal: Glucose maintained within target range  Description: INTERVENTIONS:  - Monitor Blood Glucose as ordered  - Assess for signs and symptoms of hyperglycemia and hypoglycemia  - Administer ordered medications to maintain glucose within target range  - Assess nutritional intake and initiate nutrition service referral as needed  Outcome: Adequate for Discharge     Problem: Nutrition/Hydration-ADULT  Goal: Nutrient/Hydration intake appropriate for improving, restoring or maintaining nutritional needs  Description: Monitor and assess patient's nutrition/hydration status for malnutrition  Collaborate with interdisciplinary team and initiate plan and interventions as ordered  Monitor patient's weight and dietary intake as ordered or per policy  Utilize nutrition screening tool and intervene as necessary   Determine patient's food preferences and provide high-protein, high-caloric foods as appropriate       INTERVENTIONS:  - Monitor oral intake, urinary output, labs, and treatment plans  - Assess nutrition and hydration status and recommend course of action  - Evaluate amount of meals eaten  - Assist patient with eating if necessary   - Allow adequate time for meals  - Recommend/ encourage appropriate diets, oral nutritional supplements, and vitamin/mineral supplements  - Order, calculate, and assess calorie counts as needed  - Recommend, monitor, and adjust tube feedings based on assessed needs  - Assess need for intravenous fluids  - Provide specific nutrition/hydration education as appropriate  - Include patient/family/caregiver in decisions related to nutrition  Outcome: Adequate for Discharge

## 2022-07-14 NOTE — DISCHARGE SUMMARY
[address] [facility]  Discharge- Josy Mosqueda 1940, [de-identified] y o  male MRN: [id number]  Unit/Bed#: -01 Encounter: [id number]  Primary Care Provider: Kyaw Celestin MD   Date and time admitted to hospital: 6/30/2022  3:28 PM    * Type 2 diabetes mellitus with mild nonproliferative diabetic retinopathy without macular edema, bilateral [facility])  Assessment & Plan  Lab Results   Component Value Date    HGBA1C 8 8 (H) 06/27/2022       Recent Labs     07/13/22  2115 07/14/22  0004 07/14/22  0404 07/14/22  0609   POCGLU 186* 151* 207* 147*       Blood Sugar Average: Last 72 hrs:  (P) 185 [id number]     · Currently patient is poor p o  Intake due to dysphagia  · Lantus to 5 units q h s , Lispro 5 u ac    Dysphagia  Assessment & Plan  · ST consult appreciated; Diet-puree and nectar thick liquid  · Maintain aspiration precaution  · Gastroenterology following; continue fluconazole and will need EGD as outpatient  · Patient had esophagram which showed no strictures or lesions    Physical deconditioning  Assessment & Plan  · PT OT recommendation noted for post acute rehab  H/o COVID-19  Assessment & Plan  · No O2 requirements  · Outside isolation window  · Incentive spirometer      Coronary atherosclerosis of native coronary artery  Assessment & Plan  · Continue aspirin/statin/beta-blocker    Ischemic cardiomyopathy  Assessment & Plan  · Ejection fraction 25% historically  · Repeat echocardiogram showing EF of 25%  · Sub home toprol for lopressor    BPH (benign prostatic hyperplasia)  Assessment & Plan  · Continue home finasteride and tamsulosin  · Currently patient has Mckeon catheter  · Recommended to remove Mckeon catheter and Attempt  voiding trial once ambulation improves at rehab        Medical Problems             Resolved Problems  Date Reviewed: 7/4/2022   None               Discharging Physician / Practitioner: Milena Salazar PA-C  PCP: Kyaw Celestin MD  Admission Date:   Admission Orders (From admission, onward)     Ordered        06/30/22 1750  Inpatient Admission  Once                      Discharge Date: 07/14/22    Consultations During Hospital Stay:  · Gi, pt, ot, st, cm    Procedures Performed:   · none    Significant Findings / Test Results:   FL barium swallow video w speech   Final Result by KIT VIVEROS (07/11 1131)      XR chest portable   Final Result by Josh Burks MD (07/11 1236)      Patchy airspace disease at the right lung base compatible with atelectasis or infiltrate  The examination demonstrates a significant  finding and was documented as such in [facility] for liaison and referring practitioner notification  Workstation performed: [id number]         FL barium swallow ROUTINE esophagus   Final Result by Rich Wallace MD (07/07 1426)      No obstructing or constricting lesions seen  Markedly limited study due to markedly impaired mobility of the patient,   Pooling of contrast into the vallecula with some coughing during the evaluation, consider correlation with the speech pathology evaluation for assessing aspiration risk      Workstation performed: [id number]         XR chest portable   Final Result by Chad Cartagena MD (07/05 1401)      No acute cardiopulmonary disease  Workstation performed: [id number]         XR chest 2 views   ED Interpretation by Lisette George DO (06/30 1626)   Unchanged from prior      Final Result by Paco Zhu MD (07/01 3724)      No active pulmonary disease  Workstation performed: [id number]         CT abdomen pelvis wo contrast   Final Result by Rock Duc MD (06/30 1631)      No acute abdominopelvic pathology                 Workstation performed: [id number]           Results Reviewed     Procedure Component Value Units Date/Time    Comprehensive metabolic panel [[id number]]  (Abnormal) Collected: 07/01/22 0868    Lab Status: Final result Specimen: Blood from Arm, Left Updated: 07/01/22 0557     Sodium 148 mmol/L      Potassium 3 9 mmol/L      Chloride 113 mmol/L      CO2 19 mmol/L      ANION GAP 16 mmol/L      BUN 29 mg/dL      Creatinine 1 14 mg/dL      Glucose 136 mg/dL      Calcium 7 8 mg/dL      Corrected Calcium 8 9 mg/dL      AST 20 U/L      ALT 24 U/L      Alkaline Phosphatase 24 U/L      Total Protein 5 6 g/dL      Albumin 2 6 g/dL      Total Bilirubin 1 15 mg/dL      eGFR 60 ml/min/1 73sq m     Narrative:      National Kidney Disease Foundation guidelines for Chronic Kidney Disease (CKD):     Stage 1 with normal or high GFR (GFR > 90 mL/min/1 73 square meters)    Stage 2 Mild CKD (GFR = 60-89 mL/min/1 73 square meters)    Stage 3A Moderate CKD (GFR = 45-59 mL/min/1 73 square meters)    Stage 3B Moderate CKD (GFR = 30-44 mL/min/1 73 square meters)    Stage 4 Severe CKD (GFR = 15-29 mL/min/1 73 square meters)    Stage 5 End Stage CKD (GFR <15 mL/min/1 73 square meters)  Note: GFR calculation is accurate only with a steady state creatinine    Phosphorus [[id number]]  (Normal) Collected: 07/01/22 0445    Lab Status: Final result Specimen: Blood from Arm, Left Updated: 07/01/22 0557     Phosphorus 2 6 mg/dL     Magnesium [[id number]]  (Normal) Collected: 07/01/22 0445    Lab Status: Final result Specimen: Blood from Arm, Left Updated: 07/01/22 0544     Magnesium 2 4 mg/dL     CBC and differential [[id number]]  (Abnormal) Collected: 07/01/22 0445    Lab Status: Final result Specimen: Blood from Arm, Left Updated: 07/01/22 0536     WBC 7 26 Thousand/uL      RBC 3 15 Million/uL      Hemoglobin 11 6 g/dL      Hematocrit 34 6 %       fL      MCH 36 5 pg      MCHC 33 2 g/dL      RDW 16 4 %      MPV 11 1 fL      Platelets 155 Thousands/uL      nRBC 0 /100 WBCs      Neutrophils Relative 86 %      Immat GRANS % 0 %      Lymphocytes Relative 9 %      Monocytes Relative 5 %      Eosinophils Relative 0 %      Basophils Relative 0 % Neutrophils Absolute 6 19 Thousands/µL      Immature Grans Absolute 0 03 Thousand/uL      Lymphocytes Absolute 0 64 Thousands/µL      Monocytes Absolute 0 39 Thousand/µL      Eosinophils Absolute 0 00 Thousand/µL      Basophils Absolute 0 01 Thousands/µL     HS Troponin I 4hr [[id number]]  (Normal) Collected: 06/30/22 2205    Lab Status: Final result Specimen: Blood from Arm, Left Updated: 06/30/22 2239     hs TnI 4hr 43 ng/L      Delta 4hr hsTnI 8 ng/L     Blood gas, venous [[id number]]  (Abnormal) Collected: 06/30/22 2027    Lab Status: Final result Specimen: Blood from Arm, Right Updated: 06/30/22 2040     pH, Lei 7 275     pCO2, Lei 29 9 mm Hg      pO2, Lei 46 2 mm Hg      HCO3, Lei 13 6 mmol/L      Base Excess, Lei -11 9 mmol/L      O2 Content, Lei 14 2 ml/dL      O2 HGB, VENOUS 75 8 %     HS Troponin I 2hr [[id number]]  (Normal) Collected: 06/1940    Lab Status: Final result Specimen: Blood from Hand, Right Updated: 06/30/22 2034     hs TnI 2hr 24 ng/L      Delta 2hr hsTnI -11 ng/L     Fingerstick Glucose (POCT) [[id number]]  (Abnormal) Collected: 06/30/22 2002    Lab Status: Final result Updated: 06/30/22 2005     POC Glucose 184 mg/dl     Basic metabolic panel [[id number]]  (Abnormal) Collected: 06/1940    Lab Status: Final result Specimen: Blood from Hand, Right Updated: 06/30/22 2004     Sodium 140 mmol/L      Potassium 5 6 mmol/L      Chloride 107 mmol/L      CO2 17 mmol/L      ANION GAP 16 mmol/L      BUN 38 mg/dL      Creatinine 1 22 mg/dL      Glucose 200 mg/dL      Calcium 8 2 mg/dL      eGFR 55 ml/min/1 73sq m     Narrative:      [place] guidelines for Chronic Kidney Disease (CKD):     Stage 1 with normal or high GFR (GFR > 90 mL/min/1 73 square meters)    Stage 2 Mild CKD (GFR = 60-89 mL/min/1 73 square meters)    Stage 3A Moderate CKD (GFR = 45-59 mL/min/1 73 square meters)    Stage 3B Moderate CKD (GFR = 30-44 mL/min/1 73 square meters)    Stage 4 Severe CKD (GFR = 15-29 mL/min/1 73 square meters)    Stage 5 End Stage CKD (GFR <15 mL/min/1 73 square meters)  Note: GFR calculation is accurate only with a steady state creatinine    Phosphorus [[id number]]  (Abnormal) Collected: 06/1940    Lab Status: Final result Specimen: Blood from Hand, Right Updated: 06/30/22 2004     Phosphorus 4 8 mg/dL     Calcium, ionized [[id number]]  (Abnormal) Collected: 06/1940    Lab Status: Final result Specimen: Blood from Arm, Right Updated: 06/30/22 1952     Calcium, Ionized 1 09 mmol/L     Fingerstick Glucose (POCT) [[id number]]  (Abnormal) Collected: 06/30/22 1912    Lab Status: Final result Updated: 06/30/22 1913     POC Glucose 240 mg/dl     Lactic acid, plasma [[id number]]  (Normal) Collected: 06/30/22 1849    Lab Status: Final result Specimen: Blood from Arm, Left Updated: 06/30/22 1912     LACTIC ACID 1 7 mmol/L     Narrative:      Result may be elevated if tourniquet was used during collection  Beta Hydroxybutyrate [[id number]]  (Abnormal) Collected: 06/30/22 1730    Lab Status: Final result Specimen: Blood from Arm, Right Updated: 06/30/22 1741     BETA-HYDROXYBUTYRATE 6 6 mmol/L     Blood gas, venous [[id number]]  (Abnormal) Collected: 06/30/22 1730    Lab Status: Final result Specimen: Blood from Arm, Right Updated: 06/30/22 1740     pH, Lei 7 307     pCO2, Lei 27 1 mm Hg      pO2, Lei 56 4 mm Hg      HCO3, Lei 13 2 mmol/L      Base Excess, Lei -11 3 mmol/L      O2 Content, Lei 17 6 ml/dL      O2 HGB, VENOUS 85 3 %     COVID/FLU/RSV - 2 hour TAT [[id number]]  (Abnormal) Collected: 06/30/22 1643    Lab Status: Final result Specimen: Nares from Nose Updated: 06/30/22 1727     SARS-CoV-2 Positive     INFLUENZA A PCR Negative     INFLUENZA B PCR Negative     RSV PCR Negative    Narrative:      FOR PEDIATRIC PATIENTS - copy/paste COVID Guidelines URL to browser: [email or web address]    SARS-CoV-2 assay is a Nucleic Acid Amplification assay intended for the  qualitative detection of nucleic acid from SARS-CoV-2 in nasopharyngeal  swabs  Results are for the presumptive identification of SARS-CoV-2 RNA  Positive results are indicative of infection with SARS-CoV-2, the virus  causing COVID-19, but do not rule out bacterial infection or co-infection  with other viruses  Laboratories within the [place] and its  territories are required to report all positive results to the appropriate  public health authorities  Negative results do not preclude SARS-CoV-2  infection and should not be used as the sole basis for treatment or other  patient management decisions  Negative results must be combined with  clinical observations, patient history, and epidemiological information  This test has not been FDA cleared or approved  This test has been authorized by FDA under an Emergency Use Authorization  (EUA)  This test is only authorized for the duration of time the  declaration that circumstances exist justifying the authorization of the  emergency use of an in vitro diagnostic tests for detection of SARS-CoV-2  virus and/or diagnosis of COVID-19 infection under section 564(b)(1) of  the Act, 21 U  S C  877XIL-9(L)(4), unless the authorization is terminated  or revoked sooner  The test has been validated but independent review by FDA  and CLIA is pending  Test performed using [employer] Gene[employer]pert: This RT-PCR assay targets N2,  a region unique to SARS-CoV-2  A conserved region in the E-gene was chosen  for pan-Sarbecovirus detection which includes SARS-CoV-2      Urine Microscopic [[id number]]  (Normal) Collected: 06/30/22 1643    Lab Status: Final result Specimen: Urine, Clean Catch Updated: 06/30/22 1713     RBC, UA None Seen /hpf      WBC, UA 2-4 /hpf      Epithelial Cells None Seen /hpf      Bacteria, UA None Seen /hpf     UA w Reflex to Microscopic w Reflex to Culture [[id number]]  (Abnormal) Collected: 06/30/22 1643    Lab Status: Final result Specimen: Urine, Clean Catch Updated: 06/30/22 1659     Color, UA Yellow     Clarity, UA Clear     Specific Gravity, UA 1 025     pH, UA 5 5     Leukocytes, UA Elevated glucose may cause decreased leukocyte values   See urine microscopic for [facility] result/     Nitrite, UA Negative     Protein, UA 30 (1+) mg/dl      Glucose, UA >=1000 (1%) mg/dl      Ketones, UA 40 (2+) mg/dl      Urobilinogen, UA 0 2 E U /dl      Bilirubin, UA Negative     Occult Blood, UA Small    Comprehensive metabolic panel [[id number]]  (Abnormal) Collected: 06/30/22 1604    Lab Status: Final result Specimen: Blood Updated: 06/30/22 1647     Sodium 144 mmol/L      Potassium 4 4 mmol/L      Chloride 101 mmol/L      CO2 17 mmol/L      ANION GAP 26 mmol/L      BUN 35 mg/dL      Creatinine 1 57 mg/dL      Glucose 265 mg/dL      Calcium 9 1 mg/dL      Corrected Calcium 9 7 mg/dL      AST 33 U/L      ALT 34 U/L      Alkaline Phosphatase 39 U/L      Total Protein 7 5 g/dL      Albumin 3 3 g/dL      Total Bilirubin 1 37 mg/dL      eGFR 41 ml/min/1 73sq m     Narrative:      [place] guidelines for Chronic Kidney Disease (CKD):     Stage 1 with normal or high GFR (GFR > 90 mL/min/1 73 square meters)    Stage 2 Mild CKD (GFR = 60-89 mL/min/1 73 square meters)    Stage 3A Moderate CKD (GFR = 45-59 mL/min/1 73 square meters)    Stage 3B Moderate CKD (GFR = 30-44 mL/min/1 73 square meters)    Stage 4 Severe CKD (GFR = 15-29 mL/min/1 73 square meters)    Stage 5 End Stage CKD (GFR <15 mL/min/1 73 square meters)  Note: GFR calculation is accurate only with a steady state creatinine    Lipase [[id number]]  (Normal) Collected: 06/30/22 1604    Lab Status: Final result Specimen: Blood Updated: 06/30/22 1647     Lipase 105 u/L     Magnesium [[id number]]  (Abnormal) Collected: 06/30/22 1604    Lab Status: Final result Specimen: Blood Updated: 06/30/22 1647     Magnesium 2 7 mg/dL     HS Troponin 0hr (reflex protocol) [[id number]]  (Normal) Collected: 06/30/22 1604    Lab Status: Final result Specimen: Blood Updated: 06/30/22 1632     hs TnI 0hr 35 ng/L     CBC and differential [[id number]]  (Abnormal) Collected: 06/30/22 1603    Lab Status: Final result Specimen: Blood Updated: 06/30/22 1603     WBC 9 12 Thousand/uL      RBC 4 19 Million/uL      Hemoglobin 15 7 g/dL      Hematocrit 45 7 %       fL      MCH 37 5 pg      MCHC 34 4 g/dL      RDW 16 5 %      MPV 11 3 fL      Platelets 399 Thousands/uL      nRBC 0 /100 WBCs      Neutrophils Relative 87 %      Immat GRANS % 0 %      Lymphocytes Relative 8 %      Monocytes Relative 5 %      Eosinophils Relative 0 %      Basophils Relative 0 %      Neutrophils Absolute 7 93 Thousands/µL      Immature Grans Absolute 0 04 Thousand/uL      Lymphocytes Absolute 0 70 Thousands/µL      Monocytes Absolute 0 43 Thousand/µL      Eosinophils Absolute 0 00 Thousand/µL      Basophils Absolute 0 02 Thousands/µL     Narrative: This is an appended report  These results have been appended to a previously verified report  Incidental Findings:   · None    Test Results Pending at Discharge (will require follow up):   · none     Outpatient Tests Requested:  · EGD recall in 4 weeks with either Dr Valerie Reynoso for dysphagia    Complications:  none    Reason for Admission: nausea & vomiting    Hospital Course:   Jazmyn Bell is a [de-identified] y o  male patient with pmh DM, ischemic cardiomyopathy with AICD, chronic systolic congestive heart failure, HTN, HLD, BPH, COPD, acquired hypothyroidism, severe gastritis, and osteoarthritis who originally presented to the hospital on 6/30/2022 due to nausea and vomiting  Was admitted to ICU for DKA and also COVID- 19 positive  He was treated for DKA on insulin gtt and eventually transitioned to med surg status  From a covid 19 standpoint he never had respiratory symptoms   Per RN there was witnessed aspiration event and patient had speech therapy consult for VBS  Found to be aspirating and put on a dysphagia modified diet  He had a little appetite and less po intake because of this and his insulin basal/bolus regimen was decreased  GI was also consulted for the dysphagia and they prescribed PPI  Since there was no stricture or obstructive lesion on the VBS they were comfortable planning for EGD in the op setting once patient was covid free  Patient also had incident of urinary retention requiring straight cath which was traumatic and caused transient hematuria  Hgb remained stable and hematuria cleared up after cortez catheter was placed  Patient has a known h/o BPH  Cortez was left in until patient has improved ambulatory status likely to happen with acute post hospitalization rehab as rec by PT/OT  Patient is outside covid 19 isolation window and was accepted to rehab  Patient and his family members have had all of their questions answered and are amenable to his discharge to rehab today  Please see above list of diagnoses and related plan for additional information  Condition at Discharge: stable    Discharge Day Visit / Exam:   Subjective:  Complaining about his diet the consistency is not appetizing I discussed risk benefit and how we make informed decisions about all medical issues and he can continue to make the decisions which best match his life values when he is dc from the hospital and from the [facility] however when he is still oriented to aggressive treatment measures he should follow our recommendations for dysphagia diet  A full diet can be put in place for pleasure feeds should the patient turn away from aggressive measures and head more toward comfort measures in the future  No odynphagia    No todd or cp  There is weakness in the extremities with ambulation    Vitals: Blood Pressure: 112/65 (07/14/22 0711)  Pulse: 99 (07/14/22 0711)  Temperature: 97 6 °F (36 4 °C) (07/13/22 2153)  Temp Source: Axillary (07/13/22 1506)  Respirations: 18 (07/14/22 9664)  Height: 5' 6" (167 6 cm) (07/01/22 0910)  Weight - Scale: 57 3 kg (126 lb 5 2 oz) (07/14/22 4863)  SpO2: 94 % (07/14/22 0711)  Exam:   Physical Exam  Vitals and nursing note reviewed  Constitutional:       Comments: Frail, awake & alert   HENT:      Head: Normocephalic and atraumatic  Mouth/Throat:      Mouth: Mucous membranes are dry  Comments: Tongue with white patch on it pt states he hasn't brushed his teeth /tongue for weeks  Eyes:      Extraocular Movements: Extraocular movements intact  Conjunctiva/sclera: Conjunctivae normal    Cardiovascular:      Rate and Rhythm: Normal rate  Pulses: Normal pulses  Heart sounds: Normal heart sounds  Pulmonary:      Effort: Pulmonary effort is normal       Breath sounds: Normal breath sounds  Abdominal:      General: Bowel sounds are normal       Palpations: Abdomen is soft  Genitourinary:     Comments: Mckeon without output in tubing  Musculoskeletal:         General: Normal range of motion  Cervical back: Normal range of motion  Skin:     General: Skin is warm and dry  Capillary Refill: Capillary refill takes less than 2 seconds  Neurological:      General: No focal deficit present  Mental Status: He is alert  Psychiatric:         Mood and Affect: Mood normal          Behavior: Behavior normal           Discussion with Family: Attempted to update  (daughter in law) via phone  Unable to contact  Discharge instructions/Information to patient and family:   See after visit summary for information provided to patient and family  Provisions for Follow-Up Care:  See after visit summary for information related to follow-up care and any pertinent home health orders  Disposition:   Other [facility] Readmission: none     Discharge Statement:  I spent 45 minutes discharging the patient  This time was spent on the day of discharge   I had direct contact with the patient on the day of discharge  Greater than 50% of the total time was spent examining patient, answering all patient questions, arranging and discussing plan of care with patient as well as directly providing post-discharge instructions  Additional time then spent on discharge activities  Discharge Medications:  See after visit summary for reconciled discharge medications provided to patient and/or family        **Please Note: This note may have been constructed using a voice recognition system**

## 2022-07-14 NOTE — CASE MANAGEMENT
Case Management Discharge Planning Note    Patient name Adi Sandy  Location [address] [id number] MRN [id number]  : 1940 Date 2022       Current Admission Date: 2022  Current Admission Diagnosis:Type 2 diabetes mellitus with mild nonproliferative diabetic retinopathy without macular edema, bilateral [facility])   Patient Active Problem List    Diagnosis Date Noted    Dysphagia 2022    Physical deconditioning 2022    H/o COVID-19 2022    Type 2 diabetes mellitus with mild nonproliferative diabetic retinopathy without macular edema, bilateral ([facility] ) 10/14/2021    Chronic obstructive pulmonary disease ([facility] ) 2021    Prostate cancer screening 2020    Urinary retention 2019    Coronary atherosclerosis of native coronary artery 2019    Ischemic cardiomyopathy 2018    Foot pain, bilateral 10/08/2018    BPH (benign prostatic hyperplasia) 2018    Anemia 02/10/2018      LOS (days): 14  Geometric Mean LOS (GMLOS) (days): 4 60  Days to GMLOS:-9 2     OBJECTIVE:  Risk of Unplanned Readmission Score: 17 07         Current admission status: Inpatient   Preferred Pharmacy:   [address], [facility] [address] [place] [address]  Phone: [phone] Fax: [phone]    Primary Care Provider: Whit Mcdowell MD    Primary Insurance: [facility]  Secondary Insurance:     DISCHARGE DETAILS:    Discharge planning discussed with[de-identified] Patient's Daughter in [address] of Choice: Yes  Comments - Freedom of Choice: CM called patient's DIL to inform her that CM received auth and patient is cleared to go to [facility] today  CM discussed transportation options and informed patient's DIL that if [facility] transport, patient would be financially responsible for the cost  DIL reported understanding and will speak with her  regarding this matter   DIL reported that she will likely transport patient to [facility] herself around 3:00, but she will call CM back shortly to confirm  CM contacted family/caregiver?: Yes  Were Treatment Team discharge recommendations reviewed with patient/caregiver?: Yes  Did patient/caregiver verbalize understanding of patient care needs?: Yes  Were patient/caregiver advised of the risks associated with not following Treatment Team discharge recommendations?: Yes    Contacts  Patient Contacts: Jim Benson  Relationship to Patient[de-identified] Family  Contact Method: Phone  Phone Number: [phone] (A)  Reason/Outcome: Continuity of Care, Discharge [address]         Is the patient interested in [address] at discharge?: No    Other Referral/Resources/Interventions Provided:  Interventions: Short Term Rehab  Referral Comments: CM called Gail Zarateshima from [facility] at [phone] to report that [facility] has been obtained  Gail Davila confirmed that she can accept patient today and prefers that patient is admitted by 3:00 pm if possible  CM to call Gail Davila back with a transportation time  Would you like to participate in our [address] service program?  : No - Declined    Treatment Team Recommendation: Short Term Rehab  Discharge Destination Plan[de-identified] Short Term Rehab  Transport at Discharge :  Other (Comment)

## 2022-07-14 NOTE — CASE MANAGEMENT
Case Management Discharge Planning Note    Patient name Cuca Rivero  Location [id number] MRN [id number]  : 1940 Date 2022       Current Admission Date: 2022  Current Admission Diagnosis:Type 2 diabetes mellitus with mild nonproliferative diabetic retinopathy without macular edema, bilateral [facility])   Patient Active Problem List    Diagnosis Date Noted    Dysphagia 2022    Physical deconditioning 2022    H/o COVID-19 2022    Type 2 diabetes mellitus with mild nonproliferative diabetic retinopathy without macular edema, bilateral ([facility] [address] ) 10/14/2021    Chronic obstructive pulmonary disease ([facility] [address] ) 2021    Prostate cancer screening 2020    Urinary retention 2019    Coronary atherosclerosis of native coronary artery 2019    Ischemic cardiomyopathy 2018    Foot pain, bilateral 10/08/2018    BPH (benign prostatic hyperplasia) 2018    Anemia 02/10/2018      LOS (days): 14  Geometric Mean LOS (GMLOS) (days): 4 60  Days to GMLOS:-9 1     OBJECTIVE:  Risk of Unplanned Readmission Score: 17 07         Current admission status: Inpatient   Preferred Pharmacy:   [place] [id number], [address] [facility] [address] [place], [place] [address] [address]  Phone: [phone] Fax: [phone]    Primary Care Provider: Kirill Thompson MD    Primary Insurance: [facility] REP  Secondary Insurance:     [address] [place] [address] Number: approved [facility] #[id number] for [facility]:   NRD:   Care Coord: Megan Gudino  P: [phone]   F: [phone]

## 2022-07-14 NOTE — CASE MANAGEMENT
Case Management Discharge Planning Note    Patient name Makayla Jackson  Location /-67 MRN [id number]  : 1940 Date 2022       Current Admission Date: 2022  Current Admission Diagnosis:Type 2 diabetes mellitus with mild nonproliferative diabetic retinopathy without macular edema, bilateral [facility])   Patient Active Problem List    Diagnosis Date Noted    Dysphagia 2022    Physical deconditioning 2022    H/o COVID-19 2022    Type 2 diabetes mellitus with mild nonproliferative diabetic retinopathy without macular edema, bilateral ([facility] [address] ) 10/14/2021    Chronic obstructive pulmonary disease ([facility] [address] ) 2021    Prostate cancer screening 2020    Urinary retention 2019    Coronary atherosclerosis of native coronary artery 2019    Ischemic cardiomyopathy 2018    Foot pain, bilateral 10/08/2018    BPH (benign prostatic hyperplasia) 2018    Anemia 02/10/2018      LOS (days): 14  Geometric Mean LOS (GMLOS) (days): 4 60  Days to GMLOS:-9 1     OBJECTIVE:  Risk of Unplanned Readmission Score: 17 07         Current admission status: Inpatient   Preferred Pharmacy:   [address] [place] [address]  Phone: [phone] Fax: [phone]    Primary Care Provider: Rochelle Teague MD    Primary Insurance: [facility]  Secondary Insurance:     DISCHARGE DETAILS:    Other Referral/Resources/Interventions Provided:  Interventions: Short Term Rehab  Referral Comments:  sent a TT to the  DC Support Team requesting an update on auth  Per Lynda Members is still pending at this time, but there should be a determination soon  CM department will continue to follow patient through discharge

## 2022-07-15 ENCOUNTER — TRANSITIONAL CARE MANAGEMENT (OUTPATIENT)
Dept: INTERNAL MEDICINE CLINIC | Facility: CLINIC | Age: 82
End: 2022-07-15

## 2022-07-15 NOTE — TELEPHONE ENCOUNTER
Spoke with Chris Hensley and she said prior to being in the hospital he didn't have any issues eating or food not going down  She thinks he was just weak  He is having a speech evaluation at the rehab place today and they will decide from there if he needs an EGD or not  She will call me back if they decide to proceed with the procedure

## 2022-07-20 LAB — GLUCOSE SERPL-MCNC: 175 MG/DL (ref 65–140)

## 2022-08-01 ENCOUNTER — CONSULT (OUTPATIENT)
Dept: INTERNAL MEDICINE CLINIC | Facility: CLINIC | Age: 82
End: 2022-08-01
Payer: COMMERCIAL

## 2022-08-01 VITALS
HEIGHT: 66 IN | SYSTOLIC BLOOD PRESSURE: 110 MMHG | BODY MASS INDEX: 18.77 KG/M2 | HEART RATE: 84 BPM | OXYGEN SATURATION: 96 % | TEMPERATURE: 97.4 F | WEIGHT: 116.8 LBS | DIASTOLIC BLOOD PRESSURE: 60 MMHG

## 2022-08-01 DIAGNOSIS — I25.5 ISCHEMIC CARDIOMYOPATHY: ICD-10-CM

## 2022-08-01 DIAGNOSIS — Z79.4 TYPE 2 DIABETES MELLITUS WITH BOTH EYES AFFECTED BY MILD NONPROLIFERATIVE RETINOPATHY WITHOUT MACULAR EDEMA, WITH LONG-TERM CURRENT USE OF INSULIN (HCC): ICD-10-CM

## 2022-08-01 DIAGNOSIS — R13.12 OROPHARYNGEAL DYSPHAGIA: ICD-10-CM

## 2022-08-01 DIAGNOSIS — B37.0 ORAL THRUSH: Primary | ICD-10-CM

## 2022-08-01 DIAGNOSIS — R53.81 PHYSICAL DECONDITIONING: ICD-10-CM

## 2022-08-01 DIAGNOSIS — E11.3293 TYPE 2 DIABETES MELLITUS WITH BOTH EYES AFFECTED BY MILD NONPROLIFERATIVE RETINOPATHY WITHOUT MACULAR EDEMA, WITH LONG-TERM CURRENT USE OF INSULIN (HCC): ICD-10-CM

## 2022-08-01 DIAGNOSIS — D64.9 ANEMIA, UNSPECIFIED TYPE: ICD-10-CM

## 2022-08-01 PROCEDURE — 3725F SCREEN DEPRESSION PERFORMED: CPT | Performed by: NURSE PRACTITIONER

## 2022-08-01 PROCEDURE — 99215 OFFICE O/P EST HI 40 MIN: CPT | Performed by: NURSE PRACTITIONER

## 2022-08-01 RX ORDER — ALBUTEROL SULFATE 90 UG/1
AEROSOL, METERED RESPIRATORY (INHALATION)
COMMUNITY
Start: 2022-06-24 | End: 2022-08-01

## 2022-08-01 RX ORDER — BISACODYL 10 MG
10 SUPPOSITORY, RECTAL RECTAL AS NEEDED
COMMUNITY
End: 2022-08-11 | Stop reason: ALTCHOICE

## 2022-08-01 NOTE — ASSESSMENT & PLAN NOTE
Patient's most recent echocardiogram while hospitalized shows stability in his EF which is 25%  He continues to follow with Cardiology  Interpretation Summary         Left Ventricle: Left ventricular cavity size is normal  Wall thickness is normal  Systolic function is severely reduced (25%)  Wall motion cannot be accurately assessed  There is severe global hypokinesis  Diastolic function is normal     Right Ventricle: An ICD lead is present    Right Atrium: An ICD lead is present    Aortic Valve: A possible mobile echodensity is seen on the aortic leaflets which, if of clinical concern, can be evaluated by a MONSE  There is no evidence of regurgitation    Tricuspid Valve: There is trace regurgitation   The right ventricular systolic pressure is normal

## 2022-08-01 NOTE — PROGRESS NOTES
INTERNAL MEDICINE PRE-OPERATIVE EVALUATION  [facility] [address] [place] [address]    NAME: Calos Cheung  AGE: [de-identified] y o  SEX: male  : 1940     DATE: 2022     Internal Medicine Pre-Operative Evaluation:     Chief Complaint: Pre-operative Evaluation     Surgery: right eye cataract surgery  Anticipated Date of Surgery: 2022  Referring Provider: Makayla Fajardo DO        History of Present Illness:     Calos Cheung is a [de-identified] y o  male who presents to the office today for a preoperative consultation at the request of surgeon, Evelio Ahumada DO, who plans on performing right cataract surgery on 2022  Planned anesthesia is local  Patient has a bleeding risk of: no recent abnormal bleeding  Current anti-platelet/anti-coagulation medications that the patient is prescribed includes: Aspirin 81 mg     Assessment of Chronic Conditions:   1  Oral thrush  -     nystatin (MYCOSTATIN) 500,000 units/5 mL suspension; Apply 5 mL (500,000 Units total) to the mouth or throat 4 (four) times a day for 10 days    2  Type 2 diabetes mellitus with both eyes affected by mild nonproliferative retinopathy without macular edema, with long-term current use of insulin (HCC)  Assessment & Plan:    Lab Results   Component Value Date    HGBA1C 8 8 (H) 2022       Patient's most recent hemoglobin A1c is 8 8  He does have an upcoming appointment with endocrinology  He currently is in a short-term rehab center after being hospitalized several weeks ago  He is on a dysphagic diet  3  Ischemic cardiomyopathy  Assessment & Plan:    Patient's most recent echocardiogram while hospitalized shows stability in his EF which is 25%  He continues to follow with Cardiology  Interpretation Summary         Left Ventricle: Left ventricular cavity size is normal  Wall thickness is normal  Systolic function is severely reduced (25%)  Wall motion cannot be accurately assessed   There is severe global hypokinesis  Diastolic function is normal     Right Ventricle: An ICD lead is present    Right Atrium: An ICD lead is present    Aortic Valve: A possible mobile echodensity is seen on the aortic leaflets which, if of clinical concern, can be evaluated by a MONSE  There is no evidence of regurgitation    Tricuspid Valve: There is trace regurgitation  The right ventricular systolic pressure is normal         4  Physical deconditioning  Assessment & Plan:    Patient currently is in a rehab center following a hospitalization  He is able to walk with assistance and an assisted device  He  has had no falls  5  Anemia, unspecified type    6  Oropharyngeal dysphagia  Assessment & Plan:    Patient had a barium swallow performed while an inpatient which did show oropharyngeal dysphagia at high risk for aspiration  He currently is on nectar thick liquids with a pureed diet at the rehab center  Patient's family does not believe he needs to be on his diet  The patient is missing many teeth as well  They are interested in bringing him home and putting him back on his regular diet  I did caution them regarding advancing his diet without having him fully evaluated again for risk of aspiration               Assessment of Cardiac Risk:  Denies unstable or severe angina or MI in the last 6 weeks or history of stent placement in the last year   Denies decompensated heart failure (e g  New onset heart failure, NYHA functional class IV heart failure, or worsening existing heart failure)  Denies significant arrhythmias such as high grade AV block, symptomatic ventricular arrhythmia, newly recognized ventricular tachycardia, supraventricular tachycardia with resting heart rate >100, or symptomatic bradycardia  Denies severe heart valve disease including aortic stenosis or symptomatic mitral stenosis     Exercise Capacity:  Able to walk 4 blocks without symptoms?: No  Able to walk 2 flights without symptoms?: No    Prior Anesthesia Reactions: No     Personal history of venous thromboembolic disease? No    History of steroid use for >2 weeks within last year? No          Review of Systems:     Review of Systems   Constitutional: Negative  Negative for fatigue  HENT: Negative  Negative for congestion, postnasal drip, rhinorrhea and trouble swallowing  Eyes: Negative  Negative for visual disturbance  Respiratory: Negative  Negative for choking and shortness of breath  Cardiovascular: Negative  Negative for chest pain  Gastrointestinal: Negative  Endocrine: Negative  Genitourinary: Negative  Musculoskeletal: Negative  Negative for arthralgias, back pain, myalgias and neck pain  Skin: Negative  Neurological: Negative for dizziness and headaches  Psychiatric/Behavioral: Negative  Problem List:     Patient Active Problem List   Diagnosis    Anemia    BPH (benign prostatic hyperplasia)    Ischemic cardiomyopathy    Coronary atherosclerosis of native coronary artery    Urinary retention    Type 2 diabetes mellitus with mild nonproliferative diabetic retinopathy without macular edema, bilateral ([address] )    H/o COVID-19    Physical deconditioning    Oropharyngeal dysphagia        Allergies:      Allergies   Allergen Reactions    Atorvastatin Other (See Comments)     Pt unsure      Percocet [Oxycodone-Acetaminophen] Nausea Only and GI Intolerance        Current Medications:       Current Outpatient Medications:     aspirin 81 MG tablet, Take 1 tablet by mouth daily, Disp: , Rfl:     bisacodyl (DULCOLAX) 10 mg suppository, Insert 10 mg into the rectum if needed for constipation, Disp: , Rfl:     Canagliflozin (Invokana) 300 MG TABS, Take 1 tablet (300 mg total) by mouth daily before breakfast, Disp: 90 tablet, Rfl: 3    cyclobenzaprine (FLEXERIL) 10 mg tablet, Take 1 tablet (10 mg total) by mouth 3 (three) times a day as needed for muscle spasms, Disp: 30 tablet, Rfl: 1    finasteride (PROSCAR) 5 mg tablet, Take 1 tablet (5 mg total) by mouth daily, Disp: 90 tablet, Rfl: 3    glipiZIDE (GLUCOTROL) 5 mg tablet, Take 1 tablet (5 mg total) by mouth 2 (two) times a day, Disp: 180 tablet, Rfl: 3    insulin glargine (LANTUS) 100 units/mL subcutaneous injection, Inject 5 Units under the skin daily at bedtime, Disp: 10 mL, Rfl: 0    insulin lispro (HumaLOG Albert KwikPen) 100 units/mL injection pen, Inject 5 Units under the skin 3 (three) times a day with meals, Disp: 15 mL, Rfl: 0    Insulin Pen Needle (Pen Needles) 32G X 4 MM MISC, Check blood glucose at home AC and HS 4 times a day, Disp: 200 each, Rfl: 3    levothyroxine 88 mcg tablet, Take 1 tablet (88 mcg total) by mouth daily, Disp: 90 tablet, Rfl: 3    metoprolol tartrate (LOPRESSOR) 25 mg tablet, Take 1 tablet (25 mg total) by mouth every 12 (twelve) hours, Disp: 60 tablet, Rfl: 0    nystatin (MYCOSTATIN) 500,000 units/5 mL suspension, Apply 5 mL (500,000 Units total) to the mouth or throat 4 (four) times a day for 10 days, Disp: 473 mL, Rfl: 1    rosuvastatin (CRESTOR) 20 MG tablet, Take 1 tablet (20 mg total) by mouth daily, Disp: 90 tablet, Rfl: 3    senna-docusate sodium (SENOKOT S) 8 6-50 mg per tablet, Take 1 tablet by mouth daily at bedtime, Disp: 30 tablet, Rfl: 0    tamsulosin (FLOMAX) 0 4 mg, Take 1 capsule (0 4 mg total) by mouth daily, Disp: 90 capsule, Rfl: 3    Continuous Blood Gluc  (FreeStyle Maris 14 Day [place]) ANTONELLA, Continuous monitering for pt on insulin, Disp: 1 each, Rfl: 0    Continuous Blood Gluc Sensor (FreeStyle Maris 14 Day Sensor) MISC, Continuous monitering for pt on insulin, Disp: 6 each, Rfl: 3     Past History:     Past Medical History:   Diagnosis Date    Acquired cyst of kidney     Anemia     Benign paroxysmal vertigo, unspecified ear     Cellulitis of leg     Cervical spinal stenosis     Chest pain     Coronary atherosclerosis of native coronary artery     Enlarged prostate     Esophageal candidiasis (HCC)     Hematuria     Herpes zoster     Hyperlipidemia     Hypertension     Incomplete emptying of bladder     Inflamed seborrheic keratosis     Internal hemorrhoids     Malignant neoplasm of skin of trunk     Myocardial infarction ([facility] [address] )     Nonmelanoma skin cancer     LAST ASSESSED: 17    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 infarction     Paroxysmal tachycardia (HCC)     Shortness of breath     Vitamin B deficiency         Past Surgical History:   Procedure Laterality Date    CARDIAC DEFIBRILLATOR PLACEMENT      COLONOSCOPY  10/07/2008    FIBEROPTIC SCREENING; NO FURTHER RECOMMENDATIONS    CORONARY ANGIOPLASTY WITH STENT PLACEMENT      CORONARY ANGIOPLASTY WITH STENT PLACEMENT      CYSTOSCOPY  2015    DIAGNOSTIC; MANAGED BY: Ethel Alfaro    EGD AND COLONOSCOPY N/A 2018    Procedure: EGD AND COLONOSCOPY;  Surgeon: Yadira Rowley MD;  Location: [facility]; Service: Gastroenterology    FL INJECTION RIGHT SHOULDER (ARTHROGRAM)  2022    HIP ARTHROPLASTY Right     INSERT / REPLACE / REMOVE PACEMAKER      JOINT REPLACEMENT Right     TRUNK SKIN LESION EXCISIONAL BIOPSY  2007    MALIGNANT; BCC CHEST        Family History   Problem Relation Age of Onset    Heart disease Mother     Coronary artery disease Mother     Sudden death Mother     Cancer Father         throat; MALIGNANT NEOPLASM OF HEAD, FACE OR NECK    Throat cancer Father     Cancer Family     Coronary artery disease Family         Social History     Socioeconomic History    Marital status:       Spouse name: Not on file    Number of children: Not on file    Years of education: Not on file    Highest education level: Not on file   Occupational History    Occupation: RETIRED   Tobacco Use    Smoking status: Former Smoker     Packs/day: 1 00     Years: 10 00     Pack years: 10 00     Types: Cigarettes     Quit date: 1978     Years since quittin 4    Smokeless tobacco: Never Used   Vaping Use    Vaping Use: Never used   Substance and Sexual Activity    Alcohol use: Yes     Comment: occasionally 1-2 per month     Drug use: No    Sexual activity: Not Currently     Partners: Female   Other Topics Concern    Not on file   Social History Narrative    LIVING INDEPENDENTLY WITH SPOUSE    NO ADVANCE DIRECTIVE ON FILE     Social Determinants of Health     Financial Resource Strain: Not on file   Food Insecurity: No Food Insecurity    Worried About Running Out of Food in the Last Year: Never true    Rashard of Food in the Last Year: Never true   Transportation Needs: No Transportation Needs    Lack of Transportation (Medical): No    Lack of Transportation (Non-Medical): No   Physical Activity: Inactive    Days of Exercise per Week: 0 days    Minutes of Exercise per Session: 0 min   Stress: Stress Concern Present    Feeling of Stress : Rather much   Social Connections: Not on file   Intimate Partner Violence: Not on file   Housing Stability: Low Risk     Unable to Pay for Housing in the Last Year: No    Number of Places Lived in the Last Year: 1    Unstable Housing in the Last Year: No        Physical Exam:      /60   Pulse 84   Temp (!) 97 4 °F (36 3 °C)   Ht 5' 6" (1 676 m)   Wt 53 kg (116 lb 12 8 oz)   SpO2 96%   BMI 18 85 kg/m²     Physical Exam  Vitals reviewed  Constitutional:       General: He is not in acute distress  Appearance: He is well-developed and underweight  HENT:      Head: Normocephalic and atraumatic  Right Ear: Tympanic membrane, ear canal and external ear normal       Left Ear: Tympanic membrane, ear canal and external ear normal       Nose: Nose normal       Mouth/Throat:      Mouth: Mucous membranes are moist       Dentition: Abnormal dentition  Dental caries present  Tongue: Lesions (exudate) present  Pharynx: Oropharynx is clear  No oropharyngeal exudate  Eyes:      General:         Right eye: No discharge           Left eye: No discharge  Conjunctiva/sclera: Conjunctivae normal       Pupils: Pupils are equal, round, and reactive to light  Neck:      Thyroid: No thyromegaly  Vascular: No JVD  Trachea: No tracheal deviation  Cardiovascular:      Rate and Rhythm: Normal rate and regular rhythm  Pulses: Normal pulses  Heart sounds: Normal heart sounds  No murmur heard  Pulmonary:      Effort: Pulmonary effort is normal  No respiratory distress  Breath sounds: Normal breath sounds  No wheezing  Abdominal:      General: Bowel sounds are normal       Palpations: Abdomen is soft  Tenderness: There is no abdominal tenderness  Musculoskeletal:         General: Normal range of motion  Cervical back: Normal range of motion and neck supple  Lymphadenopathy:      Cervical: No cervical adenopathy  Skin:     General: Skin is warm and dry  Capillary Refill: Capillary refill takes less than 2 seconds  Neurological:      General: No focal deficit present  Mental Status: He is alert and oriented to person, place, and time  Mental status is at baseline  Psychiatric:         Mood and Affect: Mood normal          Behavior: Behavior normal          Thought Content: Thought content normal          Judgment: Judgment normal            Data:     Pre-operative work-up    Laboratory Results: I have personally reviewed the pertinent laboratory results/reports     EKG: I have personally reviewed pertinent reports  Chest x-ray: I have personally reviewed pertinent reports  Previous cardiopulmonary studies within the past year:  Echocardiogram: 7/1/2022-stable with EF of 25%, continue to follow with cardiology           Plan:     [de-identified] y o  male with planned surgery: right cataract surgery        Cardiac Risk Estimation: per the Revised Cardiac Risk Index (Circ  100:1043, 1999), the patient's risk factors for cardiac complications include history of ischemic heart disease, putting him in: RCI RISK CLASS II (1 risk factor, risk of major cardiac compl  appr  1 3%)  1  Further preoperative workup as follows:   - None; no further preoperative work-up is required    2  Medication Management/Recommendations:   -per surgeon    3  Prophylaxis for cardiac events with perioperative beta-blockers: not indicated  4  Patient requires further consultation with: None    Clearance  Patient is CLEARED for surgery without any additional cardiac testing       [facility]  [address] [facility] [address]  Phone#  [phone]  Fax#  [phone]

## 2022-08-01 NOTE — ASSESSMENT & PLAN NOTE
Patient had a barium swallow performed while an inpatient which did show oropharyngeal dysphagia at high risk for aspiration  He currently is on nectar thick liquids with a pureed diet at the rehab center  Patient's family does not believe he needs to be on his diet  The patient is missing many teeth as well  They are interested in bringing him home and putting him back on his regular diet  I did caution them regarding advancing his diet without having him fully evaluated again for risk of aspiration

## 2022-08-01 NOTE — ASSESSMENT & PLAN NOTE
Lab Results   Component Value Date    HGBA1C 8 8 (H) 06/27/2022       Patient's most recent hemoglobin A1c is 8 8  He does have an upcoming appointment with endocrinology  He currently is in a short-term rehab center after being hospitalized several weeks ago  He is on a dysphagic diet

## 2022-08-01 NOTE — PATIENT INSTRUCTIONS
Oral Candidiasis   WHAT YOU NEED TO KNOW:   Oral candidiasis, or thrush, is a fungal infection that affects the inside of your mouth  DISCHARGE INSTRUCTIONS:   Return to the emergency department if:   You have trouble swallowing and your jaw and neck are stiff  You are dizzy, thirsty, or have a dry mouth  You are urinating little or not at all  You cannot eat or drink because of the pain  Contact your healthcare provider if:   You have a fever  You have nausea, vomiting, or diarrhea  Your signs and symptoms get worse, even after treatment  You have questions or concerns about your condition or care  Medicines:   Antifungal medicine  helps kill the fungus that caused your oral candidiasis  This medicine may be a pill or a solution that you gargle  Remove dentures before you gargle  Take your medicine as directed  Contact your healthcare provider if you think your medicine is not helping or if you have side effects  Tell him of her if you are allergic to any medicine  Keep a list of the medicines, vitamins, and herbs you take  Include the amounts, and when and why you take them  Bring the list or the pill bottles to follow-up visits  Carry your medicine list with you in case of an emergency  Prevent oral candidiasis:  Brush your teeth, gums, and tongue after you eat and before you go to sleep  Use a toothbrush with soft bristles  See your dentist for regular exams  Remove your dentures when you sleep, or at least 6 hours each day  Clean your dentures and soak them in denture   Let them air dry after soaking  Follow up with your doctor as directed:  Write down your questions so you remember to ask them during your visits  © Copyright [employer] 2022 Information is for End User's use only and may not be sold, redistributed or otherwise used for commercial purposes   All illustrations and images included in CareNotes® are the copyrighted property of [employer]  or [employer] [facility]  The above information is an  only  It is not intended as medical advice for individual conditions or treatments  Talk to your doctor, nurse or pharmacist before following any medical regimen to see if it is safe and effective for you

## 2022-08-01 NOTE — ASSESSMENT & PLAN NOTE
Patient currently is in a rehab center following a hospitalization  He is able to walk with assistance and an assisted device  He  has had no falls

## 2022-08-05 ENCOUNTER — IN-CLINIC DEVICE VISIT (OUTPATIENT)
Dept: CARDIOLOGY CLINIC | Facility: CLINIC | Age: 82
End: 2022-08-05
Payer: COMMERCIAL

## 2022-08-05 DIAGNOSIS — Z95.810 PRESENCE OF IMPLANTABLE CARDIOVERTER-DEFIBRILLATOR (ICD): Primary | ICD-10-CM

## 2022-08-05 PROCEDURE — 93282 PRGRMG EVAL IMPLANTABLE DFB: CPT | Performed by: INTERNAL MEDICINE

## 2022-08-05 NOTE — PROGRESS NOTES
BSC SINGLE CHAMBER ICD - NOT MRI CONDITIONAL   DEVICE INTERROGATED IN THE [place] OFFICE:  BATTERY VOLTAGE ADEQUATE (4 YR )    <1%    ALL LEAD PARAMETERS WITHIN NORMAL LIMITS   SINCE 2/10/22; 19 VT-1 EPISODES WITH -213 BPM AND 10 NON-SUSTAINED V EVENTS WITHS 1 EGM SHOWING RVR IN VF ZONE TREATED WITH ATP X 1 (V467), 1 EGM SHOWING NSVT (V 490 - 8 @ 161 BPM), AND ALL REMAINING EGMS SHOWING PROBABLE AF/RVR WITH -213 BPM   PT TAKES ASA AND METOPROLOL   TASK TO DR BONDS FOR REVIEW   NO PROGRAMMING CHANGES MADE TO DEVICE PARAMETERS   NORMAL DEVICE FUNCTION   RG

## 2022-08-09 ENCOUNTER — TELEPHONE (OUTPATIENT)
Dept: CARDIOLOGY CLINIC | Facility: CLINIC | Age: 82
End: 2022-08-09

## 2022-08-09 NOTE — TELEPHONE ENCOUNTER
----- Message from Janett Garcia MD sent at 8/8/2022  8:12 PM EDT -----  Please call the patient  Make sure patient is taking metoprolol 25 mg twice a day  If he is, he should increase the dosage of metoprolol to 25 mg 1 and 1/2 tablet twice a day as he has episodes of high heart rate on the ICD interrogation      Please send a new prescription after talking to the patient

## 2022-08-10 ENCOUNTER — TELEPHONE (OUTPATIENT)
Dept: INTERNAL MEDICINE CLINIC | Facility: CLINIC | Age: 82
End: 2022-08-10

## 2022-08-10 DIAGNOSIS — I25.5 ISCHEMIC CARDIOMYOPATHY: ICD-10-CM

## 2022-08-10 NOTE — TELEPHONE ENCOUNTER
THOMAS SCOTT    HE  HAS  BEEN  ADMITTED   [facility]DER [facility]   PT  IS  STAYING   WITH   HIS  SON  AND  DAUGHTER  IN  LAW    AND  HE  IS  EATING  REGULAR    DIET  NOW

## 2022-08-11 ENCOUNTER — OFFICE VISIT (OUTPATIENT)
Dept: INTERNAL MEDICINE CLINIC | Facility: CLINIC | Age: 82
End: 2022-08-11
Payer: COMMERCIAL

## 2022-08-11 VITALS
TEMPERATURE: 97.4 F | OXYGEN SATURATION: 99 % | WEIGHT: 119 LBS | HEART RATE: 77 BPM | BODY MASS INDEX: 19.13 KG/M2 | SYSTOLIC BLOOD PRESSURE: 92 MMHG | HEIGHT: 66 IN | DIASTOLIC BLOOD PRESSURE: 58 MMHG

## 2022-08-11 DIAGNOSIS — Z79.4 TYPE 2 DIABETES MELLITUS WITH BOTH EYES AFFECTED BY MILD NONPROLIFERATIVE RETINOPATHY WITHOUT MACULAR EDEMA, WITH LONG-TERM CURRENT USE OF INSULIN (HCC): Primary | ICD-10-CM

## 2022-08-11 DIAGNOSIS — R39.14 BENIGN PROSTATIC HYPERPLASIA WITH INCOMPLETE BLADDER EMPTYING: ICD-10-CM

## 2022-08-11 DIAGNOSIS — E11.3293 TYPE 2 DIABETES MELLITUS WITH BOTH EYES AFFECTED BY MILD NONPROLIFERATIVE RETINOPATHY WITHOUT MACULAR EDEMA, WITH LONG-TERM CURRENT USE OF INSULIN (HCC): Primary | ICD-10-CM

## 2022-08-11 DIAGNOSIS — B37.0 ORAL THRUSH: ICD-10-CM

## 2022-08-11 DIAGNOSIS — N40.1 BENIGN PROSTATIC HYPERPLASIA WITH INCOMPLETE BLADDER EMPTYING: ICD-10-CM

## 2022-08-11 DIAGNOSIS — I25.5 ISCHEMIC CARDIOMYOPATHY: ICD-10-CM

## 2022-08-11 PROCEDURE — 99215 OFFICE O/P EST HI 40 MIN: CPT

## 2022-08-11 PROCEDURE — 1160F RVW MEDS BY RX/DR IN RCRD: CPT

## 2022-08-11 RX ORDER — LANCING DEVICE
EACH MISCELLANEOUS
COMMUNITY
Start: 2022-08-08

## 2022-08-11 NOTE — PROGRESS NOTES
INTERNAL MEDICINE TRANSITION OF CARE OFFICE VISIT  [facility] - [facility] OF [address] [place] [address]    NAME: Gordo Graff  AGE: [de-identified] y o  SEX: male  : 1940     DATE: 2022     Assessment and Plan:     1  Oral thrush  Nystatin was not sent home for rehab, restart this and take for 10 days    2  Type 2 diabetes mellitus with both eyes affected by mild nonproliferative retinopathy without macular edema, with long-term current use of insulin (HCC)  Lantus 5 units at bedtime  limiting carbs and sugars  Checks blood sugars BID and record  Invokana    3  Ischemic cardiomyopathy  Metoprolol 37 5 mg BID  Recently saw cardiology Dr MAN    4  Benign prostatic hyperplasia with incomplete bladder emptying  flomax       No follow-ups on file  Transitional Care Management Review:     Gordo Graff is a [de-identified] y o  male here for TCM follow-up    During the TCM phone call patient stated:    TCM Call     Date and time call was made  7/15/2022 10:04 AM    Hospital care reviewed  Records reviewed    Patient was hospitialized at  [facility]    Date of Admission  22    Date of discharge  22    Diagnosis  Chief Complaint SOB    Disposition  Long term care facility      TCM Call     Scheduled for follow up? No    Comments  Pt transferred to SNF           HPI:     Presents to the office for a TCM after a hospital admission  He states he is feeling better with the exception of weakness to his BLE  Home Health caregivers of [facility] with SN, PT, OT started at his home  His medications were reviewed and updates made in chart  Patient is checking blood glucose three times a day      The following portions of the patient's history were reviewed and updated as appropriate: allergies, current medications, past family history, past medical history, past social history, past surgical history and problem list      Review of Systems:     Review of Systems   Constitutional: Negative for appetite change, chills, diaphoresis, fatigue, fever and unexpected weight change  HENT: Positive for sore throat  Negative for postnasal drip and sneezing  Sore tongue   Eyes: Negative for visual disturbance  Respiratory: Negative for chest tightness and shortness of breath  Cardiovascular: Negative for chest pain, palpitations and leg swelling  Gastrointestinal: Negative for abdominal pain and blood in stool  Endocrine: Negative for cold intolerance, heat intolerance, polydipsia, polyphagia and polyuria  Genitourinary: Negative for difficulty urinating, dysuria, frequency and urgency  Musculoskeletal: Negative for arthralgias and myalgias  Skin: Negative for rash and wound  Neurological: Negative for dizziness, weakness, light-headedness and headaches  Hematological: Negative for adenopathy  Psychiatric/Behavioral: Negative for confusion, dysphoric mood and sleep disturbance  The patient is not nervous/anxious  Problem List:     Patient Active Problem List   Diagnosis    Anemia    BPH (benign prostatic hyperplasia)    Ischemic cardiomyopathy    Coronary atherosclerosis of native coronary artery    Urinary retention    Type 2 diabetes mellitus with mild nonproliferative diabetic retinopathy without macular edema, bilateral ([address] )    H/o COVID-19    Physical deconditioning    Oropharyngeal dysphagia        Objective:     BP 92/58 (BP Location: Left arm, Patient Position: Sitting, Cuff Size: Standard)   Pulse 77   Temp (!) 97 4 °F (36 3 °C) (Temporal)   Ht 5' 6" (1 676 m)   Wt 54 kg (119 lb)   SpO2 99%   BMI 19 21 kg/m²     Physical Exam  Constitutional:       Appearance: He is well-developed  HENT:      Head: Normocephalic and atraumatic  Eyes:      Conjunctiva/sclera: Conjunctivae normal       Pupils: Pupils are equal, round, and reactive to light  Cardiovascular:      Rate and Rhythm: Normal rate and regular rhythm  Heart sounds: Normal heart sounds     Pulmonary: Effort: Pulmonary effort is normal       Breath sounds: Normal breath sounds  Abdominal:      General: Bowel sounds are normal       Palpations: Abdomen is soft  Musculoskeletal:         General: Normal range of motion  Cervical back: Normal range of motion  Skin:     General: Skin is warm and dry  Neurological:      Mental Status: He is alert and oriented to person, place, and time  Laboratory Results: I have personally reviewed the pertinent laboratory results/reports     Radiology/Other Diagnostic Testing Results: I have personally reviewed pertinent reports  CT abdomen pelvis wo contrast    Result Date: 6/30/2022  CT ABDOMEN AND PELVIS WITHOUT IV CONTRAST INDICATION:   luq pain  COMPARISON:  CT abdomen pelvis February 10, 2018 TECHNIQUE:  CT examination of the abdomen and pelvis was performed without intravenous contrast  This examination was performed without intravenous contrast in the context of the critical nationwide Omnipaque shortage  Axial, sagittal, and coronal 2D reformatted images were created from the source data and submitted for interpretation  Radiation dose length product (DLP) for this visit:  612 mGy-cm   This examination, like all CT scans performed in the [facility], was performed utilizing techniques to minimize radiation dose exposure, including the use of iterative reconstruction and automated exposure control  Enteric contrast was not administered  FINDINGS: ABDOMEN LOWER CHEST:  Increased reticulation in the periphery of the right lower lobe likely represents atelectasis  LIVER/BILIARY TREE:  Unremarkable  GALLBLADDER:  There are gallstone(s) within the gallbladder, without pericholecystic inflammatory changes  SPLEEN:  Unremarkable  PANCREAS:  Unremarkable  ADRENAL GLANDS:  Unremarkable  KIDNEYS/URETERS:  Renal cysts  Surgical clips in the right kidney  Renal cortical thinning    No hydroureteronephrosis STOMACH AND BOWEL:  Motion artifact partially limits assessment of the bowel  No bowel obstruction  APPENDIX:  No findings to suggest appendicitis  ABDOMINOPELVIC CAVITY:  No ascites  No pneumoperitoneum  No lymphadenopathy  VESSELS:  Atherosclerotic changes are present  No evidence of aneurysm  PELVIS REPRODUCTIVE ORGANS:  The prostate is enlarged  URINARY BLADDER:  Unremarkable  ABDOMINAL WALL/INGUINAL REGIONS:  Unremarkable  OSSEOUS STRUCTURES:  Total right hip arthroplasty  Multilevel degenerative change of the spine  No acute fracture  No suspicious osseous lesion  Moderate osteoarthrosis of the left hip  No acute abdominopelvic pathology  Workstation performed: [id number]     XR chest 2 views    Result Date: 7/1/2022  CHEST INDICATION:   sob  Patient has confirmed COVID-19  COMPARISON:  Chest x-ray 2/10/2018 EXAM PERFORMED/VIEWS:  XR CHEST PA & LATERAL FINDINGS:  Left-sided chest wall intracardiac device is identified  Leads are intact  Cardiomediastinal silhouette appears unremarkable  Overall mild interstitial prominence suggesting chronic lung changes, stable  No focal infiltrate  No pneumothorax or pleural effusion  Osseous structures appear within normal limits for patient age  No active pulmonary disease  Workstation performed: [id number]     Echo follow up/limited w/ contrast if indicated    Result Date: 7/1/2022    Left Ventricle: Left ventricular cavity size is normal  Wall thickness is normal  Systolic function is severely reduced (25%)  Wall motion cannot be accurately assessed  There is severe global hypokinesis  Diastolic function is normal    Right Ventricle: An ICD lead is present    Right Atrium: An ICD lead is present    Aortic Valve: A possible mobile echodensity is seen on the aortic leaflets which, if of clinical concern, can be evaluated by a MONSE  There is no evidence of regurgitation    Tricuspid Valve: There is trace regurgitation   The right ventricular systolic pressure is normal         Current Medications:     Outpatient Medications Prior to Visit   Medication Sig Dispense Refill    aspirin 81 MG tablet Take 1 tablet by mouth daily      Canagliflozin (Invokana) 300 MG TABS Take 1 tablet (300 mg total) by mouth daily before breakfast 90 tablet 3    Continuous Blood Gluc  (FreeStyle Maris 14 Day Calhoun) ANTONELLA Continuous monitering for pt on insulin 1 each 0    Continuous Blood Gluc Sensor (FreeStyle Maris 14 Day Sensor) MISC Continuous monitering for pt on insulin 6 each 3    cyclobenzaprine (FLEXERIL) 10 mg tablet Take 1 tablet (10 mg total) by mouth 3 (three) times a day as needed for muscle spasms 30 tablet 1    finasteride (PROSCAR) 5 mg tablet Take 1 tablet (5 mg total) by mouth daily 90 tablet 3    glipiZIDE (GLUCOTROL) 5 mg tablet Take 1 tablet (5 mg total) by mouth 2 (two) times a day 180 tablet 3    insulin glargine (LANTUS) 100 units/mL subcutaneous injection Inject 5 Units under the skin daily at bedtime 10 mL 0    Insulin Pen Needle (Pen Needles) 32G X 4 MM MISC Check blood glucose at home AC and HS 4 times a day 200 each 3    levothyroxine 88 mcg tablet Take 1 tablet (88 mcg total) by mouth daily 90 tablet 3    metoprolol tartrate (LOPRESSOR) 25 mg tablet Take 1 5 tablets (37 5 mg total) by mouth every 12 (twelve) hours 180 tablet 3    Pharmacist Choice Lancets MISC USE FOR TESTING 4 TIMES A DAY      rosuvastatin (CRESTOR) 20 MG tablet Take 1 tablet (20 mg total) by mouth daily 90 tablet 3    senna-docusate sodium (SENOKOT S) 8 6-50 mg per tablet Take 1 tablet by mouth daily at bedtime 30 tablet 0    tamsulosin (FLOMAX) 0 4 mg Take 1 capsule (0 4 mg total) by mouth daily 90 capsule 3    bisacodyl (DULCOLAX) 10 mg suppository Insert 10 mg into the rectum if needed for constipation      insulin lispro (HumaLOG Albert KwikPen) 100 units/mL injection pen Inject 5 Units under the skin 3 (three) times a day with meals 15 mL 0    nystatin (MYCOSTATIN) 500,000 units/5 mL suspension Apply 5 mL (500,000 Units total) to the mouth or throat 4 (four) times a day for 10 days 473 mL 1     No facility-administered medications prior to visit         MELODIE Hughes  [facility] [address] [place] [address]

## 2022-08-12 ENCOUNTER — TELEPHONE (OUTPATIENT)
Dept: CARDIOLOGY CLINIC | Facility: CLINIC | Age: 82
End: 2022-08-12

## 2022-08-12 NOTE — TELEPHONE ENCOUNTER
----- Message from Shannon Castañeda MD sent at 8/9/2022  3:51 PM EDT -----  Yes  Patient should be on the higher dose of the metoprolol as recommended  ----- Message -----  From: Jonathan Curtis  Sent: 8/9/2022   2:58 PM EDT  To: Shannon Castañeda MD, #    Spoke with pt and his daughter Alissa Ogden  She said that for the last month or so pt was in [facility] icu then physical therapy  Pt went through some stress and she thinks that that is the reason for has episodes of high heart rate on the ICD interrogation  She wants to know if you still wants the pt to take new dosage of metoprolol 37 5 mg twice daily  Please advise  Thanks!

## 2022-08-12 NOTE — TELEPHONE ENCOUNTER
Spoke with Kay Sun (pt's daughter-in-law) and she said that they should be receiving pt's new bubble pack w/ new dosage today and that pt will start new dosage as of today  meds list was updated to reflect new dosage and new prescription was sent to pt's pharmacy      Kay Sun was informed

## 2022-08-15 DIAGNOSIS — B37.0 ORAL THRUSH: ICD-10-CM

## 2022-08-16 ENCOUNTER — TELEPHONE (OUTPATIENT)
Dept: INTERNAL MEDICINE CLINIC | Facility: CLINIC | Age: 82
End: 2022-08-16

## 2022-08-25 ENCOUNTER — TELEPHONE (OUTPATIENT)
Dept: UROLOGY | Facility: AMBULATORY SURGERY CENTER | Age: 82
End: 2022-08-25

## 2022-08-25 DIAGNOSIS — R30.0 DYSURIA: Primary | ICD-10-CM

## 2022-08-25 NOTE — TELEPHONE ENCOUNTER
Called and left VM for patient to call our office back  If clinical : please advise urine testing was placed  Patient can go to any [facility] lab to have this done  Patient should increase water and monitor and fevers, chills, nausea, vomiting  If those side effects occur patient may need to go to ER

## 2022-08-25 NOTE — TELEPHONE ENCOUNTER
Patient is seen in [facility]     Patient was last seen 10/28/22    Patient is having difficulty urinating with burning  Patient states that when he urinates it is stop and start and it is very frequent at night       Patient states that the color of his urine is bright yellow     Patient can be reached at [phone]

## 2022-08-26 NOTE — TELEPHONE ENCOUNTER
Called and spoke to patient's wife, Bess Fink  Inform Татьяна we have placed urine testing orders in chart and to have patient increase water intake  Given the weekend I informed Татьяна to monitor patient for fevers, chills, nausea, vomiting go directly to the emergency room if this may occur  Татьяна verbalized understanding

## 2022-08-30 NOTE — TELEPHONE ENCOUNTER
Called and spoke to patient's wife  Patient is currently having eye surgery right now  Patient is currently feeling better but [facility] stated she will take him either Thursday or Friday for urine testing

## 2022-09-08 ENCOUNTER — APPOINTMENT (OUTPATIENT)
Dept: LAB | Facility: HOSPITAL | Age: 82
End: 2022-09-08
Payer: COMMERCIAL

## 2022-09-08 ENCOUNTER — TELEPHONE (OUTPATIENT)
Dept: UROLOGY | Facility: CLINIC | Age: 82
End: 2022-09-08

## 2022-09-08 DIAGNOSIS — N30.00 ACUTE CYSTITIS WITHOUT HEMATURIA: Primary | ICD-10-CM

## 2022-09-08 DIAGNOSIS — R30.0 DYSURIA: ICD-10-CM

## 2022-09-08 LAB
BACTERIA UR QL AUTO: ABNORMAL /HPF
BILIRUB UR QL STRIP: NEGATIVE
CLARITY UR: ABNORMAL
COLOR UR: YELLOW
GLUCOSE UR STRIP-MCNC: ABNORMAL MG/DL
HGB UR QL STRIP.AUTO: NEGATIVE
KETONES UR STRIP-MCNC: NEGATIVE MG/DL
LEUKOCYTE ESTERASE UR QL STRIP: ABNORMAL
NITRITE UR QL STRIP: POSITIVE
NON-SQ EPI CELLS URNS QL MICRO: ABNORMAL /HPF
PH UR STRIP.AUTO: 5.5 [PH]
PROT UR STRIP-MCNC: NEGATIVE MG/DL
RBC #/AREA URNS AUTO: ABNORMAL /HPF
SP GR UR STRIP.AUTO: >=1.03 (ref 1–1.03)
UROBILINOGEN UR QL STRIP.AUTO: 0.2 E.U./DL
WBC #/AREA URNS AUTO: ABNORMAL /HPF

## 2022-09-08 PROCEDURE — 81001 URINALYSIS AUTO W/SCOPE: CPT

## 2022-09-08 PROCEDURE — 87077 CULTURE AEROBIC IDENTIFY: CPT

## 2022-09-08 PROCEDURE — 87186 SC STD MICRODIL/AGAR DIL: CPT

## 2022-09-08 PROCEDURE — 87086 URINE CULTURE/COLONY COUNT: CPT

## 2022-09-08 RX ORDER — CEPHALEXIN 500 MG/1
500 CAPSULE ORAL EVERY 12 HOURS SCHEDULED
Qty: 14 CAPSULE | Refills: 0 | Status: SHIPPED | OUTPATIENT
Start: 2022-09-08 | End: 2022-09-15

## 2022-09-08 NOTE — TELEPHONE ENCOUNTER
Left message for patient with info regarding urine testing looking indicative of infection, antibiotic was sent to pharmacy  Office to monitor for urine culture    Call back with questions

## 2022-09-08 NOTE — TELEPHONE ENCOUNTER
----- Message from Diana Bonner PA-C sent at 9/8/2022  3:18 PM EDT -----  Preliminary urine testing appears positive for infection  Urine culture pending  Antibiotic has been sent to his pharmacy  Upon chart review patient has not been seen since December of 2019  In the future cannot prescribe medications or order urin  e testing without office visit  He would need to see his PCP for this unless he wishes to continue care with our office and follow-up regularly

## 2022-09-10 LAB — BACTERIA UR CULT: ABNORMAL

## 2022-09-12 DIAGNOSIS — B37.0 ORAL THRUSH: ICD-10-CM

## 2022-09-12 NOTE — TELEPHONE ENCOUNTER
Called and left VM for Татьяна  Informed Татьяна abx is appropriate and does not need to be adjusted  Office number left in vm for any other concerns

## 2022-09-13 NOTE — TELEPHONE ENCOUNTER
Returned call to patient   Spoke with patients daughter in law reports frequency , urgency , and burning  Patient daughter in law states that she did not receive any messages from our office in reference to results from urine testing  Advised patients daughter in law that he was confirmed with an infection and was prescribed antibiotic by our office  Daughter in law will go to pharmacy and  medication  He has not been seen in our office since 2019   appt given to reestablish care for 10/10/22

## 2022-09-13 NOTE — TELEPHONE ENCOUNTER
Daughter in law calling to inquire patients lab results and who and reason for antibiotic prescribed  Please call  She states nobody has called since he gave urine samples

## 2022-10-10 ENCOUNTER — OFFICE VISIT (OUTPATIENT)
Dept: UROLOGY | Facility: CLINIC | Age: 82
End: 2022-10-10
Payer: COMMERCIAL

## 2022-10-10 VITALS
DIASTOLIC BLOOD PRESSURE: 58 MMHG | OXYGEN SATURATION: 97 % | HEART RATE: 60 BPM | BODY MASS INDEX: 21.21 KG/M2 | HEIGHT: 66 IN | WEIGHT: 132 LBS | SYSTOLIC BLOOD PRESSURE: 120 MMHG

## 2022-10-10 DIAGNOSIS — N39.0 RECURRENT UTI: Primary | ICD-10-CM

## 2022-10-10 LAB
POST-VOID RESIDUAL VOLUME, ML POC: 8 ML
SL AMB  POCT GLUCOSE, UA: 1
SL AMB LEUKOCYTE ESTERASE,UA: NORMAL
SL AMB POCT BILIRUBIN,UA: NORMAL
SL AMB POCT BLOOD,UA: NORMAL
SL AMB POCT CLARITY,UA: CLEAR
SL AMB POCT COLOR,UA: YELLOW
SL AMB POCT KETONES,UA: NORMAL
SL AMB POCT NITRITE,UA: NORMAL
SL AMB POCT PH,UA: 5
SL AMB POCT SPECIFIC GRAVITY,UA: 1.02
SL AMB POCT URINE PROTEIN: NORMAL
SL AMB POCT UROBILINOGEN: 0.2

## 2022-10-10 PROCEDURE — 51798 US URINE CAPACITY MEASURE: CPT | Performed by: PHYSICIAN ASSISTANT

## 2022-10-10 PROCEDURE — 99213 OFFICE O/P EST LOW 20 MIN: CPT | Performed by: PHYSICIAN ASSISTANT

## 2022-10-10 PROCEDURE — 81002 URINALYSIS NONAUTO W/O SCOPE: CPT | Performed by: PHYSICIAN ASSISTANT

## 2022-10-10 RX ORDER — ERGOCALCIFEROL (VITAMIN D2) 1250 MCG
1 CAPSULE ORAL WEEKLY
COMMUNITY
Start: 2022-09-21 | End: 2022-12-14

## 2022-10-10 RX ORDER — ERGOCALCIFEROL 1.25 MG/1
50000 CAPSULE ORAL WEEKLY
COMMUNITY
Start: 2022-09-21

## 2022-10-10 RX ORDER — REPAGLINIDE 0.5 MG/1
TABLET ORAL
COMMUNITY
Start: 2022-10-03

## 2022-10-10 RX ORDER — BLOOD SUGAR DIAGNOSTIC
STRIP MISCELLANEOUS
COMMUNITY
Start: 2022-08-13

## 2022-10-10 RX ORDER — BACITRACIN 500 UNIT/G
OINTMENT (GRAM) TOPICAL
COMMUNITY
Start: 2022-09-21

## 2022-10-10 RX ORDER — ISOPROPYL ALCOHOL 0.7 ML/ML
SWAB TOPICAL
COMMUNITY
Start: 2022-09-23

## 2022-10-10 RX ORDER — LOTEPREDNOL ETABONATE 10 MG/ML
SUSPENSION TOPICAL
COMMUNITY
Start: 2022-09-03

## 2022-10-10 RX ORDER — ASPIRIN 81 MG/1
TABLET ORAL
COMMUNITY
Start: 2022-09-21 | End: 2022-10-14

## 2022-10-10 RX ORDER — BROMFENAC SODIUM 0.7 MG/ML
SOLUTION/ DROPS OPHTHALMIC
COMMUNITY
Start: 2022-10-04

## 2022-10-10 NOTE — PROGRESS NOTES
10/10/2022      Chief Complaint   Patient presents with   • Frequent UTI   • Urinary Frequency         Assessment and Plan    81 y o  male    1  BPH with LUTS  - Cysto TRUS (12/13/19) showing enlarged prostate with recommendations for urolift  - UA today negative for nitrites, leukocytes, blood  - PVR today 8 mL  - Discussed conservative measures with adequate hydration, avoidance of bladder irritants, avoidance of constipation  - Discussed repeat cysto TRUS as it has been over 2 years for reassessment of Urolift  - Call with any questions or concerns in the meantime  - All questions answered; patient understands and agrees with plan      History of Present Illness  Lynette Wheeler is a 81 y o  male patient with history of BPH with LUTS here for follow up  Previously was seen for cystoscopy and TRUS in 2019 with recommendations to have Urolift  Unfortunately, patient was lost to follow up  Presents today with return of LUTS that is bothersome for him  Denies gross hematuria, dysuria, flank pain, suprapubic pressure, fever, chills, nausea, vomiting  Review of Systems   Constitutional: Negative for activity change, appetite change, chills and fever  HENT: Negative for congestion and trouble swallowing  Respiratory: Negative for cough and shortness of breath  Cardiovascular: Negative for chest pain, palpitations and leg swelling  Gastrointestinal: Negative for abdominal pain, constipation, diarrhea, nausea and vomiting  Genitourinary: Positive for frequency and urgency  Negative for difficulty urinating, dysuria, flank pain and hematuria  Musculoskeletal: Negative for back pain and gait problem  Skin: Negative for wound  Allergic/Immunologic: Negative for immunocompromised state  Neurological: Negative for dizziness and syncope  Hematological: Does not bruise/bleed easily  Psychiatric/Behavioral: Negative for confusion  All other systems reviewed and are negative        Vitals  Vitals: 10/10/22 1451   BP: 120/58   Pulse: 60   SpO2: 97%   Weight: 59 9 kg (132 lb)   Height: 5' 6" (1 676 m)       Physical Exam  Constitutional:       General: He is not in acute distress  Appearance: Normal appearance  He is not ill-appearing, toxic-appearing or diaphoretic  HENT:      Head: Normocephalic  Nose: No congestion  Eyes:      General: No scleral icterus  Right eye: No discharge  Left eye: No discharge  Conjunctiva/sclera: Conjunctivae normal       Pupils: Pupils are equal, round, and reactive to light  Pulmonary:      Effort: Pulmonary effort is normal    Musculoskeletal:      Cervical back: Normal range of motion  Skin:     General: Skin is warm and dry  Coloration: Skin is not jaundiced or pale  Findings: No bruising, erythema, lesion or rash  Neurological:      General: No focal deficit present  Mental Status: He is alert and oriented to person, place, and time  Mental status is at baseline  Gait: Gait normal    Psychiatric:         Mood and Affect: Mood normal          Behavior: Behavior normal          Thought Content:  Thought content normal          Judgment: Judgment normal            Past History  Past Medical History:   Diagnosis Date   • Acquired cyst of kidney    • Anemia    • Benign paroxysmal vertigo, unspecified ear    • Cellulitis of leg    • Cervical spinal stenosis    • Chest pain    • Coronary atherosclerosis of native coronary artery    • Enlarged prostate    • Esophageal candidiasis (HCC)    • Hematuria    • Herpes zoster    • Hyperlipidemia    • Hypertension    • Incomplete emptying of bladder    • Inflamed seborrheic keratosis    • Internal hemorrhoids    • Malignant neoplasm of skin of trunk    • Myocardial infarction [facility])    • Nonmelanoma skin cancer     LAST ASSESSED: 8/9/17   • 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 infarction    • Paroxysmal tachycardia (HCC)    • Shortness of breath    • Vitamin B deficiency      Social History Socioeconomic History   • Marital status:      Spouse name: None   • Number of children: None   • Years of education: None   • Highest education level: None   Occupational History   • Occupation: RETIRED   Tobacco Use   • Smoking status: Former Smoker     Packs/day: 1 00     Years: 10 00     Pack years: 10 00     Types: Cigarettes     Quit date: 1978     Years since quittin 6   • Smokeless tobacco: Never Used   Vaping Use   • Vaping Use: Never used   Substance and Sexual Activity   • Alcohol use: Yes     Comment: occasionally 1-2 per month    • Drug use: No   • Sexual activity: Not Currently     Partners: Female   Other Topics Concern   • None   Social History Narrative    LIVING INDEPENDENTLY WITH SPOUSE    NO ADVANCE DIRECTIVE ON FILE     Social Determinants of Health     Financial Resource Strain: Not on file   Food Insecurity: No Food Insecurity   • Worried About Running Out of Food in the Last Year: Never true   • Ran Out of Food in the Last Year: Never true   Transportation Needs: No Transportation Needs   • Lack of Transportation (Medical): No   • Lack of Transportation (Non-Medical):  No   Physical Activity: Inactive   • Days of Exercise per Week: 0 days   • Minutes of Exercise per Session: 0 min   Stress: Stress Concern Present   • Feeling of Stress : Rather much   Social Connections: Not on file   Intimate Partner Violence: Not on file   Housing Stability: Low Risk    • Unable to Pay for Housing in the Last Year: No   • Number of Places Lived in the Last Year: 1   • Unstable Housing in the Last Year: No     Social History     Tobacco Use   Smoking Status Former Smoker   • Packs/day: 1 00   • Years: 10 00   • Pack years: 10 00   • Types: Cigarettes   • Quit date: 1978   • Years since quittin 6   Smokeless Tobacco Never Used     Family History   Problem Relation Age of Onset   • Heart disease Mother    • Coronary artery disease Mother    • Sudden death Mother    • Cancer Father throat; MALIGNANT NEOPLASM OF HEAD, FACE OR NECK   • Throat cancer Father    • Cancer Family    • Coronary artery disease Family        The following portions of the patient's history were reviewed and updated as appropriate: allergies, current medications, past medical history, past social history, past surgical history and problem list     Results  Recent Results (from the past 1 hour(s))   POCT Measure PVR    Collection Time: 10/10/22  2:57 PM   Result Value Ref Range    POST-VOID RESIDUAL VOLUME, ML POC 8 mL   POCT urine dip    Collection Time: 10/10/22  2:59 PM   Result Value Ref Range    LEUKOCYTE ESTERASE,UA -     NITRITE,UA -     SL AMB POCT UROBILINOGEN 0 2     POCT URINE PROTEIN +      PH,UA 5 0     BLOOD,UA -     SPECIFIC GRAVITY,UA 1 020     KETONES,UA -     BILIRUBIN,UA -     GLUCOSE, UA 1      COLOR,UA Yellow     CLARITY,UA Clear    ]  Lab Results   Component Value Date    PSA 3 1 05/24/2016    PSA 5 3 (H) 10/02/2015    PSA 11 88 (H) 05/21/2015    PSA 6 66 (H) 09/08/2014     Lab Results   Component Value Date    GLUCOSE 180 (H) 10/02/2015    CALCIUM 8 0 (L) 07/12/2022     10/02/2015    K 3 8 07/12/2022    CO2 26 07/12/2022     07/12/2022    BUN 18 07/12/2022    CREATININE 0 85 07/12/2022     Lab Results   Component Value Date    WBC 5 87 07/12/2022    HGB 11 3 (L) 07/12/2022    HCT 34 0 (L) 07/12/2022     (H) 07/12/2022     07/12/2022       Sacha Poppy

## 2022-10-28 DIAGNOSIS — B37.0 THRUSH: Primary | ICD-10-CM

## 2022-10-28 NOTE — TELEPHONE ENCOUNTER
[facility]  Please forward to your office staff for further review as this medication was reviewed by a [facility] RN

## 2022-11-02 ENCOUNTER — OFFICE VISIT (OUTPATIENT)
Dept: INTERNAL MEDICINE CLINIC | Facility: CLINIC | Age: 82
End: 2022-11-02

## 2022-11-02 ENCOUNTER — APPOINTMENT (OUTPATIENT)
Dept: LAB | Facility: CLINIC | Age: 82
End: 2022-11-02

## 2022-11-02 VITALS
DIASTOLIC BLOOD PRESSURE: 58 MMHG | HEIGHT: 65 IN | TEMPERATURE: 98.4 F | RESPIRATION RATE: 16 BRPM | BODY MASS INDEX: 22.08 KG/M2 | WEIGHT: 132.5 LBS | OXYGEN SATURATION: 97 % | SYSTOLIC BLOOD PRESSURE: 114 MMHG | HEART RATE: 83 BPM

## 2022-11-02 DIAGNOSIS — Z00.00 MEDICARE ANNUAL WELLNESS VISIT, SUBSEQUENT: ICD-10-CM

## 2022-11-02 DIAGNOSIS — Z23 ENCOUNTER FOR IMMUNIZATION: ICD-10-CM

## 2022-11-02 DIAGNOSIS — Z79.4 TYPE 2 DIABETES MELLITUS WITH BOTH EYES AFFECTED BY MILD NONPROLIFERATIVE RETINOPATHY WITHOUT MACULAR EDEMA, WITH LONG-TERM CURRENT USE OF INSULIN (HCC): Primary | ICD-10-CM

## 2022-11-02 DIAGNOSIS — M25.511 CHRONIC RIGHT SHOULDER PAIN: ICD-10-CM

## 2022-11-02 DIAGNOSIS — E11.3293 TYPE 2 DIABETES MELLITUS WITH BOTH EYES AFFECTED BY MILD NONPROLIFERATIVE RETINOPATHY WITHOUT MACULAR EDEMA, WITH LONG-TERM CURRENT USE OF INSULIN (HCC): ICD-10-CM

## 2022-11-02 DIAGNOSIS — Z79.4 TYPE 2 DIABETES MELLITUS WITH BOTH EYES AFFECTED BY MILD NONPROLIFERATIVE RETINOPATHY WITHOUT MACULAR EDEMA, WITH LONG-TERM CURRENT USE OF INSULIN (HCC): ICD-10-CM

## 2022-11-02 DIAGNOSIS — I25.10 ATHEROSCLEROSIS OF NATIVE CORONARY ARTERY OF NATIVE HEART WITHOUT ANGINA PECTORIS: ICD-10-CM

## 2022-11-02 DIAGNOSIS — G89.29 CHRONIC RIGHT SHOULDER PAIN: ICD-10-CM

## 2022-11-02 DIAGNOSIS — R39.14 BENIGN PROSTATIC HYPERPLASIA WITH INCOMPLETE BLADDER EMPTYING: ICD-10-CM

## 2022-11-02 DIAGNOSIS — E11.3293 TYPE 2 DIABETES MELLITUS WITH BOTH EYES AFFECTED BY MILD NONPROLIFERATIVE RETINOPATHY WITHOUT MACULAR EDEMA, WITH LONG-TERM CURRENT USE OF INSULIN (HCC): Primary | ICD-10-CM

## 2022-11-02 DIAGNOSIS — I25.5 ISCHEMIC CARDIOMYOPATHY: ICD-10-CM

## 2022-11-02 DIAGNOSIS — N40.1 BENIGN PROSTATIC HYPERPLASIA WITH INCOMPLETE BLADDER EMPTYING: ICD-10-CM

## 2022-11-02 PROBLEM — U07.1 COVID-19: Status: RESOLVED | Noted: 2022-06-30 | Resolved: 2022-11-02

## 2022-11-02 LAB
ALBUMIN SERPL BCP-MCNC: 3.7 G/DL (ref 3.5–5)
ALP SERPL-CCNC: 86 U/L (ref 46–116)
ALT SERPL W P-5'-P-CCNC: 26 U/L (ref 12–78)
ANION GAP SERPL CALCULATED.3IONS-SCNC: 4 MMOL/L (ref 4–13)
AST SERPL W P-5'-P-CCNC: 18 U/L (ref 5–45)
BASOPHILS # BLD AUTO: 0.04 THOUSANDS/ÂΜL (ref 0–0.1)
BASOPHILS NFR BLD AUTO: 1 % (ref 0–1)
BILIRUB SERPL-MCNC: 0.46 MG/DL (ref 0.2–1)
BUN SERPL-MCNC: 16 MG/DL (ref 5–25)
CALCIUM SERPL-MCNC: 9.4 MG/DL (ref 8.3–10.1)
CHLORIDE SERPL-SCNC: 103 MMOL/L (ref 96–108)
CHOLEST SERPL-MCNC: 120 MG/DL
CO2 SERPL-SCNC: 30 MMOL/L (ref 21–32)
CREAT SERPL-MCNC: 1.07 MG/DL (ref 0.6–1.3)
EOSINOPHIL # BLD AUTO: 0.19 THOUSAND/ÂΜL (ref 0–0.61)
EOSINOPHIL NFR BLD AUTO: 4 % (ref 0–6)
ERYTHROCYTE [DISTWIDTH] IN BLOOD BY AUTOMATED COUNT: 16.2 % (ref 11.6–15.1)
EST. AVERAGE GLUCOSE BLD GHB EST-MCNC: 223 MG/DL
GFR SERPL CREATININE-BSD FRML MDRD: 64 ML/MIN/1.73SQ M
GLUCOSE P FAST SERPL-MCNC: 360 MG/DL (ref 65–99)
HBA1C MFR BLD: 9.4 %
HCT VFR BLD AUTO: 40.1 % (ref 36.5–49.3)
HDLC SERPL-MCNC: 45 MG/DL
HGB BLD-MCNC: 13.2 G/DL (ref 12–17)
IMM GRANULOCYTES # BLD AUTO: 0.01 THOUSAND/UL (ref 0–0.2)
IMM GRANULOCYTES NFR BLD AUTO: 0 % (ref 0–2)
LDLC SERPL CALC-MCNC: 45 MG/DL (ref 0–100)
LYMPHOCYTES # BLD AUTO: 1.35 THOUSANDS/ÂΜL (ref 0.6–4.47)
LYMPHOCYTES NFR BLD AUTO: 26 % (ref 14–44)
MCH RBC QN AUTO: 37.1 PG (ref 26.8–34.3)
MCHC RBC AUTO-ENTMCNC: 32.9 G/DL (ref 31.4–37.4)
MCV RBC AUTO: 113 FL (ref 82–98)
MONOCYTES # BLD AUTO: 0.74 THOUSAND/ÂΜL (ref 0.17–1.22)
MONOCYTES NFR BLD AUTO: 14 % (ref 4–12)
NEUTROPHILS # BLD AUTO: 2.84 THOUSANDS/ÂΜL (ref 1.85–7.62)
NEUTS SEG NFR BLD AUTO: 55 % (ref 43–75)
NONHDLC SERPL-MCNC: 75 MG/DL
NRBC BLD AUTO-RTO: 0 /100 WBCS
PLATELET # BLD AUTO: 215 THOUSANDS/UL (ref 149–390)
PMV BLD AUTO: 10.4 FL (ref 8.9–12.7)
POTASSIUM SERPL-SCNC: 3.9 MMOL/L (ref 3.5–5.3)
PROT SERPL-MCNC: 7.6 G/DL (ref 6.4–8.4)
RBC # BLD AUTO: 3.56 MILLION/UL (ref 3.88–5.62)
SODIUM SERPL-SCNC: 137 MMOL/L (ref 135–147)
TRIGL SERPL-MCNC: 148 MG/DL
TSH SERPL DL<=0.05 MIU/L-ACNC: 0.17 UIU/ML (ref 0.45–4.5)
WBC # BLD AUTO: 5.17 THOUSAND/UL (ref 4.31–10.16)

## 2022-11-02 RX ORDER — BLOOD SUGAR DIAGNOSTIC
1 STRIP MISCELLANEOUS DAILY
Qty: 100 STRIP | Refills: 5 | Status: SHIPPED | OUTPATIENT
Start: 2022-11-02

## 2022-11-02 NOTE — PATIENT INSTRUCTIONS
Medicare Preventive Visit Patient Instructions  Thank you for completing your Welcome to Medicare Visit or Medicare Annual Wellness Visit today  Your next wellness visit will be due in one year (11/3/2023)  The screening/preventive services that you may require over the next 5-10 years are detailed below  Some tests may not apply to you based off risk factors and/or age  Screening tests ordered at today's visit but not completed yet may show as past due  Also, please note that scanned in results may not display below  Preventive Screenings:  Service Recommendations Previous Testing/Comments   Colorectal Cancer Screening  · Colonoscopy    · Fecal Occult Blood Test (FOBT)/Fecal Immunochemical Test (FIT)  · Fecal DNA/Cologuard Test  · Flexible Sigmoidoscopy Age: 39-70 years old   Colonoscopy: every 10 years (May be performed more frequently if at higher risk)  OR  FOBT/FIT: every 1 year  OR  Cologuard: every 3 years  OR  Sigmoidoscopy: every 5 years  Screening may be recommended earlier than age 39 if at higher risk for colorectal cancer  Also, an individualized decision between you and your healthcare provider will decide whether screening between the ages of 74-80 would be appropriate   Colonoscopy: 10/07/2008  FOBT/FIT: Not on file  Cologuard: Not on file  Sigmoidoscopy: Not on file          Prostate Cancer Screening Individualized decision between patient and health care provider in men between ages of 53-78   Medicare will cover every 12 months beginning on the day after your 50th birthday PSA: No results in last 5 years     Screening Not Indicated     Hepatitis C Screening Once for adults born between 1945 and 1965  More frequently in patients at high risk for Hepatitis C Hep C Antibody: Not on file        Diabetes Screening 1-2 times per year if you're at risk for diabetes or have pre-diabetes Fasting glucose: 243 mg/dL (2/2/2022)  A1C: 8 8 % (6/27/2022)  Screening Not Indicated  History Diabetes Cholesterol Screening Once every 5 years if you don't have a lipid disorder  May order more often based on risk factors  Lipid panel: 06/27/2022  Screening Not Indicated  History Lipid Disorder      Other Preventive Screenings Covered by Medicare:  1  Abdominal Aortic Aneurysm (AAA) Screening: covered once if your at risk  You're considered to be at risk if you have a family history of AAA or a male between the age of 73-68 who smoking at least 100 cigarettes in your lifetime  2  Lung Cancer Screening: covers low dose CT scan once per year if you meet all of the following conditions: (1) Age 50-69; (2) No signs or symptoms of lung cancer; (3) Current smoker or have quit smoking within the last 15 years; (4) You have a tobacco smoking history of at least 20 pack years (packs per day x number of years you smoked); (5) You get a written order from a healthcare provider  3  Glaucoma Screening: covered annually if you're considered high risk: (1) You have diabetes OR (2) Family history of glaucoma OR (3)  aged 48 and older OR (3)  American aged 72 and older  3  Osteoporosis Screening: covered every 2 years if you meet one of the following conditions: (1) Have a vertebral abnormality; (2) On glucocorticoid therapy for more than 3 months; (3) Have primary hyperparathyroidism; (4) On osteoporosis medications and need to assess response to drug therapy  5  HIV Screening: covered annually if you're between the age of 12-76  Also covered annually if you are younger than 13 and older than 72 with risk factors for HIV infection  For pregnant patients, it is covered up to 3 times per pregnancy      Immunizations:  Immunization Recommendations   Influenza Vaccine Annual influenza vaccination during flu season is recommended for all persons aged >= 6 months who do not have contraindications   Pneumococcal Vaccine   * Pneumococcal conjugate vaccine = PCV13 (Prevnar 13), PCV15 (Vaxneuvance), PCV20 (Prevnar 20)  * Pneumococcal polysaccharide vaccine = PPSV23 (Pneumovax) Adults 2364 years old: 1-3 doses may be recommended based on certain risk factors  Adults 72 years old: 1-2 doses may be recommended based off what pneumonia vaccine you previously received   Hepatitis B Vaccine 3 dose series if at intermediate or high risk (ex: diabetes, end stage renal disease, liver disease)   Tetanus (Td) Vaccine - COST NOT COVERED BY MEDICARE PART B Following completion of primary series, a booster dose should be given every 10 years to maintain immunity against tetanus  Td may also be given as tetanus wound prophylaxis  Tdap Vaccine - COST NOT COVERED BY MEDICARE PART B Recommended at least once for all adults  For pregnant patients, recommended with each pregnancy  Shingles Vaccine (Shingrix) - COST NOT COVERED BY MEDICARE PART B  2 shot series recommended in those aged 48 and above     Health Maintenance Due:  There are no preventive care reminders to display for this patient  Immunizations Due:      Topic Date Due   • COVID-19 Vaccine (3 - Booster for Pfizer series) 10/06/2021     Advance Directives   What are advance directives? Advance directives are legal documents that state your wishes and plans for medical care  These plans are made ahead of time in case you lose your ability to make decisions for yourself  Advance directives can apply to any medical decision, such as the treatments you want, and if you want to donate organs  What are the types of advance directives? There are many types of advance directives, and each state has rules about how to use them  You may choose a combination of any of the following:  · Living will: This is a written record of the treatment you want  You can also choose which treatments you do not want, which to limit, and which to stop at a certain time  This includes surgery, medicine, IV fluid, and tube feedings  · Durable power of  for healthcare [facility]):   This is a written record that states who you want to make healthcare choices for you when you are unable to make them for yourself  This person, called a proxy, is usually a family member or a friend  You may choose more than 1 proxy  · Do not resuscitate (DNR) order:  A DNR order is used in case your heart stops beating or you stop breathing  It is a request not to have certain forms of treatment, such as CPR  A DNR order may be included in other types of advance directives  · Medical directive: This covers the care that you want if you are in a coma, near death, or unable to make decisions for yourself  You can list the treatments you want for each condition  Treatment may include pain medicine, surgery, blood transfusions, dialysis, IV or tube feedings, and a ventilator (breathing machine)  · Values history: This document has questions about your views, beliefs, and how you feel and think about life  This information can help others choose the care that you would choose  Why are advance directives important? An advance directive helps you control your care  Although spoken wishes may be used, it is better to have your wishes written down  Spoken wishes can be misunderstood, or not followed  Treatments may be given even if you do not want them  An advance directive may make it easier for your family to make difficult choices about your care  Fall Prevention    Fall prevention  includes ways to make your home and other areas safer  It also includes ways you can move more carefully to prevent a fall  Health conditions that cause changes in your blood pressure, vision, or muscle strength and coordination may increase your risk for falls  Medicines may also increase your risk for falls if they make you dizzy, weak, or sleepy  Fall prevention tips:   · Stand or sit up slowly  · Use assistive devices as directed  · Wear shoes that fit well and have soles that   · Wear a personal alarm  · Stay active  · Manage your medical conditions  Home Safety Tips:  · Add items to prevent falls in the bathroom  · Keep paths clear  · Install bright lights in your home  · Keep items you use often on shelves within reach  · Paint or place reflective tape on the edges of your stairs  © Copyright [employer] 2018 Information is for End User's use only and may not be sold, redistributed or otherwise used for commercial purposes   All illustrations and images included in CareNotes® are the copyrighted property of [employer]  or [address] [place] [address] [employer]

## 2022-11-02 NOTE — PROGRESS NOTES
Assessment and Plan:    this is Dr Marley Spencer patient who wants to switch  Type 2 diabetes is not very well controlled but he has not had blood work done  He was told to get blood work done and I will let him know if there is any change in his medications  He has not been doing his blood glucose levels in the morning at home as his test strips are   They were reordered  He was advised to cut down on the carbohydrates in his diet  He was advised to follow-up with cardiology for the cardiomyopathy and coronary artery disease   He continues to complain of right shoulder pain and was told to go back to his orthopedic surgeon as the physical therapy did not work for him  He had a shot in his shoulder which only helped for a little while   He has prostatic hypertrophy with symptoms and has an appointment with the urologist         Problem List Items Addressed This Visit        Endocrine    Type 2 diabetes mellitus with mild nonproliferative diabetic retinopathy without macular edema, bilateral (HCC) - Primary    Relevant Medications    OneTouch Verio test strip    Other Relevant Orders    Microalbumin / creatinine urine ratio ([facility])    IRIS Diabetic eye exam    CBC and differential    Comprehensive metabolic panel    Lipid panel    TSH, 3rd generation    Hemoglobin A1C       Cardiovascular and Mediastinum    Ischemic cardiomyopathy    Coronary atherosclerosis of native coronary artery       Genitourinary    BPH (benign prostatic hyperplasia)       Other    Chronic right shoulder pain      Other Visit Diagnoses     Encounter for immunization        Relevant Orders    influenza vaccine, high-dose, PF 0 7 mL (FLUZONE HIGH-DOSE) (Completed)    Medicare annual wellness visit, subsequent               Preventive health issues were discussed with patient, and age appropriate screening tests were ordered as noted in patient's After Visit Summary    Personalized health advice and appropriate referrals for health education or preventive services given if needed, as noted in patient's After Visit Summary  History of Present Illness:     Patient presents for a Medicare Wellness Visit    HPI   Patient of Dr Caren Morrow, wants to establish care      Patient Care Team:  Tawana Dueñas MD as PCP - General (Internal Medicine)  Constantino Hollins MD as PCP - [address] [place] [address] (RTE)  MD Selvin Cota MD Waddell Sayers, MD as Endoscopist     Review of Systems:     Review of Systems   Constitutional: Negative for chills, diaphoresis, fatigue and fever  HENT: Negative for congestion, ear discharge, ear pain, hearing loss, postnasal drip, rhinorrhea, sinus pressure, sinus pain, sneezing, sore throat and voice change  Eyes: Negative for pain, discharge, redness and visual disturbance  Respiratory: Negative for cough, chest tightness, shortness of breath and wheezing  Cardiovascular: Negative for chest pain, palpitations and leg swelling  Gastrointestinal: Negative for abdominal distention, abdominal pain, blood in stool, constipation, diarrhea, nausea and vomiting  Endocrine: Negative for cold intolerance, heat intolerance, polydipsia, polyphagia and polyuria  Genitourinary: Negative for dysuria, flank pain, frequency, hematuria and urgency  Musculoskeletal: Negative for arthralgias, back pain, gait problem, joint swelling, myalgias, neck pain and neck stiffness  Skin: Negative for rash  Neurological: Negative for dizziness, tremors, syncope, facial asymmetry, speech difficulty, weakness, light-headedness, numbness and headaches  Hematological: Does not bruise/bleed easily  Psychiatric/Behavioral: Negative for behavioral problems, confusion and sleep disturbance  The patient is not nervous/anxious           Problem List:     Patient Active Problem List   Diagnosis   • Anemia   • BPH (benign prostatic hyperplasia)   • Ischemic cardiomyopathy   • Coronary atherosclerosis of native coronary artery   • Urinary retention   • Type 2 diabetes mellitus with mild nonproliferative diabetic retinopathy without macular edema, bilateral (HCC)   • Physical deconditioning   • Oropharyngeal dysphagia   • Chronic right shoulder pain      Past Medical and Surgical History:     Past Medical History:   Diagnosis Date   • Acquired cyst of kidney    • Anemia    • Benign paroxysmal vertigo, unspecified ear    • Cellulitis of leg    • Cervical spinal stenosis    • Chest pain    • Coronary atherosclerosis of native coronary artery    • Enlarged prostate    • Esophageal candidiasis (HCC)    • H/o COVID-19 6/30/2022   • Hematuria    • Herpes zoster    • Hyperlipidemia    • Hypertension    • Incomplete emptying of bladder    • Inflamed seborrheic keratosis    • Internal hemorrhoids    • Malignant neoplasm of skin of trunk    • Myocardial infarction [facility])    • Nonmelanoma skin cancer     LAST ASSESSED: 8/9/17   • 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 infarction    • Paroxysmal tachycardia (HCC)    • Shortness of breath    • Vitamin B deficiency      Past Surgical History:   Procedure Laterality Date   • CARDIAC DEFIBRILLATOR PLACEMENT     • COLONOSCOPY  10/07/2008    FIBEROPTIC SCREENING; NO FURTHER RECOMMENDATIONS   • CORONARY ANGIOPLASTY WITH STENT PLACEMENT     • CORONARY ANGIOPLASTY WITH STENT PLACEMENT     • CYSTOSCOPY  11/06/2015    DIAGNOSTIC; MANAGED BY: June Castro   • EGD AND COLONOSCOPY N/A 2/12/2018    Procedure: EGD AND COLONOSCOPY;  Surgeon: Jocelyn Henderson MD;  Location: [facility];   Service: Gastroenterology   • FL INJECTION RIGHT SHOULDER (ARTHROGRAM)  1/4/2022   • HIP ARTHROPLASTY Right    • INSERT / REPLACE / REMOVE PACEMAKER     • JOINT REPLACEMENT Right    • TRUNK SKIN LESION EXCISIONAL BIOPSY  08/22/2007    MALIGNANT; BCC CHEST      Family History:     Family History   Problem Relation Age of Onset   • Heart disease Mother    • Coronary artery disease Mother    • Sudden death Mother    • Cancer Father throat; MALIGNANT NEOPLASM OF HEAD, FACE OR NECK   • Throat cancer Father    • Cancer Family    • Coronary artery disease Family       Social History:     Social History     Socioeconomic History   • Marital status:      Spouse name: None   • Number of children: None   • Years of education: None   • Highest education level: None   Occupational History   • Occupation: RETIRED   Tobacco Use   • Smoking status: Former Smoker     Packs/day: 1 00     Years: 10 00     Pack years: 10 00     Types: Cigarettes     Quit date: 1978     Years since quittin 7   • Smokeless tobacco: Never Used   Vaping Use   • Vaping Use: Never used   Substance and Sexual Activity   • Alcohol use: Yes     Comment: occasionally 1-2 per month    • Drug use: No   • Sexual activity: Not Currently     Partners: Female   Other Topics Concern   • None   Social History Narrative    LIVING INDEPENDENTLY WITH SPOUSE    NO ADVANCE DIRECTIVE ON FILE     Social Determinants of Health     Financial Resource Strain: Low Risk    • Difficulty of Paying Living Expenses: Not very hard   Food Insecurity: No Food Insecurity   • Worried About Running Out of Food in the Last Year: Never true   • Ran Out of Food in the Last Year: Never true   Transportation Needs: No Transportation Needs   • Lack of Transportation (Medical): No   • Lack of Transportation (Non-Medical):  No   Physical Activity: Not on file   Stress: Not on file   Social Connections: Not on file   Intimate Partner Violence: Not on file   Housing Stability: Low Risk    • Unable to Pay for Housing in the Last Year: No   • Number of Places Lived in the Last Year: 1   • Unstable Housing in the Last Year: No      Medications and Allergies:     Current Outpatient Medications   Medication Sig Dispense Refill   • Alcohol Swabs (Pharmacist Choice Alcohol) PADS USE FOR TESTING AND INJECTIONS UP TO 10 PADS DAILY     • aspirin (ECOTRIN LOW STRENGTH) 81 mg EC tablet TAKE ONE (1) TABLET BY MOUTH DAILY (STOCK FROM HOME) 100 tablet 2   • aspirin 81 MG tablet Take 1 tablet by mouth daily     • Canagliflozin (Invokana) 300 MG TABS Take 1 tablet (300 mg total) by mouth daily before breakfast 90 tablet 3   • cyclobenzaprine (FLEXERIL) 10 mg tablet Take 1 tablet (10 mg total) by mouth 3 (three) times a day as needed for muscle spasms 30 tablet 1   • ergocalciferol (ERGOCALCIFEROL) 1 25 MG (94825 UT) capsule Take 1 capsule by mouth once a week     • ergocalciferol (VITAMIN D2) 50,000 units Take 50,000 Units by mouth once a week     • finasteride (PROSCAR) 5 mg tablet Take 1 tablet (5 mg total) by mouth daily 90 tablet 3   • glipiZIDE (GLUCOTROL) 5 mg tablet Take 1 tablet (5 mg total) by mouth 2 (two) times a day 180 tablet 3   • insulin glargine (LANTUS) 100 units/mL subcutaneous injection Inject 5 Units under the skin daily at bedtime 10 mL 0   • Insulin Pen Needle (Pen Needles) 32G X 4 MM MISC Check blood glucose at home AC and HS 4 times a day 200 each 3   • Inveltys 1 % SUSP AFTER SURGERY, INSTILL 1 DROP INTO RIGHT EYE TWICE DAILY     • levothyroxine 88 mcg tablet Take 1 tablet (88 mcg total) by mouth daily 90 tablet 3   • metoprolol tartrate (LOPRESSOR) 25 mg tablet Take 1 5 tablets (37 5 mg total) by mouth every 12 (twelve) hours 180 tablet 3   • Milk Thistle 300 MG CAPS TAKE TWO PILLS IN THE AM WITH FOOD  • nystatin (MYCOSTATIN) 500,000 units/5 mL suspension APPLY 5 ML (500,000 UNITS TOTAL) TO THE MOUTH OR THROAT 4 (FOUR) TIMES A DAY FOR 10 DAYS 473 mL 0   • OneTouch Verio test strip Use 1 each in the morning Use as instructed 100 strip 5   • Pharmacist Choice Lancets MISC USE FOR TESTING 4 TIMES A DAY     • Prolensa 0 07 % SOLN INSTILL 1 DROP INTO RIGHT EYE ONCE DAILY  START THREE DAYS BEFORE SURGERY     • repaglinide (PRANDIN) 0 5 mg tablet TAKE ONE TABLET BY MOUTH  BEFORE EACH MEAL IF BS > 120       • rosuvastatin (CRESTOR) 20 MG tablet Take 1 tablet (20 mg total) by mouth daily 90 tablet 3   • senna-docusate sodium (SENOKOT S) 8 6-50 mg per tablet Take 1 tablet by mouth daily at bedtime 30 tablet 0   • tamsulosin (FLOMAX) 0 4 mg Take 1 capsule (0 4 mg total) by mouth daily 90 capsule 3     No current facility-administered medications for this visit  Allergies   Allergen Reactions   • Atorvastatin Other (See Comments)     Pt unsure     • Percocet [Oxycodone-Acetaminophen] Nausea Only and GI Intolerance      Immunizations:     Immunization History   Administered Date(s) Administered   • COVID-19 PFIZER VACCINE 0 3 ML IM 04/14/2021, 05/06/2021   • Fluzone Split Quad 0 5 mL 10/14/2010   • INFLUENZA 10/14/2010, 10/07/2014, 09/01/2015, 09/03/2015, 09/12/2016, 10/13/2017, 10/09/2018   • Influenza Split High Dose Preservative Free IM 10/13/2017   • Influenza, high dose seasonal 0 7 mL 10/02/2020, 11/02/2022   • Influenza, seasonal, injectable 09/01/2015, 09/12/2016   • Pneumococcal Conjugate 13-Valent 07/06/2015, 09/07/2016   • Pneumococcal Polysaccharide PPV23 09/01/2015   • Tdap 02/23/2016   • Zoster 01/01/2015, 03/23/2015   • influenza, trivalent, adjuvanted 09/20/2019      Health Maintenance: There are no preventive care reminders to display for this patient  Topic Date Due   • COVID-19 Vaccine (3 - Booster for Pfizer series) 10/06/2021      Medicare Screening Tests and Risk Assessments:     Teresita Rush is here for his Subsequent Wellness visit  Health Risk Assessment:   Patient rates overall health as very good  Patient feels that their physical health rating is much better  Patient is very satisfied with their life  Eyesight was rated as much worse  Hearing was rated as much worse  Patient feels that their emotional and mental health rating is same  Patients states they are never, rarely angry  Patient states they are sometimes unusually tired/fatigued  Pain experienced in the last 7 days has been none  Patient states that he has experienced no weight loss or gain in last 6 months   Pt marked yes and no and states last weight due to being in hospital and rehab gained weight once home  Depression Screening:   PHQ-2 Score: 0      Fall Risk Screening: In the past year, patient has experienced: history of falling in past year    Number of falls: 2 or more  Injured during fall?: No    Feels unsteady when standing or walking?: Yes    Worried about falling?: Yes      Home Safety:  Patient has trouble with stairs inside or outside of their home  Patient has working smoke alarms and has working carbon monoxide detector  Home safety hazards include: none  Nutrition:   Current diet is Regular, Diabetic, Limited junk food and No Added Salt  Medications:   Patient is not currently taking any over-the-counter supplements  Patient is able to manage medications  Activities of Daily Living (ADLs)/Instrumental Activities of Daily Living (IADLs):   Walk and transfer into and out of bed and chair?: Yes  Dress and groom yourself?: Yes    Bathe or shower yourself?: Yes    Feed yourself?  Yes  Do your laundry/housekeeping?: Yes  Manage your money, pay your bills and track your expenses?: Yes  Make your own meals?: Yes    Do your own shopping?: Yes    Previous Hospitalizations:   Any hospitalizations or ED visits within the last 12 months?: Yes    How many hospitalizations have you had in the last year?: 1-2    Cognitive Screening:   Provider or family/friend/caregiver concerned regarding cognition?: No    PREVENTIVE SCREENINGS      Cardiovascular Screening:    General: Screening Not Indicated and History Lipid Disorder      Diabetes Screening:     General: Screening Not Indicated and History Diabetes      Colorectal Cancer Screening:     General: Screening Not Indicated      Prostate Cancer Screening:    General: Screening Not Indicated      Abdominal Aortic Aneurysm (AAA) Screening:    Risk factors include: tobacco use        Lung Cancer Screening:     General: Screening Not Indicated    Screening, Brief Intervention, and Referral to Treatment (SBIRT)    Screening  Typical number of drinks in a day: 0  Typical number of drinks in a week: 2  Interpretation: Low risk drinking behavior  Single Item Drug Screening:  How often have you used an illegal drug (including marijuana) or a prescription medication for non-medical reasons in the past year? never    Single Item Drug Screen Score: 0  Interpretation: Negative screen for possible drug use disorder    Other Counseling Topics:   Car/seat belt/driving safety, skin self-exam, sunscreen and calcium and vitamin D intake and regular weightbearing exercise  No exam data present     Physical Exam:     /58 (BP Location: Left arm, Patient Position: Sitting, Cuff Size: Standard)   Pulse 83   Temp 98 4 °F (36 9 °C) (Tympanic)   Resp 16   Ht 5' 5 25" (1 657 m)   Wt 60 1 kg (132 lb 8 oz)   SpO2 97%   BMI 21 88 kg/m²     Physical Exam  Constitutional:       General: He is not in acute distress  Appearance: He is well-developed  He is not diaphoretic  HENT:      Head: Normocephalic and atraumatic  Right Ear: External ear normal       Left Ear: External ear normal       Nose: Nose normal    Eyes:      General: No scleral icterus  Right eye: No discharge  Left eye: No discharge  Conjunctiva/sclera: Conjunctivae normal    Cardiovascular:      Rate and Rhythm: Normal rate and regular rhythm  Heart sounds: Normal heart sounds  No murmur heard  No friction rub  No gallop  Pulmonary:      Effort: Pulmonary effort is normal  No respiratory distress  Breath sounds: Normal breath sounds  No wheezing or rales  Abdominal:      General: Bowel sounds are normal  There is no distension  Palpations: Abdomen is soft  Tenderness: There is no abdominal tenderness  There is no guarding or rebound  Musculoskeletal:         General: No tenderness  Skin:     General: Skin is warm and dry  Findings: No erythema or rash  Neurological:      Mental Status: He is alert and oriented to person, place, and time  Cranial Nerves: No cranial nerve deficit  Sensory: No sensory deficit  Motor: No abnormal muscle tone     Psychiatric:         Behavior: Behavior normal           Ludmila Banegas MD

## 2022-11-03 ENCOUNTER — TELEPHONE (OUTPATIENT)
Dept: INTERNAL MEDICINE CLINIC | Facility: CLINIC | Age: 82
End: 2022-11-03

## 2022-11-03 DIAGNOSIS — E11.3293 TYPE 2 DIABETES MELLITUS WITH MILD NONPROLIFERATIVE DIABETIC RETINOPATHY WITHOUT MACULAR EDEMA, BILATERAL (HCC): ICD-10-CM

## 2022-11-03 DIAGNOSIS — E11.9 TYPE 2 DIABETES MELLITUS WITHOUT COMPLICATION, WITHOUT LONG-TERM CURRENT USE OF INSULIN (HCC): ICD-10-CM

## 2022-11-03 DIAGNOSIS — E11.10 DKA (DIABETIC KETOACIDOSIS) (HCC): ICD-10-CM

## 2022-11-03 DIAGNOSIS — D64.9 ANEMIA, UNSPECIFIED TYPE: ICD-10-CM

## 2022-11-03 LAB
CREAT UR-MCNC: 43.6 MG/DL
MICROALBUMIN UR-MCNC: 19.8 MG/L (ref 0–20)
MICROALBUMIN/CREAT 24H UR: 45 MG/G CREATININE (ref 0–30)

## 2022-11-03 RX ORDER — LEVOTHYROXINE SODIUM 0.07 MG/1
75 TABLET ORAL DAILY
Qty: 90 TABLET | Refills: 1 | Status: SHIPPED | OUTPATIENT
Start: 2022-11-03

## 2022-11-03 RX ORDER — INSULIN GLARGINE 100 [IU]/ML
10 INJECTION, SOLUTION SUBCUTANEOUS
Qty: 10 ML | Refills: 4 | Status: SHIPPED | OUTPATIENT
Start: 2022-11-03

## 2022-11-03 NOTE — TELEPHONE ENCOUNTER
----- Message from Ludmila Banegas MD sent at 11/3/2022  5:38 PM EDT -----   Blood sugar is very high, please increase the insulin to 10 units daily and continue the rest of the medications at the same dosage   Also  the new dose of the thyroid medication

## 2022-11-03 NOTE — TELEPHONE ENCOUNTER
----- Message from Panda Sanabria MD sent at 11/3/2022  5:38 PM EDT -----   Blood sugar is very high, please increase the insulin to 10 units daily and continue the rest of the medications at the same dosage   Also  the new dose of the thyroid medication

## 2022-11-14 ENCOUNTER — TELEPHONE (OUTPATIENT)
Dept: INTERNAL MEDICINE CLINIC | Facility: CLINIC | Age: 82
End: 2022-11-14

## 2022-11-14 DIAGNOSIS — Z79.4 TYPE 2 DIABETES MELLITUS WITH BOTH EYES AFFECTED BY MILD NONPROLIFERATIVE RETINOPATHY WITHOUT MACULAR EDEMA, WITH LONG-TERM CURRENT USE OF INSULIN (HCC): Primary | ICD-10-CM

## 2022-11-14 DIAGNOSIS — E11.3293 TYPE 2 DIABETES MELLITUS WITH BOTH EYES AFFECTED BY MILD NONPROLIFERATIVE RETINOPATHY WITHOUT MACULAR EDEMA, WITH LONG-TERM CURRENT USE OF INSULIN (HCC): Primary | ICD-10-CM

## 2022-11-14 RX ORDER — INSULIN GLARGINE 100 [IU]/ML
10 INJECTION, SOLUTION SUBCUTANEOUS
Qty: 15 ML | Refills: 4 | Status: SHIPPED | OUTPATIENT
Start: 2022-11-14

## 2022-11-14 NOTE — TELEPHONE ENCOUNTER
Keeps getting scripts for lantus vials    Put patient uses lantus pens    Needs new script sent over    Please advise

## 2022-11-15 DIAGNOSIS — I25.5 ISCHEMIC CARDIOMYOPATHY: ICD-10-CM

## 2022-11-15 RX ORDER — ASPIRIN 81 MG/1
TABLET ORAL
Qty: 30 TABLET | Refills: 0 | Status: SHIPPED | OUTPATIENT
Start: 2022-11-15

## 2023-01-10 DIAGNOSIS — E11.3293 TYPE 2 DIABETES MELLITUS WITH BOTH EYES AFFECTED BY MILD NONPROLIFERATIVE RETINOPATHY WITHOUT MACULAR EDEMA, WITH LONG-TERM CURRENT USE OF INSULIN (HCC): ICD-10-CM

## 2023-01-10 DIAGNOSIS — Z79.4 TYPE 2 DIABETES MELLITUS WITH BOTH EYES AFFECTED BY MILD NONPROLIFERATIVE RETINOPATHY WITHOUT MACULAR EDEMA, WITH LONG-TERM CURRENT USE OF INSULIN (HCC): ICD-10-CM

## 2023-01-10 RX ORDER — INSULIN GLARGINE 100 [IU]/ML
10 INJECTION, SOLUTION SUBCUTANEOUS
Qty: 15 ML | Refills: 4 | Status: SHIPPED | OUTPATIENT
Start: 2023-01-10

## 2023-01-17 ENCOUNTER — PREP FOR PROCEDURE (OUTPATIENT)
Dept: UROLOGY | Facility: MEDICAL CENTER | Age: 83
End: 2023-01-17

## 2023-01-17 ENCOUNTER — TELEPHONE (OUTPATIENT)
Dept: UROLOGY | Facility: MEDICAL CENTER | Age: 83
End: 2023-01-17

## 2023-01-17 ENCOUNTER — PROCEDURE VISIT (OUTPATIENT)
Dept: UROLOGY | Facility: MEDICAL CENTER | Age: 83
End: 2023-01-17

## 2023-01-17 VITALS
BODY MASS INDEX: 22.56 KG/M2 | OXYGEN SATURATION: 98 % | HEART RATE: 57 BPM | HEIGHT: 65 IN | DIASTOLIC BLOOD PRESSURE: 62 MMHG | SYSTOLIC BLOOD PRESSURE: 118 MMHG | WEIGHT: 135.4 LBS

## 2023-01-17 DIAGNOSIS — Z01.810 PRE-OPERATIVE CARDIOVASCULAR EXAMINATION: ICD-10-CM

## 2023-01-17 DIAGNOSIS — R35.0 BENIGN PROSTATIC HYPERPLASIA WITH URINARY FREQUENCY: Primary | ICD-10-CM

## 2023-01-17 DIAGNOSIS — R39.89 SUSPECTED UTI: ICD-10-CM

## 2023-01-17 DIAGNOSIS — N40.1 BENIGN PROSTATIC HYPERPLASIA WITH URINARY FREQUENCY: Primary | ICD-10-CM

## 2023-01-17 DIAGNOSIS — Z01.818 OTHER SPECIFIED PRE-OPERATIVE EXAMINATION: ICD-10-CM

## 2023-01-17 DIAGNOSIS — Z01.812 PRE-OPERATIVE LABORATORY EXAMINATION: ICD-10-CM

## 2023-01-17 LAB
POST-VOID RESIDUAL VOLUME, ML POC: 56 ML
SL AMB  POCT GLUCOSE, UA: NORMAL
SL AMB LEUKOCYTE ESTERASE,UA: NORMAL
SL AMB POCT BILIRUBIN,UA: NORMAL
SL AMB POCT BLOOD,UA: NORMAL
SL AMB POCT CLARITY,UA: CLEAR
SL AMB POCT COLOR,UA: YELLOW
SL AMB POCT KETONES,UA: NORMAL
SL AMB POCT NITRITE,UA: NORMAL
SL AMB POCT PH,UA: 5.5
SL AMB POCT SPECIFIC GRAVITY,UA: 1.01
SL AMB POCT URINE PROTEIN: NORMAL
SL AMB POCT UROBILINOGEN: 1

## 2023-01-17 NOTE — TELEPHONE ENCOUNTER
Left voicemail for patient to call office back to get his surgery with Dr Aditi Almazan set up  Left office call back number  Patient does need cardiac and medical clearance as well   Faxed those forms

## 2023-01-17 NOTE — PROGRESS NOTES
Cystoscopy     Date/Time 1/17/2023 10:17 AM     Performed by  Haley Vargas MD     Authorized by Haley Vargas MD      Universal Protocol:  Consent: Verbal consent obtained  Written consent obtained  Consent given by: patient  Patient understanding: patient states understanding of the procedure being performed  Patient consent: the patient's understanding of the procedure matches consent given  Procedure consent: procedure consent matches procedure scheduled  Patient identity confirmed: verbally with patient        Procedure Details:  Procedure type: cystoscopy      Office Cystoscopy Procedure Note    Indication:    61-year-old male with BPH and lower urinary tract symptoms  He underwent an evaluation by 19 with cystoscopy TRUS with recommendations for UroLift  Unfortunately he was lost to follow-up and return to October 2022  Repeat outlet evaluation was recommended  Informed consent   The risks, benefits, complications, treatment options, and expected outcomes were discussed with the patient  The patient agreed with the proposed plan and provided informed consent  Anesthesia  Lidocaine jelly 2%    Antibiotic prophylaxis   None    Procedure  The patient was placed in the supine position, was prepped and draped in the usual manner using sterile technique, and lubricating jelly instilled into the urethra  A 17 F flexible cystoscope was then inserted into the urethra and the urethra and bladder carefully examined  The following findings were noted:    Findings:  Urethra:  Normal in appearance without stricture or other abnormalities  Prostate: Trilobar prostatic enlargement with coapting lateral lobes and intravesical median lobe  Bladder:  Normal bladder mucosa  Specifically there is no evidence papillary lesions or erythematous patches    Bladder is moderately trabeculated  Ureteral orifices:  Orthotopic  Other findings:  Normal external genitalia, prostate is firm on digital rectal exam    Trans-Rectal Ultrasound Interpretation Report:      The prostate is measured in both axial and longitudinal planes        4 95? centimeters longitudinal length   4 85 centimeters width, by    4  6? centimeters ?height by ?   (approximate total volume? 57 7 cubic centimeters)       There is small intravesical median adenoma   Seminal vesicles are normal bilaterally   The peripheral zone is free of hypoechoic lesions    The transition zone is significantly enlarged  There are no anechoic lesions   The prostate margins are smooth and uniform  The rectal wall is thin and unremarkable   The visualized bladder base shows a bladder wall of normal thickness   There is a small amount of residual urine present       Impression: Unremarkable prostate ultrasound      Specimens: None                 Complications:    None; patient tolerated the procedure well           Condition: Stable     Assessment: 81-year-old male with BPH with trilobar enlargement with intravesical median lobe  Prostate measures just shy of 60 g today  Due to intravesical median lobe I recommend TURP  Is emptying well with PVR 56 today  Update PSA prior to procedure  Last check was in 2016      Plan:    Arrangements for TURP    Recheck PSA prior to procedure    Corey Galindo MD  [facility]

## 2023-01-24 NOTE — TELEPHONE ENCOUNTER
Pt daughter in law called to return missed call for scheduling surgery    Pt call [phone]

## 2023-01-25 NOTE — TELEPHONE ENCOUNTER
[facility], [facility] is scheduled with Dr Alena Giordano on [facility]10 [facility]  Went over prep with Татьяна, 23h hour, needs a ride to and from, hold aspirin 7 days prior etc   Patient will need medical and cardiac clearance, I let her know I will call patients cardiologist and PCP office to get those scheduled and will email her those dates  (Her email is [email or web address])      Surgery packet will be emailed as well     Labs ordered (EKG, CBC, BMP, A1C, T/S, UCX) 1/25  Patient on auth tracker 1/25  Sent to Epic to schedule 1/25

## 2023-01-25 NOTE — TELEPHONE ENCOUNTER
returning 's call to schedule patient for cardiac clearance      Phone [phone]  Fax [phone]

## 2023-01-26 ENCOUNTER — TELEPHONE (OUTPATIENT)
Dept: CARDIOLOGY CLINIC | Facility: CLINIC | Age: 83
End: 2023-01-26

## 2023-01-26 NOTE — TELEPHONE ENCOUNTER
I have not seen this patient in a while  He needs an appointment  He can be added to the schedule depending on the date of the procedure

## 2023-01-27 NOTE — TELEPHONE ENCOUNTER
3rd message left for patient to contact office to schedule pre-op clearance appointment    Letter Sent

## 2023-01-30 NOTE — PROGRESS NOTES
GI Progress Note - Uday Nearing 69 y o  male MRN: [id number]    Unit/Bed#: -01 Encounter: [id number]    Subjective: Mr Bhupinder Parker feels well today  He denies abdominal pain, nausea, vomiting  He denies melena or hematochezia  Objective:     Vitals: Blood pressure 121/58, pulse 95, temperature 97 5 °F (36 4 °C), temperature source Oral, resp  rate 18, height 5' 6" (1 676 m), SpO2 97 %  ,There is no height or weight on file to calculate BMI  Intake/Output Summary (Last 24 hours) at 02/13/18 1753  Last data filed at 02/13/18 1100   Gross per 24 hour   Intake              520 ml   Output              950 ml   Net             -430 ml       Physical Exam:     General Appearance: Alert, oriented x3, no acute distress  Lungs: Clear to auscultation bilaterally, no respiratory distress  Heart: RRR, no murmur  Abdomen: Non-distended, soft, BS active, NTTP  Extremities: No cyanosis or edema    Invasive Devices     Peripheral Intravenous Line            Peripheral IV 02/13/18 Left Arm less than 1 day          Drain            Urethral Catheter Coude 16 Fr  5 days                Lab Results:    Results from last 7 days  Lab Units 02/13/18  0545   WBC Thousand/uL 6 77   HEMOGLOBIN g/dL 8 9*   HEMATOCRIT % 26 3*   PLATELETS Thousands/uL 207   NEUTROS PCT % 77*   LYMPHS PCT % 14   MONOS PCT % 8   EOS PCT % 0       Results from last 7 days  Lab Units 02/13/18  0545   SODIUM mmol/L 142   POTASSIUM mmol/L 3 5   CHLORIDE mmol/L 105   CO2 mmol/L 29   BUN mg/dL 14   CREATININE mg/dL 1 62*   CALCIUM mg/dL 8 5   TOTAL PROTEIN g/dL 6 6   BILIRUBIN TOTAL mg/dL 0 60   ALK PHOS U/L 127*   ALT U/L 68   AST U/L 32   GLUCOSE RANDOM mg/dL 179*           Results from last 7 days  Lab Units 02/10/18  1219   LIPASE u/L 154       Imaging Studies: I have personally reviewed pertinent imaging studies  Ct Abdomen Pelvis Wo Contrast  Result Date: 2/10/2018  Impression: 1  Cholelithiasis without other evidence of acute cholecystitis  Multiple dense foci within the region of the distal common bile duct suspicious for choledocholithiasis  Correlate for clinical and laboratory evidence of biliary obstruction and consider further evaluation with MRCP  2   The bladder is decompressed by a Mckeon catheter  The catheter tip is at the superior and anterior margin of the bladder but does appear to still be within the bladder  Correlate clinically and consider catheter retraction  3   Cardiomegaly with small pericardial effusion  4   Large prostate  Xr Chest 2 Views  Result Date: 2/11/2018  Impression: No acute cardiopulmonary disease, stable  Us Right Upper Quadrant  Result Date: 2/11/2018  Impression: Cholelithiasis  Choledocholithiasis, though definitively demonstrated on recent noncontrast CT, cannot be redemonstrated on the current examination due to overlying bowel gas  No intrahepatic biliary dilatation  Common duct only mildly dilated to 7 mm  Mild gallbladder wall thickening without evidence of Pichardo's sign or pericholecystic fluid         Assessment and Plan:     Heme + Stool  Macrocytic Anemia  - S/P EGD/colon yesterday with moderately severe chronic gastritis, small nonbleeding internal hemorrhoids  - Hb 8 7 today  - Patient will need pill cam as outpatient to complete anemia workup  - Okay to resume antiplatelets per cardiology  - Pt stable for discharge from GI standpoint      Elevated LFTs  Choledocholithiasis  - CT on 2/10 showed cholelithiasis, no biliary ductal dilation, multiple densities noted in the distal CBD possible choledocholithiasis  - Unable to get MRCP due to pacemaker  - LFTs on admission with , , Alk Phos 163, Bili 1 8  - LFTs have trended now and have almost normalized, pt has no abdominal pain making any obstructive etiology less likely, suspect he may have passed the stones  - Recommend repeat LFTs at outpatient follow up visit with possible repeat CT A/P to evaluate for presence of choledocholithiasis        The patient will be seen by Dr Radha Lewis  GI will sign off  Call with questions  Prophylactic measure Prophylactic measure Prophylactic measure Prophylactic measure Prophylactic measure

## 2023-02-13 ENCOUNTER — TELEPHONE (OUTPATIENT)
Dept: ANESTHESIOLOGY | Facility: CLINIC | Age: 83
End: 2023-02-13

## 2023-02-14 DIAGNOSIS — N40.1 BENIGN PROSTATIC HYPERPLASIA WITH URINARY FREQUENCY: ICD-10-CM

## 2023-02-14 DIAGNOSIS — R35.0 BENIGN PROSTATIC HYPERPLASIA WITH URINARY FREQUENCY: ICD-10-CM

## 2023-02-14 RX ORDER — FINASTERIDE 5 MG/1
TABLET, FILM COATED ORAL
Qty: 180 TABLET | Refills: 0 | Status: SHIPPED | OUTPATIENT
Start: 2023-02-14

## 2023-02-20 ENCOUNTER — OFFICE VISIT (OUTPATIENT)
Dept: CARDIOLOGY CLINIC | Facility: CLINIC | Age: 83
End: 2023-02-20

## 2023-02-20 VITALS
HEIGHT: 65 IN | RESPIRATION RATE: 16 BRPM | DIASTOLIC BLOOD PRESSURE: 60 MMHG | OXYGEN SATURATION: 99 % | HEART RATE: 51 BPM | BODY MASS INDEX: 22.49 KG/M2 | SYSTOLIC BLOOD PRESSURE: 128 MMHG | WEIGHT: 135 LBS

## 2023-02-20 DIAGNOSIS — I10 ESSENTIAL HYPERTENSION: ICD-10-CM

## 2023-02-20 DIAGNOSIS — R07.89 CHEST DISCOMFORT: ICD-10-CM

## 2023-02-20 DIAGNOSIS — R35.0 BENIGN PROSTATIC HYPERPLASIA WITH URINARY FREQUENCY: ICD-10-CM

## 2023-02-20 DIAGNOSIS — Z01.810 PRE-OPERATIVE CARDIOVASCULAR EXAMINATION: Primary | ICD-10-CM

## 2023-02-20 DIAGNOSIS — I50.22 CHRONIC SYSTOLIC CONGESTIVE HEART FAILURE (HCC): ICD-10-CM

## 2023-02-20 DIAGNOSIS — N40.1 BENIGN PROSTATIC HYPERPLASIA WITH URINARY FREQUENCY: ICD-10-CM

## 2023-02-20 DIAGNOSIS — I25.5 ISCHEMIC CARDIOMYOPATHY: ICD-10-CM

## 2023-02-20 DIAGNOSIS — I25.10 ATHEROSCLEROSIS OF NATIVE CORONARY ARTERY OF NATIVE HEART WITHOUT ANGINA PECTORIS: ICD-10-CM

## 2023-02-20 NOTE — PROGRESS NOTES
[facility] [address] [address]  Cardiology Follow Up    Cuca Rivero  1940  [id number]      1  Atherosclerosis of native coronary artery of native heart without angina pectoris        2  Benign prostatic hyperplasia with urinary frequency  Ambulatory referral to Cardiology      3  Pre-operative cardiovascular examination  Ambulatory referral to Cardiology      4  Ischemic cardiomyopathy        5  Chronic systolic congestive heart failure ([facility] [address] )        6  Essential hypertension            Chief Complaint   Patient presents with   • Follow-up       Interval History:   Patient presents for preoperative cardiovascular risk assessment for prostate surgery  Patient has history of coronary artery disease and ischemic cardiomyopathy as well as chronic systolic heart failure status post ICD  Patient does have intermittent episodes of chest discomfort not necessarily related to exertion  Patient also has symptoms of shortness of breath with exertion  No recent functional cardiac evaluation  No history of ICD discharges  Patient states that he has been compliant with all present medications      Patient Active Problem List   Diagnosis   • Anemia   • BPH (benign prostatic hyperplasia)   • Ischemic cardiomyopathy   • Coronary atherosclerosis of native coronary artery   • Urinary retention   • Type 2 diabetes mellitus with mild nonproliferative diabetic retinopathy without macular edema, bilateral (HCC)   • Physical deconditioning   • Oropharyngeal dysphagia   • Chronic right shoulder pain     Past Medical History:   Diagnosis Date   • Acquired cyst of kidney    • Anemia    • Benign paroxysmal vertigo, unspecified ear    • Cellulitis of leg    • Cervical spinal stenosis    • Chest pain    • Coronary atherosclerosis of native coronary artery    • Diabetes mellitus ([facility] )    • Disease of thyroid gland    • Enlarged prostate    • Esophageal candidiasis (HCC)    • H/o COVID-19 2022   • Hematuria    • Herpes zoster    • Hyperlipidemia    • Hypertension    • Incomplete emptying of bladder    • Inflamed seborrheic keratosis    • Internal hemorrhoids    • Malignant neoplasm of skin of trunk    • Myocardial infarction [facility]    • Nonmelanoma skin cancer     LAST ASSESSED: 17   • 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 infarction    • Paroxysmal tachycardia (HCC)    • Shortness of breath    • Vitamin B deficiency      Social History     Socioeconomic History   • Marital status:      Spouse name: Not on file   • Number of children: Not on file   • Years of education: Not on file   • Highest education level: Not on file   Occupational History   • Occupation: RETIRED   Tobacco Use   • Smoking status: Former     Packs/day: 1 00     Years: 10 00     Pack years: 10 00     Types: Cigarettes     Quit date: 1978     Years since quittin 0   • Smokeless tobacco: Never   Vaping Use   • Vaping Use: Never used   Substance and Sexual Activity   • Alcohol use: Yes     Comment: occasionally 1-2 per month    • Drug use: No   • Sexual activity: Not Currently     Partners: Female   Other Topics Concern   • Not on file   Social History Narrative    LIVING INDEPENDENTLY WITH SPOUSE    NO ADVANCE DIRECTIVE ON FILE     Social Determinants of Health     Financial Resource Strain: Low Risk    • Difficulty of Paying Living Expenses: Not very hard   Food Insecurity: No Food Insecurity   • Worried About Running Out of Food in the Last Year: Never true   • Ran Out of Food in the Last Year: Never true   Transportation Needs: No Transportation Needs   • Lack of Transportation (Medical): No   • Lack of Transportation (Non-Medical):  No   Physical Activity: Not on file   Stress: Not on file   Social Connections: Not on file   Intimate Partner Violence: Not on file   Housing Stability: Low Risk    • Unable to Pay for Housing in the Last Year: No   • Number of Places Lived in the Last Year: 1   • Unstable Housing in the Last Year: No      Family History   Problem Relation Age of Onset   • Heart disease Mother    • Coronary artery disease Mother    • Sudden death Mother    • Cancer Father         throat; MALIGNANT NEOPLASM OF HEAD, FACE OR NECK   • Throat cancer Father    • Cancer Family    • Coronary artery disease Family      Past Surgical History:   Procedure Laterality Date   • CARDIAC DEFIBRILLATOR PLACEMENT     • COLONOSCOPY  10/07/2008    FIBEROPTIC SCREENING; NO FURTHER RECOMMENDATIONS   • CORONARY ANGIOPLASTY WITH STENT PLACEMENT     • CORONARY ANGIOPLASTY WITH STENT PLACEMENT     • CYSTOSCOPY  11/06/2015    DIAGNOSTIC; MANAGED BY: Mary Calhoun   • EGD AND COLONOSCOPY N/A 02/12/2018    Procedure: EGD AND COLONOSCOPY;  Surgeon: Carola Frye MD;  Location: [facility];   Service: Gastroenterology   • FL INJECTION RIGHT SHOULDER (ARTHROGRAM)  01/04/2022   • HIP ARTHROPLASTY Right    • INSERT / REPLACE / REMOVE PACEMAKER     • JOINT REPLACEMENT Right     THR   • TRUNK SKIN LESION EXCISIONAL BIOPSY  08/22/2007    MALIGNANT; BCC CHEST       Current Outpatient Medications:   •  Alcohol Swabs (Pharmacist Choice Alcohol) PADS, USE FOR TESTING AND INJECTIONS UP TO 10 PADS DAILY, Disp: , Rfl:   •  aspirin 81 MG tablet, Take 1 tablet by mouth daily, Disp: , Rfl:   •  ergocalciferol (VITAMIN D2) 50,000 units, Take 50,000 Units by mouth once a week, Disp: , Rfl:   •  finasteride (PROSCAR) 5 mg tablet, TAKE ONE (1) TABLET BY MOUTH DAILY (Patient taking differently: Take 5 mg by mouth daily at bedtime), Disp: 180 tablet, Rfl: 0  •  glipiZIDE (GLUCOTROL) 5 mg tablet, Take 1 tablet (5 mg total) by mouth 2 (two) times a day, Disp: 180 tablet, Rfl: 3  •  Insulin Glargine Solostar (Lantus SoloStar) 100 UNIT/ML SOPN, Inject 0 1 mL (10 Units total) under the skin daily at bedtime (Patient taking differently: Inject 10 Units under the skin every morning), Disp: 15 mL, Rfl: 4  •  Insulin Pen Needle (Pen Needles) 32G X 4 MM MISC, Check blood glucose at home [facility] and HS 4 times a day, Disp: 200 each, Rfl: 3  •  levothyroxine 75 mcg tablet, Take 1 tablet (75 mcg total) by mouth daily (Patient taking differently: Take 75 mcg by mouth daily in the early morning), Disp: 90 tablet, Rfl: 1  •  metoprolol tartrate (LOPRESSOR) 25 mg tablet, Take 1 5 tablets (37 5 mg total) by mouth every 12 (twelve) hours, Disp: 180 tablet, Rfl: 3  •  Milk Thistle 300 MG CAPS, TAKE TWO PILLS IN THE AM WITH FOOD , Disp: , Rfl:   •  OneTouch Verio test strip, Use 1 each in the morning Use as instructed, Disp: 100 strip, Rfl: 5  •  Pharmacist Choice Lancets MISC, USE FOR TESTING 4 TIMES A DAY, Disp: , Rfl:   •  repaglinide (PRANDIN) 0 5 mg tablet, , Disp: , Rfl:   •  rosuvastatin (CRESTOR) 20 MG tablet, Take 1 tablet (20 mg total) by mouth daily, Disp: 90 tablet, Rfl: 3  •  senna-docusate sodium (SENOKOT-S) 8 6-50 mg per tablet, Take 1 tablet by mouth daily, Disp: , Rfl:   •  tamsulosin (FLOMAX) 0 4 mg, Take 1 capsule (0 4 mg total) by mouth daily (Patient taking differently: Take 0 4 mg by mouth daily with dinner), Disp: 90 capsule, Rfl: 3  Allergies   Allergen Reactions   • Atorvastatin Other (See Comments)     Pt unsure     • Percocet [Oxycodone-Acetaminophen] Nausea Only and GI Intolerance       Labs:  Procedure visit on 01/17/2023   Component Date Value   • POST-VOID RESIDUAL VOLUM* 01/17/2023 56    •  COLOR,UA 01/17/2023 yellow    • CLARITY,UA 01/17/2023 clear    • SPECIFIC GRAVITY,UA 01/17/2023 1 015    •  PH,UA 01/17/2023 5 5    • LEUKOCYTE ESTERASE,UA 01/17/2023 neg    • NITRITE,UA 01/17/2023 neg    • GLUCOSE, UA 01/17/2023 500mg    • KETONES,UA 01/17/2023 trace    • BILIRUBIN,UA 01/17/2023 neg    • BLOOD,UA 01/17/2023 neg    • POCT URINE PROTEIN 01/17/2023 30mg    • SL AMB POCT UROBILINOGEN 01/17/2023 1 0      Imaging: No results found      Review of Systems:  Review of Systems   REVIEW OF SYSTEMS:  Constitutional:  Denies fever or chills   Eyes:  Denies change in visual acuity   HENT:  Denies nasal congestion or sore throat   Respiratory:   shortness of breath   Cardiovascular: Chest discomfort  GI:  Denies abdominal pain, nausea, vomiting, bloody stools or diarrhea   : Needs prostate surgery  Musculoskeletal:  Denies back pain or joint pain   Neurologic:  Denies headache, focal weakness or sensory changes   Endocrine:  Denies polyuria or polydipsia   Lymphatic:  Denies swollen glands   Psychiatric:  Denies depression or anxiety    Physical Exam:    /60 (BP Location: Left arm, Patient Position: Sitting, Cuff Size: Standard)   Pulse (!) 51   Resp 16   Ht 5' 5" (1 651 m)   Wt 61 2 kg (135 lb)   SpO2 99%   BMI 22 47 kg/m²     Physical Exam   PHYSICAL EXAM:  General:  Patient is not in acute distress   Head: Normocephalic, Atraumatic  HEENT:  Both pupils normal-size atraumatic, normocephalic, nonicteric  Neck:  JVP not raised  Trachea central  No carotid bruit  Respiratory:  normal breath sounds no crackles  no rhonchi  Cardiovascular:  Regular rate and rhythm no S3 no murmurs  GI:  Abdomen soft nontender  No organomegaly  Lymphatic:  No cervical or inguinal lymphadenopathy  Neurologic:  Patient is awake alert, oriented   Grossly nonfocal  Extremities no edema    EKG shows sinus bradycardia first-degree AV block and nonspecific ST-T abnormalities and intermittent ventricular pacing  Echocardiogram done in July 2022 showed ejection fraction of 25%  There was a questionable density noted in the aortic leaflets but clinically patient did not have any suggestion of infection  This was when patient was hospitalized  Discussion/Summary: This patient has known history of CAD status post MI and ischemic cardiomyopathy and chronic systolic heart failure status post ICD  Patient needs prostate surgery  Given patient's symptoms of chest pain and shortness of breath, patient will be scheduled for pharmacological nuclear stress test to assess for ischemia    If the nuclear stress test did not show any significant reversible defect, patient will be considered at least moderate cardiac risk based on age and comorbidities  We will make final recommendations after the stress test   Patient appears clinically euvolemic today on exam     Medications were reviewed  Patient will continue to follow-up with ICD clinic  Patient and family agreeable with the plan of care

## 2023-02-21 ENCOUNTER — TELEPHONE (OUTPATIENT)
Dept: CARDIOLOGY CLINIC | Facility: CLINIC | Age: 83
End: 2023-02-21

## 2023-02-21 NOTE — TELEPHONE ENCOUNTER
Marilou Benson RN  P Cardiology Cardiac Clearance [place]  Pt seen by Dr Letty Simmons 2/20   Is pt to stop aspirin prior to surgery as did not see mentioned in note   Surgery 3/10 for TURP  thanks

## 2023-02-22 ENCOUNTER — CONSULT (OUTPATIENT)
Dept: INTERNAL MEDICINE CLINIC | Facility: CLINIC | Age: 83
End: 2023-02-22

## 2023-02-22 ENCOUNTER — TELEPHONE (OUTPATIENT)
Dept: CARDIOLOGY CLINIC | Facility: CLINIC | Age: 83
End: 2023-02-22

## 2023-02-22 VITALS
HEART RATE: 64 BPM | HEIGHT: 65 IN | RESPIRATION RATE: 16 BRPM | OXYGEN SATURATION: 99 % | WEIGHT: 136.6 LBS | SYSTOLIC BLOOD PRESSURE: 124 MMHG | DIASTOLIC BLOOD PRESSURE: 64 MMHG | TEMPERATURE: 97.8 F | BODY MASS INDEX: 22.76 KG/M2

## 2023-02-22 DIAGNOSIS — E11.3293 TYPE 2 DIABETES MELLITUS WITH BOTH EYES AFFECTED BY MILD NONPROLIFERATIVE RETINOPATHY WITHOUT MACULAR EDEMA, WITH LONG-TERM CURRENT USE OF INSULIN (HCC): Primary | ICD-10-CM

## 2023-02-22 DIAGNOSIS — Z79.4 TYPE 2 DIABETES MELLITUS WITH BOTH EYES AFFECTED BY MILD NONPROLIFERATIVE RETINOPATHY WITHOUT MACULAR EDEMA, WITH LONG-TERM CURRENT USE OF INSULIN (HCC): Primary | ICD-10-CM

## 2023-02-22 LAB — SL AMB POCT HEMOGLOBIN AIC: 11.2 (ref ?–6.5)

## 2023-02-22 RX ORDER — FLASH GLUCOSE SENSOR
1 KIT MISCELLANEOUS
Qty: 2 EACH | Refills: 6 | Status: SHIPPED | OUTPATIENT
Start: 2023-02-22

## 2023-02-22 RX ORDER — DULAGLUTIDE 0.75 MG/.5ML
0.75 INJECTION, SOLUTION SUBCUTANEOUS
Qty: 2 ML | Refills: 1 | Status: SHIPPED | OUTPATIENT
Start: 2023-02-22

## 2023-02-22 RX ORDER — FLASH GLUCOSE SCANNING READER
1 EACH MISCELLANEOUS CONTINUOUS
Qty: 1 EACH | Refills: 0 | Status: SHIPPED | OUTPATIENT
Start: 2023-02-22

## 2023-02-22 NOTE — PROGRESS NOTES
INTERNAL MEDICINE FOLLOW-UP VISIT  [facility] [address] [place] [address]    NAME: Mike Pope  AGE: 81 y o  SEX: male  : 1940     DATE: 2023     Assessment and Plan:   1  Type 2 diabetes mellitus with both eyes affected by mild nonproliferative retinopathy without macular edema, with long-term current use of insulin ([address] )    Patient is not being cleared today for his surgical procedure as his HGA1C is 11 2  This suggests uncontrolled diabetes which can impede surgical healing  I discussed this with patient and his son and they understand that his surgery will be delayed until his HGA1C can be under 8  Stay on current DM medications glipizide, Lantus, and repaglinide  Start Trulicity 8 35 mg once a week injection, return in 4 weeks, instructed on side effects of this medication  Ordered the Yg Xylo device for the patient as his device is broken and he was told by his insurance he cant get another one  Maris device can offer him better control of his DM management with continuous readings of his blood sugar  This device can also notify him if his blood sugars run low  Make sure you control your diet better  Limit sweets, carbs and sugars in your diet  - dulaglutide (Trulicity) 6 32 WC/9 4QR injection; Inject 0 5 mL (0 75 mg total) under the skin every 7 days  Dispense: 2 mL; Refill: 1  - Continuous Blood Gluc  (FreeStyle Maris 2 [place]) ANTONELLA; Use 1 Device continuous  Dispense: 1 each; Refill: 0  - Continuous Blood Gluc Sensor (FreeStyle Maris 2 Sensor) MISC; Use 1 each every 14 (fourteen) days  Dispense: 2 each; Refill: 6    Follow up in 4 weeks for medication management and to review trends of blood suagrs     Chief Complaint:     Chief Complaint   Patient presents with   • Pre-op Exam     TURP      History of Present Illness:     Patient is here today accompanied by his son  He is here for a pre-op clearance for a TURP due to an enlarged prostate   He is feeling well overall  He has been unable to check his blood sugars as his machine is broken  The following portions of the patient's history were reviewed and updated as appropriate: allergies, current medications, past family history, past medical history, past social history, past surgical history and problem list      Review of Systems:     Review of Systems   Constitutional: Negative for appetite change, chills, diaphoresis, fatigue, fever and unexpected weight change  HENT: Negative for postnasal drip and sneezing  Eyes: Negative for visual disturbance  Respiratory: Negative for chest tightness and shortness of breath  Cardiovascular: Negative for chest pain, palpitations and leg swelling  Gastrointestinal: Negative for abdominal pain and blood in stool  Endocrine: Negative for cold intolerance, heat intolerance, polydipsia, polyphagia and polyuria  Genitourinary: Negative for difficulty urinating, dysuria, frequency and urgency  Musculoskeletal: Negative for arthralgias and myalgias  Skin: Negative for rash and wound  Neurological: Negative for dizziness, weakness, light-headedness and headaches  Hematological: Negative for adenopathy  Psychiatric/Behavioral: Negative for confusion, dysphoric mood and sleep disturbance  The patient is not nervous/anxious           Past Medical History:     Past Medical History:   Diagnosis Date   • Acquired cyst of kidney    • Anemia    • Benign paroxysmal vertigo, unspecified ear    • Cellulitis of leg    • Cervical spinal stenosis    • Chest pain    • Coronary atherosclerosis of native coronary artery    • Diabetes mellitus (HCC)    • Disease of thyroid gland    • Enlarged prostate    • Esophageal candidiasis (HCC)    • H/o COVID-19 06/30/2022   • Hematuria    • Herpes zoster    • Hyperlipidemia    • Hypertension    • Incomplete emptying of bladder    • Inflamed seborrheic keratosis    • Internal hemorrhoids    • Malignant neoplasm of skin of trunk • Myocardial infarction [facility]) 2005   • Nonmelanoma skin cancer     LAST ASSESSED: 8/9/17   • 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 infarction    • Paroxysmal tachycardia (HCC)    • Shortness of breath    • Vitamin B deficiency         Current Medications:     Current Outpatient Medications:   •  Alcohol Swabs (Pharmacist Choice Alcohol) PADS, USE FOR TESTING AND INJECTIONS UP TO 10 PADS DAILY, Disp: , Rfl:   •  aspirin 81 MG tablet, Take 1 tablet by mouth daily, Disp: , Rfl:   •  Continuous Blood Gluc  (FreeStyle Maris 2 [place]) [facility], Use 1 Device continuous, Disp: 1 each, Rfl: 0  •  Continuous Blood Gluc Sensor (FreeStyle Maris 2 Sensor) MISC, Use 1 each every 14 (fourteen) days, Disp: 2 each, Rfl: 6  •  dulaglutide (Trulicity) 5 82 LR/4 5OZ injection, Inject 0 5 mL (0 75 mg total) under the skin every 7 days, Disp: 2 mL, Rfl: 1  •  ergocalciferol (VITAMIN D2) 50,000 units, Take 50,000 Units by mouth once a week, Disp: , Rfl:   •  finasteride (PROSCAR) 5 mg tablet, TAKE ONE (1) TABLET BY MOUTH DAILY (Patient taking differently: Take 5 mg by mouth daily at bedtime), Disp: 180 tablet, Rfl: 0  •  glipiZIDE (GLUCOTROL) 5 mg tablet, Take 1 tablet (5 mg total) by mouth 2 (two) times a day, Disp: 180 tablet, Rfl: 3  •  Insulin Glargine Solostar (Lantus SoloStar) 100 UNIT/ML SOPN, Inject 0 1 mL (10 Units total) under the skin daily at bedtime (Patient taking differently: Inject 10 Units under the skin every morning), Disp: 15 mL, Rfl: 4  •  Insulin Pen Needle (Pen Needles) 32G X 4 MM MISC, Check blood glucose at home AC and HS 4 times a day, Disp: 200 each, Rfl: 3  •  levothyroxine 75 mcg tablet, Take 1 tablet (75 mcg total) by mouth daily (Patient taking differently: Take 88 mcg by mouth daily), Disp: 90 tablet, Rfl: 1  •  metoprolol tartrate (LOPRESSOR) 25 mg tablet, Take 1 5 tablets (37 5 mg total) by mouth every 12 (twelve) hours, Disp: 180 tablet, Rfl: 3  •  Milk Thistle 300 MG CAPS, TAKE TWO PILLS IN THE AM WITH FOOD , Disp: , Rfl:   •  OneTouch Verio test strip, Use 1 each in the morning Use as instructed, Disp: 100 strip, Rfl: 5  •  Pharmacist Choice Lancets MISC, USE FOR TESTING 4 TIMES A DAY, Disp: , Rfl:   •  repaglinide (PRANDIN) 0 5 mg tablet, , Disp: , Rfl:   •  rosuvastatin (CRESTOR) 20 MG tablet, Take 1 tablet (20 mg total) by mouth daily, Disp: 90 tablet, Rfl: 3  •  senna-docusate sodium (SENOKOT-S) 8 6-50 mg per tablet, Take 1 tablet by mouth daily, Disp: , Rfl:   •  tamsulosin (FLOMAX) 0 4 mg, Take 1 capsule (0 4 mg total) by mouth daily (Patient taking differently: Take 0 4 mg by mouth daily with dinner), Disp: 90 capsule, Rfl: 3     Allergies: Allergies   Allergen Reactions   • Atorvastatin Other (See Comments)     Pt unsure     • Percocet [Oxycodone-Acetaminophen] Nausea Only and GI Intolerance        Physical Exam:     /64 (BP Location: Left arm, Patient Position: Sitting, Cuff Size: Standard)   Pulse 64   Temp 97 8 °F (36 6 °C) (Temporal)   Resp 16   Ht 5' 5" (1 651 m)   Wt 62 kg (136 lb 9 6 oz)   SpO2 99%   BMI 22 73 kg/m²     Physical Exam  Constitutional:       Appearance: He is well-developed  HENT:      Head: Normocephalic and atraumatic  Eyes:      Conjunctiva/sclera: Conjunctivae normal       Pupils: Pupils are equal, round, and reactive to light  Cardiovascular:      Rate and Rhythm: Normal rate and regular rhythm  Heart sounds: Normal heart sounds  Pulmonary:      Effort: Pulmonary effort is normal       Breath sounds: Normal breath sounds  Abdominal:      General: Bowel sounds are normal       Palpations: Abdomen is soft  Musculoskeletal:         General: Normal range of motion  Cervical back: Normal range of motion  Skin:     General: Skin is warm and dry  Neurological:      Mental Status: He is alert and oriented to person, place, and time             Data:     Laboratory Results: I have personally reviewed the pertinent laboratory results/reports   Radiology/Other Diagnostic Testing Results: I have personally reviewed pertinent reports        MELODIE Tello  [facility]

## 2023-02-22 NOTE — TELEPHONE ENCOUNTER
This patient can hold aspirin 5 days prior to surgery      He is getting a pharmacological nuclear stress test   Final preop risk assessment after the results of stress test  THOMAS

## 2023-02-22 NOTE — TELEPHONE ENCOUNTER
Jamilah called from Urology & stated that pt's surgery was cancelled and postponed for 4/28.. Does patient still need to get test done on 3/1 or should it get rescheduled being surgery was moved to a later date.         Please Advise

## 2023-02-23 ENCOUNTER — TELEPHONE (OUTPATIENT)
Dept: CARDIOLOGY CLINIC | Facility: CLINIC | Age: 83
End: 2023-02-23

## 2023-02-23 DIAGNOSIS — I25.5 ISCHEMIC CARDIOMYOPATHY: ICD-10-CM

## 2023-02-23 NOTE — TELEPHONE ENCOUNTER
Fax received from [facility] for pre surgical cardiac clearance and notification of med suspension for this pt  Forms given to Dr Hilda Lopez to address      Completed forms scanned into pt's chart and faxed to [facility]

## 2023-03-01 ENCOUNTER — HOSPITAL ENCOUNTER (OUTPATIENT)
Dept: NON INVASIVE DIAGNOSTICS | Facility: CLINIC | Age: 83
Discharge: HOME | End: 2023-03-01

## 2023-03-01 VITALS
OXYGEN SATURATION: 97 % | SYSTOLIC BLOOD PRESSURE: 124 MMHG | HEIGHT: 65 IN | WEIGHT: 136 LBS | DIASTOLIC BLOOD PRESSURE: 58 MMHG | BODY MASS INDEX: 22.66 KG/M2 | HEART RATE: 50 BPM

## 2023-03-01 DIAGNOSIS — I25.10 ATHEROSCLEROSIS OF NATIVE CORONARY ARTERY OF NATIVE HEART WITHOUT ANGINA PECTORIS: ICD-10-CM

## 2023-03-01 DIAGNOSIS — R07.89 CHEST DISCOMFORT: ICD-10-CM

## 2023-03-01 LAB
NUC STRESS EJECTION FRACTION: 27 %
RATE PRESSURE PRODUCT: 9792
SL CV REST NUCLEAR ISOTOPE DOSE: 11 MCI
SL CV STRESS NUCLEAR ISOTOPE DOSE: 33 MCI
SL CV STRESS RECOVERY BP: NORMAL MMHG
SL CV STRESS RECOVERY HR: 72 BPM
SL CV STRESS RECOVERY O2 SAT: 99 %
STRESS ANGINA INDEX: 0
STRESS BASELINE BP: NORMAL MMHG
STRESS BASELINE HR: 50 BPM
STRESS O2 SAT REST: 97 %
STRESS PEAK HR: 96 BPM
STRESS POST O2 SAT PEAK: 100 %
STRESS POST PEAK BP: 102 MMHG
STRESS/REST PERFUSION RATIO: 1.09

## 2023-03-01 RX ADMIN — REGADENOSON 0.4 MG: 0.08 INJECTION, SOLUTION INTRAVENOUS at 12:49

## 2023-03-02 LAB
CHEST PAIN STATEMENT: NORMAL
MAX DIASTOLIC BP: 58 MMHG
MAX HEART RATE: 96 BPM
MAX PREDICTED HEART RATE: 139 BPM
MAX. SYSTOLIC BP: 124 MMHG
PROTOCOL NAME: NORMAL
REASON FOR TERMINATION: NORMAL
TARGET HR FORMULA: NORMAL
TIME IN EXERCISE PHASE: NORMAL

## 2023-03-03 ENCOUNTER — TELEPHONE (OUTPATIENT)
Dept: CARDIOLOGY CLINIC | Facility: CLINIC | Age: 83
End: 2023-03-03

## 2023-03-03 NOTE — TELEPHONE ENCOUNTER
----- Message from Norberto Nolasco MD sent at 3/3/2023  4:03 PM EST -----  The stress test shows some abnormalities which need to be discussed  Can you get this patient scheduled with one of the APs in the next week or 2 to discuss the same  Thank you

## 2023-03-14 ENCOUNTER — TELEMEDICINE (OUTPATIENT)
Dept: CARDIOLOGY CLINIC | Facility: CLINIC | Age: 83
End: 2023-03-14

## 2023-03-14 DIAGNOSIS — R94.39 ABNORMAL STRESS TEST: ICD-10-CM

## 2023-03-14 DIAGNOSIS — I25.5 ISCHEMIC CARDIOMYOPATHY: ICD-10-CM

## 2023-03-14 DIAGNOSIS — I25.10 ATHEROSCLEROSIS OF NATIVE CORONARY ARTERY OF NATIVE HEART WITHOUT ANGINA PECTORIS: Primary | ICD-10-CM

## 2023-03-14 NOTE — PROGRESS NOTES
Virtual Brief Visit    Patient is located in the following state in which I hold an active license [place]      Assessment/Plan:  1  Abnormal stress test  · Discussed results at length and plan for cardiac catheterization  · Message sent to urologist Dr Maco Ashley regarding finding and implications of cardiac catheterization  · Patient would require uninterrupted DAPT for at least 6 months if he requires stent placement  · Continue aspirin, Lopressor, and rosuvastatin  2   Chronic HFrEF, ICM with EF 25% s/p ICD  · Patient denies any lower extremity swelling  · Patient follows with device clinic  3   Preoperative risk assessment  · Patient found to have abnormal pharmacologic MPI  · Recommend further evaluation with cardiac catheterization  · See remainder of plan above  4   BPH  · Follows with urology, plan for TURP    5  Hypertension  · Continue lopressor    Follow up with next available appointment with Dr Willem Fields  Will arrange cardiac catheterization once discussed with urology  Problem List Items Addressed This Visit        Cardiovascular and Mediastinum    Ischemic cardiomyopathy    Coronary atherosclerosis of native coronary artery - Primary   Other Visit Diagnoses     Abnormal stress test              Recent Visits  No visits were found meeting these conditions  Showing recent visits within past 7 days and meeting all other requirements  Future Appointments  No visits were found meeting these conditions  Showing future appointments within next 150 days and meeting all other requirements     HPI:  This patient is a 81-year-old male with PMH of BPH, ICM, DM type II, hypertension presenting today for follow-up of abnormal stress test  Patient is known to Dr Willem Fields  Patient underwent pharmacologic MPI for evaluation prior to TURP  Results reveal large fixed defect in the mid to apical anterior and anteroseptal wall suggestive of infarct    There is also a small partially reversible defect in the mid inferior wall suggestive of ischemia  Left ventricular function was severely reduced with severe hypokinesis of the anterior and anteroseptal walls  Discussed these results in depth and recommendation for further evaluation with cardiac catheterization  Risks and benefits of cardiac catheterization discussed  Patient denies any chest pain  He endorses occasional shortness of breath  Review of Systems   REVIEW OF SYSTEMS:  Constitutional:  Denies fever or chills   Eyes:  Denies change in visual acuity   HENT:  Denies nasal congestion or sore throat   Respiratory:  + shortness of breath   Cardiovascular: Denies chest pain  GI:  Denies abdominal pain, nausea, vomiting, bloody stools or diarrhea   : Needs prostate surgery, endorses urinary frequency  Musculoskeletal:  Denies back pain or joint pain   Neurologic:  Denies headache, focal weakness or sensory changes   Endocrine:  Denies polyuria or polydipsia   Lymphatic:  Denies swollen glands   Psychiatric:  Denies depression or anxiety    PE:  Respiratory: Normal effort  Neuro:  AxOx3    Visit Time    Visit Start Time: 7566  Visit Stop Time: 1106  Total Visit Duration: 16 minutes

## 2023-03-16 DIAGNOSIS — E11.3293 TYPE 2 DIABETES MELLITUS WITH BOTH EYES AFFECTED BY MILD NONPROLIFERATIVE RETINOPATHY WITHOUT MACULAR EDEMA, WITH LONG-TERM CURRENT USE OF INSULIN (HCC): ICD-10-CM

## 2023-03-16 DIAGNOSIS — Z79.4 TYPE 2 DIABETES MELLITUS WITH BOTH EYES AFFECTED BY MILD NONPROLIFERATIVE RETINOPATHY WITHOUT MACULAR EDEMA, WITH LONG-TERM CURRENT USE OF INSULIN (HCC): ICD-10-CM

## 2023-03-16 RX ORDER — DULAGLUTIDE 0.75 MG/.5ML
0.75 INJECTION, SOLUTION SUBCUTANEOUS
Qty: 2 ML | Refills: 1 | Status: SHIPPED | OUTPATIENT
Start: 2023-03-16

## 2023-03-20 DIAGNOSIS — E11.9 TYPE 2 DIABETES MELLITUS WITHOUT COMPLICATION, WITHOUT LONG-TERM CURRENT USE OF INSULIN (HCC): ICD-10-CM

## 2023-03-20 DIAGNOSIS — D64.9 ANEMIA, UNSPECIFIED TYPE: ICD-10-CM

## 2023-03-20 RX ORDER — LEVOTHYROXINE SODIUM 0.07 MG/1
75 TABLET ORAL DAILY
Qty: 90 TABLET | Refills: 1 | Status: SHIPPED | OUTPATIENT
Start: 2023-03-20

## 2023-03-22 ENCOUNTER — OFFICE VISIT (OUTPATIENT)
Dept: INTERNAL MEDICINE CLINIC | Facility: CLINIC | Age: 83
End: 2023-03-22

## 2023-03-22 ENCOUNTER — TELEPHONE (OUTPATIENT)
Dept: UROLOGY | Facility: CLINIC | Age: 83
End: 2023-03-22

## 2023-03-22 VITALS
SYSTOLIC BLOOD PRESSURE: 122 MMHG | DIASTOLIC BLOOD PRESSURE: 68 MMHG | BODY MASS INDEX: 22.89 KG/M2 | RESPIRATION RATE: 18 BRPM | WEIGHT: 137.4 LBS | HEART RATE: 68 BPM | OXYGEN SATURATION: 94 % | TEMPERATURE: 96 F | HEIGHT: 65 IN

## 2023-03-22 DIAGNOSIS — E11.3293 TYPE 2 DIABETES MELLITUS WITH BOTH EYES AFFECTED BY MILD NONPROLIFERATIVE RETINOPATHY WITHOUT MACULAR EDEMA, WITH LONG-TERM CURRENT USE OF INSULIN (HCC): Primary | ICD-10-CM

## 2023-03-22 DIAGNOSIS — Z79.4 TYPE 2 DIABETES MELLITUS WITH BOTH EYES AFFECTED BY MILD NONPROLIFERATIVE RETINOPATHY WITHOUT MACULAR EDEMA, WITH LONG-TERM CURRENT USE OF INSULIN (HCC): Primary | ICD-10-CM

## 2023-03-22 LAB — SL AMB POCT HEMOGLOBIN AIC: 11.7 (ref ?–6.5)

## 2023-03-22 NOTE — PROGRESS NOTES
INTERNAL MEDICINE FOLLOW-UP VISIT  [facility] - [facility]    NAME: Unique Martinez  AGE: 81 y o  SEX: male  : 1940     DATE: 3/22/2023     Assessment and Plan:   1  Type 2 diabetes mellitus with both eyes affected by mild nonproliferative retinopathy without macular edema, with long-term current use of insulin ([facility])  - POCT hemoglobin A1c today was 11 7  Limit carbs in diet, avoid sugars in your diet  Continue on trulicity as well as repaglinide, lantus, and glipizide  He was not taking his insulin as he thought when trulicity started he stopped other medication  Educated patient and family to restart all other medications  Placed Maris on patient today and instructed him for further placement  Complete cardiac catheterization with cardiologist  Scheduled TURP was canceled at this time, follow with urology for further guidance    Patient to return in 3 months to repeat [facility], if below 8 may proceed with TURP as long as cardiac clearance has been established  No follow-ups on file  Chief Complaint:     Chief Complaint   Patient presents with   • Follow-up     4 week       History of Present Illness:     Patient is here today to follow up  He was seen on  for a preop consult with schedule TURP for 2023  At that visit patient was noted to have an HGA1C of 11 2  He was then placed on Trulicity and scheduled to follow up in 1 month  He also was not cleared by his cardiologist and is further having diagnostic testing prior to clearance for TURP  The following portions of the patient's history were reviewed and updated as appropriate: allergies, current medications, past family history, past medical history, past social history, past surgical history and problem list      Review of Systems:     Review of Systems   Constitutional: Negative for appetite change, chills, diaphoresis, fatigue, fever and unexpected weight change     HENT: Negative for postnasal drip and sneezing  Eyes: Negative for visual disturbance  Respiratory: Negative for chest tightness and shortness of breath  Cardiovascular: Negative for chest pain, palpitations and leg swelling  Gastrointestinal: Negative for abdominal pain and blood in stool  Endocrine: Negative for cold intolerance, heat intolerance, polydipsia, polyphagia and polyuria  Genitourinary: Negative for difficulty urinating, dysuria, frequency and urgency  Musculoskeletal: Negative for arthralgias and myalgias  Skin: Negative for rash and wound  Neurological: Negative for dizziness, weakness, light-headedness and headaches  Hematological: Negative for adenopathy  Psychiatric/Behavioral: Negative for confusion, dysphoric mood and sleep disturbance  The patient is not nervous/anxious           Past Medical History:     Past Medical History:   Diagnosis Date   • Acquired cyst of kidney    • Anemia    • Benign paroxysmal vertigo, unspecified ear    • Cellulitis of leg    • Cervical spinal stenosis    • Chest pain    • Coronary atherosclerosis of native coronary artery    • Diabetes mellitus (HCC)    • Disease of thyroid gland    • Enlarged prostate    • Esophageal candidiasis (HCC)    • H/o COVID-19 06/30/2022   • Hematuria    • Herpes zoster    • Hyperlipidemia    • Hypertension    • Incomplete emptying of bladder    • Inflamed seborrheic keratosis    • Internal hemorrhoids    • Malignant neoplasm of skin of trunk    • Myocardial infarction ([facility] [address] ) 2005   • Nonmelanoma skin cancer     LAST ASSESSED: 8/9/17   • 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 infarction    • Paroxysmal tachycardia (HCC)    • Shortness of breath    • Vitamin B deficiency         Current Medications:     Current Outpatient Medications:   •  Alcohol Swabs (Pharmacist Choice Alcohol) PADS, USE FOR TESTING AND INJECTIONS UP TO 10 PADS DAILY, Disp: , Rfl:   •  aspirin 81 MG tablet, Take 1 tablet by mouth daily, Disp: , Rfl:   •  ergocalciferol (VITAMIN D2) 50,000 units, Take 50,000 Units by mouth once a week, Disp: , Rfl:   •  finasteride (PROSCAR) 5 mg tablet, TAKE ONE (1) TABLET BY MOUTH DAILY (Patient taking differently: Take 5 mg by mouth daily at bedtime), Disp: 180 tablet, Rfl: 0  •  glipiZIDE (GLUCOTROL) 5 mg tablet, Take 1 tablet (5 mg total) by mouth 2 (two) times a day, Disp: 180 tablet, Rfl: 3  •  Insulin Pen Needle (Pen Needles) 32G X 4 MM MISC, Check blood glucose at home AC and HS 4 times a day, Disp: 200 each, Rfl: 3  •  levothyroxine 75 mcg tablet, TAKE 1 TABLET (75 MCG TOTAL) BY MOUTH DAILY, Disp: 90 tablet, Rfl: 1  •  metoprolol tartrate (LOPRESSOR) 25 mg tablet, TAKE 1 5 TABLETS (37 5 MG TOTAL) BY MOUTH EVERY 12 (TWELVE) HOURS, Disp: 180 tablet, Rfl: 3  •  Milk Thistle 300 MG CAPS, TAKE TWO PILLS IN THE AM WITH FOOD , Disp: , Rfl:   •  OneTouch Verio test strip, Use 1 each in the morning Use as instructed, Disp: 100 strip, Rfl: 5  •  Pharmacist Choice Lancets MISC, USE FOR TESTING 4 TIMES A DAY, Disp: , Rfl:   •  rosuvastatin (CRESTOR) 20 MG tablet, Take 1 tablet (20 mg total) by mouth daily, Disp: 90 tablet, Rfl: 3  •  senna-docusate sodium (SENOKOT-S) 8 6-50 mg per tablet, Take 1 tablet by mouth daily, Disp: , Rfl:   •  tamsulosin (FLOMAX) 0 4 mg, Take 1 capsule (0 4 mg total) by mouth daily (Patient taking differently: Take 0 4 mg by mouth daily with dinner), Disp: 90 capsule, Rfl: 3  •  Trulicity 9 68 JL/0 7BA injection, INJECT 0 5 ML (0 75 MG TOTAL) UNDER THE SKIN EVERY 7 DAYS, Disp: 2 mL, Rfl: 1  •  Continuous Blood Gluc  (FreeStyle Maris 2 [place]) [facility], Use 1 Device continuous (Patient not taking: Reported on 3/22/2023), Disp: 1 each, Rfl: 0  •  Continuous Blood Gluc Sensor (FreeStyle Maris 2 Sensor) MISC, Use 1 each every 14 (fourteen) days (Patient not taking: Reported on 3/22/2023), Disp: 2 each, Rfl: 6  •  Insulin Glargine Solostar (Lantus SoloStar) 100 UNIT/ML SOPN, Inject 0 1 mL (10 Units total) under the skin daily at bedtime (Patient not taking: Reported on 3/22/2023), Disp: 15 mL, Rfl: 4  •  repaglinide (PRANDIN) 0 5 mg tablet, , Disp: , Rfl:      Allergies: Allergies   Allergen Reactions   • Atorvastatin Other (See Comments)     Pt unsure     • Percocet [Oxycodone-Acetaminophen] Nausea Only and GI Intolerance        Physical Exam:     /68 (BP Location: Left arm, Patient Position: Sitting, Cuff Size: Standard)   Pulse 68   Temp (!) 96 °F (35 6 °C) (Tympanic)   Resp 18   Ht 5' 5" (1 651 m)   Wt 62 3 kg (137 lb 6 4 oz)   SpO2 94%   BMI 22 86 kg/m²     Physical Exam  Constitutional:       Appearance: He is well-developed  HENT:      Head: Normocephalic and atraumatic  Eyes:      Conjunctiva/sclera: Conjunctivae normal       Pupils: Pupils are equal, round, and reactive to light  Cardiovascular:      Rate and Rhythm: Normal rate and regular rhythm  Heart sounds: Normal heart sounds  Pulmonary:      Effort: Pulmonary effort is normal       Breath sounds: Normal breath sounds  Abdominal:      General: Bowel sounds are normal       Palpations: Abdomen is soft  Musculoskeletal:         General: Normal range of motion  Cervical back: Normal range of motion  Skin:     General: Skin is warm and dry  Neurological:      Mental Status: He is alert and oriented to person, place, and time  Data:     Laboratory Results: I have personally reviewed the pertinent laboratory results/reports   Radiology/Other Diagnostic Testing Results: I have personally reviewed pertinent reports        MELODIE Kidd  [facility]

## 2023-03-22 NOTE — TELEPHONE ENCOUNTER
----- Message from Batool Rider MD sent at 3/21/2023 10:28 PM EDT -----  Regarding: FW: Upcoming TURP  Please cancel surgery for now  I think what makes the most sense is for him to call after he has all cardiac concerns addressed  Alternatively we could schedule a f/u arbitrarily in like 6 months  He typically follows in [facility] and saw me for cysto presumably due to availability  Either way will work based on his preference  Thanks! VR  ----- Message -----  From: Karyle Mills, CRNP  Sent: 3/14/2023   4:13 PM EDT  To: Batool Rider MD  Subject: Upcoming TURP                                    Hi Dr Cuca Neumann,      Just reaching out regarding a mutual patient scheduled for upcoming TURP  Domingo Joy is a patient of Dr Nicole Estrada and was undergoing preop risk assessment  He has an abnormal stress test  We would like to further evaluate with cardiac cath but he would need uninterrupted DAPT for 6 months  How urgent is the upcoming TURP and is it possible to postpone this procedure if Domingo Joy gets needs a stent?     Thank you for your time,     Kristyn Goode

## 2023-04-03 DIAGNOSIS — E11.65 TYPE 2 DIABETES MELLITUS WITH HYPERGLYCEMIA, WITH LONG-TERM CURRENT USE OF INSULIN (HCC): ICD-10-CM

## 2023-04-03 DIAGNOSIS — Z79.4 TYPE 2 DIABETES MELLITUS WITH HYPERGLYCEMIA, WITH LONG-TERM CURRENT USE OF INSULIN (HCC): ICD-10-CM

## 2023-04-03 RX ORDER — GLIPIZIDE 5 MG/1
5 TABLET ORAL 2 TIMES DAILY
Qty: 180 TABLET | Refills: 3 | Status: SHIPPED | OUTPATIENT
Start: 2023-04-03

## 2023-04-27 DIAGNOSIS — E78.2 MIXED HYPERLIPIDEMIA: ICD-10-CM

## 2023-04-27 RX ORDER — ROSUVASTATIN CALCIUM 20 MG/1
20 TABLET, COATED ORAL DAILY
Qty: 90 TABLET | Refills: 3 | Status: SHIPPED | OUTPATIENT
Start: 2023-04-27

## 2023-05-03 ENCOUNTER — TELEPHONE (OUTPATIENT)
Dept: CARDIOLOGY CLINIC | Facility: CLINIC | Age: 83
End: 2023-05-03

## 2023-05-03 ENCOUNTER — PREP FOR PROCEDURE (OUTPATIENT)
Dept: CARDIOLOGY CLINIC | Facility: CLINIC | Age: 83
End: 2023-05-03

## 2023-05-03 ENCOUNTER — OFFICE VISIT (OUTPATIENT)
Dept: CARDIOLOGY CLINIC | Facility: CLINIC | Age: 83
End: 2023-05-03

## 2023-05-03 VITALS
HEART RATE: 52 BPM | BODY MASS INDEX: 22.82 KG/M2 | OXYGEN SATURATION: 97 % | SYSTOLIC BLOOD PRESSURE: 110 MMHG | RESPIRATION RATE: 16 BRPM | WEIGHT: 137 LBS | DIASTOLIC BLOOD PRESSURE: 60 MMHG | HEIGHT: 65 IN

## 2023-05-03 DIAGNOSIS — I25.5 ISCHEMIC CARDIOMYOPATHY: ICD-10-CM

## 2023-05-03 DIAGNOSIS — E11.3293 TYPE 2 DIABETES MELLITUS WITH BOTH EYES AFFECTED BY MILD NONPROLIFERATIVE RETINOPATHY WITHOUT MACULAR EDEMA, WITH LONG-TERM CURRENT USE OF INSULIN (HCC): ICD-10-CM

## 2023-05-03 DIAGNOSIS — R94.39 ABNORMAL STRESS TEST: ICD-10-CM

## 2023-05-03 DIAGNOSIS — I25.118 CORONARY ARTERY DISEASE OF NATIVE ARTERY OF NATIVE HEART WITH STABLE ANGINA PECTORIS (HCC): ICD-10-CM

## 2023-05-03 DIAGNOSIS — I25.118 CORONARY ARTERY DISEASE OF NATIVE ARTERY OF NATIVE HEART WITH STABLE ANGINA PECTORIS (HCC): Primary | ICD-10-CM

## 2023-05-03 DIAGNOSIS — Z79.4 TYPE 2 DIABETES MELLITUS WITH BOTH EYES AFFECTED BY MILD NONPROLIFERATIVE RETINOPATHY WITHOUT MACULAR EDEMA, WITH LONG-TERM CURRENT USE OF INSULIN (HCC): ICD-10-CM

## 2023-05-03 DIAGNOSIS — I10 ESSENTIAL HYPERTENSION: Primary | ICD-10-CM

## 2023-05-03 RX ORDER — DULAGLUTIDE 0.75 MG/.5ML
0.75 INJECTION, SOLUTION SUBCUTANEOUS
Qty: 2 ML | Refills: 1 | Status: SHIPPED | OUTPATIENT
Start: 2023-05-03

## 2023-05-03 NOTE — TELEPHONE ENCOUNTER
----- Message from Gerhard Mcconnell sent at 5/3/2023 10:26 AM EDT -----  Regarding: Cath  Benito Paz,        Can you please arrange cath for this patient for abnormal stress test  I ordered a new BMP for him       Thanks,   Clyde Bobo

## 2023-05-03 NOTE — H&P (VIEW-ONLY)
[facility]  [address] [place] [address]  Cardiology Office Note    Greta Prieto 81 y o  male MRN: [id number]    05/03/23          Assessment/Plan:  1  Abnormal stress test  • Plan for further evaluation with cardiac catheterization  • Risks and benefits of cardiac catheterization including but not limited to risk of infection, vascular injury, renal failure, bleeding, myocardial infarction, stroke, and death were discussed at length with patient  Patient understands the risks and benefits and wishes to proceed  • Continue aspirin, rosuvastatin, and Lopressor  2   Chronic HFrEF, ICM with EF 25% s/p ICD  • Patient appears euvolemic off of diuretics  • Continue Lopressor, patient not on ACEI/ARB/ARNI    3  Hypertension  • Mildly soft blood pressure at today's visit  • Continue Lopressor    4  BPH  • Patient will eventually undergo surgery  Discussed with urology back pain  • Procedure is elective and patient has no evidence of obstruction at this time  • Patient will need 6 months of uninterrupted DAPT if stent is placed  Follow up: 3 months or sooner as needed    Recommend aggressive risk factor modification and therapeutic lifestyle changes  Low-salt, low-calorie, low-fat, low-cholesterol diet with regular exercise and to optimize weight  Discussed concepts of atherosclerosis, including signs and symptoms of cardiac disease  Previous studies were reviewed  Safety measures were reviewed  All questions and concerns addressed  Patient was advised to report any problems requiring medical attention  1  Essential hypertension        2  Coronary artery disease of native artery of native heart with stable angina pectoris (HCC)  Basic metabolic panel      3   Ischemic cardiomyopathy  metoprolol tartrate (LOPRESSOR) 25 mg tablet          HPI: Greta Prieto is a 81y o  year old male with PMH of BPH, ICM s/p ICD, DM type II, hypertension who presents for follow-up  Patient is known to Dr Ni Funes  Patient endorses chest pain and shortness of breath on exertion  He denies any symptoms at rest   He was evaluated with pharmacologic MPI which revealed large fixed defect in the mid to apical anterior and anteroseptal wall suggestive of infarct and a small partially reversible defect in the mid inferior wall suggestive of ischemia  Discussed cardiac catheterization at previous visit which patient was considering at the time  Patient would like to pursue cardiac catheterization  The patient denies dyspnea at rest, lower extremity edema, or palpitations and was instructed to call  the office or seek medical attention if any such symptoms develop  All medications reviewed and patient is tolerating medications without side effects  Social history:   Patient has history of tobacco use  He denies any significant alcohol recreational drug use  Pharmacologic MPI 3/1/2023  •  Resting ECG: The ECG shows sinus bradycardia  Shallow T wave inversion is noted  Moderate T wave inversion in the anterior leads is noted  •  Stress ECG: The ECG was not diagnostic due to pharmacological (vasodilator) stress  •  Perfusion: There is a left ventricular perfusion defect that is large in size present in the mid to apical anterior, anteroseptal and apex location(s) that is fixed  There is a left ventricular perfusion defect that is small in size present in the mid inferior location(s) that is partially reversible  •  Stress Function: Left ventricular function post-stress is abnormal  Global function is severely reduced  Post-stress ejection fraction is 27 %  There is a defect in the anterior and anteroseptal location(s)  The defect has severely reduced function  •  Perfusion Defect Conclusion: The stress/rest perfusion ratio is 1 09   There is no evidence of transient ischemic dilation (TID)            Patient Active Problem List   Diagnosis   • Anemia   • BPH (benign prostatic hyperplasia)   • Ischemic cardiomyopathy   • Coronary atherosclerosis of native coronary artery   • Urinary retention   • Type 2 diabetes mellitus with mild nonproliferative diabetic retinopathy without macular edema, bilateral (HCC)   • Physical deconditioning   • Oropharyngeal dysphagia   • Chronic right shoulder pain       Allergies   Allergen Reactions   • Atorvastatin Other (See Comments)     Pt unsure     • Percocet [Oxycodone-Acetaminophen] Nausea Only and GI Intolerance         Current Outpatient Medications:   •  Alcohol Swabs (Pharmacist Choice Alcohol) PADS, USE FOR TESTING AND INJECTIONS UP TO 10 PADS DAILY, Disp: , Rfl:   •  aspirin 81 MG tablet, Take 1 tablet by mouth daily, Disp: , Rfl:   •  Continuous Blood Gluc  (FreeStyle Maris 2 [place]) [facility], Use 1 Device continuous, Disp: 1 each, Rfl: 0  •  Continuous Blood Gluc Sensor (FreeStyle Maris 2 Sensor) MISC, Use 1 each every 14 (fourteen) days, Disp: 2 each, Rfl: 6  •  ergocalciferol (VITAMIN D2) 50,000 units, Take 50,000 Units by mouth once a week, Disp: , Rfl:   •  finasteride (PROSCAR) 5 mg tablet, TAKE 1 TABLET (5 MG TOTAL) BY MOUTH DAILY, Disp: 90 tablet, Rfl: 0  •  glipiZIDE (GLUCOTROL) 5 mg tablet, TAKE 1 TABLET (5 MG TOTAL) BY MOUTH 2 (TWO) TIMES A DAY, Disp: 180 tablet, Rfl: 3  •  Insulin Glargine Solostar (Lantus SoloStar) 100 UNIT/ML SOPN, Inject 0 1 mL (10 Units total) under the skin daily at bedtime, Disp: 15 mL, Rfl: 4  •  Insulin Pen Needle (Pen Needles) 32G X 4 MM MISC, Check blood glucose at home AC and HS 4 times a day, Disp: 200 each, Rfl: 3  •  levothyroxine 75 mcg tablet, TAKE 1 TABLET (75 MCG TOTAL) BY MOUTH DAILY, Disp: 90 tablet, Rfl: 1  •  metoprolol tartrate (LOPRESSOR) 25 mg tablet, Take 1 tablet (25 mg total) by mouth every 12 (twelve) hours, Disp: 90 tablet, Rfl: 3  •  Milk Thistle 300 MG CAPS, TAKE TWO PILLS IN THE AM WITH FOOD , Disp: , Rfl:   •  OneTouch Verio test strip, Use 1 each in the morning Use as instructed, Disp: 100 strip, Rfl: 5  •  Pharmacist Choice Lancets MISC, USE FOR TESTING 4 TIMES A DAY, Disp: , Rfl:   •  repaglinide (PRANDIN) 0 5 mg tablet, , Disp: , Rfl:   •  rosuvastatin (CRESTOR) 20 MG tablet, TAKE 1 TABLET (20 MG TOTAL) BY MOUTH DAILY, Disp: 90 tablet, Rfl: 3  •  senna-docusate sodium (SENOKOT-S) 8 6-50 mg per tablet, Take 1 tablet by mouth daily, Disp: , Rfl:   •  tamsulosin (FLOMAX) 0 4 mg, TAKE 1 CAPSULE (0 4 MG TOTAL) BY MOUTH DAILY, Disp: 90 capsule, Rfl: 3  •  Trulicity 6 53 CZ/3 7II injection, INJECT 0 5 ML (0 75 MG TOTAL) UNDER THE SKIN EVERY 7 DAYS, Disp: 2 mL, Rfl: 1    Past Medical History:   Diagnosis Date   • Acquired cyst of kidney    • Anemia    • Benign paroxysmal vertigo, unspecified ear    • Cellulitis of leg    • Cervical spinal stenosis    • Chest pain    • Coronary atherosclerosis of native coronary artery    • Diabetes mellitus (HCC)    • Disease of thyroid gland    • Enlarged prostate    • Esophageal candidiasis (HCC)    • H/o COVID-19 06/30/2022   • Hematuria    • Herpes zoster    • Hyperlipidemia    • Hypertension    • Incomplete emptying of bladder    • Inflamed seborrheic keratosis    • Internal hemorrhoids    • Malignant neoplasm of skin of trunk    • Myocardial infarction ([facility] [address] ) 2005   • Nonmelanoma skin cancer     LAST ASSESSED: 8/9/17   • 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 infarction    • Paroxysmal tachycardia (HCC)    • Shortness of breath    • Vitamin B deficiency        Family History   Problem Relation Age of Onset   • Heart disease Mother    • Coronary artery disease Mother    • Sudden death Mother    • Cancer Father         throat; MALIGNANT NEOPLASM OF HEAD, FACE OR NECK   • Throat cancer Father    • Cancer Family    • Coronary artery disease Family        Past Surgical History:   Procedure Laterality Date   • CARDIAC DEFIBRILLATOR PLACEMENT     • COLONOSCOPY  10/07/2008    FIBEROPTIC SCREENING; NO FURTHER RECOMMENDATIONS   • CORONARY ANGIOPLASTY WITH STENT PLACEMENT     • CYSTOSCOPY  2015    DIAGNOSTIC; MANAGED BY: Verner Balding   • EGD AND COLONOSCOPY N/A 2018    Procedure: EGD AND COLONOSCOPY;  Surgeon: Holli Almanza MD;  Location: [facility]; Service: Gastroenterology   • [facility] INJECTION RIGHT SHOULDER (ARTHROGRAM)  2022   • HIP ARTHROPLASTY Right    • INSERT / REPLACE / REMOVE PACEMAKER     • JOINT REPLACEMENT Right     THR   • TRUNK SKIN LESION EXCISIONAL BIOPSY  2007    MALIGNANT; [address] CHEST       Social History     Socioeconomic History   • Marital status:      Spouse name: Not on file   • Number of children: Not on file   • Years of education: Not on file   • Highest education level: Not on file   Occupational History   • Occupation: RETIRED   Tobacco Use   • Smoking status: Former     Packs/day: 1      Years: 10      Pack years: 10 00     Types: Cigarettes     Quit date: 1978     Years since quittin 2   • Smokeless tobacco: Never   Vaping Use   • Vaping Use: Never used   Substance and Sexual Activity   • Alcohol use: Yes     Comment: occasionally 1-2 per month    • Drug use: No   • Sexual activity: Not Currently     Partners: Female   Other Topics Concern   • Not on file   Social History Narrative    LIVING INDEPENDENTLY WITH SPOUSE    NO ADVANCE DIRECTIVE ON FILE     Social Determinants of Health     Financial Resource Strain: Low Risk    • Difficulty of Paying Living Expenses: Not very hard   Food Insecurity: No Food Insecurity   • Worried About Running Out of Food in the Last Year: Never true   • Ran Out of Food in the Last Year: Never true   Transportation Needs: No Transportation Needs   • Lack of Transportation (Medical): No   • Lack of Transportation (Non-Medical):  No   Physical Activity: Not on file   Stress: Not on file   Social Connections: Not on file   Intimate Partner Violence: Not on file   Housing Stability: Low Risk    • Unable to Pay for Housing in the Last Year: No   • Number of Places Lived "in the Last Year: 1   • Unstable Housing in the Last Year: No       Review of symptoms:   Review of Systems   Constitutional: Negative for chills, diaphoresis and fever  Respiratory: Positive for shortness of breath  Negative for cough and chest tightness  Cardiovascular: Positive for chest pain  Negative for palpitations and leg swelling  Gastrointestinal: Negative for abdominal distention, blood in stool, nausea and vomiting  Genitourinary: Negative for difficulty urinating  Musculoskeletal: Negative for arthralgias and back pain  Neurological: Negative for dizziness, syncope, light-headedness and headaches  Psychiatric/Behavioral: Negative for agitation and confusion  The patient is not nervous/anxious  Vitals: /60 (BP Location: Left arm, Patient Position: Sitting, Cuff Size: Standard)   Pulse (!) 52   Resp 16   Ht 5' 5\" (1 651 m)   Wt 62 1 kg (137 lb)   SpO2 97%   BMI 22 80 kg/m²         Physical Exam:     Physical Exam  Vitals and nursing note reviewed  Constitutional:       General: He is not in acute distress  Appearance: He is well-developed  HENT:      Head: Normocephalic and atraumatic  Eyes:      Conjunctiva/sclera: Conjunctivae normal    Neck:      Vascular: No carotid bruit  Cardiovascular:      Rate and Rhythm: Normal rate and regular rhythm  Heart sounds: Normal heart sounds  No murmur heard  Pulmonary:      Effort: Pulmonary effort is normal  No respiratory distress  Breath sounds: Normal breath sounds  Abdominal:      Palpations: Abdomen is soft  Tenderness: There is no abdominal tenderness  Musculoskeletal:         General: No swelling  Cervical back: Neck supple  Right lower leg: No edema  Left lower leg: No edema  Skin:     General: Skin is warm and dry  Capillary Refill: Capillary refill takes less than 2 seconds  Neurological:      Mental Status: He is alert and oriented to person, place, and time   " Psychiatric:         Mood and Affect: Mood normal             Thank you for allowing me to participate in the care and evaluation of your patient  Should you have any questions, please feel free to contact me

## 2023-05-03 NOTE — TELEPHONE ENCOUNTER
Can we get an auth for [facility] at [facility] on 5/12/23 thanks

## 2023-05-03 NOTE — PROGRESS NOTES
[facility] [address] [place] [address]  Cardiology Office Note    Cale Hills 81 y o  male MRN: [id number]    05/03/23          Assessment/Plan:  1  Abnormal stress test   Plan for further evaluation with cardiac catheterization   Risks and benefits of cardiac catheterization including but not limited to risk of infection, vascular injury, renal failure, bleeding, myocardial infarction, stroke, and death were discussed at length with patient  Patient understands the risks and benefits and wishes to proceed   Continue aspirin, rosuvastatin, and Lopressor  2   Chronic HFrEF, ICM with EF 25% s/p ICD   Patient appears euvolemic off of diuretics   Continue Lopressor, patient not on ACEI/ARB/ARNI    3  Hypertension   Mildly soft blood pressure at today's visit   Continue Lopressor    4  BPH   Patient will eventually undergo surgery  Discussed with urology back pain   Procedure is elective and patient has no evidence of obstruction at this time   Patient will need 6 months of uninterrupted DAPT if stent is placed  Follow up: 3 months or sooner as needed    Recommend aggressive risk factor modification and therapeutic lifestyle changes  Low-salt, low-calorie, low-fat, low-cholesterol diet with regular exercise and to optimize weight  Discussed concepts of atherosclerosis, including signs and symptoms of cardiac disease  Previous studies were reviewed  Safety measures were reviewed  All questions and concerns addressed  Patient was advised to report any problems requiring medical attention  1  Essential hypertension        2  Coronary artery disease of native artery of native heart with stable angina pectoris (HCC)  Basic metabolic panel      3   Ischemic cardiomyopathy  metoprolol tartrate (LOPRESSOR) 25 mg tablet          HPI: Cale Hills is a 81y o  year old male with PMH of BPH, ICM s/p ICD, DM type II, hypertension who presents for follow-up  Patient is known to Dr Temitope Teague  Patient endorses chest pain and shortness of breath on exertion  He denies any symptoms at rest   He was evaluated with pharmacologic MPI which revealed large fixed defect in the mid to apical anterior and anteroseptal wall suggestive of infarct and a small partially reversible defect in the mid inferior wall suggestive of ischemia  Discussed cardiac catheterization at previous visit which patient was considering at the time  Patient would like to pursue cardiac catheterization  The patient denies dyspnea at rest, lower extremity edema, or palpitations and was instructed to call  the office or seek medical attention if any such symptoms develop  All medications reviewed and patient is tolerating medications without side effects  Social history:   Patient has history of tobacco use  He denies any significant alcohol recreational drug use  Pharmacologic MPI 3/1/2023    Resting ECG: The ECG shows sinus bradycardia  Shallow T wave inversion is noted  Moderate T wave inversion in the anterior leads is noted    Stress ECG: The ECG was not diagnostic due to pharmacological (vasodilator) stress    Perfusion: There is a left ventricular perfusion defect that is large in size present in the mid to apical anterior, anteroseptal and apex location(s) that is fixed  There is a left ventricular perfusion defect that is small in size present in the mid inferior location(s) that is partially reversible    Stress Function: Left ventricular function post-stress is abnormal  Global function is severely reduced  Post-stress ejection fraction is 27 %  There is a defect in the anterior and anteroseptal location(s)  The defect has severely reduced function    Perfusion Defect Conclusion: The stress/rest perfusion ratio is 1 09   There is no evidence of transient ischemic dilation (TID)            Patient Active Problem List   Diagnosis    Anemia    BPH (benign prostatic hyperplasia)    Ischemic cardiomyopathy    Coronary atherosclerosis of native coronary artery    Urinary retention    Type 2 diabetes mellitus with mild nonproliferative diabetic retinopathy without macular edema, bilateral (HCC)    Physical deconditioning    Oropharyngeal dysphagia    Chronic right shoulder pain       Allergies   Allergen Reactions    Atorvastatin Other (See Comments)     Pt unsure      Percocet [Oxycodone-Acetaminophen] Nausea Only and GI Intolerance         Current Outpatient Medications:     Alcohol Swabs (Pharmacist Choice Alcohol) PADS, USE FOR TESTING AND INJECTIONS UP TO 10 PADS DAILY, Disp: , Rfl:     aspirin 81 MG tablet, Take 1 tablet by mouth daily, Disp: , Rfl:     Continuous Blood Gluc  (FreeStyle Maris 2 [place]) [facility], Use 1 Device continuous, Disp: 1 each, Rfl: 0    Continuous Blood Gluc Sensor (FreeStyle Maris 2 Sensor) MISC, Use 1 each every 14 (fourteen) days, Disp: 2 each, Rfl: 6    ergocalciferol (VITAMIN D2) 50,000 units, Take 50,000 Units by mouth once a week, Disp: , Rfl:     finasteride (PROSCAR) 5 mg tablet, TAKE 1 TABLET (5 MG TOTAL) BY MOUTH DAILY, Disp: 90 tablet, Rfl: 0    glipiZIDE (GLUCOTROL) 5 mg tablet, TAKE 1 TABLET (5 MG TOTAL) BY MOUTH 2 (TWO) TIMES A DAY, Disp: 180 tablet, Rfl: 3    Insulin Glargine Solostar (Lantus SoloStar) 100 UNIT/ML SOPN, Inject 0 1 mL (10 Units total) under the skin daily at bedtime, Disp: 15 mL, Rfl: 4    Insulin Pen Needle (Pen Needles) 32G X 4 MM MISC, Check blood glucose at home AC and HS 4 times a day, Disp: 200 each, Rfl: 3    levothyroxine 75 mcg tablet, TAKE 1 TABLET (75 MCG TOTAL) BY MOUTH DAILY, Disp: 90 tablet, Rfl: 1    metoprolol tartrate (LOPRESSOR) 25 mg tablet, Take 1 tablet (25 mg total) by mouth every 12 (twelve) hours, Disp: 90 tablet, Rfl: 3    Milk Thistle 300 MG CAPS, TAKE TWO PILLS IN THE AM WITH FOOD , Disp: , Rfl:     OneTouch Verio test strip, Use 1 each in the morning Use as instructed, Disp: 100 strip, Rfl: 5    Pharmacist Choice Lancets MISC, USE FOR TESTING 4 TIMES A DAY, Disp: , Rfl:     repaglinide (PRANDIN) 0 5 mg tablet, , Disp: , Rfl:     rosuvastatin (CRESTOR) 20 MG tablet, TAKE 1 TABLET (20 MG TOTAL) BY MOUTH DAILY, Disp: 90 tablet, Rfl: 3    senna-docusate sodium (SENOKOT-S) 8 6-50 mg per tablet, Take 1 tablet by mouth daily, Disp: , Rfl:     tamsulosin (FLOMAX) 0 4 mg, TAKE 1 CAPSULE (0 4 MG TOTAL) BY MOUTH DAILY, Disp: 90 capsule, Rfl: 3    Trulicity 1 99 EZ/8 6CW injection, INJECT 0 5 ML (0 75 MG TOTAL) UNDER THE SKIN EVERY 7 DAYS, Disp: 2 mL, Rfl: 1    Past Medical History:   Diagnosis Date    Acquired cyst of kidney     Anemia     Benign paroxysmal vertigo, unspecified ear     Cellulitis of leg     Cervical spinal stenosis     Chest pain     Coronary atherosclerosis of native coronary artery     Diabetes mellitus (HCC)     Disease of thyroid gland     Enlarged prostate     Esophageal candidiasis (HCC)     H/o COVID-19 06/30/2022    Hematuria     Herpes zoster     Hyperlipidemia     Hypertension     Incomplete emptying of bladder     Inflamed seborrheic keratosis     Internal hemorrhoids     Malignant neoplasm of skin of trunk     Myocardial infarction [facility]) 2005    Nonmelanoma skin cancer     LAST ASSESSED: 8/9/17    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 infarction     Paroxysmal tachycardia (HCC)     Shortness of breath     Vitamin B deficiency        Family History   Problem Relation Age of Onset    Heart disease Mother     Coronary artery disease Mother     Sudden death Mother     Cancer Father         throat; MALIGNANT NEOPLASM OF HEAD, FACE OR NECK    Throat cancer Father     Cancer Family     Coronary artery disease Family        Past Surgical History:   Procedure Laterality Date    CARDIAC DEFIBRILLATOR PLACEMENT      COLONOSCOPY  10/07/2008    FIBEROPTIC SCREENING; NO FURTHER RECOMMENDATIONS    CORONARY ANGIOPLASTY WITH STENT PLACEMENT      CYSTOSCOPY  2015    DIAGNOSTIC; MANAGED BY: Vicky Rashid    EGD AND COLONOSCOPY N/A 2018    Procedure: EGD AND COLONOSCOPY;  Surgeon: Shanell Patel MD;  Location: [facility]; Service: Gastroenterology    [facility] INJECTION RIGHT SHOULDER (ARTHROGRAM)  2022    HIP ARTHROPLASTY Right     INSERT / REPLACE / REMOVE PACEMAKER      JOINT REPLACEMENT Right     THR    TRUNK SKIN LESION EXCISIONAL BIOPSY  2007    MALIGNANT; [address] CHEST       Social History     Socioeconomic History    Marital status:      Spouse name: Not on file    Number of children: Not on file    Years of education: Not on file    Highest education level: Not on file   Occupational History    Occupation: RETIRED   Tobacco Use    Smoking status: Former     Packs/day: 1 00     Years: 10      Pack years: 10 00     Types: Cigarettes     Quit date: 1978     Years since quittin 2    Smokeless tobacco: Never   Vaping Use    Vaping Use: Never used   Substance and Sexual Activity    Alcohol use: Yes     Comment: occasionally 1-2 per month     Drug use: No    Sexual activity: Not Currently     Partners: Female   Other Topics Concern    Not on file   Social History Narrative    LIVING INDEPENDENTLY WITH SPOUSE    NO ADVANCE DIRECTIVE ON FILE     Social Determinants of Health     Financial Resource Strain: Low Risk     Difficulty of Paying Living Expenses: Not very hard   Food Insecurity: No Food Insecurity    Worried About Running Out of Food in the Last Year: Never true    Rashard of Food in the Last Year: Never true   Transportation Needs: No Transportation Needs    Lack of Transportation (Medical): No    Lack of Transportation (Non-Medical):  No   Physical Activity: Not on file   Stress: Not on file   Social Connections: Not on file   Intimate Partner Violence: Not on file   Housing Stability: Low Risk     Unable to Pay for Housing in the Last Year: No    Number of Places Lived "in the Last Year: 1    Unstable Housing in the Last Year: No       Review of symptoms:   Review of Systems   Constitutional: Negative for chills, diaphoresis and fever  Respiratory: Positive for shortness of breath  Negative for cough and chest tightness  Cardiovascular: Positive for chest pain  Negative for palpitations and leg swelling  Gastrointestinal: Negative for abdominal distention, blood in stool, nausea and vomiting  Genitourinary: Negative for difficulty urinating  Musculoskeletal: Negative for arthralgias and back pain  Neurological: Negative for dizziness, syncope, light-headedness and headaches  Psychiatric/Behavioral: Negative for agitation and confusion  The patient is not nervous/anxious  Vitals: /60 (BP Location: Left arm, Patient Position: Sitting, Cuff Size: Standard)   Pulse (!) 52   Resp 16   Ht 5' 5\" (1 651 m)   Wt 62 1 kg (137 lb)   SpO2 97%   BMI 22 80 kg/m²         Physical Exam:     Physical Exam  Vitals and nursing note reviewed  Constitutional:       General: He is not in acute distress  Appearance: He is well-developed  HENT:      Head: Normocephalic and atraumatic  Eyes:      Conjunctiva/sclera: Conjunctivae normal    Neck:      Vascular: No carotid bruit  Cardiovascular:      Rate and Rhythm: Normal rate and regular rhythm  Heart sounds: Normal heart sounds  No murmur heard  Pulmonary:      Effort: Pulmonary effort is normal  No respiratory distress  Breath sounds: Normal breath sounds  Abdominal:      Palpations: Abdomen is soft  Tenderness: There is no abdominal tenderness  Musculoskeletal:         General: No swelling  Cervical back: Neck supple  Right lower leg: No edema  Left lower leg: No edema  Skin:     General: Skin is warm and dry  Capillary Refill: Capillary refill takes less than 2 seconds  Neurological:      Mental Status: He is alert and oriented to person, place, and time   " Psychiatric:         Mood and Affect: Mood normal             Thank you for allowing me to participate in the care and evaluation of your patient  Should you have any questions, please feel free to contact me

## 2023-05-05 NOTE — TELEPHONE ENCOUNTER
[id number]/[facility] 5/12/2023 Approved-Auth [id number] valid 5/5/23 to 6/19/23

## 2023-05-08 DIAGNOSIS — E11.3293 TYPE 2 DIABETES MELLITUS WITH BOTH EYES AFFECTED BY MILD NONPROLIFERATIVE RETINOPATHY WITHOUT MACULAR EDEMA, WITH LONG-TERM CURRENT USE OF INSULIN (HCC): ICD-10-CM

## 2023-05-08 DIAGNOSIS — Z79.4 TYPE 2 DIABETES MELLITUS WITH BOTH EYES AFFECTED BY MILD NONPROLIFERATIVE RETINOPATHY WITHOUT MACULAR EDEMA, WITH LONG-TERM CURRENT USE OF INSULIN (HCC): ICD-10-CM

## 2023-05-09 RX ORDER — DULAGLUTIDE 0.75 MG/.5ML
0.75 INJECTION, SOLUTION SUBCUTANEOUS
Qty: 2 ML | Refills: 1 | Status: SHIPPED | OUTPATIENT
Start: 2023-05-09

## 2023-05-09 NOTE — PROGRESS NOTES
Assessment/Plan: tendonitis right shoulder  Nearly frozen shoulder  Will refer to ortho  He has neck stiffness radicular pain seems to be better  Sugars under control  Four month follow-up continue cardiology follow-up       Diagnoses and all orders for this visit:    Type 2 diabetes mellitus with stage 3 chronic kidney disease, with long-term current use of insulin, unspecified whether stage 3a or 3b CKD ([facility] )  -     Microalbumin / creatinine urine ratio ([facility]); Future    Chronic obstructive pulmonary disease, unspecified COPD type ([facility] )    Myocardial infarction, unspecified MI type, unspecified artery ([facility] )    Tendonitis of shoulder, right  -     Cancel: Ambulatory referral to Orthopedic Surgery; Future  -     Ambulatory referral to Orthopedic Surgery; Future    Cervicalgia  -     Cancel: Ambulatory referral to Orthopedic Surgery; Future        No problem-specific Assessment & Plan notes found for this encounter  Subjective:      Patient ID: Kristofer Hernandez is a 79 y o  male  He has been complaining of persistent right shoulder pain for 3 months  Using nonsteroidals without much benefit  He was seen here a week ago  X-rays were ordered  Shows some arthritic changes in the shoulder and cervical spine  A course of steroids temporarily helped his shoulder pain but it has now returned  He has difficulty using his right arm because of the pain it interrupts his sleep  Stiff neck and pain down his arm when he turns his head has improved  He was referred to physical therapy but he never got an appointment  His diabetes on insulin sugars have been well controlled the steroids did not elevate his sugar too much  History of COPD which is stable    He has had coronary stenting no angina symptoms followed by cardiology on Plavix      The following portions of the patient's history were reviewed and updated as appropriate:   He has a past medical history of Acquired cyst of kidney, Anemia, Detail Level: Detailed Benign paroxysmal vertigo, unspecified ear, Cellulitis of leg, Cervical spinal stenosis, Chest pain, Coronary atherosclerosis of native coronary artery, Enlarged prostate, Esophageal candidiasis (HCC), Hematuria, Herpes zoster, Hyperlipidemia, Hypertension, Incomplete emptying of bladder, Inflamed seborrheic keratosis, Internal hemorrhoids, Malignant neoplasm of skin of trunk, Myocardial infarction ([facility] ), Nonmelanoma skin cancer, 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 infarction, Paroxysmal tachycardia ([facility] ), Shortness of breath, and Vitamin B deficiency  ,  does not have any pertinent problems on file  ,   has a past surgical history that includes Hip Arthroplasty (Right); Coronary angioplasty with stent; Joint replacement (Right); Insert / replace / remove pacemaker; EGD AND COLONOSCOPY (N/A, 2/12/2018); Colonoscopy (10/07/2008); Cystoscopy (11/06/2015); Trunk skin lesion excisional biopsy (08/22/2007); Cardiac defibrillator placement; and Coronary angioplasty with stent  ,  family history includes Cancer in his family and father; Coronary artery disease in his family and mother; Heart disease in his mother; Sudden death in his mother; Throat cancer in his father  ,   reports that he quit smoking about 43 years ago  His smoking use included cigarettes  He has a 10 00 pack-year smoking history  He has never used smokeless tobacco  He reports current alcohol use  He reports that he does not use drugs  ,  is allergic to atorvastatin and percocet [oxycodone-acetaminophen]     Current Outpatient Medications   Medication Sig Dispense Refill    aspirin 81 MG tablet Take 1 tablet by mouth daily      clopidogrel (PLAVIX) 75 mg tablet Take 1 tablet (75 mg total) by mouth daily 90 tablet 3    cyclobenzaprine (FLEXERIL) 10 mg tablet Take 1 tablet (10 mg total) by mouth 3 (three) times a day as needed for muscle spasms 30 tablet 1    finasteride (PROSCAR) 5 mg tablet Take 1 tablet (5 mg total) by mouth daily 90 tablet 3    glipiZIDE (GLUCOTROL) 5 mg Send Procedure Quote As Charge: No Procedure Quote $ (Will Render In Note. Use Numbers Only, No Text Please.): 4000 tablet Take 1 tablet (5 mg total) by mouth 2 (two) times a day 180 tablet 3    HumaLOG Albert KwikPen 100 units/mL injection pen Inject 20 Units under the skin 2 (two) times a day with meals 15 pen 3    insulin glargine (Lantus SoloStar) 100 units/mL injection pen Inject 26 Units under the skin daily 5 pen 5    Insulin Pen Needle (Pen Needles) 32G X 4 MM MISC Check blood glucose at home AC and HS 4 times a day 200 each 3    levothyroxine 88 mcg tablet Take 1 tablet (88 mcg total) by mouth daily 90 tablet 3    metoprolol succinate (TOPROL-XL) 50 mg 24 hr tablet TAKE 1 TABLET BY MOUTH EVERY DAY 90 tablet 2    predniSONE 10 mg tablet Take 40 milligrams for 2 days, 30 milligrams for 2 days, 20 milligrams for 2 days, 10 milligrams for 2 days, then stop 20 tablet 0    rosuvastatin (CRESTOR) 20 MG tablet Take 1 tablet (20 mg total) by mouth daily 90 tablet 3    tamsulosin (FLOMAX) 0 4 mg Take 1 capsule (0 4 mg total) by mouth daily 90 capsule 3     No current facility-administered medications for this visit  Review of Systems   Constitutional: Negative for chills and fever  HENT: Negative for ear pain and sore throat  Eyes: Negative for pain and visual disturbance  Respiratory: Negative for cough and shortness of breath  Cardiovascular: Negative for chest pain and palpitations  Gastrointestinal: Negative for abdominal pain and vomiting  Genitourinary: Negative for dysuria and hematuria  Musculoskeletal: Positive for arthralgias  Negative for back pain  Skin: Negative for color change and rash  Neurological: Negative for seizures and syncope  All other systems reviewed and are negative  Objective:  Vitals:    05/28/21 1554   BP: 124/72   BP Location: Left arm   Patient Position: Sitting   Pulse: 60   Temp: (!) 97 4 °F (36 3 °C)   TempSrc: Temporal   SpO2: 99%   Weight: 71 5 kg (157 lb 9 6 oz)     Body mass index is 25 44 kg/m²  Physical Exam  Vitals signs reviewed  Constitutional:       Appearance: He is well-developed  HENT:      Head: Normocephalic  Right Ear: Tympanic membrane and external ear normal       Left Ear: Tympanic membrane and external ear normal       Nose: Nose normal       Mouth/Throat:      Mouth: Mucous membranes are moist    Eyes:      Extraocular Movements: Extraocular movements intact  Conjunctiva/sclera: Conjunctivae normal       Pupils: Pupils are equal, round, and reactive to light  Neck:      Musculoskeletal: Neck rigidity ( somewhat limited in all directions) present  Thyroid: No thyromegaly  Vascular: No carotid bruit  Cardiovascular:      Rate and Rhythm: Normal rate and regular rhythm  Pulses: Normal pulses  Heart sounds: Normal heart sounds  No murmur  Pulmonary:      Effort: Pulmonary effort is normal  No respiratory distress  Breath sounds: Normal breath sounds  No stridor  No wheezing or rhonchi  Abdominal:      General: Abdomen is flat  Bowel sounds are normal       Palpations: Abdomen is soft  Musculoskeletal: Normal range of motion  General: Tenderness ( right shoulder with any movement in all directions) present  No swelling or deformity  Lymphadenopathy:      Cervical: No cervical adenopathy  Skin:     General: Skin is warm and dry  Neurological:      General: No focal deficit present  Mental Status: He is alert and oriented to person, place, and time  Deep Tendon Reflexes: Reflexes are normal and symmetric  Psychiatric:         Mood and Affect: Mood normal          Thought Content:  Thought content normal          Judgment: Judgment normal

## 2023-05-10 ENCOUNTER — APPOINTMENT (OUTPATIENT)
Dept: LAB | Facility: HOSPITAL | Age: 83
End: 2023-05-10
Attending: INTERNAL MEDICINE

## 2023-05-10 DIAGNOSIS — I25.118 CORONARY ARTERY DISEASE OF NATIVE ARTERY OF NATIVE HEART WITH STABLE ANGINA PECTORIS (HCC): ICD-10-CM

## 2023-05-10 DIAGNOSIS — R94.39 ABNORMAL STRESS TEST: ICD-10-CM

## 2023-05-10 LAB
ANION GAP SERPL CALCULATED.3IONS-SCNC: 4 MMOL/L (ref 4–13)
BUN SERPL-MCNC: 20 MG/DL (ref 5–25)
CALCIUM SERPL-MCNC: 9.6 MG/DL (ref 8.4–10.2)
CHLORIDE SERPL-SCNC: 102 MMOL/L (ref 96–108)
CO2 SERPL-SCNC: 30 MMOL/L (ref 21–32)
CREAT SERPL-MCNC: 1.01 MG/DL (ref 0.6–1.3)
ERYTHROCYTE [DISTWIDTH] IN BLOOD BY AUTOMATED COUNT: 14.9 % (ref 11.6–15.1)
GFR SERPL CREATININE-BSD FRML MDRD: 69 ML/MIN/1.73SQ M
GLUCOSE P FAST SERPL-MCNC: 333 MG/DL (ref 65–99)
HCT VFR BLD AUTO: 36.4 % (ref 36.5–49.3)
HGB BLD-MCNC: 13.1 G/DL (ref 12–17)
INR PPP: 0.99 (ref 0.84–1.19)
MCH RBC QN AUTO: 40.1 PG (ref 26.8–34.3)
MCHC RBC AUTO-ENTMCNC: 36 G/DL (ref 31.4–37.4)
MCV RBC AUTO: 111 FL (ref 82–98)
PLATELET # BLD AUTO: 187 THOUSANDS/UL (ref 149–390)
PMV BLD AUTO: 10.7 FL (ref 8.9–12.7)
POTASSIUM SERPL-SCNC: 4.6 MMOL/L (ref 3.5–5.3)
PROTHROMBIN TIME: 12.9 SECONDS (ref 11.6–14.5)
RBC # BLD AUTO: 3.27 MILLION/UL (ref 3.88–5.62)
SODIUM SERPL-SCNC: 136 MMOL/L (ref 135–147)
WBC # BLD AUTO: 5.49 THOUSAND/UL (ref 4.31–10.16)

## 2023-05-15 ENCOUNTER — HOSPITAL ENCOUNTER (OUTPATIENT)
Facility: HOSPITAL | Age: 83
Setting detail: OUTPATIENT SURGERY
Discharge: HOME | End: 2023-05-16
Attending: INTERNAL MEDICINE | Admitting: INTERNAL MEDICINE

## 2023-05-15 DIAGNOSIS — R94.39 ABNORMAL STRESS TEST: ICD-10-CM

## 2023-05-15 DIAGNOSIS — I25.118 CORONARY ARTERY DISEASE OF NATIVE ARTERY OF NATIVE HEART WITH STABLE ANGINA PECTORIS (HCC): ICD-10-CM

## 2023-05-15 LAB
GLUCOSE SERPL-MCNC: 137 MG/DL (ref 65–140)
GLUCOSE SERPL-MCNC: 211 MG/DL (ref 65–140)
GLUCOSE SERPL-MCNC: 232 MG/DL (ref 65–140)
KCT BLD-ACNC: 656 SEC (ref 89–137)
KCT BLD-ACNC: >1000 SEC (ref 89–137)
SPECIMEN SOURCE: ABNORMAL
SPECIMEN SOURCE: ABNORMAL

## 2023-05-15 DEVICE — XIENCE SKYPOINT™ EVEROLIMUS ELUTING CORONARY STENT SYSTEM 3.00 MM X 33 MM / RAPID-EXCHANGE
Type: IMPLANTABLE DEVICE | Site: CORONARY | Status: FUNCTIONAL
Brand: XIENCE SKYPOINT™

## 2023-05-15 DEVICE — XIENCE SKYPOINT™ EVEROLIMUS ELUTING CORONARY STENT SYSTEM 3.00 MM X 23 MM / RAPID-EXCHANGE
Type: IMPLANTABLE DEVICE | Status: FUNCTIONAL
Brand: XIENCE SKYPOINT™

## 2023-05-15 RX ORDER — FENTANYL CITRATE 50 UG/ML
INJECTION, SOLUTION INTRAMUSCULAR; INTRAVENOUS CODE/TRAUMA/SEDATION MEDICATION
Status: DISCONTINUED | OUTPATIENT
Start: 2023-05-15 | End: 2023-05-15 | Stop reason: HOSPADM

## 2023-05-15 RX ORDER — INSULIN GLARGINE 100 [IU]/ML
10 INJECTION, SOLUTION SUBCUTANEOUS
Status: DISCONTINUED | OUTPATIENT
Start: 2023-05-15 | End: 2023-05-16 | Stop reason: HOSPADM

## 2023-05-15 RX ORDER — CLOPIDOGREL BISULFATE 75 MG/1
75 TABLET ORAL DAILY
Status: DISCONTINUED | OUTPATIENT
Start: 2023-05-16 | End: 2023-05-16 | Stop reason: HOSPADM

## 2023-05-15 RX ORDER — HEPARIN SODIUM 1000 [USP'U]/ML
INJECTION, SOLUTION INTRAVENOUS; SUBCUTANEOUS CODE/TRAUMA/SEDATION MEDICATION
Status: DISCONTINUED | OUTPATIENT
Start: 2023-05-15 | End: 2023-05-15 | Stop reason: HOSPADM

## 2023-05-15 RX ORDER — MIDAZOLAM HYDROCHLORIDE 2 MG/2ML
INJECTION, SOLUTION INTRAMUSCULAR; INTRAVENOUS CODE/TRAUMA/SEDATION MEDICATION
Status: DISCONTINUED | OUTPATIENT
Start: 2023-05-15 | End: 2023-05-15 | Stop reason: HOSPADM

## 2023-05-15 RX ORDER — AMOXICILLIN 250 MG
1 CAPSULE ORAL DAILY
Status: DISCONTINUED | OUTPATIENT
Start: 2023-05-15 | End: 2023-05-16 | Stop reason: HOSPADM

## 2023-05-15 RX ORDER — CLOPIDOGREL BISULFATE 75 MG/1
TABLET ORAL CODE/TRAUMA/SEDATION MEDICATION
Status: DISCONTINUED | OUTPATIENT
Start: 2023-05-15 | End: 2023-05-16 | Stop reason: HOSPADM

## 2023-05-15 RX ORDER — VERAPAMIL HCL 2.5 MG/ML
AMPUL (ML) INTRAVENOUS CODE/TRAUMA/SEDATION MEDICATION
Status: DISCONTINUED | OUTPATIENT
Start: 2023-05-15 | End: 2023-05-15 | Stop reason: HOSPADM

## 2023-05-15 RX ORDER — INSULIN LISPRO 100 [IU]/ML
1-5 INJECTION, SOLUTION INTRAVENOUS; SUBCUTANEOUS
Status: DISCONTINUED | OUTPATIENT
Start: 2023-05-16 | End: 2023-05-16 | Stop reason: HOSPADM

## 2023-05-15 RX ORDER — LIDOCAINE HYDROCHLORIDE 10 MG/ML
INJECTION, SOLUTION EPIDURAL; INFILTRATION; INTRACAUDAL; PERINEURAL CODE/TRAUMA/SEDATION MEDICATION
Status: DISCONTINUED | OUTPATIENT
Start: 2023-05-15 | End: 2023-05-15 | Stop reason: HOSPADM

## 2023-05-15 RX ORDER — PRAVASTATIN SODIUM 40 MG
40 TABLET ORAL
Status: DISCONTINUED | OUTPATIENT
Start: 2023-05-15 | End: 2023-05-16 | Stop reason: HOSPADM

## 2023-05-15 RX ORDER — TAMSULOSIN HYDROCHLORIDE 0.4 MG/1
0.4 CAPSULE ORAL DAILY
Status: DISCONTINUED | OUTPATIENT
Start: 2023-05-15 | End: 2023-05-16 | Stop reason: HOSPADM

## 2023-05-15 RX ORDER — SODIUM CHLORIDE 9 MG/ML
50 INJECTION, SOLUTION INTRAVENOUS CONTINUOUS
Status: DISCONTINUED | OUTPATIENT
Start: 2023-05-15 | End: 2023-05-16

## 2023-05-15 RX ORDER — LEVOTHYROXINE SODIUM 0.07 MG/1
75 TABLET ORAL DAILY
Status: DISCONTINUED | OUTPATIENT
Start: 2023-05-15 | End: 2023-05-16 | Stop reason: HOSPADM

## 2023-05-15 RX ORDER — SODIUM CHLORIDE 9 MG/ML
75 INJECTION, SOLUTION INTRAVENOUS CONTINUOUS
Status: DISCONTINUED | OUTPATIENT
Start: 2023-05-15 | End: 2023-05-15

## 2023-05-15 RX ORDER — ERGOCALCIFEROL 1.25 MG/1
50000 CAPSULE ORAL WEEKLY
Status: DISCONTINUED | OUTPATIENT
Start: 2023-05-17 | End: 2023-05-16 | Stop reason: HOSPADM

## 2023-05-15 RX ORDER — FINASTERIDE 5 MG/1
5 TABLET, FILM COATED ORAL DAILY
Status: DISCONTINUED | OUTPATIENT
Start: 2023-05-15 | End: 2023-05-16 | Stop reason: HOSPADM

## 2023-05-15 RX ORDER — ASPIRIN 81 MG/1
81 TABLET, CHEWABLE ORAL DAILY
Status: DISCONTINUED | OUTPATIENT
Start: 2023-05-16 | End: 2023-05-16 | Stop reason: HOSPADM

## 2023-05-15 RX ADMIN — FINASTERIDE 5 MG: 5 TABLET, FILM COATED ORAL at 17:10

## 2023-05-15 RX ADMIN — TAMSULOSIN HYDROCHLORIDE 0.4 MG: 0.4 CAPSULE ORAL at 17:10

## 2023-05-15 RX ADMIN — INSULIN GLARGINE 10 UNITS: 100 INJECTION, SOLUTION SUBCUTANEOUS at 22:27

## 2023-05-15 RX ADMIN — SENNOSIDES AND DOCUSATE SODIUM 1 TABLET: 50; 8.6 TABLET ORAL at 17:10

## 2023-05-15 RX ADMIN — SODIUM CHLORIDE 75 ML/HR: 0.9 INJECTION, SOLUTION INTRAVENOUS at 11:00

## 2023-05-15 RX ADMIN — SODIUM CHLORIDE 50 ML/HR: 0.9 INJECTION, SOLUTION INTRAVENOUS at 15:37

## 2023-05-15 RX ADMIN — METOPROLOL TARTRATE 25 MG: 25 TABLET, FILM COATED ORAL at 15:42

## 2023-05-15 RX ADMIN — PRAVASTATIN SODIUM 40 MG: 40 TABLET ORAL at 17:10

## 2023-05-15 RX ADMIN — METOPROLOL TARTRATE 25 MG: 25 TABLET, FILM COATED ORAL at 22:27

## 2023-05-15 RX ADMIN — LEVOTHYROXINE SODIUM 75 MCG: 75 TABLET ORAL at 17:10

## 2023-05-15 NOTE — INTERVAL H&P NOTE
Update: (This section must be completed if the H&P was completed greater than 24 hrs to procedure or admission)    H&P reviewed  After examining the patient, I find no changed to the H&P since it had been written  Patient re-evaluated  Accept as history and physical       I have discussed in detail with patient regarding the indications, alternatives, risks and benefit of cardiac catheterization and possible PCI  The procedure risks, benefits, and complications (including but not limited to bleeding, infection, arrhythmia, nephrotoxicity, vessel injury, myocardial infarction, CVA, and death) were reviewed  Patient is alert and oriented x3 and wishes to proceed  All questions answered         Cecelia Ojeda MD/May 15, 2023/10:32 AM

## 2023-05-15 NOTE — Clinical Note
Defib pad site: left lower flank and right upper scapula  applied Defib pad site assessment: skin integrity intact

## 2023-05-16 VITALS
BODY MASS INDEX: 22.32 KG/M2 | HEIGHT: 66 IN | TEMPERATURE: 97.9 F | SYSTOLIC BLOOD PRESSURE: 107 MMHG | RESPIRATION RATE: 17 BRPM | HEART RATE: 63 BPM | WEIGHT: 138.89 LBS | OXYGEN SATURATION: 96 % | DIASTOLIC BLOOD PRESSURE: 48 MMHG

## 2023-05-16 LAB
ANION GAP SERPL CALCULATED.3IONS-SCNC: 4 MMOL/L (ref 4–13)
BUN SERPL-MCNC: 19 MG/DL (ref 5–25)
CALCIUM SERPL-MCNC: 9 MG/DL (ref 8.4–10.2)
CHLORIDE SERPL-SCNC: 108 MMOL/L (ref 96–108)
CO2 SERPL-SCNC: 26 MMOL/L (ref 21–32)
CREAT SERPL-MCNC: 0.89 MG/DL (ref 0.6–1.3)
GFR SERPL CREATININE-BSD FRML MDRD: 80 ML/MIN/1.73SQ M
GLUCOSE P FAST SERPL-MCNC: 132 MG/DL (ref 65–99)
GLUCOSE SERPL-MCNC: 111 MG/DL (ref 65–140)
GLUCOSE SERPL-MCNC: 132 MG/DL (ref 65–140)
GLUCOSE SERPL-MCNC: 201 MG/DL (ref 65–140)
GLUCOSE SERPL-MCNC: 201 MG/DL (ref 65–140)
POTASSIUM SERPL-SCNC: 4.3 MMOL/L (ref 3.5–5.3)
SODIUM SERPL-SCNC: 138 MMOL/L (ref 135–147)

## 2023-05-16 RX ORDER — CLOPIDOGREL BISULFATE 75 MG/1
75 TABLET ORAL DAILY
Qty: 30 TABLET | Refills: 5 | Status: SHIPPED | OUTPATIENT
Start: 2023-05-16

## 2023-05-16 RX ORDER — EPINEPHRINE 1 MG/ML
INJECTION, SOLUTION, CONCENTRATE INTRAVENOUS
Status: DISCONTINUED
Start: 2023-05-16 | End: 2023-05-16 | Stop reason: HOSPADM

## 2023-05-16 RX ADMIN — LEVOTHYROXINE SODIUM 75 MCG: 75 TABLET ORAL at 05:00

## 2023-05-16 RX ADMIN — TAMSULOSIN HYDROCHLORIDE 0.4 MG: 0.4 CAPSULE ORAL at 09:51

## 2023-05-16 RX ADMIN — SENNOSIDES AND DOCUSATE SODIUM 1 TABLET: 50; 8.6 TABLET ORAL at 09:51

## 2023-05-16 RX ADMIN — CLOPIDOGREL BISULFATE 75 MG: 75 TABLET ORAL at 09:51

## 2023-05-16 RX ADMIN — FINASTERIDE 5 MG: 5 TABLET, FILM COATED ORAL at 09:51

## 2023-05-16 RX ADMIN — INSULIN LISPRO 1 UNITS: 100 INJECTION, SOLUTION INTRAVENOUS; SUBCUTANEOUS at 12:15

## 2023-05-16 RX ADMIN — ASPIRIN 81 MG: 81 TABLET, CHEWABLE ORAL at 09:51

## 2023-05-16 NOTE — PLAN OF CARE
Problem: PAIN - ADULT  Goal: Verbalizes/displays adequate comfort level or baseline comfort level  Description: Interventions:  - Encourage patient to monitor pain and request assistance  - Assess pain using appropriate pain scale  - Administer analgesics based on type and severity of pain and evaluate response  - Implement non-pharmacological measures as appropriate and evaluate response  - Consider cultural and social influences on pain and pain management  - Notify physician/advanced practitioner if interventions unsuccessful or patient reports new pain  Outcome: Progressing     Problem: INFECTION - ADULT  Goal: Absence or prevention of progression during hospitalization  Description: INTERVENTIONS:  - Assess and monitor for signs and symptoms of infection  - Monitor lab/diagnostic results  - Monitor all insertion sites, i e  indwelling lines, tubes, and drains  - Monitor endotracheal if appropriate and nasal secretions for changes in amount and color  - [place] appropriate cooling/warming therapies per order  - Administer medications as ordered  - Instruct and encourage patient and family to use good hand hygiene technique  - Identify and instruct in appropriate isolation precautions for identified infection/condition  Outcome: Progressing  Goal: Absence of fever/infection during neutropenic period  Description: INTERVENTIONS:  - Monitor WBC    Outcome: Progressing     Problem: SAFETY ADULT  Goal: Patient will remain free of falls  Description: INTERVENTIONS:  - Educate patient/family on patient safety including physical limitations  - Instruct patient to call for assistance with activity   - Consult OT/PT to assist with strengthening/mobility   - Keep Call bell within reach  - Keep bed low and locked with side rails adjusted as appropriate  - Keep care items and personal belongings within reach  - Initiate and maintain comfort rounds  - Make Fall Risk Sign visible to staff  - Offer Toileting every  Hours, in advance of need  - Initiate/Maintain alarm  - Obtain necessary fall risk management equipment:   - Apply yellow socks and bracelet for high fall risk patients  - Consider moving patient to room near nurses station  Outcome: Progressing  Goal: Maintain or return to baseline ADL function  Description: INTERVENTIONS:  -  Assess patient's ability to carry out ADLs; assess patient's baseline for ADL function and identify physical deficits which impact ability to perform ADLs (bathing, care of mouth/teeth, toileting, grooming, dressing, etc )  - Assess/evaluate cause of self-care deficits   - Assess range of motion  - Assess patient's mobility; develop plan if impaired  - Assess patient's need for assistive devices and provide as appropriate  - Encourage maximum independence but intervene and supervise when necessary  - Involve family in performance of ADLs  - Assess for home care needs following discharge   - Consider OT consult to assist with ADL evaluation and planning for discharge  - Provide patient education as appropriate  Outcome: Progressing  Goal: Maintains/Returns to pre admission functional level  Description: INTERVENTIONS:  - Perform BMAT or MOVE assessment daily    - Set and communicate daily mobility goal to care team and patient/family/caregiver  - Collaborate with rehabilitation services on mobility goals if consulted  - Perform Range of Motion  times a day  - Reposition patient every  hours    - Dangle patient  times a day  - Stand patient  times a day  - Ambulate patient  times a day  - Out of bed to chair  times a day   - Out of bed for meals times a day  - Out of bed for toileting  - Record patient progress and toleration of activity level   Outcome: Progressing     Problem: DISCHARGE PLANNING  Goal: Discharge to home or other facility with appropriate resources  Description: INTERVENTIONS:  - Identify barriers to discharge w/patient and caregiver  - Arrange for needed discharge resources and transportation as appropriate  - Identify discharge learning needs (meds, wound care, etc )  - Arrange for interpretive services to assist at discharge as needed  - Refer to Case Management Department for coordinating discharge planning if the patient needs post-hospital services based on physician/advanced practitioner order or complex needs related to functional status, cognitive ability, or social support system  Outcome: Progressing     Problem: Knowledge Deficit  Goal: Patient/family/caregiver demonstrates understanding of disease process, treatment plan, medications, and discharge instructions  Description: Complete learning assessment and assess knowledge base    Interventions:  - Provide teaching at level of understanding  - Provide teaching via preferred learning methods  Outcome: Progressing

## 2023-05-16 NOTE — PLAN OF CARE
Problem: PAIN - ADULT  Goal: Verbalizes/displays adequate comfort level or baseline comfort level  Description: Interventions:  - Encourage patient to monitor pain and request assistance  - Assess pain using appropriate pain scale  - Administer analgesics based on type and severity of pain and evaluate response  - Implement non-pharmacological measures as appropriate and evaluate response  - Consider cultural and social influences on pain and pain management  - Notify physician/advanced practitioner if interventions unsuccessful or patient reports new pain  Outcome: Progressing     Problem: INFECTION - ADULT  Goal: Absence or prevention of progression during hospitalization  Description: INTERVENTIONS:  - Assess and monitor for signs and symptoms of infection  - Monitor lab/diagnostic results  - Monitor all insertion sites, i e  indwelling lines, tubes, and drains  - Monitor endotracheal if appropriate and nasal secretions for changes in amount and color  - [place] appropriate cooling/warming therapies per order  - Administer medications as ordered  - Instruct and encourage patient and family to use good hand hygiene technique  - Identify and instruct in appropriate isolation precautions for identified infection/condition  Outcome: Progressing  Goal: Absence of fever/infection during neutropenic period  Description: INTERVENTIONS:  - Monitor WBC    Outcome: Progressing     Problem: SAFETY ADULT  Goal: Patient will remain free of falls  Description: INTERVENTIONS:  - Educate patient/family on patient safety including physical limitations  - Instruct patient to call for assistance with activity   - Consult OT/PT to assist with strengthening/mobility   - Keep Call bell within reach  - Keep bed low and locked with side rails adjusted as appropriate  - Keep care items and personal belongings within reach  - Initiate and maintain comfort rounds  - Make Fall Risk Sign visible to staff  - Offer Toileting every  Hours, in advance of need  - Initiate/Maintain alarm  - Obtain necessary fall risk management equipment:   - Apply yellow socks and bracelet for high fall risk patients  - Consider moving patient to room near nurses station  Outcome: Progressing  Goal: Maintain or return to baseline ADL function  Description: INTERVENTIONS:  -  Assess patient's ability to carry out ADLs; assess patient's baseline for ADL function and identify physical deficits which impact ability to perform ADLs (bathing, care of mouth/teeth, toileting, grooming, dressing, etc )  - Assess/evaluate cause of self-care deficits   - Assess range of motion  - Assess patient's mobility; develop plan if impaired  - Assess patient's need for assistive devices and provide as appropriate  - Encourage maximum independence but intervene and supervise when necessary  - Involve family in performance of ADLs  - Assess for home care needs following discharge   - Consider OT consult to assist with ADL evaluation and planning for discharge  - Provide patient education as appropriate  Outcome: Progressing  Goal: Maintains/Returns to pre admission functional level  Description: INTERVENTIONS:  - Perform BMAT or MOVE assessment daily    - Set and communicate daily mobility goal to care team and patient/family/caregiver  - Collaborate with rehabilitation services on mobility goals if consulted  - Perform Range of Motion  times a day  - Reposition patient every  hours    - Dangle patient  times a day  - Stand patien times a day  - Ambulate patient times a day  - Out of bed to chair  times a day   - Out of bed for meals times a day  - Out of bed for toileting  - Record patient progress and toleration of activity level   Outcome: Progressing     Problem: DISCHARGE PLANNING  Goal: Discharge to home or other facility with appropriate resources  Description: INTERVENTIONS:  - Identify barriers to discharge w/patient and caregiver  - Arrange for needed discharge resources and transportation as appropriate  - Identify discharge learning needs (meds, wound care, etc )  - Arrange for interpretive services to assist at discharge as needed  - Refer to Case Management Department for coordinating discharge planning if the patient needs post-hospital services based on physician/advanced practitioner order or complex needs related to functional status, cognitive ability, or social support system  Outcome: Progressing     Problem: Knowledge Deficit  Goal: Patient/family/caregiver demonstrates understanding of disease process, treatment plan, medications, and discharge instructions  Description: Complete learning assessment and assess knowledge base    Interventions:  - Provide teaching at level of understanding  - Provide teaching via preferred learning methods  Outcome: Progressing

## 2023-05-16 NOTE — DISCHARGE SUMMARY
Discharge Summary  Jose Wilson 81 y o  male MRN: [id number]  Unit/Bed#: -01 Encounter: [id number]    Admission Date: 5/15/2023   Discharge Date: 5/16/2023    Disposition: Home    Discharge Diagnosis: CAD s/p CAROLYN x2  Secondary Diagnoses: Chronic systolic chf, htn,     Condition at Discharge: good   Procedures: Cardiac catherization  Discharge weight:   Vitals:    05/15/23 1051   Weight: 63 kg (138 lb 14 2 oz)       REVIEW OF SYSTEMS:  Constitutional:  Denies fever or chills   Eyes:  Denies change in visual acuity   HENT:  Denies nasal congestion or sore throat   Respiratory:  Denies cough or shortness of breath   Cardiovascular:  Denies chest pain or edema   GI:  Denies abdominal pain, nausea, vomiting, bloody stools or diarrhea   :  Denies dysuria, frequency, difficulty in micturition and nocturia  Musculoskeletal:  Denies back pain or joint pain   Neurologic:  Denies headache, focal weakness or sensory changes   Endocrine:  Denies polyuria or polydipsia   Lymphatic:  Denies swollen glands   Psychiatric:  Denies depression or anxiety         HPI and Hospital Course: Patient presented for outpatient cardiac catheterization after outpatient stress test was found to be abnormal   Stress test revealed perfusion defect in the anterior and anteroseptal locations; more specifically large perfusion defect noted in the mid to apical anterior, anteroseptal and apical occasions that were fixed, left ventricular perfusion defect that was small in size present in the mid inferior location was partially reversible  Cardiac catheterization done yesterday 5/15/2023 patient had successful drug-eluting stent placement to ostial LAD in-stent restenosis and drug-eluting stent to  of left PDA  Patient has been monitored for the last 24 hours, overall feeling well  Ambulating in the halls, vital signs are stable, abs are stable and he is ready for discharge    Reviewed importance of remaining on aspirin and Plavix for the next 1 year without interruption  Advised to report any bleeding  Discharge Medications:  See after visit summary for reconciled discharge medications provided to patient and family        Current Facility-Administered Medications   Medication Dose Route Frequency   • aspirin chewable tablet 81 mg  81 mg Oral Daily   • clopidogrel (PLAVIX) tablet 75 mg  75 mg Oral Daily   • [START ON 5/17/2023] ergocalciferol (VITAMIN D2) capsule 50,000 Units  50,000 Units Oral Weekly   • finasteride (PROSCAR) tablet 5 mg  5 mg Oral Daily   • insulin glargine (LANTUS) subcutaneous injection 10 Units 0 1 mL  10 Units Subcutaneous HS   • insulin lispro (HumaLOG) 100 units/mL subcutaneous injection 1-5 Units  1-5 Units Subcutaneous TID AC   • levothyroxine tablet 75 mcg  75 mcg Oral Daily   • metoprolol tartrate (LOPRESSOR) tablet 25 mg  25 mg Oral Q12H [address]   • pravastatin (PRAVACHOL) tablet 40 mg  40 mg Oral Daily With Dinner   • senna-docusate sodium (SENOKOT S) 8 6-50 mg per tablet 1 tablet  1 tablet Oral Daily   • tamsulosin (FLOMAX) capsule 0 4 mg  0 4 mg Oral Daily       Pertinent Labs/diagnostics:        CBC with diff:   Results from last 7 days   Lab Units 05/10/23  1204   WBC Thousand/uL 5 49   HEMOGLOBIN g/dL 13 1   HEMATOCRIT % 36 4*   MCV fL 111*   PLATELETS Thousands/uL 187   MCH pg 40 1*   MCHC g/dL 36 0   RDW % 14 9   MPV fL 10 7       CMP:  Results from last 7 days   Lab Units 05/16/23  0439 05/10/23  1204   POTASSIUM mmol/L 4 3 4 6   CHLORIDE mmol/L 108 102   CO2 mmol/L 26 30   BUN mg/dL 19 20   CREATININE mg/dL 0 89 1 01   CALCIUM mg/dL 9 0 9 6   EGFR ml/min/1 73sq m 80 69       Lipid Profile:   Lab Results   Component Value Date    CHOL 142 05/21/2015     Lab Results   Component Value Date    HDL 45 11/02/2022    HDL 52 02/02/2022    HDL 42 04/06/2021     Lab Results   Component Value Date    LDLCALC 45 11/02/2022    LDLCALC 81 02/02/2022    LDLCALC 54 04/06/2021     Lab Results   Component Value Date    TRIG 148 2022    TRIG 83 2022    TRIG 135 2021         Cardiac testing:   Results for orders placed during the hospital encounter of 18    Echo complete with contrast if indicated    Narrative  [address], [address] [facility]  [phone]    Transthoracic Echocardiogram  2D, M-mode, and Color Doppler    Study date:  2018    Patient: Clem Carson  MR number: [id number]  Account number: [de-identified]  : 1940  Age: 69 years  Gender: Male  Status: Outpatient  Location: [facility]  Height: 66 in  Weight: 150 lb  BP: 114/ 64 mmHg    Indications: CHF  Diagnoses: L16 75 - Chronic systolic (congestive) heart failure    Sonographer:  Mas RCS  Interpreting Physician:  Juan Antonio Castro MD  Primary Physician:  Kina Mehta MD  Referring Physician:  Kina Mehta MD  Group:  [facility]    SUMMARY    LEFT VENTRICLE:  The ventricle was dilated  Ejection fraction was estimated to be 25 %  There was severe diffuse hypokinesis  RIGHT VENTRICLE:  Estimated peak pressure was at least 30 mmHg  ICD lead noted  LEFT ATRIUM:  The atrium was mildly dilated  MITRAL VALVE:  There was mild regurgitation  AORTIC VALVE:  There was trace regurgitation  TRICUSPID VALVE:  There was trace regurgitation  HISTORY: PRIOR HISTORY: CAD  s/p angioplasty  Medication-treated hyperlipidemia  Tachycardia  Former smoker  Myocardial infarction  Congestive heart failure  Risk factors: hypertension, oral hypoglycemic-treated diabetes, and a family  history of coronary artery disease  PRIOR PROCEDURES: Pacemaker implantation  PROCEDURE: The study was performed in the [address] [place] [address]  This was a routine study  The transthoracic approach was used  The study included complete 2D imaging, M-mode, and color Doppler  The heart rate was 63 bpm, at the  start of the study   Images were obtained from the parasternal, apical, subcostal, and suprasternal notch acoustic windows  Image quality was adequate  LEFT VENTRICLE: The ventricle was dilated  Ejection fraction was estimated to be 25 %  There was severe diffuse hypokinesis  DOPPLER: There was an increased relative contribution of atrial contraction to ventricular filling  RIGHT VENTRICLE: The size was normal  Systolic function was normal  Wall thickness was normal  DOPPLER: Estimated peak pressure was at least 30 mmHg  ICD lead noted  LEFT ATRIUM: The atrium was mildly dilated  RIGHT ATRIUM: Size was normal     MITRAL VALVE: There was annular calcification  DOPPLER: There was mild regurgitation  AORTIC VALVE: The valve was not well visualized  DOPPLER: There was trace regurgitation  TRICUSPID VALVE: The valve structure was normal  There was normal leaflet separation  DOPPLER: The transtricuspid velocity was within the normal range  There was no evidence for stenosis  There was trace regurgitation  PULMONIC VALVE: Leaflets exhibited normal thickness, no calcification, and normal cuspal separation  DOPPLER: The transpulmonic velocity was within the normal range  There was no regurgitation  PERICARDIUM: There was no pericardial effusion  The pericardium was normal in appearance  AORTA: The root exhibited normal size  SYSTEM MEASUREMENT TABLES    Apical four chamber  4 chamber Left Atrium Volume Index; Planimetry; End Systole; Apical four chamber;: 21 36 cm2  Left Ventricular Diastolic Area; Method of Disks, Single Plane; End Diastole; Apical four chamber;: 50 79 cm2  Left Ventricular Ejection Fraction; Method of Disks, Single Plane; Apical four chamber;: 36 2 %  Left Ventricular systolic Area; Method of Disks, Single Plane; End Systole; Apical four chamber;: 37 94 cm2  Right Atrium Systolic Area; Planimetry; End Systole;  Apical four chamber;: 16 15 cm2  TAPSE: 15 9 mm    Apical two chamber  Left Ventricular Diastolic Area; Method of Disks, Single Plane; End Diastole; Apical two chamber;: 41 81 cm2  Left Ventricular Ejection Fraction; Method of Disks, Single Plane; Apical two chamber;: 39 6 %  Left Ventricular systolic Area; Method of Disks, Single Plane; End Systole; Apical two chamber;: 29 97 cm2    Unspecified Scan Mode  Aortic Root Diameter; End Systole;: 34 9 mm  Aortic valve Area; Continuity Equation by Velocity Time Integral; Systole;: 2 31 cm2  Cardiovascular Orifice Diameter; End Systole;: 23 4 mm  Gradient Pressure, Peak; Simplified Bernoulli; Antegrade Flow; Systole;: 6 2 mm[Hg]  Gradient pressure, average; Simplified Bernoulli; Antegrade Flow; Systole;: 3 8 mm[Hg]  Left Atrium to Aortic Root Ratio;: 1 08  Left atrial diameter; End Diastole;: 37 6 mm  Cardiac Output; Method of Disks, Biplane; Systole;: 4 11 L/min  Cardiac Output; Teichholz; Systole;: 3 71 L/min  Heart rate; Teichholz;: 58 /min  Interventricular Septum Diastolic Thickness; Teichholz; End Diastole;: 4 6 mm  Left Ventricle Internal End Diastolic Dimension; Teichholz;: 60 mm  Left Ventricle Internal Systolic Dimension; Teichholz; End Systole;: 50 mm  Left Ventricle Mass; Mass AVCube with Teichholz; End Diastole;: 134 g  Left Ventricle Posterior Wall Diastolic Thickness; Teichholz; End Diastole;: 7 8 mm  Left Ventricular Ejection Fraction; Method of Disks, Biplane;: 37 8 %  Left Ventricular Ejection Fraction; Teichholz;: 34 4 %  Left Ventricular End Diastolic Volume; Teichholz;: 180 ml  Left Ventricular End Systolic Volume; Teichholz;: 116 1 ml  Left Ventricular Fractional Shortening;: 17 3 %  Stroke volume; Method of Disks, Biplane; Systole;: 73 4 ml  Stroke volume;  Teichholz; Systole;: 63 9 ml  Mitral Valve Area; Area by Pressure Half-Time; Systole;: 3 49 cm2  Mitral Valve E to A Ratio; Systole;: 0 63  Pressure half time; Diastole;: 0 06 s  Maximum Tricuspid valve regurgitation pressure gradient; Regurgitant Flow; Systole;: 24 3 mm[Hg]    [employer] Accredited Echocardiography Laboratory    Prepared and electronically signed by    Loreto Mcgraw MD  Signed 14-Jul-2018 21:19:18    No results found for this or any previous visit  No valid procedures specified  No results found for this or any previous visit  PHYSICAL EXAMS:  General:  Patient is not in acute distress, laying in the bed comfortably, awake, alert responding to commands  Head: Normocephalic, Atraumatic  HEENT:  Both pupils normal-size atraumatic, normocephalic, nonicteric  Neck:  JVP not raised  Trachea central  Respiratory:  Bronchovascular breathing all over the chest without any accompaniment  Cardiovascular:  S1-S2 normal RRR without any murmur rails or rub  GI:  Abdomen soft nontender  Liver and spleen normal size, no free fluid, hernial sites unremarkable without any cough impulse  Musculoskeletal:  No edema  Neurologic:  Patient is awake alert, responding to command, well-oriented to time and place and person moving all extremities ambulating well    Discharge instructions/Information to patient and family:   See after visit summary for information provided to patient and family  Provisions for Follow-Up Care:  See after visit summary for information related to follow-up care and any pertinent home health orders  Planned Readmission: No    Discharge Statement   I spent 20 minutes minutes discharging the patient  This time was spent on the day of discharge  I had direct contact with the patient on the day of discharge  Additional documentation is required if more than 30 minutes were spent on discharge       Krys Gramajo PA-C  5/16/2023,,2:28 PM

## 2023-05-16 NOTE — CASE MANAGEMENT
Case Management Discharge Planning Note    Patient name Bjorn Baez  Location /-01 MRN [id number]  : 1940 Date 2023       Current Admission Date: 5/15/2023  Current Admission Diagnosis:Coronary artery disease of native artery of native heart with stable angina pectoris [facility])   Patient Active Problem List    Diagnosis Date Noted   • Chronic right shoulder pain 2022   • Oropharyngeal dysphagia 2022   • Physical deconditioning 2022   • Type 2 diabetes mellitus with mild nonproliferative diabetic retinopathy without macular edema, bilateral ([facility] [address] ) 10/14/2021   • Urinary retention 2019   • Coronary artery disease of native artery of native heart with stable angina pectoris ([facility] [address] ) 2019   • Ischemic cardiomyopathy 2018   • BPH (benign prostatic hyperplasia) 2018   • Anemia 02/10/2018      LOS (days): 0  Geometric Mean LOS (GMLOS) (days):   Days to GMLOS:     OBJECTIVE:      Current admission status: Outpatient Surgery   Preferred Pharmacy:   [address] [place] [address]  Phone: [phone] Fax: [phone]    [place] [address]  [address] [address] [place] [address]  Phone: [phone] Fax: [phone]    Primary Care Provider: Caren Lovelace MD    Primary Insurance: [facility]  Secondary Insurance:     DISCHARGE DETAILS:  CM was unable to complete formal assessment with patient today  General chart review completed for needs identification  CM also met with patient at bedside to review DCP  Discharge planning discussed with[de-identified] Patient at bedside  Freedom of Choice: Yes  Comments - Freedom of Choice: CM introduced self and role  Patient reports no CM needs  Has DME at home, [facility] is not indicated or requested by patient  Patient's DIL will be coming to pick him up between 4:30-5p    CM contacted family/caregiver?: Yes  Were Treatment Team discharge recommendations reviewed with patient/caregiver?: Yes (As it pertains to d/c planning and CM role )  Did patient/caregiver verbalize understanding of patient care needs?: Yes (As it pertains to d/c planning and CM role )  Were patient/caregiver advised of the risks associated with not following Treatment Team discharge recommendations?: Yes (As it pertains to d/c planning and CM role )    Contacts  Patient Contacts: Arlyn SAENZ)  Relationship to Patient[de-identified] Family  Contact Method: Phone  Phone Number: [phone]  Reason/Outcome: Continuity of Care, Discharge [address]         Is the patient interested in [address] at discharge?: No    DME Referral Provided  Referral made for DME?: No    Other Referral/Resources/Interventions Provided:  Interventions: None Indicated  Referral Comments: No CM needs  Would you like to participate in our [address] service program?  : No - Declined    Treatment Team Recommendation: Home  Discharge Destination Plan[de-identified] Home  Transport at Discharge : Family     ETA of Transport (Date): 05/16/23  ETA of Transport (Time): 1700     IMM Given (Date):: 05/16/23  IMM Given to[de-identified] Patient     Additional Comments: Verbal review of IMM and Medicare rights with patient at bedside  Patient reports understanding  States he is in agreement with plan to d/c home today  Reviewed KAYDENP w/ ZEE who also reports that she feels patient is ready to return home with no additional needs  Patient copy provided to bedside  Original to medical records bin for filing

## 2023-05-17 LAB
ATRIAL RATE: 54 BPM
P AXIS: 23 DEGREES
PR INTERVAL: 200 MS
QRS AXIS: -68 DEGREES
QRSD INTERVAL: 92 MS
QT INTERVAL: 464 MS
QTC INTERVAL: 440 MS
T WAVE AXIS: 108 DEGREES
VENTRICULAR RATE: 54 BPM

## 2023-05-18 ENCOUNTER — TELEPHONE (OUTPATIENT)
Dept: CARDIOLOGY CLINIC | Facility: CLINIC | Age: 83
End: 2023-05-18

## 2023-05-30 NOTE — CASE MANAGEMENT
Case Management Discharge Planning Note    Patient name Lynette Human  Location /-68 MRN [id number]  : 1940 Date 2022       Current Admission Date: 2022  Current Admission Diagnosis:Type 2 diabetes mellitus with mild nonproliferative diabetic retinopathy without macular edema, bilateral [facility])   Patient Active Problem List    Diagnosis Date Noted    Hypernatremia 2022    Weakness 2022    Dysphagia 2022    Physical deconditioning 2022    COVID-19 2022    Type 2 diabetes mellitus with mild nonproliferative diabetic retinopathy without macular edema, bilateral ([facility] [address] ) 10/14/2021    Chronic obstructive pulmonary disease ([facility] [address] ) 2021    Prostate cancer screening 2020    Urinary retention 2019    Coronary atherosclerosis of native coronary artery 2019    Ischemic cardiomyopathy 2018    Hypothyroidism 10/08/2018    Foot pain, bilateral 10/08/2018    BPH (benign prostatic hyperplasia) 2018    Anemia 02/10/2018    Hyperlipidemia 02/10/2018      LOS (days): 13  Geometric Mean LOS (GMLOS) (days): 4 60  Days to GMLOS:-8 2     OBJECTIVE:  Risk of Unplanned Readmission Score: 18 87         Current admission status: Inpatient   Preferred Pharmacy:   [address] [place] [address] [address] [facility] [place] [id number] [facility] [address]  Phone: [phone] Fax: [phone]    Primary Care Provider: Grazyna Kennedy MD    Primary Insurance: [facility]  Secondary Insurance:     DISCHARGE DETAILS:    Other Referral/Resources/Interventions Provided:  Interventions: Short Term Rehab  Referral Comments: ABAD received a TT from [place] with [facility] reporting that [facility] cannot accept at this time, because they have no beds in the red zone  CM called Eva Goncalves at [phone] and spoke to Kiko in the business office who is covering for Zoie Ashraf in admissions today   Valery reported that she will review updated clinicals and follow up with CM with a determination  CM resent referrals to an additional 12 facilities to determine who else could accept patient for STR  Call bell/Explanation of exam/test

## 2023-05-31 ENCOUNTER — OFFICE VISIT (OUTPATIENT)
Dept: CARDIOLOGY CLINIC | Facility: CLINIC | Age: 83
End: 2023-05-31

## 2023-05-31 VITALS
OXYGEN SATURATION: 98 % | RESPIRATION RATE: 16 BRPM | HEIGHT: 66 IN | SYSTOLIC BLOOD PRESSURE: 108 MMHG | WEIGHT: 137 LBS | BODY MASS INDEX: 22.02 KG/M2 | DIASTOLIC BLOOD PRESSURE: 68 MMHG | HEART RATE: 63 BPM

## 2023-05-31 DIAGNOSIS — I10 ESSENTIAL HYPERTENSION: Primary | ICD-10-CM

## 2023-05-31 DIAGNOSIS — I25.5 ISCHEMIC CARDIOMYOPATHY: ICD-10-CM

## 2023-05-31 DIAGNOSIS — I25.118 CORONARY ARTERY DISEASE OF NATIVE ARTERY OF NATIVE HEART WITH STABLE ANGINA PECTORIS (HCC): ICD-10-CM

## 2023-05-31 DIAGNOSIS — I50.22 CHRONIC SYSTOLIC CONGESTIVE HEART FAILURE (HCC): ICD-10-CM

## 2023-05-31 NOTE — PROGRESS NOTES
[facility]  [address] [facility] [address]  Cardiology Follow Up    Aguilar Breen  1940  [id number]      1  Essential hypertension        2  Coronary artery disease of native artery of native heart with stable angina pectoris ([facility] )        3  Ischemic cardiomyopathy        4  Chronic systolic congestive heart failure [facility])            Chief Complaint   Patient presents with   • Follow-up       Interval History: Patient presents for follow-up visit  Patient denies any history of chest pain shortness of breath  Patient denies any history of leg edema or orthopnea PND  No history of presyncope syncope  Patient states compliance with the present list of medications  Patient recently had cardiac catheterization for abnormal stress test  And had stents placed for ostial LAD in-stent restenosis as well as to chronic total occlusion of the left PDA  Patient denies any bleeding issues  He states that he has been ambulant and is able to walk longer than before  No history of leg edema orthopnea PND  No history of presyncope syncope  He also has uncontrolled diabetes  He was supposed to have urological surgery which is now on hold        Patient Active Problem List   Diagnosis   • Anemia   • BPH (benign prostatic hyperplasia)   • Ischemic cardiomyopathy   • Coronary artery disease of native artery of native heart with stable angina pectoris (HCC)   • Urinary retention   • Type 2 diabetes mellitus with mild nonproliferative diabetic retinopathy without macular edema, bilateral (HCC)   • Physical deconditioning   • Oropharyngeal dysphagia   • Chronic right shoulder pain     Past Medical History:   Diagnosis Date   • Acquired cyst of kidney    • Anemia    • Benign paroxysmal vertigo, unspecified ear    • Cellulitis of leg    • Cervical spinal stenosis    • Chest pain    • Coronary atherosclerosis of native coronary artery    • Diabetes mellitus ([facility] )    • Disease of thyroid gland    • Enlarged prostate    • Esophageal candidiasis (HCC)    • H/o COVID-19 2022   • Hematuria    • Herpes zoster    • Hyperlipidemia    • Hypertension    • Incomplete emptying of bladder    • Inflamed seborrheic keratosis    • Internal hemorrhoids    • Malignant neoplasm of skin of trunk    • Myocardial infarction [facility])    • Nonmelanoma skin cancer     LAST ASSESSED: 17   • 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 infarction    • Paroxysmal tachycardia (HCC)    • Shortness of breath    • Vitamin B deficiency      Social History     Socioeconomic History   • Marital status:      Spouse name: Not on file   • Number of children: Not on file   • Years of education: Not on file   • Highest education level: Not on file   Occupational History   • Occupation: RETIRED   Tobacco Use   • Smoking status: Former     Packs/day:  00     Years: 10 00     Total pack years: 10 00     Types: Cigarettes     Quit date: 1978     Years since quittin 3   • Smokeless tobacco: Never   Vaping Use   • Vaping Use: Never used   Substance and Sexual Activity   • Alcohol use: Yes     Comment: occasionally 1-2 per month    • Drug use: No   • Sexual activity: Not Currently     Partners: Female   Other Topics Concern   • Not on file   Social History Narrative    LIVING INDEPENDENTLY WITH SPOUSE    NO ADVANCE DIRECTIVE ON FILE     Social Determinants of Health     Financial Resource Strain: Low Risk  (2022)    Overall Financial Resource Strain (CARDIA)    • Difficulty of Paying Living Expenses: Not very hard   Food Insecurity: No Food Insecurity (2022)    Hunger Vital Sign    • Worried About Running Out of Food in the Last Year: Never true    • Ran Out of Food in the Last Year: Never true   Transportation Needs: No Transportation Needs (2022)    PRAPARE - Transportation    • Lack of Transportation (Medical): No    • Lack of Transportation (Non-Medical):  No   Physical Activity: Inactive (10/18/2021)    Exercise Vital Sign    • Days of Exercise per Week: 0 days    • Minutes of Exercise per Session: 0 min   Stress: Stress Concern Present (10/18/2021)    [address]    • Feeling of Stress : Rather much   Social Connections: Not on file   Intimate Partner Violence: Not on file   Housing Stability: Low Risk  (7/2/2022)    Housing Stability Vital Sign    • Unable to Pay for Housing in the Last Year: No    • Number of [place] in the Last Year: 1    • Unstable Housing in the Last Year: No      Family History   Problem Relation Age of Onset   • Heart disease Mother    • Coronary artery disease Mother    • Sudden death Mother    • Cancer Father         throat; MALIGNANT NEOPLASM OF HEAD, FACE OR NECK   • Throat cancer Father    • Cancer Family    • Coronary artery disease Family      Past Surgical History:   Procedure Laterality Date   • CARDIAC CATHETERIZATION Left 5/15/2023    Procedure: Cardiac Left Heart Cath;  Surgeon: Darrick Love MD;  Location: [facility]; Service: Cardiology   • CARDIAC CATHETERIZATION N/A 5/15/2023    Procedure: Cardiac pci;  Surgeon: Darrick Love MD;  Location: [address] [place] [address] CATH LAB; Service: Cardiology   • CARDIAC CATHETERIZATION N/A 5/15/2023    Procedure: Cardiac Coronary Angiogram;  Surgeon: Darrick Love MD;  Location: [address] [place] [address] CATH LAB; Service: Cardiology   • CARDIAC DEFIBRILLATOR PLACEMENT     • COLONOSCOPY  10/07/2008    FIBEROPTIC SCREENING; NO FURTHER RECOMMENDATIONS   • CORONARY ANGIOPLASTY WITH STENT PLACEMENT     • CYSTOSCOPY  11/06/2015    DIAGNOSTIC; MANAGED BY: Kalpesh Kingsley   • EGD AND COLONOSCOPY N/A 02/12/2018    Procedure: EGD AND COLONOSCOPY;  Surgeon: Krista Dubon MD;  Location: [facility] GI LAB;   Service: Gastroenterology   • [facility] INJECTION RIGHT SHOULDER (ARTHROGRAM)  01/04/2022   • HIP ARTHROPLASTY Right    • INSERT / REPLACE / REMOVE PACEMAKER     • JOINT REPLACEMENT Right     THR   • TRUNK SKIN LESION EXCISIONAL BIOPSY  08/22/2007    MALIGNANT; BCC CHEST       Current Outpatient Medications:   •  Alcohol Swabs (Pharmacist Choice Alcohol) PADS, USE FOR TESTING AND INJECTIONS UP TO 10 PADS DAILY, Disp: , Rfl:   •  aspirin 81 MG tablet, Take 1 tablet by mouth daily, Disp: , Rfl:   •  clopidogrel (PLAVIX) 75 mg tablet, Take 1 tablet (75 mg total) by mouth daily, Disp: 30 tablet, Rfl: 5  •  Continuous Blood Gluc  (FreeStyle Maris 2 Gracey) ANTONELLA, Use 1 Device continuous, Disp: 1 each, Rfl: 0  •  Continuous Blood Gluc Sensor (FreeStyle Maris 2 Sensor) MISC, Use 1 each every 14 (fourteen) days, Disp: 2 each, Rfl: 5  •  dulaglutide (Trulicity) 2 69 FK/7 5NZ injection, Inject 0 5 mL (0 75 mg total) under the skin every 7 days, Disp: 2 mL, Rfl: 1  •  ergocalciferol (VITAMIN D2) 50,000 units, Take 50,000 Units by mouth once a week, Disp: , Rfl:   •  finasteride (PROSCAR) 5 mg tablet, TAKE 1 TABLET (5 MG TOTAL) BY MOUTH DAILY, Disp: 90 tablet, Rfl: 0  •  glipiZIDE (GLUCOTROL) 5 mg tablet, TAKE 1 TABLET (5 MG TOTAL) BY MOUTH 2 (TWO) TIMES A DAY, Disp: 180 tablet, Rfl: 3  •  Insulin Glargine Solostar (Lantus SoloStar) 100 UNIT/ML SOPN, Inject 0 1 mL (10 Units total) under the skin daily at bedtime, Disp: 15 mL, Rfl: 4  •  Insulin Pen Needle (Pen Needles) 32G X 4 MM MISC, Check blood glucose at home AC and HS 4 times a day, Disp: 200 each, Rfl: 3  •  levothyroxine 75 mcg tablet, TAKE 1 TABLET (75 MCG TOTAL) BY MOUTH DAILY, Disp: 90 tablet, Rfl: 1  •  metoprolol tartrate (LOPRESSOR) 25 mg tablet, Take 1 tablet (25 mg total) by mouth every 12 (twelve) hours, Disp: 90 tablet, Rfl: 3  •  Milk Thistle 300 MG CAPS, TAKE TWO PILLS IN THE AM WITH FOOD , Disp: , Rfl:   •  Pharmacist Choice Lancets MISC, USE FOR TESTING 4 TIMES A DAY, Disp: , Rfl:   •  repaglinide (PRANDIN) 0 5 mg tablet, , Disp: , Rfl:   •  rosuvastatin (CRESTOR) 20 MG tablet, TAKE 1 TABLET (20 MG TOTAL) BY MOUTH DAILY, Disp: 90 tablet, Rfl: 3  •  senna-docusate sodium (SENOKOT-S) 8 6-50 mg per tablet, Take 1 tablet by mouth daily, Disp: , Rfl:   •  tamsulosin (FLOMAX) 0 4 mg, TAKE 1 CAPSULE (0 4 MG TOTAL) BY MOUTH DAILY, Disp: 90 capsule, Rfl: 3  Allergies   Allergen Reactions   • Atorvastatin Other (See Comments)     Pt unsure     • Percocet [Oxycodone-Acetaminophen] Nausea Only and GI Intolerance       Labs:  Admission on 05/15/2023, Discharged on 05/16/2023   Component Date Value   • POC Glucose 05/15/2023 232 (H)    • Activated Clotting Time,* 05/15/2023 >1,000 (H)    • Specimen Type 05/15/2023 ARTERIAL    • Activated Clotting Time,* 05/15/2023 656 (H)    • Specimen Type 05/15/2023 ARTERIAL    • POC Glucose 05/15/2023 211 (H)    • POC Glucose 05/15/2023 137    • Sodium 05/16/2023 138    • Potassium 05/16/2023 4 3    • Chloride 05/16/2023 108    • CO2 05/16/2023 26    • ANION GAP 05/16/2023 4    • BUN 05/16/2023 19    • Creatinine 05/16/2023 0 89    • Glucose 05/16/2023 132    • Glucose, Fasting 05/16/2023 132 (H)    • Calcium 05/16/2023 9 0    • eGFR 05/16/2023 80    • POC Glucose 05/16/2023 111    • POC Glucose 05/16/2023 201 (H)    • POC Glucose 05/16/2023 201 (H)    • Ventricular Rate 05/15/2023 54    • Atrial Rate 05/15/2023 54    • DE Interval 05/15/2023 200    • QRSD Interval 05/15/2023 92    • QT Interval 05/15/2023 464    • QTC Interval 05/15/2023 440    • P Axis 05/15/2023 23    • QRS Axis 05/15/2023 -68    • T Wave Axis 05/15/2023 108    Appointment on 05/10/2023   Component Date Value   • Sodium 05/10/2023 136    • Potassium 05/10/2023 4 6    • Chloride 05/10/2023 102    • CO2 05/10/2023 30    • ANION GAP 05/10/2023 4    • BUN 05/10/2023 20    • Creatinine 05/10/2023 1 01    • Glucose, Fasting 05/10/2023 333 (H)    • Calcium 05/10/2023 9 6    • eGFR 05/10/2023 69    • WBC 05/10/2023 5 49    • RBC 05/10/2023 3 27 (L)    • Hemoglobin 05/10/2023 13 1    • Hematocrit 05/10/2023 36 4 (L)    • MCV 05/10/2023 111 (H)    • MCH 05/10/2023 40 1 (H)    • MCHC 05/10/2023 36 0    • RDW 05/10/2023 14 9    • Platelets 48/92/5878 187    • MPV 05/10/2023 10 7    • Protime 05/10/2023 12 9    • INR 05/10/2023 0 99      Imaging: Cardiac catheterization    Result Date: 5/15/2023  Narrative: •  Ost LAD to Prox LAD lesion is 80% stenosed, in-stent restenosis  •  1st LPL lesion is 100% stenosed, chronic total occlusion  •  Successful 3 0/23 mm Xience drug-eluting stent to the ostial LAD in-stent restenosis and 3 0/33 mm Xience drug-eluting stent to the chronic total occlusion of the left PDA  •  Patent proximal RCA stent feeding a diffusely diseased small nondominant RCA  The patient underwent successful 3 0/23 mm Xience drug-eluting stent to the ostial LAD in-stent restenosis and 3 0/33 mm Xience drug-eluting stent to the chronic total occlusion of the left PDA  The LAD was approached first   A 6 [place]thel EBU 3 75 guide was used from the right radial artery  The LAD was wired with a short BMW wire  PTCA of the in-stent restenosis was performed with a 3 0/20 mm NC balloon to 14 saira  The 3 0/23 mm Xience palmer point drug-eluting stent was implanted in the ostial LAD and postdilated with a 3 0/20 mm NC balloon for multiple inflations to 16 saira  The initial diffuse 80% in-stent restenosis was reduced to 0% with a beautiful angiographic result  Attention was then turned to the left PDA chronic total occlusion  The same guide was used  A Fielder XT with a guide liner and a 3 0 over-the-wire balloon were used to wire the chronic total occlusion  The 3 mm balloon would not cross the lesion and therefore was exchanged out for a 1 5/15 mm balloon which crossed the lesion and PTCA was performed  The chronic total occlusion was then ballooned with a 3 0/20 mm NC balloon  A 3 0/33 mm Xience palmer point drug-eluting stent was then implanted in the L PDA and was postdilated with a 3 0 NC balloon to 14 saira for multiple inflations    The initial 100% chronic total occlusion was "reduced to 0% with a beautiful angiographic result  Review of Systems:  Review of Systems   REVIEW OF SYSTEMS:  Constitutional:  Denies fever or chills   Eyes:  Denies change in visual acuity   HENT:  Denies nasal congestion or sore throat   Respiratory: shortness of breath   Cardiovascular:  Denies chest pain or edema   GI:  Denies abdominal pain, nausea, vomiting, bloody stools or diarrhea   Musculoskeletal:  Denies back pain or joint pain   Neurologic:  Denies headache, focal weakness or sensory changes   Endocrine:  Denies polyuria or polydipsia   Lymphatic:  Denies swollen glands   Psychiatric:  Denies depression or anxiety      Physical Exam:    /68 (BP Location: Left arm, Patient Position: Sitting, Cuff Size: Standard)   Pulse 63   Resp 16   Ht 5' 6\" (1 676 m)   Wt 62 1 kg (137 lb)   SpO2 98%   BMI 22 11 kg/m²     Physical Exam   PHYSICAL EXAM:  General:  Patient is not in acute distress   Head: Normocephalic, Atraumatic  HEENT:  Both pupils normal-size atraumatic, normocephalic, nonicteric  Neck:  JVP not raised  Trachea central  No carotid bruit  Respiratory:  normal breath sounds no crackles  no rhonchi  Cardiovascular:  Regular rate and rhythm no S3 no murmurs  GI:  Abdomen soft nontender  No organomegaly  Lymphatic:  No cervical or inguinal lymphadenopathy  Neurologic:  Patient is awake alert, oriented   Grossly nonfocal  Extremities no edema    Results of cardiac catheterization and intervention discussed again with patient and family  Discussion/Summary:  Patient with multiple medical problems who seems to be doing reasonably well from cardiac standpoint  Previous studies reviewed with patient  Medications reviewed and possible side effects discussed  concepts of cardiovascular disease , signs and symptoms of heart disease  Dietary and risk factor modification reinforced  All questions answered  Safety measures reviewed   Patient advised to report any problems prompting " medical attention  Patient and family understands the risks and benefits of dual antiplatelet therapy to prevent stent thrombosis  Medications reviewed  Patient will continue to follow-up with ICD clinic for history of ischemic cardiomyopathy and chronic systolic heart failure  Follow-up in ICD clinic  Follow-up in 5 to 6 months or earlier as needed  Patient is agreeable with the plan of care

## 2023-06-20 DIAGNOSIS — Z79.4 TYPE 2 DIABETES MELLITUS WITH BOTH EYES AFFECTED BY MILD NONPROLIFERATIVE RETINOPATHY WITHOUT MACULAR EDEMA, WITH LONG-TERM CURRENT USE OF INSULIN (HCC): ICD-10-CM

## 2023-06-20 DIAGNOSIS — E78.2 MIXED HYPERLIPIDEMIA: ICD-10-CM

## 2023-06-20 DIAGNOSIS — E11.3293 TYPE 2 DIABETES MELLITUS WITH BOTH EYES AFFECTED BY MILD NONPROLIFERATIVE RETINOPATHY WITHOUT MACULAR EDEMA, WITH LONG-TERM CURRENT USE OF INSULIN (HCC): ICD-10-CM

## 2023-06-20 RX ORDER — ROSUVASTATIN CALCIUM 20 MG/1
20 TABLET, COATED ORAL DAILY
Qty: 90 TABLET | Refills: 3 | Status: SHIPPED | OUTPATIENT
Start: 2023-06-20

## 2023-06-20 RX ORDER — DULAGLUTIDE 0.75 MG/.5ML
0.75 INJECTION, SOLUTION SUBCUTANEOUS
Qty: 2 ML | Refills: 1 | Status: SHIPPED | OUTPATIENT
Start: 2023-06-20

## 2023-06-20 RX ORDER — INSULIN GLARGINE 100 [IU]/ML
INJECTION, SOLUTION SUBCUTANEOUS
Qty: 15 ML | Refills: 4 | Status: SHIPPED | OUTPATIENT
Start: 2023-06-20

## 2023-06-30 ENCOUNTER — TELEPHONE (OUTPATIENT)
Dept: CARDIAC REHAB | Facility: CLINIC | Age: 83
End: 2023-06-30

## 2023-06-30 ENCOUNTER — TRANSCRIBE ORDERS (OUTPATIENT)
Dept: CARDIAC REHAB | Facility: CLINIC | Age: 83
End: 2023-06-30

## 2023-06-30 DIAGNOSIS — Z95.5 S/P CORONARY ARTERY STENT PLACEMENT: Primary | ICD-10-CM

## 2023-06-30 NOTE — TELEPHONE ENCOUNTER
LM[facility] reminding patient of Cardiac rehab eval for 7/5  Informed of 100% coverage for 36 sessions  Left call back number

## 2023-07-03 ENCOUNTER — OFFICE VISIT (OUTPATIENT)
Age: 83
End: 2023-07-03
Payer: COMMERCIAL

## 2023-07-03 ENCOUNTER — RA CDI HCC (OUTPATIENT)
Dept: OTHER | Facility: HOSPITAL | Age: 83
End: 2023-07-03

## 2023-07-03 ENCOUNTER — APPOINTMENT (OUTPATIENT)
Age: 83
End: 2023-07-03
Payer: COMMERCIAL

## 2023-07-03 VITALS
HEART RATE: 60 BPM | WEIGHT: 138 LBS | OXYGEN SATURATION: 98 % | SYSTOLIC BLOOD PRESSURE: 97 MMHG | BODY MASS INDEX: 22.18 KG/M2 | HEIGHT: 66 IN | TEMPERATURE: 97.2 F | DIASTOLIC BLOOD PRESSURE: 58 MMHG | RESPIRATION RATE: 16 BRPM

## 2023-07-03 DIAGNOSIS — N18.30 TYPE 2 DIABETES MELLITUS WITH STAGE 3 CHRONIC KIDNEY DISEASE, WITH LONG-TERM CURRENT USE OF INSULIN, UNSPECIFIED WHETHER STAGE 3A OR 3B CKD (HCC): ICD-10-CM

## 2023-07-03 DIAGNOSIS — E11.3293 TYPE 2 DIABETES MELLITUS WITH BOTH EYES AFFECTED BY MILD NONPROLIFERATIVE RETINOPATHY WITHOUT MACULAR EDEMA, WITH LONG-TERM CURRENT USE OF INSULIN (HCC): ICD-10-CM

## 2023-07-03 DIAGNOSIS — E11.22 TYPE 2 DIABETES MELLITUS WITH STAGE 3 CHRONIC KIDNEY DISEASE, WITH LONG-TERM CURRENT USE OF INSULIN, UNSPECIFIED WHETHER STAGE 3A OR 3B CKD (HCC): ICD-10-CM

## 2023-07-03 DIAGNOSIS — I25.118 CORONARY ARTERY DISEASE OF NATIVE ARTERY OF NATIVE HEART WITH STABLE ANGINA PECTORIS (HCC): ICD-10-CM

## 2023-07-03 DIAGNOSIS — I25.5 ISCHEMIC CARDIOMYOPATHY: ICD-10-CM

## 2023-07-03 DIAGNOSIS — Z79.4 TYPE 2 DIABETES MELLITUS WITH BOTH EYES AFFECTED BY MILD NONPROLIFERATIVE RETINOPATHY WITHOUT MACULAR EDEMA, WITH LONG-TERM CURRENT USE OF INSULIN (HCC): ICD-10-CM

## 2023-07-03 DIAGNOSIS — Z79.4 TYPE 2 DIABETES MELLITUS WITH STAGE 3 CHRONIC KIDNEY DISEASE, WITH LONG-TERM CURRENT USE OF INSULIN, UNSPECIFIED WHETHER STAGE 3A OR 3B CKD (HCC): ICD-10-CM

## 2023-07-03 DIAGNOSIS — E11.3293 TYPE 2 DIABETES MELLITUS WITH BOTH EYES AFFECTED BY MILD NONPROLIFERATIVE RETINOPATHY WITHOUT MACULAR EDEMA, WITH LONG-TERM CURRENT USE OF INSULIN (HCC): Primary | ICD-10-CM

## 2023-07-03 DIAGNOSIS — Z79.4 TYPE 2 DIABETES MELLITUS WITH BOTH EYES AFFECTED BY MILD NONPROLIFERATIVE RETINOPATHY WITHOUT MACULAR EDEMA, WITH LONG-TERM CURRENT USE OF INSULIN (HCC): Primary | ICD-10-CM

## 2023-07-03 PROCEDURE — 3078F DIAST BP <80 MM HG: CPT | Performed by: INTERNAL MEDICINE

## 2023-07-03 PROCEDURE — 83036 HEMOGLOBIN GLYCOSYLATED A1C: CPT

## 2023-07-03 PROCEDURE — 36415 COLL VENOUS BLD VENIPUNCTURE: CPT

## 2023-07-03 PROCEDURE — 80053 COMPREHEN METABOLIC PANEL: CPT

## 2023-07-03 PROCEDURE — 1160F RVW MEDS BY RX/DR IN RCRD: CPT | Performed by: INTERNAL MEDICINE

## 2023-07-03 PROCEDURE — 99214 OFFICE O/P EST MOD 30 MIN: CPT | Performed by: INTERNAL MEDICINE

## 2023-07-03 PROCEDURE — 3074F SYST BP LT 130 MM HG: CPT | Performed by: INTERNAL MEDICINE

## 2023-07-03 PROCEDURE — 84443 ASSAY THYROID STIM HORMONE: CPT

## 2023-07-03 PROCEDURE — 1159F MED LIST DOCD IN RCRD: CPT | Performed by: INTERNAL MEDICINE

## 2023-07-03 PROCEDURE — 85025 COMPLETE CBC W/AUTO DIFF WBC: CPT

## 2023-07-03 PROCEDURE — 80061 LIPID PANEL: CPT

## 2023-07-03 RX ORDER — INSULIN GLARGINE 100 [IU]/ML
15 INJECTION, SOLUTION SUBCUTANEOUS
Qty: 15 ML | Refills: 4
Start: 2023-07-03

## 2023-07-03 NOTE — PROGRESS NOTES
INTERNAL MEDICINE OFFICE VISIT  [facility]  [address]  [place] Laura Sheila [place], [address] [facility]  Tel: [phone]      NAME: Vic Sol  AGE: 81 y.o. SEX: male  : 1940   MRN: [id number]    DATE: 7/3/2023  TIME: 3:32 PM      Assessment and Plan:  1. Type 2 diabetes mellitus with both eyes affected by mild nonproliferative retinopathy without macular edema, with long-term current use of insulin ([address])  Patient has not had blood work done for more than 3 months. We will get a hemoglobin A1c level and see if any dosage change is required. He stopped taking the Trulicity a few weeks ago and is presently taking Lantus 15 units daily and glipizide 5 mg twice a day. Once the blood work is back, I will call him with the dose of the medications    - CBC and differential; Future  - Comprehensive metabolic panel; Future  - Lipid panel; Future  - TSH, 3rd generation; Future  - Hemoglobin A1C; Future  - Insulin Glargine Solostar (Lantus SoloStar) 100 UNIT/ML SOPN; Inject 0.15 mL (15 Units total) under the skin daily at bedtime  Dispense: 15 mL; Refill: 4    2. Type 2 diabetes mellitus with stage 3 chronic kidney disease, with long-term current use of insulin, unspecified whether stage 3a or 3b CKD (HCC)  Continue Lantus and glipizide for now, get hemoglobin A1c done    3. Coronary artery disease of native artery of native heart with stable angina pectoris (HCC)  Continue medications    4. Ischemic cardiomyopathy  Follow-up with cardiology      - Counseling Documentation: patient was counseled regarding: diagnostic results, instructions for management, risk factor reductions, prognosis, patient and family education, risks and benefits of treatment options and importance of compliance with treatment  - Medication Side Effects: Adverse side effects of medications were reviewed with the patient/guardian today.       Return for follow up visit in 4 months or earlier, if needed. Chief Complaint:  Chief Complaint   Patient presents with   • Follow-up         History of Present Illness:   Type 2 diabetes is mostly very uncontrolled with a hemoglobin A1c of 11 or more. The patient does not seem to be compliant with his medications and does not know the dosages of his medications.   The caretaker is also not aware of the medication list.  He has no complaints      Active Problem List:  Patient Active Problem List   Diagnosis   • Anemia   • BPH (benign prostatic hyperplasia)   • Ischemic cardiomyopathy   • Coronary artery disease of native artery of native heart with stable angina pectoris ([address])   • Urinary retention   • Type 2 diabetes mellitus with stage 3 chronic kidney disease, with long-term current use of insulin, unspecified whether stage 3a or 3b CKD ([address])   • Physical deconditioning   • Oropharyngeal dysphagia   • Chronic right shoulder pain         Past Medical History:  Past Medical History:   Diagnosis Date   • Acquired cyst of kidney    • Anemia    • Benign paroxysmal vertigo, unspecified ear    • Cellulitis of leg    • Cervical spinal stenosis    • Chest pain    • Coronary atherosclerosis of native coronary artery    • Diabetes mellitus ([address])    • Disease of thyroid gland    • Enlarged prostate    • Esophageal candidiasis ([address])    • H/o COVID-19 06/30/2022   • Hematuria    • Herpes zoster    • Hyperlipidemia    • Hypertension    • Incomplete emptying of bladder    • Inflamed seborrheic keratosis    • Internal hemorrhoids    • Malignant neoplasm of skin of trunk    • Myocardial infarction ([address]) 2005   • Nonmelanoma skin cancer     LAST ASSESSED: 8/9/17   • 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 infarction    • Paroxysmal tachycardia (HCC)    • Shortness of breath    • Vitamin B deficiency          Past Surgical History:  Past Surgical History:   Procedure Laterality Date   • CARDIAC CATHETERIZATION Left 5/15/2023    Procedure: Cardiac Left Heart Cath;  Surgeon: Lolita Ramos MD; Location: [facility]; Service: Cardiology   • CARDIAC CATHETERIZATION N/A 5/15/2023    Procedure: Cardiac pci;  Surgeon: Rodney Dumont MD;  Location: [address] CATH LAB; Service: Cardiology   • CARDIAC CATHETERIZATION N/A 5/15/2023    Procedure: Cardiac Coronary Angiogram;  Surgeon: Rodney Dumont MD;  Location: [address] CATH LAB; Service: Cardiology   • CARDIAC DEFIBRILLATOR PLACEMENT     • COLONOSCOPY  10/07/2008    FIBEROPTIC SCREENING; NO FURTHER RECOMMENDATIONS   • CORONARY ANGIOPLASTY WITH STENT PLACEMENT     • CYSTOSCOPY  2015    DIAGNOSTIC; MANAGED BY: Chelo Ornelas   • EGD AND COLONOSCOPY N/A 2018    Procedure: EGD AND COLONOSCOPY;  Surgeon: Moisés Lawson MD;  Location: [facility]; Service: Gastroenterology   • FL INJECTION RIGHT SHOULDER (ARTHROGRAM)  2022   • HIP ARTHROPLASTY Right    • INSERT / REPLACE / REMOVE PACEMAKER     • JOINT REPLACEMENT Right     THR   • TRUNK SKIN LESION EXCISIONAL BIOPSY  2007    MALIGNANT; BCC CHEST         Family History:  Family History   Problem Relation Age of Onset   • Heart disease Mother    • Coronary artery disease Mother    • Sudden death Mother    • Cancer Father         throat; MALIGNANT NEOPLASM OF HEAD, FACE OR NECK   • Throat cancer Father    • Cancer Family    • Coronary artery disease Family          Social History:  Social History     Socioeconomic History   • Marital status:       Spouse name: None   • Number of children: None   • Years of education: None   • Highest education level: None   Occupational History   • Occupation: RETIRED   Tobacco Use   • Smoking status: Former     Packs/day: 1.00     Years: 10.00     Total pack years: 10.00     Types: Cigarettes     Quit date: 1978     Years since quittin.4   • Smokeless tobacco: Never   Vaping Use   • Vaping Use: Never used   Substance and Sexual Activity   • Alcohol use: Yes     Comment: occasionally 1-2 per month    • Drug use: No   • Sexual activity: Not Currently     Partners: Female   Other Topics Concern   • None   Social History Narrative    LIVING INDEPENDENTLY WITH SPOUSE    NO ADVANCE DIRECTIVE ON FILE     Social Determinants of Health     Financial Resource Strain: Low Risk  (11/2/2022)    Overall Financial Resource Strain (CARDIA)    • Difficulty of Paying Living Expenses: Not very hard   Food Insecurity: No Food Insecurity (7/2/2022)    Hunger Vital Sign    • Worried About Running Out of Food in the Last Year: Never true    • Ran Out of Food in the Last Year: Never true   Transportation Needs: No Transportation Needs (11/2/2022)    PRAPARE - Transportation    • Lack of Transportation (Medical): No    • Lack of Transportation (Non-Medical): No   Physical Activity: Inactive (10/18/2021)    Exercise Vital Sign    • Days of Exercise per Week: 0 days    • Minutes of Exercise per Session: 0 min   Stress: Stress Concern Present (10/18/2021)    [facility]    • Feeling of Stress : Rather much   Social Connections: Not on file   Intimate Partner Violence: Not on file   Housing Stability: [address]  (7/2/2022)    Housing Stability Vital Sign    • Unable to Pay for Housing in the Last Year: No    • Number of [address] in the Last Year: 1    • Unstable Housing in the Last Year: No         Allergies:   Allergies   Allergen Reactions   • Atorvastatin Other (See Comments)     Pt unsure     • Percocet [Oxycodone-Acetaminophen] Nausea Only and GI Intolerance         Medications:    Current Outpatient Medications:   •  Alcohol Swabs (Pharmacist Choice Alcohol) PADS, USE FOR TESTING AND INJECTIONS UP TO 10 PADS DAILY, Disp: , Rfl:   •  aspirin 81 MG tablet, Take 1 tablet by mouth daily, Disp: , Rfl:   •  clopidogrel (PLAVIX) 75 mg tablet, Take 1 tablet (75 mg total) by mouth daily, Disp: 30 tablet, Rfl: 5  •  ergocalciferol (VITAMIN D2) 50,000 units, Take 50,000 Units by mouth once a week, Disp: , Rfl:   •  finasteride (PROSCAR) 5 mg tablet, TAKE 1 TABLET (5 MG TOTAL) BY MOUTH DAILY, Disp: 90 tablet, Rfl: 0  •  glipiZIDE (GLUCOTROL) 5 mg tablet, TAKE 1 TABLET (5 MG TOTAL) BY MOUTH 2 (TWO) TIMES A DAY, Disp: 180 tablet, Rfl: 3  •  Insulin Glargine Solostar (Lantus SoloStar) 100 UNIT/ML SOPN, Inject 0.15 mL (15 Units total) under the skin daily at bedtime, Disp: 15 mL, Rfl: 4  •  levothyroxine 75 mcg tablet, TAKE 1 TABLET (75 MCG TOTAL) BY MOUTH DAILY, Disp: 90 tablet, Rfl: 1  •  metoprolol tartrate (LOPRESSOR) 25 mg tablet, Take 1 tablet (25 mg total) by mouth every 12 (twelve) hours, Disp: 90 tablet, Rfl: 3  •  Milk Thistle 300 MG CAPS, TAKE TWO PILLS IN THE AM WITH FOOD., Disp: , Rfl:   •  rosuvastatin (CRESTOR) 20 MG tablet, TAKE 1 TABLET (20 MG TOTAL) BY MOUTH DAILY, Disp: 90 tablet, Rfl: 3  •  senna-docusate sodium (SENOKOT-S) 8.6-50 mg per tablet, Take 1 tablet by mouth daily, Disp: , Rfl:   •  tamsulosin (FLOMAX) 0.4 mg, TAKE 1 CAPSULE (0.4 MG TOTAL) BY MOUTH DAILY, Disp: 90 capsule, Rfl: 3      The following portions of the patient's history were reviewed and updated as appropriate: past medical history, past surgical history, family history, social history, allergies, current medications and active problem list.      Review of Systems:  Constitutional: Denies fever, chills, weight gain, weight loss, fatigue  Eyes: Denies eye redness, eye discharge, double vision, change in visual acuity  ENT: Denies hearing loss, tinnitus, sneezing, nasal congestion, nasal discharge, sore throat   Respiratory: Denies cough, expectoration, hemoptysis, shortness of breath, wheezing  Cardiovascular: Denies chest pain, palpitations, lower extremity swelling, orthopnea, PND  Gastrointestinal: Denies abdominal pain, heartburn, nausea, vomiting, hematemesis, diarrhea, bloody stools  Genito-Urinary: Denies dysuria, frequency, difficulty in micturition, nocturia, incontinence  Musculoskeletal: Denies back pain, joint pain, muscle pain  Neurologic: Denies confusion, lightheadedness, syncope, headache, focal weakness, sensory changes, seizures  Endocrine: Denies polyuria, polydipsia, temperature intolerance  Allergy and Immunology: Denies hives, insect bite sensitivity  Hematological and Lymphatic: Denies bleeding problems, swollen glands   Psychological: Denies depression, suicidal ideation, anxiety, panic, mood swings  Dermatological: Denies pruritus, rash, skin lesion changes      Vitals:  Vitals:    07/03/23 1442   BP: 97/58   Pulse: 60   Resp: 16   Temp: (!) 97.2 °F (36.2 °C)   SpO2: 98%       Body mass index is 22.27 kg/m². Weight (last 2 days)     Date/Time Weight    07/03/23 1442 62.6 (138)            Physical Examination:  General: Patient is not in acute distress. Awake, alert, responding to commands. No weight gain or loss  Head: Normocephalic. Atraumatic  Eyes: Conjunctiva and lids with no swelling, erythema or discharge. Both pupils normal sized, round and reactive to light. Sclera nonicteric  ENT: External examination of nose and ear normal. Otoscopic examination shows translucent tympanic membranes with patent canals without erythema. Oropharynx moist with no erythema, edema, exudate or lesions  Neck: Supple. JVP not raised. Trachea midline. No masses. No thyromegaly  Lungs: No signs of increased work of breathing or respiratory distress. Bilateral bronchovascular breath sounds with no crackles or rhonchi  Chest wall: No tenderness  Cardiovascular: Normal PMI. No thrills. Regular rate and rhythm. S1 and S2 normal. No murmur, rub or gallop  Gastrointestinal: Abdomen soft, nontender. No guarding or rigidity. Liver and spleen not palpable. Bowel sounds present  Neurologic: Cranial nerves II-XII intact.  Cortical functions normal. Motor system - Reflexes 2+ and symmetrical. Sensations normal  Musculoskeletal: Gait normal. No joint tenderness  Integumentary: Skin normal with no rash or lesions  Lymphatic: No palpable lymph nodes in neck, axilla or groin  Extremities: No clubbing, cyanosis, edema or varicosities  Psychological: Judgement and insight normal. Mood and affect normal      Laboratory Results:  CBC with diff:   Lab Results   Component Value Date    WBC 5.49 05/10/2023    WBC 4.6 05/21/2015    RBC 3.27 (L) 05/10/2023    RBC 3.38 (L) 05/21/2015    HGB 13.1 05/10/2023    HGB 12.5 05/21/2015    HCT 36.4 (L) 05/10/2023    HCT 36.8 05/21/2015     (H) 05/10/2023     (H) 05/21/2015    MCH 40.1 (H) 05/10/2023    MCH 36.9 (H) 05/21/2015    RDW 14.9 05/10/2023    RDW 14.7 05/21/2015     05/10/2023     05/21/2015       CMP:  Lab Results   Component Value Date    CREATININE 0.89 05/16/2023    CREATININE 0.87 10/02/2015    BUN 19 05/16/2023    BUN 13 10/02/2015     10/02/2015    K 4.3 05/16/2023    K 4.0 10/02/2015     05/16/2023     10/02/2015    CO2 26 05/16/2023    CO2 26 10/02/2015    GLUCOSE 180 (H) 10/02/2015    PROT 6.9 05/21/2015    ALKPHOS 86 11/02/2022    ALKPHOS 39 (L) 05/21/2015    ALT 26 11/02/2022    ALT 26 05/21/2015    AST 18 11/02/2022    AST 21 05/21/2015    BILIDIR 0.84 (H) 02/11/2018       Lab Results   Component Value Date    HGBA1C 11.7 (A) 03/22/2023    HGBA1C 9.4 (H) 11/02/2022    HGBA1C 8.8 (H) 06/27/2022    MG 2.4 07/04/2022    PHOS 2.8 07/02/2022       No results found for: "TROPONINI", "CKMB", "CKTOTAL"    Lipid Profile:   Lab Results   Component Value Date    CHOL 142 05/21/2015     Lab Results   Component Value Date    HDL 45 11/02/2022    HDL 52 02/02/2022     Lab Results   Component Value Date    LDLCALC 45 11/02/2022    [facility] 81 02/02/2022     Lab Results   Component Value Date    TRIG 148 11/02/2022    TRIG 83 02/02/2022       Imaging Results:  Cardiac catheterization  •  Ost LAD to Prox LAD lesion is 80% stenosed, in-stent restenosis. •  1st LPL lesion is 100% stenosed, chronic total occlusion.   •  Successful 3.0/23 mm Xience drug-eluting stent to the ostial LAD   in-stent restenosis and 3.0/33 mm Xience drug-eluting stent to the chronic   total occlusion of the left PDA. •  Patent proximal RCA stent feeding a diffusely diseased small   nondominant RCA. The patient underwent successful 3.0/23 mm Xience drug-eluting stent to   the ostial LAD in-stent restenosis and 3.0/33 mm Xience drug-eluting stent   to the chronic total occlusion of the left PDA. The LAD was approached first.  A 6 [place] EBU 3.75 guide was used from the   right radial artery. The LAD was wired with a short BMW wire. PTCA of   the in-stent restenosis was performed with a 3.0/20 mm NC balloon to 14   saira. The 3.0/23 mm Xience [place] point drug-eluting stent was implanted in   the ostial LAD and postdilated with a 3.0/20 mm NC balloon for multiple   inflations to 16 saira. The initial diffuse 80% in-stent restenosis was   reduced to 0% with a beautiful angiographic result. Attention was then turned to the left PDA chronic total occlusion. The   same guide was used. A Fielder XT with a guide liner and a 3.0   over-the-wire balloon were used to wire the chronic total occlusion. The   3 mm balloon would not cross the lesion and therefore was exchanged out   for a 1.5/15 mm balloon which crossed the lesion and PTCA was performed. The chronic total occlusion was then ballooned with a 3.0/20 mm NC   balloon. A 3.0/33 mm Xience palmer point drug-eluting stent was then   implanted in the L PDA and was postdilated with a 3.0 NC balloon to 14 saira   for multiple inflations. The initial 100% chronic total occlusion was   reduced to 0% with a beautiful angiographic result.        Health Maintenance:  Health Maintenance   Topic Date Due   • SLP PLAN OF CARE  Never done   • Falls: Plan of Care  02/17/2021   • COVID-19 Vaccine (3 - Pfizer series) 07/01/2021   • DM Eye Exam  04/27/2022   • Influenza Vaccine (1) 09/01/2023   • HEMOGLOBIN A1C  09/22/2023   • Diabetic Foot Exam  08/03/2023 (Originally 2/2/2023)   • Fall Risk  11/02/2023   • Medicare Annual Wellness Visit (AWV)  11/02/2023   • Depression Screening  02/15/2024   • BMI: Adult  07/03/2024   • Pneumococcal Vaccine: 65+ Years  Completed   • HIB Vaccine  Aged Out   • IPV Vaccine  Aged Out   • Hepatitis A Vaccine  Aged Out   • Meningococcal ACWY Vaccine  Aged Out   • HPV Vaccine  Aged Out     Immunization History   Administered Date(s) Administered   • COVID-19 PFIZER VACCINE 0.3 ML IM 04/14/2021, 05/06/2021   • Fluzone Split Quad 0.5 mL 10/14/2010   • INFLUENZA 10/14/2010, 10/07/2014, 09/01/2015, 09/03/2015, 09/12/2016, 10/13/2017, 10/09/2018, 11/02/2022   • Influenza Split High Dose Preservative Free IM 10/13/2017   • Influenza, high dose seasonal 0.7 mL 10/02/2020, 11/02/2022   • Influenza, seasonal, injectable 09/01/2015, 09/12/2016   • Pneumococcal Conjugate 13-Valent 07/06/2015, 09/07/2016   • Pneumococcal Polysaccharide PPV23 09/01/2015   • Tdap 02/23/2016   • Zoster 01/01/2015, 03/23/2015   • influenza, trivalent, adjuvanted 09/20/2019         Jt Castro MD  7/3/2023,3:32 PM

## 2023-07-03 NOTE — PROGRESS NOTES
[address] [facility] coding opportunities    E11.65, I11.0, E11.36      Chart Reviewed number of suggestions sent to Provider: 3     Patients Insurance     Medicare Insurance: [employer]

## 2023-07-04 LAB
ALBUMIN SERPL BCP-MCNC: 4 G/DL (ref 3.5–5)
ALP SERPL-CCNC: 56 U/L (ref 46–116)
ALT SERPL W P-5'-P-CCNC: 33 U/L (ref 12–78)
ANION GAP SERPL CALCULATED.3IONS-SCNC: 6 MMOL/L
AST SERPL W P-5'-P-CCNC: 24 U/L (ref 5–45)
BASOPHILS # BLD AUTO: 0.04 THOUSANDS/ÂΜL (ref 0–0.1)
BASOPHILS NFR BLD AUTO: 1 % (ref 0–1)
BILIRUB SERPL-MCNC: 0.8 MG/DL (ref 0.2–1)
BUN SERPL-MCNC: 20 MG/DL (ref 5–25)
CALCIUM SERPL-MCNC: 9.7 MG/DL (ref 8.3–10.1)
CHLORIDE SERPL-SCNC: 108 MMOL/L (ref 96–108)
CHOLEST SERPL-MCNC: 130 MG/DL
CO2 SERPL-SCNC: 26 MMOL/L (ref 21–32)
CREAT SERPL-MCNC: 1.17 MG/DL (ref 0.6–1.3)
EOSINOPHIL # BLD AUTO: 0.15 THOUSAND/ÂΜL (ref 0–0.61)
EOSINOPHIL NFR BLD AUTO: 3 % (ref 0–6)
ERYTHROCYTE [DISTWIDTH] IN BLOOD BY AUTOMATED COUNT: 15.9 % (ref 11.6–15.1)
EST. AVERAGE GLUCOSE BLD GHB EST-MCNC: 212 MG/DL
GFR SERPL CREATININE-BSD FRML MDRD: 58 ML/MIN/1.73SQ M
GLUCOSE P FAST SERPL-MCNC: 187 MG/DL (ref 65–99)
HBA1C MFR BLD: 9 %
HCT VFR BLD AUTO: 37.5 % (ref 36.5–49.3)
HDLC SERPL-MCNC: 44 MG/DL
HGB BLD-MCNC: 12.4 G/DL (ref 12–17)
IMM GRANULOCYTES # BLD AUTO: 0.01 THOUSAND/UL (ref 0–0.2)
IMM GRANULOCYTES NFR BLD AUTO: 0 % (ref 0–2)
LDLC SERPL CALC-MCNC: 54 MG/DL (ref 0–100)
LYMPHOCYTES # BLD AUTO: 1.62 THOUSANDS/ÂΜL (ref 0.6–4.47)
LYMPHOCYTES NFR BLD AUTO: 30 % (ref 14–44)
MCH RBC QN AUTO: 38.3 PG (ref 26.8–34.3)
MCHC RBC AUTO-ENTMCNC: 33.1 G/DL (ref 31.4–37.4)
MCV RBC AUTO: 116 FL (ref 82–98)
MONOCYTES # BLD AUTO: 0.63 THOUSAND/ÂΜL (ref 0.17–1.22)
MONOCYTES NFR BLD AUTO: 12 % (ref 4–12)
NEUTROPHILS # BLD AUTO: 3.03 THOUSANDS/ÂΜL (ref 1.85–7.62)
NEUTS SEG NFR BLD AUTO: 54 % (ref 43–75)
NONHDLC SERPL-MCNC: 86 MG/DL
NRBC BLD AUTO-RTO: 0 /100 WBCS
PLATELET # BLD AUTO: 196 THOUSANDS/UL (ref 149–390)
PMV BLD AUTO: 10.4 FL (ref 8.9–12.7)
POTASSIUM SERPL-SCNC: 4.1 MMOL/L (ref 3.5–5.3)
PROT SERPL-MCNC: 7.2 G/DL (ref 6.4–8.4)
RBC # BLD AUTO: 3.24 MILLION/UL (ref 3.88–5.62)
SODIUM SERPL-SCNC: 140 MMOL/L (ref 135–147)
TRIGL SERPL-MCNC: 159 MG/DL
TSH SERPL DL<=0.05 MIU/L-ACNC: 0.31 UIU/ML (ref 0.45–4.5)
WBC # BLD AUTO: 5.48 THOUSAND/UL (ref 4.31–10.16)

## 2023-07-05 ENCOUNTER — CLINICAL SUPPORT (OUTPATIENT)
Dept: CARDIAC REHAB | Facility: CLINIC | Age: 83
End: 2023-07-05

## 2023-07-05 DIAGNOSIS — I25.118 CORONARY ARTERY DISEASE OF NATIVE ARTERY OF NATIVE HEART WITH STABLE ANGINA PECTORIS (HCC): ICD-10-CM

## 2023-07-05 DIAGNOSIS — Z95.5 S/P CORONARY ARTERY STENT PLACEMENT: ICD-10-CM

## 2023-07-05 NOTE — PROGRESS NOTES
Patient came in for initial eval today for cardiac rehab. After discussion with him and his family, the patient has decided to not attend the program at this time. They were provided with information to limit risk factors and encouraged to consider rehab in the future if needed by the patient or if he would like to try it out.

## 2023-08-25 ENCOUNTER — IN-CLINIC DEVICE VISIT (OUTPATIENT)
Dept: CARDIOLOGY CLINIC | Facility: CLINIC | Age: 83
End: 2023-08-25
Payer: COMMERCIAL

## 2023-08-25 DIAGNOSIS — Z95.810 PRESENCE OF IMPLANTABLE CARDIOVERTER-DEFIBRILLATOR (ICD): Primary | ICD-10-CM

## 2023-08-25 PROCEDURE — 93282 PRGRMG EVAL IMPLANTABLE DFB: CPT | Performed by: INTERNAL MEDICINE

## 2023-08-25 NOTE — ASSESSMENT & PLAN NOTE
"Discharge Planner Post-Acute Rehab PT:      Discharge Plan: Long term care pending placement     Precautions: Falls, NWB LLE, WB through heel only RLE, PRAFO on R foot and blue wedge at R hip for \"toes up\" when in bed.     Edema/Skin Protection:   Day: R LE EdemaWear, L LE limb   Night: R LE TG soft, L LE post-op sock     Current Status:  Bed Mobility: CGA-MIN A  Transfer: Slide board CGA   Gait: Not safe  Wheelchair: manual chair up to 200 ft with extra time  Stairs: Not safe  Balance: Able to sit independently, unable to stand     Assessment: Overall improving tolerance to upright. Fatigue in PM today d/t number of slide board transfers completed.    Other Barriers to Discharge (DME, Family Training, etc):   Completed Falls Group 8/5/23  DME order for w/c, hospital bed, and mechanical lift: completed                   " · Continue home finasteride and Flomax

## 2023-08-25 NOTE — PROGRESS NOTES
BSC SINGLE CHAMBER ICD - NOT MRI CONDITIONAL   DEVICE INTERROGATED IN THE [place] OFFICE:  BATTERY VOLTAGE ADEQUATE (3 YR.).   <1%.   ALL LEAD PARAMETERS WITHIN NORMAL LIMITS. Janel Msesina 8/5/2022;  5 VT-1 EPISODES WITH NO AVAILABLE EGMS.  20 NON-SUSTAINED EPISODES WITHE 2 AVAILABLE EGMS SHOWING PROBABLE NSVT (16 @ 162 BPM, 14 @ 161 BPM).  NO PROGRAMMING CHANGES MADE TO DEVICE PARAMETERS.  NORMAL DEVICE FUNCTION.  RG

## 2023-09-18 DIAGNOSIS — I25.5 ISCHEMIC CARDIOMYOPATHY: ICD-10-CM

## 2023-09-21 RX ORDER — LEVOTHYROXINE SODIUM 88 UG/1
88 TABLET ORAL DAILY
Status: ON HOLD | COMMUNITY
Start: 2023-08-28

## 2023-09-21 RX ORDER — DULAGLUTIDE 4.5 MG/.5ML
INJECTION, SOLUTION SUBCUTANEOUS
Status: ON HOLD | COMMUNITY
Start: 2023-08-28

## 2023-09-21 NOTE — PROGRESS NOTES
9/22/2023      Chief Complaint   Patient presents with   • Follow-up     6 month check          Assessment and Plan    81 y.o. male     1. BPH with lower urinary tract symptoms  -AUA today 11  - UA today blood and nitrites. Will send for culture and microscopic evaluation. will call with results  - PVR today 9 mL  - Reviewed conservative measures with adequate hydration, avoidance of bladder irritants, avoidance of constipation  - Continue finasteride and Flomax dual medical therapy  - Follow-up in 1 year for symptom reassessment  - Call with any questions or concerns in the meantime  - All questions answered; patient understands and agrees with plan       History of Present Illness  Hayley Gould is a 81 y.o. male patient with history of BPH with lower urinary tract symptoms here for follow up. Patient with BPH and lower urinary tract symptoms was scheduled for TURP procedure in April 2023, however patient had abnormal stress test and was recommended to have uninterrupted DAPT for 6 months. TURP was canceled and patient presents today for follow-up. Patient does continue to take finasteride and Flomax dual medical therapy with some benefit. Patient denies any new or worsening symptoms today. AUA SYMPTOM SCORE    Flowsheet Row Most Recent Value   AUA SYMPTOM SCORE    How often have you had a sensation of not emptying your bladder completely after you finished urinating? 1   How often have you had to urinate again less than two hours after you finished urinating? 2   How often have you found you stopped and started again several times when you urinate? 1   How often have you found it difficult to postpone urination? 0   How often have you had a weak urinary stream? 4   How often have you had to push or strain to begin urination? 0   How many times did you most typically get up to urinate from the time you went to bed at night until the time you got up in the morning?  3   Quality of Life: If you were to spend the rest of your life with your urinary condition just the way it is now, how would you feel about that? 6   AUA SYMPTOM SCORE 11            Review of Systems   Constitutional: Negative for activity change, appetite change, chills and fever. HENT: Negative for congestion and trouble swallowing. Respiratory: Negative for cough and shortness of breath. Cardiovascular: Negative for chest pain, palpitations and leg swelling. Gastrointestinal: Negative for abdominal pain, constipation, diarrhea, nausea and vomiting. Genitourinary: Negative for difficulty urinating, dysuria, flank pain, frequency, hematuria and urgency. Musculoskeletal: Negative for back pain and gait problem. Skin: Negative for wound. Allergic/Immunologic: Negative for immunocompromised state. Neurological: Negative for dizziness and syncope. Hematological: Does not bruise/bleed easily. Psychiatric/Behavioral: Negative for confusion. All other systems reviewed and are negative. Vitals  Vitals:    09/22/23 1126   BP: 118/60   Pulse: 56   Resp: 16   SpO2: 100%   Weight: 63 kg (139 lb)   Height: 5' 6" (1.676 m)       Physical Exam  Constitutional:       General: He is not in acute distress. Appearance: Normal appearance. He is not ill-appearing, toxic-appearing or diaphoretic. HENT:      Head: Normocephalic. Nose: No congestion. Eyes:      General: No scleral icterus. Right eye: No discharge. Left eye: No discharge. Conjunctiva/sclera: Conjunctivae normal.      Pupils: Pupils are equal, round, and reactive to light. Pulmonary:      Effort: Pulmonary effort is normal.   Musculoskeletal:      Cervical back: Normal range of motion. Skin:     General: Skin is warm and dry. Coloration: Skin is not jaundiced or pale. Findings: No bruising, erythema, lesion or rash. Neurological:      General: No focal deficit present.       Mental Status: He is alert and oriented to person, place, and time. Mental status is at baseline. Gait: Gait normal.   Psychiatric:         Mood and Affect: Mood normal.         Behavior: Behavior normal.         Thought Content: Thought content normal.         Judgment: Judgment normal.           Past History  Past Medical History:   Diagnosis Date   • Acquired cyst of kidney    • Anemia    • Benign paroxysmal vertigo, unspecified ear    • Cellulitis of leg    • Cervical spinal stenosis    • Chest pain    • Coronary atherosclerosis of native coronary artery    • Diabetes mellitus (HCC)    • Disease of thyroid gland    • Enlarged prostate    • Esophageal candidiasis (HCC)    • H/o COVID-19 2022   • Hematuria    • Herpes zoster    • Hyperlipidemia    • Hypertension    • Incomplete emptying of bladder    • Inflamed seborrheic keratosis    • Internal hemorrhoids    • Malignant neoplasm of skin of trunk    • Myocardial infarction [facility]    • Nonmelanoma skin cancer     LAST ASSESSED: 17   • 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 infarction    • Paroxysmal tachycardia (HCC)    • Shortness of breath    • Vitamin B deficiency      Social History     Socioeconomic History   • Marital status:       Spouse name: None   • Number of children: None   • Years of education: None   • Highest education level: None   Occupational History   • Occupation: RETIRED   Tobacco Use   • Smoking status: Former     Packs/day: 1.00     Years: 10.00     Total pack years: 10.00     Types: Cigarettes     Quit date: 1978     Years since quittin.6   • Smokeless tobacco: Never   Vaping Use   • Vaping Use: Never used   Substance and Sexual Activity   • Alcohol use: Yes     Comment: occasionally 1-2 per month    • Drug use: No   • Sexual activity: Not Currently     Partners: Female   Other Topics Concern   • None   Social History Narrative    LIVING INDEPENDENTLY WITH SPOUSE    NO ADVANCE DIRECTIVE ON FILE     Social Determinants of Health     Financial Resource Strain: Low Risk  (2022) Overall Financial Resource Strain (CARDIA)    • Difficulty of Paying Living Expenses: Not very hard   Food Insecurity: No Food Insecurity (2022)    Hunger Vital Sign    • Worried About Running Out of Food in the Last Year: Never true    • Ran Out of Food in the Last Year: Never true   Transportation Needs: No Transportation Needs (2022)    PRAPARE - Transportation    • Lack of Transportation (Medical): No    • Lack of Transportation (Non-Medical):  No   Physical Activity: Inactive (10/18/2021)    Exercise Vital Sign    • Days of Exercise per Week: 0 days    • Minutes of Exercise per Session: 0 min   Stress: Stress Concern Present (10/18/2021)    [facility]    • Feeling of Stress : Rather much   Social Connections: Not on file   Intimate Partner Violence: Not on file   Housing Stability: [address]  (2022)    Housing Stability Vital Sign    • Unable to Pay for Housing in the Last Year: No    • Number of [address] in the Last Year: 1    • Unstable Housing in the Last Year: No     Social History     Tobacco Use   Smoking Status Former   • Packs/day: 1.00   • Years: 10.00   • Total pack years: 10.00   • Types: Cigarettes   • Quit date: 1978   • Years since quittin.6   Smokeless Tobacco Never     Family History   Problem Relation Age of Onset   • Heart disease Mother    • Coronary artery disease Mother    • Sudden death Mother    • Cancer Father         throat; MALIGNANT NEOPLASM OF HEAD, FACE OR NECK   • Throat cancer Father    • Cancer Family    • Coronary artery disease Family        The following portions of the patient's history were reviewed and updated as appropriate: allergies, current medications, past medical history, past social history, past surgical history and problem list.    Results  Recent Results (from the past 1 hour(s))   POCT urine dip    Collection Time: 23 11:29 AM   Result Value Ref Range    LEUKOCYTE ESTERASE,UA -     NITRITE,UA +     SL AMB POCT UROBILINOGEN 0.2     POCT URINE PROTEIN +++      PH,UA -     BLOOD,UA ++     SPECIFIC GRAVITY,UA 1.025     KETONES,UA 5     BILIRUBIN,UA -     GLUCOSE, UA ++      COLOR,UA yellow     CLARITY,UA clear    POCT Measure PVR    Collection Time: 09/22/23 11:32 AM   Result Value Ref Range    POST-VOID RESIDUAL VOLUME, ML POC 9 mL   ]  Lab Results   Component Value Date    PSA 3.1 05/24/2016    PSA 5.3 (H) 10/02/2015    PSA 11.88 (H) 05/21/2015    PSA 6.66 (H) 09/08/2014     Lab Results   Component Value Date    GLUCOSE 180 (H) 10/02/2015    CALCIUM 9.7 07/03/2023     10/02/2015    K 4.1 07/03/2023    CO2 26 07/03/2023     07/03/2023    BUN 20 07/03/2023    CREATININE 1.17 07/03/2023     Lab Results   Component Value Date    WBC 5.48 07/03/2023    HGB 12.4 07/03/2023    HCT 37.5 07/03/2023     (H) 07/03/2023     07/03/2023       Aileen Borrero PA-C

## 2023-09-22 ENCOUNTER — OFFICE VISIT (OUTPATIENT)
Dept: UROLOGY | Facility: CLINIC | Age: 83
End: 2023-09-22
Payer: COMMERCIAL

## 2023-09-22 VITALS
SYSTOLIC BLOOD PRESSURE: 118 MMHG | OXYGEN SATURATION: 100 % | DIASTOLIC BLOOD PRESSURE: 60 MMHG | WEIGHT: 139 LBS | HEART RATE: 56 BPM | BODY MASS INDEX: 22.34 KG/M2 | RESPIRATION RATE: 16 BRPM | HEIGHT: 66 IN

## 2023-09-22 DIAGNOSIS — N39.0 RECURRENT UTI: Primary | ICD-10-CM

## 2023-09-22 LAB
BACTERIA UR QL AUTO: ABNORMAL /HPF
BILIRUB UR QL STRIP: NEGATIVE
BUDDING YEAST: PRESENT
CLARITY UR: CLEAR
COLOR UR: YELLOW
GLUCOSE UR STRIP-MCNC: ABNORMAL MG/DL
GRAN CASTS #/AREA URNS LPF: ABNORMAL /[LPF]
HGB UR QL STRIP.AUTO: ABNORMAL
HYALINE CASTS #/AREA URNS LPF: ABNORMAL /LPF
KETONES UR STRIP-MCNC: NEGATIVE MG/DL
LEUKOCYTE ESTERASE UR QL STRIP: ABNORMAL
MUCOUS THREADS UR QL AUTO: ABNORMAL
NITRITE UR QL STRIP: POSITIVE
NON-SQ EPI CELLS URNS QL MICRO: ABNORMAL /HPF
PH UR STRIP.AUTO: 6 [PH]
POST-VOID RESIDUAL VOLUME, ML POC: 9 ML
PROT UR STRIP-MCNC: ABNORMAL MG/DL
RBC #/AREA URNS AUTO: ABNORMAL /HPF
SL AMB  POCT GLUCOSE, UA: NORMAL
SL AMB LEUKOCYTE ESTERASE,UA: NORMAL
SL AMB POCT BILIRUBIN,UA: NORMAL
SL AMB POCT BLOOD,UA: NORMAL
SL AMB POCT CLARITY,UA: CLEAR
SL AMB POCT COLOR,UA: YELLOW
SL AMB POCT KETONES,UA: 5
SL AMB POCT NITRITE,UA: NORMAL
SL AMB POCT PH,UA: NORMAL
SL AMB POCT SPECIFIC GRAVITY,UA: 1.02
SL AMB POCT URINE PROTEIN: NORMAL
SL AMB POCT UROBILINOGEN: 0.2
SP GR UR STRIP.AUTO: 1.02 (ref 1–1.03)
UROBILINOGEN UR STRIP-ACNC: <2 MG/DL
WBC #/AREA URNS AUTO: ABNORMAL /HPF

## 2023-09-22 PROCEDURE — 81002 URINALYSIS NONAUTO W/O SCOPE: CPT | Performed by: PHYSICIAN ASSISTANT

## 2023-09-22 PROCEDURE — 87186 SC STD MICRODIL/AGAR DIL: CPT | Performed by: PHYSICIAN ASSISTANT

## 2023-09-22 PROCEDURE — 51798 US URINE CAPACITY MEASURE: CPT | Performed by: PHYSICIAN ASSISTANT

## 2023-09-22 PROCEDURE — 81001 URINALYSIS AUTO W/SCOPE: CPT | Performed by: PHYSICIAN ASSISTANT

## 2023-09-22 PROCEDURE — 87086 URINE CULTURE/COLONY COUNT: CPT | Performed by: PHYSICIAN ASSISTANT

## 2023-09-22 PROCEDURE — 87147 CULTURE TYPE IMMUNOLOGIC: CPT | Performed by: PHYSICIAN ASSISTANT

## 2023-09-22 PROCEDURE — 99213 OFFICE O/P EST LOW 20 MIN: CPT | Performed by: PHYSICIAN ASSISTANT

## 2023-09-22 RX ORDER — ONDANSETRON 4 MG/1
TABLET, ORALLY DISINTEGRATING ORAL
Status: ON HOLD | COMMUNITY
Start: 2023-09-19

## 2023-09-24 LAB — BACTERIA UR CULT: ABNORMAL

## 2023-09-25 DIAGNOSIS — N39.0 RECURRENT UTI: Primary | ICD-10-CM

## 2023-09-25 RX ORDER — SULFAMETHOXAZOLE AND TRIMETHOPRIM 800; 160 MG/1; MG/1
1 TABLET ORAL EVERY 12 HOURS SCHEDULED
Qty: 14 TABLET | Refills: 0 | Status: SHIPPED | OUTPATIENT
Start: 2023-09-25 | End: 2023-10-02

## 2023-09-29 ENCOUNTER — APPOINTMENT (EMERGENCY)
Dept: CT IMAGING | Facility: HOSPITAL | Age: 83
End: 2023-09-29
Payer: COMMERCIAL

## 2023-09-29 ENCOUNTER — HOSPITAL ENCOUNTER (INPATIENT)
Facility: HOSPITAL | Age: 83
LOS: 2 days | Discharge: HOME | End: 2023-10-02
Attending: EMERGENCY MEDICINE | Admitting: INTERNAL MEDICINE
Payer: COMMERCIAL

## 2023-09-29 DIAGNOSIS — N40.1 BENIGN PROSTATIC HYPERPLASIA WITH LOWER URINARY TRACT SYMPTOMS, SYMPTOM DETAILS UNSPECIFIED: ICD-10-CM

## 2023-09-29 DIAGNOSIS — M25.511 CHRONIC RIGHT SHOULDER PAIN: ICD-10-CM

## 2023-09-29 DIAGNOSIS — I25.118 CORONARY ARTERY DISEASE OF NATIVE ARTERY OF NATIVE HEART WITH STABLE ANGINA PECTORIS (HCC): ICD-10-CM

## 2023-09-29 DIAGNOSIS — I25.5 ISCHEMIC CARDIOMYOPATHY: ICD-10-CM

## 2023-09-29 DIAGNOSIS — G89.29 CHRONIC RIGHT SHOULDER PAIN: ICD-10-CM

## 2023-09-29 DIAGNOSIS — E86.0 DEHYDRATION: ICD-10-CM

## 2023-09-29 DIAGNOSIS — R13.12 OROPHARYNGEAL DYSPHAGIA: ICD-10-CM

## 2023-09-29 DIAGNOSIS — N17.9 AKI (ACUTE KIDNEY INJURY) (HCC): Primary | ICD-10-CM

## 2023-09-29 DIAGNOSIS — R33.9 URINARY RETENTION: ICD-10-CM

## 2023-09-29 DIAGNOSIS — N18.30 TYPE 2 DIABETES MELLITUS WITH STAGE 3 CHRONIC KIDNEY DISEASE, WITH LONG-TERM CURRENT USE OF INSULIN, UNSPECIFIED WHETHER STAGE 3A OR 3B CKD (HCC): ICD-10-CM

## 2023-09-29 DIAGNOSIS — N17.9 ACUTE KIDNEY INJURY (HCC): ICD-10-CM

## 2023-09-29 DIAGNOSIS — E11.22 TYPE 2 DIABETES MELLITUS WITH STAGE 3 CHRONIC KIDNEY DISEASE, WITH LONG-TERM CURRENT USE OF INSULIN, UNSPECIFIED WHETHER STAGE 3A OR 3B CKD (HCC): ICD-10-CM

## 2023-09-29 DIAGNOSIS — R53.81 PHYSICAL DECONDITIONING: ICD-10-CM

## 2023-09-29 DIAGNOSIS — Z79.4 TYPE 2 DIABETES MELLITUS WITH STAGE 3 CHRONIC KIDNEY DISEASE, WITH LONG-TERM CURRENT USE OF INSULIN, UNSPECIFIED WHETHER STAGE 3A OR 3B CKD (HCC): ICD-10-CM

## 2023-09-29 DIAGNOSIS — R93.89 ABNORMAL CT SCAN: ICD-10-CM

## 2023-09-29 DIAGNOSIS — K59.00 CONSTIPATION: ICD-10-CM

## 2023-09-29 LAB
ALBUMIN SERPL BCP-MCNC: 4.5 G/DL (ref 3.5–5)
ALP SERPL-CCNC: 34 U/L (ref 34–104)
ALT SERPL W P-5'-P-CCNC: 22 U/L (ref 7–52)
ANION GAP SERPL CALCULATED.3IONS-SCNC: 7 MMOL/L
AST SERPL W P-5'-P-CCNC: 37 U/L (ref 13–39)
BASOPHILS # BLD AUTO: 0.05 THOUSANDS/ÂΜL (ref 0–0.1)
BASOPHILS NFR BLD AUTO: 1 % (ref 0–1)
BILIRUB DIRECT SERPL-MCNC: 0.14 MG/DL (ref 0–0.2)
BILIRUB SERPL-MCNC: 0.75 MG/DL (ref 0.2–1)
BUN SERPL-MCNC: 20 MG/DL (ref 5–25)
CALCIUM SERPL-MCNC: 9.8 MG/DL (ref 8.4–10.2)
CHLORIDE SERPL-SCNC: 101 MMOL/L (ref 96–108)
CO2 SERPL-SCNC: 27 MMOL/L (ref 21–32)
CREAT SERPL-MCNC: 1.73 MG/DL (ref 0.6–1.3)
EOSINOPHIL # BLD AUTO: 0.09 THOUSAND/ÂΜL (ref 0–0.61)
EOSINOPHIL NFR BLD AUTO: 2 % (ref 0–6)
ERYTHROCYTE [DISTWIDTH] IN BLOOD BY AUTOMATED COUNT: 15.5 % (ref 11.6–15.1)
GFR SERPL CREATININE-BSD FRML MDRD: 35 ML/MIN/1.73SQ M
GLUCOSE SERPL-MCNC: 115 MG/DL (ref 65–140)
GLUCOSE SERPL-MCNC: 182 MG/DL (ref 65–140)
GLUCOSE SERPL-MCNC: 188 MG/DL (ref 65–140)
GLUCOSE SERPL-MCNC: 218 MG/DL (ref 65–140)
HCT VFR BLD AUTO: 39.9 % (ref 36.5–49.3)
HGB BLD-MCNC: 13.9 G/DL (ref 12–17)
IMM GRANULOCYTES # BLD AUTO: 0.01 THOUSAND/UL (ref 0–0.2)
IMM GRANULOCYTES NFR BLD AUTO: 0 % (ref 0–2)
LYMPHOCYTES # BLD AUTO: 1.34 THOUSANDS/ÂΜL (ref 0.6–4.47)
LYMPHOCYTES NFR BLD AUTO: 24 % (ref 14–44)
MCH RBC QN AUTO: 38.1 PG (ref 26.8–34.3)
MCHC RBC AUTO-ENTMCNC: 34.8 G/DL (ref 31.4–37.4)
MCV RBC AUTO: 109 FL (ref 82–98)
MONOCYTES # BLD AUTO: 0.73 THOUSAND/ÂΜL (ref 0.17–1.22)
MONOCYTES NFR BLD AUTO: 13 % (ref 4–12)
NEUTROPHILS # BLD AUTO: 3.38 THOUSANDS/ÂΜL (ref 1.85–7.62)
NEUTS SEG NFR BLD AUTO: 60 % (ref 43–75)
NRBC BLD AUTO-RTO: 0 /100 WBCS
PLATELET # BLD AUTO: 201 THOUSANDS/UL (ref 149–390)
PMV BLD AUTO: 10 FL (ref 8.9–12.7)
POTASSIUM SERPL-SCNC: 4.7 MMOL/L (ref 3.5–5.3)
PROT SERPL-MCNC: 8 G/DL (ref 6.4–8.4)
RBC # BLD AUTO: 3.65 MILLION/UL (ref 3.88–5.62)
SODIUM SERPL-SCNC: 135 MMOL/L (ref 135–147)
WBC # BLD AUTO: 5.6 THOUSAND/UL (ref 4.31–10.16)

## 2023-09-29 PROCEDURE — 96360 HYDRATION IV INFUSION INIT: CPT

## 2023-09-29 PROCEDURE — 74177 CT ABD & PELVIS W/CONTRAST: CPT

## 2023-09-29 PROCEDURE — 85025 COMPLETE CBC W/AUTO DIFF WBC: CPT | Performed by: EMERGENCY MEDICINE

## 2023-09-29 PROCEDURE — 82948 REAGENT STRIP/BLOOD GLUCOSE: CPT

## 2023-09-29 PROCEDURE — 80076 HEPATIC FUNCTION PANEL: CPT | Performed by: EMERGENCY MEDICINE

## 2023-09-29 PROCEDURE — 83036 HEMOGLOBIN GLYCOSYLATED A1C: CPT | Performed by: INTERNAL MEDICINE

## 2023-09-29 PROCEDURE — 99284 EMERGENCY DEPT VISIT MOD MDM: CPT

## 2023-09-29 PROCEDURE — 84443 ASSAY THYROID STIM HORMONE: CPT | Performed by: INTERNAL MEDICINE

## 2023-09-29 PROCEDURE — 36415 COLL VENOUS BLD VENIPUNCTURE: CPT | Performed by: EMERGENCY MEDICINE

## 2023-09-29 PROCEDURE — G1004 CDSM NDSC: HCPCS

## 2023-09-29 PROCEDURE — 99285 EMERGENCY DEPT VISIT HI MDM: CPT | Performed by: EMERGENCY MEDICINE

## 2023-09-29 PROCEDURE — 80048 BASIC METABOLIC PNL TOTAL CA: CPT | Performed by: EMERGENCY MEDICINE

## 2023-09-29 PROCEDURE — 99223 1ST HOSP IP/OBS HIGH 75: CPT | Performed by: INTERNAL MEDICINE

## 2023-09-29 RX ORDER — POLYETHYLENE GLYCOL 3350 17 G/17G
17 POWDER, FOR SOLUTION ORAL DAILY
Status: DISCONTINUED | OUTPATIENT
Start: 2023-09-29 | End: 2023-10-02 | Stop reason: HOSPADM

## 2023-09-29 RX ORDER — CLOPIDOGREL BISULFATE 75 MG/1
75 TABLET ORAL DAILY
Status: DISCONTINUED | OUTPATIENT
Start: 2023-09-30 | End: 2023-10-02 | Stop reason: HOSPADM

## 2023-09-29 RX ORDER — SODIUM CHLORIDE, SODIUM GLUCONATE, SODIUM ACETATE, POTASSIUM CHLORIDE, MAGNESIUM CHLORIDE, SODIUM PHOSPHATE, DIBASIC, AND POTASSIUM PHOSPHATE .53; .5; .37; .037; .03; .012; .00082 G/100ML; G/100ML; G/100ML; G/100ML; G/100ML; G/100ML; G/100ML
50 INJECTION, SOLUTION INTRAVENOUS CONTINUOUS
Status: DISCONTINUED | OUTPATIENT
Start: 2023-09-29 | End: 2023-10-02 | Stop reason: HOSPADM

## 2023-09-29 RX ORDER — LEVOTHYROXINE SODIUM 88 UG/1
88 TABLET ORAL DAILY
Status: DISCONTINUED | OUTPATIENT
Start: 2023-09-30 | End: 2023-10-02 | Stop reason: HOSPADM

## 2023-09-29 RX ORDER — AMOXICILLIN 250 MG
1 CAPSULE ORAL DAILY
Status: DISCONTINUED | OUTPATIENT
Start: 2023-09-30 | End: 2023-10-02 | Stop reason: HOSPADM

## 2023-09-29 RX ORDER — HEPARIN SODIUM 5000 [USP'U]/ML
5000 INJECTION, SOLUTION INTRAVENOUS; SUBCUTANEOUS EVERY 8 HOURS SCHEDULED
Status: DISCONTINUED | OUTPATIENT
Start: 2023-09-29 | End: 2023-10-02 | Stop reason: HOSPADM

## 2023-09-29 RX ORDER — INSULIN LISPRO 100 [IU]/ML
1-5 INJECTION, SOLUTION INTRAVENOUS; SUBCUTANEOUS
Status: DISCONTINUED | OUTPATIENT
Start: 2023-09-29 | End: 2023-10-02 | Stop reason: HOSPADM

## 2023-09-29 RX ORDER — ACETYLCYSTEINE 200 MG/ML
1200 SOLUTION ORAL; RESPIRATORY (INHALATION) 2 TIMES DAILY
Status: COMPLETED | OUTPATIENT
Start: 2023-09-29 | End: 2023-10-01

## 2023-09-29 RX ORDER — TAMSULOSIN HYDROCHLORIDE 0.4 MG/1
0.4 CAPSULE ORAL DAILY
Status: DISCONTINUED | OUTPATIENT
Start: 2023-09-30 | End: 2023-10-02 | Stop reason: HOSPADM

## 2023-09-29 RX ORDER — POLYETHYLENE GLYCOL 3350 17 G/17G
34 POWDER, FOR SOLUTION ORAL ONCE
Status: COMPLETED | OUTPATIENT
Start: 2023-09-29 | End: 2023-09-29

## 2023-09-29 RX ORDER — FINASTERIDE 5 MG/1
5 TABLET, FILM COATED ORAL DAILY
Status: DISCONTINUED | OUTPATIENT
Start: 2023-09-30 | End: 2023-10-02 | Stop reason: HOSPADM

## 2023-09-29 RX ORDER — ONDANSETRON 2 MG/ML
4 INJECTION INTRAMUSCULAR; INTRAVENOUS EVERY 6 HOURS PRN
Status: DISCONTINUED | OUTPATIENT
Start: 2023-09-29 | End: 2023-10-02 | Stop reason: HOSPADM

## 2023-09-29 RX ADMIN — HEPARIN SODIUM 5000 UNITS: 5000 INJECTION INTRAVENOUS; SUBCUTANEOUS at 16:45

## 2023-09-29 RX ADMIN — HEPARIN SODIUM 5000 UNITS: 5000 INJECTION INTRAVENOUS; SUBCUTANEOUS at 21:25

## 2023-09-29 RX ADMIN — POLYETHYLENE GLYCOL 3350 34 G: 17 POWDER, FOR SOLUTION ORAL at 11:52

## 2023-09-29 RX ADMIN — IOHEXOL 100 ML: 350 INJECTION, SOLUTION INTRAVENOUS at 13:04

## 2023-09-29 RX ADMIN — SODIUM CHLORIDE 1000 ML: 0.9 INJECTION, SOLUTION INTRAVENOUS at 11:55

## 2023-09-29 RX ADMIN — METOPROLOL TARTRATE 25 MG: 25 TABLET, FILM COATED ORAL at 21:25

## 2023-09-29 RX ADMIN — SODIUM CHLORIDE, SODIUM GLUCONATE, SODIUM ACETATE, POTASSIUM CHLORIDE AND MAGNESIUM CHLORIDE 75 ML/HR: 526; 502; 368; 37; 30 INJECTION, SOLUTION INTRAVENOUS at 16:45

## 2023-09-29 RX ADMIN — POLYETHYLENE GLYCOL 3350 17 G: 17 POWDER, FOR SOLUTION ORAL at 16:45

## 2023-09-29 RX ADMIN — ACETYLCYSTEINE 1200 MG: 200 INHALANT RESPIRATORY (INHALATION) at 18:29

## 2023-09-29 NOTE — ASSESSMENT & PLAN NOTE
· A CT scan of the abdomen with contrast was done on admission for constipation. .. some incidental findings noted:   Choledocholithiasis without evidence of significant biliary obstruction. 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 Implanted cardiac device noted.    Gallstones without cholecystitis   Prostatomegaly  · OP follow up

## 2023-09-29 NOTE — ASSESSMENT & PLAN NOTE
· Continue finasteride  · Recently treated for UTI with Bactrim OP - took for like 3-4 days which should be enough for cystitis, currently no urinary symptoms and JESSA so will hold further ATBs

## 2023-09-29 NOTE — ASSESSMENT & PLAN NOTE
Presented with creatinine of 1.7, baseline 1  In the setting of poor PO intake, recent Bactrim use  Unfortunately was exposed to contrast at the ED for a CT of the abdomen which was done with contrast    · Start Isolyte 75cc/h and NAC given contrast exposure  · Hold nephrotoxic agents including Bactrim  · Avoid further nephrotoxins  · Check PVR/Bladder scan  · Monitor BMP

## 2023-09-29 NOTE — ASSESSMENT & PLAN NOTE
· Ongoing somewhat chronic problem   · Passing gas, abdominal exam benign  · Continue Sennokot  · ADD Miralax  · Will leave a PRN Sop Suds enema if no BM tonight

## 2023-09-29 NOTE — ED PROVIDER NOTES
Pt Name: Indio Giron  MRN: [id number]  [address] 1940  Age/Sex: 81 y.o. male  Date of evaluation: 9/29/2023  PCP: Philomena Echols MD    [address]    Chief Complaint   Patient presents with   • Constipation     Pt reports he is suffering with chronic constipation. Pt states he has not a normal BM in 1 week. Pt reports mis abd pain, and vomiting          HPI    81 y.o. male presenting with constipation. Patient notes poor intake of food and fluids over the past few days, has had constipation on and off for months, current episodes been going on for about a week. He had a small bowel movement on Friday, none since then. Patient tried multiple things including several enemas as well as milk of magnesia that he took yesterday, since taking milk of magnesia he has had abdominal pain which is in the center of the abdomen in the left side of the abdomen, dull, moderate intensity, worse with movement better at rest.  Patient and family note that he has not had enough water to drink over the past few days.     HPI      Past Medical and Surgical History    Past Medical History:   Diagnosis Date   • Acquired cyst of kidney    • Anemia    • Benign paroxysmal vertigo, unspecified ear    • Cellulitis of leg    • Cervical spinal stenosis    • Chest pain    • Coronary atherosclerosis of native coronary artery    • Diabetes mellitus ([address])    • Disease of thyroid gland    • Enlarged prostate    • Esophageal candidiasis (HCC)    • H/o COVID-19 06/30/2022   • Hematuria    • Herpes zoster    • Hyperlipidemia    • Hypertension    • Incomplete emptying of bladder    • Inflamed seborrheic keratosis    • Internal hemorrhoids    • Malignant neoplasm of skin of trunk    • Myocardial infarction [facility]) 2005   • Nonmelanoma skin cancer     LAST ASSESSED: 8/9/17   • 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 infarction    • Paroxysmal tachycardia (HCC)    • Shortness of breath    • Vitamin B deficiency        Past Surgical History:   Procedure Laterality Date   • CARDIAC CATHETERIZATION Left 5/15/2023    Procedure: Cardiac Left Heart Cath;  Surgeon: Rigoberto Georges MD;  Location: [address] CATH LAB; Service: Cardiology   • CARDIAC CATHETERIZATION N/A 5/15/2023    Procedure: Cardiac pci;  Surgeon: Rigoberto Georges MD;  Location: [address] CATH LAB; Service: Cardiology   • CARDIAC CATHETERIZATION N/A 5/15/2023    Procedure: Cardiac Coronary Angiogram;  Surgeon: Rigoberto Georges MD;  Location: [address] CATH LAB; Service: Cardiology   • CARDIAC DEFIBRILLATOR PLACEMENT     • COLONOSCOPY  10/07/2008    FIBEROPTIC SCREENING; NO FURTHER RECOMMENDATIONS   • CORONARY ANGIOPLASTY WITH STENT PLACEMENT     • CYSTOSCOPY  2015    DIAGNOSTIC; MANAGED BY: Timoteo Jain   • EGD AND COLONOSCOPY N/A 2018    Procedure: EGD AND COLONOSCOPY;  Surgeon: Dominique Alba MD;  Location: [facility];   Service: Gastroenterology   • FL INJECTION RIGHT SHOULDER (ARTHROGRAM)  2022   • HIP ARTHROPLASTY Right    • INSERT / REPLACE / REMOVE PACEMAKER     • JOINT REPLACEMENT Right     THR   • TRUNK SKIN LESION EXCISIONAL BIOPSY  2007    MALIGNANT; BCC CHEST       Family History   Problem Relation Age of Onset   • Heart disease Mother    • Coronary artery disease Mother    • Sudden death Mother    • Cancer Father         throat; MALIGNANT NEOPLASM OF HEAD, FACE OR NECK   • Throat cancer Father    • Cancer Family    • Coronary artery disease Family        Social History     Tobacco Use   • Smoking status: Former     Packs/day: 1.00     Years: 10.00     Total pack years: 10.00     Types: Cigarettes     Quit date: 1978     Years since quittin.6   • Smokeless tobacco: Never   Vaping Use   • Vaping Use: Never used   Substance Use Topics   • Alcohol use: Yes     Comment: occasionally 1-2 per month    • Drug use: No           Allergies    Allergies   Allergen Reactions   • Atorvastatin Other (See Comments)     Pt unsure     • Percocet [Oxycodone-Acetaminophen] Nausea Only and GI Intolerance       Home Medications    Prior to Admission medications    Medication Sig Start Date End Date Taking?  Authorizing Provider   Alcohol Swabs (Pharmacist Choice Alcohol) PADS USE FOR TESTING AND INJECTIONS UP TO 10 PADS DAILY 9/23/22   Historical Provider, MD   aspirin 81 MG tablet Take 1 tablet by mouth daily    Historical Provider, MD   clopidogrel (PLAVIX) 75 mg tablet Take 1 tablet (75 mg total) by mouth daily 5/16/23   Orlie Apley, PA-C   ergocalciferol (VITAMIN D2) 50,000 units Take 50,000 Units by mouth once a week 9/21/22   Historical Provider, MD   finasteride (PROSCAR) 5 mg tablet TAKE 1 TABLET (5 MG TOTAL) BY MOUTH DAILY 4/14/23   Lennox Cue, MD   glipiZIDE (GLUCOTROL) 5 mg tablet TAKE 1 TABLET (5 MG TOTAL) BY MOUTH 2 (TWO) TIMES A DAY 4/3/23   Lennox Cue, MD   Insulin Glargine Solostar (Lantus SoloStar) 100 UNIT/ML SOPN Inject 0.15 mL (15 Units total) under the skin daily at bedtime  Patient not taking: Reported on 9/22/2023 7/3/23   Rut Fair MD   levothyroxine 75 mcg tablet TAKE 1 TABLET (75 MCG TOTAL) BY MOUTH DAILY  Patient not taking: Reported on 9/22/2023 3/20/23   Rut Fair MD   levothyroxine 88 mcg tablet Take 88 mcg by mouth daily 8/28/23   Historical Provider, MD   metoprolol tartrate (LOPRESSOR) 25 mg tablet TAKE 1 TABLET (25 MG TOTAL) BY MOUTH EVERY 12 (TWELVE) HOURS 9/18/23   MELODIE Hamlin   Milk Thistle 300 MG CAPS TAKE TWO PILLS IN THE AM WITH FOOD. 9/21/22   Historical Provider, MD   ondansetron (ZOFRAN-ODT) 4 mg disintegrating tablet TAKE 1 TABLET BY MOUTH 3 TIMES PER DAY FOR 7 DAYS 9/19/23   Historical Provider, MD   rosuvastatin (CRESTOR) 20 MG tablet TAKE 1 TABLET (20 MG TOTAL) BY MOUTH DAILY 6/20/23   Lennox Cue, MD   senna-docusate sodium (SENOKOT-S) 8.6-50 mg per tablet Take 1 tablet by mouth daily    Historical Provider, MD   sulfamethoxazole-trimethoprim (BACTRIM DS) 800-160 mg per tablet Take 1 tablet by mouth every 12 (twelve) hours for 7 days 9/25/23 10/2/23  Eun Adamson PA-C   tamsulosin (FLOMAX) 0.4 mg TAKE 1 CAPSULE (0.4 MG TOTAL) BY MOUTH DAILY 4/13/23   MD Samuel Patelity 4.5 LF/5.9MR injection INJECT 4.5 MG UNDER THE SKIN ONCE A WEEK. 8/28/23   Historical Provider, MD           Review of Systems    Review of Systems   Constitutional: Negative for appetite change, chills and diaphoresis. HENT: Negative for drooling, facial swelling, trouble swallowing and voice change. Respiratory: Negative for apnea, shortness of breath and wheezing. Cardiovascular: Negative for chest pain and leg swelling. Gastrointestinal: Positive for abdominal pain and constipation. Negative for abdominal distention, diarrhea, nausea and vomiting. Genitourinary: Negative for dysuria and urgency. Musculoskeletal: Negative for arthralgias, back pain, gait problem and neck pain. Skin: Negative for color change, rash and wound. Neurological: Negative for seizures, speech difficulty, weakness and headaches. Psychiatric/Behavioral: Negative for agitation, behavioral problems and dysphoric mood. The patient is not nervous/anxious. All other systems reviewed and negative. Physical Exam      ED Triage Vitals   Temperature Pulse Respirations Blood Pressure SpO2   09/29/23 1135 09/29/23 1135 09/29/23 1135 09/29/23 1135 09/29/23 1135   97.5 °F (36.4 °C) 101 20 106/59 99 %      Temp Source Heart Rate Source Patient Position - Orthostatic VS BP Location FiO2 (%)   09/29/23 1135 09/29/23 1135 09/29/23 1135 09/29/23 1135 --   Temporal Monitor Sitting Left arm       Pain Score       09/29/23 1159       4               Physical Exam  Vitals and nursing note reviewed. Constitutional:       General: He is not in acute distress. Appearance: He is well-developed. He is not ill-appearing, toxic-appearing or diaphoretic. HENT:      Head: Normocephalic and atraumatic.       Right Ear: External ear normal.      Left Ear: External ear normal.      Nose: Nose normal. No congestion or rhinorrhea. Mouth/Throat:      Mouth: Mucous membranes are dry. Pharynx: Oropharynx is clear. Eyes:      Conjunctiva/sclera: Conjunctivae normal.      Pupils: Pupils are equal, round, and reactive to light. Neck:      Trachea: No tracheal deviation. Cardiovascular:      Rate and Rhythm: Normal rate and regular rhythm. Pulses: Normal pulses. Heart sounds: Normal heart sounds. No murmur heard. Pulmonary:      Effort: Pulmonary effort is normal. No respiratory distress. Breath sounds: Normal breath sounds. No stridor. No wheezing or rales. Abdominal:      General: There is no distension. Palpations: Abdomen is soft. Tenderness: There is no abdominal tenderness. There is no guarding or rebound. Musculoskeletal:         General: No deformity. Normal range of motion. Cervical back: Normal range of motion and neck supple. Skin:     General: Skin is warm and dry. Capillary Refill: Capillary refill takes less than 2 seconds. Findings: No rash. Neurological:      Mental Status: He is alert and oriented to person, place, and time. Psychiatric:         Behavior: Behavior normal.         Thought Content:  Thought content normal.         Judgment: Judgment normal.              Diagnostic Results      Labs:    Results Reviewed     Procedure Component Value Units Date/Time    Basic metabolic panel [[id number]]  (Abnormal) Collected: 09/29/23 1154    Lab Status: Final result Specimen: Blood from Arm, Right Updated: 09/29/23 1226     Sodium 135 mmol/L      Potassium 4.7 mmol/L      Chloride 101 mmol/L      CO2 27 mmol/L      ANION GAP 7 mmol/L      BUN 20 mg/dL      Creatinine 1.73 mg/dL      Glucose 188 mg/dL      Calcium 9.8 mg/dL      eGFR 35 ml/min/1.73sq m     Narrative:      [place] guidelines for Chronic Kidney Disease (CKD):   •  Stage 1 with normal or high GFR (GFR > 90 mL/min/1.73 square meters)  •  Stage 2 Mild CKD (GFR = 60-89 mL/min/1.73 square meters)  •  Stage 3A Moderate CKD (GFR = 45-59 mL/min/1.73 square meters)  •  Stage 3B Moderate CKD (GFR = 30-44 mL/min/1.73 square meters)  •  Stage 4 Severe CKD (GFR = 15-29 mL/min/1.73 square meters)  •  Stage 5 End Stage CKD (GFR <15 mL/min/1.73 square meters)  Note: GFR calculation is accurate only with a steady state creatinine    Hepatic function panel [[id number]]  (Normal) Collected: 09/29/23 1154    Lab Status: Final result Specimen: Blood from Arm, Right Updated: 09/29/23 1226     Total Bilirubin 0.75 mg/dL      Bilirubin, Direct 0.14 mg/dL      Alkaline Phosphatase 34 U/L      AST 37 U/L      ALT 22 U/L      Total Protein 8.0 g/dL      Albumin 4.5 g/dL     CBC and differential [[id number]]  (Abnormal) Collected: 09/29/23 1154    Lab Status: Final result Specimen: Blood from Arm, Right Updated: 09/29/23 1204     WBC 5.60 Thousand/uL      RBC 3.65 Million/uL      Hemoglobin 13.9 g/dL      Hematocrit 39.9 %       fL      MCH 38.1 pg      MCHC 34.8 g/dL      RDW 15.5 %      MPV 10.0 fL      Platelets 957 Thousands/uL      nRBC 0 /100 WBCs      Neutrophils Relative 60 %      Immat GRANS % 0 %      Lymphocytes Relative 24 %      Monocytes Relative 13 %      Eosinophils Relative 2 %      Basophils Relative 1 %      Neutrophils Absolute 3.38 Thousands/µL      Immature Grans Absolute 0.01 Thousand/uL      Lymphocytes Absolute 1.34 Thousands/µL      Monocytes Absolute 0.73 Thousand/µL      Eosinophils Absolute 0.09 Thousand/µL      Basophils Absolute 0.05 Thousands/µL     Fingerstick Glucose (POCT) [[id number]]  (Abnormal) Collected: 09/29/23 1158    Lab Status: Final result Updated: 09/29/23 1200     POC Glucose 218 mg/dl           All labs reviewed and utilized in the medical decision making process    Radiology:    CT abdomen pelvis with contrast   Final Result      Choledocholithiasis without evidence of significant biliary obstruction. 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 Implanted cardiac device noted. Gallstones without cholecystitis      Prostatomegaly            Workstation performed: [id number]             All radiology studies independently viewed by me and interpreted by the radiologist.    Procedure    Procedures        ED Course of Care and Re-Assessments      Resuscitated with IV fluids, given MiraLAX for initial treatment of constipation pending results of CT. CT as above, showed no acute surgical pathology. Admitted to internal medicine. Medications   sodium chloride 0.9 % bolus 1,000 mL (0 mL Intravenous Stopped 9/29/23 1255)   polyethylene glycol (MIRALAX) packet 34 g (34 g Oral Given 9/29/23 1152)   iohexol (OMNIPAQUE) 350 MG/ML injection (MULTI-DOSE) 100 mL (100 mL Intravenous Given 9/29/23 1304)           FINAL IMPRESSION    Final diagnoses:   Constipation   Dehydration   JESSA (acute kidney injury) ([address])         DISPOSITION/PLAN    Presentation as above with constipation dehydration and JESSA as well as abdominal pain. Vital signs and examination as above. Labs remarkable for JESSA with creatinine rising from 1.89-1.78, suspect secondary to dehydration which may be exacerbated by hyperglycemia. CT performed, results as above, choledocholithiasis noted but no evidence of obstruction and labs reassuring. After initial treatment resuscitation ER, admitted to internal medicine, hemodynamically stable and comfortable at that time.   Time reflects when diagnosis was documented in both MDM as applicable and the Disposition within this note     Time User Action Codes Description Comment    9/29/2023  4:04 PM Macoa Carrel Add [K59.00] Constipation     9/29/2023  4:04 PM Ardena Carrel Add [E86.0] Dehydration     9/29/2023  4:04 PM Ardena Carrel Add [N17.9] JESSA (acute kidney injury) ([address])     9/29/2023  4:04 PM Derek Davies Modify [K59.00] Constipation     9/29/2023  4:04 PM Brianne RDE Modify [N17.9] JESSA (acute kidney injury) [facility])       ED Disposition     ED Disposition   Admit    Condition   Stable    Date/Time   Fri Sep 29, 2023  4:04 PM    Comment   Case was discussed with GOSIA and the patient's admission status was agreed to be Admission Status: observation status to the service of Dr. Blayne Branes . Follow-up Information    None           PATIENT REFERRED TO:    No follow-up provider specified. DISCHARGE MEDICATIONS:    Patient's Medications   Discharge Prescriptions    No medications on file       No discharge procedures on file. Derek Davies MD    Portions of the record may have been created with voice recognition software. Occasional wrong word or "sound alike" substitutions may have occurred due to the inherent limitations of voice recognition software.   Please read the chart carefully and recognize, using context, where substitutions have occurred     Derek Davies MD  09/29/23 2399

## 2023-09-29 NOTE — H&P
[address]  H&P  Name: Jessica Mora 81 y.o. male I MRN: [id number]  Unit/Bed#: ED 15 I Date of Admission: 9/29/2023   Date of Service: 9/29/2023 I Hospital Day: 0      Assessment/Plan   * Acute kidney injury [facility])  Assessment & Plan  Presented with creatinine of 1.7, baseline 1  In the setting of poor PO intake, recent Bactrim use  Unfortunately was exposed to contrast at the ED for a CT of the abdomen which was done with contrast    · Start Isolyte 75cc/h and NAC given contrast exposure  · Hold nephrotoxic agents including Bactrim  · Avoid further nephrotoxins  · Check PVR/Bladder scan  · Monitor BMP    Constipation  Assessment & Plan  · Ongoing somewhat chronic problem   · Passing gas, abdominal exam benign  · Continue Sennokot  · ADD Miralax  · Will leave a PRN Sop Suds enema if no BM tonight    Abnormal CT scan  Assessment & Plan  · A CT scan of the abdomen with contrast was done on admission for constipation. .. some incidental findings noted:   Choledocholithiasis without evidence of significant biliary obstruction. 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 Implanted cardiac device noted. Gallstones without cholecystitis   Prostatomegaly  · OP follow up       Physical deconditioning  Assessment & Plan  · PT/OT eval    Type 2 diabetes mellitus with stage 3 chronic kidney disease, with long-term current use of insulin, unspecified whether stage 3a or 3b CKD (HCC)  Assessment & Plan  Lab Results   Component Value Date    HGBA1C 9.0 (H) 07/03/2023       Recent Labs     09/29/23  1158   POCGLU 218*       Blood Sugar Average: Last 72 hrs:  (P) 218     · Less than ideal blood glucose.  Used to be on insulin but not currently - Check A1c  · Hold glipizide  · SSI for corrections  · Monitor blood glucose    Coronary artery disease of native artery of native heart with stable angina pectoris [facility])  Assessment & Plan  · No CP currently  · Continue aspirin and Plavix    BPH (benign prostatic hyperplasia)  Assessment & Plan  · Continue finasteride  · Recently treated for UTI with Bactrim OP - took for like 3-4 days which should be enough for cystitis, currently no urinary symptoms and JESSA so will hold further ATBs           VTE Prophylaxis: Heparin  / sequential compression device   Code Status: FC  POLST: POLST form is not discussed and not completed at this time. Discussion with family: Patient    Anticipated Length of Stay:  Patient will be admitted on an Observation basis with an anticipated length of stay of  Less than 2 midnights. Justification for Hospital Stay: Constipation, JESSA    Total Time for Visit, including Counseling / Coordination of Care: 90 minutes. Greater than 50% of this total time spent on direct patient counseling and coordination of care. Chief Complaint:   Constipation    History of Present Illness:    Vernell Strange is a 81 y.o. male who presents with constipation. Has not had a BM for a week, but is passing gas. Does mention some abdominal discomfort but no overt pain. Also not drinking much water. Was recently on Bactrim for an UTI. Review of Systems:    Review of Systems   Constitutional: Positive for fatigue. Gastrointestinal: Positive for constipation. All other systems reviewed and are negative.       Past Medical and Surgical History:     Past Medical History:   Diagnosis Date   • Acquired cyst of kidney    • Anemia    • Benign paroxysmal vertigo, unspecified ear    • Cellulitis of leg    • Cervical spinal stenosis    • Chest pain    • Coronary atherosclerosis of native coronary artery    • Diabetes mellitus ([address])    • Disease of thyroid gland    • Enlarged prostate    • Esophageal candidiasis (HCC)    • H/o COVID-19 06/30/2022   • Hematuria    • Herpes zoster    • Hyperlipidemia    • Hypertension    • Incomplete emptying of bladder    • Inflamed seborrheic keratosis    • Internal hemorrhoids    • Malignant neoplasm of skin of trunk    • Myocardial infarction [facility]) 2005   • Nonmelanoma skin cancer     LAST ASSESSED: 8/9/17   • 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 infarction    • Paroxysmal tachycardia (HCC)    • Shortness of breath    • Vitamin B deficiency        Past Surgical History:   Procedure Laterality Date   • CARDIAC CATHETERIZATION Left 5/15/2023    Procedure: Cardiac Left Heart Cath;  Surgeon: Lexa Peng MD;  Location: [facility] CARDIAC CATH LAB; Service: Cardiology   • CARDIAC CATHETERIZATION N/A 5/15/2023    Procedure: Cardiac pci;  Surgeon: Lexa Peng MD;  Location: [facility] [address] CATH LAB; Service: Cardiology   • CARDIAC CATHETERIZATION N/A 5/15/2023    Procedure: Cardiac Coronary Angiogram;  Surgeon: Lexa Peng MD;  Location: [facility] [address] CATH LAB; Service: Cardiology   • CARDIAC DEFIBRILLATOR PLACEMENT     • COLONOSCOPY  10/07/2008    FIBEROPTIC SCREENING; NO FURTHER RECOMMENDATIONS   • CORONARY ANGIOPLASTY WITH STENT PLACEMENT     • CYSTOSCOPY  11/06/2015    DIAGNOSTIC; MANAGED BY: Ivory Joshi   • EGD AND COLONOSCOPY N/A 02/12/2018    Procedure: EGD AND COLONOSCOPY;  Surgeon: Myrna Cornelius MD;  Location: [facility] GI LAB; Service: Gastroenterology   • FL INJECTION RIGHT SHOULDER (ARTHROGRAM)  01/04/2022   • HIP ARTHROPLASTY Right    • INSERT / REPLACE / REMOVE PACEMAKER     • JOINT REPLACEMENT Right     THR   • TRUNK SKIN LESION EXCISIONAL BIOPSY  08/22/2007    MALIGNANT; BCC CHEST       Meds/Allergies:    Prior to Admission medications    Medication Sig Start Date End Date Taking?  Authorizing Provider   Alcohol Swabs (Pharmacist Choice Alcohol) PADS USE FOR TESTING AND INJECTIONS UP TO 10 PADS DAILY 9/23/22   Historical Provider, MD   aspirin 81 MG tablet Take 1 tablet by mouth daily    Historical Provider, MD   clopidogrel (PLAVIX) 75 mg tablet Take 1 tablet (75 mg total) by mouth daily 5/16/23   Arielle Vargas PA-C   ergocalciferol (VITAMIN D2) 50,000 units Take 50,000 Units by mouth once a week 9/21/22   Historical Provider, MD   finasteride (PROSCAR) 5 mg tablet TAKE 1 TABLET (5 MG TOTAL) BY MOUTH DAILY 4/14/23   Lennox Cue, MD   glipiZIDE (GLUCOTROL) 5 mg tablet TAKE 1 TABLET (5 MG TOTAL) BY MOUTH 2 (TWO) TIMES A DAY 4/3/23   Lennox Cue, MD   Insulin Glargine Solostar (Lantus SoloStar) 100 UNIT/ML SOPN Inject 0.15 mL (15 Units total) under the skin daily at bedtime  Patient not taking: Reported on 9/22/2023 7/3/23   Rut Fair MD   levothyroxine 75 mcg tablet TAKE 1 TABLET (75 MCG TOTAL) BY MOUTH DAILY  Patient not taking: Reported on 9/22/2023 3/20/23   Rut Fair MD   levothyroxine 88 mcg tablet Take 88 mcg by mouth daily 8/28/23   Historical Provider, MD   metoprolol tartrate (LOPRESSOR) 25 mg tablet TAKE 1 TABLET (25 MG TOTAL) BY MOUTH EVERY 12 (TWELVE) HOURS 9/18/23   MELODIE Hamlin   Milk Thistle 300 MG CAPS TAKE TWO PILLS IN THE AM WITH FOOD. 9/21/22   Historical Provider, MD   ondansetron (ZOFRAN-ODT) 4 mg disintegrating tablet TAKE 1 TABLET BY MOUTH 3 TIMES PER DAY FOR 7 DAYS 9/19/23   Historical Provider, MD   rosuvastatin (CRESTOR) 20 MG tablet TAKE 1 TABLET (20 MG TOTAL) BY MOUTH DAILY 6/20/23   Lennox Cue, MD   senna-docusate sodium (SENOKOT-S) 8.6-50 mg per tablet Take 1 tablet by mouth daily    Historical Provider, MD   sulfamethoxazole-trimethoprim (BACTRIM DS) 800-160 mg per tablet Take 1 tablet by mouth every 12 (twelve) hours for 7 days 9/25/23 10/2/23  Analisa Baltazar PA-C   tamsulosin (FLOMAX) 0.4 mg TAKE 1 CAPSULE (0.4 MG TOTAL) BY MOUTH DAILY 4/13/23   Lennox Cue, MD   Trulicity 4.5 QQ/0.3UL injection INJECT 4.5 MG UNDER THE SKIN ONCE A WEEK. 8/28/23   Historical Provider, MD     I have reviewed home medications with patient personally. Allergies:    Allergies   Allergen Reactions   • Atorvastatin Other (See Comments)     Pt unsure     • Percocet [Oxycodone-Acetaminophen] Nausea Only and GI Intolerance       Social History:     Marital Status:      Substance Use History:   Social History     Substance and Sexual Activity   Alcohol Use Yes    Comment: occasionally 1-2 per month      Social History     Tobacco Use   Smoking Status Former   • Packs/day: 1.00   • Years: 10.00   • Total pack years: 10.00   • Types: Cigarettes   • Quit date: 1978   • Years since quittin.6   Smokeless Tobacco Never     Social History     Substance and Sexual Activity   Drug Use No       Family History:    non-contributory    Physical Exam:     Vitals:   Blood Pressure: 125/66 (23 1530)  Pulse: 78 (23 1530)  Temperature: 97.5 °F (36.4 °C) (23 1135)  Temp Source: Temporal (23 1135)  Respirations: 20 (23 1530)  Weight - Scale: 63 kg (139 lb) (23 1135)  SpO2: 97 % (23 1530)    Physical Exam  Vitals and nursing note reviewed. Constitutional:       Appearance: Normal appearance. Comments: Male patient in bed, awake   HENT:      Head: Normocephalic and atraumatic. Right Ear: External ear normal.      Left Ear: External ear normal.      Nose: Nose normal. No congestion or rhinorrhea. Mouth/Throat:      Mouth: Mucous membranes are dry. Pharynx: Oropharynx is clear. No oropharyngeal exudate or posterior oropharyngeal erythema. Eyes:      General: No scleral icterus. Right eye: No discharge. Left eye: No discharge. Pupils: Pupils are equal, round, and reactive to light. Neck:      Vascular: No carotid bruit. Cardiovascular:      Rate and Rhythm: Normal rate and regular rhythm. Pulses: Normal pulses. Heart sounds: No murmur heard. No friction rub. No gallop. Pulmonary:      Effort: Pulmonary effort is normal. No respiratory distress. Breath sounds: Normal breath sounds. No stridor. No wheezing, rhonchi or rales. Abdominal:      General: Abdomen is flat. Bowel sounds are normal. There is distension. Palpations: Abdomen is soft. There is no mass. Tenderness: There is no abdominal tenderness. There is no guarding or rebound. Hernia: No hernia is present. Musculoskeletal:         General: No swelling, tenderness, deformity or signs of injury. Normal range of motion. Cervical back: Normal range of motion. No rigidity. No muscular tenderness. Lymphadenopathy:      Cervical: No cervical adenopathy. Skin:     General: Skin is warm and dry. Capillary Refill: Capillary refill takes less than 2 seconds. Coloration: Skin is not jaundiced or pale. Findings: No bruising or erythema. Neurological:      General: No focal deficit present. Mental Status: He is alert and oriented to person, place, and time. Mental status is at baseline. Cranial Nerves: No cranial nerve deficit. Sensory: No sensory deficit. Motor: No weakness. Coordination: Coordination normal.      Deep Tendon Reflexes: Reflexes normal.   Psychiatric:         Mood and Affect: Mood normal.         Behavior: Behavior normal.         Thought Content: Thought content normal.         Judgment: Judgment normal.             Additional Data:     Lab Results: I have personally reviewed pertinent reports. Results from last 7 days   Lab Units 09/29/23  1154   WBC Thousand/uL 5.60   HEMOGLOBIN g/dL 13.9   HEMATOCRIT % 39.9   PLATELETS Thousands/uL 201   NEUTROS PCT % 60   LYMPHS PCT % 24   MONOS PCT % 13*   EOS PCT % 2     Results from last 7 days   Lab Units 09/29/23  1154   SODIUM mmol/L 135   POTASSIUM mmol/L 4.7   CHLORIDE mmol/L 101   CO2 mmol/L 27   BUN mg/dL 20   CREATININE mg/dL 1.73*   ANION GAP mmol/L 7   CALCIUM mg/dL 9.8   ALBUMIN g/dL 4.5   TOTAL BILIRUBIN mg/dL 0.75   ALK PHOS U/L 34   ALT U/L 22   AST U/L 37   GLUCOSE RANDOM mg/dL 188*         Results from last 7 days   Lab Units 09/29/23  1158   POC GLUCOSE mg/dl 218*               Imaging: I have personally reviewed pertinent reports.       CT abdomen pelvis with contrast   Final Result by Dylan Cha MD (09/29 4067)      Choledocholithiasis without evidence of significant biliary obstruction. 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 Implanted cardiac device noted. Gallstones without cholecystitis      Prostatomegaly            Workstation performed: [id number]                 Allscripts / Epic Records Reviewed: Yes     ** Please Note: This note has been constructed using a voice recognition system.  **

## 2023-09-29 NOTE — PLAN OF CARE
Problem: GASTROINTESTINAL - ADULT  Goal: Minimal or absence of nausea and/or vomiting  Description: INTERVENTIONS:  - Administer IV fluids if ordered to ensure adequate hydration  - Maintain NPO status until nausea and vomiting are resolved  - Nasogastric tube if ordered  - Administer ordered antiemetic medications as needed  - Provide nonpharmacologic comfort measures as appropriate  - Advance diet as tolerated, if ordered  - Consider nutrition services referral to assist patient with adequate nutrition and appropriate food choices  Outcome: Progressing     Problem: METABOLIC, FLUID AND ELECTROLYTES - ADULT  Goal: Electrolytes maintained within normal limits  Description: INTERVENTIONS:  - Monitor labs and assess patient for signs and symptoms of electrolyte imbalances  - Administer electrolyte replacement as ordered  - Monitor response to electrolyte replacements, including repeat lab results as appropriate  - Instruct patient on fluid and nutrition as appropriate  Outcome: Progressing  Goal: Fluid balance maintained  Description: INTERVENTIONS:  - Monitor labs   - Monitor I/O and WT  - Instruct patient on fluid and nutrition as appropriate  - Assess for signs & symptoms of volume excess or deficit  Outcome: Progressing  Goal: Glucose maintained within target range  Description: INTERVENTIONS:  - Monitor Blood Glucose as ordered  - Assess for signs and symptoms of hyperglycemia and hypoglycemia  - Administer ordered medications to maintain glucose within target range  - Assess nutritional intake and initiate nutrition service referral as needed  Outcome: Progressing

## 2023-09-30 LAB
ANION GAP SERPL CALCULATED.3IONS-SCNC: 6 MMOL/L
BASOPHILS # BLD AUTO: 0.03 THOUSANDS/ÂΜL (ref 0–0.1)
BASOPHILS NFR BLD AUTO: 1 % (ref 0–1)
BUN SERPL-MCNC: 14 MG/DL (ref 5–25)
CALCIUM SERPL-MCNC: 8.8 MG/DL (ref 8.4–10.2)
CHLORIDE SERPL-SCNC: 105 MMOL/L (ref 96–108)
CO2 SERPL-SCNC: 24 MMOL/L (ref 21–32)
CREAT SERPL-MCNC: 1.38 MG/DL (ref 0.6–1.3)
EOSINOPHIL # BLD AUTO: 0.21 THOUSAND/ÂΜL (ref 0–0.61)
EOSINOPHIL NFR BLD AUTO: 4 % (ref 0–6)
ERYTHROCYTE [DISTWIDTH] IN BLOOD BY AUTOMATED COUNT: 15.5 % (ref 11.6–15.1)
EST. AVERAGE GLUCOSE BLD GHB EST-MCNC: 223 MG/DL
GFR SERPL CREATININE-BSD FRML MDRD: 47 ML/MIN/1.73SQ M
GLUCOSE SERPL-MCNC: 113 MG/DL (ref 65–140)
GLUCOSE SERPL-MCNC: 115 MG/DL (ref 65–140)
GLUCOSE SERPL-MCNC: 161 MG/DL (ref 65–140)
GLUCOSE SERPL-MCNC: 219 MG/DL (ref 65–140)
GLUCOSE SERPL-MCNC: 248 MG/DL (ref 65–140)
HBA1C MFR BLD: 9.4 %
HCT VFR BLD AUTO: 34.3 % (ref 36.5–49.3)
HGB BLD-MCNC: 11.9 G/DL (ref 12–17)
IMM GRANULOCYTES # BLD AUTO: 0.01 THOUSAND/UL (ref 0–0.2)
IMM GRANULOCYTES NFR BLD AUTO: 0 % (ref 0–2)
LYMPHOCYTES # BLD AUTO: 1.31 THOUSANDS/ÂΜL (ref 0.6–4.47)
LYMPHOCYTES NFR BLD AUTO: 23 % (ref 14–44)
MCH RBC QN AUTO: 37.9 PG (ref 26.8–34.3)
MCHC RBC AUTO-ENTMCNC: 34.7 G/DL (ref 31.4–37.4)
MCV RBC AUTO: 109 FL (ref 82–98)
MONOCYTES # BLD AUTO: 0.67 THOUSAND/ÂΜL (ref 0.17–1.22)
MONOCYTES NFR BLD AUTO: 12 % (ref 4–12)
NEUTROPHILS # BLD AUTO: 3.51 THOUSANDS/ÂΜL (ref 1.85–7.62)
NEUTS SEG NFR BLD AUTO: 60 % (ref 43–75)
NRBC BLD AUTO-RTO: 0 /100 WBCS
PLATELET # BLD AUTO: 165 THOUSANDS/UL (ref 149–390)
PMV BLD AUTO: 10 FL (ref 8.9–12.7)
POTASSIUM SERPL-SCNC: 4.1 MMOL/L (ref 3.5–5.3)
RBC # BLD AUTO: 3.14 MILLION/UL (ref 3.88–5.62)
SODIUM SERPL-SCNC: 135 MMOL/L (ref 135–147)
TSH SERPL DL<=0.05 MIU/L-ACNC: 1.49 UIU/ML (ref 0.45–4.5)
WBC # BLD AUTO: 5.74 THOUSAND/UL (ref 4.31–10.16)

## 2023-09-30 PROCEDURE — 80048 BASIC METABOLIC PNL TOTAL CA: CPT | Performed by: INTERNAL MEDICINE

## 2023-09-30 PROCEDURE — 82948 REAGENT STRIP/BLOOD GLUCOSE: CPT

## 2023-09-30 PROCEDURE — 99233 SBSQ HOSP IP/OBS HIGH 50: CPT | Performed by: INTERNAL MEDICINE

## 2023-09-30 PROCEDURE — 85025 COMPLETE CBC W/AUTO DIFF WBC: CPT | Performed by: INTERNAL MEDICINE

## 2023-09-30 RX ADMIN — ACETYLCYSTEINE 1200 MG: 200 INHALANT RESPIRATORY (INHALATION) at 18:13

## 2023-09-30 RX ADMIN — HEPARIN SODIUM 5000 UNITS: 5000 INJECTION INTRAVENOUS; SUBCUTANEOUS at 22:06

## 2023-09-30 RX ADMIN — ACETYLCYSTEINE 1200 MG: 200 INHALANT RESPIRATORY (INHALATION) at 09:53

## 2023-09-30 RX ADMIN — POLYETHYLENE GLYCOL 3350 17 G: 17 POWDER, FOR SOLUTION ORAL at 08:51

## 2023-09-30 RX ADMIN — ASPIRIN 81 MG: 81 TABLET, COATED ORAL at 08:47

## 2023-09-30 RX ADMIN — CLOPIDOGREL BISULFATE 75 MG: 75 TABLET ORAL at 08:47

## 2023-09-30 RX ADMIN — HEPARIN SODIUM 5000 UNITS: 5000 INJECTION INTRAVENOUS; SUBCUTANEOUS at 15:36

## 2023-09-30 RX ADMIN — LEVOTHYROXINE SODIUM 88 MCG: 88 TABLET ORAL at 08:47

## 2023-09-30 RX ADMIN — SODIUM CHLORIDE, SODIUM GLUCONATE, SODIUM ACETATE, POTASSIUM CHLORIDE AND MAGNESIUM CHLORIDE 75 ML/HR: 526; 502; 368; 37; 30 INJECTION, SOLUTION INTRAVENOUS at 18:18

## 2023-09-30 RX ADMIN — INSULIN LISPRO 2 UNITS: 100 INJECTION, SOLUTION INTRAVENOUS; SUBCUTANEOUS at 12:20

## 2023-09-30 RX ADMIN — HEPARIN SODIUM 5000 UNITS: 5000 INJECTION INTRAVENOUS; SUBCUTANEOUS at 04:43

## 2023-09-30 RX ADMIN — METOPROLOL TARTRATE 25 MG: 25 TABLET, FILM COATED ORAL at 22:06

## 2023-09-30 RX ADMIN — METOPROLOL TARTRATE 25 MG: 25 TABLET, FILM COATED ORAL at 08:47

## 2023-09-30 RX ADMIN — FINASTERIDE 5 MG: 5 TABLET, FILM COATED ORAL at 08:47

## 2023-09-30 RX ADMIN — TAMSULOSIN HYDROCHLORIDE 0.4 MG: 0.4 CAPSULE ORAL at 08:47

## 2023-09-30 RX ADMIN — SENNOSIDES AND DOCUSATE SODIUM 1 TABLET: 50; 8.6 TABLET ORAL at 08:47

## 2023-09-30 NOTE — UTILIZATION REVIEW
Initial Clinical Review    Observation 9/29 @ 1620 and changed to Inpatient on 9/30 @ 1556. Pt requiring continued stay for  JESSA, Constipation, Abnormal CT scan/ IVFs, workup and treat    Admission: Date/Time/Statement:   Admission Orders (From admission, onward)     Ordered        09/30/23 1556  Inpatient Admission  Once            09/29/23 1620  Place in Observation  Once                      Orders Placed This Encounter   Procedures   • Inpatient Admission     Standing Status:   Standing     Number of Occurrences:   1     Order Specific Question:   Level of Care     Answer:   Med Surg [16]     Order Specific Question:   Estimated length of stay     Answer:   More than 2 Midnights     Order Specific Question:   Certification     Answer:   I certify that inpatient services are medically necessary for this patient for a duration of greater than two midnights. See H&P and MD Progress Notes for additional information about the patient's course of treatment. ED Arrival Information     Expected   -    Arrival   9/29/2023 11:29    Acuity   Urgent            Means of arrival   Wheelchair    Escorted by   Family Member    Service   Hospitalist    Admission type   Emergency            Arrival complaint   Vomiting           Chief Complaint   Patient presents with   • Constipation     Pt reports he is suffering with chronic constipation. Pt states he has not a normal BM in 1 week. Pt reports mis abd pain, and vomiting      Initial Presentation: 81 y.o. male to ED presents for Constipation. No BM for a week, but passing gas. Pt c/o some abdominal discomfort but no overt pain. Also not drinking much water. Recently on Bactrim for UTI. Unfortunately was exposed to contrast at the ED for a CT of the abdomen which was done with contrast.  PMH for T2DM with CKD stage 3, CAD with stable angina pectoris, BPH and Physician deconditioning. Admit Observation level of care for JESSA, Constipation, Abnormal CT scan.  Creat 1.7, baseline of 1. Start IVFs and NAC given contrast exposure. Check PVR/ Bladder scan. Monitor BMP. Hold nephrotoxic agents including Bactrim. Continue Senokot. Add Miralax. Will leave a PRN Sop Suds enema if no BM tonight. CT Abd with contrast was done on admission for constipation. .. some incidental findings noted: Choledocholithiasis without evidence of significant biliary obstruction. 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 Implanted cardiac device noted. OP f/u    9/30 Changed to Inpatient status  Progress notes; Hold on miralax. 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 2D echo ordered. Creat slightly improved today down to 1.38. Continues on IVFs. ontinue to hold bactrim. BMP in am.   Pt able to have several BMs. Abd less distended. Date: 10/1   Day 3: Has surpassed a 2nd midnight with active treatments and services, which include Constipation/ JESSA, continues on IVFs and trend creat. Echo.        ED Triage Vitals   Temperature Pulse Respirations Blood Pressure SpO2   09/29/23 1135 09/29/23 1135 09/29/23 1135 09/29/23 1135 09/29/23 1135   97.5 °F (36.4 °C) 101 20 106/59 99 %      Temp Source Heart Rate Source Patient Position - Orthostatic VS BP Location FiO2 (%)   09/29/23 1135 09/29/23 1135 09/29/23 1135 09/29/23 1135 --   Temporal Monitor Sitting Left arm       Pain Score       09/29/23 1159       4          Wt Readings from Last 1 Encounters:   09/29/23 63 kg (139 lb)     Additional Vital Signs:   09/30/23 07:17:53 -- 70 -- 120/63 82 98 % -- --   09/29/23 21:24:21 99.2 °F (37.3 °C) 84 18 125/71 89 96 % None (Room air) --   09/29/23 18:15:43 -- 74 -- 127/74 92 98 % -- --   09/29/23 1800 -- 120   Abnormal  -- -- -- 97 % -- --   09/29/23 1745 -- 78 -- -- -- 97 % -- --   09/29/23 1730 -- 77 20 143/64 92 97 % -- --   09/29/23 1530 -- 78 20 125/66 90 97 % --      Pertinent Labs/Diagnostic Test Results:   CT abdomen pelvis with contrast   Final Result by Bari Dancer, MD (09/29 1457)      Choledocholithiasis without evidence of significant biliary obstruction. 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 Implanted cardiac device noted.       Gallstones without cholecystitis      Prostatomegaly            Workstation performed: [id number]               Results from last 7 days   Lab Units 09/30/23  0438 09/29/23  1154   WBC Thousand/uL 5.74 5.60   HEMOGLOBIN g/dL 11.9* 13.9   HEMATOCRIT % 34.3* 39.9   PLATELETS Thousands/uL 165 201   NEUTROS ABS Thousands/µL 3.51 3.38         Results from last 7 days   Lab Units 10/01/23  0443 09/30/23  0438 09/29/23  1154   SODIUM mmol/L 137 135 135   POTASSIUM mmol/L 4.1 4.1 4.7   CHLORIDE mmol/L 105 105 101   CO2 mmol/L 27 24 27   ANION GAP mmol/L 5 6 7   BUN mg/dL 13 14 20   CREATININE mg/dL 1.17 1.38* 1.73*   EGFR ml/min/1.73sq m 57 47 35   CALCIUM mg/dL 9.0 8.8 9.8     Results from last 7 days   Lab Units 09/29/23  1154   AST U/L 37   ALT U/L 22   ALK PHOS U/L 34   TOTAL PROTEIN g/dL 8.0   ALBUMIN g/dL 4.5   TOTAL BILIRUBIN mg/dL 0.75   BILIRUBIN DIRECT mg/dL 0.14     Results from last 7 days   Lab Units 10/01/23  0709 09/30/23  2040 09/30/23  1718 09/30/23  1108 09/30/23  0716 09/29/23 2016 09/29/23  1738 09/29/23  1158   POC GLUCOSE mg/dl 152* 219* 161* 248* 115 182* 115 218*     Results from last 7 days   Lab Units 10/01/23  0443 09/30/23  0438 09/29/23  1154   GLUCOSE RANDOM mg/dL 144* 113 188*         Results from last 7 days   Lab Units 09/29/23  1154   HEMOGLOBIN A1C % 9.4*   EAG mg/dl 223     BETA-HYDROXYBUTYRATE   Date Value Ref Range Status   06/30/2022 6.6 (H) <0.6 mmol/L Final          Results from last 7 days   Lab Units 09/29/23  1154   TSH 3RD GENERATON uIU/mL 1.493       ED Treatment:   Medication Administration from 09/29/2023 1129 to 09/29/2023 2636 Date/Time Order Dose Route Action     09/29/2023 1155 EDT sodium chloride 0.9 % bolus 1,000 mL 1,000 mL Intravenous New Bag     09/29/2023 1152 EDT polyethylene glycol (MIRALAX) packet 34 g 34 g Oral Given     09/29/2023 1304 EDT iohexol (OMNIPAQUE) 350 MG/ML injection (MULTI-DOSE) 100 mL 100 mL Intravenous Given     09/29/2023 1645 EDT heparin (porcine) subcutaneous injection 5,000 Units 5,000 Units Subcutaneous Given     09/29/2023 1645 EDT multi-electrolyte (PLASMALYTE-A/ISOLYTE-S PH 7.4) IV solution 75 mL/hr Intravenous New Bag     09/29/2023 1645 EDT polyethylene glycol (MIRALAX) packet 17 g 17 g Oral Given        Past Medical History:   Diagnosis Date   • Acquired cyst of kidney    • Anemia    • Benign paroxysmal vertigo, unspecified ear    • Cellulitis of leg    • Cervical spinal stenosis    • Chest pain    • Coronary atherosclerosis of native coronary artery    • Diabetes mellitus (HCC)    • Disease of thyroid gland    • Enlarged prostate    • Esophageal candidiasis (HCC)    • H/o COVID-19 06/30/2022   • Hematuria    • Herpes zoster    • Hyperlipidemia    • Hypertension    • Incomplete emptying of bladder    • Inflamed seborrheic keratosis    • Internal hemorrhoids    • Malignant neoplasm of skin of trunk    • Myocardial infarction [facility]) 2005   • Nonmelanoma skin cancer     LAST ASSESSED: 8/9/17   • 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 infarction    • Paroxysmal tachycardia (HCC)    • Shortness of breath    • Vitamin B deficiency      Present on Admission:  • BPH (benign prostatic hyperplasia)  • Coronary artery disease of native artery of native heart with stable angina pectoris ([address])  • Physical deconditioning  • Constipation  • Acute kidney injury ([address])  • Abnormal CT scan      Admitting Diagnosis: Dehydration [E86.0]  Vomiting [R11.10]  Constipation [K59.00]  JESSA (acute kidney injury) ([address]) [N17.9]  Age/Sex: 81 y.o. male     Admission Orders:  Scheduled Medications:  aspirin, 81 mg, Oral, Daily  clopidogrel, 75 mg, Oral, Daily  finasteride, 5 mg, Oral, Daily  heparin (porcine), 5,000 Units, Subcutaneous, Q8H [address]  insulin lispro, 1-5 Units, Subcutaneous, TID AC  levothyroxine, 88 mcg, Oral, Daily  metoprolol tartrate, 25 mg, Oral, Q12H GABRIEL  polyethylene glycol, 17 g, Oral, Daily  senna-docusate sodium, 1 tablet, Oral, Daily  tamsulosin, 0.4 mg, Oral, Daily      Continuous IV Infusions:  multi-electrolyte, 50 mL/hr, Intravenous, Continuous      PRN Meds:  ondansetron, 4 mg, Intravenous, Q6H PRN        None    Network Utilization Review Department  ATTENTION: Please call with any questions or concerns to [phone] and carefully listen to the prompts so that you are directed to the right person. All voicemails are confidential.  [facility] all requests for admission clinical reviews, approved or denied determinations and any other requests to dedicated fax number below belonging to the campus where the patient is receiving treatment.  List of dedicated fax numbers for the Facilities:  [place] DENIALS (Administrative/Medical Necessity) [phone] [address] [facility] (Maternity/NICU/Pediatrics) [phone]   [address] [place] [address] Drive [phone]   [facility] [address] [facility] [phone]   [facility] [address] [facility] [id number] [place] [address] [phone]   [address] [facility] [address] [facility] [address] [place] [address] [phone]

## 2023-09-30 NOTE — PLAN OF CARE
Problem: Potential for Falls  Goal: Patient will remain free of falls  Description: INTERVENTIONS:  - Educate patient/family on patient safety including physical limitations  - Instruct patient to call for assistance with activity   - Consult OT/PT to assist with strengthening/mobility   - Keep Call bell within reach=  - Keep bed low and locked with side rails adjusted as appropriate  - Keep care items and personal belongings within reach  - Initiate and maintain comfort rounds  - Make Fall Risk Sign visible to staff  - Offer Toil Hours, in advance of need  - Initiate/Maintain =alarm  - Obtain necessary fall risk management equipment: =  - Apply yellow socks and bracelet for high fall risk patients  - Consider moving patient to room near nurses station  9/30/2023 1101 by Jonathan Gan, RN  Outcome: Progressing  9/30/2023 1101 by Jonathan Gan, RN  Outcome: Progressing

## 2023-09-30 NOTE — NUTRITION
09/30/23 1330   Biochemical Data,Medical Tests, and Procedures   Biochemical Data/Medical Tests/Procedures Lab values reviewed; Meds reviewed   Labs (Comment) 9/30/23 glucose    Meds (Comment) levothyroxine, insulin   Nutrition-Focused Physical Exam   Nutrition-Focused Physical Exam Findings RN skin assessment reviewed; No edema documented; No skin issues documented   Medical-Related Concerns JESSA, CAD, type 2 diabetes, oropharyngeal dysphagia   Current PO Intake   Current Diet Order Regular diet, thin liquids   Current Meal Intake Adequate   Estimated calorie intake compared to estimated need Nutrient needs are met. PES Statement   Knowledge and Beliefs (1) Food- and nutrition-related knowledge deficit NB-1.1   Related to Other (Comment)  (type 2 diabetes)   As evidenced by: Per patient/family interview   Recommendations/Interventions   Malnutrition/BMI Present No   Summary HgA1c 9.4. Presents with constipation. Past medical history significant for JESSA, CAD, type 2 diabetes, oropharyngeal dysphagia. Weight history reviewed. No significant changes. No edema. No pressure areas. Constipation continues per chart review and flow sheet documentation. Prescribed a Regular diet, thin liquids. Met with pt at bedside. He reports his appetite has “not been very good lately” however notes improvement as he ate almost 100% at breakfast. Usually has 2 meals daily. Eats salads and cooked vegetables often. His daughter-in-law cooks and grocery shops. Does not add salt to food. Edentulous. Reports “sometimes yes” to chewing and swallowing difficulty. Opts for soft and bite sized foods. Wears a continuous glucose monitor. Reports blood sugars vary 170 - 200s mg/dL. Discussed Surgical Soft diet - pt is agreeable. Recommend obtaining ST evaluation. RD provided and discussed “Diabetes Label Reading Tips,” “Plate Method for Diabetes” handouts. Discussed nutrition label reading; serving size and total carbohydrate.  Discussed meal planning; ½ plate non-starchy carbohydrate foods, ¼ plate carbohydrate-rich foods, ¼ protein foods. Discouraged intake of sugar-sweetened beverages. Encouraged intake of water or non-sugar sweetened beverage options. Pt verbalizes understanding to all. RD to follow and address education needs PRN. Interventions/Recommendations Adjust diet order;Monitor I & O's;Speech/swallow evaluation   Education Assessment   Education Education initiated/ completed   Patient Nutrition Goals   Goal Tolerate PO diet; Comprehend education; Adequate intake

## 2023-10-01 ENCOUNTER — APPOINTMENT (INPATIENT)
Dept: NON INVASIVE DIAGNOSTICS | Facility: HOSPITAL | Age: 83
End: 2023-10-01
Payer: COMMERCIAL

## 2023-10-01 LAB
ANION GAP SERPL CALCULATED.3IONS-SCNC: 5 MMOL/L
AORTIC ROOT: 3.3 CM
APICAL FOUR CHAMBER EJECTION FRACTION: 31 %
ASCENDING AORTA: 3.2 CM
AV LVOT MEAN GRADIENT: 1 MMHG
AV LVOT PEAK GRADIENT: 1 MMHG
AV REGURGITATION PRESSURE HALF TIME: 614 MS
BUN SERPL-MCNC: 13 MG/DL (ref 5–25)
CALCIUM SERPL-MCNC: 9 MG/DL (ref 8.4–10.2)
CHLORIDE SERPL-SCNC: 105 MMOL/L (ref 96–108)
CO2 SERPL-SCNC: 27 MMOL/L (ref 21–32)
CREAT SERPL-MCNC: 1.17 MG/DL (ref 0.6–1.3)
DOP CALC LVOT PEAK VEL VTI: 13.43 CM
DOP CALC LVOT PEAK VEL: 0.58 M/S
E WAVE DECELERATION TIME: 204 MS
FRACTIONAL SHORTENING: 14 % (ref 28–44)
GFR SERPL CREATININE-BSD FRML MDRD: 57 ML/MIN/1.73SQ M
GLUCOSE SERPL-MCNC: 144 MG/DL (ref 65–140)
GLUCOSE SERPL-MCNC: 152 MG/DL (ref 65–140)
GLUCOSE SERPL-MCNC: 160 MG/DL (ref 65–140)
GLUCOSE SERPL-MCNC: 188 MG/DL (ref 65–140)
GLUCOSE SERPL-MCNC: 239 MG/DL (ref 65–140)
INTERVENTRICULAR SEPTUM IN DIASTOLE (PARASTERNAL SHORT AXIS VIEW): 0.9 CM
INTERVENTRICULAR SEPTUM: 0.9 CM (ref 0.6–1.1)
LAAS-AP2: 20.3 CM2
LAAS-AP4: 19.8 CM2
LEFT ATRIUM AREA SYSTOLE SINGLE PLANE A4C: 18.9 CM2
LEFT ATRIUM SIZE: 3.1 CM
LEFT ATRIUM VOLUME (MOD BIPLANE): 56 ML
LEFT INTERNAL DIMENSION IN SYSTOLE: 4.4 CM (ref 2.1–4)
LEFT VENTRICULAR INTERNAL DIMENSION IN DIASTOLE: 5.1 CM (ref 3.5–6)
LEFT VENTRICULAR POSTERIOR WALL IN END DIASTOLE: 0.9 CM
LEFT VENTRICULAR STROKE VOLUME: 40 ML
LVSV (TEICH): 40 ML
MV E'TISSUE VEL-SEP: 3 CM/S
MV PEAK A VEL: 0.87 M/S
MV PEAK E VEL: 42 CM/S
MV STENOSIS PRESSURE HALF TIME: 59 MS
MV VALVE AREA P 1/2 METHOD: 3.73 CM2
POTASSIUM SERPL-SCNC: 4.1 MMOL/L (ref 3.5–5.3)
RIGHT ATRIUM AREA SYSTOLE A4C: 17.8 CM2
RIGHT VENTRICLE ID DIMENSION: 3.2 CM
SL CV AV DECELERATION TIME RETROGRADE: 2117 MS
SL CV AV PEAK GRADIENT RETROGRADE: 45 MMHG
SL CV LEFT ATRIUM LENGTH A2C: 5.5 CM
SL CV LV EF: 35
SL CV PED ECHO LEFT VENTRICLE DIASTOLIC VOLUME (MOD BIPLANE) 2D: 127 ML
SL CV PED ECHO LEFT VENTRICLE SYSTOLIC VOLUME (MOD BIPLANE) 2D: 86 ML
SODIUM SERPL-SCNC: 137 MMOL/L (ref 135–147)
TR MAX PG: 23 MMHG
TR PEAK VELOCITY: 2.4 M/S
TRICUSPID ANNULAR PLANE SYSTOLIC EXCURSION: 1.6 CM
TRICUSPID VALVE PEAK REGURGITATION VELOCITY: 2.37 M/S

## 2023-10-01 PROCEDURE — 80048 BASIC METABOLIC PNL TOTAL CA: CPT | Performed by: INTERNAL MEDICINE

## 2023-10-01 PROCEDURE — 99232 SBSQ HOSP IP/OBS MODERATE 35: CPT | Performed by: INTERNAL MEDICINE

## 2023-10-01 PROCEDURE — 93306 TTE W/DOPPLER COMPLETE: CPT

## 2023-10-01 PROCEDURE — 93306 TTE W/DOPPLER COMPLETE: CPT | Performed by: INTERNAL MEDICINE

## 2023-10-01 PROCEDURE — 82948 REAGENT STRIP/BLOOD GLUCOSE: CPT

## 2023-10-01 RX ADMIN — LEVOTHYROXINE SODIUM 88 MCG: 88 TABLET ORAL at 09:00

## 2023-10-01 RX ADMIN — POLYETHYLENE GLYCOL 3350 17 G: 17 POWDER, FOR SOLUTION ORAL at 09:02

## 2023-10-01 RX ADMIN — METOPROLOL TARTRATE 25 MG: 25 TABLET, FILM COATED ORAL at 09:00

## 2023-10-01 RX ADMIN — HEPARIN SODIUM 5000 UNITS: 5000 INJECTION INTRAVENOUS; SUBCUTANEOUS at 22:21

## 2023-10-01 RX ADMIN — SODIUM CHLORIDE, SODIUM GLUCONATE, SODIUM ACETATE, POTASSIUM CHLORIDE AND MAGNESIUM CHLORIDE 50 ML/HR: 526; 502; 368; 37; 30 INJECTION, SOLUTION INTRAVENOUS at 11:55

## 2023-10-01 RX ADMIN — FINASTERIDE 5 MG: 5 TABLET, FILM COATED ORAL at 09:00

## 2023-10-01 RX ADMIN — ASPIRIN 81 MG: 81 TABLET, COATED ORAL at 09:00

## 2023-10-01 RX ADMIN — ACETYLCYSTEINE 1200 MG: 200 INHALANT RESPIRATORY (INHALATION) at 09:08

## 2023-10-01 RX ADMIN — HEPARIN SODIUM 5000 UNITS: 5000 INJECTION INTRAVENOUS; SUBCUTANEOUS at 04:47

## 2023-10-01 RX ADMIN — TAMSULOSIN HYDROCHLORIDE 0.4 MG: 0.4 CAPSULE ORAL at 09:00

## 2023-10-01 RX ADMIN — INSULIN LISPRO 1 UNITS: 100 INJECTION, SOLUTION INTRAVENOUS; SUBCUTANEOUS at 11:57

## 2023-10-01 RX ADMIN — SENNOSIDES AND DOCUSATE SODIUM 1 TABLET: 50; 8.6 TABLET ORAL at 09:00

## 2023-10-01 RX ADMIN — CLOPIDOGREL BISULFATE 75 MG: 75 TABLET ORAL at 09:00

## 2023-10-01 RX ADMIN — METOPROLOL TARTRATE 25 MG: 25 TABLET, FILM COATED ORAL at 22:15

## 2023-10-01 NOTE — ASSESSMENT & PLAN NOTE
· Continue finasteride  · Recently treated for UTI with Bactrim OP - took for like 3-4 days which should be enough for cystitis, currently no urinary symptoms and JESSA so will hold further ATBs-agree avoid Bactrim if necessary for future treatment.

## 2023-10-01 NOTE — ASSESSMENT & PLAN NOTE
· A CT scan of the abdomen with contrast was done on admission for constipation. .. some incidental findings noted:   Choledocholithiasis without evidence of significant biliary obstruction. 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 Implanted cardiac device noted. Gallstones without cholecystitis   Prostatomegaly  · Discussed case with cardiology left ventricular aneurysm should be evaluated in house. 2D echo ordered.   If this appears to be positive I will ask my teammates to have cardiology evaluate the patient further  · With regard to the GI findings no further testing necessary for gallstones or prostatomegaly unless patient has difficulty voiding.

## 2023-10-01 NOTE — PROGRESS NOTES
[address]  Progress Note  Name: Jackelyn Red  MRN: [id number]  Unit/Bed#: -01 I Date of Admission: 9/29/2023   Date of Service: 10/1/2023 I Hospital Day: 1    Assessment/Plan   * Abnormal CT scan  Assessment & Plan  · A CT scan of the abdomen with contrast was done on admission for constipation. .. some incidental findings noted:   Choledocholithiasis without evidence of significant biliary obstruction. 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 Implanted cardiac device noted. Gallstones without cholecystitis   Prostatomegaly  · Discussed case with cardiology left ventricular aneurysm should be evaluated in house. 2D echo ordered. If this appears to be positive I will ask my teammates to have cardiology evaluate the patient further- Echo pending  · With regard to the GI findings no further testing necessary for gallstones or prostatomegaly unless patient has difficulty voiding.       Acute kidney injury [facility])  Assessment & Plan  Presented with creatinine of 1.7, baseline 1.1. Slightly improved today down to 1.38. Agree recent Bactrim may be playing a role. Unfortunately was exposed to contrast at the ED for a CT of the abdomen which was done with contrast    · Start Isolyte 75cc/h and NAC given contrast exposure. · Hold nephrotoxic agents including Bactrim-agree  · Avoid further nephrotoxins-agree  · Check PVR/Bladder scan no reported urinary retention  · Resolved      Constipation  Assessment & Plan  · Ongoing somewhat chronic problem, was able to move his bowels several times. Abdomen less distended. · Continue Sennokot  · ADDed Miralax.   · Currently patient having BM      Physical deconditioning  Assessment & Plan  · PT/OT eval    Type 2 diabetes mellitus with stage 3 chronic kidney disease, with long-term current use of insulin, unspecified whether stage 3a or 3b CKD ([address])  Assessment & Plan  Lab Results   Component Value Date    HGBA1C 9.4 (H) 09/29/2023       Recent Labs     09/30/23  1108 09/30/23  1718 09/30/23  2040 10/01/23  0709   POCGLU 248* 161* 219* 152*       Blood Sugar Average: Last 72 hrs:  (P) 176.25     · BS slightly above goal   · Will cw ssi for now    Coronary artery disease of native artery of native heart with stable angina pectoris (HCC)  Assessment & Plan  · No CP currently  · Continue aspirin and Plavix  · Follow-up echo for finding of possible aneurysmal dilatation of the ventricle which was not present on my review of previous echo. BPH (benign prostatic hyperplasia)  Assessment & Plan  · Continue finasteride  · Recently treated for UTI with Bactrim OP - took for like 3-4 days which should be enough for cystitis, currently no urinary symptoms and JESSA so will hold further ATBs-agree avoid Bactrim if necessary for future treatment. VTE Pharmacologic Prophylaxis: VTE Score: 5 Moderate Risk (Score 3-4) - Pharmacological DVT Prophylaxis Ordered: heparin. Patient Centered Rounds: I performed bedside rounds with nursing staff today. Discussions with Specialists or Other Care Team Provider: Discussed with nursing. Education and Discussions with Family / Patient: will call son and or DIN. Total Time Spent on Date of Encounter in care of patient: 30 mins. This time was spent on one or more of the following: performing physical exam; counseling and coordination of care; obtaining or reviewing history; documenting in the medical record; reviewing/ordering tests, medications or procedures; communicating with other healthcare professionals and discussing with patient's family/caregivers. Current Length of Stay: 1 day(s)  Current Patient Status: Inpatient   Certification Statement: pending echo.    Discharge Plan: Anticipate discharge in 24-48 hrs to to be determined    Code Status: Level 1 - Full Code    Subjective:   Patient seen and examined   Feeling "fine"  Having several BM       Objective:     Vitals:   Temp (24hrs), Av.5 °F (36.9 °C), Min:97.9 °F (36.6 °C), Max:99 °F (37.2 °C)    Temp:  [97.9 °F (36.6 °C)-99 °F (37.2 °C)] 97.9 °F (36.6 °C)  HR:  [65-73] 73  Resp:  [18] 18  BP: (112-127)/(63-66) 112/63  SpO2:  [95 %-98 %] 98 %  Body mass index is 22.44 kg/m². Input and Output Summary (last 24 hours): Intake/Output Summary (Last 24 hours) at 10/1/2023 1135  Last data filed at 10/1/2023 0401  Gross per 24 hour   Intake --   Output 500 ml   Net -500 ml       Physical Exam:   Physical Exam  Constitutional:       General: He is not in acute distress. Appearance: He is ill-appearing (elderly, frail ). He is not toxic-appearing. HENT:      Head: Normocephalic. Mouth/Throat:      Mouth: Mucous membranes are moist.   Eyes:      Pupils: Pupils are equal, round, and reactive to light. Cardiovascular:      Rate and Rhythm: Normal rate. Pulmonary:      Effort: Pulmonary effort is normal.   Abdominal:      General: There is no distension. Palpations: There is no mass. Tenderness: There is no abdominal tenderness. There is no right CVA tenderness, left CVA tenderness, guarding or rebound. Hernia: No hernia is present. Musculoskeletal:         General: No swelling, tenderness, deformity or signs of injury. Right lower leg: No edema. Left lower leg: No edema. Skin:     Capillary Refill: Capillary refill takes less than 2 seconds. Coloration: Skin is pale. Skin is not jaundiced. Findings: No bruising, erythema or lesion. Neurological:      General: No focal deficit present. Mental Status: He is alert. Cranial Nerves: No cranial nerve deficit. Sensory: No sensory deficit. Motor: No weakness.       Coordination: Coordination normal.      Gait: Gait normal.      Deep Tendon Reflexes: Reflexes normal.   Psychiatric:         Mood and Affect: Mood normal.          Additional Data: Labs:  Results from last 7 days   Lab Units 09/30/23  0438   WBC Thousand/uL 5.74   HEMOGLOBIN g/dL 11.9*   HEMATOCRIT % 34.3*   PLATELETS Thousands/uL 165   NEUTROS PCT % 60   LYMPHS PCT % 23   MONOS PCT % 12   EOS PCT % 4     Results from last 7 days   Lab Units 10/01/23  0443 09/30/23  0438 09/29/23  1154   SODIUM mmol/L 137   < > 135   POTASSIUM mmol/L 4.1   < > 4.7   CHLORIDE mmol/L 105   < > 101   CO2 mmol/L 27   < > 27   BUN mg/dL 13   < > 20   CREATININE mg/dL 1.17   < > 1.73*   ANION GAP mmol/L 5   < > 7   CALCIUM mg/dL 9.0   < > 9.8   ALBUMIN g/dL  --   --  4.5   TOTAL BILIRUBIN mg/dL  --   --  0.75   ALK PHOS U/L  --   --  34   ALT U/L  --   --  22   AST U/L  --   --  37   GLUCOSE RANDOM mg/dL 144*   < > 188*    < > = values in this interval not displayed. Results from last 7 days   Lab Units 10/01/23  0709 09/30/23  2040 09/30/23  1718 09/30/23  1108 09/30/23  0716 09/29/23  2016 09/29/23  1738 09/29/23  1158   POC GLUCOSE mg/dl 152* 219* 161* 248* 115 182* 115 218*     Results from last 7 days   Lab Units 09/29/23  1154   HEMOGLOBIN A1C % 9.4*           Lines/Drains:  Invasive Devices     Peripheral Intravenous Line  Duration           Peripheral IV 05/15/23 Left Forearm 139 days    Peripheral IV 09/29/23 Right;Ventral (anterior) Forearm 1 day                      Imaging: No pertinent imaging reviewed.     Recent Cultures (last 7 days):         Last 24 Hours Medication List:   Current Facility-Administered Medications   Medication Dose Route Frequency Provider Last Rate   • aspirin  81 mg Oral Daily Wilton Choi MD     • clopidogrel  75 mg Oral Daily Wilton Choi MD     • finasteride  5 mg Oral Daily Wilton Choi MD     • heparin (porcine)  5,000 Units Subcutaneous [facility] Wilton Choi MD     • insulin lispro  1-5 Units Subcutaneous TID AC Wilton Choi MD     • levothyroxine  88 mcg Oral Daily Wilton Choi MD     • metoprolol tartrate  25 mg Oral Q12H [address], [place]     • multi-electrolyte  50 mL/hr Intravenous Continuous Flash Bryant MD 50 mL/hr (09/30/23 2207)   • ondansetron  4 mg Intravenous Q6H PRN Zackery Cool MD     • polyethylene glycol  17 g Oral Daily Zackery Cool MD     • senna-docusate sodium  1 tablet Oral Daily Zackery Cool MD     • tamsulosin  0.4 mg Oral Daily Zackery Cool MD          Today, Patient Was Seen By: Jerri Chen MD    **Please Note: This note may have been constructed using a voice recognition system. **

## 2023-10-01 NOTE — ASSESSMENT & PLAN NOTE
Lab Results   Component Value Date    HGBA1C 9.4 (H) 09/29/2023       Recent Labs     09/29/23  2016 09/30/23  0716 09/30/23  1108 09/30/23  1718   POCGLU 182* 115 248* 161*       Blood Sugar Average: Last 72 hrs:  (P) 884.7436410085184920     · Less than ideal blood glucose. Used to be on insulin but not currently . Hemoglobin A1c is 9.4  · Holding glipizide in the context of acute kidney injury.   · SSI for corrections, consider basilar insulin if patient not safe with oral hypoglycemics  · Monitor blood glucose

## 2023-10-01 NOTE — PROGRESS NOTES
[address]  Progress Note  Name: Prudencio Machado  MRN: [id number]  Unit/Bed#: -01 I Date of Admission: 9/29/2023   Date of Service: 9/30/2023 I Hospital Day: 0    Assessment/Plan   Constipation  Assessment & Plan  · Ongoing somewhat chronic problem, was able to move his bowels several times. Abdomen less distended. · Continue Sennokot  · ADDed Miralax. Hold if diarrhea starts avoid dehydration with diarrhea. Abnormal CT scan  Assessment & Plan  · A CT scan of the abdomen with contrast was done on admission for constipation. .. some incidental findings noted:   Choledocholithiasis without evidence of significant biliary obstruction. 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 Implanted cardiac device noted. Gallstones without cholecystitis   Prostatomegaly  · Discussed case with cardiology left ventricular aneurysm should be evaluated in house. 2D echo ordered. If this appears to be positive I will ask my teammates to have cardiology evaluate the patient further  · With regard to the GI findings no further testing necessary for gallstones or prostatomegaly unless patient has difficulty voiding.       * Acute kidney injury [facility])  Assessment & Plan  Presented with creatinine of 1.7, baseline 1.1. Slightly improved today down to 1.38. Agree recent Bactrim may be playing a role. Unfortunately was exposed to contrast at the ED for a CT of the abdomen which was done with contrast    · Start Isolyte 75cc/h and NAC given contrast exposure.   · Hold nephrotoxic agents including Bactrim-agree  · Avoid further nephrotoxins-agree  · Check PVR/Bladder scan no reported urinary retention  · Monitor BMP in a.m.    BPH (benign prostatic hyperplasia)  Assessment & Plan  · Continue finasteride  · Recently treated for UTI with Bactrim OP - took for like 3-4 days which should be enough for cystitis, currently no urinary symptoms and JESSA so will hold further ATBs-agree avoid Bactrim if necessary for future treatment. Type 2 diabetes mellitus with stage 3 chronic kidney disease, with long-term current use of insulin, unspecified whether stage 3a or 3b CKD [facility])  Assessment & Plan  Lab Results   Component Value Date    HGBA1C 9.4 (H) 09/29/2023       Recent Labs     09/29/23 2016 09/30/23  0716 09/30/23  1108 09/30/23  1718   POCGLU 182* 115 248* 161*       Blood Sugar Average: Last 72 hrs:  (P) [id number]     · Less than ideal blood glucose. Used to be on insulin but not currently . Hemoglobin A1c is 9.4  · Holding glipizide in the context of acute kidney injury. · SSI for corrections, consider basilar insulin if patient not safe with oral hypoglycemics  · Monitor blood glucose    Coronary artery disease of native artery of native heart with stable angina pectoris (HCC)  Assessment & Plan  · No CP currently  · Continue aspirin and Plavix  · Follow-up echo for finding of possible aneurysmal dilatation of the ventricle which was not present on my review of previous echo. Physical deconditioning  Assessment & Plan  · PT/OT eval             VTE Pharmacologic Prophylaxis: VTE Score: 5 High Risk (Score >/= 5) - Pharmacological DVT Prophylaxis Ordered: heparin. Sequential Compression Devices Ordered. Patient Centered Rounds: I performed bedside rounds with nursing staff today. Discussions with Specialists or Other Care Team Provider: None    Education and Discussions with Family / Patient: Updated  (son) via phone. and daughter in law    Total Time Spent on Date of Encounter in care of patient: 40 mins.  This time was spent on one or more of the following: performing physical exam; counseling and coordination of care; obtaining or reviewing history; documenting in the medical record; reviewing/ordering tests, medications or procedures; communicating with other healthcare professionals and discussing with patient's family/caregivers. Current Length of Stay: 0 day(s)  Current Patient Status: Inpatient   Certification Statement: The patient will continue to require additional inpatient hospital stay due to need to assess the finding of possible ventricular aneurysm. And to trend renal function. Discharge Plan: Anticipate discharge in 24-48 hrs to home. Code Status: Level 1 - Full Code    Subjective:   Patient states feeling much better had multiple bowel movements. Spoke with patient's son and daughter-in-law via phone and they thought he looked much better. Patient is understanding and agreeable to staying for further evaluation of the aneurysm noted on CT scan    Objective:     Vitals:   Temp (24hrs), Av.1 °F (37.3 °C), Min:99 °F (37.2 °C), Max:99.2 °F (37.3 °C)    Temp:  [99 °F (37.2 °C)-99.2 °F (37.3 °C)] 99 °F (37.2 °C)  HR:  [70-84] 70  Resp:  [18] 18  BP: (120-125)/(63-71) 125/66  SpO2:  [96 %-98 %] 98 %  Body mass index is 22.44 kg/m². Input and Output Summary (last 24 hours):      Intake/Output Summary (Last 24 hours) at 2023  Last data filed at 2023 0100  Gross per 24 hour   Intake --   Output 175 ml   Net -175 ml       Physical Exam:   Physical Exam generally well-developed but thin elderly male in no acute distress normocephalic atraumatic pupils equal round and reactive to light extraocular muscles intact mucous membranes are fairly moist no oral lesions are noted chest    Additional Data:     Labs:  Results from last 7 days   Lab Units 23  0438   WBC Thousand/uL 5.74   HEMOGLOBIN g/dL 11.9*   HEMATOCRIT % 34.3*   PLATELETS Thousands/uL 165   NEUTROS PCT % 60   LYMPHS PCT % 23   MONOS PCT % 12   EOS PCT % 4     Results from last 7 days   Lab Units 23  0438 23  1154   SODIUM mmol/L 135 135   POTASSIUM mmol/L 4.1 4.7   CHLORIDE mmol/L 105 101   CO2 mmol/L 24 27   BUN mg/dL 14 20   CREATININE mg/dL 1.38* 1.73*   ANION GAP mmol/L 6 7   CALCIUM mg/dL 8.8 9.8 ALBUMIN g/dL  --  4.5   TOTAL BILIRUBIN mg/dL  --  0.75   ALK PHOS U/L  --  34   ALT U/L  --  22   AST U/L  --  37   GLUCOSE RANDOM mg/dL 113 188*         Results from last 7 days   Lab Units 09/30/23  1718 09/30/23  1108 09/30/23  0716 09/29/23 2016 09/29/23  1738 09/29/23  1158   POC GLUCOSE mg/dl 161* 248* 115 182* 115 218*     Results from last 7 days   Lab Units 09/29/23  1154   HEMOGLOBIN A1C % 9.4*           Lines/Drains:  Invasive Devices     Peripheral Intravenous Line  Duration           Peripheral IV 05/15/23 Left Forearm 138 days    Peripheral IV 09/29/23 Right;Ventral (anterior) Forearm 1 day                      Imaging: Personally reviewed the following imaging: chest CT scan    Recent Cultures (last 7 days):         Last 24 Hours Medication List:   Current Facility-Administered Medications   Medication Dose Route Frequency Provider Last Rate   • acetylcysteine  1,200 mg Oral BID Karlene Jack MD     • aspirin  81 mg Oral Daily Karlene Jack MD     • clopidogrel  75 mg Oral Daily Karlene Jack MD     • finasteride  5 mg Oral Daily Karlene Jack MD     • heparin (porcine)  5,000 Units Subcutaneous [facility] Karlene Jack MD     • insulin lispro  1-5 Units Subcutaneous TID AC Karlene Jack MD     • levothyroxine  88 mcg Oral Daily Karlene Jack MD     • metoprolol tartrate  25 mg Oral Q12H [address], [place]     • multi-electrolyte  75 mL/hr Intravenous Continuous Karleen Jack MD 75 mL/hr (09/30/23 1818)   • ondansetron  4 mg Intravenous Q6H PRN Karlene Jack MD     • polyethylene glycol  17 g Oral Daily Karlene Jack MD     • senna-docusate sodium  1 tablet Oral Daily Karlene Jack MD     • tamsulosin  0.4 mg Oral Daily Karlene Jack MD          Today, Patient Was Seen By: Tami Henriquez MD    **Please Note: This note may have been constructed using a voice recognition system. **

## 2023-10-01 NOTE — ASSESSMENT & PLAN NOTE
· A CT scan of the abdomen with contrast was done on admission for constipation. .. some incidental findings noted:   Choledocholithiasis without evidence of significant biliary obstruction. 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 Implanted cardiac device noted. Gallstones without cholecystitis   Prostatomegaly  · Discussed case with cardiology left ventricular aneurysm should be evaluated in house. 2D echo ordered.   If this appears to be positive I will ask my teammates to have cardiology evaluate the patient further- Echo pending  · With regard to the GI findings no further testing necessary for gallstones or prostatomegaly unless patient has difficulty voiding.

## 2023-10-01 NOTE — ASSESSMENT & PLAN NOTE
· No CP currently  · Continue aspirin and Plavix  · Follow-up echo for finding of possible aneurysmal dilatation of the ventricle which was not present on my review of previous echo.

## 2023-10-01 NOTE — ASSESSMENT & PLAN NOTE
· Ongoing somewhat chronic problem, was able to move his bowels several times. Abdomen less distended. · Continue Sennokot  · ADDed Miralax.   · Currently patient having BM

## 2023-10-01 NOTE — PLAN OF CARE
Problem: Potential for Falls  Goal: Patient will remain free of falls  Description: INTERVENTIOS:  - Educate patient/family on patient safety including physical limitations  - Instruct patient to call for assistance with activity   - Consult OT/PT to assist with strengthening/mobility   - Keep Call bell within reach  - Keep bed low and locked with side rails adjusted as appropriate  - Keep care items and personal belongings within reach  - Initiate and maintain comfort rounds  - Make Fall Risk Sign visible to staff  - Offer Toileting every  Hours, in advance of need  - Initiate/Maintain alarm  - Obtain necessary fall risk management equipment:  - Apply yellow socks and bracelet for high fall risk patients  - Consider moving patient to room near nurses station  Outcome: Progressing

## 2023-10-01 NOTE — ASSESSMENT & PLAN NOTE
Presented with creatinine of 1.7, baseline 1.1. Slightly improved today down to 1.38. Agree recent Bactrim may be playing a role. Unfortunately was exposed to contrast at the ED for a CT of the abdomen which was done with contrast    · Start Isolyte 75cc/h and NAC given contrast exposure.   · Hold nephrotoxic agents including Bactrim-agree  · Avoid further nephrotoxins-agree  · Check PVR/Bladder scan no reported urinary retention  · Resolved

## 2023-10-01 NOTE — ASSESSMENT & PLAN NOTE
Lab Results   Component Value Date    HGBA1C 9.4 (H) 09/29/2023       Recent Labs     09/30/23  1108 09/30/23  1718 09/30/23  2040 10/01/23  0709   POCGLU 248* 161* 219* 152*       Blood Sugar Average: Last 72 hrs:  (P) 176.25     · BS slightly above goal   · Will cw ssi for now

## 2023-10-01 NOTE — ASSESSMENT & PLAN NOTE
Presented with creatinine of 1.7, baseline 1.1. Slightly improved today down to 1.38. Agree recent Bactrim may be playing a role. Unfortunately was exposed to contrast at the ED for a CT of the abdomen which was done with contrast    · Start Isolyte 75cc/h and NAC given contrast exposure. · Hold nephrotoxic agents including Bactrim-agree  · Avoid further nephrotoxins-agree  · Check PVR/Bladder scan no reported urinary retention  · Monitor BMP in a.m.

## 2023-10-01 NOTE — ASSESSMENT & PLAN NOTE
· Ongoing somewhat chronic problem, was able to move his bowels several times. Abdomen less distended. · Continue Sennokot  · ADDed Miralax. Hold if diarrhea starts avoid dehydration with diarrhea.

## 2023-10-02 VITALS
SYSTOLIC BLOOD PRESSURE: 123 MMHG | RESPIRATION RATE: 18 BRPM | HEART RATE: 67 BPM | OXYGEN SATURATION: 98 % | TEMPERATURE: 97.5 F | DIASTOLIC BLOOD PRESSURE: 61 MMHG | HEIGHT: 66 IN | BODY MASS INDEX: 22.34 KG/M2 | WEIGHT: 139 LBS

## 2023-10-02 LAB
ALBUMIN SERPL BCP-MCNC: 3.7 G/DL (ref 3.5–5)
ALP SERPL-CCNC: 33 U/L (ref 34–104)
ALT SERPL W P-5'-P-CCNC: 13 U/L (ref 7–52)
ANION GAP SERPL CALCULATED.3IONS-SCNC: 6 MMOL/L
AST SERPL W P-5'-P-CCNC: 18 U/L (ref 13–39)
BASOPHILS # BLD AUTO: 0.03 THOUSANDS/ÂΜL (ref 0–0.1)
BASOPHILS NFR BLD AUTO: 1 % (ref 0–1)
BILIRUB SERPL-MCNC: 0.75 MG/DL (ref 0.2–1)
BUN SERPL-MCNC: 14 MG/DL (ref 5–25)
CALCIUM SERPL-MCNC: 9.2 MG/DL (ref 8.4–10.2)
CHLORIDE SERPL-SCNC: 106 MMOL/L (ref 96–108)
CO2 SERPL-SCNC: 24 MMOL/L (ref 21–32)
CREAT SERPL-MCNC: 1.03 MG/DL (ref 0.6–1.3)
EOSINOPHIL # BLD AUTO: 0.22 THOUSAND/ÂΜL (ref 0–0.61)
EOSINOPHIL NFR BLD AUTO: 6 % (ref 0–6)
ERYTHROCYTE [DISTWIDTH] IN BLOOD BY AUTOMATED COUNT: 15.4 % (ref 11.6–15.1)
FERRITIN SERPL-MCNC: 191 NG/ML (ref 24–336)
FOLATE SERPL-MCNC: >22.3 NG/ML
GFR SERPL CREATININE-BSD FRML MDRD: 67 ML/MIN/1.73SQ M
GLUCOSE SERPL-MCNC: 162 MG/DL (ref 65–140)
GLUCOSE SERPL-MCNC: 211 MG/DL (ref 65–140)
GLUCOSE SERPL-MCNC: 214 MG/DL (ref 65–140)
HCT VFR BLD AUTO: 34.7 % (ref 36.5–49.3)
HGB BLD-MCNC: 11.8 G/DL (ref 12–17)
IMM GRANULOCYTES # BLD AUTO: 0.01 THOUSAND/UL (ref 0–0.2)
IMM GRANULOCYTES NFR BLD AUTO: 0 % (ref 0–2)
IRON SATN MFR SERPL: 55 % (ref 15–50)
IRON SERPL-MCNC: 138 UG/DL (ref 50–212)
LYMPHOCYTES # BLD AUTO: 1.38 THOUSANDS/ÂΜL (ref 0.6–4.47)
LYMPHOCYTES NFR BLD AUTO: 38 % (ref 14–44)
MCH RBC QN AUTO: 37.5 PG (ref 26.8–34.3)
MCHC RBC AUTO-ENTMCNC: 34 G/DL (ref 31.4–37.4)
MCV RBC AUTO: 110 FL (ref 82–98)
MONOCYTES # BLD AUTO: 0.42 THOUSAND/ÂΜL (ref 0.17–1.22)
MONOCYTES NFR BLD AUTO: 11 % (ref 4–12)
NEUTROPHILS # BLD AUTO: 1.61 THOUSANDS/ÂΜL (ref 1.85–7.62)
NEUTS SEG NFR BLD AUTO: 44 % (ref 43–75)
NRBC BLD AUTO-RTO: 0 /100 WBCS
PLATELET # BLD AUTO: 158 THOUSANDS/UL (ref 149–390)
PMV BLD AUTO: 9.7 FL (ref 8.9–12.7)
POTASSIUM SERPL-SCNC: 4.1 MMOL/L (ref 3.5–5.3)
PROT SERPL-MCNC: 6.2 G/DL (ref 6.4–8.4)
RBC # BLD AUTO: 3.15 MILLION/UL (ref 3.88–5.62)
SODIUM SERPL-SCNC: 136 MMOL/L (ref 135–147)
TIBC SERPL-MCNC: 252 UG/DL (ref 250–450)
UIBC SERPL-MCNC: 114 UG/DL (ref 155–355)
VIT B12 SERPL-MCNC: 229 PG/ML (ref 180–914)
WBC # BLD AUTO: 3.67 THOUSAND/UL (ref 4.31–10.16)

## 2023-10-02 PROCEDURE — 82607 VITAMIN B-12: CPT | Performed by: INTERNAL MEDICINE

## 2023-10-02 PROCEDURE — 82948 REAGENT STRIP/BLOOD GLUCOSE: CPT

## 2023-10-02 PROCEDURE — 85025 COMPLETE CBC W/AUTO DIFF WBC: CPT | Performed by: INTERNAL MEDICINE

## 2023-10-02 PROCEDURE — 83540 ASSAY OF IRON: CPT | Performed by: INTERNAL MEDICINE

## 2023-10-02 PROCEDURE — 82746 ASSAY OF FOLIC ACID SERUM: CPT | Performed by: INTERNAL MEDICINE

## 2023-10-02 PROCEDURE — 82728 ASSAY OF FERRITIN: CPT | Performed by: INTERNAL MEDICINE

## 2023-10-02 PROCEDURE — 80053 COMPREHEN METABOLIC PANEL: CPT | Performed by: INTERNAL MEDICINE

## 2023-10-02 PROCEDURE — 83550 IRON BINDING TEST: CPT | Performed by: INTERNAL MEDICINE

## 2023-10-02 PROCEDURE — 99239 HOSP IP/OBS DSCHRG MGMT >30: CPT | Performed by: INTERNAL MEDICINE

## 2023-10-02 RX ORDER — POLYETHYLENE GLYCOL 3350 17 G/17G
17 POWDER, FOR SOLUTION ORAL DAILY PRN
Qty: 3 EACH | Refills: 0 | Status: SHIPPED | OUTPATIENT
Start: 2023-10-02 | End: 2023-10-05

## 2023-10-02 RX ADMIN — SODIUM CHLORIDE, SODIUM GLUCONATE, SODIUM ACETATE, POTASSIUM CHLORIDE AND MAGNESIUM CHLORIDE 50 ML/HR: 526; 502; 368; 37; 30 INJECTION, SOLUTION INTRAVENOUS at 05:16

## 2023-10-02 RX ADMIN — HEPARIN SODIUM 5000 UNITS: 5000 INJECTION INTRAVENOUS; SUBCUTANEOUS at 05:18

## 2023-10-02 RX ADMIN — TAMSULOSIN HYDROCHLORIDE 0.4 MG: 0.4 CAPSULE ORAL at 09:00

## 2023-10-02 RX ADMIN — FINASTERIDE 5 MG: 5 TABLET, FILM COATED ORAL at 09:00

## 2023-10-02 RX ADMIN — METOPROLOL TARTRATE 25 MG: 25 TABLET, FILM COATED ORAL at 10:19

## 2023-10-02 RX ADMIN — ASPIRIN 81 MG: 81 TABLET, COATED ORAL at 09:00

## 2023-10-02 RX ADMIN — INSULIN LISPRO 1 UNITS: 100 INJECTION, SOLUTION INTRAVENOUS; SUBCUTANEOUS at 12:08

## 2023-10-02 RX ADMIN — LEVOTHYROXINE SODIUM 88 MCG: 88 TABLET ORAL at 09:00

## 2023-10-02 RX ADMIN — HEPARIN SODIUM 5000 UNITS: 5000 INJECTION INTRAVENOUS; SUBCUTANEOUS at 15:00

## 2023-10-02 RX ADMIN — CLOPIDOGREL BISULFATE 75 MG: 75 TABLET ORAL at 09:00

## 2023-10-02 RX ADMIN — POLYETHYLENE GLYCOL 3350 17 G: 17 POWDER, FOR SOLUTION ORAL at 09:00

## 2023-10-02 RX ADMIN — SENNOSIDES AND DOCUSATE SODIUM 1 TABLET: 50; 8.6 TABLET ORAL at 09:00

## 2023-10-02 NOTE — PLAN OF CARE
Problem: Potential for Falls  Goal: Patient will remain free of falls  Description: INTERVENTIONS:  - Educate patient/family on patient safety including physical limitations  - Instruct patient to call for assistance with activity   - Consult OT/PT to assist with strengthening/mobility   - Keep Call bell within reach  - Keep bed low and locked with side rails adjusted as appropriate  - Keep care items and personal belongings within reach  - Initiate and maintain comfort rounds  - Make Fall Risk Sign visible to staff  - Offer Toileting every 4 Hours, in advance of need  - Initiate/Maintain bed/chair alarm  - Obtain necessary fall risk management equipment:   - Apply yellow socks and bracelet for high fall risk patients  - Consider moving patient to room near nurses station  Outcome: Progressing     Problem: MOBILITY - ADULT  Goal: Maintain or return to baseline ADL function  Description: INTERVENTIONS:  -  Assess patient's ability to carry out ADLs; assess patient's baseline for ADL function and identify physical deficits which impact ability to perform ADLs (bathing, care of mouth/teeth, toileting, grooming, dressing, etc.)  - Assess/evaluate cause of self-care deficits   - Assess range of motion  - Assess patient's mobility; develop plan if impaired  - Assess patient's need for assistive devices and provide as appropriate  - Encourage maximum independence but intervene and supervise when necessary  - Involve family in performance of ADLs  - Assess for home care needs following discharge   - Consider OT consult to assist with ADL evaluation and planning for discharge  - Provide patient education as appropriate  Outcome: Progressing  Goal: Maintains/Returns to pre admission functional level  Description: INTERVENTIONS:  - Perform BMAT or MOVE assessment daily.   - Set and communicate daily mobility goal to care team and patient/family/caregiver.    - Collaborate with rehabilitation services on mobility goals if consulted  - Perform Range of Motion 2 times a day. - Reposition patient every 2 hours.   - Dangle patient 3 times a day  - Stand patient 3 times a day  - Ambulate patient 3 times a day  - Out of bed to chair 3 times a day   - Out of bed for meals 3 times a day  - Out of bed for toileting  - Record patient progress and toleration of activity level   Outcome: Progressing     Problem: GASTROINTESTINAL - ADULT  Goal: Minimal or absence of nausea and/or vomiting  Description: INTERVENTIONS:  - Administer IV fluids if ordered to ensure adequate hydration  - Maintain NPO status until nausea and vomiting are resolved  - Nasogastric tube if ordered  - Administer ordered antiemetic medications as needed  - Provide nonpharmacologic comfort measures as appropriate  - Advance diet as tolerated, if ordered  - Consider nutrition services referral to assist patient with adequate nutrition and appropriate food choices  Outcome: Progressing     Problem: METABOLIC, FLUID AND ELECTROLYTES - ADULT  Goal: Electrolytes maintained within normal limits  Description: INTERVENTIONS:  - Monitor labs and assess patient for signs and symptoms of electrolyte imbalances  - Administer electrolyte replacement as ordered  - Monitor response to electrolyte replacements, including repeat lab results as appropriate  - Instruct patient on fluid and nutrition as appropriate  Outcome: Progressing  Goal: Fluid balance maintained  Description: INTERVENTIONS:  - Monitor labs   - Monitor I/O and WT  - Instruct patient on fluid and nutrition as appropriate  - Assess for signs & symptoms of volume excess or deficit  Outcome: Progressing  Goal: Glucose maintained within target range  Description: INTERVENTIONS:  - Monitor Blood Glucose as ordered  - Assess for signs and symptoms of hyperglycemia and hypoglycemia  - Administer ordered medications to maintain glucose within target range  - Assess nutritional intake and initiate nutrition service referral as needed  Outcome: Progressing     Problem: HEMATOLOGIC - ADULT  Goal: Maintains hematologic stability  Description: INTERVENTIONS  - Assess for signs and symptoms of bleeding or hemorrhage  - Monitor labs  - Administer supportive blood products/factors as ordered and appropriate  Outcome: Progressing

## 2023-10-02 NOTE — PLAN OF CARE
Problem: Potential for Falls  Goal: Patient will remain free of falls  Description: INTERVENTIONS:  - Educate patient/family on patient safety including physical limitations  - Instruct patient to call for assistance with activity   - Consult OT/PT to assist with strengthening/mobility   - Keep Call bell within reach  - Keep bed low and locked with side rails adjusted as appropriate  - Keep care items and personal belongings within reach  - Initiate and maintain comfort rounds  - Make Fall Risk Sign visible to staff  - Offer Toileting every  Hours, in advance of need  - Initiate/Maintain   alarm  - Obtain necessary fall risk management equipment:     - Apply yellow socks and bracelet for high fall risk patients  - Consider moving patient to room near nurses station  Outcome: Progressing     Problem: MOBILITY - ADULT  Goal: Maintain or return to baseline ADL function  Description: INTERVENTIONS:  -  Assess patient's ability to carry out ADLs; assess patient's baseline for ADL function and identify physical deficits which impact ability to perform ADLs (bathing, care of mouth/teeth, toileting, grooming, dressing, etc.)  - Assess/evaluate cause of self-care deficits   - Assess range of motion  - Assess patient's mobility; develop plan if impaired  - Assess patient's need for assistive devices and provide as appropriate  - Encourage maximum independence but intervene and supervise when necessary  - Involve family in performance of ADLs  - Assess for home care needs following discharge   - Consider OT consult to assist with ADL evaluation and planning for discharge  - Provide patient education as appropriate  Outcome: Progressing  Goal: Maintains/Returns to pre admission functional level  Description: INTERVENTIONS:  - Perform BMAT or MOVE assessment daily.   - Set and communicate daily mobility goal to care team and patient/family/caregiver.    - Collaborate with rehabilitation services on mobility goals if consulted  - Perform Range of Motion    times a day. - Reposition patient every    hours.   - Dangle patient      times a day  - Stand patient    times a day  - Ambulate patient    times a day  - Out of bed to chair    times a day   - Out of bed for meals    times a day  - Out of bed for toileting  - Record patient progress and toleration of activity level   Outcome: Progressing     Problem: GASTROINTESTINAL - ADULT  Goal: Minimal or absence of nausea and/or vomiting  Description: INTERVENTIONS:  - Administer IV fluids if ordered to ensure adequate hydration  - Maintain NPO status until nausea and vomiting are resolved  - Nasogastric tube if ordered  - Administer ordered antiemetic medications as needed  - Provide nonpharmacologic comfort measures as appropriate  - Advance diet as tolerated, if ordered  - Consider nutrition services referral to assist patient with adequate nutrition and appropriate food choices  Outcome: Progressing     Problem: METABOLIC, FLUID AND ELECTROLYTES - ADULT  Goal: Electrolytes maintained within normal limits  Description: INTERVENTIONS:  - Monitor labs and assess patient for signs and symptoms of electrolyte imbalances  - Administer electrolyte replacement as ordered  - Monitor response to electrolyte replacements, including repeat lab results as appropriate  - Instruct patient on fluid and nutrition as appropriate  Outcome: Progressing  Goal: Fluid balance maintained  Description: INTERVENTIONS:  - Monitor labs   - Monitor I/O and WT  - Instruct patient on fluid and nutrition as appropriate  - Assess for signs & symptoms of volume excess or deficit  Outcome: Progressing  Goal: Glucose maintained within target range  Description: INTERVENTIONS:  - Monitor Blood Glucose as ordered  - Assess for signs and symptoms of hyperglycemia and hypoglycemia  - Administer ordered medications to maintain glucose within target range  - Assess nutritional intake and initiate nutrition service referral as needed  Outcome: Progressing     Problem: HEMATOLOGIC - ADULT  Goal: Maintains hematologic stability  Description: INTERVENTIONS  - Assess for signs and symptoms of bleeding or hemorrhage  - Monitor labs  - Administer supportive blood products/factors as ordered and appropriate  Outcome: Progressing

## 2023-10-02 NOTE — ASSESSMENT & PLAN NOTE
· Ongoing somewhat chronic problem, was able to move his bowels several times. Abdomen less distended. · Continue Sennokot  · ADDed Miralax.   · Currently patient having BM regularly

## 2023-10-02 NOTE — ASSESSMENT & PLAN NOTE
· A CT scan of the abdomen with contrast was done on admission for constipation. .. some incidental findings noted:   Choledocholithiasis without evidence of significant biliary obstruction. 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 Implanted cardiac device noted. Gallstones without cholecystitis   Prostatomegaly  · Discussed case with cardiology left ventricular aneurysm should be evaluated in house. 2D echo ordered. If this appears to be positive I will ask my teammates to have cardiology evaluate the patient further- Echo pending  · With regard to the GI findings no further testing necessary for gallstones or prostatomegaly unless patient has difficulty voiding. · Discussed with nursing today checked PVR and zero mL on bladder scan prior to voiding.    · Can follow up with PCP and could have surgical referral if any symptoms.

## 2023-10-02 NOTE — ASSESSMENT & PLAN NOTE
· Continue finasteride  · Recently treated for UTI with Bactrim OP - took for like 3-4 days which should be enough for cystitis, currently no urinary symptoms and JESSA so will hold further ATBs-agree avoid Bactrim if necessary for future treatment. · Discussed with nursing no urinary retention.

## 2023-10-02 NOTE — ASSESSMENT & PLAN NOTE
Lab Results   Component Value Date    HGBA1C 9.4 (H) 09/29/2023       Recent Labs     10/01/23  1142 10/01/23  1558 10/01/23  2101 10/02/23  1051   POCGLU 188* 160* 239* 214*       Blood Sugar Average: Last 72 hrs:  (P) 184.25     · BS slightly above goal   · Will cw ssi for now

## 2023-10-02 NOTE — QUICK NOTE
Called daughter in law - shanti - updated them.
Called daughter in law - updated her.
06-Mar-2019 13:33

## 2023-10-02 NOTE — ASSESSMENT & PLAN NOTE
· No CP currently  · Continue aspirin and Plavix  · Echo does not show any aneurysm   · Discussed with cardiology no further work up recommended by them

## 2023-10-02 NOTE — DISCHARGE SUMMARY
[address]  Discharge- Hayley Gould 1940, 81 y.o. male MRN: [id number]  Unit/Bed#: -[facility] Encounter: [id number]  Primary Care Provider: Denise Garcia MD   Date and time admitted to hospital: 9/29/2023 11:37 AM    * Abnormal CT scan  Assessment & Plan  · A CT scan of the abdomen with contrast was done on admission for constipation. .. some incidental findings noted:   Choledocholithiasis without evidence of significant biliary obstruction. 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 Implanted cardiac device noted. Gallstones without cholecystitis   Prostatomegaly  · Discussed case with cardiology left ventricular aneurysm should be evaluated in house. 2D echo ordered. If this appears to be positive I will ask my teammates to have cardiology evaluate the patient further- Echo pending  · With regard to the GI findings no further testing necessary for gallstones or prostatomegaly unless patient has difficulty voiding. · Discussed with nursing today checked PVR and zero mL on bladder scan prior to voiding. · Can follow up with PCP and could have surgical referral if any symptoms.        Acute kidney injury [facility])  Assessment & Plan  Presented with creatinine of 1.7, baseline 1.1. Slightly improved today down to 1.38. Agree recent Bactrim may be playing a role. Unfortunately was exposed to contrast at the ED for a CT of the abdomen which was done with contrast    · Avoid further nephrotoxins-agree  · Check PVR/Bladder scan no reported urinary retention  · Resolved      Constipation  Assessment & Plan  · Ongoing somewhat chronic problem, was able to move his bowels several times. Abdomen less distended. · Continue Sennokot  · ADDed Miralax.   · Currently patient having BM regularly       Physical deconditioning  Assessment & Plan  · PT/OT eval    Type 2 diabetes mellitus with stage 3 chronic kidney disease, with long-term current use of insulin, unspecified whether stage 3a or 3b CKD [facility])  Assessment & Plan  Lab Results   Component Value Date    HGBA1C 9.4 (H) 09/29/2023       Recent Labs     10/01/23  1142 10/01/23  1558 10/01/23  2101 10/02/23  1051   POCGLU 188* 160* 239* 214*       Blood Sugar Average: Last 72 hrs:  (P) 184.25     · BS slightly above goal   · Will cw ssi for now    Coronary artery disease of native artery of native heart with stable angina pectoris (HCC)  Assessment & Plan  · No CP currently  · Continue aspirin and Plavix  · Echo does not show any aneurysm   · Discussed with cardiology no further work up recommended by them    BPH (benign prostatic hyperplasia)  Assessment & Plan  · Continue finasteride  · Recently treated for UTI with Bactrim OP - took for like 3-4 days which should be enough for cystitis, currently no urinary symptoms and JESSA so will hold further ATBs-agree avoid Bactrim if necessary for future treatment. · Discussed with nursing no urinary retention. Medical Problems     Resolved Problems  Date Reviewed: 10/2/2023   None       Discharging Physician / Practitioner: Lynda Larios MD  PCP: Dina Jackson MD  Admission Date:   Admission Orders (From admission, onward)     Ordered        09/30/23 1556  Inpatient Admission  Once            09/29/23 1620  Place in Observation  Once                      Discharge Date: 10/02/23    Consultations During Hospital Stay:  · None. Procedures Performed:   CT Choledocholithiasis without evidence of significant biliary obstruction.     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 Implanted cardiac device noted.     · Gallstones without cholecystitis    Echo -   •  Left Ventricle: Left ventricular cavity size is normal. Wall thickness is normal. The left ventricular ejection fraction is 35%. Systolic function is severely reduced.  There is severe global hypokinesis. Diastolic function is mildly abnormal, consistent with grade I (abnormal) relaxation. •  Right Ventricle: Right ventricular cavity size is normal. Systolic function is mildly reduced. •  Aortic Valve: There is mild regurgitation. •  Tricuspid Valve: There is mild regurgitation.         Significant Findings / Test Results:   · See below. Incidental Findings:   · As above. · needs to follow up with PCP and may need surgical and urologist referral.     Test Results Pending at Discharge (will require follow up): · None. Outpatient Tests Requested:  · Should have blood work checked with PCP    Complications:  None. Reason for Admission: constipation    Hospital Course:   Vernell Strange is a 81 y.o. male patient who originally presented to the hospital on 9/29/2023 due to no BM for a week and found to have elevated creatinine after recently being on bactrim     CT scan done which showed the above findings. Postvoid residual on the day of discharge was 0, patient was having normal bowel movements and he was discharged on as needed MiraLAX. He may need an outpatient urology and surgical referral for the gallbladder findings and the findings of BPH. He was also advised to avoid Bactrim in the future if possible. 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 This was performed and the results of the echo were discussed with the house cardiologist who did not recommend any other follow-up as it is more likely that the CT scan reading  is erroneous      Please see above list of diagnoses and related plan for additional information.      Condition at Discharge: stable    Discharge Day Visit / Exam:   Subjective:    Patient seen and examined   Feeling "great"  Vitals: Blood Pressure: 123/61 (10/02/23 1452)  Pulse: 67 (10/02/23 1452)  Temperature: 97.5 °F (36.4 °C) (10/02/23 1452)  Temp Source: Oral (10/02/23 1452)  Respirations: 18 (10/02/23 1452)  Height: 5' 6" (167.6 cm) (10/01/23 1420)  Weight - Scale: 63 kg (139 lb) (10/01/23 1420)  SpO2: 98 % (10/02/23 1452)  Exam:   Physical Exam  Constitutional:       General: He is not in acute distress. Appearance: He is not ill-appearing, toxic-appearing or diaphoretic. HENT:      Head: Normocephalic. Mouth/Throat:      Mouth: Mucous membranes are moist.   Eyes:      General: No scleral icterus. Right eye: No discharge. Pupils: Pupils are equal, round, and reactive to light. Cardiovascular:      Rate and Rhythm: Normal rate. Pulmonary:      Effort: No respiratory distress. Breath sounds: No stridor. No wheezing, rhonchi or rales. Abdominal:      General: Abdomen is flat. There is no distension. Palpations: There is no mass. Tenderness: There is no abdominal tenderness. There is no right CVA tenderness, left CVA tenderness, guarding or rebound. Hernia: No hernia is present. Musculoskeletal:         General: No swelling, tenderness, deformity or signs of injury. Right lower leg: No edema. Left lower leg: No edema. Skin:     Capillary Refill: Capillary refill takes less than 2 seconds. Coloration: Skin is pale. Skin is not jaundiced. Findings: No bruising, erythema, lesion or rash. Neurological:      General: No focal deficit present. Mental Status: He is alert. Cranial Nerves: No cranial nerve deficit. Sensory: No sensory deficit. Motor: No weakness. Coordination: Coordination normal.      Gait: Gait normal.      Deep Tendon Reflexes: Reflexes normal.   Psychiatric:         Mood and Affect: Mood normal.          Discussion with Family: Updated  (daughter in law) via phone.     Discharge instructions/Information to patient and family: See after visit summary for information provided to patient and family. Provisions for Follow-Up Care:  See after visit summary for information related to follow-up care and any pertinent home health orders. Disposition:   Home    Planned Readmission: none. Discharge Statement:  I spent 45 minutes discharging the patient. This time was spent on the day of discharge. I had direct contact with the patient on the day of discharge. Greater than 50% of the total time was spent examining patient, answering all patient questions, arranging and discussing plan of care with patient as well as directly providing post-discharge instructions. Additional time then spent on discharge activities. Discharge Medications:  See after visit summary for reconciled discharge medications provided to patient and/or family.       **Please Note: This note may have been constructed using a voice recognition system**

## 2023-10-02 NOTE — DISCHARGE INSTR - AVS FIRST PAGE
Dear Kendrick Gonzales,     It was our pleasure to care for you here at [address] It is our hope that we were always able to exceed the expected standards for your care during your stay. You were hospitalized due to john and dehydration. You were cared for on the  floor under the service of Sandra Addison MD with the [facility] who covers for your primary care physician (PCP), Georgie He MD, while you were hospitalized. If you have any questions or concerns related to this hospitalization, you may contact us at [phone]. For follow up as well as medication refills, we recommend that you follow up with your primary care physician. A registered nurse will reach out to you by phone within a few days after your discharge to answer any additional questions that you may have after going home. However, at this time we provide for you here, the most important instructions / recommendations at discharge:     Notable Medication Adjustments -   None. Testing Required after Discharge -   Needs follow up with PCP and possible Urology and GI referral.   ** Please contact your PCP to request testing orders for any of the testing recommended here **  Important follow up information -   Follow up with PCP. Other Instructions -   See below. Please review this entire after visit summary as additional general instructions including medication list, appointments, activity, diet, any pertinent wound care, and other additional recommendations from your care team that may be provided for you.       Sincerely,     Sandra Addison MD

## 2023-10-02 NOTE — CASE MANAGEMENT
Case Management Assessment & Discharge Planning Note    Patient name Cricket Matt  Location /-31 MRN [id number]  : 1940 Date 10/2/2023       Current Admission Date: 2023  Current Admission Diagnosis:Abnormal CT scan   Patient Active Problem List    Diagnosis Date Noted   • Constipation 2023   • Acute kidney injury ([address]) 2023   • Abnormal CT scan 2023   • Chronic right shoulder pain 2022   • Oropharyngeal dysphagia 2022   • Physical deconditioning 2022   • Type 2 diabetes mellitus with stage 3 chronic kidney disease, with long-term current use of insulin, unspecified whether stage 3a or 3b CKD ([address])    • Urinary retention 2019   • Coronary artery disease of native artery of native heart with stable angina pectoris ([address]) 2019   • Ischemic cardiomyopathy 2018   • BPH (benign prostatic hyperplasia) 2018   • Anemia 02/10/2018      LOS (days): 2  Geometric Mean LOS (GMLOS) (days):   Days to GMLOS:     OBJECTIVE:    Risk of Unplanned Readmission Score: 12.13         Current admission status: Inpatient       Preferred Pharmacy:   [address]  [address]  Phone: [phone] Fax: [phone]    [address]  [address]  [address] [address]  Phone: [phone] Fax: [phone]    Primary Care Provider: Ramin Crespo MD    Primary Insurance: [facility]  Secondary Insurance:     ASSESSMENT:  [facility] [address] Kassandra Cantu Representative - Daughter In-Law   Primary Phone: [phone] (Mobile)                         Readmission Root Cause  30 Day Readmission: No    Patient Information  Admitted from[de-identified] Home  Mental Status: Alert  During Assessment patient was accompanied by: Not accompanied during assessment  Assessment information provided by[de-identified] Patient  Primary Caregiver: Self  Support Systems: [address] [facility] of Residence: [address] [facility] do you live in?: [address] [facility] entry access options.  Select all that apply.: Stairs  Number of steps to enter home.: One Flight  Do the steps have railings?: Yes  Type of Current Residence: [place]  In the last 12 months, was there a time when you were not able to pay the mortgage or rent on time?: No  In the last 12 months, how many places have you lived?: 1  In the last 12 months, was there a time when you did not have a steady place to sleep or slept in a shelter (including now)?: No  Homeless/housing insecurity resource given?: N/A  Living Arrangements: Lives w/ Son, Lives w/ Extended Family  Is patient a ?: Yes  Is patient active with [facility])?: No    Activities of Daily Living Prior to Admission  Functional Status: Assistance  Completes ADLs independently?: Yes  Ambulates independently?: No  Level of ambulatory dependence: Assistance  Does patient use assisted devices?: Yes  Assisted Devices (DME) used: Dimitri Lee (Comment) (scooter)  Does patient currently own DME?: Yes  What DME does the patient currently own?: Dimitri Lee (Comment) (scooter)  Does patient have a history of Outpatient Therapy (PT/OT)?: No  Does the patient have a history of Short-Term Rehab?: No  Does patient have a history of HHC?: No  Does patient currently have [facility]?: No         Patient Information Continued  Income Source: Pension/half-way  Does patient have prescription coverage?: Yes  Within the past 12 months, you worried that your food would run out before you got the money to buy more.: Never true  Within the past 12 months, the food you bought just didn't last and you didn't have money to get more.: Never true  Food insecurity resource given?: N/A  Does patient receive dialysis treatments?: No  Does patient have a history of substance abuse?: No  Does patient have a history of Mental Health Diagnosis?: No         Means of Transportation  Means of Transport to Appts[de-identified] Drives Self  In the past 12 months, has lack of transportation kept you from medical appointments or from getting medications?: No  In the past 12 months, has lack of transportation kept you from meetings, work, or from getting things needed for daily living?: No  Was application for public transport provided?: N/A        DISCHARGE DETAILS:    Discharge planning discussed with[de-identified] patient  Freedom of Choice: Yes  Comments - Freedom of Choice: maintainted FOC with patient, discussed level of function prior to hospital admission and needs after. Patient does not want STR if recommended, but will use HHC if recommended.   CM contacted family/caregiver?: No- see comments (patient declined)  Were Treatment Team discharge recommendations reviewed with patient/caregiver?: Yes  Did patient/caregiver verbalize understanding of patient care needs?: Yes  Were patient/caregiver advised of the risks associated with not following Treatment Team discharge recommendations?: Yes              DME Referral Provided  Referral made for DME?: No    Other Referral/Resources/Interventions Provided:  Interventions: None Indicated    Would you like to participate in our [address] service program?  : No - Declined

## 2023-10-02 NOTE — ASSESSMENT & PLAN NOTE
Presented with creatinine of 1.7, baseline 1.1. Slightly improved today down to 1.38. Agree recent Bactrim may be playing a role.   Unfortunately was exposed to contrast at the ED for a CT of the abdomen which was done with contrast    · Avoid further nephrotoxins-agree  · Check PVR/Bladder scan no reported urinary retention  · Resolved

## 2023-10-03 ENCOUNTER — TRANSITIONAL CARE MANAGEMENT (OUTPATIENT)
Age: 83
End: 2023-10-03

## 2023-10-03 NOTE — UTILIZATION REVIEW
NOTIFICATION OF ADMISSION DISCHARGE   This is a Notification of Discharge from [address] [place] [address]. Please be advised that this patient has been discharge from our facility. Below you will find the admission and discharge date and time including the patient’s disposition. UTILIZATION REVIEW CONTACT:  Yang Bush  Utilization   Network Utilization Review Department  Phone: [phone] x carefully listen to the prompts. All voicemails are confidential.  Email: [email or web address]     ADMISSION INFORMATION  PRESENTATION DATE: 9/29/2023 11:37 AM  OBERVATION ADMISSION DATE: 9/29/23  INPATIENT ADMISSION DATE: 9/30/23  3:56 PM   DISCHARGE DATE: 10/2/2023  4:46 PM   DISPOSITION:Home/Self Care    IMPORTANT INFORMATION:  Send all requests for admission clinical reviews, approved or denied determinations and any other requests to dedicated fax number below belonging to the campus where the patient is receiving treatment.  List of dedicated fax numbers:  [place] DENIALS (Administrative/Medical Necessity) [phone] [address] [facility] (Maternity/NICU/Pediatrics) [phone]   [facility] [phone]   [facility] [phone] [address] [facility] [phone]   [address] [place] [address] [phone]   [facility] [phone]   [address] [place] [address] [address] [place] [address] [phone]   [address] [place] [address] [phone]   [address] [place] [address] [phone]   [address] [facility] [address] [facility] [phone]

## 2023-10-28 DIAGNOSIS — R35.0 BENIGN PROSTATIC HYPERPLASIA WITH URINARY FREQUENCY: ICD-10-CM

## 2023-10-28 DIAGNOSIS — N40.1 BENIGN PROSTATIC HYPERPLASIA WITH URINARY FREQUENCY: ICD-10-CM

## 2023-10-30 RX ORDER — FINASTERIDE 5 MG/1
5 TABLET, FILM COATED ORAL DAILY
Qty: 180 TABLET | Refills: 3 | Status: SHIPPED | OUTPATIENT
Start: 2023-10-30

## 2023-11-02 DIAGNOSIS — I25.5 ISCHEMIC CARDIOMYOPATHY: Primary | ICD-10-CM

## 2023-11-02 RX ORDER — ASPIRIN 81 MG/1
81 TABLET, COATED ORAL DAILY
Qty: 90 TABLET | Refills: 3 | Status: SHIPPED | OUTPATIENT
Start: 2023-11-02

## 2023-11-14 ENCOUNTER — OFFICE VISIT (OUTPATIENT)
Age: 83
End: 2023-11-14
Payer: COMMERCIAL

## 2023-11-14 VITALS
RESPIRATION RATE: 16 BRPM | SYSTOLIC BLOOD PRESSURE: 102 MMHG | OXYGEN SATURATION: 99 % | HEIGHT: 66 IN | BODY MASS INDEX: 21.34 KG/M2 | DIASTOLIC BLOOD PRESSURE: 48 MMHG | WEIGHT: 132.8 LBS | HEART RATE: 62 BPM | TEMPERATURE: 97.3 F

## 2023-11-14 DIAGNOSIS — I25.5 ISCHEMIC CARDIOMYOPATHY: ICD-10-CM

## 2023-11-14 DIAGNOSIS — Z23 ENCOUNTER FOR IMMUNIZATION: ICD-10-CM

## 2023-11-14 DIAGNOSIS — Z00.00 MEDICARE ANNUAL WELLNESS VISIT, SUBSEQUENT: ICD-10-CM

## 2023-11-14 DIAGNOSIS — I25.118 CORONARY ARTERY DISEASE OF NATIVE ARTERY OF NATIVE HEART WITH STABLE ANGINA PECTORIS (HCC): ICD-10-CM

## 2023-11-14 DIAGNOSIS — Z79.4 TYPE 2 DIABETES MELLITUS WITH BOTH EYES AFFECTED BY MILD NONPROLIFERATIVE RETINOPATHY WITHOUT MACULAR EDEMA, WITH LONG-TERM CURRENT USE OF INSULIN (HCC): Primary | ICD-10-CM

## 2023-11-14 DIAGNOSIS — D64.9 ANEMIA, UNSPECIFIED TYPE: ICD-10-CM

## 2023-11-14 DIAGNOSIS — E11.3293 TYPE 2 DIABETES MELLITUS WITH BOTH EYES AFFECTED BY MILD NONPROLIFERATIVE RETINOPATHY WITHOUT MACULAR EDEMA, WITH LONG-TERM CURRENT USE OF INSULIN (HCC): Primary | ICD-10-CM

## 2023-11-14 PROCEDURE — 99214 OFFICE O/P EST MOD 30 MIN: CPT | Performed by: INTERNAL MEDICINE

## 2023-11-14 PROCEDURE — 90662 IIV NO PRSV INCREASED AG IM: CPT | Performed by: INTERNAL MEDICINE

## 2023-11-14 PROCEDURE — G0439 PPPS, SUBSEQ VISIT: HCPCS | Performed by: INTERNAL MEDICINE

## 2023-11-14 PROCEDURE — G0008 ADMIN INFLUENZA VIRUS VAC: HCPCS | Performed by: INTERNAL MEDICINE

## 2023-11-14 RX ORDER — DULAGLUTIDE 4.5 MG/.5ML
INJECTION, SOLUTION SUBCUTANEOUS
Status: CANCELLED | OUTPATIENT
Start: 2023-11-14

## 2023-11-14 NOTE — PROGRESS NOTES
Assessment and Plan:   Type 2 diabetes is not very well controlled at present. Follows up with endocrinology, continue the same. Has iron deficiency anemia and was told to eat better  Follows up with cardiology for the coronary artery disease and cardiomyopathy  Does not have any symptoms right now but was recently admitted to the hospital with dehydration and acute kidney injury. Feeling better now    Problem List Items Addressed This Visit    None  Visit Diagnoses       Type 2 diabetes mellitus with both eyes affected by mild nonproliferative retinopathy without macular edema, with long-term current use of insulin ([address])    -  Primary    Encounter for immunization        Relevant Orders    influenza vaccine, high-dose, PF 0.7 mL (FLUZONE HIGH-DOSE)            Depression Screening and Follow-up Plan: Patient was screened for depression during today's encounter. They screened negative with a PHQ-2 score of 0. Preventive health issues were discussed with patient, and age appropriate screening tests were ordered as noted in patient's After Visit Summary. Personalized health advice and appropriate referrals for health education or preventive services given if needed, as noted in patient's After Visit Summary. History of Present Illness:     Patient presents for a Medicare Wellness Visit    HPI  Patient is here for follow-up of type 2 diabetes, anemia, heart disease and kidney disease. He does not complain of any symptoms at present      Patient Care Team:  Ramin Crespo MD as PCP - General (Internal Medicine)  Lorin Vega MD as PCP - [address] [place] [address] ([facility])  MD Mekhi Del Angel MD Hannah Patter, MD as Endoscopist     Review of Systems:     Review of Systems   Constitutional:  Negative for chills, diaphoresis, fatigue and fever.    HENT:  Negative for congestion, ear discharge, ear pain, hearing loss, postnasal drip, rhinorrhea, sinus pressure, sinus pain, sneezing, sore throat and voice change. Eyes:  Negative for pain, discharge, redness and visual disturbance. Respiratory:  Negative for cough, chest tightness, shortness of breath and wheezing. Cardiovascular:  Negative for chest pain, palpitations and leg swelling. Gastrointestinal:  Negative for abdominal distention, abdominal pain, blood in stool, constipation, diarrhea, nausea and vomiting. Endocrine: Negative for cold intolerance, heat intolerance, polydipsia, polyphagia and polyuria. Genitourinary:  Negative for dysuria, flank pain, frequency, hematuria and urgency. Musculoskeletal:  Negative for arthralgias, back pain, gait problem, joint swelling, myalgias, neck pain and neck stiffness. Skin:  Negative for rash. Neurological:  Negative for dizziness, tremors, syncope, facial asymmetry, speech difficulty, weakness, light-headedness, numbness and headaches. Hematological:  Does not bruise/bleed easily. Psychiatric/Behavioral:  Negative for behavioral problems, confusion and sleep disturbance. The patient is not nervous/anxious.          Problem List:     Patient Active Problem List   Diagnosis    Anemia    BPH (benign prostatic hyperplasia)    Ischemic cardiomyopathy    Coronary artery disease of native artery of native heart with stable angina pectoris (HCC)    Urinary retention    Type 2 diabetes mellitus with stage 3 chronic kidney disease, with long-term current use of insulin, unspecified whether stage 3a or 3b CKD ([address])    Physical deconditioning    Oropharyngeal dysphagia    Chronic right shoulder pain    Constipation    Acute kidney injury ([address])    Abnormal CT scan      Past Medical and Surgical History:     Past Medical History:   Diagnosis Date    Acquired cyst of kidney     Anemia     Benign paroxysmal vertigo, unspecified ear     Cellulitis of leg     Cervical spinal stenosis     Chest pain     Coronary atherosclerosis of native coronary artery     Diabetes mellitus ([address])     Disease of thyroid gland     Enlarged prostate     Esophageal candidiasis (HCC)     H/o COVID-19 06/30/2022    Hematuria     Herpes zoster     Hyperlipidemia     Hypertension     Incomplete emptying of bladder     Inflamed seborrheic keratosis     Internal hemorrhoids     Malignant neoplasm of skin of trunk     Myocardial infarction ([address]) 2005    Nonmelanoma skin cancer     LAST ASSESSED: 8/9/17    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 infarction     Paroxysmal tachycardia (HCC)     Shortness of breath     Vitamin B deficiency      Past Surgical History:   Procedure Laterality Date    CARDIAC CATHETERIZATION Left 5/15/2023    Procedure: Cardiac Left Heart Cath;  Surgeon: Alvino Cesar MD;  Location: [facility] [address] CATH LAB; Service: Cardiology    CARDIAC CATHETERIZATION N/A 5/15/2023    Procedure: Cardiac pci;  Surgeon: Alvino Cesar MD;  Location: [address] CATH LAB; Service: Cardiology    CARDIAC CATHETERIZATION N/A 5/15/2023    Procedure: Cardiac Coronary Angiogram;  Surgeon: Alvino Cesar MD;  Location: [facility] [address] CATH LAB; Service: Cardiology    CARDIAC DEFIBRILLATOR PLACEMENT      COLONOSCOPY  10/07/2008    FIBEROPTIC SCREENING; NO FURTHER RECOMMENDATIONS    CORONARY ANGIOPLASTY WITH STENT PLACEMENT      CYSTOSCOPY  11/06/2015    DIAGNOSTIC; MANAGED BY: Norman Brizuela    EGD AND COLONOSCOPY N/A 02/12/2018    Procedure: EGD AND COLONOSCOPY;  Surgeon: Kinza Almanza MD;  Location: [facility];   Service: Gastroenterology    FL INJECTION RIGHT SHOULDER (ARTHROGRAM)  01/04/2022    HIP ARTHROPLASTY Right     INSERT / REPLACE / REMOVE PACEMAKER      JOINT REPLACEMENT Right     THR    TRUNK SKIN LESION EXCISIONAL BIOPSY  08/22/2007    MALIGNANT; BCC CHEST      Family History:     Family History   Problem Relation Age of Onset    Heart disease Mother     Coronary artery disease Mother     Sudden death Mother     Cancer Father         throat; MALIGNANT NEOPLASM OF HEAD, FACE OR NECK    Throat cancer Father     Cancer Family Coronary artery disease Family       Social History:     Social History     Socioeconomic History    Marital status:      Spouse name: None    Number of children: None    Years of education: None    Highest education level: None   Occupational History    Occupation: RETIRED   Tobacco Use    Smoking status: Former     Packs/day: 1.00     Years: 10.00     Total pack years: 10.00     Types: Cigarettes     Quit date: 1978     Years since quittin.7    Smokeless tobacco: Never   Vaping Use    Vaping Use: Never used   Substance and Sexual Activity    Alcohol use: Yes     Comment: occasionally 1-2 per month     Drug use: No    Sexual activity: Not Currently     Partners: Female   Other Topics Concern    None   Social History Narrative    LIVING INDEPENDENTLY WITH SPOUSE    NO ADVANCE DIRECTIVE ON FILE     Social Determinants of Health     Financial Resource Strain: Low Risk  (2022)    Overall Financial Resource Strain (CARDIA)     Difficulty of Paying Living Expenses: Not very hard   Food Insecurity: No Food Insecurity (10/2/2023)    Hunger Vital Sign     Worried About Running Out of Food in the Last Year: Never true     Ran Out of Food in the Last Year: Never true   Transportation Needs: No Transportation Needs (10/2/2023)    PRAPARE - Transportation     Lack of Transportation (Medical): No     Lack of Transportation (Non-Medical):  No   Physical Activity: Inactive (10/18/2021)    Exercise Vital Sign     Days of Exercise per Week: 0 days     Minutes of Exercise per Session: 0 min   Stress: Stress Concern Present (10/18/2021)    [facility]     Feeling of Stress : Rather much   Social Connections: Not on file   Intimate Partner Violence: Not on file   Housing Stability: [address]  (10/2/2023)    Housing Stability Vital Sign     Unable to Pay for Housing in the Last Year: No     Number of [address] in the Last Year: 1     Unstable Housing in the Last Year: No      Medications and Allergies:     Current Outpatient Medications   Medication Sig Dispense Refill    Alcohol Swabs (Pharmacist Choice Alcohol) PADS USE FOR TESTING AND INJECTIONS UP TO 10 PADS DAILY      aspirin (Aspirin Low Dose) 81 mg EC tablet TAKE ONE (1) TABLET BY MOUTH DAILY 90 tablet 3    aspirin 81 MG tablet Take 1 tablet by mouth daily      clopidogrel (PLAVIX) 75 mg tablet TAKE 1 TABLET (75 MG TOTAL) BY MOUTH DAILY 90 tablet 3    ergocalciferol (VITAMIN D2) 50,000 units Take 50,000 Units by mouth once a week      finasteride (PROSCAR) 5 mg tablet TAKE ONE (1) TABLET BY MOUTH DAILY 180 tablet 3    glipiZIDE (GLUCOTROL) 5 mg tablet TAKE 1 TABLET (5 MG TOTAL) BY MOUTH 2 (TWO) TIMES A  tablet 3    levothyroxine 88 mcg tablet Take 88 mcg by mouth daily      metoprolol tartrate (LOPRESSOR) 25 mg tablet TAKE 1 TABLET (25 MG TOTAL) BY MOUTH EVERY 12 (TWELVE) HOURS 90 tablet 3    ondansetron (ZOFRAN-ODT) 4 mg disintegrating tablet TAKE 1 TABLET BY MOUTH 3 TIMES PER DAY FOR 7 DAYS      rosuvastatin (CRESTOR) 20 MG tablet TAKE 1 TABLET (20 MG TOTAL) BY MOUTH DAILY 90 tablet 3    senna-docusate sodium (SENOKOT-S) 8.6-50 mg per tablet Take 1 tablet by mouth daily      tamsulosin (FLOMAX) 0.4 mg TAKE 1 CAPSULE (0.4 MG TOTAL) BY MOUTH DAILY 90 capsule 3    Trulicity 4.5 RH/1.3RZ injection INJECT 4.5 MG UNDER THE SKIN ONCE A WEEK.      polyethylene glycol (MIRALAX) 17 g packet Take 17 g by mouth daily as needed (constipation) for up to 3 days 3 each 0     No current facility-administered medications for this visit.      Allergies   Allergen Reactions    Atorvastatin Other (See Comments)     Pt unsure      Percocet [Oxycodone-Acetaminophen] Nausea Only and GI Intolerance      Immunizations:     Immunization History   Administered Date(s) Administered    COVID-19 PFIZER VACCINE 0.3 ML IM 04/14/2021, 05/06/2021    Fluzone Split Quad 0.5 mL 10/14/2010    INFLUENZA 10/14/2010, 10/07/2014, 09/01/2015, 09/03/2015, 09/12/2016, 10/13/2017, 10/09/2018, 11/02/2022    Influenza Split High Dose Preservative Free IM 10/13/2017    Influenza, high dose seasonal 0.7 mL 10/02/2020, 11/02/2022    Influenza, seasonal, injectable 09/01/2015, 09/12/2016    Pneumococcal Conjugate 13-Valent 07/06/2015, 09/07/2016    Pneumococcal Polysaccharide PPV23 09/01/2015    Tdap 02/23/2016    Zoster 01/01/2015, 03/23/2015    influenza, trivalent, adjuvanted 09/20/2019      Health Maintenance: There are no preventive care reminders to display for this patient. Topic Date Due    COVID-19 Vaccine (3 - Pfizer series) 07/01/2021    Influenza Vaccine (1) 09/01/2023      Medicare Screening Tests and Risk Assessments:     Clari Reed is here for his Subsequent Wellness visit. Health Risk Assessment:   Patient rates overall health as good. Patient feels that their physical health rating is same. Patient is satisfied with their life. Eyesight was rated as same. Hearing was rated as same. Patient feels that their emotional and mental health rating is same. Patients states they are never, rarely angry. Patient states they are never, rarely unusually tired/fatigued. Pain experienced in the last 7 days has been none. Patient states that he has experienced no weight loss or gain in last 6 months. Depression Screening:   PHQ-2 Score: 0      Fall Risk Screening: In the past year, patient has experienced: no history of falling in past year      Home Safety:  Patient has trouble with stairs inside or outside of their home. Patient has working smoke alarms and has working carbon monoxide detector. Home safety hazards include: none. Nutrition:   Current diet is Diabetic. Medications:   Patient is not currently taking any over-the-counter supplements. Patient is able to manage medications.      Activities of Daily Living (ADLs)/Instrumental Activities of Daily Living (IADLs):   Walk and transfer into and out of bed and chair?: Yes  Dress and groom yourself?: Yes    Bathe or shower yourself?: Yes    Feed yourself? Yes  Do your laundry/housekeeping?: Yes  Manage your money, pay your bills and track your expenses?: Yes  Make your own meals?: Yes    Do your own shopping?: Yes    ADL comments: With help     Previous Hospitalizations:   Any hospitalizations or ED visits within the last 12 months?: Yes    How many hospitalizations have you had in the last year?: 1-2    Advance Care Planning:   Living will: No      PREVENTIVE SCREENINGS      Cardiovascular Screening:    General: Screening Not Indicated and History Lipid Disorder      Diabetes Screening:     General: Screening Not Indicated and History Diabetes      Prostate Cancer Screening:    General: Screening Not Indicated      Abdominal Aortic Aneurysm (AAA) Screening:    Risk factors include: tobacco use        Lung Cancer Screening:     General: Screening Not Indicated    Screening, Brief Intervention, and Referral to Treatment (SBIRT)    Screening  Typical number of drinks in a day: 0  Typical number of drinks in a week: 0  Interpretation: Low risk drinking behavior. Single Item Drug Screening:  How often have you used an illegal drug (including marijuana) or a prescription medication for non-medical reasons in the past year? never    Single Item Drug Screen Score: 0  Interpretation: Negative screen for possible drug use disorder    No results found. Physical Exam:     Ht 5' 6" (1.676 m)   Wt 60.2 kg (132 lb 12.8 oz)   BMI 21.43 kg/m²     Physical Exam  Constitutional:       General: He is not in acute distress. Appearance: He is well-developed. He is not diaphoretic. HENT:      Head: Normocephalic and atraumatic. Right Ear: External ear normal.      Left Ear: External ear normal.      Nose: Nose normal.   Eyes:      General: No scleral icterus. Right eye: No discharge. Left eye: No discharge.       Conjunctiva/sclera: Conjunctivae normal. Cardiovascular:      Rate and Rhythm: Normal rate and regular rhythm. Heart sounds: Normal heart sounds. No murmur heard. No friction rub. No gallop. Pulmonary:      Effort: Pulmonary effort is normal. No respiratory distress. Breath sounds: Normal breath sounds. No wheezing or rales. Abdominal:      General: Bowel sounds are normal. There is no distension. Palpations: Abdomen is soft. Tenderness: There is no abdominal tenderness. There is no guarding or rebound. Musculoskeletal:         General: No tenderness. Skin:     General: Skin is warm and dry. Findings: No erythema or rash. Neurological:      Mental Status: He is alert and oriented to person, place, and time. Cranial Nerves: No cranial nerve deficit. Sensory: No sensory deficit. Motor: No abnormal muscle tone.    Psychiatric:         Behavior: Behavior normal.          Trey Proctor MD

## 2023-11-14 NOTE — PATIENT INSTRUCTIONS
Medicare Preventive Visit Patient Instructions  Thank you for completing your Welcome to Medicare Visit or Medicare Annual Wellness Visit today. Your next wellness visit will be due in one year (11/14/2024). The screening/preventive services that you may require over the next 5-10 years are detailed below. Some tests may not apply to you based off risk factors and/or age. Screening tests ordered at today's visit but not completed yet may show as past due. Also, please note that scanned in results may not display below. Preventive Screenings:  Service Recommendations Previous Testing/Comments   Colorectal Cancer Screening  Colonoscopy    Fecal Occult Blood Test (FOBT)/Fecal Immunochemical Test (FIT)  Fecal DNA/Cologuard Test  Flexible Sigmoidoscopy Age: 43-73 years old   Colonoscopy: every 10 years (May be performed more frequently if at higher risk)  OR  FOBT/FIT: every 1 year  OR  Cologuard: every 3 years  OR  Sigmoidoscopy: every 5 years  Screening may be recommended earlier than age 39 if at higher risk for colorectal cancer. Also, an individualized decision between you and your healthcare provider will decide whether screening between the ages of 77-80 would be appropriate.  Colonoscopy: 10/07/2008  FOBT/FIT: Not on file  Cologuard: Not on file  Sigmoidoscopy: Not on file          Prostate Cancer Screening Individualized decision between patient and health care provider in men between ages of 53-66   Medicare will cover every 12 months beginning on the day after your 50th birthday PSA: No results in last 5 years     Screening Not Indicated     Hepatitis C Screening Once for adults born between 1945 and 1965  More frequently in patients at high risk for Hepatitis C Hep C Antibody: Not on file        Diabetes Screening 1-2 times per year if you're at risk for diabetes or have pre-diabetes Fasting glucose: 187 mg/dL (7/3/2023)  A1C: 9.4 % (9/29/2023)  Screening Not Indicated  History Diabetes   Cholesterol Screening Once every 5 years if you don't have a lipid disorder. May order more often based on risk factors. Lipid panel: 07/03/2023  Screening Not Indicated  History Lipid Disorder      Other Preventive Screenings Covered by Medicare:  Abdominal Aortic Aneurysm (AAA) Screening: covered once if your at risk. You're considered to be at risk if you have a family history of AAA or a male between the age of 70-76 who smoking at least 100 cigarettes in your lifetime. Lung Cancer Screening: covers low dose CT scan once per year if you meet all of the following conditions: (1) Age 48-67; (2) No signs or symptoms of lung cancer; (3) Current smoker or have quit smoking within the last 15 years; (4) You have a tobacco smoking history of at least 20 pack years (packs per day x number of years you smoked); (5) You get a written order from a healthcare provider. Glaucoma Screening: covered annually if you're considered high risk: (1) You have diabetes OR (2) Family history of glaucoma OR (3)  aged 48 and older OR (3)  American aged 72 and older  Osteoporosis Screening: covered every 2 years if you meet one of the following conditions: (1) Have a vertebral abnormality; (2) On glucocorticoid therapy for more than 3 months; (3) Have primary hyperparathyroidism; (4) On osteoporosis medications and need to assess response to drug therapy. HIV Screening: covered annually if you're between the age of 14-79. Also covered annually if you are younger than 13 and older than 72 with risk factors for HIV infection. For pregnant patients, it is covered up to 3 times per pregnancy.     Immunizations:  Immunization Recommendations   Influenza Vaccine Annual influenza vaccination during flu season is recommended for all persons aged >= 6 months who do not have contraindications   Pneumococcal Vaccine   * Pneumococcal conjugate vaccine = PCV13 (Prevnar 13), PCV15 (Vaxneuvance), PCV20 (Prevnar 20)  * Pneumococcal polysaccharide vaccine = PPSV23 (Pneumovax) Adults 30-43 yo with certain risk factors or if 69+ yo  If never received any pneumonia vaccine: recommend Prevnar 20 (PCV20)  Give PCV20 if previously received 1 dose of PCV13 or PPSV23   Hepatitis B Vaccine 3 dose series if at intermediate or high risk (ex: diabetes, end stage renal disease, liver disease)   Respiratory syncytial virus (RSV) Vaccine - COVERED BY MEDICARE PART D  * RSVPreF3 (Arexvy) CDC recommends that adults 61years of age and older may receive a single dose of RSV vaccine using shared clinical decision-making (SCDM)   Tetanus (Td) Vaccine - COST NOT COVERED BY MEDICARE PART B Following completion of primary series, a booster dose should be given every 10 years to maintain immunity against tetanus. Td may also be given as tetanus wound prophylaxis. Tdap Vaccine - COST NOT COVERED BY MEDICARE PART B Recommended at least once for all adults. For pregnant patients, recommended with each pregnancy. Shingles Vaccine (Shingrix) - COST NOT COVERED BY MEDICARE PART B  2 shot series recommended in those 19 years and older who have or will have weakened immune systems or those 50 years and older     Health Maintenance Due:  There are no preventive care reminders to display for this patient. Immunizations Due:      Topic Date Due   • COVID-19 Vaccine (3 - Pfizer series) 07/01/2021   • Influenza Vaccine (1) 09/01/2023     Advance Directives   What are advance directives? Advance directives are legal documents that state your wishes and plans for medical care. These plans are made ahead of time in case you lose your ability to make decisions for yourself. Advance directives can apply to any medical decision, such as the treatments you want, and if you want to donate organs. What are the types of advance directives? There are many types of advance directives, and each state has rules about how to use them.  You may choose a combination of any of the following:  Living will: This is a written record of the treatment you want. You can also choose which treatments you do not want, which to limit, and which to stop at a certain time. This includes surgery, medicine, IV fluid, and tube feedings. Durable power of  for healthcare [facility]): This is a written record that states who you want to make healthcare choices for you when you are unable to make them for yourself. This person, called a proxy, is usually a family member or a friend. You may choose more than 1 proxy. Do not resuscitate (DNR) order:  A DNR order is used in case your heart stops beating or you stop breathing. It is a request not to have certain forms of treatment, such as CPR. A DNR order may be included in other types of advance directives. Medical directive: This covers the care that you want if you are in a coma, near death, or unable to make decisions for yourself. You can list the treatments you want for each condition. Treatment may include pain medicine, surgery, blood transfusions, dialysis, IV or tube feedings, and a ventilator (breathing machine). Values history: This document has questions about your views, beliefs, and how you feel and think about life. This information can help others choose the care that you would choose. Why are advance directives important? An advance directive helps you control your care. Although spoken wishes may be used, it is better to have your wishes written down. Spoken wishes can be misunderstood, or not followed. Treatments may be given even if you do not want them. An advance directive may make it easier for your family to make difficult choices about your care. © Copyright [employer] 2018 Information is for End User's use only and may not be sold, redistributed or otherwise used for commercial purposes.  All illustrations and images included in CareNotes® are the copyrighted property of [employer] or  [employer]

## 2023-11-22 DIAGNOSIS — I25.118 CORONARY ARTERY DISEASE OF NATIVE ARTERY OF NATIVE HEART WITH STABLE ANGINA PECTORIS (HCC): ICD-10-CM

## 2023-11-22 RX ORDER — CLOPIDOGREL BISULFATE 75 MG/1
75 TABLET ORAL DAILY
Qty: 90 TABLET | Refills: 3 | Status: SHIPPED | OUTPATIENT
Start: 2023-11-22

## 2023-11-24 DIAGNOSIS — K59.00 CONSTIPATION, UNSPECIFIED CONSTIPATION TYPE: Primary | ICD-10-CM

## 2023-11-24 RX ORDER — MINERAL OIL/PETROLATUM,WHITE 41.5-56.8%
1 OINTMENT (GRAM) OPHTHALMIC (EYE)
Qty: 30 TABLET | Refills: 1 | Status: SHIPPED | OUTPATIENT
Start: 2023-11-24

## 2023-11-30 ENCOUNTER — REMOTE DEVICE CLINIC VISIT (OUTPATIENT)
Dept: CARDIOLOGY CLINIC | Facility: CLINIC | Age: 83
End: 2023-11-30
Payer: COMMERCIAL

## 2023-11-30 DIAGNOSIS — Z95.810 PRESENCE OF AUTOMATIC CARDIOVERTER/DEFIBRILLATOR (AICD): Primary | ICD-10-CM

## 2023-11-30 PROCEDURE — 93296 REM INTERROG EVL PM/IDS: CPT | Performed by: INTERNAL MEDICINE

## 2023-11-30 PROCEDURE — 93295 DEV INTERROG REMOTE 1/2/MLT: CPT | Performed by: INTERNAL MEDICINE

## 2023-11-30 NOTE — PROGRESS NOTES
Results for orders placed or performed in visit on 11/30/23   Cardiac EP device report    Narrative    BSC SINGLE CHAMBER ICD - NOT MRI CONDITIONAL  LATITUDE TRANSMISSION: BATTERY VOLTAGE ADEQUATE (2.5 YRS). : 1%. ALL AVAILABLE LEAD PARAMETERS WITHIN NORMAL LIMITS. PRESENTING RHYTHM: ST @ 116 BPM.  3 VT-1 EPISODES W/AVAIL EGMS SHOWING NSVT vs RVR W/ -161 BPM, MAX DURATION 24 SECS. 2 NON-SUSTAINED EPISODES W/ EGMS SHOWING PROB NST 12 BEATS @ 164 BPM & 14 BEATS @ 163 BPM. PT TAKES PLAVIX, ASA 81MG, METOPROLOL TART. EF: 35% (ECHO 10/1/23). NORMAL DEVICE FUNCTION.  [id number] [address]

## 2023-12-05 ENCOUNTER — APPOINTMENT (EMERGENCY)
Dept: RADIOLOGY | Facility: HOSPITAL | Age: 83
End: 2023-12-05
Payer: COMMERCIAL

## 2023-12-05 ENCOUNTER — HOSPITAL ENCOUNTER (OUTPATIENT)
Facility: HOSPITAL | Age: 83
Setting detail: OBSERVATION
Discharge: HOME | End: 2023-12-06
Attending: EMERGENCY MEDICINE | Admitting: STUDENT IN AN ORGANIZED HEALTH CARE EDUCATION/TRAINING PROGRAM
Payer: COMMERCIAL

## 2023-12-05 ENCOUNTER — APPOINTMENT (EMERGENCY)
Dept: CT IMAGING | Facility: HOSPITAL | Age: 83
End: 2023-12-05
Payer: COMMERCIAL

## 2023-12-05 DIAGNOSIS — E86.0 DEHYDRATION: ICD-10-CM

## 2023-12-05 DIAGNOSIS — R11.10 VOMITING: Primary | ICD-10-CM

## 2023-12-05 PROBLEM — E87.6 HYPOKALEMIA: Status: ACTIVE | Noted: 2023-12-05

## 2023-12-05 LAB
2HR DELTA HS TROPONIN: 0 NG/L
4HR DELTA HS TROPONIN: -1 NG/L
ALBUMIN SERPL BCP-MCNC: 4.3 G/DL (ref 3.5–5)
ALP SERPL-CCNC: 42 U/L (ref 34–104)
ALT SERPL W P-5'-P-CCNC: 17 U/L (ref 7–52)
ANION GAP SERPL CALCULATED.3IONS-SCNC: 9 MMOL/L
APTT PPP: 28 SECONDS (ref 23–37)
AST SERPL W P-5'-P-CCNC: 21 U/L (ref 13–39)
ATRIAL RATE: 68 BPM
ATRIAL RATE: 83 BPM
BACTERIA UR QL AUTO: NORMAL /HPF
BASOPHILS # BLD AUTO: 0.04 THOUSANDS/ÂΜL (ref 0–0.1)
BASOPHILS NFR BLD AUTO: 1 % (ref 0–1)
BILIRUB SERPL-MCNC: 0.88 MG/DL (ref 0.2–1)
BILIRUB UR QL STRIP: NEGATIVE
BUN SERPL-MCNC: 16 MG/DL (ref 5–25)
CALCIUM SERPL-MCNC: 10 MG/DL (ref 8.4–10.2)
CARDIAC TROPONIN I PNL SERPL HS: 20 NG/L
CARDIAC TROPONIN I PNL SERPL HS: 21 NG/L
CARDIAC TROPONIN I PNL SERPL HS: 21 NG/L
CHLORIDE SERPL-SCNC: 100 MMOL/L (ref 96–108)
CLARITY UR: CLEAR
CO2 SERPL-SCNC: 30 MMOL/L (ref 21–32)
COLOR UR: ABNORMAL
CREAT SERPL-MCNC: 1.35 MG/DL (ref 0.6–1.3)
EOSINOPHIL # BLD AUTO: 0.11 THOUSAND/ÂΜL (ref 0–0.61)
EOSINOPHIL NFR BLD AUTO: 2 % (ref 0–6)
ERYTHROCYTE [DISTWIDTH] IN BLOOD BY AUTOMATED COUNT: 15.2 % (ref 11.6–15.1)
GFR SERPL CREATININE-BSD FRML MDRD: 48 ML/MIN/1.73SQ M
GLUCOSE SERPL-MCNC: 140 MG/DL (ref 65–140)
GLUCOSE SERPL-MCNC: 147 MG/DL (ref 65–140)
GLUCOSE UR STRIP-MCNC: NEGATIVE MG/DL
HCT VFR BLD AUTO: 39.8 % (ref 36.5–49.3)
HGB BLD-MCNC: 14.1 G/DL (ref 12–17)
HGB UR QL STRIP.AUTO: ABNORMAL
IMM GRANULOCYTES # BLD AUTO: 0.04 THOUSAND/UL (ref 0–0.2)
IMM GRANULOCYTES NFR BLD AUTO: 1 % (ref 0–2)
INR PPP: 0.96 (ref 0.84–1.19)
KETONES UR STRIP-MCNC: NEGATIVE MG/DL
LACTATE SERPL-SCNC: 1.8 MMOL/L (ref 0.5–2)
LEUKOCYTE ESTERASE UR QL STRIP: NEGATIVE
LIPASE SERPL-CCNC: 45 U/L (ref 11–82)
LYMPHOCYTES # BLD AUTO: 1.37 THOUSANDS/ÂΜL (ref 0.6–4.47)
LYMPHOCYTES NFR BLD AUTO: 20 % (ref 14–44)
MCH RBC QN AUTO: 38.6 PG (ref 26.8–34.3)
MCHC RBC AUTO-ENTMCNC: 35.4 G/DL (ref 31.4–37.4)
MCV RBC AUTO: 109 FL (ref 82–98)
MONOCYTES # BLD AUTO: 0.57 THOUSAND/ÂΜL (ref 0.17–1.22)
MONOCYTES NFR BLD AUTO: 9 % (ref 4–12)
NEUTROPHILS # BLD AUTO: 4.6 THOUSANDS/ÂΜL (ref 1.85–7.62)
NEUTS SEG NFR BLD AUTO: 67 % (ref 43–75)
NITRITE UR QL STRIP: NEGATIVE
NON-SQ EPI CELLS URNS QL MICRO: NORMAL /HPF
NRBC BLD AUTO-RTO: 0 /100 WBCS
P AXIS: 41 DEGREES
P AXIS: 69 DEGREES
PH UR STRIP.AUTO: 7.5 [PH]
PLATELET # BLD AUTO: 257 THOUSANDS/UL (ref 149–390)
PMV BLD AUTO: 9.3 FL (ref 8.9–12.7)
POTASSIUM SERPL-SCNC: 3.3 MMOL/L (ref 3.5–5.3)
PR INTERVAL: 200 MS
PR INTERVAL: 226 MS
PROCALCITONIN SERPL-MCNC: 0.05 NG/ML
PROT SERPL-MCNC: 7.9 G/DL (ref 6.4–8.4)
PROT UR STRIP-MCNC: ABNORMAL MG/DL
PROTHROMBIN TIME: 13.4 SECONDS (ref 11.6–14.5)
QRS AXIS: -68 DEGREES
QRS AXIS: 45 DEGREES
QRSD INTERVAL: 132 MS
QRSD INTERVAL: 90 MS
QT INTERVAL: 456 MS
QT INTERVAL: 458 MS
QTC INTERVAL: 487 MS
QTC INTERVAL: 535 MS
RBC # BLD AUTO: 3.65 MILLION/UL (ref 3.88–5.62)
RBC #/AREA URNS AUTO: NORMAL /HPF
SODIUM SERPL-SCNC: 139 MMOL/L (ref 135–147)
SP GR UR STRIP.AUTO: >=1.05 (ref 1–1.03)
T WAVE AXIS: 265 DEGREES
T WAVE AXIS: 76 DEGREES
UROBILINOGEN UR STRIP-ACNC: 2 MG/DL
VENTRICULAR RATE: 68 BPM
VENTRICULAR RATE: 83 BPM
WBC # BLD AUTO: 6.73 THOUSAND/UL (ref 4.31–10.16)
WBC #/AREA URNS AUTO: NORMAL /HPF

## 2023-12-05 PROCEDURE — 96366 THER/PROPH/DIAG IV INF ADDON: CPT

## 2023-12-05 PROCEDURE — 87040 BLOOD CULTURE FOR BACTERIA: CPT | Performed by: EMERGENCY MEDICINE

## 2023-12-05 PROCEDURE — 83605 ASSAY OF LACTIC ACID: CPT | Performed by: EMERGENCY MEDICINE

## 2023-12-05 PROCEDURE — 85730 THROMBOPLASTIN TIME PARTIAL: CPT | Performed by: EMERGENCY MEDICINE

## 2023-12-05 PROCEDURE — 74177 CT ABD & PELVIS W/CONTRAST: CPT

## 2023-12-05 PROCEDURE — 85610 PROTHROMBIN TIME: CPT | Performed by: EMERGENCY MEDICINE

## 2023-12-05 PROCEDURE — 84145 PROCALCITONIN (PCT): CPT | Performed by: EMERGENCY MEDICINE

## 2023-12-05 PROCEDURE — 36415 COLL VENOUS BLD VENIPUNCTURE: CPT | Performed by: EMERGENCY MEDICINE

## 2023-12-05 PROCEDURE — 85025 COMPLETE CBC W/AUTO DIFF WBC: CPT | Performed by: EMERGENCY MEDICINE

## 2023-12-05 PROCEDURE — 84484 ASSAY OF TROPONIN QUANT: CPT | Performed by: INTERNAL MEDICINE

## 2023-12-05 PROCEDURE — 83690 ASSAY OF LIPASE: CPT | Performed by: EMERGENCY MEDICINE

## 2023-12-05 PROCEDURE — 96361 HYDRATE IV INFUSION ADD-ON: CPT

## 2023-12-05 PROCEDURE — 93005 ELECTROCARDIOGRAM TRACING: CPT

## 2023-12-05 PROCEDURE — 71275 CT ANGIOGRAPHY CHEST: CPT

## 2023-12-05 PROCEDURE — 81001 URINALYSIS AUTO W/SCOPE: CPT | Performed by: EMERGENCY MEDICINE

## 2023-12-05 PROCEDURE — 82948 REAGENT STRIP/BLOOD GLUCOSE: CPT

## 2023-12-05 PROCEDURE — 84484 ASSAY OF TROPONIN QUANT: CPT | Performed by: EMERGENCY MEDICINE

## 2023-12-05 PROCEDURE — 99285 EMERGENCY DEPT VISIT HI MDM: CPT | Performed by: EMERGENCY MEDICINE

## 2023-12-05 PROCEDURE — 80053 COMPREHEN METABOLIC PANEL: CPT | Performed by: EMERGENCY MEDICINE

## 2023-12-05 PROCEDURE — 99285 EMERGENCY DEPT VISIT HI MDM: CPT

## 2023-12-05 PROCEDURE — 99223 1ST HOSP IP/OBS HIGH 75: CPT | Performed by: STUDENT IN AN ORGANIZED HEALTH CARE EDUCATION/TRAINING PROGRAM

## 2023-12-05 PROCEDURE — 96365 THER/PROPH/DIAG IV INF INIT: CPT

## 2023-12-05 PROCEDURE — 71045 X-RAY EXAM CHEST 1 VIEW: CPT

## 2023-12-05 RX ORDER — CLOPIDOGREL BISULFATE 75 MG/1
75 TABLET ORAL DAILY
Status: DISCONTINUED | OUTPATIENT
Start: 2023-12-06 | End: 2023-12-06 | Stop reason: HOSPADM

## 2023-12-05 RX ORDER — ATORVASTATIN CALCIUM 40 MG/1
40 TABLET, FILM COATED ORAL
Status: DISCONTINUED | OUTPATIENT
Start: 2023-12-06 | End: 2023-12-06 | Stop reason: HOSPADM

## 2023-12-05 RX ORDER — MAGNESIUM SULFATE HEPTAHYDRATE 40 MG/ML
2 INJECTION, SOLUTION INTRAVENOUS ONCE
Status: COMPLETED | OUTPATIENT
Start: 2023-12-05 | End: 2023-12-05

## 2023-12-05 RX ORDER — TAMSULOSIN HYDROCHLORIDE 0.4 MG/1
0.4 CAPSULE ORAL DAILY
Status: DISCONTINUED | OUTPATIENT
Start: 2023-12-06 | End: 2023-12-06 | Stop reason: HOSPADM

## 2023-12-05 RX ORDER — SODIUM CHLORIDE 9 MG/ML
75 INJECTION, SOLUTION INTRAVENOUS CONTINUOUS
Status: DISPENSED | OUTPATIENT
Start: 2023-12-05 | End: 2023-12-06

## 2023-12-05 RX ORDER — AMOXICILLIN 250 MG
1 CAPSULE ORAL
Status: DISCONTINUED | OUTPATIENT
Start: 2023-12-05 | End: 2023-12-06 | Stop reason: HOSPADM

## 2023-12-05 RX ORDER — FINASTERIDE 5 MG/1
5 TABLET, FILM COATED ORAL DAILY
Status: DISCONTINUED | OUTPATIENT
Start: 2023-12-06 | End: 2023-12-06 | Stop reason: HOSPADM

## 2023-12-05 RX ORDER — ACETAMINOPHEN 325 MG/1
650 TABLET ORAL EVERY 6 HOURS PRN
Status: DISCONTINUED | OUTPATIENT
Start: 2023-12-05 | End: 2023-12-06 | Stop reason: HOSPADM

## 2023-12-05 RX ORDER — ASPIRIN 81 MG/1
324 TABLET, CHEWABLE ORAL ONCE
Status: COMPLETED | OUTPATIENT
Start: 2023-12-05 | End: 2023-12-05

## 2023-12-05 RX ORDER — POTASSIUM CHLORIDE 20MEQ/15ML
40 LIQUID (ML) ORAL ONCE
Status: COMPLETED | OUTPATIENT
Start: 2023-12-05 | End: 2023-12-05

## 2023-12-05 RX ORDER — LEVOTHYROXINE SODIUM 88 UG/1
88 TABLET ORAL DAILY
Status: DISCONTINUED | OUTPATIENT
Start: 2023-12-06 | End: 2023-12-06 | Stop reason: HOSPADM

## 2023-12-05 RX ORDER — INSULIN LISPRO 100 [IU]/ML
1-5 INJECTION, SOLUTION INTRAVENOUS; SUBCUTANEOUS
Status: DISCONTINUED | OUTPATIENT
Start: 2023-12-05 | End: 2023-12-06 | Stop reason: HOSPADM

## 2023-12-05 RX ORDER — HEPARIN SODIUM 5000 [USP'U]/ML
5000 INJECTION, SOLUTION INTRAVENOUS; SUBCUTANEOUS EVERY 8 HOURS SCHEDULED
Status: DISCONTINUED | OUTPATIENT
Start: 2023-12-05 | End: 2023-12-06 | Stop reason: HOSPADM

## 2023-12-05 RX ORDER — POTASSIUM CHLORIDE 20 MEQ/1
40 TABLET, EXTENDED RELEASE ORAL ONCE
Status: DISCONTINUED | OUTPATIENT
Start: 2023-12-05 | End: 2023-12-05

## 2023-12-05 RX ADMIN — MAGNESIUM SULFATE HEPTAHYDRATE 2 G: 40 INJECTION, SOLUTION INTRAVENOUS at 16:29

## 2023-12-05 RX ADMIN — SENNOSIDES AND DOCUSATE SODIUM 1 TABLET: 50; 8.6 TABLET ORAL at 22:08

## 2023-12-05 RX ADMIN — SODIUM CHLORIDE 1000 ML: 0.9 INJECTION, SOLUTION INTRAVENOUS at 14:54

## 2023-12-05 RX ADMIN — IOHEXOL 100 ML: 350 INJECTION, SOLUTION INTRAVENOUS at 14:15

## 2023-12-05 RX ADMIN — METOPROLOL TARTRATE 25 MG: 25 TABLET, FILM COATED ORAL at 22:08

## 2023-12-05 RX ADMIN — ASPIRIN 324 MG: 81 TABLET, CHEWABLE ORAL at 17:53

## 2023-12-05 RX ADMIN — HEPARIN SODIUM 5000 UNITS: 5000 INJECTION INTRAVENOUS; SUBCUTANEOUS at 22:08

## 2023-12-05 RX ADMIN — SODIUM CHLORIDE 75 ML/HR: 0.9 INJECTION, SOLUTION INTRAVENOUS at 22:16

## 2023-12-05 RX ADMIN — POTASSIUM CHLORIDE 40 MEQ: 1.5 SOLUTION ORAL at 17:54

## 2023-12-05 NOTE — ED PROVIDER NOTES
History  Chief Complaint   Patient presents with    Vomiting     Pt reports vomiting x 1 week, sent by endocrinology for dehydration. Constipation x 1 week. All symptoms began after pt got flu shot last weekd    Shortness of Breath     Pt reports dyspnea on exertion x 1 week      HPI    Prior to Admission Medications   Prescriptions Last Dose Informant Patient Reported? Taking? Alcohol Swabs (Pharmacist Choice Alcohol) PADS  Self Yes No   Sig: USE FOR TESTING AND INJECTIONS UP TO 10 PADS DAILY   Trulicity 4.5 EA/7.6XJ injection  Self Yes No   Sig: INJECT 4.5 MG UNDER THE SKIN ONCE A WEEK.    aspirin (Aspirin Low Dose) 81 mg EC tablet   No No   Sig: TAKE ONE (1) TABLET BY MOUTH DAILY   aspirin 81 MG tablet  Self Yes No   Sig: Take 1 tablet by mouth daily   clopidogrel (PLAVIX) 75 mg tablet   No No   Sig: TAKE 1 TABLET (75 MG TOTAL) BY MOUTH DAILY   ergocalciferol (VITAMIN D2) 50,000 units  Self Yes No   Sig: Take 50,000 Units by mouth once a week   finasteride (PROSCAR) 5 mg tablet   No No   Sig: TAKE ONE (1) TABLET BY MOUTH DAILY   glipiZIDE (GLUCOTROL) 5 mg tablet  Self No No   Sig: TAKE 1 TABLET (5 MG TOTAL) BY MOUTH 2 (TWO) TIMES A DAY   levothyroxine 88 mcg tablet  Self Yes No   Sig: Take 88 mcg by mouth daily   metoprolol tartrate (LOPRESSOR) 25 mg tablet  Self No No   Sig: TAKE 1 TABLET (25 MG TOTAL) BY MOUTH EVERY 12 (TWELVE) HOURS   ondansetron (ZOFRAN-ODT) 4 mg disintegrating tablet  Self Yes No   Sig: TAKE 1 TABLET BY MOUTH 3 TIMES PER DAY FOR 7 DAYS   polyethylene glycol (MIRALAX) 17 g packet   No No   Sig: Take 17 g by mouth daily as needed (constipation) for up to 3 days   rosuvastatin (CRESTOR) 20 MG tablet  Self No No   Sig: TAKE 1 TABLET (20 MG TOTAL) BY MOUTH DAILY   senna-docusate sodium (Senna-S) 8.6-50 mg per tablet   No No   Sig: TAKE 1 TABLET BY MOUTH DAILY AT BEDTIME   tamsulosin (FLOMAX) 0.4 mg  Self No No   Sig: TAKE 1 CAPSULE (0.4 MG TOTAL) BY MOUTH DAILY      Facility-Administered Medications: None       Past Medical History:   Diagnosis Date    Acquired cyst of kidney     Anemia     Benign paroxysmal vertigo, unspecified ear     Cellulitis of leg     Cervical spinal stenosis     Chest pain     Coronary atherosclerosis of native coronary artery     Diabetes mellitus (HCC)     Disease of thyroid gland     Enlarged prostate     Esophageal candidiasis (HCC)     H/o COVID-19 06/30/2022    Hematuria     Herpes zoster     Hyperlipidemia     Hypertension     Incomplete emptying of bladder     Inflamed seborrheic keratosis     Internal hemorrhoids     Malignant neoplasm of skin of trunk     Myocardial infarction ([address]) 2005    Nonmelanoma skin cancer     LAST ASSESSED: 8/9/17    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 infarction     Paroxysmal tachycardia (HCC)     Shortness of breath     Vitamin B deficiency        Past Surgical History:   Procedure Laterality Date    CARDIAC CATHETERIZATION Left 5/15/2023    Procedure: Cardiac Left Heart Cath;  Surgeon: Alcira Oconnell MD;  Location: [address] [place] [address] Ave CATH LAB; Service: Cardiology    CARDIAC CATHETERIZATION N/A 5/15/2023    Procedure: Cardiac pci;  Surgeon: Alcira Oconnell MD;  Location: [facility] [address] CATH LAB; Service: Cardiology    CARDIAC CATHETERIZATION N/A 5/15/2023    Procedure: Cardiac Coronary Angiogram;  Surgeon: Alcira Oconnell MD;  Location: [address] [place] [address] Ave CATH LAB; Service: Cardiology    CARDIAC DEFIBRILLATOR PLACEMENT      COLONOSCOPY  10/07/2008    FIBEROPTIC SCREENING; NO FURTHER RECOMMENDATIONS    CORONARY ANGIOPLASTY WITH STENT PLACEMENT      CYSTOSCOPY  11/06/2015    DIAGNOSTIC; MANAGED BY: Tsering Luna    EGD AND COLONOSCOPY N/A 02/12/2018    Procedure: EGD AND COLONOSCOPY;  Surgeon: Annemarie Savage MD;  Location: [facility];   Service: Gastroenterology    FL INJECTION RIGHT SHOULDER (ARTHROGRAM)  01/04/2022    HIP ARTHROPLASTY Right     INSERT / REPLACE / REMOVE PACEMAKER      JOINT REPLACEMENT Right     THR    TRUNK SKIN LESION EXCISIONAL BIOPSY 2007    MALIGNANT; BCC CHEST       Family History   Problem Relation Age of Onset    Heart disease Mother     Coronary artery disease Mother     Sudden death Mother     Cancer Father         throat; MALIGNANT NEOPLASM OF HEAD, FACE OR NECK    Throat cancer Father     Cancer Family     Coronary artery disease Family      I have reviewed and agree with the history as documented.     E-Cigarette/Vaping    E-Cigarette Use Never User      E-Cigarette/Vaping Substances    Nicotine No     THC No     CBD No     Flavoring No     Other No     Unknown No      Social History     Tobacco Use    Smoking status: Former     Packs/day: 1.00     Years: 10.00     Total pack years: 10.00     Types: Cigarettes     Quit date: 1978     Years since quittin.8    Smokeless tobacco: Never   Vaping Use    Vaping Use: Never used   Substance Use Topics    Alcohol use: Yes     Comment: occasionally 1-2 per month     Drug use: No       Review of Systems    Physical Exam  Physical Exam    Vital Signs  ED Triage Vitals   Temperature Pulse Respirations Blood Pressure SpO2   23 1311 23 1306 23 1306 23 1306 23 1306   (!) 97.1 °F (36.2 °C) 70 16 92/54 100 %      Temp Source Heart Rate Source Patient Position - Orthostatic VS BP Location FiO2 (%)   23 1311 23 1306 23 1306 23 1306 --   Temporal Monitor Sitting Left arm       Pain Score       23 1306       No Pain           Vitals:    23 1306   BP: 92/54   Pulse: 70   Patient Position - Orthostatic VS: Sitting         Visual Acuity      ED Medications  Medications - No data to display    Diagnostic Studies  Results Reviewed       None                   No orders to display              Procedures  Procedures         ED Course                                             Medical Decision Making           Disposition  Final diagnoses:   None     ED Disposition       None          Follow-up Information    None         Patient's Medications   Discharge Prescriptions    No medications on file       No discharge procedures on file.     PDMP Review       None            ED Provider  Electronically Signed by [id number]   Patient Position - Orthostatic VS: Lying Lying Lying Lying         Visual Acuity  Visual Acuity      Flowsheet Row Most Recent Value   L Pupil Size (mm) 2   R Pupil Size (mm) 2   L Pupil Shape Round   R Pupil Shape Round            ED Medications  Medications   sodium chloride 0.9 % infusion (0 mL/hr Intravenous Stopped 12/6/23 0755)   iohexol (OMNIPAQUE) 350 MG/ML injection (SINGLE-DOSE) 100 mL (100 mL Intravenous Given 12/5/23 1415)   sodium chloride 0.9 % bolus 1,000 mL (0 mL Intravenous Stopped 12/5/23 1554)   magnesium sulfate 2 g/50 mL IVPB (premix) 2 g (0 g Intravenous Stopped 12/5/23 1829)   aspirin chewable tablet 324 mg (324 mg Oral Given 12/5/23 1753)   potassium chloride oral solution 40 mEq (40 mEq Oral Given 12/5/23 1754)   potassium chloride oral solution 20 mEq (20 mEq Oral Given 12/6/23 0808)       Diagnostic Studies  Results Reviewed       Procedure Component Value Units Date/Time    Blood culture #2 [[id number]] Collected: 12/05/23 1404    Lab Status: Final result Specimen: Blood from Hand, Left Updated: 12/10/23 1802     Blood Culture No Growth After 5 Days.     Fingerstick Glucose (POCT) [[id number]]  (Abnormal) Collected: 12/06/23 1135    Lab Status: Final result Updated: 12/06/23 1136     POC Glucose 249 mg/dl     Fingerstick Glucose (POCT) [[id number]]  (Normal) Collected: 12/06/23 0803    Lab Status: Final result Updated: 12/06/23 0804     POC Glucose 114 mg/dl     Basic metabolic panel [[id number]]  (Abnormal) Collected: 12/06/23 0558    Lab Status: Final result Specimen: Blood from Arm, Right Updated: 12/06/23 0659     Sodium 139 mmol/L      Potassium 3.4 mmol/L      Chloride 108 mmol/L      CO2 27 mmol/L      ANION GAP 4 mmol/L      BUN 12 mg/dL      Creatinine 1.10 mg/dL      Glucose 108 mg/dL      Glucose, Fasting 108 mg/dL      Calcium 8.5 mg/dL      eGFR 62 ml/min/1.73sq m     Narrative:      [place] guidelines for Chronic Kidney Disease (CKD):     Stage 1 with normal or high GFR (GFR > 90 mL/min/1.73 square meters)    Stage 2 Mild CKD (GFR = 60-89 mL/min/1.73 square meters)    Stage 3A Moderate CKD (GFR = 45-59 mL/min/1.73 square meters)    Stage 3B Moderate CKD (GFR = 30-44 mL/min/1.73 square meters)    Stage 4 Severe CKD (GFR = 15-29 mL/min/1.73 square meters)    Stage 5 End Stage CKD (GFR <15 mL/min/1.73 square meters)  Note: GFR calculation is accurate only with a steady state creatinine    CBC (With Platelets) [[id number]]  (Abnormal) Collected: 12/06/23 0558    Lab Status: Final result Specimen: Blood from Arm, Right Updated: 12/06/23 0609     WBC 8.57 Thousand/uL      RBC 3.08 Million/uL      Hemoglobin 11.7 g/dL      Hematocrit 33.9 %       fL      MCH 38.0 pg      MCHC 34.5 g/dL      RDW 15.2 %      Platelets 690 Thousands/uL      MPV 9.4 fL     HS Troponin I 4hr [[id number]]  (Normal) Collected: 12/05/23 2207    Lab Status: Final result Specimen: Blood from Arm, Right Updated: 12/05/23 2242     hs TnI 4hr 20 ng/L      Delta 4hr hsTnI -1 ng/L     Fingerstick Glucose (POCT) [[id number]]  (Normal) Collected: 12/05/23 2200    Lab Status: Final result Updated: 12/05/23 2201     POC Glucose 140 mg/dl     HS Troponin I 2hr [[id number]]  (Normal) Collected: 12/05/23 2021    Lab Status: Final result Specimen: Blood from Arm, Right Updated: 12/05/23 2052     hs TnI 2hr 21 ng/L      Delta 2hr hsTnI 0 ng/L     HS Troponin 0hr (reflex protocol) [[id number]]  (Normal) Collected: 12/05/23 1756    Lab Status: Final result Specimen: Blood from Arm, Right Updated: 12/05/23 1839     hs TnI 0hr 21 ng/L     Urine Microscopic [[id number]]  (Normal) Collected: 12/05/23 1640    Lab Status: Final result Specimen: Urine, Clean Catch Updated: 12/05/23 1709     RBC, UA 1-2 /hpf      WBC, UA 1-2 /hpf      Epithelial Cells Occasional /hpf      Bacteria, UA None Seen /hpf     UA w Reflex to Microscopic w Reflex to Culture [[id number]]  (Abnormal) Collected: 12/05/23 1640    Lab Status: Final result Specimen: Urine, Clean Catch Updated: 12/05/23 1701     Color, UA Light Yellow     Clarity, UA Clear     Specific Gravity, UA >=1.050     pH, UA 7.5     Leukocytes, UA Negative     Nitrite, UA Negative     Protein, UA 50 (1+) mg/dl      Glucose, UA Negative mg/dl      Ketones, UA Negative mg/dl      Urobilinogen, UA 2.0 mg/dl      Bilirubin, UA Negative     Occult Blood, UA Trace    Procalcitonin [[id number]]  (Normal) Collected: 12/05/23 1345    Lab Status: Final result Specimen: Blood from Arm, Right Updated: 12/05/23 1422     Procalcitonin 0.05 ng/ml     Comprehensive metabolic panel [[id number]]  (Abnormal) Collected: 12/05/23 1345    Lab Status: Final result Specimen: Blood from Arm, Right Updated: 12/05/23 1412     Sodium 139 mmol/L      Potassium 3.3 mmol/L      Chloride 100 mmol/L      CO2 30 mmol/L      ANION GAP 9 mmol/L      BUN 16 mg/dL      Creatinine 1.35 mg/dL      Glucose 147 mg/dL      Calcium 10.0 mg/dL      AST 21 U/L      ALT 17 U/L      Alkaline Phosphatase 42 U/L      Total Protein 7.9 g/dL      Albumin 4.3 g/dL      Total Bilirubin 0.88 mg/dL      eGFR 48 ml/min/1.73sq m     Narrative:      [facility] guidelines for Chronic Kidney Disease (CKD):     Stage 1 with normal or high GFR (GFR > 90 mL/min/1.73 square meters)    Stage 2 Mild CKD (GFR = 60-89 mL/min/1.73 square meters)    Stage 3A Moderate CKD (GFR = 45-59 mL/min/1.73 square meters)    Stage 3B Moderate CKD (GFR = 30-44 mL/min/1.73 square meters)    Stage 4 Severe CKD (GFR = 15-29 mL/min/1.73 square meters)    Stage 5 End Stage CKD (GFR <15 mL/min/1.73 square meters)  Note: GFR calculation is accurate only with a steady state creatinine    Lipase [[id number]]  (Normal) Collected: 12/05/23 1345    Lab Status: Final result Specimen: Blood from Arm, Right Updated: 12/05/23 1412     Lipase 45 u/L     Lactic acid [[id number]]  (Normal) Collected: 12/05/23 1345    Lab Status: Final result Specimen: Blood from Arm, Right Updated: 12/05/23 1410     LACTIC ACID 1.8 mmol/L     Narrative:      Result may be elevated if tourniquet was used during collection. Protime-INR [[id number]]  (Normal) Collected: 12/05/23 1345    Lab Status: Final result Specimen: Blood from Arm, Right Updated: 12/05/23 1405     Protime 13.4 seconds      INR 0.96    APTT [[id number]]  (Normal) Collected: 12/05/23 1345    Lab Status: Final result Specimen: Blood from Arm, Right Updated: 12/05/23 1405     PTT 28 seconds     CBC and differential [[id number]]  (Abnormal) Collected: 12/05/23 1345    Lab Status: Final result Specimen: Blood from Arm, Right Updated: 12/05/23 1354     WBC 6.73 Thousand/uL      RBC 3.65 Million/uL      Hemoglobin 14.1 g/dL      Hematocrit 39.8 %       fL      MCH 38.6 pg      MCHC 35.4 g/dL      RDW 15.2 %      MPV 9.3 fL      Platelets 820 Thousands/uL      nRBC 0 /100 WBCs      Neutrophils Relative 67 %      Immat GRANS % 1 %      Lymphocytes Relative 20 %      Monocytes Relative 9 %      Eosinophils Relative 2 %      Basophils Relative 1 %      Neutrophils Absolute 4.60 Thousands/µL      Immature Grans Absolute 0.04 Thousand/uL      Lymphocytes Absolute 1.37 Thousands/µL      Monocytes Absolute 0.57 Thousand/µL      Eosinophils Absolute 0.11 Thousand/µL      Basophils Absolute 0.04 Thousands/µL                    PE Study with CT abdomen & pelvis with contrast   ED Interpretation by Karime Fuller DO (12/05 0734)   There is no evidence of acute pulmonary embolism or aortic dissection. 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 Trace pericardial effusion. There is no acute abdominal/pelvic inflammatory process. Cholelithiasis without CT evidence for acute cholecystitis. Stable renal cortical lesions and chronic parenchymal changes compared to 9/29/2023. Prostatomegaly. Correlate with PSA titers.         Final Result by Jose Nicholas MD (12/05 8599)   There is no evidence of acute pulmonary embolism or aortic dissection. 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 Trace pericardial effusion. There is no acute abdominal/pelvic inflammatory process. Cholelithiasis without CT evidence for acute cholecystitis. Stable renal cortical lesions and chronic parenchymal changes compared to 9/29/2023. Prostatomegaly. Correlate with PSA titers. Workstation performed: [id number]         XR chest portable   Final Result by Gail Murphy MD (12/06 8464)      No acute cardiopulmonary disease. Workstation performed: [id number]                    Procedures  Procedures         ED Course  ED Course as of 12/17/23 0418   Tue Dec 05, 2023   1519 Creatinine(!): 1.35  Mild dehydration, getting IV fluid. 1915 hs TnI 0hr: 21                                             Medical Decision Making  Dehydration after GI upset at home. Patient has acute kidney injury and requires admission for rehydration. Amount and/or Complexity of Data Reviewed  Labs: ordered. Decision-making details documented in ED Course. Radiology: ordered. Risk  OTC drugs. Prescription drug management. Decision regarding hospitalization. Disposition  Final diagnoses:   Vomiting   Dehydration     Time reflects when diagnosis was documented in both MDM as applicable and the Disposition within this note       Time User Action Codes Description Comment    12/5/2023  7:22 PM Samuel Hassan Add [R11.10] Vomiting     12/5/2023  7:22 PM Samuel Hassan Add [E86.0] Dehydration           ED Disposition       ED Disposition   Admit    Condition   Stable    Date/Time   Tue Dec 5, 2023  7:22 PM    Comment   [facility] and the patient's admission status was agreed to be Admission Status: observation status to the service of Dr. Ady Martinez .                Follow-up Information    None         Discharge Medication List as of 12/6/2023  1:11 PM        CONTINUE these medications which have NOT CHANGED    Details   clopidogrel (PLAVIX) 75 mg tablet TAKE 1 TABLET (75 MG TOTAL) BY MOUTH DAILY, Starting Wed 11/22/2023, Normal      ergocalciferol (VITAMIN D2) 50,000 units Take 50,000 Units by mouth once a week, Starting Wed 9/21/2022, Historical Med      glipiZIDE (GLUCOTROL) 5 mg tablet TAKE 1 TABLET (5 MG TOTAL) BY MOUTH 2 (TWO) TIMES A DAY, Starting Mon 4/3/2023, Normal      levothyroxine 88 mcg tablet Take 88 mcg by mouth daily, Starting Mon 8/28/2023, Historical Med      metoprolol tartrate (LOPRESSOR) 25 mg tablet TAKE 1 TABLET (25 MG TOTAL) BY MOUTH EVERY 12 (TWELVE) HOURS, Starting Mon 9/18/2023, Normal      MILK THISTLE PO Take 2 tablets by mouth in the morning Take in the morning with food, Historical Med      rosuvastatin (CRESTOR) 20 MG tablet TAKE 1 TABLET (20 MG TOTAL) BY MOUTH DAILY, Starting Tue 6/20/2023, Normal      tamsulosin (FLOMAX) 0.4 mg TAKE 1 CAPSULE (0.4 MG TOTAL) BY MOUTH DAILY, Starting Thu 4/13/2023, Normal      Trulicity 4.5 KP/4.5JN injection INJECT 4.5 MG UNDER THE SKIN ONCE A WEEK., Historical Med      aspirin 81 MG tablet Take 1 tablet by mouth daily, Historical Med      senna-docusate sodium (Senna-S) 8.6-50 mg per tablet TAKE 1 TABLET BY MOUTH DAILY AT BEDTIME, Starting Fri 11/24/2023, Normal      Alcohol Swabs (Pharmacist Choice Alcohol) PADS USE FOR TESTING AND INJECTIONS UP TO 10 PADS DAILY, Historical Med      aspirin (Aspirin Low Dose) 81 mg EC tablet TAKE ONE (1) TABLET BY MOUTH DAILY, Starting Thu 11/2/2023, Normal      finasteride (PROSCAR) 5 mg tablet TAKE ONE (1) TABLET BY MOUTH DAILY, Starting Mon 10/30/2023, Normal      ondansetron (ZOFRAN-ODT) 4 mg disintegrating tablet TAKE 1 TABLET BY MOUTH 3 TIMES PER DAY FOR 7 DAYS, Historical Med      polyethylene glycol (MIRALAX) 17 g packet Take 17 g by mouth daily as needed (constipation) for up to 3 days, Starting Mon 10/2/2023, Until Thu 10/5/2023 at 2359, Normal             No discharge procedures on file.     PDMP Review       None            ED Provider  Electronically Signed by             Yvonne Bales DO  12/17/23 9000

## 2023-12-06 ENCOUNTER — TRANSITIONAL CARE MANAGEMENT (OUTPATIENT)
Age: 83
End: 2023-12-06

## 2023-12-06 VITALS
TEMPERATURE: 98 F | OXYGEN SATURATION: 97 % | DIASTOLIC BLOOD PRESSURE: 58 MMHG | RESPIRATION RATE: 19 BRPM | SYSTOLIC BLOOD PRESSURE: 115 MMHG | HEART RATE: 69 BPM

## 2023-12-06 LAB
ANION GAP SERPL CALCULATED.3IONS-SCNC: 4 MMOL/L
BUN SERPL-MCNC: 12 MG/DL (ref 5–25)
CALCIUM SERPL-MCNC: 8.5 MG/DL (ref 8.4–10.2)
CHLORIDE SERPL-SCNC: 108 MMOL/L (ref 96–108)
CO2 SERPL-SCNC: 27 MMOL/L (ref 21–32)
CREAT SERPL-MCNC: 1.1 MG/DL (ref 0.6–1.3)
ERYTHROCYTE [DISTWIDTH] IN BLOOD BY AUTOMATED COUNT: 15.2 % (ref 11.6–15.1)
GFR SERPL CREATININE-BSD FRML MDRD: 62 ML/MIN/1.73SQ M
GLUCOSE P FAST SERPL-MCNC: 108 MG/DL (ref 65–99)
GLUCOSE SERPL-MCNC: 108 MG/DL (ref 65–140)
GLUCOSE SERPL-MCNC: 114 MG/DL (ref 65–140)
GLUCOSE SERPL-MCNC: 249 MG/DL (ref 65–140)
HCT VFR BLD AUTO: 33.9 % (ref 36.5–49.3)
HGB BLD-MCNC: 11.7 G/DL (ref 12–17)
MCH RBC QN AUTO: 38 PG (ref 26.8–34.3)
MCHC RBC AUTO-ENTMCNC: 34.5 G/DL (ref 31.4–37.4)
MCV RBC AUTO: 110 FL (ref 82–98)
PLATELET # BLD AUTO: 207 THOUSANDS/UL (ref 149–390)
PMV BLD AUTO: 9.4 FL (ref 8.9–12.7)
POTASSIUM SERPL-SCNC: 3.4 MMOL/L (ref 3.5–5.3)
RBC # BLD AUTO: 3.08 MILLION/UL (ref 3.88–5.62)
SODIUM SERPL-SCNC: 139 MMOL/L (ref 135–147)
WBC # BLD AUTO: 8.57 THOUSAND/UL (ref 4.31–10.16)

## 2023-12-06 PROCEDURE — 99239 HOSP IP/OBS DSCHRG MGMT >30: CPT | Performed by: INTERNAL MEDICINE

## 2023-12-06 PROCEDURE — 85027 COMPLETE CBC AUTOMATED: CPT | Performed by: INTERNAL MEDICINE

## 2023-12-06 PROCEDURE — 80048 BASIC METABOLIC PNL TOTAL CA: CPT | Performed by: INTERNAL MEDICINE

## 2023-12-06 PROCEDURE — 36415 COLL VENOUS BLD VENIPUNCTURE: CPT | Performed by: INTERNAL MEDICINE

## 2023-12-06 PROCEDURE — 82948 REAGENT STRIP/BLOOD GLUCOSE: CPT

## 2023-12-06 RX ORDER — POTASSIUM CHLORIDE 20MEQ/15ML
20 LIQUID (ML) ORAL ONCE
Status: COMPLETED | OUTPATIENT
Start: 2023-12-06 | End: 2023-12-06

## 2023-12-06 RX ORDER — BISACODYL 10 MG
10 SUPPOSITORY, RECTAL RECTAL ONCE
Status: DISCONTINUED | OUTPATIENT
Start: 2023-12-06 | End: 2023-12-06 | Stop reason: HOSPADM

## 2023-12-06 RX ADMIN — INSULIN LISPRO 2 UNITS: 100 INJECTION, SOLUTION INTRAVENOUS; SUBCUTANEOUS at 12:55

## 2023-12-06 RX ADMIN — METOPROLOL TARTRATE 25 MG: 25 TABLET, FILM COATED ORAL at 08:08

## 2023-12-06 RX ADMIN — FINASTERIDE 5 MG: 5 TABLET, FILM COATED ORAL at 08:12

## 2023-12-06 RX ADMIN — POTASSIUM CHLORIDE 20 MEQ: 1.5 SOLUTION ORAL at 08:08

## 2023-12-06 RX ADMIN — TAMSULOSIN HYDROCHLORIDE 0.4 MG: 0.4 CAPSULE ORAL at 08:08

## 2023-12-06 RX ADMIN — LEVOTHYROXINE SODIUM 88 MCG: 88 TABLET ORAL at 08:13

## 2023-12-06 RX ADMIN — CLOPIDOGREL BISULFATE 75 MG: 75 TABLET ORAL at 08:08

## 2023-12-06 RX ADMIN — HEPARIN SODIUM 5000 UNITS: 5000 INJECTION INTRAVENOUS; SUBCUTANEOUS at 05:57

## 2023-12-06 RX ADMIN — ASPIRIN 81 MG: 81 TABLET, COATED ORAL at 08:08

## 2023-12-06 NOTE — UTILIZATION REVIEW
Initial Clinical Review    Admission: Date/Time/Statement:   Admission Orders (From admission, onward)       Ordered        12/05/23 1923  Place in Observation  Once                          Orders Placed This Encounter   Procedures    Place in Observation     Standing Status:   Standing     Number of Occurrences:   1     Order Specific Question:   Level of Care     Answer:   Med Surg [16]     Order Specific Question:   Bed request comments     Answer:   tele     ED Arrival Information       Expected   -    Arrival   12/5/2023 12:42    Acuity   Emergent              Means of arrival   Walk-In    Escorted by   Family Member    Service   Hospitalist    Admission type   Emergency              Arrival complaint   VOMITING             Chief Complaint   Patient presents with    Vomiting     Pt reports vomiting x 1 week, sent by endocrinology for dehydration. Constipation x 1 week. All symptoms began after pt got flu shot last weekd    Shortness of Breath     Pt reports dyspnea on exertion x 1 week        Initial Presentation: 81 y.o. male to the ED from home with complaints of vomiting for a week. Admitted under observation for john. H/O  coronary artery disease, ischemic cardiomyopathy, diabetes. Arrives with creat 1.35. Started on IV fluids. Potassium 2.2. Given po supplement, recheck.      Date:     Day 2:      ED Triage Vitals   Temperature Pulse Respirations Blood Pressure SpO2   12/05/23 1311 12/05/23 1306 12/05/23 1306 12/05/23 1306 12/05/23 1306   (!) 97.1 °F (36.2 °C) 70 16 92/54 100 %      Temp Source Heart Rate Source Patient Position - Orthostatic VS BP Location FiO2 (%)   12/05/23 1311 12/05/23 1306 12/05/23 1306 12/05/23 1306 --   Temporal Monitor Sitting Left arm       Pain Score       12/05/23 1306       No Pain          Wt Readings from Last 1 Encounters:   11/14/23 60.2 kg (132 lb 12.8 oz)     Additional Vital Signs: Vital Signs (last 2 days)    Date/Time Temp Pulse Resp BP MAP (mmHg) SpO2 O2 Device Patient Position - Orthostatic VS   12/06/23 0756 -- -- -- -- -- 97 % -- --   12/06/23 0755 98 °F (36.7 °C) 77 -- 115/58 82 98 % None (Room air) Lying   12/06/23 0600 -- 77 22 110/60 79 98 % None (Room air) Lying   12/05/23 2307 -- 83 16 135/63 90 97 % None (Room air) Lying   12/05/23 2201 -- 76 16 114/65 -- 96 % None (Room air) Lying   12/05/23 2100 -- 68 20 121/61 85 97 % -- --   12/05/23 2030 -- 73 22 124/58 84 97 % None (Room air) Lying   12/05/23 1730 -- 71 22 118/74 91 96 % None (Room air) Lying   12/05/23 1600 -- 68 22 123/57 82 97 % None (Room air) Lying   12/05/23 1529 -- -- -- -- -- -- None (Room air) --   12/05/23 1500 97.6 °F (36.4 °C) -- -- -- -- -- -- --   12/05/23 1445 -- 67 22 116/54 78 99 % None (Room air) Lying   12/05/23 1430 -- 68 20 -- -- 97 % None (Room air) --   12/05/23 1415 -- 69 -- 101/56 75 96 % None (Room air) Lying   12/05/23 1330 -- 67 23 Abnormal  115/58 80 100 % None (Room air) Lying   12/05/23 1321 96.7 °F (35.9 °C) Abnormal  -- -- -- -- -- -- --   12/05/23 1311 97.1 °F (36.2 °C) Abnormal  -- -- -- -- -- -- --   12/05/23 1306 -- 70 16 92/54 -- 100 % None (Room air) Sittin     Pertinent Labs/Diagnostic Test Results:   12/5 EKG: Narrative & Impression    Normal sinus rhythm  Septal infarct , age undetermined  ST & T wave abnormality, consider inferolateral ischemia  Abnormal ECG  When compared with ECG of 15-MAY-2023 14:08, (unconfirmed)  Significant changes have occurred     PE Study with CT abdomen & pelvis with contrast   ED Interpretation by Shira Bermudez DO (12/05 1624)   There is no evidence of acute pulmonary embolism or aortic dissection. 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 Trace pericardial effusion. There is no acute abdominal/pelvic inflammatory process. Cholelithiasis without CT evidence for acute cholecystitis. Stable renal cortical lesions and chronic parenchymal changes compared to 9/29/2023. Prostatomegaly.  Correlate with PSA titers. Final Result by Heather Craig MD (12/05 1554)   There is no evidence of acute pulmonary embolism or aortic dissection. 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 Trace pericardial effusion. There is no acute abdominal/pelvic inflammatory process. Cholelithiasis without CT evidence for acute cholecystitis. Stable renal cortical lesions and chronic parenchymal changes compared to 9/29/2023. Prostatomegaly. Correlate with PSA titers.       Workstation performed: [id number]         XR chest portable    (Results Pending)         Results from last 7 days   Lab Units 12/06/23  0558 12/05/23  1345   WBC Thousand/uL 8.57 6.73   HEMOGLOBIN g/dL 11.7* 14.1   HEMATOCRIT % 33.9* 39.8   PLATELETS Thousands/uL 207 257   NEUTROS ABS Thousands/µL  --  4.60         Results from last 7 days   Lab Units 12/06/23  0558 12/05/23  1345   SODIUM mmol/L 139 139   POTASSIUM mmol/L 3.4* 3.3*   CHLORIDE mmol/L 108 100   CO2 mmol/L 27 30   ANION GAP mmol/L 4 9   BUN mg/dL 12 16   CREATININE mg/dL 1.10 1.35*   EGFR ml/min/1.73sq m 62 48   CALCIUM mg/dL 8.5 10.0     Results from last 7 days   Lab Units 12/05/23  1345   AST U/L 21   ALT U/L 17   ALK PHOS U/L 42   TOTAL PROTEIN g/dL 7.9   ALBUMIN g/dL 4.3   TOTAL BILIRUBIN mg/dL 0.88     Results from last 7 days   Lab Units 12/06/23  0803 12/05/23  2200   POC GLUCOSE mg/dl 114 140     Results from last 7 days   Lab Units 12/06/23  0558 12/05/23  1345   GLUCOSE RANDOM mg/dL 108 147*         BETA-HYDROXYBUTYRATE   Date Value Ref Range Status   06/30/2022 6.6 (H) <0.6 mmol/L Final         Results from last 7 days   Lab Units 12/05/23 2207 12/05/23 2021 12/05/23  1756   HS TNI 0HR ng/L  --   --  21   HS TNI 2HR ng/L  --  21  --    HSTNI D2 ng/L  --  0  --    HS TNI 4HR ng/L 20  --   --    HSTNI D4 ng/L -1  --   --          Results from last 7 days   Lab Units 12/05/23  1345   PROTIME seconds 13.4   INR  0.96   PTT seconds 28 Results from last 7 days   Lab Units 12/05/23  1345   PROCALCITONIN ng/ml 0.05     Results from last 7 days   Lab Units 12/05/23  1345   LACTIC ACID mmol/L 1.8       Results from last 7 days   Lab Units 12/05/23  1345   LIPASE u/L 45     Results from last 7 days   Lab Units 12/05/23  1640   CLARITY UA  Clear   COLOR UA  Light Yellow   SPEC GRAV UA  >=1.050*   PH UA  7.5   GLUCOSE UA mg/dl Negative   KETONES UA mg/dl Negative   BLOOD UA  Trace*   PROTEIN UA mg/dl 50 (1+)*   NITRITE UA  Negative   BILIRUBIN UA  Negative   UROBILINOGEN UA (BE) mg/dl 2.0*   LEUKOCYTES UA  Negative   WBC UA /hpf 1-2   RBC UA /hpf 1-2   BACTERIA UA /hpf None Seen   EPITHELIAL CELLS WET PREP /hpf Occasional       Results from last 7 days   Lab Units 12/05/23  1404   BLOOD CULTURE  Received in Microbiology Lab. Culture in Progress.              ED Treatment:   Medication Administration from 12/05/2023 1242 to 12/06/2023 0841         Date/Time Order Dose Route Action Comments     12/05/2023 1454 EST sodium chloride 0.9 % bolus 1,000 mL 1,000 mL Intravenous New Bag --     12/05/2023 1629 EST magnesium sulfate 2 g/50 mL IVPB (premix) 2 g 2 g Intravenous New Bag --     12/05/2023 1753 EST aspirin chewable tablet 324 mg 324 mg Oral Given --     12/05/2023 1754 EST potassium chloride oral solution 40 mEq 40 mEq Oral Given --     12/06/2023 0808 EST aspirin (ECOTRIN LOW STRENGTH) EC tablet 81 mg 81 mg Oral Given --     12/06/2023 0808 EST clopidogrel (PLAVIX) tablet 75 mg 75 mg Oral Given --     12/06/2023 0812 EST finasteride (PROSCAR) tablet 5 mg 5 mg Oral Given --     12/06/2023 0813 EST levothyroxine tablet 88 mcg 88 mcg Oral Given --     12/06/2023 0808 EST metoprolol tartrate (LOPRESSOR) tablet 25 mg 25 mg Oral Given --     12/05/2023 2208 EST metoprolol tartrate (LOPRESSOR) tablet 25 mg 25 mg Oral Given --     12/05/2023 2208 EST senna-docusate sodium (SENOKOT S) 8.6-50 mg per tablet 1 tablet 1 tablet Oral Given --     12/06/2023 0808 EST tamsulosin (FLOMAX) capsule 0.4 mg 0.4 mg Oral Given --     12/05/2023 2216 EST sodium chloride 0.9 % infusion 75 mL/hr Intravenous New Bag --     12/06/2023 0557 EST heparin (porcine) subcutaneous injection 5,000 Units 5,000 Units Subcutaneous Given --     12/05/2023 2208 EST heparin (porcine) subcutaneous injection 5,000 Units 5,000 Units Subcutaneous Given --     12/06/2023 0808 EST potassium chloride oral solution 20 mEq 20 mEq Oral Given --          Past Medical History:   Diagnosis Date    Acquired cyst of kidney     Anemia     Benign paroxysmal vertigo, unspecified ear     Cellulitis of leg     Cervical spinal stenosis     Chest pain     Coronary atherosclerosis of native coronary artery     Diabetes mellitus ([address])     Disease of thyroid gland     Enlarged prostate     Esophageal candidiasis ([address])     H/o COVID-19 06/30/2022    Hematuria     Herpes zoster     Hyperlipidemia     Hypertension     Incomplete emptying of bladder     Inflamed seborrheic keratosis     Internal hemorrhoids     Malignant neoplasm of skin of trunk     Myocardial infarction ([address]) 2005    Nonmelanoma skin cancer     LAST ASSESSED: 8/9/17    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 infarction     Paroxysmal tachycardia (HCC)     Shortness of breath     Vitamin B deficiency      Present on Admission:   JESSA (acute kidney injury) ([address])   Coronary artery disease of native artery of native heart with stable angina pectoris (HCC)   Ischemic cardiomyopathy      Admitting Diagnosis: Vomiting [R11.10]  Shortness of breath [R06.02]  Age/Sex: 81 y.o. male  Admission Orders:  Scheduled Medications:  aspirin, 81 mg, Oral, Daily  atorvastatin, 40 mg, Oral, Daily With Dinner  bisacodyl, 10 mg, Rectal, Once  clopidogrel, 75 mg, Oral, Daily  finasteride, 5 mg, Oral, Daily  heparin (porcine), 5,000 Units, Subcutaneous, Q8H [address]  insulin lispro, 1-5 Units, Subcutaneous, 4x Daily (AC & HS)  levothyroxine, 88 mcg, Oral, Daily  metoprolol tartrate, 25 mg, Oral, Q12H [address]  senna-docusate sodium, 1 tablet, Oral, HS  tamsulosin, 0.4 mg, Oral, Daily      Continuous IV Infusions:     PRN Meds:  acetaminophen, 650 mg, Oral, Q6H PRN        None    Network Utilization Review Department  ATTENTION: Please call with any questions or concerns to [phone] and carefully listen to the prompts so that you are directed to the right person. All voicemails are confidential.   For Discharge needs, contact Care Management DC Support Team at [phone] opt. [phone]  Send all requests for admission clinical reviews, approved or denied determinations and any other requests to dedicated fax number below belonging to the campus where the patient is receiving treatment.  List of dedicated fax numbers for the Facilities:  [place] DENIALS (Administrative/Medical Necessity) [phone]   DISCHARGE SUPPORT TEAM (NETWORK) [id number] (Maternity/NICU/Pediatrics) [phone]   [address] 1000 [facility] [phone] 1505 [facility] 207 [facility] [address] [place] [address] [address] [address] [facility] [address] 1300 [facility] [address]  [phone]

## 2023-12-06 NOTE — H&P
[address]  H&P  Name: Ind[place] Giron 81 y.o. male I MRN: [id number]  Unit/Bed#: ED 23 I Date of Admission: 12/5/2023   Date of Service: 12/5/2023 I Hospital Day: 0      Assessment/Plan   * JESSA (acute kidney injury) [facility])  Assessment & Plan  Patient presents to the ED for dehydration. Patient reports that he has been having intermittent vomiting over the last week. Reports that he has not been able to keep much down orally. Denies any fever/chills, chest pain, shortness of breath, diarrhea, urinary complaints. Creatinine elevated 1.35 on admission  Start on gentle IVF overnight  Monitor I&O  Daily BMP    Hypokalemia  Assessment & Plan  Potassium 3.3 on admission  Supplementation with 40mEq oral K  Recheck with AM labs    Type 2 diabetes mellitus with stage 3 chronic kidney disease, with long-term current use of insulin, unspecified whether stage 3a or 3b CKD ([address])  Assessment & Plan  Lab Results   Component Value Date    HGBA1C 9.4 (H) 09/29/2023       No results for input(s): "POCGLU" in the last 72 hours. 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 Trace pericardial effusion  Outpatient Cardiology follow up         VTE Pharmacologic Prophylaxis: VTE Score: 5 Moderate Risk (Score 3-4) - Pharmacological DVT Prophylaxis Ordered: heparin.   Code Status: Level 1 - Full Code   Discussion with family:  Update in the AM.     Anticipated Length of Stay: Patient will be admitted on an observation basis with an anticipated length of stay of less than 2 midnights secondary to JESSA. Chief Complaint: Dehydration    History of Present Illness:  Damon Richardson is a 81 y.o. male with a PMH of coronary artery disease, ischemic cardiomyopathy, diabetes. Patient presents to the ED for dehydration. Patient reports that he has been having intermittent vomiting over the last week. Reports that he has not been able to keep much down orally. Denies any fever/chills, chest pain, shortness of breath, diarrhea, urinary complaints. Patient required medical mission for JESSA and requiring IV hydration. All patient questions answered to the best of my ability. Review of Systems:  Review of Systems   Constitutional:  Positive for appetite change. Negative for chills and fever. HENT:  Negative for ear pain and sore throat. Eyes:  Negative for pain and visual disturbance. Respiratory:  Negative for cough and shortness of breath. Cardiovascular:  Negative for chest pain and palpitations. Gastrointestinal:  Positive for constipation, nausea and vomiting. Negative for abdominal pain. Genitourinary:  Negative for dysuria and hematuria. Musculoskeletal:  Negative for arthralgias and back pain. Skin:  Negative for color change and rash. Neurological:  Positive for weakness. Negative for seizures and syncope. All other systems reviewed and are negative.       Past Medical and Surgical History:   Past Medical History:   Diagnosis Date    Acquired cyst of kidney     Anemia     Benign paroxysmal vertigo, unspecified ear     Cellulitis of leg     Cervical spinal stenosis     Chest pain     Coronary atherosclerosis of native coronary artery     Diabetes mellitus ([address])     Disease of thyroid gland     Enlarged prostate     Esophageal candidiasis ([address])     H/o COVID-19 06/30/2022    Hematuria     Herpes zoster     Hyperlipidemia     Hypertension     Incomplete emptying of bladder     Inflamed seborrheic keratosis     Internal hemorrhoids     Malignant neoplasm of skin of trunk     Myocardial infarction [facility]) 2005    Nonmelanoma skin cancer     LAST ASSESSED: 8/9/17    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 infarction     Paroxysmal tachycardia (HCC)     Shortness of breath     Vitamin B deficiency        Past Surgical History:   Procedure Laterality Date    CARDIAC CATHETERIZATION Left 5/15/2023    Procedure: Cardiac Left Heart Cath;  Surgeon: Ryan Navarro MD;  Location: [facility]; Service: Cardiology    CARDIAC CATHETERIZATION N/A 5/15/2023    Procedure: Cardiac pci;  Surgeon: Ryan Navarro MD;  Location: [address] CATH LAB; Service: Cardiology    CARDIAC CATHETERIZATION N/A 5/15/2023    Procedure: Cardiac Coronary Angiogram;  Surgeon: Ryan Navarro MD;  Location: [address] CATH LAB; Service: Cardiology    CARDIAC DEFIBRILLATOR PLACEMENT      COLONOSCOPY  10/07/2008    FIBEROPTIC SCREENING; NO FURTHER RECOMMENDATIONS    CORONARY ANGIOPLASTY WITH STENT PLACEMENT      CYSTOSCOPY  11/06/2015    DIAGNOSTIC; MANAGED BY: Andrea Live    EGD AND COLONOSCOPY N/A 02/12/2018    Procedure: EGD AND COLONOSCOPY;  Surgeon: Ron Tello MD;  Location: [facility] GI LAB; Service: Gastroenterology    FL INJECTION RIGHT SHOULDER (ARTHROGRAM)  01/04/2022    HIP ARTHROPLASTY Right     INSERT / REPLACE / REMOVE PACEMAKER      JOINT REPLACEMENT Right     THR    TRUNK SKIN LESION EXCISIONAL BIOPSY  08/22/2007    MALIGNANT; BCC CHEST       Meds/Allergies:  Prior to Admission medications    Medication Sig Start Date End Date Taking?  Authorizing Provider   Alcohol Swabs (Pharmacist Choice Alcohol) PADS USE FOR TESTING AND INJECTIONS UP TO 10 PADS DAILY 9/23/22   Historical Provider, MD   aspirin (Aspirin Low Dose) 81 mg EC tablet TAKE ONE (1) TABLET BY MOUTH DAILY 11/2/23   Rosie Luis MD   aspirin 81 MG tablet Take 1 tablet by mouth daily    Historical Provider, MD   clopidogrel (PLAVIX) 75 mg tablet TAKE 1 TABLET (75 MG TOTAL) BY MOUTH DAILY 11/22/23   Yesi Ramos PA-C   ergocalciferol (VITAMIN D2) 50,000 units Take 50,000 Units by mouth once a week 9/21/22   Historical Provider, MD   finasteride (PROSCAR) 5 mg tablet TAKE ONE (1) TABLET BY MOUTH DAILY 10/30/23   Clayton Vargas MD   glipiZIDE (GLUCOTROL) 5 mg tablet TAKE 1 TABLET (5 MG TOTAL) BY MOUTH 2 (TWO) TIMES A DAY 4/3/23   Joyce Shah MD   levothyroxine 88 mcg tablet Take 88 mcg by mouth daily 8/28/23   Historical Provider, MD   metoprolol tartrate (LOPRESSOR) 25 mg tablet TAKE 1 TABLET (25 MG TOTAL) BY MOUTH EVERY 12 (TWELVE) HOURS 9/18/23   MELODIE Lama   ondansetron (ZOFRAN-ODT) 4 mg disintegrating tablet TAKE 1 TABLET BY MOUTH 3 TIMES PER DAY FOR 7 DAYS 9/19/23   Historical Provider, MD   polyethylene glycol (MIRALAX) 17 g packet Take 17 g by mouth daily as needed (constipation) for up to 3 days 10/2/23 10/5/23  Jerri Chen MD   rosuvastatin (CRESTOR) 20 MG tablet TAKE 1 TABLET (20 MG TOTAL) BY MOUTH DAILY 6/20/23   Joyce Shah MD   senna-docusate sodium (Senna-S) 8.6-50 mg per tablet TAKE 1 TABLET BY MOUTH DAILY AT BEDTIME 11/24/23   Clayton Vargas MD   tamsulosin (FLOMAX) 0.4 mg TAKE 1 CAPSULE (0.4 MG TOTAL) BY MOUTH DAILY 4/13/23   Joyce Shah MD   Trulicity 4.5 AJ/8.5EM injection INJECT 4.5 MG UNDER THE SKIN ONCE A WEEK. 8/28/23   Historical Provider, MD YOON have reviewed home medications using recent Epic encounter. Allergies: Allergies   Allergen Reactions    Atorvastatin Other (See Comments)     Pt unsure      Percocet [Oxycodone-Acetaminophen] Nausea Only and GI Intolerance       Social History:  Marital Status:     Occupation: NA  Patient Pre-hospital Living Situation: Home  Patient Pre-hospital Level of Mobility: unable to be assessed at time of evaluation  Patient Pre-hospital Diet Restrictions: Diabetic  Substance Use History:   Social History     Substance and Sexual Activity   Alcohol Use Yes    Comment: occasionally 1-2 per month Social History     Tobacco Use   Smoking Status Former    Packs/day: 1.00    Years: 10.00    Total pack years: 10.00    Types: Cigarettes    Quit date: 1978    Years since quittin.8   Smokeless Tobacco Never     Social History     Substance and Sexual Activity   Drug Use No       Family History:  Family History   Problem Relation Age of Onset    Heart disease Mother     Coronary artery disease Mother     Sudden death Mother     Cancer Father         throat; MALIGNANT NEOPLASM OF HEAD, FACE OR NECK    Throat cancer Father     Cancer Family     Coronary artery disease Family        Physical Exam:     Vitals:   Blood Pressure: 118/74 (23 1730)  Pulse: 71 (23 1730)  Temperature: 97.6 °F (36.4 °C) (23 1500)  Temp Source: Oral (23 1500)  Respirations: 22 (23 1730)  SpO2: 96 % (23 173)    Physical Exam  Vitals and nursing note reviewed. Constitutional:       General: He is not in acute distress. Appearance: He is well-developed. HENT:      Head: Normocephalic and atraumatic. Mouth/Throat:      Mouth: Mucous membranes are dry. Eyes:      Conjunctiva/sclera: Conjunctivae normal.   Cardiovascular:      Rate and Rhythm: Normal rate and regular rhythm. Heart sounds: No murmur heard. Pulmonary:      Effort: Pulmonary effort is normal. No respiratory distress. Breath sounds: Normal breath sounds. Abdominal:      Palpations: Abdomen is soft. Tenderness: There is no abdominal tenderness. Musculoskeletal:         General: No swelling. Cervical back: Neck supple. Skin:     General: Skin is warm and dry. Capillary Refill: Capillary refill takes less than 2 seconds. Neurological:      Mental Status: He is alert and oriented to person, place, and time.    Psychiatric:         Mood and Affect: Mood normal.          Additional Data:     Lab Results:  Results from last 7 days   Lab Units 23  1345   WBC Thousand/uL 6.73   HEMOGLOBIN g/dL 14.1   HEMATOCRIT % 39.8   PLATELETS Thousands/uL 257   NEUTROS PCT % 67   LYMPHS PCT % 20   MONOS PCT % 9   EOS PCT % 2     Results from last 7 days   Lab Units 12/05/23  1345   SODIUM mmol/L 139   POTASSIUM mmol/L 3.3*   CHLORIDE mmol/L 100   CO2 mmol/L 30   BUN mg/dL 16   CREATININE mg/dL 1.35*   ANION GAP mmol/L 9   CALCIUM mg/dL 10.0   ALBUMIN g/dL 4.3   TOTAL BILIRUBIN mg/dL 0.88   ALK PHOS U/L 42   ALT U/L 17   AST U/L 21   GLUCOSE RANDOM mg/dL 147*     Results from last 7 days   Lab Units 12/05/23  1345   INR  0.96             Results from last 7 days   Lab Units 12/05/23  1345   LACTIC ACID mmol/L 1.8   PROCALCITONIN ng/ml 0.05       Lines/Drains:  Invasive Devices       Peripheral Intravenous Line  Duration             Peripheral IV Distal;Right;Upper;Ventral (anterior) Arm -- days                        Imaging: Reviewed radiology reports from this admission including: chest CT scan and abdominal/pelvic CT  PE Study with CT abdomen & pelvis with contrast   ED Interpretation by Leo Diaz DO (12/05 1624)   There is no evidence of acute pulmonary embolism or aortic dissection. 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 Trace pericardial effusion. There is no acute abdominal/pelvic inflammatory process. Cholelithiasis without CT evidence for acute cholecystitis. Stable renal cortical lesions and chronic parenchymal changes compared to 9/29/2023. Prostatomegaly. Correlate with PSA titers. Final Result by Jocelyn Saucedo MD (12/05 5714)   There is no evidence of acute pulmonary embolism or aortic dissection. 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 Trace pericardial effusion. There is no acute abdominal/pelvic inflammatory process. Cholelithiasis without CT evidence for acute cholecystitis. Stable renal cortical lesions and chronic parenchymal changes compared to 9/29/2023. Prostatomegaly.  Correlate with PSA titers. Workstation performed: [id number]         XR chest portable    (Results Pending)       EKG and Other Studies Reviewed on Admission:   EKG:  NSR with nonspecific ST &T wave abnormality. ** Please Note: This note has been constructed using a voice recognition system.  **

## 2023-12-06 NOTE — ASSESSMENT & PLAN NOTE
Patient presents to the ED for dehydration. Patient reports that he has been having intermittent vomiting over the last week. Reports that he has not been able to keep much down orally. Denies any fever/chills, chest pain, shortness of breath, diarrhea, urinary complaints.   Creatinine elevated 1.35 on admission  Start on gentle IVF overnight  Monitor I&O  Daily BMP

## 2023-12-06 NOTE — DISCHARGE SUMMARY
[address]  Discharge- Blake Burt 1940, 81 y.o. male MRN: [id number]  Unit/Bed#: ED 19 Encounter: [id number]  Primary Care Provider: Alejandra Ponce MD   Date and time admitted to hospital: 12/5/2023  1:09 PM    Assessment & Plan  Vomiting  -Now resolved  -Likely due to constipation and increased abdominal pressure leading to GI upset over the past week. Had bowel movement with well formed non-bloody stool this morning.    -start stool softener after discharge.  -discharge today    JESSA  -likely due to volume depletion because of to vomiting 1-2 per day for 1 week  -IV fluids received  -BUN and creatinine are improve from initial labs  -mucus membranes appear moist  -discharge today    Hypokalemia  -potassium was 3.3 on initial labs- is now 3.4  -will likely improve on own now that patient is eating again and no longer vomiting     Type 2 diabetes mellitus  -resume home medications at discharged    CAD  - no chest pain, palpitations, or shortness of breath  -continue aspirin, Plavix, beta blocker, and statin    Cardiomyopathy  - no chest pain, palpitations, or shortness of breath  -last echocardiogram was 10/1/23- EF was 23%  -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  -follow up with cardiology outpatient          Discharging Physician / Practitioner: Violet Marcum  PCP: Alejandra Ponce MD  Admission Date:   Admission Orders (From admission, onward)       Ordered        12/05/23 1923  Place in Observation  Once                          Discharge Date: 12/06/23    Significant Findings / Test Results:   Hypokalemia- now resolved    Incidental Findings:   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     Reason for Admission: vomiting and fatigue    Hospital Course: Radha Holder is a 81 y.o. male patient with a PMH of type II diabetes with state 3 chronic kidney disease, CAD, cardiomyopathy with EF of 23%, who originally presented to the hospital on 12/5/2023 due to 1 week of vomiting 1-2 times per day and 1 week of fatigue. He also reports that he only had 1 very small pebble like bowel movement during this time. Today, he had first 1st normal appearing bowel movement in about a week. He no longer reports nausea and vomiting. The patient was treated with IV fluids and stool softeners, feels much better, kidney function essentially improved and he is now able to tolerate p.o. intake. He is being discharged in stable condition. He was encouraged to increase his p.o. intake moving forward. Condition at Discharge: good    Discharge Day Visit / Exam:   Subjective:  He reports that he is feeling better this morning. He had a bowel movement with well formed non-bloody stool this morning. He also reports that he ate breakfast this morning. He denies shortness of breath, chest pain, palpitations, fever, chills, changes in urination, sick contacts, recent travel, and changes in diet. Vitals: Blood Pressure: 115/58 (12/06/23 1137)  Pulse: 69 (12/06/23 1137)  Temperature: 98 °F (36.7 °C) (12/06/23 0755)  Temp Source: Oral (12/06/23 0755)  Respirations: 19 (12/06/23 1137)  SpO2: 97 % (12/06/23 1137)    Exam:   Physical Exam  Constitutional:       Appearance: Normal appearance. Cardiovascular:      Rate and Rhythm: Normal rate and regular rhythm. Pulses: Normal pulses. Heart sounds: No murmur heard. No friction rub. No gallop. Pulmonary:      Effort: Pulmonary effort is normal.      Breath sounds: Normal breath sounds. No wheezing, rhonchi or rales. Abdominal:      General: Abdomen is flat. Bowel sounds are normal. There is no distension. Palpations: Abdomen is soft. Tenderness: There is no abdominal tenderness.    Neurological:      Mental Status: He is alert and oriented to person, place, and time. Discharge instructions/Information to patient and family:   See after visit summary for information provided to patient and family. Provisions for Follow-Up Care:  See after visit summary for information related to follow-up care and any pertinent home health orders. Disposition:   Home     Discharge Statement:  I spent 46 minutes discharging the patient. This time was spent on the day of discharge. I had direct contact with the patient on the day of discharge. Greater than 50% of the total time was spent examining patient, answering all patient questions, arranging and discussing plan of care with patient as well as directly providing post-discharge instructions. Additional time then spent on discharge activities. Discharge Medications:  See after visit summary for reconciled discharge medications provided to patient and/or family.       **Please Note: This note may have been constructed using a voice recognition system**    Genice Brittle, 3rd year medical student

## 2023-12-06 NOTE — ASSESSMENT & PLAN NOTE
Lab Results   Component Value Date    HGBA1C 9.4 (H) 09/29/2023       No results for input(s): "POCGLU" in the last 72 hours.     Blood Sugar Average: Last 72 hrs:    Holding home oral agents  Start on SSI with accu checks  Hypoglycemia protocol  Diabetic diet

## 2023-12-06 NOTE — ASSESSMENT & PLAN NOTE
Currently euvolemic on exam  Denies any chest pain  Currently oxygenating well on RA  Echo (10/1/23): EF 35%  CT C/A/P: Stable small aneurysm at the left ventricle cardiac apex.  Trace pericardial effusion  Outpatient Cardiology follow up

## 2023-12-06 NOTE — PLAN OF CARE

## 2023-12-10 LAB — BACTERIA BLD CULT: NORMAL

## 2023-12-13 ENCOUNTER — HOSPITAL ENCOUNTER (INPATIENT)
Facility: HOSPITAL | Age: 83
LOS: 1 days | Discharge: HOME | End: 2023-12-15
Attending: STUDENT IN AN ORGANIZED HEALTH CARE EDUCATION/TRAINING PROGRAM | Admitting: STUDENT IN AN ORGANIZED HEALTH CARE EDUCATION/TRAINING PROGRAM
Payer: COMMERCIAL

## 2023-12-13 ENCOUNTER — APPOINTMENT (EMERGENCY)
Dept: CT IMAGING | Facility: HOSPITAL | Age: 83
End: 2023-12-13
Payer: COMMERCIAL

## 2023-12-13 ENCOUNTER — APPOINTMENT (EMERGENCY)
Dept: RADIOLOGY | Facility: HOSPITAL | Age: 83
End: 2023-12-13
Payer: COMMERCIAL

## 2023-12-13 DIAGNOSIS — E86.0 DEHYDRATION: ICD-10-CM

## 2023-12-13 DIAGNOSIS — R53.1 WEAKNESS: ICD-10-CM

## 2023-12-13 DIAGNOSIS — U07.1 COVID: Primary | ICD-10-CM

## 2023-12-13 DIAGNOSIS — K59.00 CONSTIPATION, UNSPECIFIED CONSTIPATION TYPE: ICD-10-CM

## 2023-12-13 LAB
2HR DELTA HS TROPONIN: -6 NG/L
4HR DELTA HS TROPONIN: 6 NG/L
ALBUMIN SERPL BCP-MCNC: 3.5 G/DL (ref 3.5–5)
ALP SERPL-CCNC: 26 U/L (ref 34–104)
ALT SERPL W P-5'-P-CCNC: 14 U/L (ref 7–52)
ANION GAP SERPL CALCULATED.3IONS-SCNC: 7 MMOL/L
AST SERPL W P-5'-P-CCNC: 29 U/L (ref 13–39)
BACTERIA UR QL AUTO: NORMAL /HPF
BASOPHILS # BLD AUTO: 0.02 THOUSANDS/ÂΜL (ref 0–0.1)
BASOPHILS NFR BLD AUTO: 0 % (ref 0–1)
BILIRUB SERPL-MCNC: 0.83 MG/DL (ref 0.2–1)
BILIRUB UR QL STRIP: NEGATIVE
BNP SERPL-MCNC: 208 PG/ML (ref 0–100)
BUN SERPL-MCNC: 17 MG/DL (ref 5–25)
CALCIUM SERPL-MCNC: 8.2 MG/DL (ref 8.4–10.2)
CARDIAC TROPONIN I PNL SERPL HS: 35 NG/L
CARDIAC TROPONIN I PNL SERPL HS: 41 NG/L
CARDIAC TROPONIN I PNL SERPL HS: 47 NG/L
CHLORIDE SERPL-SCNC: 105 MMOL/L (ref 96–108)
CLARITY UR: CLEAR
CO2 SERPL-SCNC: 25 MMOL/L (ref 21–32)
COLOR UR: ABNORMAL
CREAT SERPL-MCNC: 0.96 MG/DL (ref 0.6–1.3)
EOSINOPHIL # BLD AUTO: 0 THOUSAND/ÂΜL (ref 0–0.61)
EOSINOPHIL NFR BLD AUTO: 0 % (ref 0–6)
ERYTHROCYTE [DISTWIDTH] IN BLOOD BY AUTOMATED COUNT: 15.9 % (ref 11.6–15.1)
FLUAV RNA RESP QL NAA+PROBE: NEGATIVE
FLUBV RNA RESP QL NAA+PROBE: NEGATIVE
GFR SERPL CREATININE-BSD FRML MDRD: 73 ML/MIN/1.73SQ M
GLUCOSE SERPL-MCNC: 165 MG/DL (ref 65–140)
GLUCOSE UR STRIP-MCNC: ABNORMAL MG/DL
HCT VFR BLD AUTO: 35.4 % (ref 36.5–49.3)
HGB BLD-MCNC: 12.5 G/DL (ref 12–17)
HGB UR QL STRIP.AUTO: ABNORMAL
IMM GRANULOCYTES # BLD AUTO: 0.04 THOUSAND/UL (ref 0–0.2)
IMM GRANULOCYTES NFR BLD AUTO: 0 % (ref 0–2)
KETONES UR STRIP-MCNC: ABNORMAL MG/DL
LEUKOCYTE ESTERASE UR QL STRIP: NEGATIVE
LIPASE SERPL-CCNC: 28 U/L (ref 11–82)
LYMPHOCYTES # BLD AUTO: 0.91 THOUSANDS/ÂΜL (ref 0.6–4.47)
LYMPHOCYTES NFR BLD AUTO: 8 % (ref 14–44)
MAGNESIUM SERPL-MCNC: 2 MG/DL (ref 1.9–2.7)
MCH RBC QN AUTO: 38.7 PG (ref 26.8–34.3)
MCHC RBC AUTO-ENTMCNC: 35.3 G/DL (ref 31.4–37.4)
MCV RBC AUTO: 110 FL (ref 82–98)
MONOCYTES # BLD AUTO: 0.81 THOUSAND/ÂΜL (ref 0.17–1.22)
MONOCYTES NFR BLD AUTO: 7 % (ref 4–12)
NEUTROPHILS # BLD AUTO: 9.1 THOUSANDS/ÂΜL (ref 1.85–7.62)
NEUTS SEG NFR BLD AUTO: 85 % (ref 43–75)
NITRITE UR QL STRIP: NEGATIVE
NON-SQ EPI CELLS URNS QL MICRO: NORMAL /HPF
NRBC BLD AUTO-RTO: 0 /100 WBCS
PH UR STRIP.AUTO: 5.5 [PH]
PHOSPHATE SERPL-MCNC: 3 MG/DL (ref 2.3–4.1)
PLATELET # BLD AUTO: 183 THOUSANDS/UL (ref 149–390)
PMV BLD AUTO: 9.9 FL (ref 8.9–12.7)
POTASSIUM SERPL-SCNC: 4.5 MMOL/L (ref 3.5–5.3)
PROT SERPL-MCNC: 6.7 G/DL (ref 6.4–8.4)
PROT UR STRIP-MCNC: ABNORMAL MG/DL
RBC # BLD AUTO: 3.23 MILLION/UL (ref 3.88–5.62)
RBC #/AREA URNS AUTO: NORMAL /HPF
RSV RNA RESP QL NAA+PROBE: NEGATIVE
S PYO DNA THROAT QL NAA+PROBE: NOT DETECTED
SARS-COV-2 RNA RESP QL NAA+PROBE: POSITIVE
SODIUM SERPL-SCNC: 137 MMOL/L (ref 135–147)
SP GR UR STRIP.AUTO: >=1.05 (ref 1–1.03)
UROBILINOGEN UR STRIP-ACNC: <2 MG/DL
WBC # BLD AUTO: 10.88 THOUSAND/UL (ref 4.31–10.16)
WBC #/AREA URNS AUTO: NORMAL /HPF

## 2023-12-13 PROCEDURE — 86140 C-REACTIVE PROTEIN: CPT | Performed by: PHYSICIAN ASSISTANT

## 2023-12-13 PROCEDURE — 83880 ASSAY OF NATRIURETIC PEPTIDE: CPT | Performed by: STUDENT IN AN ORGANIZED HEALTH CARE EDUCATION/TRAINING PROGRAM

## 2023-12-13 PROCEDURE — 82550 ASSAY OF CK (CPK): CPT | Performed by: PHYSICIAN ASSISTANT

## 2023-12-13 PROCEDURE — 83690 ASSAY OF LIPASE: CPT | Performed by: STUDENT IN AN ORGANIZED HEALTH CARE EDUCATION/TRAINING PROGRAM

## 2023-12-13 PROCEDURE — 74177 CT ABD & PELVIS W/CONTRAST: CPT

## 2023-12-13 PROCEDURE — 99285 EMERGENCY DEPT VISIT HI MDM: CPT

## 2023-12-13 PROCEDURE — 81001 URINALYSIS AUTO W/SCOPE: CPT | Performed by: STUDENT IN AN ORGANIZED HEALTH CARE EDUCATION/TRAINING PROGRAM

## 2023-12-13 PROCEDURE — 84100 ASSAY OF PHOSPHORUS: CPT | Performed by: STUDENT IN AN ORGANIZED HEALTH CARE EDUCATION/TRAINING PROGRAM

## 2023-12-13 PROCEDURE — 85025 COMPLETE CBC W/AUTO DIFF WBC: CPT | Performed by: STUDENT IN AN ORGANIZED HEALTH CARE EDUCATION/TRAINING PROGRAM

## 2023-12-13 PROCEDURE — 87651 STREP A DNA AMP PROBE: CPT | Performed by: STUDENT IN AN ORGANIZED HEALTH CARE EDUCATION/TRAINING PROGRAM

## 2023-12-13 PROCEDURE — G1004 CDSM NDSC: HCPCS

## 2023-12-13 PROCEDURE — 96361 HYDRATE IV INFUSION ADD-ON: CPT

## 2023-12-13 PROCEDURE — 36415 COLL VENOUS BLD VENIPUNCTURE: CPT | Performed by: STUDENT IN AN ORGANIZED HEALTH CARE EDUCATION/TRAINING PROGRAM

## 2023-12-13 PROCEDURE — 96374 THER/PROPH/DIAG INJ IV PUSH: CPT

## 2023-12-13 PROCEDURE — 71045 X-RAY EXAM CHEST 1 VIEW: CPT

## 2023-12-13 PROCEDURE — 83735 ASSAY OF MAGNESIUM: CPT | Performed by: STUDENT IN AN ORGANIZED HEALTH CARE EDUCATION/TRAINING PROGRAM

## 2023-12-13 PROCEDURE — 99285 EMERGENCY DEPT VISIT HI MDM: CPT | Performed by: STUDENT IN AN ORGANIZED HEALTH CARE EDUCATION/TRAINING PROGRAM

## 2023-12-13 PROCEDURE — 93005 ELECTROCARDIOGRAM TRACING: CPT

## 2023-12-13 PROCEDURE — 84484 ASSAY OF TROPONIN QUANT: CPT | Performed by: STUDENT IN AN ORGANIZED HEALTH CARE EDUCATION/TRAINING PROGRAM

## 2023-12-13 PROCEDURE — 80053 COMPREHEN METABOLIC PANEL: CPT | Performed by: STUDENT IN AN ORGANIZED HEALTH CARE EDUCATION/TRAINING PROGRAM

## 2023-12-13 PROCEDURE — 0241U HB NFCT DS VIR RESP RNA 4 TRGT: CPT | Performed by: STUDENT IN AN ORGANIZED HEALTH CARE EDUCATION/TRAINING PROGRAM

## 2023-12-13 RX ORDER — ONDANSETRON 2 MG/ML
4 INJECTION INTRAMUSCULAR; INTRAVENOUS ONCE
Status: COMPLETED | OUTPATIENT
Start: 2023-12-13 | End: 2023-12-13

## 2023-12-13 RX ORDER — MINERAL OIL/PETROLATUM,WHITE 41.5-56.8%
1 OINTMENT (GRAM) OPHTHALMIC (EYE)
Qty: 30 TABLET | Refills: 1 | Status: SHIPPED | OUTPATIENT
Start: 2023-12-13

## 2023-12-13 RX ADMIN — SODIUM CHLORIDE 500 ML: 0.9 INJECTION, SOLUTION INTRAVENOUS at 17:52

## 2023-12-13 RX ADMIN — ONDANSETRON 4 MG: 2 INJECTION INTRAMUSCULAR; INTRAVENOUS at 18:00

## 2023-12-13 RX ADMIN — IOHEXOL 100 ML: 350 INJECTION, SOLUTION INTRAVENOUS at 19:27

## 2023-12-13 NOTE — Clinical Note
Case was discussed with hospitalist and the patient's admission status was agreed to be Admission Status: observation status to the service of Dr. Delinda Dubin .

## 2023-12-14 PROBLEM — R53.1 GENERALIZED WEAKNESS: Status: ACTIVE | Noted: 2023-12-14

## 2023-12-14 LAB
ATRIAL RATE: 117 BPM
CK SERPL-CCNC: 43 U/L (ref 39–308)
CRP SERPL QL: 58.6 MG/L
GLUCOSE SERPL-MCNC: 129 MG/DL (ref 65–140)
GLUCOSE SERPL-MCNC: 139 MG/DL (ref 65–140)
GLUCOSE SERPL-MCNC: 146 MG/DL (ref 65–140)
GLUCOSE SERPL-MCNC: 149 MG/DL (ref 65–140)
PR INTERVAL: 160 MS
QRS AXIS: -61 DEGREES
QRSD INTERVAL: 114 MS
QT INTERVAL: 374 MS
QTC INTERVAL: 521 MS
T WAVE AXIS: 85 DEGREES
VENTRICULAR RATE: 117 BPM

## 2023-12-14 PROCEDURE — 97166 OT EVAL MOD COMPLEX 45 MIN: CPT

## 2023-12-14 PROCEDURE — 82948 REAGENT STRIP/BLOOD GLUCOSE: CPT

## 2023-12-14 PROCEDURE — 97162 PT EVAL MOD COMPLEX 30 MIN: CPT

## 2023-12-14 PROCEDURE — 99223 1ST HOSP IP/OBS HIGH 75: CPT | Performed by: PHYSICIAN ASSISTANT

## 2023-12-14 RX ORDER — MAGNESIUM HYDROXIDE/ALUMINUM HYDROXICE/SIMETHICONE 120; 1200; 1200 MG/30ML; MG/30ML; MG/30ML
30 SUSPENSION ORAL EVERY 6 HOURS PRN
Status: DISCONTINUED | OUTPATIENT
Start: 2023-12-14 | End: 2023-12-15 | Stop reason: HOSPADM

## 2023-12-14 RX ORDER — ONDANSETRON 2 MG/ML
4 INJECTION INTRAMUSCULAR; INTRAVENOUS EVERY 4 HOURS PRN
Status: DISCONTINUED | OUTPATIENT
Start: 2023-12-14 | End: 2023-12-14

## 2023-12-14 RX ORDER — INSULIN LISPRO 100 [IU]/ML
1-5 INJECTION, SOLUTION INTRAVENOUS; SUBCUTANEOUS
Status: DISCONTINUED | OUTPATIENT
Start: 2023-12-14 | End: 2023-12-15 | Stop reason: HOSPADM

## 2023-12-14 RX ORDER — LEVOTHYROXINE SODIUM 88 UG/1
88 TABLET ORAL
Status: DISCONTINUED | OUTPATIENT
Start: 2023-12-14 | End: 2023-12-15 | Stop reason: HOSPADM

## 2023-12-14 RX ORDER — ACETAMINOPHEN 325 MG/1
650 TABLET ORAL EVERY 6 HOURS PRN
Status: DISCONTINUED | OUTPATIENT
Start: 2023-12-14 | End: 2023-12-15 | Stop reason: HOSPADM

## 2023-12-14 RX ORDER — CLOPIDOGREL BISULFATE 75 MG/1
75 TABLET ORAL DAILY
Status: DISCONTINUED | OUTPATIENT
Start: 2023-12-14 | End: 2023-12-15 | Stop reason: HOSPADM

## 2023-12-14 RX ORDER — HEPARIN SODIUM 5000 [USP'U]/ML
5000 INJECTION, SOLUTION INTRAVENOUS; SUBCUTANEOUS EVERY 8 HOURS SCHEDULED
Status: DISCONTINUED | OUTPATIENT
Start: 2023-12-14 | End: 2023-12-15 | Stop reason: HOSPADM

## 2023-12-14 RX ORDER — AMOXICILLIN 250 MG
1 CAPSULE ORAL DAILY PRN
Status: DISCONTINUED | OUTPATIENT
Start: 2023-12-14 | End: 2023-12-15 | Stop reason: HOSPADM

## 2023-12-14 RX ORDER — FINASTERIDE 5 MG/1
5 TABLET, FILM COATED ORAL DAILY
Status: DISCONTINUED | OUTPATIENT
Start: 2023-12-14 | End: 2023-12-15 | Stop reason: HOSPADM

## 2023-12-14 RX ORDER — POLYETHYLENE GLYCOL 3350 17 G/17G
17 POWDER, FOR SOLUTION ORAL DAILY
Status: DISCONTINUED | OUTPATIENT
Start: 2023-12-14 | End: 2023-12-15 | Stop reason: HOSPADM

## 2023-12-14 RX ORDER — TAMSULOSIN HYDROCHLORIDE 0.4 MG/1
0.4 CAPSULE ORAL DAILY
Status: DISCONTINUED | OUTPATIENT
Start: 2023-12-14 | End: 2023-12-15 | Stop reason: HOSPADM

## 2023-12-14 RX ORDER — GUAIFENESIN/DEXTROMETHORPHAN 100-10MG/5
10 SYRUP ORAL EVERY 6 HOURS PRN
Status: DISCONTINUED | OUTPATIENT
Start: 2023-12-14 | End: 2023-12-15 | Stop reason: HOSPADM

## 2023-12-14 RX ORDER — PRAVASTATIN SODIUM 80 MG/1
80 TABLET ORAL
Status: DISCONTINUED | OUTPATIENT
Start: 2023-12-14 | End: 2023-12-15 | Stop reason: HOSPADM

## 2023-12-14 RX ADMIN — TAMSULOSIN HYDROCHLORIDE 0.4 MG: 0.4 CAPSULE ORAL at 10:05

## 2023-12-14 RX ADMIN — POLYETHYLENE GLYCOL 3350 17 G: 17 POWDER, FOR SOLUTION ORAL at 10:04

## 2023-12-14 RX ADMIN — CLOPIDOGREL 75 MG: 75 TABLET ORAL at 10:04

## 2023-12-14 RX ADMIN — HEPARIN SODIUM 5000 UNITS: 5000 INJECTION INTRAVENOUS; SUBCUTANEOUS at 21:31

## 2023-12-14 RX ADMIN — HEPARIN SODIUM 5000 UNITS: 5000 INJECTION INTRAVENOUS; SUBCUTANEOUS at 14:54

## 2023-12-14 RX ADMIN — ASPIRIN 81 MG: 81 TABLET, COATED ORAL at 10:05

## 2023-12-14 RX ADMIN — HEPARIN SODIUM 5000 UNITS: 5000 INJECTION INTRAVENOUS; SUBCUTANEOUS at 06:10

## 2023-12-14 RX ADMIN — FINASTERIDE 5 MG: 5 TABLET, FILM COATED ORAL at 10:05

## 2023-12-14 RX ADMIN — LEVOTHYROXINE SODIUM 88 MCG: 88 TABLET ORAL at 07:00

## 2023-12-14 RX ADMIN — SODIUM CHLORIDE 500 ML: 0.9 INJECTION, SOLUTION INTRAVENOUS at 00:00

## 2023-12-14 RX ADMIN — METOPROLOL TARTRATE 25 MG: 25 TABLET, FILM COATED ORAL at 10:05

## 2023-12-14 NOTE — PLAN OF CARE
Problem: PAIN - ADULT  Goal: Verbalizes/displays adequate comfort level or baseline comfort level  Description: Interventions:  - Encourage patient to monitor pain and request assistance  - Assess pain using appropriate pain scale  - Administer analgesics based on type and severity of pain and evaluate response  - Implement non-pharmacological measures as appropriate and evaluate response  - Consider cultural and social influences on pain and pain management  - Notify physician/advanced practitioner if interventions unsuccessful or patient reports new pain  Outcome: Progressing     Problem: INFECTION - ADULT  Goal: Absence or prevention of progression during hospitalization  Description: INTERVENTIONS:  - Assess and monitor for signs and symptoms of infection  - Monitor lab/diagnostic results  - Monitor all insertion sites, i.e. indwelling lines, tubes, and drains  - Monitor endotracheal if appropriate and nasal secretions for changes in amount and color  - [place] appropriate cooling/warming therapies per order  - Administer medications as ordered  - Instruct and encourage patient and family to use good hand hygiene technique  - Identify and instruct in appropriate isolation precautions for identified infection/condition  Outcome: Progressing  Goal: Absence of fever/infection during neutropenic period  Description: INTERVENTIONS:  - Monitor WBC    Outcome: Progressing     Problem: SAFETY ADULT  Goal: Patient will remain free of falls  Description: INTERVENTIONS:  - Educate patient/family on patient safety including physical limitations  - Instruct patient to call for assistance with activity   - Consult OT/PT to assist with strengthening/mobility   - Keep Call bell within reach  - Keep bed low and locked with side rails adjusted as appropriate  - Keep care items and personal belongings within reach  - Initiate and maintain comfort rounds  - Make Fall Risk Sign visible to staff  - Offer Toileting every 2 Hours, in advance of need  - Initiate/Maintain bed alarm  - Obtain necessary fall risk management equipment  - Apply yellow socks and bracelet for high fall risk patients  - Consider moving patient to room near nurses station  Outcome: Progressing  Goal: Maintain or return to baseline ADL function  Description: INTERVENTIONS:  -  Assess patient's ability to carry out ADLs; assess patient's baseline for ADL function and identify physical deficits which impact ability to perform ADLs (bathing, care of mouth/teeth, toileting, grooming, dressing, etc.)  - Assess/evaluate cause of self-care deficits   - Assess range of motion  - Assess patient's mobility; develop plan if impaired  - Assess patient's need for assistive devices and provide as appropriate  - Encourage maximum independence but intervene and supervise when necessary  - Involve family in performance of ADLs  - Assess for home care needs following discharge   - Consider OT consult to assist with ADL evaluation and planning for discharge  - Provide patient education as appropriate  Outcome: Progressing  Goal: Maintains/Returns to pre admission functional level  Description: INTERVENTIONS:  - Perform AM-PAC 6 Click Basic Mobility/ Daily Activity assessment daily.  - Set and communicate daily mobility goal to care team and patient/family/caregiver. - Collaborate with rehabilitation services on mobility goals if consulted  - Perform Range of Motion 3 times a day. - Reposition patient every 2 hours.   - Dangle patient 3 times a day  - Stand patient 3 times a day  - Ambulate patient 3 times a day  - Out of bed to chair 3 times a day   - Out of bed for meals 3 times a day  - Out of bed for toileting  - Record patient progress and toleration of activity level   Outcome: Progressing     Problem: DISCHARGE PLANNING  Goal: Discharge to home or other facility with appropriate resources  Description: INTERVENTIONS:  - Identify barriers to discharge w/patient and caregiver  - Arrange for needed discharge resources and transportation as appropriate  - Identify discharge learning needs (meds, wound care, etc.)  - Arrange for interpretive services to assist at discharge as needed  - Refer to Case Management Department for coordinating discharge planning if the patient needs post-hospital services based on physician/advanced practitioner order or complex needs related to functional status, cognitive ability, or social support system  Outcome: Progressing     Problem: Knowledge Deficit  Goal: Patient/family/caregiver demonstrates understanding of disease process, treatment plan, medications, and discharge instructions  Description: Complete learning assessment and assess knowledge base.   Interventions:  - Provide teaching at level of understanding  - Provide teaching via preferred learning methods  Outcome: Progressing

## 2023-12-14 NOTE — ED NOTES
Request sent to Pharmacy re: levothyroxine tab 88 mcg x1 tablet. Awaiting delivery.      Ramon Russell, RN  12/14/23 5550

## 2023-12-14 NOTE — ED NOTES
Pt reports, " I couldn't eat that sandwich you gave me, it was too much." Offered Jello, pudding, and apple sauce, Pt accepted all offered.       Jemal Cole RN  12/14/23 6893

## 2023-12-14 NOTE — PLAN OF CARE
Problem: PHYSICAL THERAPY ADULT  Goal: Performs mobility at highest level of function for planned discharge setting. See evaluation for individualized goals. Description: Treatment/Interventions: Functional transfer training, LE strengthening/ROM, Elevations, Therapeutic exercise, Endurance training, Patient/family training, Bed mobility, Gait training, OT          See flowsheet documentation for full assessment, interventions and recommendations. Note: Prognosis: Good  Problem List: Decreased strength, Decreased endurance, Impaired balance, Decreased mobility  Assessment: Pt is 81year old male seen for PT evaluation s/p admit to [id number] [place] [address] on 12/13/2023 with Generalized weakness. PT consulted to assess pt's functional mobility and discharge needs. Order placed for PT evaluation and treatment, with activity as tolerated order. Comorbidities affecting pt's physical performance at time of assessment include type 2 DM with stage 3 CKD, COVID-19, and constipation. Prior to hospitalization, pt was independent with all functional mobility without an AD for household distance and with a cane for community distances. Pt resides with his son and family, in a one level house with thirteen steps to enter. Personal factors affecting pt at time of initial evaluation include stairs to enter home, difficulty ambulating community distances, difficulty navigating level surfaces without external assistance, difficulty performing dynamic tasks in the community, and difficulty performing ADLs and IADLs. Please find objective findings from PT assessment regarding body systems outlined above with impairments and limitations including weakness, impaired balance, decreased endurance, gait deviations, decreased activity tolerance, decreased functional mobility tolerance, and fall risk.  The following objective measures were performed on initial evaluation Barthel Index: 55/100, Modified Gilberto: 3 (moderate disability), and AM-PAC 6-Clicks: 95/30. Pt's clinical presentation is currently evolving seen in pt's presentation of need for ongoing medical management/monitoring, pt is a fall risk, and pt requires cues and assist for safety with functional mobility. Pt to benefit from continued PT treatment to address deficits as defined above and maximize pt's level of function and independence with mobility. From a PT standpoint, recommendation at time of discharge would be level 3, minimal resource intensity in order to facilitate return to prior level of function. Barriers to Discharge: Inaccessible home environment     Rehab Resource Intensity Level, PT: III (Minimum Resource Intensity)    See flowsheet documentation for full assessment.

## 2023-12-14 NOTE — ASSESSMENT & PLAN NOTE
CT abdomen pelvis revealing moderate constipation, no acute change, reports no bowel movement in the last 2 days  Start MiraLAX daily, as needed senna

## 2023-12-14 NOTE — ASSESSMENT & PLAN NOTE
Lab Results   Component Value Date    HGBA1C 9.4 (H) 09/29/2023       No results for input(s): "POCGLU" in the last 72 hours.     Blood Sugar Average: Last 72 hrs:    Blood glucose checks 4 times daily, hypoglycemia protocol  Hold p.o. diabetic meds  ssi

## 2023-12-14 NOTE — DISCHARGE INSTRUCTIONS
Continue to stay well-hydrated. Use over-the-counter medication as needed for discomfort. Follow-up with primary care physician for reevaluation. Return if you have any new or worsening symptoms such as chest pain, increased work of breathing or weakness.

## 2023-12-14 NOTE — NURSING NOTE
[facility]  Ambulatory Clinic  Obstetrics & Gynecology    Visit Date:  2017     Chief Complaint:  Missed my period    History of Present Illness:      Dirk Manrique is a 32 y.o. , UPT positive today, here with c/o missed period.  Patient's last menstrual period was 2017.   Pt has no major complaints today and denies any vaginal bleeding, discharge, pain, GI/ compliants.  Pt reports overall good health.      Past Medical History:      None      Past Surgical History:      Wrist cyst removal  Nose surgery  Shoulder surgery     Medications:      Prenatal vitamins     Allergies:      NKDA      Obstetric History:          T1      L1     SAB0   TAB0   Ectopic0   Multiple0   Live Births1       # Outcome Date GA Lbr Mauricio/2nd Weight Sex Delivery Anes PTL Lv   2 Current            1 Term 16 39w2d  3.37 kg (7 lb 6.9 oz) F Vag-Spont EPI N GUS      Name: KEN, GIRL DIRK      Apgar1:  9                Apgar5: 9       Gynecologic History:      Denies active STI  H/o abnormal pap     Social History:     Denies tobacco, alcohol or illicit drug use  Current partner is father of baby  Denies domestic abuse     Family History:       Denies congenital anomalies, inherited syndromes, fetal aneuploidy    Review of Systems:      Constitutional:  No fever, fatigue  HENT:  No congestion, hearing changes  Eyes:  No visual disturbance  Respiratory:  No cough, shortness of breath  Cardiovascular:  No chest pain, leg swelling  Breast:  No lump, pain, nipple discharge, redness, skin changes  Gastrointestinal:  No abdominal pain, constipation, blood in stool   Genitourinary:  No dysuria, frequency  Endocrine:  No heat or cold intolerance  Musculoskeletal:  No back pain, arthralgias  Skin:  No rash, jaundice  Neurological:  No dizziness, weakness, headaches  Psychiatric/Behavioral:  No sleep disturbance, dysphoric mood     Physical Exam:     /64 (BP Location: Right arm,  Pt refusing bed alarm. Intentionally sitting in chair not fit for alarm and says its inconvenient. "Patient Position: Sitting, BP Method: Large (Manual))   Ht 6' 1" (1.854 m)   Wt 81 kg (178 lb 9.2 oz)   LMP 2017   Breastfeeding? No   BMI 23.56 kg/m²      GENERAL:  NAD. Well-nourished. A&Ox3.  HEENT:  NCAT, EOMI, moist mucus membranes.  Neck supple w/o masses.  BREAST:  Symmetric, no obvious masses, adenopathy, skin changes or nipple discharge.  LUNGS:  CTA-B.  HEART:  RRR, physiologic heart sounds.  ABDOMEN:  Soft, non-tender. Normoactive BS.  No obvious organomegaly.  Size = dates  EXT:  Symmetric w/o cramping, claudication, or edema. +2 distal pulses. FROM.  SKIN:  No rashes  NEURO:  CN II - XII grossly intact bilaterally. +2 DTR.  PSYCH:  Mood & affect appropriate.     GENITOURINARY:  NFEG no lesion. No vaginal or cervical lesion. No bleeding or discharge. No CMT. Uterus and ovaries small, NT. Wet prep negative. Declined rectal exam. No obvious external lesions.    Chaperone present for exam.    Assessment:     32 y.o. , UPT positive, LMP 17, here for confirmation of pregnancy    Plan:    We discussed principles of prenatal care, weight gain goals, pregnancy care instructions and precautions.    Prenatal educational material d/w.  Prenatal vitamins.    SAB and ectopic precautions reviewed.    Return in 4 weeks to initiate prenatal care.    All questions answered, pt voiced understanding.      Bradly Cervantes MD    __________________________________________________________    10/23/2017    11w1d here for initial OB visit and dating ultrasound.  Pt has no major complaints today.  Dating u/s shows single live IUP at 11w1d with GEOVANI 2018.  Limitation of today's u/s exam discussed.  Order initial OB labs.  Pt declined first trimester aneuploidy screening, and state she would not terminate the pregnancy for any reasons.  Principles of prenatal care and precautions reviewed.  Return 4 wks.  Voiced understanding.    Bradly Cervantes, " MD  __________________________________________________________

## 2023-12-14 NOTE — PLAN OF CARE
Problem: PAIN - ADULT  Goal: Verbalizes/displays adequate comfort level or baseline comfort level  Description: Interventions:  - Encourage patient to monitor pain and request assistance  - Assess pain using appropriate pain scale  - Administer analgesics based on type and severity of pain and evaluate response  - Implement non-pharmacological measures as appropriate and evaluate response  - Consider cultural and social influences on pain and pain management  - Notify physician/advanced practitioner if interventions unsuccessful or patient reports new pain  12/14/2023 1558 by Gautam Machado RN  Outcome: Progressing  12/14/2023 1557 by Gautam Machado RN  Outcome: Progressing     Problem: INFECTION - ADULT  Goal: Absence or prevention of progression during hospitalization  Description: INTERVENTIONS:  - Assess and monitor for signs and symptoms of infection  - Monitor lab/diagnostic results  - Monitor all insertion sites, i.e. indwelling lines, tubes, and drains  - Monitor endotracheal if appropriate and nasal secretions for changes in amount and color  - [place] appropriate cooling/warming therapies per order  - Administer medications as ordered  - Instruct and encourage patient and family to use good hand hygiene technique  - Identify and instruct in appropriate isolation precautions for identified infection/condition  12/14/2023 1558 by Gautam Machado RN  Outcome: Progressing  12/14/2023 1557 by Gautam Machado RN  Outcome: Progressing  Goal: Absence of fever/infection during neutropenic period  Description: INTERVENTIONS:  - Monitor WBC    12/14/2023 1558 by Gautam Machado RN  Outcome: Progressing  12/14/2023 1557 by Gautam Machado RN  Outcome: Progressing     Problem: SAFETY ADULT  Goal: Patient will remain free of falls  Description: INTERVENTIONS:  - Educate patient/family on patient safety including physical limitations  - Instruct patient to call for assistance with activity   - Consult OT/PT to assist with strengthening/mobility   - Keep Call bell within reach  - Keep bed low and locked with side rails adjusted as appropriate  - Keep care items and personal belongings within reach  - Initiate and maintain comfort rounds  - Make Fall Risk Sign visible to staff  - Offer Toileting every 2 Hours, in advance of need  - Initiate/Maintain bed alarm  - Obtain necessary fall risk management equipment  - Apply yellow socks and bracelet for high fall risk patients  - Consider moving patient to room near nurses station  12/14/2023 1558 by Valerie Weeks RN  Outcome: Progressing  12/14/2023 1557 by Valerie Weeks RN  Outcome: Progressing  Goal: Maintain or return to baseline ADL function  Description: INTERVENTIONS:  -  Assess patient's ability to carry out ADLs; assess patient's baseline for ADL function and identify physical deficits which impact ability to perform ADLs (bathing, care of mouth/teeth, toileting, grooming, dressing, etc.)  - Assess/evaluate cause of self-care deficits   - Assess range of motion  - Assess patient's mobility; develop plan if impaired  - Assess patient's need for assistive devices and provide as appropriate  - Encourage maximum independence but intervene and supervise when necessary  - Involve family in performance of ADLs  - Assess for home care needs following discharge   - Consider OT consult to assist with ADL evaluation and planning for discharge  - Provide patient education as appropriate  12/14/2023 1558 by Valerie Weeks RN  Outcome: Progressing  12/14/2023 1557 by Valerie Weeks RN  Outcome: Progressing  Goal: Maintains/Returns to pre admission functional level  Description: INTERVENTIONS:  - Perform AM-PAC 6 Click Basic Mobility/ Daily Activity assessment daily.  - Set and communicate daily mobility goal to care team and patient/family/caregiver. - Collaborate with rehabilitation services on mobility goals if consulted  - Perform Range of Motion 3 times a day.   - Reposition patient every 2 hours. - Dangle patient 3 times a day  - Stand patient 3 times a day  - Ambulate patient 3 times a day  - Out of bed to chair 3 times a day   - Out of bed for meals 3 times a day  - Out of bed for toileting  - Record patient progress and toleration of activity level   12/14/2023 1558 by Mary Sandoval RN  Outcome: Progressing  12/14/2023 1557 by Mary Sandoval RN  Outcome: Progressing     Problem: DISCHARGE PLANNING  Goal: Discharge to home or other facility with appropriate resources  Description: INTERVENTIONS:  - Identify barriers to discharge w/patient and caregiver  - Arrange for needed discharge resources and transportation as appropriate  - Identify discharge learning needs (meds, wound care, etc.)  - Arrange for interpretive services to assist at discharge as needed  - Refer to Case Management Department for coordinating discharge planning if the patient needs post-hospital services based on physician/advanced practitioner order or complex needs related to functional status, cognitive ability, or social support system  12/14/2023 1558 by Mary Sandoval RN  Outcome: Progressing  12/14/2023 1557 by Mary Sandoval RN  Outcome: Progressing     Problem: Knowledge Deficit  Goal: Patient/family/caregiver demonstrates understanding of disease process, treatment plan, medications, and discharge instructions  Description: Complete learning assessment and assess knowledge base.   Interventions:  - Provide teaching at level of understanding  - Provide teaching via preferred learning methods  12/14/2023 1558 by Mary Sandoval RN  Outcome: Progressing  12/14/2023 1557 by Mary Sandoval RN  Outcome: Progressing

## 2023-12-14 NOTE — ASSESSMENT & PLAN NOTE
Secondary to COVID-19 infection  Denies any falls, fall precaution, delirium precaution  See plan under COVID-19  PT/OT eval  CT abdomen pelvis also revealing choledocholithiasis with CBD up to 8 mm which is similar to CT scan on 12/5, but patient denies any abdominal pain and reports nausea and vomiting have resolved, so feel less likely related to generalized weakness  If any onset of severe pain can follow-up with GI

## 2023-12-14 NOTE — ED PROVIDER NOTES
History  Chief Complaint   Patient presents with    Shortness of Breath     Pt reports he's had SOB, vomiting, sore throat, and weakness x 1 week. Pt discharged last week for similar symptoms. HPI    Prior to Admission Medications   Prescriptions Last Dose Informant Patient Reported? Taking? Alcohol Swabs (Pharmacist Choice Alcohol) PADS  Self Yes No   Sig: USE FOR TESTING AND INJECTIONS UP TO 10 PADS DAILY   MILK THISTLE PO   Yes No   Sig: Take 2 tablets by mouth in the morning Take in the morning with food   Senna-S 8.6-50 MG per tablet   No No   Sig: TAKE 1 TABLET BY MOUTH DAILY AT BEDTIME   Trulicity 4.5 ON/7.4XW injection  Self Yes No   Sig: INJECT 4.5 MG UNDER THE SKIN ONCE A WEEK.    aspirin (Aspirin Low Dose) 81 mg EC tablet   No No   Sig: TAKE ONE (1) TABLET BY MOUTH DAILY   clopidogrel (PLAVIX) 75 mg tablet   No No   Sig: TAKE 1 TABLET (75 MG TOTAL) BY MOUTH DAILY   ergocalciferol (VITAMIN D2) 50,000 units  Self Yes No   Sig: Take 50,000 Units by mouth once a week   finasteride (PROSCAR) 5 mg tablet   No No   Sig: TAKE ONE (1) TABLET BY MOUTH DAILY   glipiZIDE (GLUCOTROL) 5 mg tablet  Self No No   Sig: TAKE 1 TABLET (5 MG TOTAL) BY MOUTH 2 (TWO) TIMES A DAY   levothyroxine 88 mcg tablet  Self Yes No   Sig: Take 88 mcg by mouth daily   metoprolol tartrate (LOPRESSOR) 25 mg tablet  Self No No   Sig: TAKE 1 TABLET (25 MG TOTAL) BY MOUTH EVERY 12 (TWELVE) HOURS   ondansetron (ZOFRAN-ODT) 4 mg disintegrating tablet  Self Yes No   Sig: TAKE 1 TABLET BY MOUTH 3 TIMES PER DAY FOR 7 DAYS   polyethylene glycol (MIRALAX) 17 g packet   No No   Sig: Take 17 g by mouth daily as needed (constipation) for up to 3 days   rosuvastatin (CRESTOR) 20 MG tablet  Self No No   Sig: TAKE 1 TABLET (20 MG TOTAL) BY MOUTH DAILY   tamsulosin (FLOMAX) 0.4 mg  Self No No   Sig: TAKE 1 CAPSULE (0.4 MG TOTAL) BY MOUTH DAILY      Facility-Administered Medications: None       Past Medical History:   Diagnosis Date    Acquired cyst of kidney     Anemia     Benign paroxysmal vertigo, unspecified ear     Cellulitis of leg     Cervical spinal stenosis     Chest pain     Coronary atherosclerosis of native coronary artery     Diabetes mellitus ([address])     Disease of thyroid gland     Enlarged prostate     Esophageal candidiasis (HCC)     H/o COVID-19 06/30/2022    Hematuria     Herpes zoster     Hyperlipidemia     Hypertension     Incomplete emptying of bladder     Inflamed seborrheic keratosis     Internal hemorrhoids     Malignant neoplasm of skin of trunk     Myocardial infarction ([address]) 2005    Nonmelanoma skin cancer     LAST ASSESSED: 8/9/17    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 infarction     Paroxysmal tachycardia (HCC)     Shortness of breath     Vitamin B deficiency        Past Surgical History:   Procedure Laterality Date    CARDIAC CATHETERIZATION Left 5/15/2023    Procedure: Cardiac Left Heart Cath;  Surgeon: Mykel Reyes MD;  Location: [address] [place] [address] Ave CATH LAB; Service: Cardiology    CARDIAC CATHETERIZATION N/A 5/15/2023    Procedure: Cardiac pci;  Surgeon: Mykel Reyes MD;  Location: [address] CATH LAB; Service: Cardiology    CARDIAC CATHETERIZATION N/A 5/15/2023    Procedure: Cardiac Coronary Angiogram;  Surgeon: Mykel Reyes MD;  Location: [address] [place] [address] Ave CATH LAB; Service: Cardiology    CARDIAC DEFIBRILLATOR PLACEMENT      COLONOSCOPY  10/07/2008    FIBEROPTIC SCREENING; NO FURTHER RECOMMENDATIONS    CORONARY ANGIOPLASTY WITH STENT PLACEMENT      CYSTOSCOPY  11/06/2015    DIAGNOSTIC; MANAGED BY: Malik Elder    EGD AND COLONOSCOPY N/A 02/12/2018    Procedure: EGD AND COLONOSCOPY;  Surgeon: Leida Gee MD;  Location: [facility];   Service: Gastroenterology    FL INJECTION RIGHT SHOULDER (ARTHROGRAM)  01/04/2022    HIP ARTHROPLASTY Right     INSERT / REPLACE / REMOVE PACEMAKER      JOINT REPLACEMENT Right     THR    TRUNK SKIN LESION EXCISIONAL BIOPSY  08/22/2007    MALIGNANT; BCC CHEST       Family History   Problem Relation Age of Onset    Heart disease Mother     Coronary artery disease Mother     Sudden death Mother     Cancer Father         throat; MALIGNANT NEOPLASM OF HEAD, FACE OR NECK    Throat cancer Father     Cancer Family     Coronary artery disease Family      I have reviewed and agree with the history as documented.     E-Cigarette/Vaping    E-Cigarette Use Never User      E-Cigarette/Vaping Substances    Nicotine No     THC No     CBD No     Flavoring No     Other No     Unknown No      Social History     Tobacco Use    Smoking status: Former     Current packs/day: 0.00     Average packs/day: 1 pack/day for 10.0 years (10.0 ttl pk-yrs)     Types: Cigarettes     Start date: 1968     Quit date: 1978     Years since quittin.8    Smokeless tobacco: Never   Vaping Use    Vaping status: Never Used   Substance Use Topics    Alcohol use: Yes     Comment: occasionally 1-2 per month     Drug use: No       Review of Systems    Physical Exam  Physical Exam    Vital Signs  ED Triage Vitals   Temperature Pulse Respirations Blood Pressure SpO2   23 1645 23 1645 23 1645 23 1733 23 164   98.6 °F (37 °C) (!) 125 (!) 24 127/66 100 %      Temp Source Heart Rate Source Patient Position - Orthostatic VS BP Location FiO2 (%)   23 1645 23 1645 23 1645 23 164 --   Temporal Monitor Sitting Left arm       Pain Score       23 2030       (S) No Pain           Vitals:    23 0200 23 0230 23 0300 23 0330   BP: 112/59 111/65 114/65 115/61   Pulse: 97 105 104 101   Patient Position - Orthostatic VS: Lying Lying Lying Lying         Visual Acuity      ED Medications  Medications   aspirin (ECOTRIN LOW STRENGTH) EC tablet 81 mg (has no administration in time range)   clopidogrel (PLAVIX) tablet 75 mg (has no administration in time range)   finasteride (PROSCAR) tablet 5 mg (has no administration in time range)   levothyroxine tablet 88 mcg (has no administration in time range)   metoprolol tartrate (LOPRESSOR) tablet 25 mg (25 mg Oral Not Given 12/14/23 0121)   polyethylene glycol (MIRALAX) packet 17 g (has no administration in time range)   tamsulosin (FLOMAX) capsule 0.4 mg (has no administration in time range)   senna-docusate sodium (SENOKOT S) 8.6-50 mg per tablet 1 tablet (has no administration in time range)   pravastatin (PRAVACHOL) tablet 80 mg (has no administration in time range)   acetaminophen (TYLENOL) tablet 650 mg (has no administration in time range)   aluminum-magnesium hydroxide-simethicone (MAALOX) oral suspension 30 mL (has no administration in time range)   heparin (porcine) subcutaneous injection 5,000 Units (has no administration in time range)   insulin lispro (HumaLOG) 100 units/mL subcutaneous injection 1-5 Units (has no administration in time range)   insulin lispro (HumaLOG) 100 units/mL subcutaneous injection 1-5 Units (has no administration in time range)   dextromethorphan-guaiFENesin (ROBITUSSIN DM) oral syrup 10 mL (has no administration in time range)   sodium chloride 0.9 % bolus 500 mL (0 mL Intravenous Stopped 12/13/23 2049)   ondansetron (ZOFRAN) injection 4 mg (4 mg Intravenous Given 12/13/23 1800)   iohexol (OMNIPAQUE) 350 MG/ML injection (MULTI-DOSE) 100 mL (100 mL Intravenous Given 12/13/23 1927)   sodium chloride 0.9 % bolus 500 mL (500 mL Intravenous New Bag 12/14/23 0000)       Diagnostic Studies  Results Reviewed       Procedure Component Value Units Date/Time    C-reactive protein [[id number]]  (Abnormal) Collected: 12/13/23 1848    Lab Status: Final result Specimen: Blood from Arm, Left Updated: 12/14/23 0034     CRP 58.6 mg/L     CK [[id number]]  (Normal) Collected: 12/13/23 1848    Lab Status: Final result Specimen: Blood from Arm, Left Updated: 12/14/23 0034     Total CK 43 U/L     HS Troponin I 4hr [[id number]]  (Normal) Collected: 12/13/23 2148    Lab Status: Final result Specimen: Blood from Arm, Left Updated: 12/13/23 2233 hs TnI 4hr 47 ng/L      Delta 4hr hsTnI 6 ng/L     Urine Microscopic [[id number]]  (Normal) Collected: 12/13/23 2142    Lab Status: Final result Specimen: Urine, Clean Catch Updated: 12/13/23 2202     RBC, UA 2-4 /hpf      WBC, UA None Seen /hpf      Epithelial Cells Occasional /hpf      Bacteria, UA None Seen /hpf     UA w Reflex to Microscopic w Reflex to Culture [[id number]]  (Abnormal) Collected: 12/13/23 2142    Lab Status: Final result Specimen: Urine, Clean Catch Updated: 12/13/23 2201     Color, UA Light Yellow     Clarity, UA Clear     Specific Gravity, UA >=1.050     pH, UA 5.5     Leukocytes, UA Negative     Nitrite, UA Negative     Protein,  (2+) mg/dl      Glucose,  (1/5%) mg/dl      Ketones, UA 20 (1+) mg/dl      Urobilinogen, UA <2.0 mg/dl      Bilirubin, UA Negative     Occult Blood, UA Trace    HS Troponin I 2hr [[id number]]  (Normal) Collected: 12/1941    Lab Status: Final result Specimen: Blood from Arm, Left Updated: 12/13/23 2021     hs TnI 2hr 35 ng/L      Delta 2hr hsTnI -6 ng/L     Comprehensive metabolic panel [[id number]]  (Abnormal) Collected: 12/13/23 1848    Lab Status: Final result Specimen: Blood from Arm, Left Updated: 12/13/23 1917     Sodium 137 mmol/L      Potassium 4.5 mmol/L      Chloride 105 mmol/L      CO2 25 mmol/L      ANION GAP 7 mmol/L      BUN 17 mg/dL      Creatinine 0.96 mg/dL      Glucose 165 mg/dL      Calcium 8.2 mg/dL      AST 29 U/L      ALT 14 U/L      Alkaline Phosphatase 26 U/L      Total Protein 6.7 g/dL      Albumin 3.5 g/dL      Total Bilirubin 0.83 mg/dL      eGFR 73 ml/min/1.73sq m     Narrative:      [place] guidelines for Chronic Kidney Disease (CKD):     Stage 1 with normal or high GFR (GFR > 90 mL/min/1.73 square meters)    Stage 2 Mild CKD (GFR = 60-89 mL/min/1.73 square meters)    Stage 3A Moderate CKD (GFR = 45-59 mL/min/1.73 square meters)    Stage 3B Moderate CKD (GFR = 30-44 mL/min/1.73 square meters) Stage 4 Severe CKD (GFR = 15-29 mL/min/1.73 square meters)    Stage 5 End Stage CKD (GFR <15 mL/min/1.73 square meters)  Note: GFR calculation is accurate only with a steady state creatinine    Lipase [[id number]]  (Normal) Collected: 12/13/23 1848    Lab Status: Final result Specimen: Blood from Arm, Left Updated: 12/13/23 1917     Lipase 28 u/L     Phosphorus [[id number]]  (Normal) Collected: 12/13/23 1848    Lab Status: Final result Specimen: Blood from Arm, Left Updated: 12/13/23 1917     Phosphorus 3.0 mg/dL     Magnesium [[id number]]  (Normal) Collected: 12/13/23 1848    Lab Status: Final result Specimen: Blood from Arm, Left Updated: 12/13/23 1917     Magnesium 2.0 mg/dL     Strep A PCR [[id number]]  (Normal) Collected: 12/13/23 1751    Lab Status: Final result Specimen: Throat Updated: 12/13/23 1833     STREP A PCR Not Detected    COVID/FLU/RSV [[id number]]  (Abnormal) Collected: 12/13/23 1732    Lab Status: Final result Specimen: Nares from Nose Updated: 12/13/23 1831     SARS-CoV-2 Positive     INFLUENZA A PCR Negative     INFLUENZA B PCR Negative     RSV PCR Negative    Narrative:      FOR PEDIATRIC PATIENTS - copy/paste COVID Guidelines URL to browser: [email or web address]    SARS-CoV-2 assay is a Nucleic Acid Amplification assay intended for the  qualitative detection of nucleic acid from SARS-CoV-2 in nasopharyngeal  swabs. Results are for the presumptive identification of SARS-CoV-2 RNA. Positive results are indicative of infection with SARS-CoV-2, the virus  causing COVID-19, but do not rule out bacterial infection or co-infection  with other viruses. Laboratories within the [facility] and its  territories are required to report all positive results to the appropriate  public health authorities. Negative results do not preclude SARS-CoV-2  infection and should not be used as the sole basis for treatment or other  patient management decisions. Negative results must be combined with  clinical observations, patient history, and epidemiological information. This test has not been FDA cleared or approved. This test has been authorized by FDA under an Emergency Use Authorization  (EUA). This test is only authorized for the duration of time the  declaration that circumstances exist justifying the authorization of the  emergency use of an in vitro diagnostic tests for detection of SARS-CoV-2  virus and/or diagnosis of COVID-19 infection under section 564(b)(1) of  the Act, 21 U. S.C. 678EWU-7(E)(5), unless the authorization is terminated  or revoked sooner. The test has been validated but independent review by FDA  and CLIA is pending. Test performed using [employer] GeneXpert: This RT-PCR assay targets N2,  a region unique to SARS-CoV-2. A conserved region in the E-gene was chosen  for pan-Sarbecovirus detection which includes SARS-CoV-2. According to CMS-2020-01-R, this platform meets the definition of high-throughput technology.     HS Troponin 0hr (reflex protocol) [[id number]]  (Normal) Collected: 12/13/23 1732    Lab Status: Final result Specimen: Blood from Arm, Left Updated: 12/13/23 1815     hs TnI 0hr 41 ng/L     B-Type Natriuretic Peptide(BNP) [[id number]]  (Abnormal) Collected: 12/13/23 1732    Lab Status: Final result Specimen: Blood from Arm, Left Updated: 12/13/23 1815      pg/mL     CBC and differential [[id number]]  (Abnormal) Collected: 12/13/23 1732    Lab Status: Final result Specimen: Blood from Arm, Left Updated: 12/13/23 1747     WBC 10.88 Thousand/uL      RBC 3.23 Million/uL      Hemoglobin 12.5 g/dL      Hematocrit 35.4 %       fL      MCH 38.7 pg      MCHC 35.3 g/dL      RDW 15.9 %      MPV 9.9 fL      Platelets 257 Thousands/uL      nRBC 0 /100 WBCs      Neutrophils Relative 85 %      Immat GRANS % 0 %      Lymphocytes Relative 8 %      Monocytes Relative 7 %      Eosinophils Relative 0 %      Basophils Relative 0 % Neutrophils Absolute 9.10 Thousands/µL      Immature Grans Absolute 0.04 Thousand/uL      Lymphocytes Absolute 0.91 Thousands/µL      Monocytes Absolute 0.81 Thousand/µL      Eosinophils Absolute 0.00 Thousand/µL      Basophils Absolute 0.02 Thousands/µL                    CT abdomen pelvis with contrast   Final Result by Ravi Garcia DO (12/13 2050)      1. No CT evidence of acute inflammatory process within the abdomen or pelvis. 2. Redemonstration of cholelithiasis and choledocholithiasis. The common bile duct is at the top of normal limits for a patient of this age measuring 8 mm in caliber. Findings are similar to recent comparison studies. Correlation with biliary labs is    recommended. 3. Moderate colonic stool burden      4. Prostatomegaly. 5. Stable appearance of renal cysts and irregular hypoattenuated region along the medial aspect of the right kidney as described above. 6. 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 Workstation performed: [id number]         XR chest 1 view portable    (Results Pending)              Procedures  Procedures         ED Course                                             Medical Decision Making    EKG: rate 117, sinus tachycardia, stable t wave changes from prior. 12/23    Amount and/or Complexity of Data Reviewed  Labs: ordered. Radiology: ordered. Risk  Prescription drug management. Decision regarding hospitalization.              Disposition  Final diagnoses:   COVID   Weakness   Dehydration     Time reflects when diagnosis was documented in both MDM as applicable and the Disposition within this note       Time User Action Codes Description Comment    12/13/2023 10:42 PM Garry MCKNIGHT Add [U07.1] COVID     12/13/2023 10:53 PM Garry MCKNIGHT Add [R53.1] Weakness     12/13/2023 11:06 PM Ness Bonner Add [E86.0] Dehydration           ED Disposition       ED Disposition   Admit    Condition   Stable    Date/Time   Wed Dec 13, 2023 11:42 PM    Comment   Case was discussed with hospitalist and the patient's admission status was agreed to be Admission Status: observation status to the service of Dr. Segundo Tolbert . Follow-up Information       Follow up With Specialties Details Why Wisam Tenorio MD Internal Medicine Schedule an appointment as soon as possible for a visit  As needed, If symptoms worsen [address] [place] [address] [address] [facility]  [phone]              Patient's Medications   Discharge Prescriptions    No medications on file       No discharge procedures on file.     PDMP Review       None            ED Provider  Electronically Signed by be Admission Status: observation status to the service of Dr. Westfall .               Follow-up Information       Follow up With Specialties Details Why Contact Info    Wendy Alex MD Internal Medicine Schedule an appointment as soon as possible for a visit  As needed, If symptoms worsen [address] [facility]  2nd [address]  [facility] [address]  [phone]              Discharge Medication List as of 12/15/2023  4:02 PM        START taking these medications    Details   benzonatate (TESSALON PERLES) 100 mg capsule Take 1 capsule (100 mg total) by mouth 3 (three) times a day as needed for cough, Starting Fri 12/15/2023, Normal      dextromethorphan-guaiFENesin (ROBITUSSIN DM)  mg/5 mL syrup Take 10 mL by mouth every 6 (six) hours as needed for congestion or cough for up to 14 days, Starting Fri 12/15/2023, Until Fri 12/29/2023 at 2359, Normal           CONTINUE these medications which have NOT CHANGED    Details   aspirin (Aspirin Low Dose) 81 mg EC tablet TAKE ONE (1) TABLET BY MOUTH DAILY, Starting Thu 11/2/2023, Normal      clopidogrel (PLAVIX) 75 mg tablet TAKE 1 TABLET (75 MG TOTAL) BY MOUTH DAILY, Starting Wed 11/22/2023, Normal      ergocalciferol (VITAMIN D2) 50,000 units Take 50,000 Units by mouth once a week, Starting Wed 9/21/2022, Historical Med      finasteride (PROSCAR) 5 mg tablet TAKE ONE (1) TABLET BY MOUTH DAILY, Starting Mon 10/30/2023, Normal      levothyroxine 88 mcg tablet Take 88 mcg by mouth daily, Starting Mon 8/28/2023, Historical Med      metoprolol tartrate (LOPRESSOR) 25 mg tablet TAKE 1 TABLET (25 MG TOTAL) BY MOUTH EVERY 12 (TWELVE) HOURS, Starting Mon 9/18/2023, Normal      ondansetron (ZOFRAN-ODT) 4 mg disintegrating tablet TAKE 1 TABLET BY MOUTH 3 TIMES PER DAY FOR 7 DAYS, Historical Med      rosuvastatin (CRESTOR) 20 MG tablet TAKE 1 TABLET (20 MG TOTAL) BY MOUTH DAILY, Starting Tue 6/20/2023, Normal      tamsulosin (FLOMAX) 0.4 mg TAKE 1 CAPSULE (0.4 MG TOTAL) BY MOUTH  DAILY, Starting Thu 4/13/2023, Normal      Alcohol Swabs (Pharmacist Choice Alcohol) PADS USE FOR TESTING AND INJECTIONS UP TO 10 PADS DAILY, Historical Med      glipiZIDE (GLUCOTROL) 5 mg tablet TAKE 1 TABLET (5 MG TOTAL) BY MOUTH 2 (TWO) TIMES A DAY, Starting Mon 4/3/2023, Normal      MILK THISTLE PO Take 2 tablets by mouth in the morning Take in the morning with food, Historical Med      polyethylene glycol (MIRALAX) 17 g packet Take 17 g by mouth daily as needed (constipation) for up to 3 days, Starting Mon 10/2/2023, Until Thu 10/5/2023 at 2359, Normal      Senna-S 8.6-50 MG per tablet TAKE 1 TABLET BY MOUTH DAILY AT BEDTIME, Starting Wed 12/13/2023, Normal      Trulicity 4.5 MG/0.5ML injection INJECT 4.5 MG UNDER THE SKIN ONCE A WEEK., Historical Med             No discharge procedures on file.    PDMP Review       None            ED Provider  Electronically Signed by             Yesi Tinoco DO  01/05/24 5962

## 2023-12-14 NOTE — PLAN OF CARE
Problem: OCCUPATIONAL THERAPY ADULT  Goal: Performs self-care activities at highest level of function for planned discharge setting. See evaluation for individualized goals. Description: Treatment Interventions: ADL retraining, Functional transfer training, Endurance training, Patient/family training, Equipment evaluation/education, Compensatory technique education, Continued evaluation, Energy conservation, Activityengagement          See flowsheet documentation for full assessment, interventions and recommendations. Note: Limitation: Decreased ADL status, Decreased endurance, Decreased self-care trans, Decreased high-level ADLs  Prognosis: Good  Assessment: Patient is a 81 y.o. male seen for OT evaluation s/p admit to [facility] on 12/13/2023 w/Generalized weakness. 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 Orders placed for OT evaluation and treatment. Performed at least two patient identifiers during session including name and wristband. Prior to admission, Pt ambulates without an AD for household distances and uses a cane in the community. patient lives with family in a one story house with 13 [address]. Personal factors affecting patient at time of initial evaluation include: difficulty performing ADLs and difficulty performing IADLs. Upon evaluation, patient requires supervision and set up assist for UB ADLs, minimal  assist for LB ADLs, transfers and functional ambulation in room and bathroom with minimal  assist, with the use of Rolling Walker. Occupational performance is affected by the following deficits: decreased muscle strength, dynamic sit/ stand balance deficit with poor standing tolerance time for self care and functional mobility, and decreased activity tolerance.   Patient to benefit from continued Occupational Therapy treatment while in the hospital to address deficits as defined above and maximize level of functional independence with ADLs and functional mobility. Occupational Performance areas to address include: bathing/ shower, dressing, toilet hygiene, functional mobility, health maintenance, and IADLs: safety procedures. From OT standpoint, recommendation at time of d/c would be Level 2.      Rehab Resource Intensity Level, OT: III (Minimum Resource Intensity)

## 2023-12-14 NOTE — INCIDENTAL FINDINGS
The following findings require follow up:  Radiographic finding   Finding: "Redemonstration of cholelithiasis and choledocholithiasis. The common bile duct is at the top of normal limits for a patient of this age measuring 8 mm in caliber.  Findings are similar to recent comparison studies "     Follow up required: Outpatient as needed with PCP and/or GI   Follow up should be done within as needed if abdominal pain, nausea or vomiting return    Please notify the following clinician to assist with the follow up:   Primary care physician and/or gastroenterology

## 2023-12-14 NOTE — ASSESSMENT & PLAN NOTE
Currently saturating 90 to 99% on room air, denies shortness of breath  Since patient currently without respiratory symptoms we will hold off on initiating IV dexamethasone and IV remdesivir  VTE prophylaxis ordered  Supportive care

## 2023-12-14 NOTE — PHYSICAL THERAPY NOTE
Physical Therapy Evaluation     Patient's Name: Atif Allison    Admitting Diagnosis  SOB (shortness of breath) [R06.02]  Vomiting [R11.10]    Problem List  Patient Active Problem List   Diagnosis    Anemia    BPH (benign prostatic hyperplasia)    Ischemic cardiomyopathy    Coronary artery disease of native artery of native heart with stable angina pectoris (HCC)    Urinary retention    Type 2 diabetes mellitus with stage 3 chronic kidney disease, with long-term current use of insulin, unspecified whether stage 3a or 3b CKD ([address])    COVID-19    Physical deconditioning    Oropharyngeal dysphagia    Chronic right shoulder pain    Constipation    JESSA (acute kidney injury) ([address])    Abnormal CT scan    Hypokalemia    Generalized weakness     Past Medical History  Past Medical History:   Diagnosis Date    Acquired cyst of kidney     Anemia     Benign paroxysmal vertigo, unspecified ear     Cellulitis of leg     Cervical spinal stenosis     Chest pain     Coronary atherosclerosis of native coronary artery     Diabetes mellitus ([address])     Disease of thyroid gland     Enlarged prostate     Esophageal candidiasis ([address])     H/o COVID-19 06/30/2022    Hematuria     Herpes zoster     Hyperlipidemia     Hypertension     Incomplete emptying of bladder     Inflamed seborrheic keratosis     Internal hemorrhoids     Malignant neoplasm of skin of trunk     Myocardial infarction ([address]) 2005    Nonmelanoma skin cancer     LAST ASSESSED: 8/9/17    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 infarction     Paroxysmal tachycardia (HCC)     Shortness of breath     Vitamin B deficiency      Past Surgical History  Past Surgical History:   Procedure Laterality Date    CARDIAC CATHETERIZATION Left 5/15/2023    Procedure: Cardiac Left Heart Cath;  Surgeon: Bernardo Galvez MD;  Location: [facility]; Service: Cardiology    CARDIAC CATHETERIZATION N/A 5/15/2023    Procedure: Cardiac pci;  Surgeon: Bernardo Galvez MD;  Location: [address] [place] [address] CATH LAB;   Service: Cardiology    CARDIAC CATHETERIZATION N/A 5/15/2023    Procedure: Cardiac Coronary Angiogram;  Surgeon: Jerrod Munoz MD;  Location: [address] [place] [address] CATH LAB; Service: Cardiology    CARDIAC DEFIBRILLATOR PLACEMENT      COLONOSCOPY  10/07/2008    FIBEROPTIC SCREENING; NO FURTHER RECOMMENDATIONS    CORONARY ANGIOPLASTY WITH STENT PLACEMENT      CYSTOSCOPY  11/06/2015    DIAGNOSTIC; MANAGED BY: Olamide Lao    EGD AND COLONOSCOPY N/A 02/12/2018    Procedure: EGD AND COLONOSCOPY;  Surgeon: Marquis Powell MD;  Location: [facility]; Service: Gastroenterology    FL INJECTION RIGHT SHOULDER (ARTHROGRAM)  01/04/2022    HIP ARTHROPLASTY Right     INSERT / REPLACE / REMOVE PACEMAKER      JOINT REPLACEMENT Right     THR    TRUNK SKIN LESION EXCISIONAL BIOPSY  08/22/2007    MALIGNANT; [facility] CHEST      12/14/23 0759   PT Last Visit   PT Visit Date 12/14/23   Note Type   Note type Evaluation   Additional Comments Vitals at start of session:  bpm, /63, SpO2 on room air 96%   Pain Assessment   Pain Assessment Tool 0-10   Pain Score No Pain   Restrictions/Precautions   Weight Bearing Precautions Per Order No   Braces or Orthoses Other (Comment)  (none per patient)   Other Precautions Contact/isolation; Airborne/isolation;Droplet precautions; Chair Alarm; Bed Alarm;Multiple lines;Telemetry; Fall Risk  (+COVID)   Home Living   Type of [address] [place] [address] One level; Able to live on main level with bedroom/bathroom; Performs ADLs on one level;Stairs to enter with rails  ([address])   Bathroom Shower/Tub Walk-in shower   Bathroom Toilet Raised   Bathroom Equipment Shower chair;Grab bars in shower;Grab bars around toilet   [address] Cane;Electric scooter   Additional Comments Pt ambulates without an AD for household distances and uses a cane in the community. Prior Function   Level of [place] Independent with functional mobility; Independent with ADLs; Needs assistance with IADLS   Lives With Son;Family   Receives Help From Family   IADLs Family/Friend/Other provides meals; Family/Friend/Other provides medication management; Family/Friend/Other provides transportation   Falls in the last 6 months 0   Vocational Retired   General   Family/Caregiver Present No   Cognition   Overall Cognitive Status WFL   Arousal/Participation Alert   Attention Within functional limits   Orientation Level Oriented X4   Memory Within functional limits   Following Commands Follows multistep commands without difficulty   Comments Pt agreeable to PT. Subjective   Subjective "I feel better than yesterday."   RLE Assessment   RLE Assessment X   Strength RLE   RLE Overall Strength 4-/5   LLE Assessment   LLE Assessment X   Strength LLE   LLE Overall Strength 4-/5   Light Touch   RLE Light Touch Grossly intact   LLE Light Touch Grossly intact   Bed Mobility   Supine to Sit 5  Supervision   Additional items Assist x 1;HOB elevated; Increased time required;Verbal cues   Sit to Supine 5  Supervision   Additional items Assist x 1;HOB elevated; Increased time required;Verbal cues   Transfers   Sit to Stand 5  Supervision   Additional items Assist x 1; Increased time required;Verbal cues   Stand to Sit 5  Supervision   Additional items Assist x 1; Increased time required;Verbal cues   Ambulation/Elevation   Gait pattern Short stride; Shuffling   Gait Assistance   (CG assist)   Additional items Assist x 1;Verbal cues   Assistive Device None   Distance 20 feet   Balance   Static Sitting Good   Dynamic Sitting Fair +   Static Standing Fair   Dynamic Standing Fair -   Ambulatory Fair -   Endurance Deficit   Endurance Deficit Yes   Endurance Deficit Description decreased activity tolerance   Activity Tolerance   Activity Tolerance Patient limited by fatigue   Medical Staff Made Aware OT Luz Davis  (Co-evaluation performed with OT secondary to complex medical condition of patient and regression of functional status from baseline. PT/OT goals were addressed separately.)   Assessment   Prognosis Good   Problem List Decreased strength;Decreased endurance; Impaired balance;Decreased mobility   Assessment Pt is 81year old male seen for PT evaluation s/p admit to [address] [place] [address] on 12/13/2023 with Generalized weakness. PT consulted to assess pt's functional mobility and discharge needs. Order placed for PT evaluation and treatment, with activity as tolerated order. Comorbidities affecting pt's physical performance at time of assessment include type 2 DM with stage 3 CKD, COVID-19, and constipation. Prior to hospitalization, pt was independent with all functional mobility without an AD for household distance and with a cane for community distances. Pt resides with his son and family, in a one level house with thirteen steps to enter. Personal factors affecting pt at time of initial evaluation include stairs to enter home, difficulty ambulating community distances, difficulty navigating level surfaces without external assistance, difficulty performing dynamic tasks in the community, and difficulty performing ADLs and IADLs. Please find objective findings from PT assessment regarding body systems outlined above with impairments and limitations including weakness, impaired balance, decreased endurance, gait deviations, decreased activity tolerance, decreased functional mobility tolerance, and fall risk. The following objective measures were performed on initial evaluation Barthel Index: 55/100, Modified Gilberto: 3 (moderate disability), and AM-PAC 6-Clicks: 80/47. Pt's clinical presentation is currently evolving seen in pt's presentation of need for ongoing medical management/monitoring, pt is a fall risk, and pt requires cues and assist for safety with functional mobility. Pt to benefit from continued PT treatment to address deficits as defined above and maximize pt's level of function and independence with mobility.  From a PT standpoint, recommendation at time of discharge would be level 3, minimal resource intensity in order to facilitate return to prior level of function. Barriers to Discharge Inaccessible home environment   Goals   STG Expiration Date 12/24/23   Short Term Goal #1 In 10 days: Increase bilateral LE strength 1/2 grade to facilitate independent mobility, Perform all bed mobility tasks modified independent to decrease caregiver burden, Perform all transfers modified independent to improve independence, Ambulate > 150 ft. with least restrictive assistive device modified independent w/o LOB and w/ normalized gait pattern 100% of the time, Navigate 13 stairs modified independent with unilateral handrail to facilitate return to previous living environment, and Increase all balance 1/2 grade to decrease risk for falls   Plan   Treatment/Interventions Functional transfer training;LE strengthening/ROM; Elevations; Therapeutic exercise; Endurance training;Patient/family training;Bed mobility;Gait training;OT   PT Frequency 2-3x/wk   Discharge Recommendation   Rehab Resource Intensity Level, PT III (Minimum Resource Intensity)   AM-PAC Basic Mobility Inpatient   Turning in Flat Bed Without Bedrails 3   Lying on Back to Sitting on Edge of Flat Bed Without Bedrails 3   Moving Bed to Chair 3   Standing Up From Chair Using Arms 3   Walk in Room 3   Climb 3-5 Stairs With Railing 3   Basic Mobility Inpatient Raw Score 18   Basic Mobility Standardized Score 41.05   Highest Level Of Mobility   JH-HLM Goal 6: Walk 10 steps or more   JH-HLM Achieved 6: Walk 10 steps or more   Modified Warren Scale   Modified Warren Scale 3   Barthel Index   Feeding 10   Bathing 0   Grooming Score 5   Dressing Score 5   Bladder Score 10   Bowels Score 10   Toilet Use Score 5   Transfers (Bed/Chair) Score 10   Mobility (Level Surface) Score 0   Stairs Score 0   Barthel Index Score 55     PT Evaluation Time: 5223-9279  Roula Das, PT, DPT

## 2023-12-14 NOTE — UTILIZATION REVIEW
Initial Clinical Review      OBS order 12/13 2342 converted to IP on 12/14 1749 for generalized weakness and treatment of COVID . Admission: Date/Time/Statement:   Admission Orders (From admission, onward)       Ordered        12/14/23 1749  Inpatient Admission  Once            12/13/23 2342  Place in Observation  Once                           Inpatient Admission     Standing Status:   Standing     Number of Occurrences:   1     Order Specific Question:   Level of Care     Answer:   Med Surg [16]     Order Specific Question:   Estimated length of stay     Answer:   More than 2 Midnights     Order Specific Question:   Certification     Answer:   I certify that inpatient services are medically necessary for this patient for a duration of greater than two midnights. See H&P and MD Progress Notes for additional information about the patient's course of treatment. ED Arrival Information       Expected   -    Arrival   12/13/2023 16:36    Acuity   Emergent              Means of arrival   Wheelchair    Escorted by   Family Member    Service   Hospitalist    Admission type   Emergency              Arrival complaint   Shortness Of Breath,Vomiting             Chief Complaint   Patient presents with    Shortness of Breath     Pt reports he's had SOB, vomiting, sore throat, and weakness x 1 week. Pt discharged last week for similar symptoms. Initial Presentation: 81 y.o. male to ED from home w/ PMH of CAD, ischemic cardiomyopathy status post defibrillator placement, DM2 who presents with c/o sudden onset severe generalized weakness, overall not feeling well x 1 week. Reports he initially was seen on 12/5 for nausea and vomiting, but reports that has resolved. Tested + COVID . Admitted IP status w/ generalized weakness , COVID . Plan for PT OT eval , IV dexamethasone and iv remdesivir , supportive care , DVT ppx. DM SSI and monitor .        Date: 12/15    Day 3: Has surpassed a 2nd midnight with active treatments and services, which include supportive care of COVID , safe DC plan  .       ED Triage Vitals   Temperature Pulse Respirations Blood Pressure SpO2   12/13/23 1645 12/13/23 1645 12/13/23 1645 12/13/23 1733 12/13/23 1645   98.6 °F (37 °C) (!) 125 (!) 24 127/66 100 %      Temp Source Heart Rate Source Patient Position - Orthostatic VS BP Location FiO2 (%)   12/13/23 1645 12/13/23 1645 12/13/23 1645 12/13/23 1645 --   Temporal Monitor Sitting Left arm       Pain Score       12/13/23 2030       (S) No Pain          Wt Readings from Last 1 Encounters:   12/14/23 59.9 kg (132 lb)     Additional Vital Signs:   12/15/23 09:14:52 -- 91 -- 114/60 78 97 % -- --   12/15/23 07:39:52 98.4 °F (36.9 °C) 96 16 108/65 79 97 % -- --   12/14/23 23:10:12 -- 89 -- 124/59 81 98 % -- --   12/14/23 21:29:31 -- 80 -- 102/55 71 97 % -- --   12/14/23 2129 -- 80 -- 102/55 -- -- -- --   12/14/23 16:24:50 97.7 °F (36.5 °C) 83 20 95/56 69 99 % -- --   12/14/23 1300 98 °F (36.7 °C) -- -- -- -- -- -- --   12/14/23 1249 -- 79 19 126/60 -- 95 % None (Room air) Lying   12/14/23 1100 -- 93 21 104/65 79 95 % None (Room air) Lying   12/14/23 0930 -- 96 17 126/60 86 97 % -- --                12/14/23 0730 -- 94 20 127/52 75 95 % None (Room air) Lying   12/14/23 0700 -- 100 20 107/55 75 95 % None (Room air) Lying   12/14/23 0630 -- 105 22 127/60 87 95 % None (Room air) Lying   12/14/23 0600 -- 99 16 118/65 86 97 % None (Room air) Lying   12/14/23 0530 -- 96 21 103/59 75 94 % None (Room air) Lying   12/14/23 0500 -- 102 21 108/62 80 95 % None (Room air) Lying   12/14/23 0430 -- 104 22 105/58 74 95 % None (Room air) Lying   12/14/23 0330 -- 101 22 115/61 82 95 % None (Room air) Lying   12/14/23 0300 -- 104 22 114/65 83 95 % None (Room air) Lying   12/14/23 0230 -- 105 22 111/65 82 95 % None (Room air) Lying   12/14/23 0200 -- 97 20 112/59 80 95 % None (Room air) Lying   12/14/23 0100 -- 103 20 108/58 78 97 % None (Room air) Lying   12/14/23 0030 -- 101 22 109/58 78 98 % None (Room air) Lying   12/14/23 0000 -- 107 Abnormal  20 120/59 81 97 % None (Room air) Lying   12/13/23 2330 -- 105 20 129/59 85 98 % None (Room air) Lying   12/13/23 2300 -- 106 Abnormal  20 115/55 79 98 % None (Room air) Lying   12/13/23 2252 -- 101 18 119/61 -- 98 % None (Room air) Lying   12/13/23 2100 -- 108 Abnormal  20 112/62 81 98 % None (Room air) Lying   12/13/23 2030 -- 112 Abnormal  15 110/77 89 98 % None (Room air)   Lying   O2 Device: No reports of SOB at present. at 12/13/23 2030 12/13/23 2000 -- 109 Abnormal  20 117/58 82 97 % None (Room air) Lying   12/13/23 1809 -- 105 17 -- -- 99 % -- --   12/13/23 1733 -- 119 Abnormal  -- 127/66 91 97 % --        Pertinent Labs/Diagnostic Test Results:   12/13 EKG ST   CT abdomen pelvis with contrast   Final Result by Oz Willson DO (12/13 2050)      1. No CT evidence of acute inflammatory process within the abdomen or pelvis. 2. Redemonstration of cholelithiasis and choledocholithiasis. The common bile duct is at the top of normal limits for a patient of this age measuring 8 mm in caliber. Findings are similar to recent comparison studies. Correlation with biliary labs is    recommended. 3. Moderate colonic stool burden      4. Prostatomegaly. 5. Stable appearance of renal cysts and irregular hypoattenuated region along the medial aspect of the right kidney as described above. 6. 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 Workstation performed: [id number]         XR chest 1 view portable   Final Result by Olivia Avelar MD (12/14 2174)      No acute cardiopulmonary disease.                   Workstation performed: [id number]           Results from last 7 days   Lab Units 12/13/23  1732   SARS-COV-2  Positive*     Results from last 7 days   Lab Units 12/15/23  0511 12/13/23  1732   WBC Thousand/uL 4.77 10.88*   HEMOGLOBIN g/dL 10.9* 12.5   HEMATOCRIT % 33.8* 35.4*   PLATELETS Thousands/uL 162 183 NEUTROS ABS Thousands/µL 2.88 9.10*         Results from last 7 days   Lab Units 12/15/23  0511 12/13/23  1848   SODIUM mmol/L 140 137   POTASSIUM mmol/L 3.5 4.5   CHLORIDE mmol/L 105 105   CO2 mmol/L 28 25   ANION GAP mmol/L 7 7   BUN mg/dL 17 17   CREATININE mg/dL 0.89 0.96   EGFR ml/min/1.73sq m 79 73   CALCIUM mg/dL 8.4 8.2*   MAGNESIUM mg/dL 2.1 2.0   PHOSPHORUS mg/dL  --  3.0     Results from last 7 days   Lab Units 12/15/23  0511 12/13/23  1848   AST U/L 39 29   ALT U/L 23 14   ALK PHOS U/L 28* 26*   TOTAL PROTEIN g/dL 6.4 6.7   ALBUMIN g/dL 3.4* 3.5   TOTAL BILIRUBIN mg/dL 0.74 0.83     Results from last 7 days   Lab Units 12/15/23  0616 12/14/23  2129 12/14/23  1622 12/14/23  1132 12/14/23  0650   POC GLUCOSE mg/dl 82 149* 139 129 146*     Results from last 7 days   Lab Units 12/15/23  0511 12/13/23  1848   GLUCOSE RANDOM mg/dL 82 165*               Results from last 7 days   Lab Units 12/13/23  1848   CK TOTAL U/L 43     Results from last 7 days   Lab Units 12/13/23  2148 12/13/23  1941/23  1732   HS TNI 0HR ng/L  --   --  41   HS TNI 2HR ng/L  --  35  --    HSTNI D2 ng/L  --  -6  --    HS TNI 4HR ng/L 47  --   --    HSTNI D4 ng/L 6  --   --      Results from last 7 days   Lab Units 12/13/23  1732   BNP pg/mL 208*       Results from last 7 days   Lab Units 12/13/23  1848   LIPASE u/L 28     Results from last 7 days   Lab Units 12/13/23  1848   CRP mg/L 58.6*       Results from last 7 days   Lab Units 12/13/23  2142   CLARITY UA  Clear   COLOR UA  Light Yellow   SPEC GRAV UA  >=1.050*   PH UA  5.5   GLUCOSE UA mg/dl 200 (1/5%)*   KETONES UA mg/dl 20 (1+)*   BLOOD UA  Trace*   PROTEIN UA mg/dl 100 (2+)*   NITRITE UA  Negative   BILIRUBIN UA  Negative   UROBILINOGEN UA (BE) mg/dl <2.0   LEUKOCYTES UA  Negative   WBC UA /hpf None Seen   RBC UA /hpf 2-4   BACTERIA UA /hpf None Seen   EPITHELIAL CELLS WET PREP /hpf Occasional     Results from last 7 days   Lab Units 12/13/23  1732   INFLUENZA A PCR Negative   INFLUENZA B PCR  Negative   RSV PCR  Negative       ED Treatment:   Medication Administration from 12/13/2023 1636 to 12/14/2023 2686         Date/Time Order Dose Route Action     12/13/2023 1752 EST sodium chloride 0.9 % bolus 500 mL 500 mL Intravenous New Bag     12/13/2023 1800 EST ondansetron (ZOFRAN) injection 4 mg 4 mg Intravenous Given     12/14/2023 0000 EST sodium chloride 0.9 % bolus 500 mL 500 mL Intravenous New Bag     12/14/2023 0700 EST levothyroxine tablet 88 mcg 88 mcg Oral Given     12/14/2023 0610 EST heparin (porcine) subcutaneous injection 5,000 Units 5,000 Units Subcutaneous Given          Past Medical History:   Diagnosis Date    Acquired cyst of kidney     Anemia     Benign paroxysmal vertigo, unspecified ear     Cellulitis of leg     Cervical spinal stenosis     Chest pain     Coronary atherosclerosis of native coronary artery     Diabetes mellitus (HCC)     Disease of thyroid gland     Enlarged prostate     Esophageal candidiasis (HCC)     H/o COVID-19 06/30/2022    Hematuria     Herpes zoster     Hyperlipidemia     Hypertension     Incomplete emptying of bladder     Inflamed seborrheic keratosis     Internal hemorrhoids     Malignant neoplasm of skin of trunk     Myocardial infarction ([address]) 2005    Nonmelanoma skin cancer     LAST ASSESSED: 8/9/17    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 infarction     Paroxysmal tachycardia (HCC)     Shortness of breath     Vitamin B deficiency      Present on Admission:   COVID-19   Constipation      Admitting Diagnosis: Dehydration [E86.0]  Vomiting [R11.10]  Weakness [R53.1]  SOB (shortness of breath) [R06.02]  COVID [U07.1]  Age/Sex: 81 y.o. male  Admission Orders:  Scheduled Medications:  aspirin, 81 mg, Oral, Daily  clopidogrel, 75 mg, Oral, Daily  finasteride, 5 mg, Oral, Daily  heparin (porcine), 5,000 Units, Subcutaneous, Q8H GABRIEL  insulin lispro, 1-5 Units, Subcutaneous, TID AC  insulin lispro, 1-5 Units, Subcutaneous, HS  levothyroxine, 88 mcg, Oral, Early Morning  metoprolol tartrate, 25 mg, Oral, Q12H GABRIEL  polyethylene glycol, 17 g, Oral, Daily  pravastatin, 80 mg, Oral, Daily With Dinner  tamsulosin, 0.4 mg, Oral, Daily      Continuous IV Infusions:     PRN Meds:  acetaminophen, 650 mg, Oral, Q6H PRN  aluminum-magnesium hydroxide-simethicone, 30 mL, Oral, Q6H PRN  dextromethorphan-guaiFENesin, 10 mL, Oral, Q6H PRN  senna-docusate sodium, 1 tablet, Oral, Daily PRN  trimethobenzamide, 200 mg, Intramuscular, Q6H PRN      Amb pt   Act as cresencio   Fingerstick ac and hs   Fall precautions  Up and OOB   PT OT eval       Network Utilization Review Department  ATTENTION: Please call with any questions or concerns to [phone] and carefully listen to the prompts so that you are directed to the right person. All voicemails are confidential.   For Discharge needs, contact Care Management DC Support Team at [phone] opt. [phone]  Send all requests for admission clinical reviews, approved or denied determinations and any other requests to dedicated fax number below belonging to the campus where the patient is receiving treatment.  List of dedicated fax numbers for the Facilities:  [place] DENIALS (Administrative/Medical Necessity) [phone]   DISCHARGE SUPPORT TEAM (NETWORK) [id number] (Maternity/NICU/Pediatrics) [phone]   [facility] Online 15[address] [facility] 1000 [facility] [phone] 1505 [facility] [address] 5220 [facility] [address] [address] [facility] [address] 1300 [facility] [address] [place] [address] [phone]

## 2023-12-14 NOTE — CASE MANAGEMENT
Case Management Assessment & Discharge Planning Note    Patient name Indio Giron  Location ED 07/ED 07 MRN [id number]  : 1940 Date 2023       Current Admission Date: 2023  Current Admission Diagnosis:Generalized weakness   Patient Active Problem List    Diagnosis Date Noted    Generalized weakness 2023    Hypokalemia 2023    Constipation 2023    JESSA (acute kidney injury) ([address]) 2023    Abnormal CT scan 2023    Chronic right shoulder pain 2022    Oropharyngeal dysphagia 2022    Physical deconditioning 2022    COVID-19 2022    Type 2 diabetes mellitus with stage 3 chronic kidney disease, with long-term current use of insulin, unspecified whether stage 3a or 3b CKD ([address])     Urinary retention 2019    Coronary artery disease of native artery of native heart with stable angina pectoris ([address]) 2019    Ischemic cardiomyopathy 2018    BPH (benign prostatic hyperplasia) 2018    Anemia 02/10/2018      LOS (days): 0  Geometric Mean LOS (GMLOS) (days):   Days to GMLOS:     OBJECTIVE:     Current admission status: Observation       Preferred Pharmacy:   [facility] [address], [address] [place] [address] 700 [facility]  [facility] [address] [place] [address]  Phone: [phone] Fax: [phone]    [facility] [facility] [address]  [address] [address]  Phone: [phone] Fax: [phone]    Primary Care Provider: Philomena Echols MD    Primary Insurance: [facility]  Secondary Insurance:     ASSESSMENT:  [facility], [address] [place] [address] - Daughter In-Law   Primary Phone: [phone] (Mobile)                 Advance Directives  Does patient have a [address]?: Yes  Does patient have Advance Directives?: Yes  Advance Directives: Living will, Power of  for health care, [facility] finance  Primary Contact: Patient's Daughter-in-law Donna Rojas)    Readmission Root Cause  30 Day Readmission: No    Patient Information  Admitted from[de-identified] Home  Mental Status: Alert  During Assessment patient was accompanied by: Not accompanied during assessment  Assessment information provided by[de-identified] Other - please comment (Daughter-in-law)  Primary Caregiver: Self  Support Systems: Self, Son, Family members  [place] of Residence: [address] [place] [address] do you live in?: [place], [address] [facility] entry access options.  Select all that apply.: No steps to enter home  Type of Current Residence: [place]  Living Arrangements: Lives w/ Son, Other (Comment) (daughter-in-law)  Is patient a ?: Yes  Is patient active with [facility])?: No    Activities of Daily Living Prior to Admission  Functional Status: Independent  Completes ADLs independently?: Yes (Patient's DIL reported that he occassionally needs some assistance with ADLs, but he is mostly independent.)  Ambulates independently?: Yes  Does patient use assisted devices?: Yes  Assisted Devices (DME) used: Straight Cane, Shower Chair, Other (Comment) (scooter, grab bars in the  bathroom)  Does patient currently own DME?: Yes  What DME does the patient currently own?: Shower Chair, [facility], Other (Comment) (scooter, grab bars in the bathroom)  Does patient have a history of Outpatient Therapy (PT/OT)?: No  Does the patient have a history of Short-Term Rehab?: No  Does patient have a history of HHC?: No  Does patient currently have [facility]?: No    Patient Information Continued  Income Source: Pension/correction  Does patient have prescription coverage?: Yes (Patient's DIL confirmed that patient uses [place], and she denied any barriers to obtaining or affording prescriptions.)  Does patient receive dialysis treatments?: No  Does patient have a history of substance abuse?: No  Does patient have a history of Mental Health Diagnosis?: No    Means of Transportation  [place] of Transport to [facility][de-identified] [address]: Low Risk  (12/14/2023)    Housing Stability Vital Sign     Unable to Pay for Housing in the Last Year: No     Number of Places Lived in the Last Year: 1     Unstable Housing in the Last Year: No   Food Insecurity: No Food Insecurity (12/14/2023)    Hunger Vital Sign     Worried About Running Out of Food in the Last Year: Never true     Ran Out of Food in the Last Year: Never true   Transportation Needs: No Transportation Needs (12/14/2023)    PRAPARE - Transportation     Lack of Transportation (Medical): No     Lack of Transportation (Non-Medical): No   Utilities: Not At Risk (12/14/2023)    [facility] Utilities     Threatened with loss of utilities: No     DISCHARGE DETAILS:    Discharge planning discussed with[de-identified] Patient's DIL  Freedom of Choice: Yes  Comments - Freedom of Choice: CM discussed freedom of choice as it pertains to discharge planning. Patient's DIL reported that she is not sure if patient will need STR vs. VNA at the time of discharge. CM agreeable to follow up with patient once recommendations, if any, have been made. Patient's DIL confirmed that his family will provide transportation home.   CM contacted family/caregiver?: Yes  Were Treatment Team discharge recommendations reviewed with patient/caregiver?: Yes  Did patient/caregiver verbalize understanding of patient care needs?: Yes  Were patient/caregiver advised of the risks associated with not following Treatment Team discharge recommendations?: Yes    Contacts  Patient Contacts: Phil Patel  Relationship to Patient[de-identified] Family  Contact Method: Phone  Phone Number: [phone]  Reason/Outcome: Continuity of Care, Discharge [address]         Is the patient interested in [facility] at discharge?: No    DME Referral Provided  Referral made for DME?: No    Other Referral/Resources/Interventions Provided:  Interventions: None Indicated    Would you like to participate in our [address] service program?  : No - Declined    Treatment Team Recommendation: Home  Discharge Destination Plan[de-identified] Home  Transport at Discharge : Family

## 2023-12-14 NOTE — OCCUPATIONAL THERAPY NOTE
Occupational Therapy Evaluation        Patient Name: Zachariah Brandon  [id number] Date: 12/14/2023 12/14/23 3918   OT Last Visit   OT Visit Date 12/14/23   Note Type   Note type Evaluation   Additional Comments Vitals at start of session:  bpm, /63, SpO2 on room air 96%   Pain Assessment   Pain Assessment Tool 0-10   Pain Score No Pain   Restrictions/Precautions   Weight Bearing Precautions Per Order No   Braces or Orthoses Other (Comment)  (none per patient)   Other Precautions Contact/isolation; Airborne/isolation;Droplet precautions; Chair Alarm; Bed Alarm;Multiple lines;Telemetry; Fall Risk  (+ Covid)   Home Living   Type of [address] [place] [address] Dr One level; Laundry in basement;Performs ADLs on one level; Able to live on main level with bedroom/bathroom;Stairs to enter with rails  ([address])   Bathroom Shower/Tub Walk-in shower   Bathroom Toilet Raised   Bathroom Equipment Shower chair;Grab bars in shower;Grab bars around toilet   [address] Cane;Electric scooter   Additional Comments Pt ambulates without an AD for household distances and uses a cane in the community. Prior Function   Level of [place] Independent with functional mobility; Independent with ADLs; Needs assistance with IADLS   Lives With Son;Family   Receives Help From Family   IADLs Family/Friend/Other provides meals; Family/Friend/Other provides medication management; Family/Friend/Other provides transportation   Falls in the last 6 months 0   Vocational Retired   Lifestyle   Autonomy Pt ambulates without an AD for household distances and uses a cane in the community. patient lives with family in a one story house with [address].    Reciprocal Relationships Supportive Family   Service to Others Retired   General   Family/Caregiver Present No   ADL   Eating Assistance [address] [address]  [address] [facility] Assistance 4  Minimal Assistance   UB Dressing Assistance 5  Supervision/Setup   LB Dressing Assistance 4  Minimal Assistance   Toileting Assistance  5  Supervision/Setup   Functional Assistance 5  Supervision/Setup   Bed Mobility   Supine to Sit 5  Supervision   Additional items HOB elevated;Verbal cues   Sit to Supine 5  Supervision   Additional items HOB elevated;Verbal cues   Transfers   Sit to Stand 5  Supervision  (contact guard assist)   Additional items Assist x 1;Verbal cues   Stand to Sit 5  Supervision  (Contact guard assist)   Additional items Assist x 1;Verbal cues   Functional Mobility   Functional Mobility   (contact guard)   Additional Comments ambulated to/from bed/ around the room with CG assist/ reports feeling better today, mild weakness/ fatigue and coughing   Balance   Static Sitting Normal   Dynamic Sitting Good   Static Standing Fair +   Dynamic Standing Fair +   Activity Tolerance   Activity Tolerance Patient limited by fatigue   Medical Staff Made Aware Pt seen as a co-eval with PT due to the patient's co-morbidities, clinically unstable presentation, and present impairments which are a regression from the patient's baseline. RUE Assessment   RUE Assessment X   RUE Strength   RUE Overall Strength Deficits  (4/5)   LUE Assessment   LUE Assessment X   LUE Strength   LUE Overall Strength Deficits  (4/5)   Hand Function   Gross Motor Coordination Functional   Fine Motor Coordination Functional   Sensation   Light Touch No apparent deficits  (BUEs)   Vision-Basic Assessment   Current Vision Wears glasses only for reading   Psychosocial   Psychosocial (WDL) WDL   Cognition   Overall Cognitive Status WFL   Arousal/Participation Alert; Responsive; Cooperative   Attention Within functional limits   Orientation Level Oriented X4   Memory Within functional limits   Following Commands Follows multistep commands without difficulty   Assessment   Limitation Decreased ADL status; Decreased endurance;Decreased self-care trans;Decreased high-level ADLs   Prognosis Good   Assessment Patient is a 81 y.o. male seen for OT evaluation s/p admit to [facility]20[facility] w/Generalized weakness. 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 Orders placed for OT evaluation and treatment. Performed at least two patient identifiers during session including name and wristband. Prior to admission, Pt ambulates without an AD for household distances and uses a cane in the community. patient lives with family in a one story house with [address]. Personal factors affecting patient at time of initial evaluation include: difficulty performing ADLs and difficulty performing IADLs. Upon evaluation, patient requires supervision and set up assist for UB ADLs, minimal  assist for LB ADLs, transfers and functional ambulation in room and bathroom with minimal  assist, with the use of Rolling Walker. Occupational performance is affected by the following deficits: decreased muscle strength, dynamic sit/ stand balance deficit with poor standing tolerance time for self care and functional mobility, and decreased activity tolerance. Patient to benefit from continued Occupational Therapy treatment while in the hospital to address deficits as defined above and maximize level of functional independence with ADLs and functional mobility. Occupational Performance areas to address include: bathing/ shower, dressing, toilet hygiene, functional mobility, health maintenance, and IADLs: safety procedures. From OT standpoint, recommendation at time of d/c would be Level 2. Plan   Treatment Interventions ADL retraining;Functional transfer training; Endurance training;Patient/family training;Equipment evaluation/education; Compensatory technique education;Continued evaluation; Energy conservation; Activityengagement   Goal Expiration Date 12/28/23   OT Frequency 2-3x/wk   Discharge Recommendation   Rehab Resource Intensity Level, OT III (Minimum Resource Intensity)   AM-PAC Daily Activity Inpatient   Lower Body Dressing 3   Bathing 3   Toileting 3   Upper Body Dressing 4   Grooming 4   Eating 4   Daily Activity Raw Score 21   Daily Activity Standardized Score (Calc for Raw Score >=11) 44.27   AM-PAC Applied Cognition Inpatient   Following a Speech/Presentation 4   Understanding Ordinary Conversation 4   Taking Medications 4   Remembering Where Things Are Placed or Put Away 4   Remembering List of 4-5 Errands 3   Taking Care of Complicated Tasks 3   Applied Cognition Raw Score 22   Applied Cognition Standardized Score 47.83   Barthel Index   Feeding 10   Bathing 0   Grooming Score 5   Dressing Score 5   Bladder Score 10   Bowels Score 10   Toilet Use Score 5   Transfers (Bed/Chair) Score 10   Mobility (Level Surface) Score 0   Stairs Score 0   Barthel Index Score 55       Occupational therapy Goals: In 2-4 days    1- Patient will verbalize and demonstrate use of energy conservation/ deep breathing technique and work simplification skills during functional activity with no verbal cues. 2- Patient will verbalize and demonstrate good body mechanics and joint protection techniques during ADLs/ IADLs with no verbal cues   3- Patient will increase OOB/ sitting tolerance to 4-6 hours per day for increased participation in self care and leisure tasks with no s/s of exertion. 4- Patient will identify s/s of exertion during ADL and functional mobility with no verbal cues  . 5-Patient will verbalize/ demonstrate compensatory strategies to recover from exertion with no verbal cues. 6-Patient will increase standing tolerance time to 10 minutes with No UE support to complete sink level ADLs @ Mod I level   7- Patient will increase sitting tolerance at edge of bed to 30 minutes to complete UB ADLs @ Indep. level.    8-- Patient/ Family will demonstrate competency with UE Home Exercise Program.

## 2023-12-14 NOTE — H&P
[address]  H&P  Name: Ruiz Bolanos 81 y.o. male I MRN: [id number]  Unit/Bed#: ED 20 I Date of Admission: 12/13/2023   Date of Service: 12/14/2023 I Hospital Day: 0      Assessment/Plan   * Generalized weakness  Assessment & Plan  Secondary to COVID-19 infection  Denies any falls, fall precaution, delirium precaution  See plan under COVID-19  PT/OT eval  CT abdomen pelvis also revealing choledocholithiasis with CBD up to 8 mm which is similar to CT scan on 12/5, but patient denies any abdominal pain and reports nausea and vomiting have resolved, so feel less likely related to generalized weakness  If any onset of severe pain can follow-up with GI    COVID-19  Assessment & Plan  Currently saturating 90 to 99% on room air, denies shortness of breath  Since patient currently without respiratory symptoms we will hold off on initiating IV dexamethasone and IV remdesivir  VTE prophylaxis ordered  Supportive care    Constipation  Assessment & Plan  CT abdomen pelvis revealing moderate constipation, no acute change, reports no bowel movement in the last 2 days  Start MiraLAX daily, as needed senna      Type 2 diabetes mellitus with stage 3 chronic kidney disease, with long-term current use of insulin, unspecified whether stage 3a or 3b CKD (HCC)  Assessment & Plan  Lab Results   Component Value Date    HGBA1C 9.4 (H) 09/29/2023       No results for input(s): "POCGLU" in the last 72 hours. Blood Sugar Average: Last 72 hrs:    Blood glucose checks 4 times daily, hypoglycemia protocol  Hold p.o. diabetic meds  ssi         VTE Pharmacologic Prophylaxis: VTE Score: 4 Moderate Risk (Score 3-4) - Pharmacological DVT Prophylaxis Ordered: heparin. Code Status: Level 1 - Full Code per pt  Discussion with family:  pt. Anticipated Length of Stay: Patient will be admitted on an observation basis with an anticipated length of stay of less than 2 midnights secondary to see above.     Total Time Spent on Date of Encounter in care of patient: 60 mins. This time was spent on one or more of the following: performing physical exam; counseling and coordination of care; obtaining or reviewing history; documenting in the medical record; reviewing/ordering tests, medications or procedures; communicating with other healthcare professionals and discussing with patient's family/caregivers. Chief Complaint:    Chief Complaint   Patient presents with    Shortness of Breath     Pt reports he's had SOB, vomiting, sore throat, and weakness x 1 week. Pt discharged last week for similar symptoms. History of Present Illness:  Damon Richardson is a 81 y.o. male with a PMH of CAD, ischemic cardiomyopathy status post defibrillator placement, DM2 who presents with c/o sudden onset severe generalized weakness, overall not feeling well x 1 week. Reports he initially was seen on 12/5 for nausea and vomiting, but reports that has resolved. He denies abdominal pain. Denies shortness of breath, chest pain. Admits overall he is very tired. He does admit he has been able to ambulate with a cane at home. Denies any falls recently. .    Review of Systems:  Review of Systems   Constitutional:  Positive for fatigue. Negative for appetite change. Respiratory:  Positive for cough. Negative for shortness of breath. Cardiovascular:  Negative for chest pain. Gastrointestinal:  Positive for constipation. Negative for abdominal pain, nausea and vomiting. Neurological:  Positive for weakness. Negative for headaches.        Past Medical and Surgical History:   Past Medical History:   Diagnosis Date    Acquired cyst of kidney     Anemia     Benign paroxysmal vertigo, unspecified ear     Cellulitis of leg     Cervical spinal stenosis     Chest pain     Coronary atherosclerosis of native coronary artery     Diabetes mellitus ([address])     Disease of thyroid gland     Enlarged prostate     Esophageal candidiasis ([address])     H/o COVID-19 06/30/2022 Hematuria     Herpes zoster     Hyperlipidemia     Hypertension     Incomplete emptying of bladder     Inflamed seborrheic keratosis     Internal hemorrhoids     Malignant neoplasm of skin of trunk     Myocardial infarction ([address]) 2005    Nonmelanoma skin cancer     LAST ASSESSED: 8/9/17    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 infarction     Paroxysmal tachycardia (HCC)     Shortness of breath     Vitamin B deficiency        Past Surgical History:   Procedure Laterality Date    CARDIAC CATHETERIZATION Left 5/15/2023    Procedure: Cardiac Left Heart Cath;  Surgeon: Bernardo Galvez MD;  Location: [address] CATH LAB; Service: Cardiology    CARDIAC CATHETERIZATION N/A 5/15/2023    Procedure: Cardiac pci;  Surgeon: Bernardo Galvez MD;  Location: [address] CATH LAB; Service: Cardiology    CARDIAC CATHETERIZATION N/A 5/15/2023    Procedure: Cardiac Coronary Angiogram;  Surgeon: Bernardo Galvez MD;  Location: [address] CATH LAB; Service: Cardiology    CARDIAC DEFIBRILLATOR PLACEMENT      COLONOSCOPY  10/07/2008    FIBEROPTIC SCREENING; NO FURTHER RECOMMENDATIONS    CORONARY ANGIOPLASTY WITH STENT PLACEMENT      CYSTOSCOPY  11/06/2015    DIAGNOSTIC; MANAGED BY: Sathya Francisco    EGD AND COLONOSCOPY N/A 02/12/2018    Procedure: EGD AND COLONOSCOPY;  Surgeon: Abilio Foote MD;  Location: [facility]; Service: Gastroenterology    FL INJECTION RIGHT SHOULDER (ARTHROGRAM)  01/04/2022    HIP ARTHROPLASTY Right     INSERT / REPLACE / REMOVE PACEMAKER      JOINT REPLACEMENT Right     THR    TRUNK SKIN LESION EXCISIONAL BIOPSY  08/22/2007    MALIGNANT; BCC CHEST       Meds/Allergies:  Prior to Admission medications    Medication Sig Start Date End Date Taking?  Authorizing Provider   Alcohol Swabs (Pharmacist Choice Alcohol) PADS USE FOR TESTING AND INJECTIONS UP TO 10 PADS DAILY 9/23/22   Historical Provider, MD   aspirin (Aspirin Low Dose) 81 mg EC tablet TAKE ONE (1) TABLET BY MOUTH DAILY 11/2/23   Cata Franklin MD clopidogrel (PLAVIX) 75 mg tablet TAKE 1 TABLET (75 MG TOTAL) BY MOUTH DAILY 11/22/23   Yesi Cantu PA-C   ergocalciferol (VITAMIN D2) 50,000 units Take 50,000 Units by mouth once a week 9/21/22   Historical Provider, MD   finasteride (PROSCAR) 5 mg tablet TAKE ONE (1) TABLET BY MOUTH DAILY 10/30/23   Sridhar Mehta MD   glipiZIDE (GLUCOTROL) 5 mg tablet TAKE 1 TABLET (5 MG TOTAL) BY MOUTH 2 (TWO) TIMES A DAY 4/3/23   Frederick Burkitt, MD   levothyroxine 88 mcg tablet Take 88 mcg by mouth daily 8/28/23   Historical Provider, MD   metoprolol tartrate (LOPRESSOR) 25 mg tablet TAKE 1 TABLET (25 MG TOTAL) BY MOUTH EVERY 12 (TWELVE) HOURS 9/18/23   MELODIE Gao   MILK THISTLE PO Take 2 tablets by mouth in the morning Take in the morning with food    Historical Provider, MD   ondansetron (ZOFRAN-ODT) 4 mg disintegrating tablet TAKE 1 TABLET BY MOUTH 3 TIMES PER DAY FOR 7 DAYS 9/19/23   Historical Provider, MD   polyethylene glycol (MIRALAX) 17 g packet Take 17 g by mouth daily as needed (constipation) for up to 3 days 10/2/23 10/5/23  Emily Lagunas MD   rosuvastatin (CRESTOR) 20 MG tablet TAKE 1 TABLET (20 MG TOTAL) BY MOUTH DAILY 6/20/23   Frederick Burkitt, MD   Senna-S 8.6-50 MG per tablet TAKE 1 TABLET BY MOUTH DAILY AT BEDTIME 12/13/23   Sridhar Mehta MD   tamsulosin (FLOMAX) 0.4 mg TAKE 1 CAPSULE (0.4 MG TOTAL) BY MOUTH DAILY 4/13/23   Frederick Burkitt, MD   Trulicity 4.5 YL/7.2ED injection INJECT 4.5 MG UNDER THE SKIN ONCE A WEEK. 8/28/23   Historical Provider, MD   aspirin 81 MG tablet Take 1 tablet by mouth daily  12/14/23  Historical Provider, MD YOON have reviewed her medications per review of chart. Allergies: Allergies   Allergen Reactions    Atorvastatin Other (See Comments)     Pt unsure      Percocet [Oxycodone-Acetaminophen] Nausea Only and GI Intolerance       Social History:  Marital Status:       Patient Pre-hospital Living Situation: Home  Patient Pre-hospital Level of Mobility: walks with cane  Patient Pre-hospital Diet Restrictions: diabetic  Substance Use History:   Social History     Substance and Sexual Activity   Alcohol Use Yes    Comment: occasionally 1-2 per month      Social History     Tobacco Use   Smoking Status Former    Current packs/day: 0.00    Average packs/day: 1 pack/day for 10.0 years (10.0 ttl pk-yrs)    Types: Cigarettes    Start date: 1968    Quit date: 1978    Years since quittin.8   Smokeless Tobacco Never     Social History     Substance and Sexual Activity   Drug Use No       Family History:  Family History   Problem Relation Age of Onset    Heart disease Mother     Coronary artery disease Mother     Sudden death Mother     Cancer Father         throat; MALIGNANT NEOPLASM OF HEAD, FACE OR NECK    Throat cancer Father     Cancer Family     Coronary artery disease Family        Physical Exam:     Vitals:   Blood Pressure: 129/59 (23 2330)  Pulse: (!) 107 (23 0000)  Temperature: 98.6 °F (37 °C) (23 1645)  Temp Source: Temporal (23 1645)  Respirations: 20 (23 0000)  SpO2: 97 % (23 0000)    Physical Exam  Vitals and nursing note reviewed. Constitutional:       General: He is not in acute distress. Appearance: Normal appearance. He is not diaphoretic. HENT:      Head: Normocephalic. Cardiovascular:      Rate and Rhythm: Regular rhythm. Tachycardia present. Pulmonary:      Effort: Pulmonary effort is normal. No respiratory distress. Breath sounds: Normal breath sounds. No stridor. No wheezing, rhonchi or rales. Abdominal:      General: Abdomen is flat. Bowel sounds are normal. There is no distension. Palpations: Abdomen is soft. Tenderness: There is no abdominal tenderness. Musculoskeletal:      Right lower leg: No edema. Left lower leg: No edema. Skin:     General: Skin is warm and dry. Neurological:      General: No focal deficit present. Mental Status: He is alert.  Mental status is at baseline. Psychiatric:         Mood and Affect: Mood normal.         Behavior: Behavior normal.         Thought Content: Thought content normal.          Additional Data:     Lab Results:  Results from last 7 days   Lab Units 12/13/23  1732   WBC Thousand/uL 10.88*   HEMOGLOBIN g/dL 12.5   HEMATOCRIT % 35.4*   PLATELETS Thousands/uL 183   NEUTROS PCT % 85*   LYMPHS PCT % 8*   MONOS PCT % 7   EOS PCT % 0     Results from last 7 days   Lab Units 12/13/23  1848   SODIUM mmol/L 137   POTASSIUM mmol/L 4.5   CHLORIDE mmol/L 105   CO2 mmol/L 25   BUN mg/dL 17   CREATININE mg/dL 0.96   ANION GAP mmol/L 7   CALCIUM mg/dL 8.2*   ALBUMIN g/dL 3.5   TOTAL BILIRUBIN mg/dL 0.83   ALK PHOS U/L 26*   ALT U/L 14   AST U/L 29   GLUCOSE RANDOM mg/dL 165*                       Lines/Drains:  Invasive Devices       Peripheral Intravenous Line  Duration             Peripheral IV 12/13/23 Left;Ventral (anterior) Forearm <1 day                        Imaging: Reviewed radiology reports from this admission including: abdominal/pelvic CT  CT abdomen pelvis with contrast   Final Result by Tate Smallwood DO (12/13 2050)      1. No CT evidence of acute inflammatory process within the abdomen or pelvis. 2. Redemonstration of cholelithiasis and choledocholithiasis. The common bile duct is at the top of normal limits for a patient of this age measuring 8 mm in caliber. Findings are similar to recent comparison studies. Correlation with biliary labs is    recommended. 3. Moderate colonic stool burden      4. Prostatomegaly. 5. Stable appearance of renal cysts and irregular hypoattenuated region along the medial aspect of the right kidney as described above. 6. 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          Workstation performed: [id number]         XR chest 1 view portable    (Results Pending)       EKG and Other Studies Reviewed on Admission:   EKG:  Prolonged QTc, ventricular paced, first-degree AV block. ** Please Note: This note has been constructed using a voice recognition system.  **

## 2023-12-15 VITALS
BODY MASS INDEX: 21.21 KG/M2 | TEMPERATURE: 98.4 F | HEIGHT: 66 IN | HEART RATE: 73 BPM | DIASTOLIC BLOOD PRESSURE: 55 MMHG | OXYGEN SATURATION: 97 % | SYSTOLIC BLOOD PRESSURE: 100 MMHG | WEIGHT: 132 LBS | RESPIRATION RATE: 16 BRPM

## 2023-12-15 LAB
ALBUMIN SERPL BCP-MCNC: 3.4 G/DL (ref 3.5–5)
ALP SERPL-CCNC: 28 U/L (ref 34–104)
ALT SERPL W P-5'-P-CCNC: 23 U/L (ref 7–52)
ANION GAP SERPL CALCULATED.3IONS-SCNC: 7 MMOL/L
AST SERPL W P-5'-P-CCNC: 39 U/L (ref 13–39)
BASOPHILS # BLD AUTO: 0.03 THOUSANDS/ÂΜL (ref 0–0.1)
BASOPHILS NFR BLD AUTO: 1 % (ref 0–1)
BILIRUB SERPL-MCNC: 0.74 MG/DL (ref 0.2–1)
BUN SERPL-MCNC: 17 MG/DL (ref 5–25)
CALCIUM ALBUM COR SERPL-MCNC: 8.9 MG/DL (ref 8.3–10.1)
CALCIUM SERPL-MCNC: 8.4 MG/DL (ref 8.4–10.2)
CHLORIDE SERPL-SCNC: 105 MMOL/L (ref 96–108)
CO2 SERPL-SCNC: 28 MMOL/L (ref 21–32)
CREAT SERPL-MCNC: 0.89 MG/DL (ref 0.6–1.3)
EOSINOPHIL # BLD AUTO: 0.13 THOUSAND/ÂΜL (ref 0–0.61)
EOSINOPHIL NFR BLD AUTO: 3 % (ref 0–6)
ERYTHROCYTE [DISTWIDTH] IN BLOOD BY AUTOMATED COUNT: 15.9 % (ref 11.6–15.1)
GFR SERPL CREATININE-BSD FRML MDRD: 79 ML/MIN/1.73SQ M
GLUCOSE SERPL-MCNC: 144 MG/DL (ref 65–140)
GLUCOSE SERPL-MCNC: 240 MG/DL (ref 65–140)
GLUCOSE SERPL-MCNC: 82 MG/DL (ref 65–140)
GLUCOSE SERPL-MCNC: 82 MG/DL (ref 65–140)
HCT VFR BLD AUTO: 33.8 % (ref 36.5–49.3)
HGB BLD-MCNC: 10.9 G/DL (ref 12–17)
IMM GRANULOCYTES # BLD AUTO: 0.02 THOUSAND/UL (ref 0–0.2)
IMM GRANULOCYTES NFR BLD AUTO: 0 % (ref 0–2)
LYMPHOCYTES # BLD AUTO: 1.31 THOUSANDS/ÂΜL (ref 0.6–4.47)
LYMPHOCYTES NFR BLD AUTO: 28 % (ref 14–44)
MAGNESIUM SERPL-MCNC: 2.1 MG/DL (ref 1.9–2.7)
MCH RBC QN AUTO: 37.6 PG (ref 26.8–34.3)
MCHC RBC AUTO-ENTMCNC: 32.2 G/DL (ref 31.4–37.4)
MCV RBC AUTO: 117 FL (ref 82–98)
MONOCYTES # BLD AUTO: 0.4 THOUSAND/ÂΜL (ref 0.17–1.22)
MONOCYTES NFR BLD AUTO: 8 % (ref 4–12)
NEUTROPHILS # BLD AUTO: 2.88 THOUSANDS/ÂΜL (ref 1.85–7.62)
NEUTS SEG NFR BLD AUTO: 60 % (ref 43–75)
NRBC BLD AUTO-RTO: 0 /100 WBCS
PLATELET # BLD AUTO: 162 THOUSANDS/UL (ref 149–390)
PMV BLD AUTO: 9.9 FL (ref 8.9–12.7)
POTASSIUM SERPL-SCNC: 3.5 MMOL/L (ref 3.5–5.3)
PROT SERPL-MCNC: 6.4 G/DL (ref 6.4–8.4)
RBC # BLD AUTO: 2.9 MILLION/UL (ref 3.88–5.62)
SODIUM SERPL-SCNC: 140 MMOL/L (ref 135–147)
WBC # BLD AUTO: 4.77 THOUSAND/UL (ref 4.31–10.16)

## 2023-12-15 PROCEDURE — 80053 COMPREHEN METABOLIC PANEL: CPT | Performed by: STUDENT IN AN ORGANIZED HEALTH CARE EDUCATION/TRAINING PROGRAM

## 2023-12-15 PROCEDURE — 82948 REAGENT STRIP/BLOOD GLUCOSE: CPT

## 2023-12-15 PROCEDURE — 83735 ASSAY OF MAGNESIUM: CPT | Performed by: STUDENT IN AN ORGANIZED HEALTH CARE EDUCATION/TRAINING PROGRAM

## 2023-12-15 PROCEDURE — 85025 COMPLETE CBC W/AUTO DIFF WBC: CPT | Performed by: STUDENT IN AN ORGANIZED HEALTH CARE EDUCATION/TRAINING PROGRAM

## 2023-12-15 PROCEDURE — 99232 SBSQ HOSP IP/OBS MODERATE 35: CPT | Performed by: STUDENT IN AN ORGANIZED HEALTH CARE EDUCATION/TRAINING PROGRAM

## 2023-12-15 PROCEDURE — 99239 HOSP IP/OBS DSCHRG MGMT >30: CPT | Performed by: STUDENT IN AN ORGANIZED HEALTH CARE EDUCATION/TRAINING PROGRAM

## 2023-12-15 RX ORDER — BENZONATATE 100 MG/1
100 CAPSULE ORAL 3 TIMES DAILY PRN
Qty: 20 CAPSULE | Refills: 0 | Status: SHIPPED | OUTPATIENT
Start: 2023-12-15

## 2023-12-15 RX ORDER — GUAIFENESIN/DEXTROMETHORPHAN 100-10MG/5
10 SYRUP ORAL EVERY 6 HOURS PRN
Qty: 100 ML | Refills: 0 | Status: SHIPPED | OUTPATIENT
Start: 2023-12-15 | End: 2023-12-29

## 2023-12-15 RX ADMIN — TAMSULOSIN HYDROCHLORIDE 0.4 MG: 0.4 CAPSULE ORAL at 09:11

## 2023-12-15 RX ADMIN — FINASTERIDE 5 MG: 5 TABLET, FILM COATED ORAL at 09:12

## 2023-12-15 RX ADMIN — METOPROLOL TARTRATE 25 MG: 25 TABLET, FILM COATED ORAL at 09:15

## 2023-12-15 RX ADMIN — CLOPIDOGREL 75 MG: 75 TABLET ORAL at 09:11

## 2023-12-15 RX ADMIN — HEPARIN SODIUM 5000 UNITS: 5000 INJECTION INTRAVENOUS; SUBCUTANEOUS at 13:58

## 2023-12-15 RX ADMIN — LEVOTHYROXINE SODIUM 88 MCG: 88 TABLET ORAL at 05:02

## 2023-12-15 RX ADMIN — ASPIRIN 81 MG: 81 TABLET, COATED ORAL at 09:12

## 2023-12-15 RX ADMIN — HEPARIN SODIUM 5000 UNITS: 5000 INJECTION INTRAVENOUS; SUBCUTANEOUS at 05:03

## 2023-12-15 RX ADMIN — INSULIN LISPRO 2 UNITS: 100 INJECTION, SOLUTION INTRAVENOUS; SUBCUTANEOUS at 16:23

## 2023-12-15 NOTE — PLAN OF CARE
Problem: INFECTION - ADULT  Goal: Absence or prevention of progression during hospitalization  Description: INTERVENTIONS:  - Assess and monitor for signs and symptoms of infection  - Monitor lab/diagnostic results  - Monitor all insertion sites, i.e. indwelling lines, tubes, and drains  - Monitor endotracheal if appropriate and nasal secretions for changes in amount and color  - Rochelle appropriate cooling/warming therapies per order  - Administer medications as ordered  - Instruct and encourage patient and family to use good hand hygiene technique  - Identify and instruct in appropriate isolation precautions for identified infection/condition  Outcome: Progressing

## 2023-12-15 NOTE — CASE MANAGEMENT
Case Management Discharge Planning Note    Patient name Kaiele Kraus  Location /-41 MRN [id number]  : 1940 Date 12/15/2023       Current Admission Date: 2023  Current Admission Diagnosis:Generalized weakness   Patient Active Problem List    Diagnosis Date Noted    Generalized weakness 2023    Hypokalemia 2023    Constipation 2023    JESSA (acute kidney injury) ([address]) 2023    Abnormal CT scan 2023    Chronic right shoulder pain 2022    Oropharyngeal dysphagia 2022    Physical deconditioning 2022    COVID-19 2022    Type 2 diabetes mellitus with stage 3 chronic kidney disease, with long-term current use of insulin, unspecified whether stage 3a or 3b CKD ([address])     Urinary retention 2019    Coronary artery disease of native artery of native heart with stable angina pectoris ([address]) 2019    Ischemic cardiomyopathy 2018    BPH (benign prostatic hyperplasia) 2018    Anemia 02/10/2018      LOS (days): 1  Geometric Mean LOS (GMLOS) (days): 2.5  Days to GMLOS:1.6     OBJECTIVE:  Risk of Unplanned Readmission Score: 18.05         Current admission status: Inpatient   Preferred Pharmacy:   [address]  [address]  Phone: [phone] Fax: [phone]    [place] [address]  [address] [place] [address]  Phone: [phone] Fax: [phone]    Primary Care Provider: Sridhar Mehta MD    Primary Insurance: [facility]  Secondary Insurance:     DISCHARGE DETAILS:  CM contacted [facility] to notify that patient was being discharged and confirm that there were no CM needs. Family to pick patient up.

## 2023-12-15 NOTE — ASSESSMENT & PLAN NOTE
Lab Results   Component Value Date    HGBA1C 9.4 (H) 09/29/2023       Recent Labs     12/14/23  1622 12/14/23  2129 12/15/23  0616 12/15/23  1117   POCGLU 139 149* 82 144*       Blood Sugar Average: Last 72 hrs:  (P) 131.5  Blood glucose checks 4 times daily, hypoglycemia protocol  Hold p.o. diabetic meds  ssi

## 2023-12-15 NOTE — PROGRESS NOTES
[address]  Progress Note  Name: Ann Paige  MRN: [id number]  Unit/Bed#: -01 I Date of Admission: 12/13/2023   Date of Service: 12/15/2023 I Hospital Day: 1    Assessment/Plan   * Generalized weakness  Assessment & Plan  Physical deconditioning from covid 19  Consult PT and OT for rehab evaluation  Currently stable     COVID-19  Assessment & Plan  Currently saturating 90 to 99% on room air, denies shortness of breath  Since patient currently without respiratory symptoms we will hold off on initiating IV dexamethasone and IV remdesivir  VTE prophylaxis ordered  Supportive care    Type 2 diabetes mellitus with stage 3 chronic kidney disease, with long-term current use of insulin, unspecified whether stage 3a or 3b CKD [facility])  Assessment & Plan  Lab Results   Component Value Date    HGBA1C 9.4 (H) 09/29/2023       Recent Labs     12/14/23  1622 12/14/23  2129 12/15/23  0616 12/15/23  1117   POCGLU 139 149* 82 144*       Blood Sugar Average: Last 72 hrs:  (P) 131.5  Blood glucose checks 4 times daily, hypoglycemia protocol  Hold p.o. diabetic meds  ssi           VTE Pharmacologic Prophylaxis: VTE Score: 4 Moderate Risk (Score 3-4) - Pharmacological DVT Prophylaxis Ordered: heparin. Mobility:   Basic Mobility Inpatient Raw Score: 23  JH-HLM Goal: 7: Walk 25 feet or more  JH-HLM Achieved: 7: Walk 25 feet or more  HLM Goal achieved. Continue to encourage appropriate mobility. Patient Centered Rounds: I performed bedside rounds with nursing staff today. Discussions with Specialists or Other Care Team Provider: na      Total Time Spent on Date of Encounter in care of patient: 30+ mins.  This time was spent on one or more of the following: performing physical exam; counseling and coordination of care; obtaining or reviewing history; documenting in the medical record; reviewing/ordering tests, medications or procedures; communicating with other healthcare professionals and discussing with patient's family/caregivers. Current Length of Stay: 1 day(s)  Current Patient Status: Inpatient   Certification Statement: The patient will continue to require additional inpatient hospital stay due to dispo planning   Discharge Plan: Anticipate discharge in 24-48 hrs to rehab facility. Code Status: Level 1 - Full Code    Subjective:   Patient feels well     Objective:     Vitals:   Temp (24hrs), Av.1 °F (36.7 °C), Min:97.7 °F (36.5 °C), Max:98.4 °F (36.9 °C)    Temp:  [97.7 °F (36.5 °C)-98.4 °F (36.9 °C)] 98.4 °F (36.9 °C)  HR:  [80-96] 91  Resp:  [16-20] 16  BP: ()/(55-65) 114/60  SpO2:  [97 %-99 %] 97 %  Body mass index is 21.31 kg/m². Input and Output Summary (last 24 hours): Intake/Output Summary (Last 24 hours) at 12/15/2023 1442  Last data filed at 12/15/2023 0601  Gross per 24 hour   Intake 780 ml   Output 300 ml   Net 480 ml       Physical Exam:   Physical Exam  Constitutional:       General: He is not in acute distress. Appearance: Normal appearance. He is not toxic-appearing. Cardiovascular:      Rate and Rhythm: Normal rate and regular rhythm. Heart sounds: Normal heart sounds. No murmur heard. Pulmonary:      Effort: Pulmonary effort is normal. No respiratory distress. Breath sounds: Normal breath sounds. No wheezing. Abdominal:      General: Abdomen is flat. There is no distension. Palpations: Abdomen is soft. Tenderness: There is no abdominal tenderness. Neurological:      General: No focal deficit present. Mental Status: He is alert. Mental status is at baseline. Motor: No weakness.           Additional Data:     Labs:  Results from last 7 days   Lab Units 12/15/23  0511   WBC Thousand/uL 4.77   HEMOGLOBIN g/dL 10.9*   HEMATOCRIT % 33.8*   PLATELETS Thousands/uL 162   NEUTROS PCT % 60   LYMPHS PCT % 28   MONOS PCT % 8   EOS PCT % 3     Results from last 7 days   Lab Units 12/15/23  0511   SODIUM mmol/L 140   POTASSIUM mmol/L 3.5 CHLORIDE mmol/L 105   CO2 mmol/L 28   BUN mg/dL 17   CREATININE mg/dL 0.89   ANION GAP mmol/L 7   CALCIUM mg/dL 8.4   ALBUMIN g/dL 3.4*   TOTAL BILIRUBIN mg/dL 0.74   ALK PHOS U/L 28*   ALT U/L 23   AST U/L 39   GLUCOSE RANDOM mg/dL 82         Results from last 7 days   Lab Units 12/15/23  1117 12/15/23  0616 12/14/23  2129 12/14/23  1622 12/14/23  1132 12/14/23  0650   POC GLUCOSE mg/dl 144* 82 149* 139 129 146*               Lines/Drains:  Invasive Devices       Peripheral Intravenous Line  Duration             Peripheral IV 12/13/23 Left;Ventral (anterior) Forearm 1 day                          Imaging: No pertinent imaging reviewed.     Recent Cultures (last 7 days):         Last 24 Hours Medication List:   Current Facility-Administered Medications   Medication Dose Route Frequency Provider Last Rate    acetaminophen  650 mg Oral Q6H PRN Elena Barros PA-C      aluminum-magnesium hydroxide-simethicone  30 mL Oral Q6H PRN Elena Barros PA-C      aspirin  81 mg Oral Daily Elena Barros PA-C      clopidogrel  75 mg Oral Daily Elena Barros PA-C      dextromethorphan-guaiFENesin  10 mL Oral Q6H PRN Elena Barros PA-C      finasteride  5 mg Oral Daily Elena Barros PA-C      heparin (porcine)  5,000 Units Subcutaneous Q8H [facility] Whit Morales PA-C      insulin lispro  1-5 Units Subcutaneous TID AC Whit Morales PA-C      insulin lispro  1-5 Units Subcutaneous HS Elena Barros PA-C      levothyroxine  88 mcg Oral Early Morning Elenagabe Barros PA-C      metoprolol tartrate  25 mg Oral Q12H [facility] Whit Morales PA-C      polyethylene glycol  17 g Oral Daily Elena Barros PA-C      pravastatin  80 mg Oral Daily With Dinner Elenagabe Barros PA-C      senna-docusate sodium  1 tablet Oral Daily PRN Elena Barros PA-C      tamsulosin  0.4 mg Oral Daily Elena Barros PA-C      trimethobenzamide  200 mg Intramuscular Q6H PRN Saintclair Sawyer, MD          Today, Patient Was Seen By: Di Pemberton, MD    **Please Note: This note may have been constructed using a voice recognition system. **

## 2023-12-15 NOTE — TELEPHONE ENCOUNTER
12/15/2023      Caitlyn Lujan  [address] [facility] [address]      Dear Caitlyn Lujan,                                                                                                       Magdaleno Reyes MD wrote a referral for you to see a Behavioral Health provider. Our attempt to reach you by phone has been unsuccessful.     Please call the Behavioral [facility] Central Scheduling Patient Line [phone] at your earliest convenience. Let the  know that a behavioral health referral has been ordered and you are calling to discuss the referral.    We look forward to assisting you with your health care needs.    Sincerely,    [facility] Central Scheduling Department  [phone]       Called patient's PCP office, spoke with Garry Finley      Patient is scheduled for medical clearance appointment / H&P on 2/22 @ 11am with Qi Garza

## 2023-12-15 NOTE — NURSING NOTE
Discharge instruction given to son and patient, both verbalized understanding. Went home fair  per wheelchair with his son and his belongings.

## 2023-12-18 ENCOUNTER — TRANSITIONAL CARE MANAGEMENT (OUTPATIENT)
Age: 83
End: 2023-12-18

## 2023-12-18 NOTE — UTILIZATION REVIEW
NOTIFICATION OF ADMISSION DISCHARGE   This is a Notification of Discharge from [facility]. Please be advised that this patient has been discharge from our facility. Below you will find the admission and discharge date and time including the patient’s disposition.   UTILIZATION REVIEW CONTACT:  Janae Resendez  Utilization   Network Utilization Review Department  Phone: [phone] x carefully listen to the prompts. All voicemails are confidential.  Email: [email or web address]     ADMISSION INFORMATION  PRESENTATION DATE: 12/13/2023  4:47 PM  OBERVATION ADMISSION DATE:   INPATIENT ADMISSION DATE: 12/14/23  5:49 PM   DISCHARGE DATE: 12/15/2023  5:50 PM   DISPOSITION:Home/Self Care    Network Utilization Review Department  ATTENTION: Please call with any questions or concerns to [phone] and carefully listen to the prompts so that you are directed to the right person. All voicemails are confidential.   For Discharge needs, contact Care Management DC Support Team at [phone] opt. 2  Send all requests for admission clinical reviews, approved or denied determinations and any other requests to dedicated fax number below belonging to the campus where the patient is receiving treatment. List of dedicated fax numbers for the Facilities:  FACILITY NAME UR FAX NUMBER   ADMISSION DENIALS (Administrative/Medical Necessity) [phone]   DISCHARGE SUPPORT TEAM ([facility]) [phone]   PARENT CHILD HEALTH (Maternity/NICU/Pediatrics) [phone]   [facility] [phone]   [facility] [phone]   [facility] [phone]   [facility] [phone]   [facility] [phone]   [facility] [phone]   [facility] [phone]   [facility] [phone]    [facility] [phone]   [facility] [phone]   [facility] [phone]

## 2023-12-18 NOTE — ASSESSMENT & PLAN NOTE
Currently saturating 90 to 99% on room air, denies shortness of breath  Since patient currently without respiratory symptoms we will hold off on initiating IV dexamethasone and IV remdesivir  VTE prophylaxis ordered  Supportive care  Stable for discharge

## 2023-12-18 NOTE — UTILIZATION REVIEW
NOTIFICATION OF ADMISSION DISCHARGE   This is a Notification of Discharge from Salvatore Lara. Please be advised that this patient has been discharge from our facility. Below you will find the admission and discharge date and time including the patient’s disposition. UTILIZATION REVIEW CONTACT:  Jenifer Alanis  Utilization   Network Utilization Review Department  Phone: [phone] x carefully listen to the prompts. All voicemails are confidential.  Email: [email or web address]     ADMISSION INFORMATION  PRESENTATION DATE: 12/13/2023  4:47 PM    INPATIENT ADMISSION DATE: 12/14/23  5:49 PM   DISCHARGE DATE: 12/15/2023  5:50 PM   DISPOSITION:Home/Self Care    Network Utilization Review Department  ATTENTION: Please call with any questions or concerns to [phone] and carefully listen to the prompts so that you are directed to the right person. All voicemails are confidential.   For Discharge needs, contact Care Management DC Support Team at [phone] opt. 2  Send all requests for admission clinical reviews, approved or denied determinations and any other requests to dedicated fax number below belonging to the campus where the patient is receiving treatment.  List of dedicated fax numbers for the Facilities:  [place] DENIALS (Administrative/Medical Necessity) [phone]   DISCHARGE SUPPORT TEAM (Network) [phone] 230[address] [facility] (Maternity/NICU/Pediatrics) [phone]   [address] [place] [address] Drive [phone]   [facility] [address] [facility] [phone] 1505 [facility] [address] [place] [address] [facility] [address] [facility]  [address] [phone]

## 2023-12-18 NOTE — DISCHARGE SUMMARY
[facility]  Discharge- Felix Mackey 1940, 83 y.o. male MRN: [id number]  Unit/Bed#: -01 Encounter: [id number]  Primary Care Provider: Wendy Alex MD   Date and time admitted to hospital: 12/13/2023  4:47 PM    * Generalized weakness  Assessment & Plan  Physical deconditioning from covid 19  Currently stable for discharge     COVID-19  Assessment & Plan  Currently saturating 90 to 99% on room air, denies shortness of breath  Since patient currently without respiratory symptoms we will hold off on initiating IV dexamethasone and IV remdesivir  VTE prophylaxis ordered  Supportive care  Stable for discharge     Type 2 diabetes mellitus with stage 3 chronic kidney disease, with long-term current use of insulin, unspecified whether stage 3a or 3b CKD (HCC)  Assessment & Plan  Lab Results   Component Value Date    HGBA1C 9.4 (H) 09/29/2023       Recent Labs     12/15/23  1544   POCGLU 240*         Blood Sugar Average: Last 72 hrs:  (P) 155.2053936775258640  Stable for discharge with home regimen  Follow up with PCP         Medical Problems       Resolved Problems  Date Reviewed: 12/14/2023   None       Discharging Physician / Practitioner: Efra Ferrara MD  PCP: Wendy Alex MD  Admission Date:   Admission Orders (From admission, onward)       Ordered        12/14/23 1749  Inpatient Admission  Once            12/13/23 2342  Place in Observation  Once                          Discharge Date: 12/15/23    Consultations During Hospital Stay:  None      Procedures Performed:   imaging    Significant Findings / Test Results:   Principal Problem:    Generalized weakness  Active Problems:    Type 2 diabetes mellitus with stage 3 chronic kidney disease, with long-term current use of insulin, unspecified whether stage 3a or 3b CKD (HCC)    COVID-19    Constipation  Resolved Problems:    * No resolved hospital problems. *        Incidental Findings:   See above        Test Results Pending  at Discharge (will require follow up):   na     Outpatient Tests Requested:  Follow up with PCP     Complications:  na    Reason for Admission: weakness     Hospital Course:   Felix Mackey is a 83 y.o. male patient who originally presented to the hospital on 12/13/2023 due to weakness secondary to covid 19 infection. Was not requiring oxygen so held off on covid treatment. Stable for discharge after supportive care.     Condition at Discharge: good    Discharge Day Visit / Exam:   * Please refer to separate progress note for these details *    Discussion with Family: Attempted to update  (daughter) via phone. Unable to contact.    Discharge instructions/Information to patient and family:   See after visit summary for information provided to patient and family.      Provisions for Follow-Up Care:  See after visit summary for information related to follow-up care and any pertinent home health orders.      Mobility at time of Discharge:   Basic Mobility Inpatient Raw Score: 23  JH-HLM Goal: 7: Walk 25 feet or more  JH-HLM Achieved: 7: Walk 25 feet or more  HLM Goal achieved. Continue to encourage appropriate mobility.     Disposition:   Home    Planned Readmission: none     Discharge Statement:  I spent 30+ minutes discharging the patient. This time was spent on the day of discharge. I had direct contact with the patient on the day of discharge. Greater than 50% of the total time was spent examining patient, answering all patient questions, arranging and discussing plan of care with patient as well as directly providing post-discharge instructions.  Additional time then spent on discharge activities.    Discharge Medications:  See after visit summary for reconciled discharge medications provided to patient and/or family.      **Please Note: This note may have been constructed using a voice recognition system**

## 2023-12-18 NOTE — ASSESSMENT & PLAN NOTE
Lab Results   Component Value Date    HGBA1C 9.4 (H) 09/29/2023       Recent Labs     12/15/23  1544   POCGLU 240*         Blood Sugar Average: Last 72 hrs:  (P) [id number]  Stable for discharge with home regimen  Follow up with PCP

## 2023-12-27 ENCOUNTER — APPOINTMENT (EMERGENCY)
Dept: RADIOLOGY | Facility: HOSPITAL | Age: 83
End: 2023-12-27
Payer: COMMERCIAL

## 2023-12-27 ENCOUNTER — HOSPITAL ENCOUNTER (EMERGENCY)
Facility: HOSPITAL | Age: 83
Discharge: HOME | End: 2023-12-27
Attending: EMERGENCY MEDICINE
Payer: COMMERCIAL

## 2023-12-27 VITALS
OXYGEN SATURATION: 94 % | HEART RATE: 90 BPM | DIASTOLIC BLOOD PRESSURE: 80 MMHG | TEMPERATURE: 97.8 F | RESPIRATION RATE: 18 BRPM | SYSTOLIC BLOOD PRESSURE: 141 MMHG

## 2023-12-27 DIAGNOSIS — R06.00 DYSPNEA: Primary | ICD-10-CM

## 2023-12-27 LAB
ALBUMIN SERPL BCP-MCNC: 3.7 G/DL (ref 3.5–5)
ALP SERPL-CCNC: 52 U/L (ref 34–104)
ALT SERPL W P-5'-P-CCNC: 14 U/L (ref 7–52)
ANION GAP SERPL CALCULATED.3IONS-SCNC: 8 MMOL/L
AST SERPL W P-5'-P-CCNC: 24 U/L (ref 13–39)
BASOPHILS # BLD AUTO: 0.03 THOUSANDS/ÂΜL (ref 0–0.1)
BASOPHILS NFR BLD AUTO: 1 % (ref 0–1)
BILIRUB SERPL-MCNC: 0.67 MG/DL (ref 0.2–1)
BUN SERPL-MCNC: 13 MG/DL (ref 5–25)
CALCIUM SERPL-MCNC: 9 MG/DL (ref 8.4–10.2)
CARDIAC TROPONIN I PNL SERPL HS: 11 NG/L
CHLORIDE SERPL-SCNC: 107 MMOL/L (ref 96–108)
CO2 SERPL-SCNC: 22 MMOL/L (ref 21–32)
CREAT SERPL-MCNC: 0.87 MG/DL (ref 0.6–1.3)
EOSINOPHIL # BLD AUTO: 0.12 THOUSAND/ÂΜL (ref 0–0.61)
EOSINOPHIL NFR BLD AUTO: 2 % (ref 0–6)
ERYTHROCYTE [DISTWIDTH] IN BLOOD BY AUTOMATED COUNT: 16.8 % (ref 11.6–15.1)
GFR SERPL CREATININE-BSD FRML MDRD: 80 ML/MIN/1.73SQ M
GLUCOSE SERPL-MCNC: 143 MG/DL (ref 65–140)
HCT VFR BLD AUTO: 34.5 % (ref 36.5–49.3)
HGB BLD-MCNC: 11.5 G/DL (ref 12–17)
IMM GRANULOCYTES # BLD AUTO: 0.02 THOUSAND/UL (ref 0–0.2)
IMM GRANULOCYTES NFR BLD AUTO: 0 % (ref 0–2)
LYMPHOCYTES # BLD AUTO: 1.41 THOUSANDS/ÂΜL (ref 0.6–4.47)
LYMPHOCYTES NFR BLD AUTO: 26 % (ref 14–44)
MCH RBC QN AUTO: 38 PG (ref 26.8–34.3)
MCHC RBC AUTO-ENTMCNC: 33.3 G/DL (ref 31.4–37.4)
MCV RBC AUTO: 114 FL (ref 82–98)
MONOCYTES # BLD AUTO: 0.51 THOUSAND/ÂΜL (ref 0.17–1.22)
MONOCYTES NFR BLD AUTO: 9 % (ref 4–12)
NEUTROPHILS # BLD AUTO: 3.41 THOUSANDS/ÂΜL (ref 1.85–7.62)
NEUTS SEG NFR BLD AUTO: 62 % (ref 43–75)
NRBC BLD AUTO-RTO: 0 /100 WBCS
PLATELET # BLD AUTO: 250 THOUSANDS/UL (ref 149–390)
PMV BLD AUTO: 9.8 FL (ref 8.9–12.7)
POTASSIUM SERPL-SCNC: 3.7 MMOL/L (ref 3.5–5.3)
PROT SERPL-MCNC: 6.7 G/DL (ref 6.4–8.4)
RBC # BLD AUTO: 3.03 MILLION/UL (ref 3.88–5.62)
SODIUM SERPL-SCNC: 137 MMOL/L (ref 135–147)
WBC # BLD AUTO: 5.5 THOUSAND/UL (ref 4.31–10.16)

## 2023-12-27 PROCEDURE — 71046 X-RAY EXAM CHEST 2 VIEWS: CPT

## 2023-12-27 PROCEDURE — 84484 ASSAY OF TROPONIN QUANT: CPT | Performed by: EMERGENCY MEDICINE

## 2023-12-27 PROCEDURE — 36415 COLL VENOUS BLD VENIPUNCTURE: CPT

## 2023-12-27 PROCEDURE — 93005 ELECTROCARDIOGRAM TRACING: CPT

## 2023-12-27 PROCEDURE — 99285 EMERGENCY DEPT VISIT HI MDM: CPT | Performed by: EMERGENCY MEDICINE

## 2023-12-27 PROCEDURE — 85025 COMPLETE CBC W/AUTO DIFF WBC: CPT | Performed by: EMERGENCY MEDICINE

## 2023-12-27 PROCEDURE — 80053 COMPREHEN METABOLIC PANEL: CPT | Performed by: EMERGENCY MEDICINE

## 2023-12-27 PROCEDURE — 99285 EMERGENCY DEPT VISIT HI MDM: CPT

## 2023-12-27 NOTE — DISCHARGE INSTRUCTIONS
Normal care  Increase activity  Follow-up with your provider return worsening symptoms or any problems

## 2023-12-27 NOTE — ED NOTES
Patient maintained O2 of 97% while ambulating, provider notified     Yesi Galloway  12/27/23 8710

## 2023-12-27 NOTE — ED PROVIDER NOTES
History  Chief Complaint   Patient presents with    Shortness of Breath     Son reports pt has had SOB x 3 weeks and its not improving.      HPI is a 83-year-old male previously hospitalized from December 13 to December 15 for COVID.  Apparently not hypoxic at that time did not require steroids or remdesivir.  Patient's son reports that over the last few days he has been persistently short of breath and was concerned that maybe he was not improving from his COVID infection so decided to bring him in to have it evaluated today.  Patient is essentially asymptomatic.  Denies any chest pain.  Denies any shortness of breath.  Has not difficulty walking or shortness of breath with exertion.  He denies any cough or congestion.  Patient orts he feels fine but his son was concerned because he had a recent COVID.  Son signs The patient in and also signs himself and because he has a kidney stone.  Past medical history recent COVID, anemia, cervical spine stenosis  Hyperlipidemia hypertension  Family history noncontributory  Social history smoker no history of drug abuse  Prior to Admission Medications   Prescriptions Last Dose Informant Patient Reported? Taking?   Alcohol Swabs (Pharmacist Choice Alcohol) PADS  Self Yes No   Sig: USE FOR TESTING AND INJECTIONS UP TO 10 PADS DAILY   MILK THISTLE PO   Yes No   Sig: Take 2 tablets by mouth in the morning Take in the morning with food   Senna-S 8.6-50 MG per tablet   No No   Sig: TAKE 1 TABLET BY MOUTH DAILY AT BEDTIME   Trulicity 4.5 MG/0.5ML injection  Self Yes No   Sig: INJECT 4.5 MG UNDER THE SKIN ONCE A WEEK.   aspirin (Aspirin Low Dose) 81 mg EC tablet   No No   Sig: TAKE ONE (1) TABLET BY MOUTH DAILY   benzonatate (TESSALON PERLES) 100 mg capsule   No No   Sig: Take 1 capsule (100 mg total) by mouth 3 (three) times a day as needed for cough   clopidogrel (PLAVIX) 75 mg tablet   No No   Sig: TAKE 1 TABLET (75 MG TOTAL) BY MOUTH DAILY   dextromethorphan-guaiFENesin  (ROBITUSSIN DM)  mg/5 mL syrup   No No   Sig: Take 10 mL by mouth every 6 (six) hours as needed for congestion or cough for up to 14 days   ergocalciferol (VITAMIN D2) 50,000 units  Self Yes No   Sig: Take 50,000 Units by mouth once a week   finasteride (PROSCAR) 5 mg tablet   No No   Sig: TAKE ONE (1) TABLET BY MOUTH DAILY   glipiZIDE (GLUCOTROL) 5 mg tablet  Self No No   Sig: TAKE 1 TABLET (5 MG TOTAL) BY MOUTH 2 (TWO) TIMES A DAY   levothyroxine 88 mcg tablet  Self Yes No   Sig: Take 88 mcg by mouth daily   metoprolol tartrate (LOPRESSOR) 25 mg tablet  Self No No   Sig: TAKE 1 TABLET (25 MG TOTAL) BY MOUTH EVERY 12 (TWELVE) HOURS   ondansetron (ZOFRAN-ODT) 4 mg disintegrating tablet  Self Yes No   Sig: TAKE 1 TABLET BY MOUTH 3 TIMES PER DAY FOR 7 DAYS   polyethylene glycol (MIRALAX) 17 g packet   No No   Sig: Take 17 g by mouth daily as needed (constipation) for up to 3 days   rosuvastatin (CRESTOR) 20 MG tablet  Self No No   Sig: TAKE 1 TABLET (20 MG TOTAL) BY MOUTH DAILY   tamsulosin (FLOMAX) 0.4 mg  Self No No   Sig: TAKE 1 CAPSULE (0.4 MG TOTAL) BY MOUTH DAILY      Facility-Administered Medications: None       Past Medical History:   Diagnosis Date    Acquired cyst of kidney     Anemia     Benign paroxysmal vertigo, unspecified ear     Cellulitis of leg     Cervical spinal stenosis     Chest pain     Coronary atherosclerosis of native coronary artery     Diabetes mellitus (HCC)     Disease of thyroid gland     Enlarged prostate     Esophageal candidiasis (HCC)     H/o COVID-19 06/30/2022    Hematuria     Herpes zoster     Hyperlipidemia     Hypertension     Incomplete emptying of bladder     Inflamed seborrheic keratosis     Internal hemorrhoids     Malignant neoplasm of skin of trunk     Myocardial infarction (HCC) 2005    Nonmelanoma skin cancer     LAST ASSESSED: 8/9/17    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 infarction     Paroxysmal tachycardia (HCC)     Shortness of breath     Vitamin B deficiency        Past Surgical  History:   Procedure Laterality Date    CARDIAC CATHETERIZATION Left 5/15/2023    Procedure: Cardiac Left Heart Cath;  Surgeon: Joo Howard MD;  Location: [facility];  Service: Cardiology    CARDIAC CATHETERIZATION N/A 5/15/2023    Procedure: Cardiac pci;  Surgeon: Joo Howard MD;  Location: [facility];  Service: Cardiology    CARDIAC CATHETERIZATION N/A 5/15/2023    Procedure: Cardiac Coronary Angiogram;  Surgeon: Joo Howard MD;  Location: [facility];  Service: Cardiology    CARDIAC DEFIBRILLATOR PLACEMENT      COLONOSCOPY  10/07/2008    FIBEROPTIC SCREENING; NO FURTHER RECOMMENDATIONS    CORONARY ANGIOPLASTY WITH STENT PLACEMENT      CYSTOSCOPY  2015    DIAGNOSTIC; MANAGED BY: DIAMOND JOINER    EGD AND COLONOSCOPY N/A 2018    Procedure: EGD AND COLONOSCOPY;  Surgeon: Fabiola العلي MD;  Location: [facility];  Service: Gastroenterology    FL INJECTION RIGHT SHOULDER (ARTHROGRAM)  2022    HIP ARTHROPLASTY Right     INSERT / REPLACE / REMOVE PACEMAKER      JOINT REPLACEMENT Right     THR    TRUNK SKIN LESION EXCISIONAL BIOPSY  2007    MALIGNANT; BCC CHEST       Family History   Problem Relation Age of Onset    Heart disease Mother     Coronary artery disease Mother     Sudden death Mother     Cancer Father         throat; MALIGNANT NEOPLASM OF HEAD, FACE OR NECK    Throat cancer Father     Cancer Family     Coronary artery disease Family      I have reviewed and agree with the history as documented.    E-Cigarette/Vaping    E-Cigarette Use Never User      E-Cigarette/Vaping Substances    Nicotine No     THC No     CBD No     Flavoring No     Other No     Unknown No      Social History     Tobacco Use    Smoking status: Former     Current packs/day: 0.00     Average packs/day: 1 pack/day for 10.0 years (10.0 ttl pk-yrs)     Types: Cigarettes     Start date: 1968     Quit date: 1978     Years since quittin.8    Smokeless  tobacco: Never   Vaping Use    Vaping status: Never Used   Substance Use Topics    Alcohol use: Yes     Comment: occasionally 1-2 per month     Drug use: No       Review of Systems   Constitutional:  Negative for fever.   HENT:  Negative for voice change.    Eyes:  Negative for redness.   Respiratory:  Negative for shortness of breath.    Skin:  Negative for color change.   Psychiatric/Behavioral:  Negative for confusion.    Son reports shortness of breath    Physical Exam  Physical Exam  Vitals and nursing note reviewed.   Constitutional:       Appearance: He is well-developed.   HENT:      Head: Normocephalic.      Right Ear: External ear normal.      Left Ear: External ear normal.      Nose: Nose normal.      Mouth/Throat:      Mouth: Mucous membranes are moist.      Pharynx: Oropharynx is clear.   Eyes:      General: Lids are normal.      Extraocular Movements: Extraocular movements intact.      Pupils: Pupils are equal, round, and reactive to light.   Cardiovascular:      Rate and Rhythm: Normal rate and regular rhythm.      Pulses: Normal pulses.      Heart sounds: Normal heart sounds.   Pulmonary:      Effort: Pulmonary effort is normal. No respiratory distress.      Breath sounds: Normal breath sounds.   Musculoskeletal:         General: No deformity. Normal range of motion.      Cervical back: Normal range of motion and neck supple.   Skin:     General: Skin is warm and dry.   Neurological:      Mental Status: He is alert and oriented to person, place, and time.   Psychiatric:         Mood and Affect: Mood normal.       Pulse oximetry 98% on room air adequate oxygenation, there is no hypoxia  Vital Signs  ED Triage Vitals   Temperature Pulse Respirations Blood Pressure SpO2   12/27/23 1444 12/27/23 1444 12/27/23 1444 12/27/23 1444 12/27/23 1444   97.8 °F (36.6 °C) 87 18 99/54 98 %      Temp src Heart Rate Source Patient Position - Orthostatic VS BP Location FiO2 (%)   -- 12/27/23 1700 -- -- --    Monitor          Pain Score       --                  Vitals:    12/27/23 1444 12/27/23 1700 12/27/23 1800   BP: 99/54 125/58 141/80   Pulse: 87 65 90         Visual Acuity  Visual Acuity      Flowsheet Row Most Recent Value   L Pupil Size (mm) 3   R Pupil Size (mm) 3            ED Medications  Medications - No data to display    Diagnostic Studies  Results Reviewed       Procedure Component Value Units Date/Time    HS Troponin 0hr (reflex protocol) [[id number]]  (Normal) Collected: 12/27/23 1453    Lab Status: Final result Specimen: Blood from Arm, Right Updated: 12/27/23 1524     hs TnI 0hr 11 ng/L     Comprehensive metabolic panel [[id number]]  (Abnormal) Collected: 12/27/23 1453    Lab Status: Final result Specimen: Blood from Arm, Right Updated: 12/27/23 1516     Sodium 137 mmol/L      Potassium 3.7 mmol/L      Chloride 107 mmol/L      CO2 22 mmol/L      ANION GAP 8 mmol/L      BUN 13 mg/dL      Creatinine 0.87 mg/dL      Glucose 143 mg/dL      Calcium 9.0 mg/dL      AST 24 U/L      ALT 14 U/L      Alkaline Phosphatase 52 U/L      Total Protein 6.7 g/dL      Albumin 3.7 g/dL      Total Bilirubin 0.67 mg/dL      eGFR 80 ml/min/1.73sq m     Narrative:      National Kidney Disease Foundation guidelines for Chronic Kidney Disease (CKD):     Stage 1 with normal or high GFR (GFR > 90 mL/min/1.73 square meters)    Stage 2 Mild CKD (GFR = 60-89 mL/min/1.73 square meters)    Stage 3A Moderate CKD (GFR = 45-59 mL/min/1.73 square meters)    Stage 3B Moderate CKD (GFR = 30-44 mL/min/1.73 square meters)    Stage 4 Severe CKD (GFR = 15-29 mL/min/1.73 square meters)    Stage 5 End Stage CKD (GFR <15 mL/min/1.73 square meters)  Note: GFR calculation is accurate only with a steady state creatinine    CBC and differential [[id number]]  (Abnormal) Collected: 12/27/23 1453    Lab Status: Final result Specimen: Blood from Arm, Right Updated: 12/27/23 1459     WBC 5.50 Thousand/uL      RBC 3.03 Million/uL      Hemoglobin 11.5 g/dL      Hematocrit  34.5 %       fL      MCH 38.0 pg      MCHC 33.3 g/dL      RDW 16.8 %      MPV 9.8 fL      Platelets 250 Thousands/uL      nRBC 0 /100 WBCs      Neutrophils Relative 62 %      Immat GRANS % 0 %      Lymphocytes Relative 26 %      Monocytes Relative 9 %      Eosinophils Relative 2 %      Basophils Relative 1 %      Neutrophils Absolute 3.41 Thousands/µL      Immature Grans Absolute 0.02 Thousand/uL      Lymphocytes Absolute 1.41 Thousands/µL      Monocytes Absolute 0.51 Thousand/µL      Eosinophils Absolute 0.12 Thousand/µL      Basophils Absolute 0.03 Thousands/µL                    XR chest 2 views   Final Result by Savi Cervantes MD (12/27 1653)      No acute cardiopulmonary disease.               Workstation performed: [id number]                    Procedures  Procedures         ED Course         Chest x-ray: Chest x-ray showed a normal cardiac silhouette, no pneumothorax no infiltrates, No sign of pathology, interpreted by me, I was the primary .    Diagnostic testing showed normal troponin no sign of cardiac ischemia despite weeks of symptoms chest pain troponin was done for ongoing cardiac ischemia cardiac strain  Electrolytes are within normal limits  White count was normal at 5.5 no sign of inflammation hemoglobin is normal 11.5 no sign of anemia.    Discussed with son, patient seems to be improving after being hospitalized for COVID he denies any symptoms to me.  Patient was able to ambulate with no hypoxia.  I believe patient is safe for discharge.  Patient was comfortable being discharged.                      SBIRT 20yo+      Flowsheet Row Most Recent Value   Initial Alcohol Screen: US AUDIT-C     1. How often do you have a drink containing alcohol? 0 Filed at: 12/27/2023 1729   2. How many drinks containing alcohol do you have on a typical day you are drinking?  0 Filed at: 12/27/2023 1729   3a. Male UNDER 65: How often do you have five or more drinks on one occasion? 0 Filed at:  12/27/2023 1729   3b. FEMALE Any Age, or MALE 65+: How often do you have 4 or more drinks on one occassion? 0 Filed at: 12/27/2023 1729   Audit-C Score 0 Filed at: 12/27/2023 1729   TRACY: How many times in the past year have you...    Used an illegal drug or used a prescription medication for non-medical reasons? Never Filed at: 12/27/2023 1729                      Medical Decision Making  Medical decision making 83-year-old male recent COVID infection son reports still with some dyspnea and weakness.  Patient was awake and alert.  Denied any symptoms, he was able to ambulate without dropping his pulse oximetry cardiac workup was negative.  No sign of infection on chest x-ray.  Discussed with patient and son I believe he is safe to be at home.  They agree.  We discussed outpatient treatment and follow-up.    Amount and/or Complexity of Data Reviewed  Labs: ordered.  Radiology: ordered.             Disposition  Final diagnoses:   Dyspnea     Time reflects when diagnosis was documented in both MDM as applicable and the Disposition within this note       Time User Action Codes Description Comment    12/27/2023  6:21 PM Jason Murdock Add [R06.00] Dyspnea           ED Disposition       ED Disposition   Discharge    Condition   Stable    Date/Time   Wed Dec 27, 2023 1821    Comment   Felix Mackey discharge to home/self care.                   Follow-up Information       Follow up With Specialties Details Why Contact Info    Wendy Alex MD Internal Medicine   [address] [place] [address]  2nd Floor  [facility] [address]  [phone]              Discharge Medication List as of 12/27/2023  6:21 PM        CONTINUE these medications which have NOT CHANGED    Details   Alcohol Swabs (Pharmacist Choice Alcohol) PADS USE FOR TESTING AND INJECTIONS UP TO 10 PADS DAILY, Historical Med      aspirin (Aspirin Low Dose) 81 mg EC tablet TAKE ONE (1) TABLET BY MOUTH DAILY, Starting Thu 11/2/2023, Normal      benzonatate (TESSALON PERLES)  100 mg capsule Take 1 capsule (100 mg total) by mouth 3 (three) times a day as needed for cough, Starting Fri 12/15/2023, Normal      clopidogrel (PLAVIX) 75 mg tablet TAKE 1 TABLET (75 MG TOTAL) BY MOUTH DAILY, Starting Wed 11/22/2023, Normal      dextromethorphan-guaiFENesin (ROBITUSSIN DM)  mg/5 mL syrup Take 10 mL by mouth every 6 (six) hours as needed for congestion or cough for up to 14 days, Starting Fri 12/15/2023, Until Fri 12/29/2023 at 2359, Normal      ergocalciferol (VITAMIN D2) 50,000 units Take 50,000 Units by mouth once a week, Starting Wed 9/21/2022, Historical Med      finasteride (PROSCAR) 5 mg tablet TAKE ONE (1) TABLET BY MOUTH DAILY, Starting Mon 10/30/2023, Normal      glipiZIDE (GLUCOTROL) 5 mg tablet TAKE 1 TABLET (5 MG TOTAL) BY MOUTH 2 (TWO) TIMES A DAY, Starting Mon 4/3/2023, Normal      levothyroxine 88 mcg tablet Take 88 mcg by mouth daily, Starting Mon 8/28/2023, Historical Med      metoprolol tartrate (LOPRESSOR) 25 mg tablet TAKE 1 TABLET (25 MG TOTAL) BY MOUTH EVERY 12 (TWELVE) HOURS, Starting Mon 9/18/2023, Normal      MILK THISTLE PO Take 2 tablets by mouth in the morning Take in the morning with food, Historical Med      ondansetron (ZOFRAN-ODT) 4 mg disintegrating tablet TAKE 1 TABLET BY MOUTH 3 TIMES PER DAY FOR 7 DAYS, Historical Med      polyethylene glycol (MIRALAX) 17 g packet Take 17 g by mouth daily as needed (constipation) for up to 3 days, Starting Mon 10/2/2023, Until Thu 10/5/2023 at 2359, Normal      rosuvastatin (CRESTOR) 20 MG tablet TAKE 1 TABLET (20 MG TOTAL) BY MOUTH DAILY, Starting Tue 6/20/2023, Normal      Senna-S 8.6-50 MG per tablet TAKE 1 TABLET BY MOUTH DAILY AT BEDTIME, Starting Wed 12/13/2023, Normal      tamsulosin (FLOMAX) 0.4 mg TAKE 1 CAPSULE (0.4 MG TOTAL) BY MOUTH DAILY, Starting Thu 4/13/2023, Normal      Trulicity 4.5 MG/0.5ML injection INJECT 4.5 MG UNDER THE SKIN ONCE A WEEK., Historical Med             No discharge procedures on  file.    PDMP Review       None            ED Provider  Electronically Signed by             Jason Murdock MD  12/27/23 2045

## 2023-12-28 LAB
ATRIAL RATE: 75 BPM
P AXIS: 40 DEGREES
PR INTERVAL: 204 MS
QRS AXIS: -52 DEGREES
QRSD INTERVAL: 116 MS
QT INTERVAL: 412 MS
QTC INTERVAL: 460 MS
T WAVE AXIS: 131 DEGREES
VENTRICULAR RATE: 75 BPM

## 2024-02-28 ENCOUNTER — REMOTE DEVICE CLINIC VISIT (OUTPATIENT)
Dept: CARDIOLOGY CLINIC | Facility: CLINIC | Age: 84
End: 2024-02-28
Payer: COMMERCIAL

## 2024-02-28 DIAGNOSIS — Z95.810 PRESENCE OF IMPLANTABLE CARDIOVERTER-DEFIBRILLATOR (ICD): Primary | ICD-10-CM

## 2024-02-28 PROCEDURE — 93296 REM INTERROG EVL PM/IDS: CPT | Performed by: INTERNAL MEDICINE

## 2024-02-28 PROCEDURE — 93295 DEV INTERROG REMOTE 1/2/MLT: CPT | Performed by: INTERNAL MEDICINE

## 2024-02-28 NOTE — PROGRESS NOTES
Results for orders placed or performed in visit on 02/28/24   Cardiac EP device report    Narrative    BSC SINGLE CHAMBER ICD - NOT MRI CONDITIONAL  LATITUDE TRANSMISSION: BATTERY VOLTAGE ADEQUATE (2.5 YRS).  0% ALL AVAILABLE LEAD PARAMETERS WITHIN NORMAL LIMITS. 17 DEVICE CLASSIFIED NONSUST V EPISODES W/ AVAILABLE EGMS SHOWING PROBABLE SVT VS NSVT UP  BPM, LONGEST LASTING 9 SECS. TAKES METOPROLOL TART. EF 35% (ECHO 10/1/23). NORMAL DEVICE FUNCTION. AM

## 2024-03-12 DIAGNOSIS — K59.00 CONSTIPATION, UNSPECIFIED CONSTIPATION TYPE: ICD-10-CM

## 2024-03-12 RX ORDER — MINERAL OIL/PETROLATUM,WHITE 41.5-56.8%
1 OINTMENT (GRAM) OPHTHALMIC (EYE)
Qty: 30 TABLET | Refills: 1 | Status: SHIPPED | OUTPATIENT
Start: 2024-03-12

## 2024-03-19 DIAGNOSIS — E11.9 TYPE 2 DIABETES MELLITUS WITHOUT COMPLICATION, WITHOUT LONG-TERM CURRENT USE OF INSULIN (HCC): ICD-10-CM

## 2024-03-19 DIAGNOSIS — D64.9 ANEMIA, UNSPECIFIED TYPE: ICD-10-CM

## 2024-03-19 RX ORDER — TAMSULOSIN HYDROCHLORIDE 0.4 MG/1
0.4 CAPSULE ORAL DAILY
Qty: 90 CAPSULE | Refills: 3 | Status: SHIPPED | OUTPATIENT
Start: 2024-03-19

## 2024-04-01 DIAGNOSIS — I25.5 ISCHEMIC CARDIOMYOPATHY: ICD-10-CM

## 2024-04-22 ENCOUNTER — HOSPITAL ENCOUNTER (EMERGENCY)
Facility: HOSPITAL | Age: 84
Discharge: HOME | End: 2024-04-22
Attending: EMERGENCY MEDICINE
Payer: COMMERCIAL

## 2024-04-22 ENCOUNTER — APPOINTMENT (EMERGENCY)
Dept: RADIOLOGY | Facility: HOSPITAL | Age: 84
End: 2024-04-22
Payer: COMMERCIAL

## 2024-04-22 VITALS
RESPIRATION RATE: 16 BRPM | TEMPERATURE: 98 F | DIASTOLIC BLOOD PRESSURE: 62 MMHG | OXYGEN SATURATION: 99 % | HEART RATE: 59 BPM | SYSTOLIC BLOOD PRESSURE: 138 MMHG

## 2024-04-22 DIAGNOSIS — R06.02 SHORTNESS OF BREATH: Primary | ICD-10-CM

## 2024-04-22 DIAGNOSIS — R79.89 ELEVATED BRAIN NATRIURETIC PEPTIDE (BNP) LEVEL: ICD-10-CM

## 2024-04-22 LAB
2HR DELTA HS TROPONIN: -4 NG/L
ALBUMIN SERPL BCP-MCNC: 4.2 G/DL (ref 3.5–5)
ALP SERPL-CCNC: 41 U/L (ref 34–104)
ALT SERPL W P-5'-P-CCNC: 22 U/L (ref 7–52)
ANION GAP SERPL CALCULATED.3IONS-SCNC: 5 MMOL/L (ref 4–13)
APTT PPP: 26 SECONDS (ref 23–37)
AST SERPL W P-5'-P-CCNC: 23 U/L (ref 13–39)
ATRIAL RATE: 61 BPM
BASOPHILS # BLD AUTO: 0.03 THOUSANDS/ÂΜL (ref 0–0.1)
BASOPHILS NFR BLD AUTO: 1 % (ref 0–1)
BILIRUB SERPL-MCNC: 0.71 MG/DL (ref 0.2–1)
BNP SERPL-MCNC: 117 PG/ML (ref 0–100)
BUN SERPL-MCNC: 17 MG/DL (ref 5–25)
CALCIUM SERPL-MCNC: 9.7 MG/DL (ref 8.4–10.2)
CARDIAC TROPONIN I PNL SERPL HS: 10 NG/L
CARDIAC TROPONIN I PNL SERPL HS: 14 NG/L
CHLORIDE SERPL-SCNC: 103 MMOL/L (ref 96–108)
CO2 SERPL-SCNC: 30 MMOL/L (ref 21–32)
CREAT SERPL-MCNC: 0.95 MG/DL (ref 0.6–1.3)
EOSINOPHIL # BLD AUTO: 0.2 THOUSAND/ÂΜL (ref 0–0.61)
EOSINOPHIL NFR BLD AUTO: 4 % (ref 0–6)
ERYTHROCYTE [DISTWIDTH] IN BLOOD BY AUTOMATED COUNT: 15.9 % (ref 11.6–15.1)
FLUAV RNA RESP QL NAA+PROBE: NEGATIVE
FLUBV RNA RESP QL NAA+PROBE: NEGATIVE
GFR SERPL CREATININE-BSD FRML MDRD: 74 ML/MIN/1.73SQ M
GLUCOSE SERPL-MCNC: 198 MG/DL (ref 65–140)
HCT VFR BLD AUTO: 36.9 % (ref 36.5–49.3)
HGB BLD-MCNC: 13.1 G/DL (ref 12–17)
IMM GRANULOCYTES # BLD AUTO: 0.01 THOUSAND/UL (ref 0–0.2)
IMM GRANULOCYTES NFR BLD AUTO: 0 % (ref 0–2)
INR PPP: 0.92 (ref 0.84–1.19)
LYMPHOCYTES # BLD AUTO: 1.58 THOUSANDS/ÂΜL (ref 0.6–4.47)
LYMPHOCYTES NFR BLD AUTO: 31 % (ref 14–44)
MCH RBC QN AUTO: 39.3 PG (ref 26.8–34.3)
MCHC RBC AUTO-ENTMCNC: 35.5 G/DL (ref 31.4–37.4)
MCV RBC AUTO: 111 FL (ref 82–98)
MONOCYTES # BLD AUTO: 0.5 THOUSAND/ÂΜL (ref 0.17–1.22)
MONOCYTES NFR BLD AUTO: 10 % (ref 4–12)
NEUTROPHILS # BLD AUTO: 2.76 THOUSANDS/ÂΜL (ref 1.85–7.62)
NEUTS SEG NFR BLD AUTO: 54 % (ref 43–75)
NRBC BLD AUTO-RTO: 0 /100 WBCS
P AXIS: 62 DEGREES
PLATELET # BLD AUTO: 160 THOUSANDS/UL (ref 149–390)
PMV BLD AUTO: 9.8 FL (ref 8.9–12.7)
POTASSIUM SERPL-SCNC: 4.2 MMOL/L (ref 3.5–5.3)
PR INTERVAL: 232 MS
PROT SERPL-MCNC: 6.9 G/DL (ref 6.4–8.4)
PROTHROMBIN TIME: 12.9 SECONDS (ref 11.6–14.5)
QRS AXIS: -46 DEGREES
QRSD INTERVAL: 118 MS
QT INTERVAL: 436 MS
QTC INTERVAL: 438 MS
RBC # BLD AUTO: 3.33 MILLION/UL (ref 3.88–5.62)
RSV RNA RESP QL NAA+PROBE: NEGATIVE
SARS-COV-2 RNA RESP QL NAA+PROBE: NEGATIVE
SODIUM SERPL-SCNC: 138 MMOL/L (ref 135–147)
T WAVE AXIS: 144 DEGREES
VENTRICULAR RATE: 61 BPM
WBC # BLD AUTO: 5.08 THOUSAND/UL (ref 4.31–10.16)

## 2024-04-22 PROCEDURE — 80053 COMPREHEN METABOLIC PANEL: CPT | Performed by: EMERGENCY MEDICINE

## 2024-04-22 PROCEDURE — 93005 ELECTROCARDIOGRAM TRACING: CPT

## 2024-04-22 PROCEDURE — 83880 ASSAY OF NATRIURETIC PEPTIDE: CPT | Performed by: EMERGENCY MEDICINE

## 2024-04-22 PROCEDURE — 99285 EMERGENCY DEPT VISIT HI MDM: CPT

## 2024-04-22 PROCEDURE — 85610 PROTHROMBIN TIME: CPT | Performed by: EMERGENCY MEDICINE

## 2024-04-22 PROCEDURE — 84484 ASSAY OF TROPONIN QUANT: CPT | Performed by: EMERGENCY MEDICINE

## 2024-04-22 PROCEDURE — 96374 THER/PROPH/DIAG INJ IV PUSH: CPT

## 2024-04-22 PROCEDURE — 85730 THROMBOPLASTIN TIME PARTIAL: CPT | Performed by: EMERGENCY MEDICINE

## 2024-04-22 PROCEDURE — 93010 ELECTROCARDIOGRAM REPORT: CPT | Performed by: INTERNAL MEDICINE

## 2024-04-22 PROCEDURE — 0241U HB NFCT DS VIR RESP RNA 4 TRGT: CPT | Performed by: EMERGENCY MEDICINE

## 2024-04-22 PROCEDURE — 36415 COLL VENOUS BLD VENIPUNCTURE: CPT | Performed by: EMERGENCY MEDICINE

## 2024-04-22 PROCEDURE — 99285 EMERGENCY DEPT VISIT HI MDM: CPT | Performed by: EMERGENCY MEDICINE

## 2024-04-22 PROCEDURE — 71045 X-RAY EXAM CHEST 1 VIEW: CPT

## 2024-04-22 PROCEDURE — 85025 COMPLETE CBC W/AUTO DIFF WBC: CPT | Performed by: EMERGENCY MEDICINE

## 2024-04-22 RX ORDER — ASPIRIN 81 MG/1
324 TABLET, CHEWABLE ORAL ONCE
Status: COMPLETED | OUTPATIENT
Start: 2024-04-22 | End: 2024-04-22

## 2024-04-22 RX ORDER — FUROSEMIDE 10 MG/ML
40 INJECTION INTRAMUSCULAR; INTRAVENOUS ONCE
Status: COMPLETED | OUTPATIENT
Start: 2024-04-22 | End: 2024-04-22

## 2024-04-22 RX ORDER — FUROSEMIDE 20 MG/1
20 TABLET ORAL DAILY
Qty: 7 TABLET | Refills: 0 | Status: SHIPPED | OUTPATIENT
Start: 2024-04-22 | End: 2024-04-26

## 2024-04-22 RX ADMIN — FUROSEMIDE 40 MG: 10 INJECTION, SOLUTION INTRAMUSCULAR; INTRAVENOUS at 12:32

## 2024-04-22 RX ADMIN — ASPIRIN 324 MG: 81 TABLET, CHEWABLE ORAL at 11:26

## 2024-04-22 NOTE — ED PROVIDER NOTES
Pt Name: Felix Mackey  MRN: [id number]  Birthdate 1940  Age/Sex: 83 y.o. male  Date of evaluation: 4/22/2024  PCP: Wendy Alex MD    CHIEF COMPLAINT    Chief Complaint   Patient presents with    Shortness of Breath     Pt presents with c/o SOB that has been progressing over the last few days. Pt denies cough or fevers.          HPI    83 y.o. male presenting with shortness of breath.  Patient states he has been having progressively worsening shortness of breath over the last few days.  He denies any cough, fevers, or wheezing, but states his shortness of breath seems to be worsening, particularly with exertion.  He denies sick contacts, trauma, chest pain, numbness, weakness, other symptoms.      HPI      Past Medical and Surgical History    Past Medical History:   Diagnosis Date    Acquired cyst of kidney     Anemia     Benign paroxysmal vertigo, unspecified ear     Cellulitis of leg     Cervical spinal stenosis     Chest pain     Coronary atherosclerosis of native coronary artery     Diabetes mellitus (HCC)     Disease of thyroid gland     Enlarged prostate     Esophageal candidiasis (HCC)     H/o COVID-19 06/30/2022    Hematuria     Herpes zoster     Hyperlipidemia     Hypertension     Incomplete emptying of bladder     Inflamed seborrheic keratosis     Internal hemorrhoids     Malignant neoplasm of skin of trunk     Myocardial infarction (HCC) 2005    Nonmelanoma skin cancer     LAST ASSESSED: 8/9/17    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 infarction     Paroxysmal tachycardia (HCC)     Shortness of breath     Vitamin B deficiency        Past Surgical History:   Procedure Laterality Date    CARDIAC CATHETERIZATION Left 5/15/2023    Procedure: Cardiac Left Heart Cath;  Surgeon: Joo Howard MD;  Location: [facility];  Service: Cardiology    CARDIAC CATHETERIZATION N/A 5/15/2023    Procedure: Cardiac pci;  Surgeon: Joo Howard MD;  Location: [facility];  Service: Cardiology    CARDIAC  CATHETERIZATION N/A 5/15/2023    Procedure: Cardiac Coronary Angiogram;  Surgeon: Joo Howard MD;  Location: [facility];  Service: Cardiology    CARDIAC DEFIBRILLATOR PLACEMENT      COLONOSCOPY  10/07/2008    FIBEROPTIC SCREENING; NO FURTHER RECOMMENDATIONS    CORONARY ANGIOPLASTY WITH STENT PLACEMENT      CYSTOSCOPY  2015    DIAGNOSTIC; MANAGED BY: DIAMOND JOINER    EGD AND COLONOSCOPY N/A 2018    Procedure: EGD AND COLONOSCOPY;  Surgeon: Fabiola العلي MD;  Location: [facility];  Service: Gastroenterology    FL INJECTION RIGHT SHOULDER (ARTHROGRAM)  2022    HIP ARTHROPLASTY Right     INSERT / REPLACE / REMOVE PACEMAKER      JOINT REPLACEMENT Right     THR    TRUNK SKIN LESION EXCISIONAL BIOPSY  2007    MALIGNANT; BCC CHEST       Family History   Problem Relation Age of Onset    Heart disease Mother     Coronary artery disease Mother     Sudden death Mother     Cancer Father         throat; MALIGNANT NEOPLASM OF HEAD, FACE OR NECK    Throat cancer Father     Cancer Family     Coronary artery disease Family        Social History     Tobacco Use    Smoking status: Former     Current packs/day: 0.00     Average packs/day: 1 pack/day for 10.0 years (10.0 ttl pk-yrs)     Types: Cigarettes     Start date: 1968     Quit date: 1978     Years since quittin.2    Smokeless tobacco: Never   Vaping Use    Vaping status: Never Used   Substance Use Topics    Alcohol use: Yes     Comment: occasionally 1-2 per month     Drug use: No           Allergies    Allergies   Allergen Reactions    Atorvastatin Other (See Comments)     Pt unsure      Percocet [Oxycodone-Acetaminophen] Nausea Only and GI Intolerance       Home Medications    Prior to Admission medications    Medication Sig Start Date End Date Taking? Authorizing Provider   Alcohol Swabs (Pharmacist Choice Alcohol) PADS USE FOR TESTING AND INJECTIONS UP TO 10 PADS DAILY 22   Historical Provider, MD   aspirin  (Aspirin Low Dose) 81 mg EC tablet TAKE ONE (1) TABLET BY MOUTH DAILY 11/2/23   Wendy Alex MD   benzonatate (TESSALON PERLES) 100 mg capsule Take 1 capsule (100 mg total) by mouth 3 (three) times a day as needed for cough 12/15/23   Efra Noble MD   clopidogrel (PLAVIX) 75 mg tablet TAKE 1 TABLET (75 MG TOTAL) BY MOUTH DAILY 11/22/23   Yesi Cantu PA-C   ergocalciferol (VITAMIN D2) 50,000 units Take 50,000 Units by mouth once a week 9/21/22   Historical Provider, MD   finasteride (PROSCAR) 5 mg tablet TAKE ONE (1) TABLET BY MOUTH DAILY 10/30/23   Wendy Alex MD   glipiZIDE (GLUCOTROL) 5 mg tablet TAKE 1 TABLET (5 MG TOTAL) BY MOUTH 2 (TWO) TIMES A DAY 4/3/23   David Leahy MD   levothyroxine 88 mcg tablet Take 88 mcg by mouth daily 8/28/23   Historical Provider, MD   metoprolol tartrate (LOPRESSOR) 25 mg tablet TAKE 1 TABLET (25 MG TOTAL) BY MOUTH EVERY 12 (TWELVE) HOURS 4/1/24   Denisse Way MD   MILK THISTLE PO Take 2 tablets by mouth in the morning Take in the morning with food    Historical Provider, MD   ondansetron (ZOFRAN-ODT) 4 mg disintegrating tablet TAKE 1 TABLET BY MOUTH 3 TIMES PER DAY FOR 7 DAYS 9/19/23   Historical Provider, MD   polyethylene glycol (MIRALAX) 17 g packet Take 17 g by mouth daily as needed (constipation) for up to 3 days 10/2/23 10/5/23  Laurie Farnsworth MD   rosuvastatin (CRESTOR) 20 MG tablet TAKE 1 TABLET (20 MG TOTAL) BY MOUTH DAILY 6/20/23   David Leahy MD   Senna-S 8.6-50 MG per tablet TAKE 1 TABLET BY MOUTH DAILY AT BEDTIME 3/12/24   Wendy Alex MD   tamsulosin (FLOMAX) 0.4 mg Take 1 capsule (0.4 mg total) by mouth daily 3/19/24   Wendy Alex MD   Trulicity 4.5 MG/0.5ML injection INJECT 4.5 MG UNDER THE SKIN ONCE A WEEK. 8/28/23   Historical Provider, MD           Review of Systems    Review of Systems   Constitutional:  Positive for fatigue. Negative for appetite change, chills and diaphoresis.   HENT:  Negative for drooling, facial swelling,  trouble swallowing and voice change.    Respiratory:  Positive for shortness of breath. Negative for apnea and wheezing.    Cardiovascular:  Negative for chest pain and leg swelling.   Gastrointestinal:  Negative for abdominal distention, abdominal pain, diarrhea, nausea and vomiting.   Genitourinary:  Negative for dysuria and urgency.   Musculoskeletal:  Negative for arthralgias, back pain, gait problem and neck pain.   Skin:  Negative for color change, rash and wound.   Neurological:  Negative for seizures, speech difficulty, weakness and headaches.   Psychiatric/Behavioral:  Negative for agitation, behavioral problems and dysphoric mood. The patient is not nervous/anxious.            All other systems reviewed and negative.    Physical Exam      ED Triage Vitals   Temperature Pulse Respirations Blood Pressure SpO2   04/22/24 1252 04/22/24 1109 04/22/24 1109 04/22/24 1109 04/22/24 1109   98 °F (36.7 °C) 65 16 109/53 99 %      Temp Source Heart Rate Source Patient Position - Orthostatic VS BP Location FiO2 (%)   04/22/24 1252 04/22/24 1109 04/22/24 1109 04/22/24 1109 --   Oral Monitor Sitting Left arm       Pain Score       04/22/24 1109       No Pain               Physical Exam  Vitals and nursing note reviewed.   Constitutional:       General: He is not in acute distress.     Appearance: He is well-developed. He is not ill-appearing, toxic-appearing or diaphoretic.   HENT:      Head: Normocephalic and atraumatic.      Right Ear: External ear normal.      Left Ear: External ear normal.      Nose: Nose normal. No congestion or rhinorrhea.      Mouth/Throat:      Mouth: Mucous membranes are moist.      Pharynx: Oropharynx is clear. No oropharyngeal exudate or posterior oropharyngeal erythema.   Eyes:      Conjunctiva/sclera: Conjunctivae normal.      Pupils: Pupils are equal, round, and reactive to light.   Neck:      Trachea: No tracheal deviation.   Cardiovascular:      Rate and Rhythm: Normal rate and regular  rhythm.      Pulses: Normal pulses.      Heart sounds: Normal heart sounds. No murmur heard.  Pulmonary:      Effort: Pulmonary effort is normal. No respiratory distress.      Breath sounds: Normal breath sounds. No stridor. No wheezing or rales.   Abdominal:      General: There is no distension.      Palpations: Abdomen is soft.      Tenderness: There is no abdominal tenderness. There is no guarding or rebound.   Musculoskeletal:         General: No deformity. Normal range of motion.      Cervical back: Normal range of motion and neck supple. No rigidity or tenderness.      Right lower leg: Edema present.      Left lower leg: Edema present.      Comments: Trace edema bilateral lower extremities   Skin:     General: Skin is warm and dry.      Capillary Refill: Capillary refill takes less than 2 seconds.      Findings: No rash.   Neurological:      Mental Status: He is alert and oriented to person, place, and time.   Psychiatric:         Behavior: Behavior normal.         Thought Content: Thought content normal.         Judgment: Judgment normal.              Diagnostic Results    EKG Interpretation    Rate:  61  BPM  Rhythm:   Sinus Rhythm with first-degree AV block  Axis: Left axis deviation  Intervals: Normal, no blocks, QTc  438 ms  Q waves:  No pathologic Q waves   T waves:  Normal   ST segments: Nonspecific changes in inferior lateral leads    Impression: Sinus rhythm with first-degree AV block and left axis deviation as well as nonspecific ST-T changes in the inferior and lateral leads without evidence of STEMI    EKG for comparison: EKG dated 27 December 2023 similar in character no major changes other than ST-T changes.    EKG interpreted by me.     Labs:    Results Reviewed       Procedure Component Value Units Date/Time    FLU/RSV/COVID - if FLU/RSV clinically relevant [[id number]]  (Normal) Collected: 04/22/24 1122    Lab Status: Final result Specimen: Nares from Nose Updated: 04/22/24 1426     SARS-CoV-2  Negative     INFLUENZA A PCR Negative     INFLUENZA B PCR Negative     RSV PCR Negative    Narrative:      FOR PEDIATRIC PATIENTS - copy/paste COVID Guidelines URL to browser: [email or web address]    SARS-CoV-2 assay is a Nucleic Acid Amplification assay intended for the  qualitative detection of nucleic acid from SARS-CoV-2 in nasopharyngeal  swabs. Results are for the presumptive identification of SARS-CoV-2 RNA.    Positive results are indicative of infection with SARS-CoV-2, the virus  causing COVID-19, but do not rule out bacterial infection or co-infection  with other viruses. Laboratories within the [place] and its  territories are required to report all positive results to the appropriate  public health authorities. Negative results do not preclude SARS-CoV-2  infection and should not be used as the sole basis for treatment or other  patient management decisions. Negative results must be combined with  clinical observations, patient history, and epidemiological information.  This test has not been FDA cleared or approved.    This test has been authorized by FDA under an Emergency Use Authorization  (EUA). This test is only authorized for the duration of time the  declaration that circumstances exist justifying the authorization of the  emergency use of an in vitro diagnostic tests for detection of SARS-CoV-2  virus and/or diagnosis of COVID-19 infection under section 564(b)(1) of  the Act, 21 U.S.C. 360bbb-3(b)(1), unless the authorization is terminated  or revoked sooner. The test has been validated but independent review by FDA  and CLIA is pending.    Test performed using HÃ¶vding: This RT-PCR assay targets N2,  a region unique to SARS-CoV-2. A conserved region in the E-gene was chosen  for pan-Sarbecovirus detection which includes SARS-CoV-2.    According to CMS-2020-01-R, this platform meets the definition of high-throughput  technology.    HS Troponin I 2hr [[id number]]  (Normal) Collected: 04/22/24 1320    Lab Status: Final result Specimen: Blood from Arm, Right Updated: 04/22/24 1348     hs TnI 2hr 10 ng/L      Delta 2hr hsTnI -4 ng/L     Protime-INR [[id number]]  (Normal) Collected: 04/22/24 1122    Lab Status: Final result Specimen: Blood from Arm, Right Updated: 04/22/24 1159     Protime 12.9 seconds      INR 0.92    APTT [[id number]]  (Normal) Collected: 04/22/24 1122    Lab Status: Final result Specimen: Blood from Arm, Right Updated: 04/22/24 1159     PTT 26 seconds     HS Troponin 0hr (reflex protocol) [[id number]]  (Normal) Collected: 04/22/24 1122    Lab Status: Final result Specimen: Blood from Arm, Right Updated: 04/22/24 1151     hs TnI 0hr 14 ng/L     B-Type Natriuretic Peptide(BNP) [[id number]]  (Abnormal) Collected: 04/22/24 1122    Lab Status: Final result Specimen: Blood from Arm, Right Updated: 04/22/24 1151      pg/mL     Comprehensive metabolic panel [[id number]]  (Abnormal) Collected: 04/22/24 1122    Lab Status: Final result Specimen: Blood from Arm, Right Updated: 04/22/24 1144     Sodium 138 mmol/L      Potassium 4.2 mmol/L      Chloride 103 mmol/L      CO2 30 mmol/L      ANION GAP 5 mmol/L      BUN 17 mg/dL      Creatinine 0.95 mg/dL      Glucose 198 mg/dL      Calcium 9.7 mg/dL      AST 23 U/L      ALT 22 U/L      Alkaline Phosphatase 41 U/L      Total Protein 6.9 g/dL      Albumin 4.2 g/dL      Total Bilirubin 0.71 mg/dL      eGFR 74 ml/min/1.73sq m     Narrative:      National Kidney Disease Foundation guidelines for Chronic Kidney Disease (CKD):     Stage 1 with normal or high GFR (GFR > 90 mL/min/1.73 square meters)    Stage 2 Mild CKD (GFR = 60-89 mL/min/1.73 square meters)    Stage 3A Moderate CKD (GFR = 45-59 mL/min/1.73 square meters)    Stage 3B Moderate CKD (GFR = 30-44 mL/min/1.73 square meters)    Stage 4 Severe CKD (GFR = 15-29 mL/min/1.73 square meters)    Stage 5 End Stage CKD (GFR <15  mL/min/1.73 square meters)  Note: GFR calculation is accurate only with a steady state creatinine    CBC and differential [[id number]]  (Abnormal) Collected: 04/22/24 1122    Lab Status: Final result Specimen: Blood from Arm, Right Updated: 04/22/24 1129     WBC 5.08 Thousand/uL      RBC 3.33 Million/uL      Hemoglobin 13.1 g/dL      Hematocrit 36.9 %       fL      MCH 39.3 pg      MCHC 35.5 g/dL      RDW 15.9 %      MPV 9.8 fL      Platelets 160 Thousands/uL      nRBC 0 /100 WBCs      Segmented % 54 %      Immature Grans % 0 %      Lymphocytes % 31 %      Monocytes % 10 %      Eosinophils Relative 4 %      Basophils Relative 1 %      Absolute Neutrophils 2.76 Thousands/µL      Absolute Immature Grans 0.01 Thousand/uL      Absolute Lymphocytes 1.58 Thousands/µL      Absolute Monocytes 0.50 Thousand/µL      Eosinophils Absolute 0.20 Thousand/µL      Basophils Absolute 0.03 Thousands/µL             All labs reviewed and utilized in the medical decision making process    Radiology:    XR chest 1 view portable    (Results Pending)       All radiology studies independently viewed by me and interpreted by the radiologist.    Procedure    Procedures    HEART score:    History 0=Slightly or non-suspicious   ECG 1=Nonspecific repolarization disturbance   Age 2= > 65 years   Risk Factors 2= > 3 risk factors, or history of atherosclerotic disease   Troponin 1= Between 13-35 ng/L   Total 6            ED Course of Care and Re-Assessments      Given aspirin and diuresis with Lasix with resolution of shortness of breath.    Medications   aspirin chewable tablet 324 mg (324 mg Oral Given 4/22/24 1126)   furosemide (LASIX) injection 40 mg (40 mg Intravenous Given 4/22/24 1232)           FINAL IMPRESSION    Final diagnoses:   Shortness of breath   Elevated brain natriuretic peptide (BNP) level         DISPOSITION/PLAN    Presentation as above with shortness of breath as well as elevated BNP concerning for possible congestive  heart failure.  Vital signs and examination overall reassuring with no respiratory distress.  Labs remarkable for elevated BNP, elevated blood sugar, slightly elevated troponin that is downtrending on repeat.  Some concern for mild congestive heart failure, diuresed with resolution of symptoms.  Do not suspect bacterial pneumonia, pneumothorax, PE, ACS, aortic dissection, other acute life threat.  Treated symptomatically, discharged with strict return precautions, follow-up with primary care doctor as well as cardiology.  Time reflects when diagnosis was documented in both MDM as applicable and the Disposition within this note       Time User Action Codes Description Comment    4/22/2024  3:04 PM Joo Chi Add [R06.02] Shortness of breath     4/22/2024  3:04 PM Joo Chi Add [R79.89] Elevated brain natriuretic peptide (BNP) level           ED Disposition       ED Disposition   Discharge    Condition   Stable    Date/Time   Mon Apr 22, 2024  3:04 PM    Comment   Felix Mackey discharge to home/self care.                   Follow-up Information       Follow up With Specialties Details Why Contact Info Additional Information    [facility] Emergency Department Emergency Medicine Go to  If symptoms worsen [address] [facility] [address] [phone] [facility] Emergency Department, [address] [place], [place], [address]    Wendy Alex MD Internal Medicine Call in 1 day To discuss this visit and schedule close follow-up [address] [facility]  2nd Floor  [facility] [id number] [phone]       Denisse Way MD Cardiology Call in 1 day To discuss this visit and schedule close follow-up [address] [facility] [id number] [phone]                 PATIENT REFERRED TO:    [facility] Emergency Department  [address] [facility] [address] [phone]  Go to   If  symptoms worsen    Wendy Alex MD  [address] [facility]  2nd Floor  [facility] [address]  [phone]    Call in 1 day  To discuss this visit and schedule close follow-up    Denisse Way MD  [address] [facility] [address]  [phone]    Call in 1 day  To discuss this visit and schedule close follow-up      DISCHARGE MEDICATIONS:    Discharge Medication List as of 4/22/2024  3:06 PM        START taking these medications    Details   furosemide (LASIX) 20 mg tablet Take 1 tablet (20 mg total) by mouth daily for 7 days, Starting Mon 4/22/2024, Until Mon 4/29/2024, Print           CONTINUE these medications which have NOT CHANGED    Details   Alcohol Swabs (Pharmacist Choice Alcohol) PADS USE FOR TESTING AND INJECTIONS UP TO 10 PADS DAILY, Historical Med      aspirin (Aspirin Low Dose) 81 mg EC tablet TAKE ONE (1) TABLET BY MOUTH DAILY, Starting Thu 11/2/2023, Normal      benzonatate (TESSALON PERLES) 100 mg capsule Take 1 capsule (100 mg total) by mouth 3 (three) times a day as needed for cough, Starting Fri 12/15/2023, Normal      clopidogrel (PLAVIX) 75 mg tablet TAKE 1 TABLET (75 MG TOTAL) BY MOUTH DAILY, Starting Wed 11/22/2023, Normal      ergocalciferol (VITAMIN D2) 50,000 units Take 50,000 Units by mouth once a week, Starting Wed 9/21/2022, Historical Med      finasteride (PROSCAR) 5 mg tablet TAKE ONE (1) TABLET BY MOUTH DAILY, Starting Mon 10/30/2023, Normal      glipiZIDE (GLUCOTROL) 5 mg tablet TAKE 1 TABLET (5 MG TOTAL) BY MOUTH 2 (TWO) TIMES A DAY, Starting Mon 4/3/2023, Normal      levothyroxine 88 mcg tablet Take 88 mcg by mouth daily, Starting Mon 8/28/2023, Historical Med      metoprolol tartrate (LOPRESSOR) 25 mg tablet TAKE 1 TABLET (25 MG TOTAL) BY MOUTH EVERY 12 (TWELVE) HOURS, Starting Mon 4/1/2024, Normal      MILK THISTLE PO Take 2 tablets by mouth in the morning Take in the morning with food, Historical Med      ondansetron (ZOFRAN-ODT) 4 mg disintegrating tablet  "TAKE 1 TABLET BY MOUTH 3 TIMES PER DAY FOR 7 DAYS, Historical Med      polyethylene glycol (MIRALAX) 17 g packet Take 17 g by mouth daily as needed (constipation) for up to 3 days, Starting Mon 10/2/2023, Until Thu 10/5/2023 at 2359, Normal      rosuvastatin (CRESTOR) 20 MG tablet TAKE 1 TABLET (20 MG TOTAL) BY MOUTH DAILY, Starting Tue 6/20/2023, Normal      Senna-S 8.6-50 MG per tablet TAKE 1 TABLET BY MOUTH DAILY AT BEDTIME, Starting Tue 3/12/2024, Normal      tamsulosin (FLOMAX) 0.4 mg Take 1 capsule (0.4 mg total) by mouth daily, Starting Tue 3/19/2024, Normal      Trulicity 4.5 MG/0.5ML injection INJECT 4.5 MG UNDER THE SKIN ONCE A WEEK., Historical Med                      Joo Chi MD    Portions of the record may have been created with voice recognition software.  Occasional wrong word or \"sound alike\" substitutions may have occurred due to the inherent limitations of voice recognition software.  Please read the chart carefully and recognize, using context, where substitutions have occurred     Joo Chi MD  04/22/24 8082    "

## 2024-04-22 NOTE — ED NOTES
Pt ambulated around the department using his cane. Pt's O2 sat % on room air during ambulation. Pt did state he felt tired and short of breath after walking but this is improved from when he came in. Pt states at home he only has to walk a few steps to go to the bathroom or to another room, and he walked father here than he usually would at home. Pt denies chest pain or dizziness after ambulation.      Heidi Fletcher RN  04/22/24 2793

## 2024-04-26 ENCOUNTER — OFFICE VISIT (OUTPATIENT)
Dept: CARDIOLOGY CLINIC | Facility: CLINIC | Age: 84
End: 2024-04-26
Payer: COMMERCIAL

## 2024-04-26 VITALS
OXYGEN SATURATION: 93 % | BODY MASS INDEX: 18.96 KG/M2 | RESPIRATION RATE: 16 BRPM | SYSTOLIC BLOOD PRESSURE: 100 MMHG | DIASTOLIC BLOOD PRESSURE: 50 MMHG | HEART RATE: 71 BPM | HEIGHT: 66 IN | WEIGHT: 118 LBS

## 2024-04-26 DIAGNOSIS — N18.30 TYPE 2 DIABETES MELLITUS WITH STAGE 3 CHRONIC KIDNEY DISEASE, WITH LONG-TERM CURRENT USE OF INSULIN, UNSPECIFIED WHETHER STAGE 3A OR 3B CKD (HCC): ICD-10-CM

## 2024-04-26 DIAGNOSIS — I50.22 CHRONIC SYSTOLIC CONGESTIVE HEART FAILURE (HCC): ICD-10-CM

## 2024-04-26 DIAGNOSIS — I10 ESSENTIAL HYPERTENSION: ICD-10-CM

## 2024-04-26 DIAGNOSIS — E78.2 MIXED HYPERLIPIDEMIA: ICD-10-CM

## 2024-04-26 DIAGNOSIS — I25.118 CORONARY ARTERY DISEASE OF NATIVE ARTERY OF NATIVE HEART WITH STABLE ANGINA PECTORIS (HCC): Primary | ICD-10-CM

## 2024-04-26 DIAGNOSIS — I47.29 NSVT (NONSUSTAINED VENTRICULAR TACHYCARDIA) (HCC): ICD-10-CM

## 2024-04-26 DIAGNOSIS — R79.89 ELEVATED BRAIN NATRIURETIC PEPTIDE (BNP) LEVEL: ICD-10-CM

## 2024-04-26 DIAGNOSIS — E11.22 TYPE 2 DIABETES MELLITUS WITH STAGE 3 CHRONIC KIDNEY DISEASE, WITH LONG-TERM CURRENT USE OF INSULIN, UNSPECIFIED WHETHER STAGE 3A OR 3B CKD (HCC): ICD-10-CM

## 2024-04-26 DIAGNOSIS — Z95.810 PRESENCE OF IMPLANTABLE CARDIOVERTER-DEFIBRILLATOR (ICD): ICD-10-CM

## 2024-04-26 DIAGNOSIS — Z79.4 TYPE 2 DIABETES MELLITUS WITH STAGE 3 CHRONIC KIDNEY DISEASE, WITH LONG-TERM CURRENT USE OF INSULIN, UNSPECIFIED WHETHER STAGE 3A OR 3B CKD (HCC): ICD-10-CM

## 2024-04-26 DIAGNOSIS — I25.5 ISCHEMIC CARDIOMYOPATHY: ICD-10-CM

## 2024-04-26 DIAGNOSIS — R06.02 SHORTNESS OF BREATH: ICD-10-CM

## 2024-04-26 PROCEDURE — 99213 OFFICE O/P EST LOW 20 MIN: CPT | Performed by: NURSE PRACTITIONER

## 2024-04-26 RX ORDER — FUROSEMIDE 20 MG/1
20 TABLET ORAL AS NEEDED
Qty: 30 TABLET | Refills: 0 | Status: SHIPPED | OUTPATIENT
Start: 2024-04-26

## 2024-04-26 RX ORDER — INSULIN GLARGINE 100 [IU]/ML
INJECTION, SOLUTION SUBCUTANEOUS
COMMUNITY

## 2024-04-26 NOTE — PATIENT INSTRUCTIONS
Limit sodium to 2000 mg daily, weigh yourself daily    Drink 6-8 8 ounce glasses of non-caffeine fluids

## 2024-04-26 NOTE — PROGRESS NOTES
Cardiology Office Note    Felix Mackey 83 y.o. male MRN: [id number]    04/26/24          Assessment/Plan:    1.  Acute on chronic HFrEF  -Patient appears euvolemic, even on the dry side but continues with mild shortness of breath  -Check BMP to monitor kidney function and electrolytes  - Continue furosemide 20 mg daily, but change to PRN only for weight gain, continue metoprolol tartrate  - Continue 2000 mg sodium restricted diet and daily weights    2. Ischemic cardiomyopathy with an EF of 35% s/p [employer] ICD implantation  - Continue follow-up with device clinic  - See plan as above     3.  CAD s/p CAROLYN x 2 x 2 of the ostial LAD and CAROLYN x 1 to the left PDA  - Denies chest pain or other anginal equivalent  - Continue aspirin, rosuvastatin, metoprolol tartrate, Plavix    4. Hypertension  -Blood pressure overall well-controlled with metoprolol tartrate  -Continue ambulatory blood pressure monitoring    5.  Hyperlipidemia  -Continue rosuvastatin    6.  Diabetes type 2    7.  History of nonsustained ventricular tachycardia  -Noted on multiple ICD interrogations  - Continue metoprolol tartrate    Follow up: 2 months or sooner as needed    1. Coronary artery disease of native artery of native heart with stable angina pectoris (HCC)        2. Ischemic cardiomyopathy        3. Type 2 diabetes mellitus with stage 3 chronic kidney disease, with long-term current use of insulin, unspecified whether stage 3a or 3b CKD (HCC)        4. Essential hypertension        5. Presence of implantable cardioverter-defibrillator (ICD)        6. Mixed hyperlipidemia        7. NSVT (nonsustained ventricular tachycardia) ([facility])        8. Shortness of breath  furosemide (LASIX) 20 mg tablet      9. Elevated brain natriuretic peptide (BNP) level  furosemide (LASIX) 20 mg tablet      10. Chronic systolic congestive heart failure (HCC)  Basic metabolic panel          HPI: Felix Mackey is a 83 y.o. year old male with a past medical history  of CAD s/p CAROLYN x 2 to the ostial LAD and CAROLYN x 1 to the left PDA, anemia, diabetes type 2, nonsustained reticular tachycardia, former heavy tobacco abuse, ischemic cardiomyopathy with an EF of 35% s/p [employer] single-chamber ICD implantation, hypertension and hyperlipidemia who presents today for hospital follow-up.  He reported to [facility] emergency room on 4/24/2024 for increasing shortness of breath.  He was noted to have elevated BNP and mild acute congestive heart failure was treated with IV Lasix with reported improvement of his symptoms and was discharged home.      He was last seen in the cardiology office in May 2023 and at that time was doing well from cardiac standpoint.  He does follow-up regularly with the device clinic.  His last TTE was performed in October 2023 that revealed severely severely reduced EF of 35% with severe global hypokinesis and grade 1 diastolic dysfunction.  He also underwent coronary angiography in May 2023 where he received CAROLYN x 1 to his ostial LAD for in-stent restenosis as well as CAROLYN x 1 to his  of his left PDA.    Today he presents to the office with his daughter and grandson.  He reports that he does still continue to have shortness of breath with activity which has been increasing over time.  He appears to be euvolemic and even more on the dry side currently and reports that his weight has continually been going down over the last few months.  He was instructed to stop the furosemide and only take it on a as needed basis if he gains 3 to 5 pounds.  He was instructed to get a BMP drawn to monitor his kidney function as he does appear to be a bit dehydrated and to drink 6-8 8 ounce glasses of noncaffeinated fluids a day and to closely monitor his symptoms.    He was also advised to follow-up with his PCP for shortness of breath as it may be multifactorial given his cardiomyopathy, anemia, age and possibly deconditioning also contributing.  He  denies chest pain, lower extremity edema, lightheadedness, dizziness, syncopal episodes,or palpitations and was instructed to call  the office or seek medical attention if any such symptoms develop.    The patient was instructed to follow a 2000 mg sodium restricted diet and to weigh themselves daily, and report a weight gain of 3 pounds in 1 day or 5 pounds in 1 week.     All medications reviewed and patient is tolerating medications without side effects.       Patient Active Problem List   Diagnosis    Anemia    BPH (benign prostatic hyperplasia)    Ischemic cardiomyopathy    Coronary artery disease of native artery of native heart with stable angina pectoris (HCC)    Urinary retention    Type 2 diabetes mellitus with stage 3 chronic kidney disease, with long-term current use of insulin, unspecified whether stage 3a or 3b CKD ([facility])    COVID-19    Physical deconditioning    Oropharyngeal dysphagia    Chronic right shoulder pain    Constipation    JESSA (acute kidney injury) ([facility])    Abnormal CT scan    Hypokalemia    Generalized weakness       Allergies   Allergen Reactions    Atorvastatin Other (See Comments)     Pt unsure      Percocet [Oxycodone-Acetaminophen] Nausea Only and GI Intolerance         Current Outpatient Medications:     Alcohol Swabs (Pharmacist Choice Alcohol) PADS, USE FOR TESTING AND INJECTIONS UP TO 10 PADS DAILY, Disp: , Rfl:     aspirin (Aspirin Low Dose) 81 mg EC tablet, TAKE ONE (1) TABLET BY MOUTH DAILY, Disp: 90 tablet, Rfl: 3    benzonatate (TESSALON PERLES) 100 mg capsule, Take 1 capsule (100 mg total) by mouth 3 (three) times a day as needed for cough, Disp: 20 capsule, Rfl: 0    clopidogrel (PLAVIX) 75 mg tablet, TAKE 1 TABLET (75 MG TOTAL) BY MOUTH DAILY, Disp: 90 tablet, Rfl: 3    Continuous Blood Gluc Sensor (FreeStyle Maris 2 Sensor) MISC, 1 KIT BY ABDOMINAL SUBCUTANEOUS ROUTE EVERY 14 (FOURTEEN) DAYS., Disp: , Rfl:     ergocalciferol (VITAMIN D2) 50,000 units, Take 50,000 Units by  mouth once a week, Disp: , Rfl:     finasteride (PROSCAR) 5 mg tablet, TAKE ONE (1) TABLET BY MOUTH DAILY, Disp: 180 tablet, Rfl: 3    furosemide (LASIX) 20 mg tablet, Take 1 tablet (20 mg total) by mouth if needed (weight gain of 3-5 lbs), Disp: 30 tablet, Rfl: 0    glipiZIDE (GLUCOTROL) 5 mg tablet, TAKE 1 TABLET (5 MG TOTAL) BY MOUTH 2 (TWO) TIMES A DAY, Disp: 180 tablet, Rfl: 3    Lantus SoloStar 100 units/mL SOPN, INJECT 15 UNITS UNDER THE SKIN DAILY. TDD 15 UNITS 11.65, Disp: , Rfl:     levothyroxine 88 mcg tablet, Take 88 mcg by mouth daily, Disp: , Rfl:     metoprolol tartrate (LOPRESSOR) 25 mg tablet, TAKE 1 TABLET (25 MG TOTAL) BY MOUTH EVERY 12 (TWELVE) HOURS, Disp: 90 tablet, Rfl: 3    MILK THISTLE PO, Take 2 tablets by mouth in the morning Take in the morning with food, Disp: , Rfl:     ondansetron (ZOFRAN-ODT) 4 mg disintegrating tablet, TAKE 1 TABLET BY MOUTH 3 TIMES PER DAY FOR 7 DAYS, Disp: , Rfl:     rosuvastatin (CRESTOR) 20 MG tablet, TAKE 1 TABLET (20 MG TOTAL) BY MOUTH DAILY, Disp: 90 tablet, Rfl: 3    Senna-S 8.6-50 MG per tablet, TAKE 1 TABLET BY MOUTH DAILY AT BEDTIME, Disp: 30 tablet, Rfl: 1    tamsulosin (FLOMAX) 0.4 mg, Take 1 capsule (0.4 mg total) by mouth daily, Disp: 90 capsule, Rfl: 3    Trulicity 4.5 MG/0.5ML injection, INJECT 4.5 MG UNDER THE SKIN ONCE A WEEK., Disp: , Rfl:     polyethylene glycol (MIRALAX) 17 g packet, Take 17 g by mouth daily as needed (constipation) for up to 3 days, Disp: 3 each, Rfl: 0    Past Medical History:   Diagnosis Date    Acquired cyst of kidney     Anemia     Benign paroxysmal vertigo, unspecified ear     Cellulitis of leg     Cervical spinal stenosis     Chest pain     Coronary atherosclerosis of native coronary artery     Diabetes mellitus (HCC)     Disease of thyroid gland     Enlarged prostate     Esophageal candidiasis (HCC)     H/o COVID-19 06/30/2022    Hematuria     Herpes zoster     Hyperlipidemia     Hypertension     Incomplete emptying of  bladder     Inflamed seborrheic keratosis     Internal hemorrhoids     Malignant neoplasm of skin of trunk     Myocardial infarction (HCC) 2005    Nonmelanoma skin cancer     LAST ASSESSED: 8/9/17    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 infarction     Paroxysmal tachycardia (HCC)     Shortness of breath     Vitamin B deficiency        Family History   Problem Relation Age of Onset    Heart disease Mother     Coronary artery disease Mother     Sudden death Mother     Cancer Father         throat; MALIGNANT NEOPLASM OF HEAD, FACE OR NECK    Throat cancer Father     Cancer Family     Coronary artery disease Family        Past Surgical History:   Procedure Laterality Date    CARDIAC CATHETERIZATION Left 5/15/2023    Procedure: Cardiac Left Heart Cath;  Surgeon: Joo Howard MD;  Location: [facility];  Service: Cardiology    CARDIAC CATHETERIZATION N/A 5/15/2023    Procedure: Cardiac pci;  Surgeon: Joo Howard MD;  Location: [facility];  Service: Cardiology    CARDIAC CATHETERIZATION N/A 5/15/2023    Procedure: Cardiac Coronary Angiogram;  Surgeon: Joo Howard MD;  Location: [facility];  Service: Cardiology    CARDIAC DEFIBRILLATOR PLACEMENT      COLONOSCOPY  10/07/2008    FIBEROPTIC SCREENING; NO FURTHER RECOMMENDATIONS    CORONARY ANGIOPLASTY WITH STENT PLACEMENT      CYSTOSCOPY  11/06/2015    DIAGNOSTIC; MANAGED BY: DIAMOND JOINER    EGD AND COLONOSCOPY N/A 02/12/2018    Procedure: EGD AND COLONOSCOPY;  Surgeon: Fabiola العلي MD;  Location: [facility];  Service: Gastroenterology    FL INJECTION RIGHT SHOULDER (ARTHROGRAM)  01/04/2022    HIP ARTHROPLASTY Right     INSERT / REPLACE / REMOVE PACEMAKER      JOINT REPLACEMENT Right     THR    TRUNK SKIN LESION EXCISIONAL BIOPSY  08/22/2007    MALIGNANT; BCC CHEST       Social History     Socioeconomic History    Marital status:      Spouse name: Not on file    Number of children: Not on file    Years of education: Not on file     Highest education level: Not on file   Occupational History    Occupation: RETIRED   Tobacco Use    Smoking status: Former     Current packs/day: 0.00     Average packs/day: 1 pack/day for 10.0 years (10.0 ttl pk-yrs)     Types: Cigarettes     Start date: 1968     Quit date: 1978     Years since quittin.2    Smokeless tobacco: Never   Vaping Use    Vaping status: Never Used   Substance and Sexual Activity    Alcohol use: Yes     Comment: occasionally 1-2 per month     Drug use: No    Sexual activity: Not Currently     Partners: Female   Other Topics Concern    Not on file   Social History Narrative    LIVING INDEPENDENTLY WITH SPOUSE    NO ADVANCE DIRECTIVE ON FILE     Social Determinants of Health     Financial Resource Strain: Low Risk  (2023)    Overall Financial Resource Strain (CARDIA)     Difficulty of Paying Living Expenses: Not hard at all   Food Insecurity: No Food Insecurity (2023)    Hunger Vital Sign     Worried About Running Out of Food in the Last Year: Never true     Ran Out of Food in the Last Year: Never true   Transportation Needs: No Transportation Needs (2023)    PRAPARE - Transportation     Lack of Transportation (Medical): No     Lack of Transportation (Non-Medical): No   Physical Activity: Inactive (10/18/2021)    Exercise Vital Sign     Days of Exercise per Week: 0 days     Minutes of Exercise per Session: 0 min   Stress: Stress Concern Present (10/18/2021)    [employer] - Occupational Stress Questionnaire     Feeling of Stress : Rather much   Social Connections: Not on file   Intimate Partner Violence: Not on file   Housing Stability: Low Risk  (2023)    Housing Stability Vital Sign     Unable to Pay for Housing in the Last Year: No     Number of Places Lived in the Last Year: 1     Unstable Housing in the Last Year: No       Review of symptoms:   Constitution:  Negative  HEENT:  Negative  Cardiovascular:   "Negative  Respiratory: + Shortness of breath  Skin:  Negative  Gastrointestinal:  Negative  Genitourinary:  Negative  Musculoskeletal:  Negative  Neurological:  Negative  Endocrine:  Negative  Psychological:  Negative    Vitals: /50   Pulse 71   Resp 16   Ht 5' 6\" (1.676 m)   Wt 53.5 kg (118 lb)   SpO2 93%   BMI 19.05 kg/m²         Physical Exam:     GEN: Alert and oriented x 3, in no acute distress.  Well appearing and well nourished.   HEENT: Sclera anicteric, conjunctivae pink, mucous membranes moist.   NECK: Supple, no carotid bruits, no significant JVD. Trachea midline.  HEART: Regular rhythm, normal S1 and S2, no murmurs, clicks, gallops or rubs.   LUNGS: Clear to auscultation bilaterally; no wheezes, rales, or rhonchi. No increased work of breathing or signs of respiratory distress.   ABDOMEN: Soft, nontender, nondistended, normoactive bowel sounds.   EXTREMITIES: Skin warm and well perfused, no clubbing, cyanosis, or edema.  NEURO: No focal findings. Normal speech. Mood and affect normal.   SKIN: Normal without suspicious lesions on exposed skin, + skin tenting    "

## 2024-05-09 LAB — HBA1C MFR BLD HPLC: 8.4 %

## 2024-05-21 ENCOUNTER — OFFICE VISIT (OUTPATIENT)
Age: 84
End: 2024-05-21
Payer: COMMERCIAL

## 2024-05-21 VITALS
OXYGEN SATURATION: 99 % | HEART RATE: 52 BPM | DIASTOLIC BLOOD PRESSURE: 50 MMHG | SYSTOLIC BLOOD PRESSURE: 99 MMHG | WEIGHT: 120 LBS | RESPIRATION RATE: 18 BRPM | BODY MASS INDEX: 19.29 KG/M2 | HEIGHT: 66 IN | TEMPERATURE: 98.1 F

## 2024-05-21 DIAGNOSIS — N40.1 BENIGN PROSTATIC HYPERPLASIA WITH URINARY FREQUENCY: ICD-10-CM

## 2024-05-21 DIAGNOSIS — N18.30 TYPE 2 DIABETES MELLITUS WITH STAGE 3 CHRONIC KIDNEY DISEASE, WITH LONG-TERM CURRENT USE OF INSULIN, UNSPECIFIED WHETHER STAGE 3A OR 3B CKD (HCC): Primary | ICD-10-CM

## 2024-05-21 DIAGNOSIS — E11.22 TYPE 2 DIABETES MELLITUS WITH STAGE 3 CHRONIC KIDNEY DISEASE, WITH LONG-TERM CURRENT USE OF INSULIN, UNSPECIFIED WHETHER STAGE 3A OR 3B CKD (HCC): Primary | ICD-10-CM

## 2024-05-21 DIAGNOSIS — I25.5 ISCHEMIC CARDIOMYOPATHY: ICD-10-CM

## 2024-05-21 DIAGNOSIS — Z79.4 TYPE 2 DIABETES MELLITUS WITH STAGE 3 CHRONIC KIDNEY DISEASE, WITH LONG-TERM CURRENT USE OF INSULIN, UNSPECIFIED WHETHER STAGE 3A OR 3B CKD (HCC): Primary | ICD-10-CM

## 2024-05-21 DIAGNOSIS — R35.0 BENIGN PROSTATIC HYPERPLASIA WITH URINARY FREQUENCY: ICD-10-CM

## 2024-05-21 DIAGNOSIS — I25.118 CORONARY ARTERY DISEASE OF NATIVE ARTERY OF NATIVE HEART WITH STABLE ANGINA PECTORIS (HCC): ICD-10-CM

## 2024-05-21 PROBLEM — R33.9 URINARY RETENTION: Status: RESOLVED | Noted: 2019-12-13 | Resolved: 2024-05-21

## 2024-05-21 PROBLEM — R53.81 PHYSICAL DECONDITIONING: Status: RESOLVED | Noted: 2022-07-03 | Resolved: 2024-05-21

## 2024-05-21 PROBLEM — N17.9 AKI (ACUTE KIDNEY INJURY) (HCC): Status: RESOLVED | Noted: 2023-09-29 | Resolved: 2024-05-21

## 2024-05-21 PROBLEM — U07.1 COVID-19: Status: RESOLVED | Noted: 2022-06-30 | Resolved: 2024-05-21

## 2024-05-21 PROBLEM — K59.00 CONSTIPATION: Status: RESOLVED | Noted: 2023-09-29 | Resolved: 2024-05-21

## 2024-05-21 PROCEDURE — G2211 COMPLEX E/M VISIT ADD ON: HCPCS | Performed by: INTERNAL MEDICINE

## 2024-05-21 PROCEDURE — 99214 OFFICE O/P EST MOD 30 MIN: CPT | Performed by: INTERNAL MEDICINE

## 2024-05-21 NOTE — PROGRESS NOTES
INTERNAL MEDICINE OFFICE VISIT  [facility] [place] [address]  Tel: [phone]      NAME: Felix Mackey  AGE: 83 y.o.  SEX: male  : 1940   MRN: [id number]    DATE: 2024  TIME: 12:28 PM      Assessment and Plan:  1. Type 2 diabetes mellitus with stage 3 chronic kidney disease, with long-term current use of insulin, unspecified whether stage 3a or 3b CKD (HCC)  Diabetic control is much better this time, continue the same medications and follow-up with endocrinology      2. Ischemic cardiomyopathy  Follow-up with cardiology  Was advised to decrease the dose of the metoprolol to 25 mg once a day as his blood pressure was very low today.  Was told to continue to monitor the blood pressure    3. Coronary artery disease of native artery of native heart with stable angina pectoris (HCC)  Continue medications and follow-up with cardiology    4. Benign prostatic hyperplasia with urinary frequency  Continue Flomax and finasteride      - Counseling Documentation: patient was counseled regarding: diagnostic results, instructions for management, risk factor reductions, prognosis, patient and family education, risks and benefits of treatment options, and importance of compliance with treatment  - Medication Side Effects: Adverse side effects of medications were reviewed with the patient/guardian today.      Return for follow up visit in 4 months or earlier, if needed.      Chief Complaint:  Chief Complaint   Patient presents with    Follow-up         History of Present Illness:   Patient is doing fairly well with the diabetic control.  Has been noncompliant with the diet off-and-on.  Follows up with cardiology for the cardiomyopathy and coronary artery disease and has been symptom-free  His blood pressure is on the lower side today.  Symptoms of BPH are stable and there is no urinary retention      Active Problem List:  Patient Active Problem List    Diagnosis    Anemia    BPH (benign prostatic hyperplasia)    Ischemic cardiomyopathy    Coronary artery disease of native artery of native heart with stable angina pectoris (HCC)    Type 2 diabetes mellitus with stage 3 chronic kidney disease, with long-term current use of insulin, unspecified whether stage 3a or 3b CKD (HCC)    Oropharyngeal dysphagia    Chronic right shoulder pain    Abnormal CT scan    Hypokalemia    Generalized weakness         Past Medical History:  Past Medical History:   Diagnosis Date    Acquired cyst of kidney     Anemia     Benign paroxysmal vertigo, unspecified ear     Cellulitis of leg     Cervical spinal stenosis     Chest pain     Coronary atherosclerosis of native coronary artery     Diabetes mellitus (HCC)     Disease of thyroid gland     Enlarged prostate     Esophageal candidiasis (HCC)     H/o COVID-19 06/30/2022    Hematuria     Herpes zoster     Hyperlipidemia     Hypertension     Incomplete emptying of bladder     Inflamed seborrheic keratosis     Internal hemorrhoids     Malignant neoplasm of skin of trunk     Myocardial infarction (HCC) 2005    Nonmelanoma skin cancer     LAST ASSESSED: 8/9/17    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 infarction     Paroxysmal tachycardia (HCC)     Shortness of breath     Vitamin B deficiency          Past Surgical History:  Past Surgical History:   Procedure Laterality Date    CARDIAC CATHETERIZATION Left 5/15/2023    Procedure: Cardiac Left Heart Cath;  Surgeon: Joo Howard MD;  Location: [facility];  Service: Cardiology    CARDIAC CATHETERIZATION N/A 5/15/2023    Procedure: Cardiac pci;  Surgeon: Joo Howard MD;  Location: [facility];  Service: Cardiology    CARDIAC CATHETERIZATION N/A 5/15/2023    Procedure: Cardiac Coronary Angiogram;  Surgeon: Joo Howard MD;  Location: [facility];  Service: Cardiology    CARDIAC DEFIBRILLATOR PLACEMENT      COLONOSCOPY  10/07/2008    FIBEROPTIC SCREENING; NO FURTHER  RECOMMENDATIONS    CORONARY ANGIOPLASTY WITH STENT PLACEMENT      CYSTOSCOPY  2015    DIAGNOSTIC; MANAGED BY: DIAMOND JOINER    EGD AND COLONOSCOPY N/A 2018    Procedure: EGD AND COLONOSCOPY;  Surgeon: Fabiola العلي MD;  Location: [facility];  Service: Gastroenterology    FL INJECTION RIGHT SHOULDER (ARTHROGRAM)  2022    HIP ARTHROPLASTY Right     INSERT / REPLACE / REMOVE PACEMAKER      JOINT REPLACEMENT Right     THR    TRUNK SKIN LESION EXCISIONAL BIOPSY  2007    MALIGNANT; BCC CHEST         Family History:  Family History   Problem Relation Age of Onset    Heart disease Mother     Coronary artery disease Mother     Sudden death Mother     Cancer Father         throat; MALIGNANT NEOPLASM OF HEAD, FACE OR NECK    Throat cancer Father     Cancer Family     Coronary artery disease Family          Social History:  Social History     Socioeconomic History    Marital status:      Spouse name: None    Number of children: None    Years of education: None    Highest education level: None   Occupational History    Occupation: RETIRED   Tobacco Use    Smoking status: Former     Current packs/day: 0.00     Average packs/day: 1 pack/day for 10.0 years (10.0 ttl pk-yrs)     Types: Cigarettes     Start date: 1968     Quit date: 1978     Years since quittin.2    Smokeless tobacco: Never   Vaping Use    Vaping status: Never Used   Substance and Sexual Activity    Alcohol use: Yes     Comment: occasionally 1-2 per month     Drug use: No    Sexual activity: Not Currently     Partners: Female   Other Topics Concern    None   Social History Narrative    LIVING INDEPENDENTLY WITH SPOUSE    NO ADVANCE DIRECTIVE ON FILE     Social Determinants of Health     Financial Resource Strain: Low Risk  (2023)    Overall Financial Resource Strain (CARDIA)     Difficulty of Paying Living Expenses: Not hard at all   Food Insecurity: No Food Insecurity (2023)    Hunger Vital Sign      Worried About Running Out of Food in the Last Year: Never true     Ran Out of Food in the Last Year: Never true   Transportation Needs: No Transportation Needs (12/14/2023)    PRAPARE - Transportation     Lack of Transportation (Medical): No     Lack of Transportation (Non-Medical): No   Physical Activity: Inactive (10/18/2021)    Exercise Vital Sign     Days of Exercise per Week: 0 days     Minutes of Exercise per Session: 0 min   Stress: Stress Concern Present (10/18/2021)    [place] citizen [employer] - Occupational Stress Questionnaire     Feeling of Stress : Rather much   Social Connections: Not on file   Intimate Partner Violence: Not on file   Housing Stability: Low Risk  (12/14/2023)    Housing Stability Vital Sign     Unable to Pay for Housing in the Last Year: No     Number of Places Lived in the Last Year: 1     Unstable Housing in the Last Year: No         Allergies:  Allergies   Allergen Reactions    Atorvastatin Other (See Comments)     Pt unsure      Percocet [Oxycodone-Acetaminophen] Nausea Only and GI Intolerance         Medications:    Current Outpatient Medications:     Alcohol Swabs (Pharmacist Choice Alcohol) PADS, USE FOR TESTING AND INJECTIONS UP TO 10 PADS DAILY, Disp: , Rfl:     aspirin (Aspirin Low Dose) 81 mg EC tablet, TAKE ONE (1) TABLET BY MOUTH DAILY, Disp: 90 tablet, Rfl: 3    benzonatate (TESSALON PERLES) 100 mg capsule, Take 1 capsule (100 mg total) by mouth 3 (three) times a day as needed for cough, Disp: 20 capsule, Rfl: 0    clopidogrel (PLAVIX) 75 mg tablet, TAKE 1 TABLET (75 MG TOTAL) BY MOUTH DAILY, Disp: 90 tablet, Rfl: 3    Continuous Blood Gluc Sensor (FreeStyle Maris 2 Sensor) MISC, 1 KIT BY ABDOMINAL SUBCUTANEOUS ROUTE EVERY 14 (FOURTEEN) DAYS., Disp: , Rfl:     ergocalciferol (VITAMIN D2) 50,000 units, Take 50,000 Units by mouth once a week, Disp: , Rfl:     finasteride (PROSCAR) 5 mg tablet, TAKE ONE (1) TABLET BY MOUTH DAILY, Disp: 180 tablet, Rfl: 3     furosemide (LASIX) 20 mg tablet, Take 1 tablet (20 mg total) by mouth if needed (weight gain of 3-5 lbs), Disp: 30 tablet, Rfl: 0    glipiZIDE (GLUCOTROL) 5 mg tablet, TAKE 1 TABLET (5 MG TOTAL) BY MOUTH 2 (TWO) TIMES A DAY, Disp: 180 tablet, Rfl: 3    Lantus SoloStar 100 units/mL SOPN, 10 mL, Disp: , Rfl:     levothyroxine 88 mcg tablet, Take 88 mcg by mouth daily, Disp: , Rfl:     metoprolol tartrate (LOPRESSOR) 25 mg tablet, TAKE 1 TABLET (25 MG TOTAL) BY MOUTH EVERY 12 (TWELVE) HOURS, Disp: 90 tablet, Rfl: 3    MILK THISTLE PO, Take 2 tablets by mouth in the morning Take in the morning with food, Disp: , Rfl:     ondansetron (ZOFRAN-ODT) 4 mg disintegrating tablet, TAKE 1 TABLET BY MOUTH 3 TIMES PER DAY FOR 7 DAYS, Disp: , Rfl:     rosuvastatin (CRESTOR) 20 MG tablet, TAKE 1 TABLET (20 MG TOTAL) BY MOUTH DAILY, Disp: 90 tablet, Rfl: 3    Senna-S 8.6-50 MG per tablet, TAKE 1 TABLET BY MOUTH DAILY AT BEDTIME, Disp: 30 tablet, Rfl: 1    tamsulosin (FLOMAX) 0.4 mg, Take 1 capsule (0.4 mg total) by mouth daily, Disp: 90 capsule, Rfl: 3    Trulicity 4.5 MG/0.5ML injection, INJECT 4.5 MG UNDER THE SKIN ONCE A WEEK., Disp: , Rfl:     polyethylene glycol (MIRALAX) 17 g packet, Take 17 g by mouth daily as needed (constipation) for up to 3 days, Disp: 3 each, Rfl: 0      The following portions of the patient's history were reviewed and updated as appropriate: past medical history, past surgical history, family history, social history, allergies, current medications and active problem list.      Review of Systems:  Constitutional: Denies fever, chills, weight gain, weight loss, fatigue  Eyes: Denies eye redness, eye discharge, double vision, change in visual acuity  ENT: Denies hearing loss, tinnitus, sneezing, nasal congestion, nasal discharge, sore throat   Respiratory: Denies cough, expectoration, hemoptysis, shortness of breath, wheezing  Cardiovascular: Denies chest pain, palpitations, lower extremity swelling,  orthopnea, PND  Gastrointestinal: Denies abdominal pain, heartburn, nausea, vomiting, hematemesis, diarrhea, bloody stools  Genito-Urinary: Denies dysuria, frequency, difficulty in micturition, nocturia, incontinence  Musculoskeletal: Denies back pain, joint pain, muscle pain  Neurologic: Denies confusion, has lightheadedness, denies syncope, headache, focal weakness, sensory changes, seizures  Endocrine: Denies polyuria, polydipsia, temperature intolerance  Allergy and Immunology: Denies hives, insect bite sensitivity  Hematological and Lymphatic: Denies bleeding problems, swollen glands   Psychological: Denies depression, suicidal ideation, anxiety, panic, mood swings  Dermatological: Denies pruritus, rash, skin lesion changes      Vitals:  Vitals:    05/21/24 1205   BP: 99/50   Pulse: (!) 52   Resp: 18   Temp: 98.1 °F (36.7 °C)   SpO2: 99%       Body mass index is 19.37 kg/m².    Weight (last 2 days)       Date/Time Weight    05/21/24 1205 54.4 (120)              Physical Examination:  General: Patient is not in acute distress. Awake, alert, responding to commands. No weight gain or loss  Head: Normocephalic. Atraumatic  Eyes: Conjunctiva and lids with no swelling, erythema or discharge. Both pupils normal sized, round and reactive to light. Sclera nonicteric  ENT: External examination of nose and ear normal. Otoscopic examination shows translucent tympanic membranes with patent canals without erythema. Oropharynx moist with no erythema, edema, exudate or lesions  Neck: Supple. JVP not raised. Trachea midline. No masses. No thyromegaly  Lungs: No signs of increased work of breathing or respiratory distress. Bilateral bronchovascular breath sounds with no crackles or rhonchi  Chest wall: No tenderness  Cardiovascular: Normal PMI. No thrills. Regular rate and rhythm. S1 and S2 normal. No murmur, rub or gallop  Gastrointestinal: Abdomen soft, nontender. No guarding or rigidity. Liver and spleen not palpable. Bowel  sounds present  Neurologic: Cranial nerves II-XII intact. Cortical functions normal. Motor system - Reflexes 2+ and symmetrical. Sensations normal  Musculoskeletal: Gait normal. No joint tenderness  Integumentary: Skin normal with no rash or lesions  Lymphatic: No palpable lymph nodes in neck, axilla or groin  Extremities: No clubbing, cyanosis, edema or varicosities  Psychological: Judgement and insight normal. Mood and affect normal      Laboratory Results:  CBC with diff:   Lab Results   Component Value Date    WBC 5.08 04/22/2024    WBC 4.6 05/21/2015    RBC 3.33 (L) 04/22/2024    RBC 3.38 (L) 05/21/2015    HGB 13.1 04/22/2024    HGB 12.5 05/21/2015    HCT 36.9 04/22/2024    HCT 36.8 05/21/2015     (H) 04/22/2024     (H) 05/21/2015    MCH 39.3 (H) 04/22/2024    MCH 36.9 (H) 05/21/2015    RDW 15.9 (H) 04/22/2024    RDW 14.7 05/21/2015     04/22/2024     05/21/2015       CMP:  Lab Results   Component Value Date    CREATININE 0.95 04/22/2024    CREATININE 1.13 06/27/2022    BUN 17 04/22/2024    BUN 28 06/27/2022     10/02/2015    K 4.2 04/22/2024    K 3.5 06/27/2022     04/22/2024     (H) 06/27/2022    CO2 30 04/22/2024    CO2 24 06/27/2022    GLUCOSE 180 (H) 10/02/2015    PROT 6.9 05/21/2015    ALKPHOS 41 04/22/2024    ALKPHOS 53 06/27/2022    ALT 22 04/22/2024    ALT 49 06/27/2022    AST 23 04/22/2024    AST 42 (H) 06/27/2022    BILIDIR 0.14 09/29/2023       Lab Results   Component Value Date    HGBA1C 9.4 (H) 09/29/2023    HGBA1C 8.8 (H) 06/27/2022    MG 2.1 12/15/2023    PHOS 3.0 12/13/2023       Lab Results   Component Value Date    CKTOTAL 43 12/13/2023       Lipid Profile:   Lab Results   Component Value Date    CHOL 142 05/21/2015     Lab Results   Component Value Date    HDL 44 07/03/2023    HDL 45 11/02/2022     Lab Results   Component Value Date    LDLCALC 54 07/03/2023    LDLCALC 45 11/02/2022     Lab Results   Component Value Date    TRIG 159 (H) 07/03/2023     TRIG 148 11/02/2022       Imaging Results:  XR chest 1 view portable  Narrative: XR CHEST PORTABLE    INDICATION: sob.    COMPARISON: CXR 12/27/2023, chest CT 12/05/2023.    FINDINGS:    Clear lungs. No pneumothorax or pleural effusion.    Normal cardiomediastinal silhouette. Left subclavian ICD lead in right ventricle.    Bones are unremarkable for age.    Normal upper abdomen.  Impression: No acute cardiopulmonary disease.    Workstation performed: [id number]       Health Maintenance:  Health Maintenance   Topic Date Due    SLP PLAN OF CARE  Never done    RSV Vaccine Age 60+ Years (1 - 1-dose 60+ series) Never done    Zoster Vaccine (2 of 3) 05/18/2015    DM Eye Exam  04/27/2022    Diabetic Foot Exam  02/02/2023    COVID-19 Vaccine (3 - 2023-24 season) 09/01/2023    HEMOGLOBIN A1C  03/29/2024    Medicare Annual Wellness Visit (AWV)  11/14/2024    Fall Risk  05/21/2025    Depression Screening  05/21/2025    Pneumococcal Vaccine: 65+ Years  Completed    Influenza Vaccine  Completed    RSV Vaccine age 0-20 Months  Aged Out    HIB Vaccine  Aged Out    IPV Vaccine  Aged Out    Hepatitis A Vaccine  Aged Out    Meningococcal ACWY Vaccine  Aged Out    HPV Vaccine  Aged Out     Immunization History   Administered Date(s) Administered    COVID-19 PFIZER VACCINE 0.3 ML IM 04/14/2021, 05/06/2021    Fluzone Split Quad 0.5 mL 10/14/2010    INFLUENZA 10/14/2010, 10/07/2014, 09/01/2015, 09/03/2015, 09/12/2016, 10/13/2017, 10/09/2018, 11/02/2022    Influenza Split High Dose Preservative Free IM 10/13/2017    Influenza, high dose seasonal 0.7 mL 10/02/2020, 11/02/2022, 11/14/2023    Influenza, seasonal, injectable 09/01/2015, 09/12/2016    Pneumococcal Conjugate 13-Valent 07/06/2015, 09/07/2016    Pneumococcal Polysaccharide PPV23 09/01/2015    Tdap 02/23/2016    Zoster 01/01/2015, 03/23/2015    influenza, trivalent, adjuvanted 09/20/2019         Wendy Alex MD  5/21/2024,12:28 PM

## 2024-05-22 ENCOUNTER — TELEPHONE (OUTPATIENT)
Dept: ADMINISTRATIVE | Facility: OTHER | Age: 84
End: 2024-05-22

## 2024-05-22 DIAGNOSIS — E78.2 MIXED HYPERLIPIDEMIA: ICD-10-CM

## 2024-05-22 RX ORDER — ROSUVASTATIN CALCIUM 20 MG/1
20 TABLET, COATED ORAL DAILY
Qty: 90 TABLET | Refills: 0 | Status: SHIPPED | OUTPATIENT
Start: 2024-05-22

## 2024-05-22 NOTE — TELEPHONE ENCOUNTER
Upon review of the In Basket request we were able to locate, review, and update the patient chart as requested for Diabetic Foot Exam and Hemoglobin A1c.    Any additional questions or concerns should be emailed to the Practice Liaisons via the appropriate education email address, please do not reply via In Basket.    Thank you  Milly Meier

## 2024-05-30 ENCOUNTER — REMOTE DEVICE CLINIC VISIT (OUTPATIENT)
Dept: CARDIOLOGY CLINIC | Facility: CLINIC | Age: 84
End: 2024-05-30
Payer: COMMERCIAL

## 2024-05-30 DIAGNOSIS — Z95.810 PRESENCE OF AUTOMATIC CARDIOVERTER/DEFIBRILLATOR (AICD): Primary | ICD-10-CM

## 2024-05-30 PROCEDURE — 93296 REM INTERROG EVL PM/IDS: CPT | Performed by: INTERNAL MEDICINE

## 2024-05-30 PROCEDURE — 93295 DEV INTERROG REMOTE 1/2/MLT: CPT | Performed by: INTERNAL MEDICINE

## 2024-05-30 NOTE — PROGRESS NOTES
Results for orders placed or performed in visit on 05/30/24   Cardiac EP device report    Narrative    BSC SINGLE CHAMBER ICD - NOT MRI CONDITIONAL  LATITUDE TRANSMISSION: BATTERY VOLTAGE ADEQUATE (2.5 YRS). : 0%. ALL AVAILABLE LEAD PARAMETERS WITHIN NORMAL LIMITS. 2 NON-SUSTV EPISODES W/ EGMS SHOWING NSVT 6 BEATS @ 180 BPM, 6 BEATS  @166 BPM. PT TAKES METOPROLOL TART, PLAVIX,ASA 81MG. EF: 35% (ECHO 10/1/23). NORMAL DEVICE FUNCTION. CH

## 2024-06-05 ENCOUNTER — OFFICE VISIT (OUTPATIENT)
Dept: CARDIOLOGY CLINIC | Facility: CLINIC | Age: 84
End: 2024-06-05
Payer: COMMERCIAL

## 2024-06-05 VITALS
BODY MASS INDEX: 19.44 KG/M2 | RESPIRATION RATE: 16 BRPM | DIASTOLIC BLOOD PRESSURE: 54 MMHG | SYSTOLIC BLOOD PRESSURE: 118 MMHG | HEIGHT: 66 IN | OXYGEN SATURATION: 98 % | HEART RATE: 76 BPM | WEIGHT: 121 LBS

## 2024-06-05 DIAGNOSIS — I47.29 NSVT (NONSUSTAINED VENTRICULAR TACHYCARDIA) (HCC): ICD-10-CM

## 2024-06-05 DIAGNOSIS — I25.5 ISCHEMIC CARDIOMYOPATHY: ICD-10-CM

## 2024-06-05 DIAGNOSIS — I10 ESSENTIAL HYPERTENSION: ICD-10-CM

## 2024-06-05 DIAGNOSIS — D64.9 ANEMIA, UNSPECIFIED TYPE: ICD-10-CM

## 2024-06-05 DIAGNOSIS — N18.30 TYPE 2 DIABETES MELLITUS WITH STAGE 3 CHRONIC KIDNEY DISEASE, WITH LONG-TERM CURRENT USE OF INSULIN, UNSPECIFIED WHETHER STAGE 3A OR 3B CKD (HCC): ICD-10-CM

## 2024-06-05 DIAGNOSIS — E78.2 MIXED HYPERLIPIDEMIA: ICD-10-CM

## 2024-06-05 DIAGNOSIS — Z95.810 PRESENCE OF IMPLANTABLE CARDIOVERTER-DEFIBRILLATOR (ICD): ICD-10-CM

## 2024-06-05 DIAGNOSIS — I25.118 CORONARY ARTERY DISEASE OF NATIVE ARTERY OF NATIVE HEART WITH STABLE ANGINA PECTORIS (HCC): Primary | ICD-10-CM

## 2024-06-05 DIAGNOSIS — E11.22 TYPE 2 DIABETES MELLITUS WITH STAGE 3 CHRONIC KIDNEY DISEASE, WITH LONG-TERM CURRENT USE OF INSULIN, UNSPECIFIED WHETHER STAGE 3A OR 3B CKD (HCC): ICD-10-CM

## 2024-06-05 DIAGNOSIS — Z79.4 TYPE 2 DIABETES MELLITUS WITH STAGE 3 CHRONIC KIDNEY DISEASE, WITH LONG-TERM CURRENT USE OF INSULIN, UNSPECIFIED WHETHER STAGE 3A OR 3B CKD (HCC): ICD-10-CM

## 2024-06-05 PROCEDURE — 99214 OFFICE O/P EST MOD 30 MIN: CPT | Performed by: NURSE PRACTITIONER

## 2024-06-05 NOTE — PROGRESS NOTES
Cardiology Office Note    Felix Mackey 83 y.o. male MRN: [id number]    06/05/24          Assessment/Plan:    1.  Acute on chronic HFrEF  -Patient appears euvolemic  - Continue furosemide 20 mg PRN only for weight gain and metoprolol tartrate  - Continue 2000 mg sodium restricted diet and daily weights     2. Ischemic cardiomyopathy with an EF of 35% s/p [employer] ICD implantation  - Continue follow-up with device clinic  - See plan as above      3.  CAD s/p CAROLYN x 2 x 2 of the ostial LAD and CAROLYN x 1 to the left PDA  - Denies chest pain or other anginal equivalent  - Continue aspirin, rosuvastatin, metoprolol tartrate, Plavix     4. Hypertension  -Blood pressure overall well-controlled with metoprolol tartrate  -Continue ambulatory blood pressure monitoring     5.  Hyperlipidemia  -Continue rosuvastatin     6.  Diabetes type 2     7.  History of nonsustained ventricular tachycardia  -Noted on multiple ICD interrogations  - Continue metoprolol tartrate       Follow up: 4 months or sooner as needed    1. Coronary artery disease of native artery of native heart with stable angina pectoris (HCC)        2. Ischemic cardiomyopathy        3. Type 2 diabetes mellitus with stage 3 chronic kidney disease, with long-term current use of insulin, unspecified whether stage 3a or 3b CKD ([facility])        4. Anemia, unspecified type        5. NSVT (nonsustained ventricular tachycardia) ([facility])        6. Presence of implantable cardioverter-defibrillator (ICD)        7. Essential hypertension        8. Mixed hyperlipidemia            HPI: Felix Mackey is a 83 y.o. year old male with a past medical history of CAD s/p CAROLYN x 2 to the ostial LAD and CAROLYN x 1 to the left PDA, anemia, diabetes type 2, nonsustained ventricular tachycardia, former heavy tobacco abuse, ischemic cardiomyopathy with an EF of 35% s/p [employer] single-chamber ICD implantation, hypertension and hyperlipidemia who presents today for a 1 month follow-up.  He  was seen in this office last month after hospital admission for heart failure and was doing well from a cardiac standpoint aside from lower blood pressures.  At that time his Lasix was made as needed.    Today he states he continues to do well.  He has not needed his as needed Lasix since his last office visit and his weight has remained steady.  His blood pressure has been on the softer side but he did follow-up with his PCP who advised him to decrease his Lopressor to once a day and today his blood pressures are better.    He denies chest pain, dyspnea on exertion or rest, lower extremity edema, lightheadedness, dizziness, syncopal episodes,or palpitations and was instructed to call  the office or seek medical attention if any such symptoms develop.    The patient was instructed to follow a 2000 mg sodium restricted diet and to weigh themselves daily, and report a weight gain of 3 pounds in 1 day or 5 pounds in 1 week.     All medications reviewed and patient is tolerating medications without side effects.       Patient Active Problem List   Diagnosis    Anemia    BPH (benign prostatic hyperplasia)    Ischemic cardiomyopathy    Coronary artery disease of native artery of native heart with stable angina pectoris (HCC)    Type 2 diabetes mellitus with stage 3 chronic kidney disease, with long-term current use of insulin, unspecified whether stage 3a or 3b CKD (HCC)    Oropharyngeal dysphagia    Chronic right shoulder pain    Abnormal CT scan    Hypokalemia    Generalized weakness       Allergies   Allergen Reactions    Atorvastatin Other (See Comments)     Pt unsure      Percocet [Oxycodone-Acetaminophen] Nausea Only and GI Intolerance         Current Outpatient Medications:     aspirin (Aspirin Low Dose) 81 mg EC tablet, TAKE ONE (1) TABLET BY MOUTH DAILY, Disp: 90 tablet, Rfl: 3    benzonatate (TESSALON PERLES) 100 mg capsule, Take 1 capsule (100 mg total) by mouth 3 (three) times a day as needed for cough, Disp:  20 capsule, Rfl: 0    clopidogrel (PLAVIX) 75 mg tablet, TAKE 1 TABLET (75 MG TOTAL) BY MOUTH DAILY, Disp: 90 tablet, Rfl: 3    Continuous Blood Gluc Sensor (FreeStyle Maris 2 Sensor) MISC, 1 KIT BY ABDOMINAL SUBCUTANEOUS ROUTE EVERY 14 (FOURTEEN) DAYS., Disp: , Rfl:     ergocalciferol (VITAMIN D2) 50,000 units, Take 50,000 Units by mouth once a week, Disp: , Rfl:     finasteride (PROSCAR) 5 mg tablet, TAKE ONE (1) TABLET BY MOUTH DAILY, Disp: 180 tablet, Rfl: 3    furosemide (LASIX) 20 mg tablet, Take 1 tablet (20 mg total) by mouth if needed (weight gain of 3-5 lbs), Disp: 30 tablet, Rfl: 0    glipiZIDE (GLUCOTROL) 5 mg tablet, TAKE 1 TABLET (5 MG TOTAL) BY MOUTH 2 (TWO) TIMES A DAY, Disp: 180 tablet, Rfl: 3    Lantus SoloStar 100 units/mL SOPN, 10 mL, Disp: , Rfl:     levothyroxine 88 mcg tablet, Take 88 mcg by mouth daily, Disp: , Rfl:     metoprolol tartrate (LOPRESSOR) 25 mg tablet, TAKE 1 TABLET (25 MG TOTAL) BY MOUTH EVERY 12 (TWELVE) HOURS, Disp: 90 tablet, Rfl: 3    MILK THISTLE PO, Take 2 tablets by mouth in the morning Take in the morning with food, Disp: , Rfl:     rosuvastatin (CRESTOR) 20 MG tablet, Take 1 tablet (20 mg total) by mouth daily, Disp: 90 tablet, Rfl: 0    Senna-S 8.6-50 MG per tablet, TAKE 1 TABLET BY MOUTH DAILY AT BEDTIME, Disp: 30 tablet, Rfl: 1    tamsulosin (FLOMAX) 0.4 mg, Take 1 capsule (0.4 mg total) by mouth daily, Disp: 90 capsule, Rfl: 3    Trulicity 4.5 MG/0.5ML injection, INJECT 4.5 MG UNDER THE SKIN ONCE A WEEK., Disp: , Rfl:     Alcohol Swabs (Pharmacist Choice Alcohol) PADS, USE FOR TESTING AND INJECTIONS UP TO 10 PADS DAILY (Patient not taking: Reported on 6/5/2024), Disp: , Rfl:     ondansetron (ZOFRAN-ODT) 4 mg disintegrating tablet, TAKE 1 TABLET BY MOUTH 3 TIMES PER DAY FOR 7 DAYS (Patient not taking: Reported on 6/5/2024), Disp: , Rfl:     polyethylene glycol (MIRALAX) 17 g packet, Take 17 g by mouth daily as needed (constipation) for up to 3 days, Disp: 3 each,  Rfl: 0    Past Medical History:   Diagnosis Date    Acquired cyst of kidney     Anemia     Benign paroxysmal vertigo, unspecified ear     Cellulitis of leg     Cervical spinal stenosis     Chest pain     Coronary atherosclerosis of native coronary artery     Diabetes mellitus (HCC)     Disease of thyroid gland     Enlarged prostate     Esophageal candidiasis (HCC)     H/o COVID-19 06/30/2022    Hematuria     Herpes zoster     Hyperlipidemia     Hypertension     Incomplete emptying of bladder     Inflamed seborrheic keratosis     Internal hemorrhoids     Malignant neoplasm of skin of trunk     Myocardial infarction (HCC) 2005    Nonmelanoma skin cancer     LAST ASSESSED: 8/9/17    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 infarction     Paroxysmal tachycardia (HCC)     Shortness of breath     Vitamin B deficiency        Family History   Problem Relation Age of Onset    Heart disease Mother     Coronary artery disease Mother     Sudden death Mother     Cancer Father         throat; MALIGNANT NEOPLASM OF HEAD, FACE OR NECK    Throat cancer Father     Cancer Family     Coronary artery disease Family        Past Surgical History:   Procedure Laterality Date    CARDIAC CATHETERIZATION Left 5/15/2023    Procedure: Cardiac Left Heart Cath;  Surgeon: Joo Howard MD;  Location: [facility];  Service: Cardiology    CARDIAC CATHETERIZATION N/A 5/15/2023    Procedure: Cardiac pci;  Surgeon: Joo Howard MD;  Location: [facility];  Service: Cardiology    CARDIAC CATHETERIZATION N/A 5/15/2023    Procedure: Cardiac Coronary Angiogram;  Surgeon: Joo Howard MD;  Location: [facility];  Service: Cardiology    CARDIAC DEFIBRILLATOR PLACEMENT      COLONOSCOPY  10/07/2008    FIBEROPTIC SCREENING; NO FURTHER RECOMMENDATIONS    CORONARY ANGIOPLASTY WITH STENT PLACEMENT      CYSTOSCOPY  11/06/2015    DIAGNOSTIC; MANAGED BY: DIAMOND JOINER    EGD AND COLONOSCOPY N/A 02/12/2018    Procedure: EGD AND COLONOSCOPY;   Surgeon: Fabiola العلي MD;  Location: [facility];  Service: Gastroenterology    FL INJECTION RIGHT SHOULDER (ARTHROGRAM)  2022    HIP ARTHROPLASTY Right     INSERT / REPLACE / REMOVE PACEMAKER      JOINT REPLACEMENT Right     THR    TRUNK SKIN LESION EXCISIONAL BIOPSY  2007    MALIGNANT; BCC CHEST       Social History     Socioeconomic History    Marital status:      Spouse name: Not on file    Number of children: Not on file    Years of education: Not on file    Highest education level: Not on file   Occupational History    Occupation: RETIRED   Tobacco Use    Smoking status: Former     Current packs/day: 0.00     Average packs/day: 1 pack/day for 10.0 years (10.0 ttl pk-yrs)     Types: Cigarettes     Start date: 1968     Quit date: 1978     Years since quittin.3    Smokeless tobacco: Never   Vaping Use    Vaping status: Never Used   Substance and Sexual Activity    Alcohol use: Yes     Comment: occasionally 1-2 per month     Drug use: No    Sexual activity: Not Currently     Partners: Female   Other Topics Concern    Not on file   Social History Narrative    LIVING INDEPENDENTLY WITH SPOUSE    NO ADVANCE DIRECTIVE ON FILE     Social Determinants of Health     Financial Resource Strain: Low Risk  (2023)    Overall Financial Resource Strain (CARDIA)     Difficulty of Paying Living Expenses: Not hard at all   Food Insecurity: No Food Insecurity (2023)    Hunger Vital Sign     Worried About Running Out of Food in the Last Year: Never true     Ran Out of Food in the Last Year: Never true   Transportation Needs: No Transportation Needs (2023)    PRAPARE - Transportation     Lack of Transportation (Medical): No     Lack of Transportation (Non-Medical): No   Physical Activity: Inactive (10/18/2021)    Exercise Vital Sign     Days of Exercise per Week: 0 days     Minutes of Exercise per Session: 0 min   Stress: Stress Concern Present (10/18/2021)    [employer] - Occupational Stress Questionnaire     Feeling of Stress : Rather much   Social Connections: Not on file   Intimate Partner Violence: Not on file   Housing Stability: Low Risk  (12/14/2023)    Housing Stability Vital Sign     Unable to Pay for Housing in the Last Year: No     Number of Places Lived in the Last Year: 1     Unstable Housing in the Last Year: No       Review of symptoms:   Constitution:  Negative  HEENT:  Negative  Cardiovascular:  Negative  Respiratory:  Negative  Skin:  Negative  Gastrointestinal:  Negative  Genitourinary:  Negative  Musculoskeletal:  Negative  Neurological:  Negative  Endocrine:  Negative  Psychological:  Negative    Vitals: /54 (BP Location: Left arm, Patient Position: Sitting, Cuff Size: Standard)   Pulse 76   Resp 16   Ht 5' 6\" (1.676 m)   Wt 54.9 kg (121 lb)   SpO2 98%   BMI 19.53 kg/m²         Physical Exam:     GEN: Alert and oriented x 3, in no acute distress.  Well appearing and well nourished.   HEENT: Sclera anicteric, conjunctivae pink, mucous membranes moist.   NECK: Supple, no carotid bruits, no significant JVD. Trachea midline.  HEART: Regular rhythm, normal S1 and S2, no murmurs, clicks, gallops or rubs.   LUNGS: Clear to auscultation bilaterally; no wheezes, rales, or rhonchi. No increased work of breathing or signs of respiratory distress.   ABDOMEN: Soft, nontender, nondistended, normoactive bowel sounds.   EXTREMITIES: Skin warm and well perfused, no clubbing, cyanosis, or edema.  NEURO: No focal findings. Normal speech. Mood and affect normal.   SKIN: Normal without suspicious lesions on exposed skin.    "

## 2024-06-20 DIAGNOSIS — K59.00 CONSTIPATION, UNSPECIFIED CONSTIPATION TYPE: ICD-10-CM

## 2024-06-20 DIAGNOSIS — I25.5 ISCHEMIC CARDIOMYOPATHY: ICD-10-CM

## 2024-06-20 RX ORDER — MINERAL OIL/PETROLATUM,WHITE 41.5-56.8%
1 OINTMENT (GRAM) OPHTHALMIC (EYE)
Qty: 30 TABLET | Refills: 1 | Status: SHIPPED | OUTPATIENT
Start: 2024-06-20

## 2024-08-06 ENCOUNTER — HOSPITAL ENCOUNTER (EMERGENCY)
Facility: HOSPITAL | Age: 84
Discharge: HOME | End: 2024-08-06
Attending: EMERGENCY MEDICINE
Payer: COMMERCIAL

## 2024-08-06 ENCOUNTER — APPOINTMENT (EMERGENCY)
Dept: RADIOLOGY | Facility: HOSPITAL | Age: 84
End: 2024-08-06
Payer: COMMERCIAL

## 2024-08-06 VITALS
SYSTOLIC BLOOD PRESSURE: 124 MMHG | HEART RATE: 61 BPM | HEIGHT: 65 IN | OXYGEN SATURATION: 99 % | DIASTOLIC BLOOD PRESSURE: 60 MMHG | RESPIRATION RATE: 18 BRPM | WEIGHT: 118 LBS | BODY MASS INDEX: 19.66 KG/M2 | TEMPERATURE: 97.5 F

## 2024-08-06 DIAGNOSIS — S69.92XA INJURY OF FINGER OF LEFT HAND, INITIAL ENCOUNTER: ICD-10-CM
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
## 2024-08-06 PROCEDURE — 73140 X-RAY EXAM OF FINGER(S): CPT

## 2024-08-06 PROCEDURE — 99284 EMERGENCY DEPT VISIT MOD MDM: CPT | Performed by: EMERGENCY MEDICINE

## 2024-08-06 PROCEDURE — 73502 X-RAY EXAM HIP UNI 2-3 VIEWS: CPT

## 2024-08-06 PROCEDURE — 73110 X-RAY EXAM OF WRIST: CPT

## 2024-08-06 PROCEDURE — 99284 EMERGENCY DEPT VISIT MOD MDM: CPT

## 2024-08-06 NOTE — ED NOTES
Patient ambulated out of the ED, AAOx4 resp even and unlabored with no S$S of distress.       Melanie Alvares RN  08/06/24 0318

## 2024-08-06 NOTE — ED PROVIDER NOTES
History  Chief Complaint   Patient presents with    Fall     Fell last night after tripping over laundry, -LOC - HS + BT, c/o L hand pain.,      Trip and fall last night, tripped on a laundry basket. Landed on L hand/wrist and hip. Ambulating without difficulty. No head strike or LOC. On ASA but no other thinners. C/o bruising and swelling of L wrist and L ring finger. No other injuries or concerns. Additional history from daughter at bedside.         Prior to Admission Medications   Prescriptions Last Dose Informant Patient Reported? Taking?   Alcohol Swabs (Pharmacist Choice Alcohol) PADS  Self Yes No   Sig: USE FOR TESTING AND INJECTIONS UP TO 10 PADS DAILY   Patient not taking: Reported on 2024   Continuous Blood Gluc Sensor (FreeStyle Maris 2 Sensor) MISC  Self Yes No   Si KIT BY ABDOMINAL SUBCUTANEOUS ROUTE EVERY 14 (FOURTEEN) DAYS.   Lantus SoloStar 100 units/mL SOPN  Self Yes No   Sig: 10 mL   MILK THISTLE PO  Self Yes No   Sig: Take 2 tablets by mouth in the morning Take in the morning with food   Senna-S 8.6-50 MG per tablet   No No   Sig: TAKE 1 TABLET BY MOUTH DAILY AT BEDTIME   Trulicity 4.5 MG/0.5ML injection  Self Yes No   Sig: INJECT 4.5 MG UNDER THE SKIN ONCE A WEEK.   aspirin (Aspirin Low Dose) 81 mg EC tablet  Self No No   Sig: TAKE ONE (1) TABLET BY MOUTH DAILY   benzonatate (TESSALON PERLES) 100 mg capsule  Self No No   Sig: Take 1 capsule (100 mg total) by mouth 3 (three) times a day as needed for cough   clopidogrel (PLAVIX) 75 mg tablet  Self No No   Sig: TAKE 1 TABLET (75 MG TOTAL) BY MOUTH DAILY   ergocalciferol (VITAMIN D2) 50,000 units  Self Yes No   Sig: Take 50,000 Units by mouth once a week   finasteride (PROSCAR) 5 mg tablet  Self No No   Sig: TAKE ONE (1) TABLET BY MOUTH DAILY   furosemide (LASIX) 20 mg tablet  Self No No   Sig: Take 1 tablet (20 mg total) by mouth if needed (weight gain of 3-5 lbs)   glipiZIDE (GLUCOTROL) 5 mg tablet  Self No No   Sig: TAKE 1 TABLET (5 MG  TOTAL) BY MOUTH 2 (TWO) TIMES A DAY   levothyroxine 88 mcg tablet  Self Yes No   Sig: Take 88 mcg by mouth daily   metoprolol tartrate (LOPRESSOR) 25 mg tablet   No No   Sig: TAKE 1 TABLET (25 MG TOTAL) BY MOUTH EVERY 12 (TWELVE) HOURS   ondansetron (ZOFRAN-ODT) 4 mg disintegrating tablet  Self Yes No   Sig: TAKE 1 TABLET BY MOUTH 3 TIMES PER DAY FOR 7 DAYS   Patient not taking: Reported on 6/5/2024   polyethylene glycol (MIRALAX) 17 g packet   No No   Sig: Take 17 g by mouth daily as needed (constipation) for up to 3 days   rosuvastatin (CRESTOR) 20 MG tablet  Self No No   Sig: Take 1 tablet (20 mg total) by mouth daily   tamsulosin (FLOMAX) 0.4 mg  Self No No   Sig: Take 1 capsule (0.4 mg total) by mouth daily      Facility-Administered Medications: None       Past Medical History:   Diagnosis Date    Acquired cyst of kidney     Anemia     Benign paroxysmal vertigo, unspecified ear     Cellulitis of leg     Cervical spinal stenosis     Chest pain     Coronary atherosclerosis of native coronary artery     Diabetes mellitus (HCC)     Disease of thyroid gland     Enlarged prostate     Esophageal candidiasis (HCC)     H/o COVID-19 06/30/2022    Hematuria     Herpes zoster     Hyperlipidemia     Hypertension     Incomplete emptying of bladder     Inflamed seborrheic keratosis     Internal hemorrhoids     Malignant neoplasm of skin of trunk     Myocardial infarction (HCC) 2005    Nonmelanoma skin cancer     LAST ASSESSED: 8/9/17    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 infarction     Paroxysmal tachycardia (HCC)     Shortness of breath     Vitamin B deficiency        Past Surgical History:   Procedure Laterality Date    CARDIAC CATHETERIZATION Left 5/15/2023    Procedure: Cardiac Left Heart Cath;  Surgeon: Joo Howard MD;  Location: [facility];  Service: Cardiology    CARDIAC CATHETERIZATION N/A 5/15/2023    Procedure: Cardiac pci;  Surgeon: Joo Howard MD;  [facility]: [facility];  Service: Cardiology    CARDIAC  CATHETERIZATION N/A 5/15/2023    Procedure: Cardiac Coronary Angiogram;  Surgeon: Joo Howard MD;  [facility]: [facility];  Service: Cardiology    CARDIAC DEFIBRILLATOR PLACEMENT      COLONOSCOPY  10/07/2008    FIBEROPTIC SCREENING; NO FURTHER RECOMMENDATIONS    CORONARY ANGIOPLASTY WITH STENT PLACEMENT      CYSTOSCOPY  2015    DIAGNOSTIC; MANAGED BY: DIAMOND JOINER    EGD AND COLONOSCOPY N/A 2018    Procedure: EGD AND COLONOSCOPY;  Surgeon: Fabiola العلي MD;  Location: [facility];  Service: Gastroenterology    FL INJECTION RIGHT SHOULDER (ARTHROGRAM)  2022    HIP ARTHROPLASTY Right     INSERT / REPLACE / REMOVE PACEMAKER      JOINT REPLACEMENT Right     THR    TRUNK SKIN LESION EXCISIONAL BIOPSY  2007    MALIGNANT; BCC CHEST       Family History   Problem Relation Age of Onset    Heart disease Mother     Coronary artery disease Mother     Sudden death Mother     Cancer Father         throat; MALIGNANT NEOPLASM OF HEAD, FACE OR NECK    Throat cancer Father     Cancer Family     Coronary artery disease Family      I have reviewed and agree with the history as documented.    E-Cigarette/Vaping    E-Cigarette Use Never User      E-Cigarette/Vaping Substances    Nicotine No     THC No     CBD No     Flavoring No     Other No     Unknown No      Social History     Tobacco Use    Smoking status: Former     Current packs/day: 0.00     Average packs/day: 1 pack/day for 10.0 years (10.0 ttl pk-yrs)     Types: Cigarettes     Start date: 1968     Quit date: 1978     Years since quittin.5    Smokeless tobacco: Never   Vaping Use    Vaping status: Never Used   Substance Use Topics    Alcohol use: Yes     Comment: occasionally 1-2 per month     Drug use: No       Review of Systems   Musculoskeletal:  Positive for arthralgias.       Physical Exam  Physical Exam  Vitals and nursing note reviewed.   Constitutional:       General: He is not in acute distress.      Appearance: Normal appearance. He is well-developed. He is not ill-appearing, toxic-appearing or diaphoretic.   HENT:      Head: Normocephalic and atraumatic.      Mouth/Throat:      Mouth: Mucous membranes are moist.      Pharynx: Oropharynx is clear.   Eyes:      General:         Right eye: No discharge.         Left eye: No discharge.      Conjunctiva/sclera: Conjunctivae normal.      Pupils: Pupils are equal, round, and reactive to light.   Neck:      Vascular: No JVD.   Cardiovascular:      Rate and Rhythm: Normal rate.      Pulses: Normal pulses.   Pulmonary:      Effort: Pulmonary effort is normal. No respiratory distress.      Breath sounds: No stridor. No wheezing.   Chest:      Chest wall: No tenderness.   Abdominal:      General: There is no distension.      Tenderness: There is no abdominal tenderness. There is no guarding or rebound.   Musculoskeletal:         General: Swelling, deformity and signs of injury present. Normal range of motion.      Cervical back: Normal range of motion and neck supple. No rigidity or tenderness.      Comments: Bruising and deformity L ring finger.   Bruising over L wrist. Nontender scaphoid.   Normal distal perfusion. Normal active ROM L wrist. Nontender forearm and elbow.    Skin:     General: Skin is warm and dry.      Capillary Refill: Capillary refill takes less than 2 seconds.      Coloration: Skin is not pale.      Findings: No erythema or rash.   Neurological:      General: No focal deficit present.      Mental Status: He is alert and oriented to person, place, and time.      Cranial Nerves: No cranial nerve deficit.      Sensory: No sensory deficit.      Motor: No weakness or abnormal muscle tone.      Coordination: Coordination normal.      Gait: Gait normal.         Vital Signs  ED Triage Vitals [08/06/24 1751]   Temperature Pulse Respirations Blood Pressure SpO2   97.5 °F (36.4 °C) 61 18 124/60 99 %      Temp Source Heart Rate Source Patient Position -  Orthostatic VS BP Location FiO2 (%)   Oral Monitor Sitting Left arm --      Pain Score       --           Vitals:    08/06/24 1751   BP: 124/60   Pulse: 61   Patient Position - Orthostatic VS: Sitting         Visual Acuity      ED Medications  Medications - No data to display    Diagnostic Studies  Results Reviewed       None                   XR finger fourth digit-ring LEFT   ED Interpretation by Konstantin Wallace MD (08/06 1850)   normal      XR wrist 3+ views LEFT   ED Interpretation by Konstantin Wallace MD (08/06 1850)   normal      XR hip/pelv 2-3 vws left if performed   ED Interpretation by Konstantin Wallace MD (08/06 1850)   normal                 Procedures  Procedures         ED Course                                               Medical Decision Making  Problems Addressed:  Fall, initial encounter: acute illness or injury     Details: No head injury, headache or neurologic complaints. No external signs of trauma. Gcs 15.  Injury of finger of left hand, initial encounter: acute illness or injury     Details: Ddx includes fracture and ligamentous injury. Splint applied. F/u hand.     Amount and/or Complexity of Data Reviewed  Radiology: ordered and independent interpretation performed. Decision-making details documented in ED Course.                 Disposition  Final diagnoses:   Fall, initial encounter   Injury of finger of left hand, initial encounter     Time reflects when diagnosis was documented in both MDM as applicable and the Disposition within this note       Time User Action Codes Description Comment    8/6/2024  6:55 PM Konstantin Wallace Add [W19.XXXA] Fall, initial encounter     8/6/2024  6:55 PM Konstantin Wallace Add [S69.92XA] Injury of finger of left hand, initial encounter           ED Disposition       ED Disposition   Discharge    Condition   Stable    Date/Time   Tue Aug 6, 2024  6:55 PM    Comment   Felix Mackey discharge to home/self care.                   Follow-up Information    None          Discharge Medication List as of 8/6/2024  6:55 PM        CONTINUE these medications which have NOT CHANGED    Details   Alcohol Swabs (Pharmacist Choice Alcohol) PADS USE FOR TESTING AND INJECTIONS UP TO 10 PADS DAILY, Historical Med      aspirin (Aspirin Low Dose) 81 mg EC tablet TAKE ONE (1) TABLET BY MOUTH DAILY, Starting Thu 11/2/2023, Normal      benzonatate (TESSALON PERLES) 100 mg capsule Take 1 capsule (100 mg total) by mouth 3 (three) times a day as needed for cough, Starting Fri 12/15/2023, Normal      clopidogrel (PLAVIX) 75 mg tablet TAKE 1 TABLET (75 MG TOTAL) BY MOUTH DAILY, Starting Wed 11/22/2023, Normal      Continuous Blood Gluc Sensor (FreeStyle Maris 2 Sensor) MISC 1 KIT BY ABDOMINAL SUBCUTANEOUS ROUTE EVERY 14 (FOURTEEN) DAYS., Historical Med      ergocalciferol (VITAMIN D2) 50,000 units Take 50,000 Units by mouth once a week, Starting Wed 9/21/2022, Historical Med      finasteride (PROSCAR) 5 mg tablet TAKE ONE (1) TABLET BY MOUTH DAILY, Starting Mon 10/30/2023, Normal      furosemide (LASIX) 20 mg tablet Take 1 tablet (20 mg total) by mouth if needed (weight gain of 3-5 lbs), Starting Fri 4/26/2024, Normal      glipiZIDE (GLUCOTROL) 5 mg tablet TAKE 1 TABLET (5 MG TOTAL) BY MOUTH 2 (TWO) TIMES A DAY, Starting Mon 4/3/2023, Normal      Lantus SoloStar 100 units/mL SOPN 10 mL, Historical Med      levothyroxine 88 mcg tablet Take 88 mcg by mouth daily, Starting Mon 8/28/2023, Historical Med      metoprolol tartrate (LOPRESSOR) 25 mg tablet TAKE 1 TABLET (25 MG TOTAL) BY MOUTH EVERY 12 (TWELVE) HOURS, Starting Thu 6/20/2024, Normal      MILK THISTLE PO Take 2 tablets by mouth in the morning Take in the morning with food, Historical Med      ondansetron (ZOFRAN-ODT) 4 mg disintegrating tablet TAKE 1 TABLET BY MOUTH 3 TIMES PER DAY FOR 7 DAYS, Historical Med      polyethylene glycol (MIRALAX) 17 g packet Take 17 g by mouth daily as needed (constipation) for up to 3 days, Starting Mon  10/2/2023, Until Thu 10/5/2023 at 2359, Normal      rosuvastatin (CRESTOR) 20 MG tablet Take 1 tablet (20 mg total) by mouth daily, Starting Wed 5/22/2024, Normal      Senna-S 8.6-50 MG per tablet TAKE 1 TABLET BY MOUTH DAILY AT BEDTIME, Starting Thu 6/20/2024, Normal      tamsulosin (FLOMAX) 0.4 mg Take 1 capsule (0.4 mg total) by mouth daily, Starting Tue 3/19/2024, Normal      Trulicity 4.5 MG/0.5ML injection INJECT 4.5 MG UNDER THE SKIN ONCE A WEEK., Historical Med                 PDMP Review       None            ED Provider  Electronically Signed by             Konstantin Wallace MD  08/06/24 1958

## 2024-08-07 DIAGNOSIS — K59.00 CONSTIPATION, UNSPECIFIED CONSTIPATION TYPE: ICD-10-CM

## 2024-08-07 RX ORDER — MINERAL OIL/PETROLATUM,WHITE 41.5-56.8%
1 OINTMENT (GRAM) OPHTHALMIC (EYE)
Qty: 30 TABLET | Refills: 1 | Status: SHIPPED | OUTPATIENT
Start: 2024-08-07

## 2024-08-29 ENCOUNTER — APPOINTMENT (OUTPATIENT)
Dept: LAB | Facility: HOSPITAL | Age: 84
End: 2024-08-29
Attending: INTERNAL MEDICINE
Payer: COMMERCIAL

## 2024-08-29 DIAGNOSIS — E11.3293 TYPE 2 DIABETES MELLITUS WITH BOTH EYES AFFECTED BY MILD NONPROLIFERATIVE RETINOPATHY WITHOUT MACULAR EDEMA, WITH LONG-TERM CURRENT USE OF INSULIN (HCC): ICD-10-CM

## 2024-08-29 DIAGNOSIS — Z79.4 TYPE 2 DIABETES MELLITUS WITH BOTH EYES AFFECTED BY MILD NONPROLIFERATIVE RETINOPATHY WITHOUT MACULAR EDEMA, WITH LONG-TERM CURRENT USE OF INSULIN (HCC): ICD-10-CM

## 2024-08-29 DIAGNOSIS — I50.22 CHRONIC SYSTOLIC CONGESTIVE HEART FAILURE (HCC): ICD-10-CM

## 2024-08-29 LAB
ALBUMIN SERPL BCG-MCNC: 4 G/DL (ref 3.5–5)
ALP SERPL-CCNC: 48 U/L (ref 34–104)
ALT SERPL W P-5'-P-CCNC: 15 U/L (ref 7–52)
ANION GAP SERPL CALCULATED.3IONS-SCNC: 6 MMOL/L (ref 4–13)
AST SERPL W P-5'-P-CCNC: 19 U/L (ref 13–39)
BASOPHILS # BLD AUTO: 0.03 THOUSANDS/ÂΜL (ref 0–0.1)
BASOPHILS NFR BLD AUTO: 1 % (ref 0–1)
BILIRUB SERPL-MCNC: 0.74 MG/DL (ref 0.2–1)
BUN SERPL-MCNC: 20 MG/DL (ref 5–25)
CALCIUM SERPL-MCNC: 9.9 MG/DL (ref 8.4–10.2)
CHLORIDE SERPL-SCNC: 105 MMOL/L (ref 96–108)
CHOLEST SERPL-MCNC: 131 MG/DL
CO2 SERPL-SCNC: 29 MMOL/L (ref 21–32)
CREAT SERPL-MCNC: 0.88 MG/DL (ref 0.6–1.3)
CREAT UR-MCNC: 120 MG/DL
EOSINOPHIL # BLD AUTO: 0.15 THOUSAND/ÂΜL (ref 0–0.61)
EOSINOPHIL NFR BLD AUTO: 3 % (ref 0–6)
ERYTHROCYTE [DISTWIDTH] IN BLOOD BY AUTOMATED COUNT: 15.1 % (ref 11.6–15.1)
EST. AVERAGE GLUCOSE BLD GHB EST-MCNC: 183 MG/DL
GFR SERPL CREATININE-BSD FRML MDRD: 79 ML/MIN/1.73SQ M
GLUCOSE P FAST SERPL-MCNC: 203 MG/DL (ref 65–99)
HBA1C MFR BLD: 8 %
HCT VFR BLD AUTO: 35.3 % (ref 36.5–49.3)
HDLC SERPL-MCNC: 51 MG/DL
HGB BLD-MCNC: 12.5 G/DL (ref 12–17)
IMM GRANULOCYTES # BLD AUTO: 0.02 THOUSAND/UL (ref 0–0.2)
IMM GRANULOCYTES NFR BLD AUTO: 0 % (ref 0–2)
LDLC SERPL CALC-MCNC: 60 MG/DL (ref 0–100)
LYMPHOCYTES # BLD AUTO: 1.3 THOUSANDS/ÂΜL (ref 0.6–4.47)
LYMPHOCYTES NFR BLD AUTO: 25 % (ref 14–44)
MCH RBC QN AUTO: 39.9 PG (ref 26.8–34.3)
MCHC RBC AUTO-ENTMCNC: 35.4 G/DL (ref 31.4–37.4)
MCV RBC AUTO: 113 FL (ref 82–98)
MICROALBUMIN UR-MCNC: 82 MG/L
MICROALBUMIN/CREAT 24H UR: 68 MG/G CREATININE (ref 0–30)
MONOCYTES # BLD AUTO: 0.52 THOUSAND/ÂΜL (ref 0.17–1.22)
MONOCYTES NFR BLD AUTO: 10 % (ref 4–12)
NEUTROPHILS # BLD AUTO: 3.18 THOUSANDS/ÂΜL (ref 1.85–7.62)
NEUTS SEG NFR BLD AUTO: 61 % (ref 43–75)
NRBC BLD AUTO-RTO: 0 /100 WBCS
PLATELET # BLD AUTO: 172 THOUSANDS/UL (ref 149–390)
PMV BLD AUTO: 9.8 FL (ref 8.9–12.7)
POTASSIUM SERPL-SCNC: 4.7 MMOL/L (ref 3.5–5.3)
PROT SERPL-MCNC: 6.8 G/DL (ref 6.4–8.4)
RBC # BLD AUTO: 3.13 MILLION/UL (ref 3.88–5.62)
SODIUM SERPL-SCNC: 140 MMOL/L (ref 135–147)
T4 FREE SERPL-MCNC: 0.8 NG/DL (ref 0.61–1.12)
TRIGL SERPL-MCNC: 100 MG/DL
TSH SERPL DL<=0.05 MIU/L-ACNC: 5.63 UIU/ML (ref 0.45–4.5)
WBC # BLD AUTO: 5.2 THOUSAND/UL (ref 4.31–10.16)

## 2024-08-29 PROCEDURE — 36415 COLL VENOUS BLD VENIPUNCTURE: CPT

## 2024-08-29 PROCEDURE — 82570 ASSAY OF URINE CREATININE: CPT

## 2024-08-29 PROCEDURE — 80061 LIPID PANEL: CPT

## 2024-08-29 PROCEDURE — 80053 COMPREHEN METABOLIC PANEL: CPT

## 2024-08-29 PROCEDURE — 82043 UR ALBUMIN QUANTITATIVE: CPT

## 2024-08-29 PROCEDURE — 84439 ASSAY OF FREE THYROXINE: CPT

## 2024-08-29 PROCEDURE — 83036 HEMOGLOBIN GLYCOSYLATED A1C: CPT

## 2024-08-29 PROCEDURE — 85025 COMPLETE CBC W/AUTO DIFF WBC: CPT

## 2024-08-29 PROCEDURE — 84443 ASSAY THYROID STIM HORMONE: CPT

## 2024-09-03 DIAGNOSIS — I25.118 CORONARY ARTERY DISEASE OF NATIVE ARTERY OF NATIVE HEART WITH STABLE ANGINA PECTORIS (HCC): ICD-10-CM

## 2024-09-04 RX ORDER — CLOPIDOGREL BISULFATE 75 MG/1
75 TABLET ORAL DAILY
Qty: 90 TABLET | Refills: 1 | Status: SHIPPED | OUTPATIENT
Start: 2024-09-04

## 2024-09-05 ENCOUNTER — IN-CLINIC DEVICE VISIT (OUTPATIENT)
Dept: CARDIOLOGY CLINIC | Facility: CLINIC | Age: 84
End: 2024-09-05
Payer: COMMERCIAL

## 2024-09-05 DIAGNOSIS — Z95.810 PRESENCE OF IMPLANTABLE CARDIOVERTER-DEFIBRILLATOR (ICD): Primary | ICD-10-CM

## 2024-09-05 PROCEDURE — 93282 PRGRMG EVAL IMPLANTABLE DFB: CPT | Performed by: INTERNAL MEDICINE

## 2024-09-05 NOTE — PROGRESS NOTES
BSC SC ICD/NOT MRI CONDITIONAL   DEVICE INTERROGATED IN THE [facility] OFFICE:  BATTERY VOLTAGE ADEQUATE (2 YR.).   <1%.  ALL LEAD PARAMETERS WITHIN NORMAL LIMITS.  1 VT-1 EPISODE @ 182 BPM, NO THERAPIES.  2 NONSUSTAINED EPISODES (5 @ 155 BPM, 5 @ 168 BPM).  NO PROGRAMMING CHANGES MADE TO DEVICE PARAMETERS.  NORMAL DEVICE FUNCTION.  RG

## 2024-09-06 DIAGNOSIS — K59.00 CONSTIPATION, UNSPECIFIED CONSTIPATION TYPE: ICD-10-CM

## 2024-09-06 RX ORDER — SENNOSIDES AND DOCUSATE SODIUM 8.6; 5 MG/1; MG/1
1 TABLET ORAL
Qty: 30 TABLET | Refills: 1 | Status: SHIPPED | OUTPATIENT
Start: 2024-09-06

## 2024-09-26 ENCOUNTER — OFFICE VISIT (OUTPATIENT)
Age: 84
End: 2024-09-26
Payer: COMMERCIAL

## 2024-09-26 VITALS
OXYGEN SATURATION: 98 % | BODY MASS INDEX: 20.83 KG/M2 | HEART RATE: 71 BPM | DIASTOLIC BLOOD PRESSURE: 58 MMHG | TEMPERATURE: 96.2 F | HEIGHT: 65 IN | SYSTOLIC BLOOD PRESSURE: 118 MMHG | WEIGHT: 125 LBS | RESPIRATION RATE: 14 BRPM

## 2024-09-26 DIAGNOSIS — E03.9 ACQUIRED HYPOTHYROIDISM: ICD-10-CM

## 2024-09-26 DIAGNOSIS — Z79.4 TYPE 2 DIABETES MELLITUS WITH STAGE 3 CHRONIC KIDNEY DISEASE, WITH LONG-TERM CURRENT USE OF INSULIN, UNSPECIFIED WHETHER STAGE 3A OR 3B CKD (HCC): Primary | ICD-10-CM

## 2024-09-26 DIAGNOSIS — N18.30 TYPE 2 DIABETES MELLITUS WITH STAGE 3 CHRONIC KIDNEY DISEASE, WITH LONG-TERM CURRENT USE OF INSULIN, UNSPECIFIED WHETHER STAGE 3A OR 3B CKD (HCC): Primary | ICD-10-CM

## 2024-09-26 DIAGNOSIS — I25.118 CORONARY ARTERY DISEASE OF NATIVE ARTERY OF NATIVE HEART WITH STABLE ANGINA PECTORIS (HCC): ICD-10-CM

## 2024-09-26 DIAGNOSIS — R14.3 FLATULENCE: ICD-10-CM

## 2024-09-26 DIAGNOSIS — E11.22 TYPE 2 DIABETES MELLITUS WITH STAGE 3 CHRONIC KIDNEY DISEASE, WITH LONG-TERM CURRENT USE OF INSULIN, UNSPECIFIED WHETHER STAGE 3A OR 3B CKD (HCC): Primary | ICD-10-CM

## 2024-09-26 DIAGNOSIS — I10 PRIMARY HYPERTENSION: ICD-10-CM

## 2024-09-26 DIAGNOSIS — R35.0 BENIGN PROSTATIC HYPERPLASIA WITH URINARY FREQUENCY: ICD-10-CM

## 2024-09-26 DIAGNOSIS — N40.1 BENIGN PROSTATIC HYPERPLASIA WITH URINARY FREQUENCY: ICD-10-CM

## 2024-09-26 PROBLEM — E55.9 VITAMIN D DEFICIENCY: Status: ACTIVE | Noted: 2024-05-09

## 2024-09-26 PROBLEM — E44.1 MILD PROTEIN-CALORIE MALNUTRITION (HCC): Status: ACTIVE | Noted: 2024-09-26

## 2024-09-26 PROCEDURE — G2211 COMPLEX E/M VISIT ADD ON: HCPCS

## 2024-09-26 PROCEDURE — 99214 OFFICE O/P EST MOD 30 MIN: CPT

## 2024-09-26 RX ORDER — AZELASTINE HYDROCHLORIDE 137 UG/1
1 SPRAY, METERED NASAL 2 TIMES DAILY
COMMUNITY
Start: 2024-09-06

## 2024-09-26 RX ORDER — BLOOD SUGAR DIAGNOSTIC
STRIP MISCELLANEOUS DAILY
Qty: 100 EACH | Refills: 1 | Status: SHIPPED | OUTPATIENT
Start: 2024-09-26 | End: 2025-01-04

## 2024-09-26 NOTE — PROGRESS NOTES
Ambulatory Visit  Name: Felix Mackey      : 1940      MRN: [id number]  Encounter Provider: Alek Amezcua PA-C  Encounter Date: 2024   Encounter department: [facility] [place]    Assessment & Plan  Type 2 diabetes mellitus with stage 3 chronic kidney disease, with long-term current use of insulin, unspecified whether stage 3a or 3b CKD (HCC)    Lab Results   Component Value Date    HGBA1C 8.0 (H) 2024   Uncontrolled, continue following with endocrinology at [facility] and current medications. Recently added jardiance recheck in 3 months    Orders:    Hemoglobin A1C; Future    CBC and differential; Future    Basic metabolic panel; Future    Insulin Pen Needle (Advocate Insulin Pen Needles) 29G X 12.7MM MISC; Use daily    Coronary artery disease of native artery of native heart with stable angina pectoris (HCC)  Stable, no recent angina. Continue current medications and following with cardiology       Primary hypertension  -Blood pressure overall well-controlled with metoprolol tartrate  -Continue ambulatory blood pressure monitoring       Benign prostatic hyperplasia with urinary frequency  Continue finasteride and flomax  Has seen urology in the past. No current urinary concerns        Acquired hypothyroidism  TSH slight elevation, continue following with endo  States he isnt taking synthroid on the weekends  Orders:    TSH, 3rd generation with Free T4 reflex; Future    Flatulence  Patient reports a lot of flatulence. Advised to increase fluid and fiber intake. F/u if new or worsening symptoms occur.           History of Present Illness     Patient here to establish care  Former Dr Alex patient wants to stay at office.   States he has a lot of flatulence for years. No abdominal pain, n/v/d, rashes         History obtained from : patient  Review of Systems   Constitutional:  Negative for activity change, diaphoresis, fatigue and fever.   HENT: Negative.  Negative for  congestion and ear pain.    Eyes: Negative.    Respiratory: Negative.  Negative for cough, chest tightness and shortness of breath.    Cardiovascular:  Negative for chest pain, palpitations and leg swelling.   Gastrointestinal: Negative.    Endocrine: Negative for polydipsia, polyphagia and polyuria.   Genitourinary: Negative.  Negative for dysuria, flank pain, frequency, hematuria and urgency.   Musculoskeletal: Negative.  Negative for back pain, joint swelling, neck pain and neck stiffness.   Skin: Negative.    Neurological: Negative.  Negative for dizziness, weakness, light-headedness and headaches.   Hematological:  Negative for adenopathy.   Psychiatric/Behavioral: Negative.  Negative for confusion and sleep disturbance. The patient is not nervous/anxious.      Medical History Reviewed by provider this encounter:  Tobacco  Allergies  Meds  Problems  Med Hx  Surg Hx  Fam Hx       Past Medical History   Past Medical History:   Diagnosis Date    Acquired cyst of kidney     Anemia     Benign paroxysmal vertigo, unspecified ear     Cellulitis of leg     Cervical spinal stenosis     Chest pain     Coronary atherosclerosis of native coronary artery     Diabetes mellitus (HCC)     Disease of thyroid gland     Enlarged prostate     Esophageal candidiasis (HCC)     H/o COVID-19 06/30/2022    Hematuria     Herpes zoster     Hyperlipidemia     Hypertension     Incomplete emptying of bladder     Inflamed seborrheic keratosis     Internal hemorrhoids     Malignant neoplasm of skin of trunk     Myocardial infarction (HCC) 2005    Nonmelanoma skin cancer     LAST ASSESSED: 8/9/17    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 infarction     Paroxysmal tachycardia (HCC)     Shortness of breath     Vitamin B deficiency      Past Surgical History:   Procedure Laterality Date    CARDIAC CATHETERIZATION Left 5/15/2023    Procedure: Cardiac Left Heart Cath;  Surgeon: Joo Howard MD;  Location: [facility];  Service: Cardiology    CARDIAC  CATHETERIZATION N/A 5/15/2023    Procedure: Cardiac pci;  Surgeon: Joo Howard MD;  Location: [facility];  Service: Cardiology    CARDIAC CATHETERIZATION N/A 5/15/2023    Procedure: Cardiac Coronary Angiogram;  Surgeon: Joo Howard MD;  Location: [facility];  Service: Cardiology    CARDIAC DEFIBRILLATOR PLACEMENT      COLONOSCOPY  10/07/2008    FIBEROPTIC SCREENING; NO FURTHER RECOMMENDATIONS    CORONARY ANGIOPLASTY WITH STENT PLACEMENT      CYSTOSCOPY  11/06/2015    DIAGNOSTIC; MANAGED BY: DIAMOND JOINER    EGD AND COLONOSCOPY N/A 02/12/2018    Procedure: EGD AND COLONOSCOPY;  Surgeon: Fabiola العلي MD;  Location: [facility];  Service: Gastroenterology    FL INJECTION RIGHT SHOULDER (ARTHROGRAM)  01/04/2022    HIP ARTHROPLASTY Right     INSERT / REPLACE / REMOVE PACEMAKER      JOINT REPLACEMENT Right     THR    TRUNK SKIN LESION EXCISIONAL BIOPSY  08/22/2007    MALIGNANT; BCC CHEST     Family History   Problem Relation Age of Onset    Heart disease Mother     Coronary artery disease Mother     Sudden death Mother     Cancer Father         throat; MALIGNANT NEOPLASM OF HEAD, FACE OR NECK    Throat cancer Father     Cancer Family     Coronary artery disease Family      Current Outpatient Medications on File Prior to Visit   Medication Sig Dispense Refill    aspirin (Aspirin Low Dose) 81 mg EC tablet TAKE ONE (1) TABLET BY MOUTH DAILY 90 tablet 3    Azelastine HCl 137 MCG/SPRAY SOLN 1 spray by Each Nare route 2 (two) times a day As directed      clopidogrel (PLAVIX) 75 mg tablet TAKE 1 TABLET (75 MG TOTAL) BY MOUTH DAILY 90 tablet 1    Continuous Blood Gluc Sensor (FreeStyle Maris 2 Sensor) MISC 1 KIT BY ABDOMINAL SUBCUTANEOUS ROUTE EVERY 14 (FOURTEEN) DAYS.      Empagliflozin (Jardiance) 10 MG TABS tablet Take 10 mg by mouth every morning      ergocalciferol (VITAMIN D2) 50,000 units Take 50,000 Units by mouth once a week      finasteride (PROSCAR) 5 mg tablet TAKE ONE (1)  TABLET BY MOUTH DAILY 180 tablet 3    glipiZIDE (GLUCOTROL) 5 mg tablet TAKE 1 TABLET (5 MG TOTAL) BY MOUTH 2 (TWO) TIMES A  tablet 3    Lantus SoloStar 100 units/mL SOPN 10 mL      levothyroxine 88 mcg tablet Take 88 mcg by mouth daily      metoprolol tartrate (LOPRESSOR) 25 mg tablet TAKE 1 TABLET (25 MG TOTAL) BY MOUTH EVERY 12 (TWELVE) HOURS 180 tablet 1    MILK THISTLE PO Take 2 tablets by mouth in the morning Take in the morning with food      rosuvastatin (CRESTOR) 20 MG tablet Take 1 tablet (20 mg total) by mouth daily 90 tablet 0    Senna-Plus 8.6-50 MG per tablet TAKE 1 TABLET BY MOUTH DAILY AT BEDTIME 30 tablet 1    tamsulosin (FLOMAX) 0.4 mg Take 1 capsule (0.4 mg total) by mouth daily 90 capsule 3    Trulicity 4.5 MG/0.5ML injection INJECT 4.5 MG UNDER THE SKIN ONCE A WEEK.      [DISCONTINUED] Alcohol Swabs (Pharmacist Choice Alcohol) PADS USE FOR TESTING AND INJECTIONS UP TO 10 PADS DAILY (Patient not taking: Reported on 6/5/2024)      [DISCONTINUED] benzonatate (TESSALON PERLES) 100 mg capsule Take 1 capsule (100 mg total) by mouth 3 (three) times a day as needed for cough 20 capsule 0    [DISCONTINUED] furosemide (LASIX) 20 mg tablet Take 1 tablet (20 mg total) by mouth if needed (weight gain of 3-5 lbs) 30 tablet 0    [DISCONTINUED] ondansetron (ZOFRAN-ODT) 4 mg disintegrating tablet TAKE 1 TABLET BY MOUTH 3 TIMES PER DAY FOR 7 DAYS (Patient not taking: Reported on 6/5/2024)      [DISCONTINUED] polyethylene glycol (MIRALAX) 17 g packet Take 17 g by mouth daily as needed (constipation) for up to 3 days 3 each 0     No current facility-administered medications on file prior to visit.     Allergies   Allergen Reactions    Atorvastatin Other (See Comments)     Pt unsure      Percocet [Oxycodone-Acetaminophen] Nausea Only and GI Intolerance      Current Outpatient Medications on File Prior to Visit   Medication Sig Dispense Refill    aspirin (Aspirin Low Dose) 81 mg EC tablet TAKE ONE (1) TABLET  BY MOUTH DAILY 90 tablet 3    Azelastine HCl 137 MCG/SPRAY SOLN 1 spray by Each Nare route 2 (two) times a day As directed      clopidogrel (PLAVIX) 75 mg tablet TAKE 1 TABLET (75 MG TOTAL) BY MOUTH DAILY 90 tablet 1    Continuous Blood Gluc Sensor (FreeStyle Maris 2 Sensor) MISC 1 KIT BY ABDOMINAL SUBCUTANEOUS ROUTE EVERY 14 (FOURTEEN) DAYS.      Empagliflozin (Jardiance) 10 MG TABS tablet Take 10 mg by mouth every morning      ergocalciferol (VITAMIN D2) 50,000 units Take 50,000 Units by mouth once a week      finasteride (PROSCAR) 5 mg tablet TAKE ONE (1) TABLET BY MOUTH DAILY 180 tablet 3    glipiZIDE (GLUCOTROL) 5 mg tablet TAKE 1 TABLET (5 MG TOTAL) BY MOUTH 2 (TWO) TIMES A  tablet 3    Lantus SoloStar 100 units/mL SOPN 10 mL      levothyroxine 88 mcg tablet Take 88 mcg by mouth daily      metoprolol tartrate (LOPRESSOR) 25 mg tablet TAKE 1 TABLET (25 MG TOTAL) BY MOUTH EVERY 12 (TWELVE) HOURS 180 tablet 1    MILK THISTLE PO Take 2 tablets by mouth in the morning Take in the morning with food      rosuvastatin (CRESTOR) 20 MG tablet Take 1 tablet (20 mg total) by mouth daily 90 tablet 0    Senna-Plus 8.6-50 MG per tablet TAKE 1 TABLET BY MOUTH DAILY AT BEDTIME 30 tablet 1    tamsulosin (FLOMAX) 0.4 mg Take 1 capsule (0.4 mg total) by mouth daily 90 capsule 3    Trulicity 4.5 MG/0.5ML injection INJECT 4.5 MG UNDER THE SKIN ONCE A WEEK.      [DISCONTINUED] Alcohol Swabs (Pharmacist Choice Alcohol) PADS USE FOR TESTING AND INJECTIONS UP TO 10 PADS DAILY (Patient not taking: Reported on 6/5/2024)      [DISCONTINUED] benzonatate (TESSALON PERLES) 100 mg capsule Take 1 capsule (100 mg total) by mouth 3 (three) times a day as needed for cough 20 capsule 0    [DISCONTINUED] furosemide (LASIX) 20 mg tablet Take 1 tablet (20 mg total) by mouth if needed (weight gain of 3-5 lbs) 30 tablet 0    [DISCONTINUED] ondansetron (ZOFRAN-ODT) 4 mg disintegrating tablet TAKE 1 TABLET BY MOUTH 3 TIMES PER DAY FOR 7 DAYS  "(Patient not taking: Reported on 2024)      [DISCONTINUED] polyethylene glycol (MIRALAX) 17 g packet Take 17 g by mouth daily as needed (constipation) for up to 3 days 3 each 0     No current facility-administered medications on file prior to visit.      Social History     Tobacco Use    Smoking status: Former     Current packs/day: 0.00     Average packs/day: 1 pack/day for 10.0 years (10.0 ttl pk-yrs)     Types: Cigarettes     Start date: 1968     Quit date: 1978     Years since quittin.6     Passive exposure: Past    Smokeless tobacco: Never   Vaping Use    Vaping status: Never Used   Substance and Sexual Activity    Alcohol use: Yes     Comment: occasionally 1-2 per month     Drug use: No    Sexual activity: Not Currently     Partners: Female         Objective     /58 (BP Location: Left arm, Patient Position: Sitting, Cuff Size: Standard)   Pulse 71   Temp (!) 96.2 °F (35.7 °C) (Tympanic)   Resp 14   Ht 5' 5\" (1.651 m)   Wt 56.7 kg (125 lb)   SpO2 98%   BMI 20.80 kg/m²     Physical Exam  Vitals and nursing note reviewed.   Constitutional:       General: He is not in acute distress.     Appearance: He is normal weight. He is not ill-appearing, toxic-appearing or diaphoretic.   HENT:      Head: Normocephalic and atraumatic.      Right Ear: External ear normal.      Left Ear: External ear normal.      Nose: Nose normal.   Eyes:      General: No scleral icterus.        Right eye: No discharge.         Left eye: No discharge.      Extraocular Movements: Extraocular movements intact.      Conjunctiva/sclera: Conjunctivae normal.   Neck:      Vascular: No carotid bruit.   Cardiovascular:      Rate and Rhythm: Normal rate and regular rhythm.      Heart sounds: No murmur heard.  Pulmonary:      Effort: Pulmonary effort is normal.      Breath sounds: Normal breath sounds.   Musculoskeletal:      Cervical back: Normal range of motion and neck supple.   Skin:     Findings: No rash. "   Neurological:      Mental Status: He is alert. Mental status is at baseline.   Psychiatric:         Mood and Affect: Mood normal.         Behavior: Behavior normal.         Thought Content: Thought content normal.         Judgment: Judgment normal.

## 2024-09-26 NOTE — ASSESSMENT & PLAN NOTE
Lab Results   Component Value Date    HGBA1C 8.0 (H) 08/29/2024   Uncontrolled, continue following with endocrinology at [facility] and current medications. Recently added jardiance recheck in 3 months    Orders:    Hemoglobin A1C; Future    CBC and differential; Future    Basic metabolic panel; Future    Insulin Pen Needle (Advocate Insulin Pen Needles) 29G X 12.7MM MISC; Use daily

## 2024-09-26 NOTE — ASSESSMENT & PLAN NOTE
TSH slight elevation, continue following with endo  States he isnt taking synthroid on the weekends  Orders:    TSH, 3rd generation with Free T4 reflex; Future

## 2024-09-26 NOTE — ASSESSMENT & PLAN NOTE
-Blood pressure overall well-controlled with metoprolol tartrate  -Continue ambulatory blood pressure monitoring

## 2024-10-16 ENCOUNTER — OFFICE VISIT (OUTPATIENT)
Dept: CARDIOLOGY CLINIC | Facility: CLINIC | Age: 84
End: 2024-10-16
Payer: COMMERCIAL

## 2024-10-16 VITALS
SYSTOLIC BLOOD PRESSURE: 120 MMHG | HEART RATE: 72 BPM | WEIGHT: 120 LBS | OXYGEN SATURATION: 95 % | DIASTOLIC BLOOD PRESSURE: 78 MMHG | HEIGHT: 65 IN | RESPIRATION RATE: 16 BRPM | BODY MASS INDEX: 19.99 KG/M2

## 2024-10-16 DIAGNOSIS — E78.2 MIXED HYPERLIPIDEMIA: ICD-10-CM

## 2024-10-16 DIAGNOSIS — Z79.4 TYPE 2 DIABETES MELLITUS WITH STAGE 3 CHRONIC KIDNEY DISEASE, WITH LONG-TERM CURRENT USE OF INSULIN, UNSPECIFIED WHETHER STAGE 3A OR 3B CKD (HCC): ICD-10-CM

## 2024-10-16 DIAGNOSIS — I10 PRIMARY HYPERTENSION: ICD-10-CM

## 2024-10-16 DIAGNOSIS — I25.5 ISCHEMIC CARDIOMYOPATHY: ICD-10-CM

## 2024-10-16 DIAGNOSIS — E11.22 TYPE 2 DIABETES MELLITUS WITH STAGE 3 CHRONIC KIDNEY DISEASE, WITH LONG-TERM CURRENT USE OF INSULIN, UNSPECIFIED WHETHER STAGE 3A OR 3B CKD (HCC): ICD-10-CM

## 2024-10-16 DIAGNOSIS — I25.118 CORONARY ARTERY DISEASE OF NATIVE ARTERY OF NATIVE HEART WITH STABLE ANGINA PECTORIS (HCC): Primary | ICD-10-CM

## 2024-10-16 DIAGNOSIS — N18.30 TYPE 2 DIABETES MELLITUS WITH STAGE 3 CHRONIC KIDNEY DISEASE, WITH LONG-TERM CURRENT USE OF INSULIN, UNSPECIFIED WHETHER STAGE 3A OR 3B CKD (HCC): ICD-10-CM

## 2024-10-16 PROCEDURE — 99213 OFFICE O/P EST LOW 20 MIN: CPT | Performed by: NURSE PRACTITIONER

## 2024-10-16 NOTE — PROGRESS NOTES
"Cardiology Office Note    Felix Mackey 84 y.o. male MRN: [id number]    10/16/24          Assessment/Plan:    1.  Chronic HFrEF  -Patient appears euvolemic  - Continue furosemide 20 mg PRN only for weight gain and metoprolol tartrate  - Continue 2000 mg sodium restricted diet and daily weights     2. Ischemic cardiomyopathy with an EF of 35% s/p [employer] ICD implantation  - Continue follow-up with device clinic  - See plan as above      3.  CAD s/p CAROLYN x 2 x 2 of the ostial LAD and CAROLYN x 1 to the left PDA  - Denies chest pain or other anginal equivalent  - Continue aspirin, rosuvastatin, metoprolol tartrate, Plavix     4. Hypertension  -Blood pressure overall well-controlled with metoprolol tartrate  -Continue ambulatory blood pressure monitoring     5.  Hyperlipidemia  -Continue rosuvastatin     6.  Diabetes type 2     7.  History of NSVT  -Noted on multiple ICD interrogations  - Continue metoprolol tartrate       Follow up: 6 months or sooner as needed    1. Coronary artery disease of native artery of native heart with stable angina pectoris (HCC)        2. Ischemic cardiomyopathy        3. Primary hypertension        4. Type 2 diabetes mellitus with stage 3 chronic kidney disease, with long-term current use of insulin, unspecified whether stage 3a or 3b CKD (HCC)        5. Mixed hyperlipidemia            HPI: Felix Mackey is a 84 y.o. year old male with a past medical history of chronic HFrEF/ischemic cardiomyopathy with an EF of 35% s/p [employer] ICD implantation, CAD s/p CAROLYN x 2 to the ostial LAD and CAROLYN x 1 to the left PDA, hypertension, hyperlipidemia who presents today for routine follow-up.  He was last seen in this office in June 2024 at that time he was doing well from a cardiac standpoint and remaining euvolemic.    Today he reports that he feels \"blah\" and tired and at times short of breath.  He reports that overall its mild and intermittent and would like to continue to monitor his " symptoms at this time.  His daughter accompanied him today at his visit and she agreed with this plan.  Will follow-up in 1 month to further assess his symptoms and he was instructed if he should develop worsening symptoms or more frequent symptoms he should notify the office or report to the emergency room ASAP.  Otherwise, he denies chest pain, lower extremity edema, lightheadedness, dizziness, syncopal episodes,or palpitations and was instructed to call  the office or seek medical attention if any such symptoms develop.    His last TTE was performed in October 2023 that revealed reduced systolic function with an EF of 35% with global hypokinesis and grade 1 diastolic dysfunction.    He was instructed to follow a 2000 mg sodium restricted diet and to weigh themselves daily, and report a weight gain of 3 pounds in 1 day or 5 pounds in 1 week.     All medications reviewed and patient is tolerating medications without side effects.       Patient Active Problem List   Diagnosis    Anemia    BPH (benign prostatic hyperplasia)    Ischemic cardiomyopathy    Coronary artery disease of native artery of native heart with stable angina pectoris (HCC)    Type 2 diabetes mellitus with stage 3 chronic kidney disease, with long-term current use of insulin, unspecified whether stage 3a or 3b CKD (HCC)    Oropharyngeal dysphagia    Chronic right shoulder pain    Abnormal CT scan    Hypokalemia    Generalized weakness    Mild protein-calorie malnutrition (HCC)    Acquired hypothyroidism    Vitamin D deficiency    Primary hypertension       Allergies   Allergen Reactions    Atorvastatin Other (See Comments)     Pt unsure      Percocet [Oxycodone-Acetaminophen] Nausea Only and GI Intolerance         Current Outpatient Medications:     aspirin (Aspirin Low Dose) 81 mg EC tablet, TAKE ONE (1) TABLET BY MOUTH DAILY, Disp: 90 tablet, Rfl: 3    Azelastine HCl 137 MCG/SPRAY SOLN, 1 spray by Each Nare route 2 (two) times a day As directed,  Disp: , Rfl:     clopidogrel (PLAVIX) 75 mg tablet, TAKE 1 TABLET (75 MG TOTAL) BY MOUTH DAILY, Disp: 90 tablet, Rfl: 1    Continuous Blood Gluc Sensor (FreeStyle Maris 2 Sensor) MISC, 1 KIT BY ABDOMINAL SUBCUTANEOUS ROUTE EVERY 14 (FOURTEEN) DAYS., Disp: , Rfl:     Empagliflozin (Jardiance) 10 MG TABS tablet, Take 10 mg by mouth every morning, Disp: , Rfl:     ergocalciferol (VITAMIN D2) 50,000 units, Take 50,000 Units by mouth once a week, Disp: , Rfl:     finasteride (PROSCAR) 5 mg tablet, TAKE ONE (1) TABLET BY MOUTH DAILY, Disp: 180 tablet, Rfl: 3    glipiZIDE (GLUCOTROL) 5 mg tablet, TAKE 1 TABLET (5 MG TOTAL) BY MOUTH 2 (TWO) TIMES A DAY, Disp: 180 tablet, Rfl: 3    Insulin Pen Needle ([employer] Insulin Pen Needles) 29G X 12.7MM MISC, Use daily, Disp: 100 each, Rfl: 1    Lantus SoloStar 100 units/mL SOPN, 10 mL, Disp: , Rfl:     levothyroxine 88 mcg tablet, Take 88 mcg by mouth daily, Disp: , Rfl:     metoprolol tartrate (LOPRESSOR) 25 mg tablet, TAKE 1 TABLET (25 MG TOTAL) BY MOUTH EVERY 12 (TWELVE) HOURS, Disp: 180 tablet, Rfl: 1    MILK THISTLE PO, Take 2 tablets by mouth in the morning Take in the morning with food, Disp: , Rfl:     rosuvastatin (CRESTOR) 20 MG tablet, Take 1 tablet (20 mg total) by mouth daily, Disp: 90 tablet, Rfl: 0    Senna-Plus 8.6-50 MG per tablet, TAKE 1 TABLET BY MOUTH DAILY AT BEDTIME, Disp: 30 tablet, Rfl: 1    tamsulosin (FLOMAX) 0.4 mg, Take 1 capsule (0.4 mg total) by mouth daily, Disp: 90 capsule, Rfl: 3    Trulicity 4.5 MG/0.5ML injection, INJECT 4.5 MG UNDER THE SKIN ONCE A WEEK., Disp: , Rfl:     Past Medical History:   Diagnosis Date    Acquired cyst of kidney     Anemia     Benign paroxysmal vertigo, unspecified ear     Cellulitis of leg     Cervical spinal stenosis     Chest pain     Coronary atherosclerosis of native coronary artery     Diabetes mellitus (HCC)     Disease of thyroid gland     Enlarged prostate     Esophageal candidiasis (HCC)     H/o COVID-19  06/30/2022    Hematuria     Herpes zoster     Hyperlipidemia     Hypertension     Incomplete emptying of bladder     Inflamed seborrheic keratosis     Internal hemorrhoids     Malignant neoplasm of skin of trunk     Myocardial infarction (HCC) 2005    Nonmelanoma skin cancer     LAST ASSESSED: 8/9/17    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 infarction     Paroxysmal tachycardia (HCC)     Shortness of breath     Vitamin B deficiency        Family History   Problem Relation Age of Onset    Heart disease Mother     Coronary artery disease Mother     Sudden death Mother     Cancer Father         throat; MALIGNANT NEOPLASM OF HEAD, FACE OR NECK    Throat cancer Father     Cancer Family     Coronary artery disease Family        Past Surgical History:   Procedure Laterality Date    CARDIAC CATHETERIZATION Left 5/15/2023    Procedure: Cardiac Left Heart Cath;  Surgeon: Joo Howard MD;  Location: [facility];  Service: Cardiology    CARDIAC CATHETERIZATION N/A 5/15/2023    Procedure: Cardiac pci;  Surgeon: Joo Howard MD;  Location: [facility];  Service: Cardiology    CARDIAC CATHETERIZATION N/A 5/15/2023    Procedure: Cardiac Coronary Angiogram;  Surgeon: Joo Howard MD;  Location: [facility];  Service: Cardiology    CARDIAC DEFIBRILLATOR PLACEMENT      COLONOSCOPY  10/07/2008    FIBEROPTIC SCREENING; NO FURTHER RECOMMENDATIONS    CORONARY ANGIOPLASTY WITH STENT PLACEMENT      CYSTOSCOPY  11/06/2015    DIAGNOSTIC; MANAGED BY: DIAMOND JOINER    EGD AND COLONOSCOPY N/A 02/12/2018    Procedure: EGD AND COLONOSCOPY;  Surgeon: Fabiola العلي MD;  Location: [facility];  Service: Gastroenterology    FL INJECTION RIGHT SHOULDER (ARTHROGRAM)  01/04/2022    HIP ARTHROPLASTY Right     INSERT / REPLACE / REMOVE PACEMAKER      JOINT REPLACEMENT Right     THR    TRUNK SKIN LESION EXCISIONAL BIOPSY  08/22/2007    MALIGNANT; BCC CHEST       Social History     Socioeconomic History    Marital status:       Spouse name: Not on file    Number of children: Not on file    Years of education: Not on file    Highest education level: Not on file   Occupational History    Occupation: RETIRED   Tobacco Use    Smoking status: Former     Current packs/day: 0.00     Average packs/day: 1 pack/day for 10.0 years (10.0 ttl pk-yrs)     Types: Cigarettes     Start date: 1968     Quit date: 1978     Years since quittin.6     Passive exposure: Past    Smokeless tobacco: Never   Vaping Use    Vaping status: Never Used   Substance and Sexual Activity    Alcohol use: Yes     Comment: occasionally 1-2 per month     Drug use: No    Sexual activity: Not Currently     Partners: Female   Other Topics Concern    Not on file   Social History Narrative    LIVING INDEPENDENTLY WITH SPOUSE    NO ADVANCE DIRECTIVE ON FILE     Social Determinants of Health     Financial Resource Strain: Low Risk  (2023)    Overall Financial Resource Strain (CARDIA)     Difficulty of Paying Living Expenses: Not hard at all   Food Insecurity: No Food Insecurity (2023)    Hunger Vital Sign     Worried About Running Out of Food in the Last Year: Never true     Ran Out of Food in the Last Year: Never true   Transportation Needs: No Transportation Needs (2023)    PRAPARE - Transportation     Lack of Transportation (Medical): No     Lack of Transportation (Non-Medical): No   Physical Activity: Inactive (10/18/2021)    Exercise Vital Sign     Days of Exercise per Week: 0 days     Minutes of Exercise per Session: 0 min   Stress: Stress Concern Present (10/18/2021)    [employer] - Occupational Stress Questionnaire     Feeling of Stress : Rather much   Social Connections: Not on file   Intimate Partner Violence: Not on file   Housing Stability: Low Risk  (2023)    Housing Stability Vital Sign     Unable to Pay for Housing in the Last Year: No     Number of Times Moved in the Last Year: 1     Homeless in the  "Last Year: No       Review of symptoms:   Constitution:  Negative  HEENT:  Negative  Cardiovascular:  Negative  Respiratory:  Negative  Skin:  Negative  Gastrointestinal:  Negative  Genitourinary:  Negative  Musculoskeletal:  Negative  Neurological:  Negative  Endocrine:  Negative  Psychological:  Negative    Vitals: /78 (BP Location: Left arm, Patient Position: Sitting, Cuff Size: Standard)   Pulse 72   Resp 16   Ht 5' 5\" (1.651 m)   Wt 54.4 kg (120 lb)   SpO2 95%   BMI 19.97 kg/m²         Physical Exam:     GEN: Alert and oriented x 3, in no acute distress.  Well appearing and well nourished.   HEENT: Sclera anicteric, conjunctivae pink, mucous membranes moist.   NECK: Supple, no carotid bruits, no significant JVD. Trachea midline.  HEART: Regular rhythm, normal S1 and S2, no murmurs, clicks, gallops or rubs.   LUNGS: Clear to auscultation bilaterally; no wheezes, rales, or rhonchi. No increased work of breathing or signs of respiratory distress.   ABDOMEN: Soft, nontender, nondistended, normoactive bowel sounds.   EXTREMITIES: Skin warm and well perfused, no clubbing, cyanosis, or edema.  NEURO: No focal findings. Normal speech. Mood and affect normal.   SKIN: Normal without suspicious lesions on exposed skin.    "

## 2024-10-22 DIAGNOSIS — I25.5 ISCHEMIC CARDIOMYOPATHY: ICD-10-CM

## 2024-10-22 RX ORDER — METOPROLOL TARTRATE 25 MG/1
25 TABLET, FILM COATED ORAL EVERY 12 HOURS SCHEDULED
Qty: 180 TABLET | Refills: 1 | Status: SHIPPED | OUTPATIENT
Start: 2024-10-22

## 2024-10-26 ENCOUNTER — APPOINTMENT (EMERGENCY)
Dept: CT IMAGING | Facility: HOSPITAL | Age: 84
End: 2024-10-26
Payer: COMMERCIAL

## 2024-10-26 ENCOUNTER — HOSPITAL ENCOUNTER (EMERGENCY)
Facility: HOSPITAL | Age: 84
Discharge: HOME | End: 2024-10-26
Attending: EMERGENCY MEDICINE
Payer: COMMERCIAL

## 2024-10-26 VITALS
SYSTOLIC BLOOD PRESSURE: 124 MMHG | HEART RATE: 98 BPM | DIASTOLIC BLOOD PRESSURE: 64 MMHG | TEMPERATURE: 98.1 F | OXYGEN SATURATION: 97 % | RESPIRATION RATE: 20 BRPM

## 2024-10-26 DIAGNOSIS — R10.9 ABDOMINAL PAIN, UNSPECIFIED ABDOMINAL LOCATION: ICD-10-CM

## 2024-10-26 DIAGNOSIS — R11.2 NAUSEA AND VOMITING: Primary | ICD-10-CM

## 2024-10-26 LAB
ALBUMIN SERPL BCG-MCNC: 4.2 G/DL (ref 3.5–5)
ALP SERPL-CCNC: 31 U/L (ref 34–104)
ALT SERPL W P-5'-P-CCNC: 19 U/L (ref 7–52)
ANION GAP SERPL CALCULATED.3IONS-SCNC: 14 MMOL/L (ref 4–13)
AST SERPL W P-5'-P-CCNC: 28 U/L (ref 13–39)
BASOPHILS # BLD AUTO: 0.03 THOUSANDS/ΜL (ref 0–0.1)
BASOPHILS NFR BLD AUTO: 1 % (ref 0–1)
BILIRUB SERPL-MCNC: 1.04 MG/DL (ref 0.2–1)
BUN SERPL-MCNC: 26 MG/DL (ref 5–25)
CALCIUM SERPL-MCNC: 9.4 MG/DL (ref 8.4–10.2)
CHLORIDE SERPL-SCNC: 99 MMOL/L (ref 96–108)
CO2 SERPL-SCNC: 23 MMOL/L (ref 21–32)
CREAT SERPL-MCNC: 1.12 MG/DL (ref 0.6–1.3)
EOSINOPHIL # BLD AUTO: 0.04 THOUSAND/ΜL (ref 0–0.61)
EOSINOPHIL NFR BLD AUTO: 1 % (ref 0–6)
ERYTHROCYTE [DISTWIDTH] IN BLOOD BY AUTOMATED COUNT: 15.8 % (ref 11.6–15.1)
GFR SERPL CREATININE-BSD FRML MDRD: 60 ML/MIN/1.73SQ M
GLUCOSE SERPL-MCNC: 155 MG/DL (ref 65–140)
HCT VFR BLD AUTO: 39 % (ref 36.5–49.3)
HGB BLD-MCNC: 13.3 G/DL (ref 12–17)
IMM GRANULOCYTES # BLD AUTO: 0.02 THOUSAND/UL (ref 0–0.2)
IMM GRANULOCYTES NFR BLD AUTO: 0 % (ref 0–2)
LIPASE SERPL-CCNC: 41 U/L (ref 11–82)
LYMPHOCYTES # BLD AUTO: 0.6 THOUSANDS/ΜL (ref 0.6–4.47)
LYMPHOCYTES NFR BLD AUTO: 11 % (ref 14–44)
MCH RBC QN AUTO: 38.4 PG (ref 26.8–34.3)
MCHC RBC AUTO-ENTMCNC: 34.1 G/DL (ref 31.4–37.4)
MCV RBC AUTO: 113 FL (ref 82–98)
MONOCYTES # BLD AUTO: 0.5 THOUSAND/ΜL (ref 0.17–1.22)
MONOCYTES NFR BLD AUTO: 10 % (ref 4–12)
NEUTROPHILS # BLD AUTO: 4.07 THOUSANDS/ΜL (ref 1.85–7.62)
NEUTS SEG NFR BLD AUTO: 77 % (ref 43–75)
NRBC BLD AUTO-RTO: 0 /100 WBCS
PLATELET # BLD AUTO: 144 THOUSANDS/UL (ref 149–390)
PMV BLD AUTO: 10.2 FL (ref 8.9–12.7)
POTASSIUM SERPL-SCNC: 4.2 MMOL/L (ref 3.5–5.3)
PROT SERPL-MCNC: 7.5 G/DL (ref 6.4–8.4)
RBC # BLD AUTO: 3.46 MILLION/UL (ref 3.88–5.62)
SODIUM SERPL-SCNC: 136 MMOL/L (ref 135–147)
WBC # BLD AUTO: 5.26 THOUSAND/UL (ref 4.31–10.16)

## 2024-10-26 PROCEDURE — 99285 EMERGENCY DEPT VISIT HI MDM: CPT | Performed by: EMERGENCY MEDICINE

## 2024-10-26 PROCEDURE — 85025 COMPLETE CBC W/AUTO DIFF WBC: CPT | Performed by: EMERGENCY MEDICINE

## 2024-10-26 PROCEDURE — 93005 ELECTROCARDIOGRAM TRACING: CPT

## 2024-10-26 PROCEDURE — 80053 COMPREHEN METABOLIC PANEL: CPT | Performed by: EMERGENCY MEDICINE

## 2024-10-26 PROCEDURE — 83690 ASSAY OF LIPASE: CPT | Performed by: EMERGENCY MEDICINE

## 2024-10-26 PROCEDURE — 74177 CT ABD & PELVIS W/CONTRAST: CPT

## 2024-10-26 PROCEDURE — 99284 EMERGENCY DEPT VISIT MOD MDM: CPT

## 2024-10-26 PROCEDURE — 36415 COLL VENOUS BLD VENIPUNCTURE: CPT | Performed by: EMERGENCY MEDICINE

## 2024-10-26 PROCEDURE — 96360 HYDRATION IV INFUSION INIT: CPT

## 2024-10-26 RX ADMIN — SODIUM CHLORIDE 1000 ML: 0.9 INJECTION, SOLUTION INTRAVENOUS at 18:40

## 2024-10-26 RX ADMIN — IOHEXOL 100 ML: 350 INJECTION, SOLUTION INTRAVENOUS at 19:23

## 2024-10-26 NOTE — ED PROVIDER NOTES
Time reflects when diagnosis was documented in both MDM as applicable and the Disposition within this note       Time User Action Codes Description Comment    10/26/2024  8:28 PM Kennedy Chappell Add [R11.2] Nausea and vomiting     10/26/2024  8:28 PM Kennedy Chappell Add [R10.9] Abdominal pain, unspecified abdominal location           ED Disposition       ED Disposition   Discharge    Condition   Stable    Date/Time   Sat Oct 26, 2024  8:28 PM    Comment   Felix IGOR Narendra discharge to home/self care.                   Assessment & Plan       Medical Decision Making  84-year-old male, presents with vomiting and abdominal pain.  Differential diagnosis includes dehydration, appendicitis, diverticulitis among other diagnoses.  CT scan and labs ordered in ED, receiving IV fluids.    Patient reports feeling better after receiving IV fluids.  Denies any current nausea, tolerating oral intake, abdomen soft and nontender on repeat examination.  Patient is requesting to be discharged home.  Instructed to keep himself well-hydrated by drinking small amounts of fluids frequently.  Discussed with patient return to emergency department for any worsening or new concerning symptoms, inability to tolerate any oral intake.  Follow-up with primary doctor.    I have reviewed test results and diagnosis with patient.  Follow-up plan reviewed.  Precautions for acute return for re-evaluation are reviewed.  Opportunity to ask questions was provided.  Patient verbalizes understanding.      Amount and/or Complexity of Data Reviewed  Labs: ordered. Decision-making details documented in ED Course.  Radiology: ordered. Decision-making details documented in ED Course.  ECG/medicine tests: ordered and independent interpretation performed. Decision-making details documented in ED Course.    Risk  Prescription drug management.        ED Course as of 10/26/24 2034   Sat Oct 26, 2024   2026 CT scan read by radiology, no acute findings       Medications    sodium chloride 0.9 % bolus 1,000 mL (1,000 mL Intravenous New Bag 10/26/24 1840)   iohexol (OMNIPAQUE) 350 MG/ML injection (MULTI-DOSE) 100 mL (100 mL Intravenous Given 10/26/24 1923)       ED Risk Strat Scores                           SBIRT 22yo+      Flowsheet Row Most Recent Value   Initial Alcohol Screen: US AUDIT-C     1. How often do you have a drink containing alcohol? 0 Filed at: 10/26/2024 1811   2. How many drinks containing alcohol do you have on a typical day you are drinking?  0 Filed at: 10/26/2024 1811   3a. Male UNDER 65: How often do you have five or more drinks on one occasion? 0 Filed at: 10/26/2024 1811   3b. FEMALE Any Age, or MALE 65+: How often do you have 4 or more drinks on one occassion? 0 Filed at: 10/26/2024 1811   Audit-C Score 0 Filed at: 10/26/2024 1811   TRACY: How many times in the past year have you...    Used an illegal drug or used a prescription medication for non-medical reasons? Never Filed at: 10/26/2024 1811                            History of Present Illness       Chief Complaint   Patient presents with    Abdominal Pain     Came in for complaints of abdominal pain and cannot keep anything down. Also complaint of nausea and vomiting.       Past Medical History:   Diagnosis Date    Acquired cyst of kidney     Anemia     Benign paroxysmal vertigo, unspecified ear     Cellulitis of leg     Cervical spinal stenosis     Chest pain     Coronary atherosclerosis of native coronary artery     Diabetes mellitus (HCC)     Disease of thyroid gland     Enlarged prostate     Esophageal candidiasis (HCC)     H/o COVID-19 06/30/2022    Hematuria     Herpes zoster     Hyperlipidemia     Hypertension     Incomplete emptying of bladder     Inflamed seborrheic keratosis     Internal hemorrhoids     Malignant neoplasm of skin of trunk     Myocardial infarction (HCC) 2005    Nonmelanoma skin cancer     LAST ASSESSED: 8/9/17    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 infarction     Paroxysmal tachycardia (HCC)      Shortness of breath     Vitamin B deficiency       Past Surgical History:   Procedure Laterality Date    CARDIAC CATHETERIZATION Left 5/15/2023    Procedure: Cardiac Left Heart Cath;  Surgeon: Joo Howard MD;  Location: [facility];  Service: Cardiology    CARDIAC CATHETERIZATION N/A 5/15/2023    Procedure: Cardiac pci;  Surgeon: Joo Howard MD;  Location: [facility];  Service: Cardiology    CARDIAC CATHETERIZATION N/A 5/15/2023    Procedure: Cardiac Coronary Angiogram;  Surgeon: Joo Howard MD;  Location: [facility];  Service: Cardiology    CARDIAC DEFIBRILLATOR PLACEMENT      COLONOSCOPY  10/07/2008    FIBEROPTIC SCREENING; NO FURTHER RECOMMENDATIONS    CORONARY ANGIOPLASTY WITH STENT PLACEMENT      CYSTOSCOPY  2015    DIAGNOSTIC; MANAGED BY: DIAMOND JOINER    EGD AND COLONOSCOPY N/A 2018    Procedure: EGD AND COLONOSCOPY;  Surgeon: Fabiola العلي MD;  Location: [facility];  Service: Gastroenterology    FL INJECTION RIGHT SHOULDER (ARTHROGRAM)  2022    HIP ARTHROPLASTY Right     INSERT / REPLACE / REMOVE PACEMAKER      JOINT REPLACEMENT Right     THR    TRUNK SKIN LESION EXCISIONAL BIOPSY  2007    MALIGNANT; BCC CHEST      Family History   Problem Relation Age of Onset    Heart disease Mother     Coronary artery disease Mother     Sudden death Mother     Cancer Father         throat; MALIGNANT NEOPLASM OF HEAD, FACE OR NECK    Throat cancer Father     Cancer Family     Coronary artery disease Family       Social History     Tobacco Use    Smoking status: Former     Current packs/day: 0.00     Average packs/day: 1 pack/day for 10.0 years (10.0 ttl pk-yrs)     Types: Cigarettes     Start date: 1968     Quit date: 1978     Years since quittin.7     Passive exposure: Past    Smokeless tobacco: Never   Vaping Use    Vaping status: Never Used   Substance Use Topics    Alcohol use: Yes     Comment: occasionally 1-2 per month      Drug use: No      E-Cigarette/Vaping    E-Cigarette Use Never User       E-Cigarette/Vaping Substances    Nicotine No     THC No     CBD No     Flavoring No     Other No     Unknown No       I have reviewed and agree with the history as documented.     84-year-old male, presents with vomiting and abdominal pain.  Patient reports nausea and vomiting since yesterday, having difficulty tolerating oral intake.  Patient also reporting mild pain in lower abdomen, having difficulty having bowel movements.  Denies any fevers, no known sick contacts.  Patient denies any history of abdominal surgery.      History provided by:  Patient   used: No    Abdominal Pain  Associated symptoms: fatigue and vomiting    Associated symptoms: no fever        Review of Systems   Constitutional:  Positive for fatigue. Negative for fever.   Respiratory: Negative.     Gastrointestinal:  Positive for abdominal pain and vomiting.           Objective       ED Triage Vitals [10/26/24 1809]   Temperature Pulse Blood Pressure Respirations SpO2 Patient Position - Orthostatic VS   98.1 °F (36.7 °C) (!) 112 98/53 16 98 % Sitting      Temp Source Heart Rate Source BP Location FiO2 (%) Pain Score    Temporal Monitor Left arm -- --      Vitals      Date and Time Temp Pulse SpO2 Resp BP Pain Score FACES Pain Rating User   10/26/24 1845 -- 97 98 % 21 131/63 -- -- TF   10/26/24 1809 98.1 °F (36.7 °C) 112 98 % 16 98/53 -- -- SA            Physical Exam  Vitals and nursing note reviewed.   Constitutional:       General: He is not in acute distress.  HENT:      Head: Normocephalic and atraumatic.      Mouth/Throat:      Mouth: Mucous membranes are moist.      Pharynx: Oropharynx is clear.   Cardiovascular:      Rate and Rhythm: Regular rhythm. Tachycardia present.   Pulmonary:      Effort: Pulmonary effort is normal.      Breath sounds: Normal breath sounds.   Abdominal:      Comments: Nondistended, soft  Mild tenderness across lower  abdomen, no rebound or guarding   Musculoskeletal:         General: Normal range of motion.   Skin:     General: Skin is warm and dry.   Neurological:      General: No focal deficit present.      Mental Status: He is alert and oriented to person, place, and time.         Results Reviewed       Procedure Component Value Units Date/Time    Comprehensive metabolic panel [[id number]]  (Abnormal) Collected: 10/26/24 1839    Lab Status: Final result Specimen: Blood from Arm, Left Updated: 10/26/24 1903     Sodium 136 mmol/L      Potassium 4.2 mmol/L      Chloride 99 mmol/L      CO2 23 mmol/L      ANION GAP 14 mmol/L      BUN 26 mg/dL      Creatinine 1.12 mg/dL      Glucose 155 mg/dL      Calcium 9.4 mg/dL      AST 28 U/L      ALT 19 U/L      Alkaline Phosphatase 31 U/L      Total Protein 7.5 g/dL      Albumin 4.2 g/dL      Total Bilirubin 1.04 mg/dL      eGFR 60 ml/min/1.73sq m     Narrative:      National Kidney Disease Foundation guidelines for Chronic Kidney Disease (CKD):     Stage 1 with normal or high GFR (GFR > 90 mL/min/1.73 square meters)    Stage 2 Mild CKD (GFR = 60-89 mL/min/1.73 square meters)    Stage 3A Moderate CKD (GFR = 45-59 mL/min/1.73 square meters)    Stage 3B Moderate CKD (GFR = 30-44 mL/min/1.73 square meters)    Stage 4 Severe CKD (GFR = 15-29 mL/min/1.73 square meters)    Stage 5 End Stage CKD (GFR <15 mL/min/1.73 square meters)  Note: GFR calculation is accurate only with a steady state creatinine    Lipase [[id number]]  (Normal) Collected: 10/26/24 1839    Lab Status: Final result Specimen: Blood from Arm, Left Updated: 10/26/24 1903     Lipase 41 u/L     CBC and differential [[id number]]  (Abnormal) Collected: 10/26/24 1839    Lab Status: Final result Specimen: Blood from Arm, Left Updated: 10/26/24 1845     WBC 5.26 Thousand/uL      RBC 3.46 Million/uL      Hemoglobin 13.3 g/dL      Hematocrit 39.0 %       fL      MCH 38.4 pg      MCHC 34.1 g/dL      RDW 15.8 %      MPV 10.2 fL       Platelets 144 Thousands/uL      nRBC 0 /100 WBCs      Segmented % 77 %      Immature Grans % 0 %      Lymphocytes % 11 %      Monocytes % 10 %      Eosinophils Relative 1 %      Basophils Relative 1 %      Absolute Neutrophils 4.07 Thousands/µL      Absolute Immature Grans 0.02 Thousand/uL      Absolute Lymphocytes 0.60 Thousands/µL      Absolute Monocytes 0.50 Thousand/µL      Eosinophils Absolute 0.04 Thousand/µL      Basophils Absolute 0.03 Thousands/µL             CT abdomen pelvis with contrast   Final Interpretation by Walter Patten MD (10/26 2016)      No bowel obstruction or acute inflammatory findings identified in the abdomen or pelvis.   No free air or free fluid.      Stable cholelithiasis and choledocholithiasis. No new biliary dilatation.      Additional stable chronic/nonemergent findings as above         Workstation performed: [id number]             ECG 12 Lead Documentation Only    Date/Time: 10/26/2024 6:50 PM    Performed by: Kennedy Chappell MD  Authorized by: Kennedy Chappell MD    ECG reviewed by me, the ED Provider: yes    Previous ECG:     Previous ECG:  Compared to current  Rate:     ECG rate assessment: normal    Rhythm:     Rhythm: sinus rhythm    Ectopy:     Ectopy: none    QRS:     QRS axis:  Left    QRS intervals:  Wide  Conduction:     Conduction: abnormal      Abnormal conduction: incomplete RBBB and 1st degree    Comments:      Sinus rhythm at 98, no acute changes      ED Medication and Procedure Management   Prior to Admission Medications   Prescriptions Last Dose Informant Patient Reported? Taking?   Azelastine HCl 137 MCG/SPRAY SOLN  Self Yes No   Si spray by Each Nare route 2 (two) times a day As directed   Continuous Blood Gluc Sensor (FreeStyle Maris 2 Sensor) MISC  Self Yes No   Si KIT BY ABDOMINAL SUBCUTANEOUS ROUTE EVERY 14 (FOURTEEN) DAYS.   Empagliflozin (Jardiance) 10 MG TABS tablet  Self Yes No   Sig: Take 10 mg by mouth every morning   Insulin Pen Needle  (Advocate Insulin Pen Needles) 29G X 12.7MM MISC  Self No No   Sig: Use daily   Lantus SoloStar 100 units/mL SOPN  Self Yes No   Sig: 10 mL   MILK THISTLE PO  Self Yes No   Sig: Take 2 tablets by mouth in the morning Take in the morning with food   Senna-Plus 8.6-50 MG per tablet  Self No No   Sig: TAKE 1 TABLET BY MOUTH DAILY AT BEDTIME   Trulicity 4.5 MG/0.5ML injection  Self Yes No   Sig: INJECT 4.5 MG UNDER THE SKIN ONCE A WEEK.   aspirin (Aspirin Low Dose) 81 mg EC tablet  Self No No   Sig: TAKE ONE (1) TABLET BY MOUTH DAILY   clopidogrel (PLAVIX) 75 mg tablet  Self No No   Sig: TAKE 1 TABLET (75 MG TOTAL) BY MOUTH DAILY   ergocalciferol (VITAMIN D2) 50,000 units  Self Yes No   Sig: Take 50,000 Units by mouth once a week   finasteride (PROSCAR) 5 mg tablet  Self No No   Sig: TAKE ONE (1) TABLET BY MOUTH DAILY   glipiZIDE (GLUCOTROL) 5 mg tablet  Self No No   Sig: TAKE 1 TABLET (5 MG TOTAL) BY MOUTH 2 (TWO) TIMES A DAY   levothyroxine 88 mcg tablet  Self Yes No   Sig: Take 88 mcg by mouth daily   metoprolol tartrate (LOPRESSOR) 25 mg tablet   No No   Sig: TAKE 1 TABLET (25 MG TOTAL) BY MOUTH EVERY 12 (TWELVE) HOURS   rosuvastatin (CRESTOR) 20 MG tablet  Self No No   Sig: Take 1 tablet (20 mg total) by mouth daily   tamsulosin (FLOMAX) 0.4 mg  Self No No   Sig: Take 1 capsule (0.4 mg total) by mouth daily      Facility-Administered Medications: None     Patient's Medications   Discharge Prescriptions    No medications on file     No discharge procedures on file.  ED SEPSIS DOCUMENTATION   Time reflects when diagnosis was documented in both MDM as applicable and the Disposition within this note       Time User Action Codes Description Comment    10/26/2024  8:28 PM Kennedy Chappell [R11.2] Nausea and vomiting     10/26/2024  8:28 PM Kennedy Chappell [R10.9] Abdominal pain, unspecified abdominal location                  Kennedy Chappell MD  10/26/24 2034

## 2024-10-27 LAB
ATRIAL RATE: 98 BPM
P AXIS: 67 DEGREES
PR INTERVAL: 228 MS
QRS AXIS: -83 DEGREES
QRSD INTERVAL: 118 MS
QT INTERVAL: 358 MS
QTC INTERVAL: 457 MS
T WAVE AXIS: 93 DEGREES
VENTRICULAR RATE: 98 BPM

## 2024-10-27 PROCEDURE — 93010 ELECTROCARDIOGRAM REPORT: CPT | Performed by: INTERNAL MEDICINE

## 2024-10-27 NOTE — ED NOTES
Called pt's daughter in law, Татьяна. Татьяна is aware of pt's discharge status and reports being on their way to  pt.      Naty Rousseau RN  10/26/24 2041

## 2024-11-25 DIAGNOSIS — I25.5 ISCHEMIC CARDIOMYOPATHY: ICD-10-CM

## 2024-11-25 RX ORDER — ASPIRIN 81 MG/1
81 TABLET ORAL DAILY
Qty: 90 TABLET | Refills: 3 | Status: SHIPPED | OUTPATIENT
Start: 2024-11-25

## 2024-12-05 DIAGNOSIS — N40.1 BENIGN PROSTATIC HYPERPLASIA WITH URINARY FREQUENCY: ICD-10-CM

## 2024-12-05 DIAGNOSIS — R35.0 BENIGN PROSTATIC HYPERPLASIA WITH URINARY FREQUENCY: ICD-10-CM

## 2024-12-05 RX ORDER — FINASTERIDE 5 MG/1
5 TABLET, FILM COATED ORAL DAILY
Qty: 90 TABLET | Refills: 1 | Status: SHIPPED | OUTPATIENT
Start: 2024-12-05

## 2024-12-05 NOTE — TELEPHONE ENCOUNTER
Medication:  finasteride (PROSCAR) 5 mg tablet    Dose/Frequency:   TAKE ONE (1) TABLET BY MOUTH DAILY        Quantity:  180 tablet     Pharmacy: [place] [id number] - [place], [place] - [facility] [address]     Office:   [x] PCP/Provider - Wendy Alex MD   [] Speciality/Provider -     Does the patient have enough for 3 days?   [x] Yes   [] No - Send as HP to POD

## 2025-01-02 ENCOUNTER — OFFICE VISIT (OUTPATIENT)
Age: 85
End: 2025-01-02
Payer: COMMERCIAL

## 2025-01-02 VITALS
HEIGHT: 65 IN | WEIGHT: 119 LBS | BODY MASS INDEX: 19.83 KG/M2 | DIASTOLIC BLOOD PRESSURE: 60 MMHG | SYSTOLIC BLOOD PRESSURE: 100 MMHG | OXYGEN SATURATION: 97 % | TEMPERATURE: 94.4 F | HEART RATE: 76 BPM

## 2025-01-02 DIAGNOSIS — I25.118 CORONARY ARTERY DISEASE OF NATIVE ARTERY OF NATIVE HEART WITH STABLE ANGINA PECTORIS (HCC): ICD-10-CM

## 2025-01-02 DIAGNOSIS — E11.22 TYPE 2 DIABETES MELLITUS WITH STAGE 3 CHRONIC KIDNEY DISEASE, WITH LONG-TERM CURRENT USE OF INSULIN, UNSPECIFIED WHETHER STAGE 3A OR 3B CKD (HCC): ICD-10-CM

## 2025-01-02 DIAGNOSIS — E44.1 MILD PROTEIN-CALORIE MALNUTRITION (HCC): ICD-10-CM

## 2025-01-02 DIAGNOSIS — Z00.00 MEDICARE ANNUAL WELLNESS VISIT, SUBSEQUENT: Primary | ICD-10-CM

## 2025-01-02 DIAGNOSIS — K30 UPSET STOMACH: ICD-10-CM

## 2025-01-02 DIAGNOSIS — N18.30 TYPE 2 DIABETES MELLITUS WITH STAGE 3 CHRONIC KIDNEY DISEASE, WITH LONG-TERM CURRENT USE OF INSULIN, UNSPECIFIED WHETHER STAGE 3A OR 3B CKD (HCC): ICD-10-CM

## 2025-01-02 DIAGNOSIS — Z79.4 TYPE 2 DIABETES MELLITUS WITH STAGE 3 CHRONIC KIDNEY DISEASE, WITH LONG-TERM CURRENT USE OF INSULIN, UNSPECIFIED WHETHER STAGE 3A OR 3B CKD (HCC): ICD-10-CM

## 2025-01-02 DIAGNOSIS — I47.29 NSVT (NONSUSTAINED VENTRICULAR TACHYCARDIA) (HCC): ICD-10-CM

## 2025-01-02 DIAGNOSIS — I50.22 CHRONIC SYSTOLIC CONGESTIVE HEART FAILURE (HCC): ICD-10-CM

## 2025-01-02 DIAGNOSIS — I10 PRIMARY HYPERTENSION: ICD-10-CM

## 2025-01-02 DIAGNOSIS — D69.6 THROMBOCYTOPENIA (HCC): ICD-10-CM

## 2025-01-02 PROCEDURE — 99397 PER PM REEVAL EST PAT 65+ YR: CPT

## 2025-01-02 PROCEDURE — 99214 OFFICE O/P EST MOD 30 MIN: CPT

## 2025-01-02 PROCEDURE — G0439 PPPS, SUBSEQ VISIT: HCPCS

## 2025-01-02 NOTE — ASSESSMENT & PLAN NOTE
Lab Results   Component Value Date    HGBA1C 8.0 (H) 08/29/2024      Declines eye exam, was just seen at [facility] recently   Update labs today  Uncontrolled, continue following with endocrinology at [facility] and current medications.

## 2025-01-02 NOTE — ASSESSMENT & PLAN NOTE
-Blood pressure overall well-controlled with metoprolol tartrate  -Continue ambulatory blood pressure monitoring  -A little low today at 100/60, discussed with patient likely due to recent diarrhea episodes and vomiting. Continue to monitor and focus on rehydration

## 2025-01-02 NOTE — PROGRESS NOTES
Name: Felix Mackey      : 1940      MRN: [id number]  Encounter Provider: Alek Amezcua PA-C  Encounter Date: 2025   Encounter department: [facility] PRIMARY CARE [facility] & Plan  Medicare annual wellness visit, subsequent  - Medical history reviewed, including existing medical conditions, medications, and surgeries.   - Labs discussed to evaluate cholesterol, blood sugar, kidney function, liver function, and other important markers of health.  - BMI evaluated and discussed.  - Cancer screenings discussed and recommended.  - Vaccination status reviewed   - Bone health reviewed.   - Skin examination.  No current concerns   - Lifestyle and health counseling   - Family history obtained to identify any of hereditary health risks reviewed   All screenings and vaccinations have been reviewed, with benefits and risks discussed. All questions were answered. Appropriate orders have been placed as patient agrees to        Type 2 diabetes mellitus with stage 3 chronic kidney disease, with long-term current use of insulin, unspecified whether stage 3a or 3b CKD (HCC)    Lab Results   Component Value Date    HGBA1C 8.0 (H) 2024      Declines eye exam, was just seen at [facility] recently   Update labs today  Uncontrolled, continue following with endocrinology at [facility] and current medications.       Coronary artery disease of native artery of native heart with stable angina pectoris (HCC)  Stable, no recent angina. Continue current medications and following with cardiology        Primary hypertension  -Blood pressure overall well-controlled with metoprolol tartrate  -Continue ambulatory blood pressure monitoring  -A little low today at 100/60, discussed with patient likely due to recent diarrhea episodes and vomiting. Continue to monitor and focus on rehydration        NSVT (nonsustained ventricular tachycardia) ([facility])  Stable, no recent symptoms. Continue current  medications and following with cardiology       Thrombocytopenia (HCC)  Continue to monitor. Needs to update labs, previously ordered        Chronic systolic congestive heart failure (HCC)  Wt Readings from Last 3 Encounters:   01/02/25 54 kg (119 lb)   10/16/24 54.4 kg (120 lb)   09/26/24 56.7 kg (125 lb)   No signs or symptoms of acute decompensation. Continue current medications and following with cardiology  Weight stable                Mild protein-calorie malnutrition (HCC)  Weight stable, continue staying hydrated and eating a well balanced diet        Upset stomach  Continue to monitor. Follow up if new or worsening symptoms occur   Focus on staying hydrated           Preventive health issues were discussed with patient, and age appropriate screening tests were ordered as noted in patient's After Visit Summary. Personalized health advice and appropriate referrals for health education or preventive services given if needed, as noted in patient's After Visit Summary.    History of Present Illness     Patient presents today for follow up, medicare awv and upset stomach that started this morning. Has vomiting and diarrhea. No abdominal pain, fever or blood in the stool. Is a little light headed        Patient Care Team:  Alek Amezcua PA-C as PCP - General (Family Medicine)  David Leahy MD as PCP - PCP-[facility] ([facility])  MD Denisse Bentley MD Loveleen K Sidhu, MD as Endoscopist    Review of Systems   Constitutional:  Negative for activity change, diaphoresis, fatigue and fever.   HENT: Negative.     Eyes: Negative.    Respiratory: Negative.  Negative for cough, chest tightness and shortness of breath.    Cardiovascular:  Negative for chest pain, palpitations and leg swelling.   Gastrointestinal:  Positive for diarrhea, nausea and vomiting. Negative for abdominal distention, abdominal pain, anal bleeding, blood in stool, constipation and rectal pain.   Endocrine:  Negative.  Negative for polydipsia, polyphagia and polyuria.   Genitourinary: Negative.  Negative for dysuria, flank pain, frequency, hematuria and urgency.   Musculoskeletal: Negative.  Negative for back pain, joint swelling, neck pain and neck stiffness.   Skin: Negative.    Neurological: Negative.  Negative for dizziness, weakness, light-headedness and headaches.   Hematological:  Negative for adenopathy.   Psychiatric/Behavioral: Negative.  Negative for confusion and sleep disturbance. The patient is not nervous/anxious.      Medical History Reviewed by provider this encounter:  Tobacco  Allergies  Meds  Problems  Med Hx  Surg Hx  Fam Hx       Annual Wellness Visit Questionnaire   Felix is here for his Subsequent Wellness visit.     Health Risk Assessment:   Patient rates overall health as good. Patient feels that their physical health rating is same. Patient is satisfied with their life. Eyesight was rated as slightly worse. Hearing was rated as slightly worse. Patient feels that their emotional and mental health rating is same. Patients states they are never, rarely angry. Patient states they are always unusually tired/fatigued. Pain experienced in the last 7 days has been none. Patient states that he has experienced no weight loss or gain in last 6 months.     Depression Screening:   PHQ-2 Score: 0      Fall Risk Screening:   In the past year, patient has experienced: history of falling in past year    Number of falls: 2 or more  Injured during fall?: Yes    Feels unsteady when standing or walking?: Yes    Worried about falling?: Yes      Home Safety:  Patient has trouble with stairs inside or outside of their home. Patient has working smoke alarms and has working carbon monoxide detector. Home safety hazards include: none.     Nutrition:   Current diet is Regular.     Medications:   Patient is not currently taking any over-the-counter supplements. Patient is able to manage medications. Patient gets  meds in bubble pack    Activities of Daily Living (ADLs)/Instrumental Activities of Daily Living (IADLs):   Walk and transfer into and out of bed and chair?: Yes  Dress and groom yourself?: Yes    Bathe or shower yourself?: Yes    Feed yourself? Yes  Do your laundry/housekeeping?: No  Manage your money, pay your bills and track your expenses?: No  Make your own meals?: No    Do your own shopping?: No    ADL comments: Family helps with aDL'S    Previous Hospitalizations:   Any hospitalizations or ED visits within the last 12 months?: Yes      Hospitalization Comments: TO EMERGECY ROOM [facility]    PREVENTIVE SCREENINGS      Cardiovascular Screening:    General: Screening Not Indicated and History Lipid Disorder      Diabetes Screening:     General: Screening Not Indicated and History Diabetes      Prostate Cancer Screening:    General: Screening Not Indicated      Abdominal Aortic Aneurysm (AAA) Screening:    Risk factors include: tobacco use        Lung Cancer Screening:     General: Screening Not Indicated    Screening, Brief Intervention, and Referral to Treatment (SBIRT)    Screening  Typical number of drinks in a day: 0  Typical number of drinks in a week: 0  Interpretation: Low risk drinking behavior.    AUDIT-C Screenin) How often did you have a drink containing alcohol in the past year? never  2) How many drinks did you have on a typical day when you were drinking in the past year? 0  3) How often did you have 6 or more drinks on one occasion in the past year? never    AUDIT-C Score: 0  Interpretation: Score 0-3 (male): Negative screen for alcohol misuse    Single Item Drug Screening:  How often have you used an illegal drug (including marijuana) or a prescription medication for non-medical reasons in the past year? never    Single Item Drug Screen Score: 0  Interpretation: Negative screen for possible drug use disorder    Social Drivers of Health     Financial Resource Strain: Low Risk  (2023)     "Overall Financial Resource Strain (CARDIA)     Difficulty of Paying Living Expenses: Not hard at all   Food Insecurity: No Food Insecurity (1/2/2025)    Hunger Vital Sign     Worried About Running Out of Food in the Last Year: Never true     Ran Out of Food in the Last Year: Never true   Transportation Needs: No Transportation Needs (1/2/2025)    PRAPARE - Transportation     Lack of Transportation (Medical): No     Lack of Transportation (Non-Medical): No   Housing Stability: Low Risk  (1/2/2025)    Housing Stability Vital Sign     Unable to Pay for Housing in the Last Year: No     Number of Times Moved in the Last Year: 0     Homeless in the Last Year: No   Utilities: Not At Risk (1/2/2025)    [facility] Utilities     Threatened with loss of utilities: No     No results found.    Objective   /60 (Patient Position: Sitting, Cuff Size: Standard)   Pulse 76   Temp (!) 94.4 °F (34.7 °C)   Ht 5' 5\" (1.651 m)   Wt 54 kg (119 lb)   SpO2 97%   BMI 19.80 kg/m²     Physical Exam  Vitals and nursing note reviewed.   Constitutional:       General: He is not in acute distress.     Appearance: He is normal weight. He is not ill-appearing, toxic-appearing or diaphoretic.   HENT:      Head: Normocephalic and atraumatic.      Right Ear: External ear normal.      Left Ear: External ear normal.      Nose: Nose normal.   Eyes:      General: No scleral icterus.        Right eye: No discharge.         Left eye: No discharge.      Extraocular Movements: Extraocular movements intact.      Conjunctiva/sclera: Conjunctivae normal.   Neck:      Vascular: No carotid bruit.   Cardiovascular:      Rate and Rhythm: Normal rate and regular rhythm.   Pulmonary:      Effort: Pulmonary effort is normal. No respiratory distress.      Breath sounds: Normal breath sounds. No wheezing or rales.   Musculoskeletal:      Cervical back: Normal range of motion and neck supple.      Right lower leg: No edema.      Left lower leg: No edema.   Skin:     " Findings: No rash.   Neurological:      Mental Status: He is alert. Mental status is at baseline.   Psychiatric:         Mood and Affect: Mood normal.         Behavior: Behavior normal.         Thought Content: Thought content normal.         Judgment: Judgment normal.

## 2025-01-02 NOTE — PATIENT INSTRUCTIONS
Medicare Preventive Visit Patient Instructions  Thank you for completing your Welcome to Medicare Visit or Medicare Annual Wellness Visit today. Your next wellness visit will be due in one year (1/3/2026).  The screening/preventive services that you may require over the next 5-10 years are detailed below. Some tests may not apply to you based off risk factors and/or age. Screening tests ordered at today's visit but not completed yet may show as past due. Also, please note that scanned in results may not display below.  Preventive Screenings:  Service Recommendations Previous Testing/Comments   Colorectal Cancer Screening  Colonoscopy    Fecal Occult Blood Test (FOBT)/Fecal Immunochemical Test (FIT)  Fecal DNA/Cologuard Test  Flexible Sigmoidoscopy Age: 45-75 years old   Colonoscopy: every 10 years (May be performed more frequently if at higher risk)  OR  FOBT/FIT: every 1 year  OR  Cologuard: every 3 years  OR  Sigmoidoscopy: every 5 years  Screening may be recommended earlier than age 45 if at higher risk for colorectal cancer. Also, an individualized decision between you and your healthcare provider will decide whether screening between the ages of 76-85 would be appropriate. Colonoscopy: 10/07/2008  FOBT/FIT: Not on file  Cologuard: Not on file  Sigmoidoscopy: Not on file          Prostate Cancer Screening Individualized decision between patient and health care provider in men between ages of 55-69   Medicare will cover every 12 months beginning on the day after your 50th birthday PSA: No results in last 5 years     Screening Not Indicated     Hepatitis C Screening Once for adults born between 1945 and 1965  More frequently in patients at high risk for Hepatitis C Hep C Antibody: Not on file        Diabetes Screening 1-2 times per year if you're at risk for diabetes or have pre-diabetes Fasting glucose: 203 mg/dL (8/29/2024)  A1C: 8.0 % (8/29/2024)  Screening Not Indicated  History Diabetes   Cholesterol  Screening Once every 5 years if you don't have a lipid disorder. May order more often based on risk factors. Lipid panel: 08/29/2024  Screening Not Indicated  History Lipid Disorder      Other Preventive Screenings Covered by Medicare:  Abdominal Aortic Aneurysm (AAA) Screening: covered once if your at risk. You're considered to be at risk if you have a family history of AAA or a male between the age of 65-75 who smoking at least 100 cigarettes in your lifetime.  Lung Cancer Screening: covers low dose CT scan once per year if you meet all of the following conditions: (1) Age 55-77; (2) No signs or symptoms of lung cancer; (3) Current smoker or have quit smoking within the last 15 years; (4) You have a tobacco smoking history of at least 20 pack years (packs per day x number of years you smoked); (5) You get a written order from a healthcare provider.  Glaucoma Screening: covered annually if you're considered high risk: (1) You have diabetes OR (2) Family history of glaucoma OR (3)  aged 50 and older OR (4)  American aged 65 and older  Osteoporosis Screening: covered every 2 years if you meet one of the following conditions: (1) Have a vertebral abnormality; (2) On glucocorticoid therapy for more than 3 months; (3) Have primary hyperparathyroidism; (4) On osteoporosis medications and need to assess response to drug therapy.  HIV Screening: covered annually if you're between the age of 15-65. Also covered annually if you are younger than 15 and older than 65 with risk factors for HIV infection. For pregnant patients, it is covered up to 3 times per pregnancy.    Immunizations:  Immunization Recommendations   Influenza Vaccine Annual influenza vaccination during flu season is recommended for all persons aged >= 6 months who do not have contraindications   Pneumococcal Vaccine   * Pneumococcal conjugate vaccine = PCV13 (Prevnar 13), PCV15 (Vaxneuvance), PCV20 (Prevnar 20)  * Pneumococcal  polysaccharide vaccine = PPSV23 (Pneumovax) Adults 19-63 yo with certain risk factors or if 65+ yo  If never received any pneumonia vaccine: recommend Prevnar 20 (PCV20)  Give PCV20 if previously received 1 dose of PCV13 or PPSV23   Hepatitis B Vaccine 3 dose series if at intermediate or high risk (ex: diabetes, end stage renal disease, liver disease)   Respiratory syncytial virus (RSV) Vaccine - COVERED BY MEDICARE PART D  * RSVPreF3 (Arexvy) CDC recommends that adults 60 years of age and older may receive a single dose of RSV vaccine using shared clinical decision-making (SCDM)   Tetanus (Td) Vaccine - COST NOT COVERED BY MEDICARE PART B Following completion of primary series, a booster dose should be given every 10 years to maintain immunity against tetanus. Td may also be given as tetanus wound prophylaxis.   Tdap Vaccine - COST NOT COVERED BY MEDICARE PART B Recommended at least once for all adults. For pregnant patients, recommended with each pregnancy.   Shingles Vaccine (Shingrix) - COST NOT COVERED BY MEDICARE PART B  2 shot series recommended in those 19 years and older who have or will have weakened immune systems or those 50 years and older     Health Maintenance Due:  There are no preventive care reminders to display for this patient.  Immunizations Due:      Topic Date Due   • Influenza Vaccine (1) 09/01/2024   • COVID-19 Vaccine (3 - 2024-25 season) 09/01/2024     Advance Directives   What are advance directives?  Advance directives are legal documents that state your wishes and plans for medical care. These plans are made ahead of time in case you lose your ability to make decisions for yourself. Advance directives can apply to any medical decision, such as the treatments you want, and if you want to donate organs.   What are the types of advance directives?  There are many types of advance directives, and each state has rules about how to use them. You may choose a combination of any of the  following:  Living will:  This is a written record of the treatment you want. You can also choose which treatments you do not want, which to limit, and which to stop at a certain time. This includes surgery, medicine, IV fluid, and tube feedings.   Durable power of  for healthcare (DPAHC):  This is a written record that states who you want to make healthcare choices for you when you are unable to make them for yourself. This person, called a proxy, is usually a family member or a friend. You may choose more than 1 proxy.  Do not resuscitate (DNR) order:  A DNR order is used in case your heart stops beating or you stop breathing. It is a request not to have certain forms of treatment, such as CPR. A DNR order may be included in other types of advance directives.  Medical directive:  This covers the care that you want if you are in a coma, near death, or unable to make decisions for yourself. You can list the treatments you want for each condition. Treatment may include pain medicine, surgery, blood transfusions, dialysis, IV or tube feedings, and a ventilator (breathing machine).  Values history:  This document has questions about your views, beliefs, and how you feel and think about life. This information can help others choose the care that you would choose.  Why are advance directives important?  An advance directive helps you control your care. Although spoken wishes may be used, it is better to have your wishes written down. Spoken wishes can be misunderstood, or not followed. Treatments may be given even if you do not want them. An advance directive may make it easier for your family to make difficult choices about your care.   Fall Prevention    Fall prevention  includes ways to make your home and other areas safer. It also includes ways you can move more carefully to prevent a fall. Health conditions that cause changes in your blood pressure, vision, or muscle strength and coordination may increase  your risk for falls. Medicines may also increase your risk for falls if they make you dizzy, weak, or sleepy.   Fall prevention tips:   Stand or sit up slowly.    Use assistive devices as directed.    Wear shoes that fit well and have soles that .    Wear a personal alarm.    Stay active.    Manage your medical conditions.    Home Safety Tips:  Add items to prevent falls in the bathroom.    Keep paths clear.    Install bright lights in your home.    Keep items you use often on shelves within reach.    Paint or place reflective tape on the edges of your stairs.       © Copyright [employer] 2018 Information is for End User's use only and may not be sold, redistributed or otherwise used for commercial purposes. All illustrations and images included in CareNotes® are the copyrighted property of [employer].A[employer]M., Inc. or [employer]

## 2025-01-02 NOTE — ASSESSMENT & PLAN NOTE
Wt Readings from Last 3 Encounters:   01/02/25 54 kg (119 lb)   10/16/24 54.4 kg (120 lb)   09/26/24 56.7 kg (125 lb)   No signs or symptoms of acute decompensation. Continue current medications and following with cardiology  Weight stable

## 2025-01-07 ENCOUNTER — APPOINTMENT (OUTPATIENT)
Age: 85
End: 2025-01-07
Payer: COMMERCIAL

## 2025-01-07 DIAGNOSIS — N18.30 TYPE 2 DIABETES MELLITUS WITH STAGE 3 CHRONIC KIDNEY DISEASE, WITH LONG-TERM CURRENT USE OF INSULIN, UNSPECIFIED WHETHER STAGE 3A OR 3B CKD (HCC): ICD-10-CM

## 2025-01-07 DIAGNOSIS — E03.9 ACQUIRED HYPOTHYROIDISM: ICD-10-CM

## 2025-01-07 DIAGNOSIS — E11.22 TYPE 2 DIABETES MELLITUS WITH STAGE 3 CHRONIC KIDNEY DISEASE, WITH LONG-TERM CURRENT USE OF INSULIN, UNSPECIFIED WHETHER STAGE 3A OR 3B CKD (HCC): ICD-10-CM

## 2025-01-07 DIAGNOSIS — Z79.4 TYPE 2 DIABETES MELLITUS WITH STAGE 3 CHRONIC KIDNEY DISEASE, WITH LONG-TERM CURRENT USE OF INSULIN, UNSPECIFIED WHETHER STAGE 3A OR 3B CKD (HCC): ICD-10-CM

## 2025-01-07 LAB
ANION GAP SERPL CALCULATED.3IONS-SCNC: 8 MMOL/L (ref 4–13)
BASOPHILS # BLD AUTO: 0.05 THOUSANDS/ΜL (ref 0–0.1)
BASOPHILS NFR BLD AUTO: 1 % (ref 0–1)
BUN SERPL-MCNC: 20 MG/DL (ref 5–25)
CALCIUM SERPL-MCNC: 10.1 MG/DL (ref 8.4–10.2)
CHLORIDE SERPL-SCNC: 105 MMOL/L (ref 96–108)
CO2 SERPL-SCNC: 30 MMOL/L (ref 21–32)
CREAT SERPL-MCNC: 0.77 MG/DL (ref 0.6–1.3)
EOSINOPHIL # BLD AUTO: 0.18 THOUSAND/ΜL (ref 0–0.61)
EOSINOPHIL NFR BLD AUTO: 3 % (ref 0–6)
ERYTHROCYTE [DISTWIDTH] IN BLOOD BY AUTOMATED COUNT: 16.7 % (ref 11.6–15.1)
EST. AVERAGE GLUCOSE BLD GHB EST-MCNC: 180 MG/DL
GFR SERPL CREATININE-BSD FRML MDRD: 83 ML/MIN/1.73SQ M
GLUCOSE P FAST SERPL-MCNC: 154 MG/DL (ref 65–99)
HBA1C MFR BLD: 7.9 %
HCT VFR BLD AUTO: 40.3 % (ref 36.5–49.3)
HGB BLD-MCNC: 13.8 G/DL (ref 12–17)
IMM GRANULOCYTES # BLD AUTO: 0.02 THOUSAND/UL (ref 0–0.2)
IMM GRANULOCYTES NFR BLD AUTO: 0 % (ref 0–2)
LYMPHOCYTES # BLD AUTO: 1.9 THOUSANDS/ΜL (ref 0.6–4.47)
LYMPHOCYTES NFR BLD AUTO: 33 % (ref 14–44)
MCH RBC QN AUTO: 38.8 PG (ref 26.8–34.3)
MCHC RBC AUTO-ENTMCNC: 34.2 G/DL (ref 31.4–37.4)
MCV RBC AUTO: 113 FL (ref 82–98)
MONOCYTES # BLD AUTO: 0.51 THOUSAND/ΜL (ref 0.17–1.22)
MONOCYTES NFR BLD AUTO: 9 % (ref 4–12)
NEUTROPHILS # BLD AUTO: 3.09 THOUSANDS/ΜL (ref 1.85–7.62)
NEUTS SEG NFR BLD AUTO: 54 % (ref 43–75)
NRBC BLD AUTO-RTO: 0 /100 WBCS
PLATELET # BLD AUTO: 194 THOUSANDS/UL (ref 149–390)
PMV BLD AUTO: 10.5 FL (ref 8.9–12.7)
POTASSIUM SERPL-SCNC: 4.4 MMOL/L (ref 3.5–5.3)
RBC # BLD AUTO: 3.56 MILLION/UL (ref 3.88–5.62)
SODIUM SERPL-SCNC: 143 MMOL/L (ref 135–147)
T4 FREE SERPL-MCNC: 0.85 NG/DL (ref 0.61–1.12)
TSH SERPL DL<=0.05 MIU/L-ACNC: 6.2 UIU/ML (ref 0.45–4.5)
WBC # BLD AUTO: 5.75 THOUSAND/UL (ref 4.31–10.16)

## 2025-01-07 PROCEDURE — 84439 ASSAY OF FREE THYROXINE: CPT

## 2025-01-07 PROCEDURE — 80048 BASIC METABOLIC PNL TOTAL CA: CPT

## 2025-01-07 PROCEDURE — 83036 HEMOGLOBIN GLYCOSYLATED A1C: CPT

## 2025-01-07 PROCEDURE — 85025 COMPLETE CBC W/AUTO DIFF WBC: CPT

## 2025-01-07 PROCEDURE — 36415 COLL VENOUS BLD VENIPUNCTURE: CPT

## 2025-01-07 PROCEDURE — 84443 ASSAY THYROID STIM HORMONE: CPT

## 2025-01-09 ENCOUNTER — RESULTS FOLLOW-UP (OUTPATIENT)
Age: 85
End: 2025-01-09

## 2025-01-13 DIAGNOSIS — K59.00 CONSTIPATION, UNSPECIFIED CONSTIPATION TYPE: ICD-10-CM

## 2025-01-14 RX ORDER — DOCUSATE SODIUM AND SENNOSIDES 8.6; 5 MG/1; MG/1
1 TABLET ORAL
Qty: 30 TABLET | Refills: 1 | Status: SHIPPED | OUTPATIENT
Start: 2025-01-14

## 2025-01-18 DIAGNOSIS — E11.9 TYPE 2 DIABETES MELLITUS WITHOUT COMPLICATION, WITHOUT LONG-TERM CURRENT USE OF INSULIN (HCC): ICD-10-CM

## 2025-01-18 DIAGNOSIS — D64.9 ANEMIA, UNSPECIFIED TYPE: ICD-10-CM

## 2025-01-20 RX ORDER — TAMSULOSIN HYDROCHLORIDE 0.4 MG/1
0.4 CAPSULE ORAL DAILY
Qty: 90 CAPSULE | Refills: 1 | Status: SHIPPED | OUTPATIENT
Start: 2025-01-20

## 2025-01-30 DIAGNOSIS — I25.118 CORONARY ARTERY DISEASE OF NATIVE ARTERY OF NATIVE HEART WITH STABLE ANGINA PECTORIS (HCC): ICD-10-CM

## 2025-01-31 RX ORDER — CLOPIDOGREL BISULFATE 75 MG/1
75 TABLET ORAL DAILY
Qty: 90 TABLET | Refills: 1 | Status: SHIPPED | OUTPATIENT
Start: 2025-01-31

## 2025-02-15 DIAGNOSIS — R35.0 BENIGN PROSTATIC HYPERPLASIA WITH URINARY FREQUENCY: ICD-10-CM

## 2025-02-15 DIAGNOSIS — N40.1 BENIGN PROSTATIC HYPERPLASIA WITH URINARY FREQUENCY: ICD-10-CM

## 2025-02-16 RX ORDER — FINASTERIDE 5 MG/1
5 TABLET, FILM COATED ORAL DAILY
Qty: 90 TABLET | Refills: 1 | Status: SHIPPED | OUTPATIENT
Start: 2025-02-16

## 2025-03-13 ENCOUNTER — TELEPHONE (OUTPATIENT)
Dept: CARDIOLOGY CLINIC | Facility: CLINIC | Age: 85
End: 2025-03-13

## 2025-03-14 ENCOUNTER — REMOTE DEVICE CLINIC VISIT (OUTPATIENT)
Dept: CARDIOLOGY CLINIC | Facility: CLINIC | Age: 85
End: 2025-03-14
Payer: COMMERCIAL

## 2025-03-14 ENCOUNTER — OFFICE VISIT (OUTPATIENT)
Age: 85
End: 2025-03-14
Payer: COMMERCIAL

## 2025-03-14 VITALS
RESPIRATION RATE: 15 BRPM | TEMPERATURE: 98 F | HEIGHT: 65 IN | SYSTOLIC BLOOD PRESSURE: 102 MMHG | HEART RATE: 80 BPM | BODY MASS INDEX: 19.16 KG/M2 | WEIGHT: 115 LBS | DIASTOLIC BLOOD PRESSURE: 60 MMHG | OXYGEN SATURATION: 98 %

## 2025-03-14 DIAGNOSIS — I50.22 CHRONIC SYSTOLIC CONGESTIVE HEART FAILURE (HCC): ICD-10-CM

## 2025-03-14 DIAGNOSIS — I10 PRIMARY HYPERTENSION: ICD-10-CM

## 2025-03-14 DIAGNOSIS — L82.1 SEBORRHEIC KERATOSIS: ICD-10-CM

## 2025-03-14 DIAGNOSIS — Z79.4 TYPE 2 DIABETES MELLITUS WITH STAGE 3 CHRONIC KIDNEY DISEASE, WITH LONG-TERM CURRENT USE OF INSULIN, UNSPECIFIED WHETHER STAGE 3A OR 3B CKD (HCC): ICD-10-CM

## 2025-03-14 DIAGNOSIS — Z95.810 AICD (AUTOMATIC CARDIOVERTER/DEFIBRILLATOR) PRESENT: Primary | ICD-10-CM

## 2025-03-14 DIAGNOSIS — E11.22 TYPE 2 DIABETES MELLITUS WITH STAGE 3 CHRONIC KIDNEY DISEASE, WITH LONG-TERM CURRENT USE OF INSULIN, UNSPECIFIED WHETHER STAGE 3A OR 3B CKD (HCC): ICD-10-CM

## 2025-03-14 DIAGNOSIS — L30.9 DERMATITIS: Primary | ICD-10-CM

## 2025-03-14 DIAGNOSIS — N18.30 TYPE 2 DIABETES MELLITUS WITH STAGE 3 CHRONIC KIDNEY DISEASE, WITH LONG-TERM CURRENT USE OF INSULIN, UNSPECIFIED WHETHER STAGE 3A OR 3B CKD (HCC): ICD-10-CM

## 2025-03-14 PROCEDURE — 99214 OFFICE O/P EST MOD 30 MIN: CPT

## 2025-03-14 PROCEDURE — 93295 DEV INTERROG REMOTE 1/2/MLT: CPT | Performed by: INTERNAL MEDICINE

## 2025-03-14 PROCEDURE — 93296 REM INTERROG EVL PM/IDS: CPT | Performed by: INTERNAL MEDICINE

## 2025-03-14 RX ORDER — TRIAMCINOLONE ACETONIDE 1 MG/G
OINTMENT TOPICAL 2 TIMES DAILY
Qty: 15 G | Refills: 1 | Status: SHIPPED | OUTPATIENT
Start: 2025-03-14 | End: 2025-03-28

## 2025-03-14 NOTE — PROGRESS NOTES
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  LATITUDE TRANSMISSION: BATTERY VOLTAGE ADEQUATE (1.5 YRS). -0%. ALL AVAILABLE LEAD PARAMETERS WITHIN NORMAL LIMITS. 32 VHR EPISODES W/ SIMILAR MORPHOLOGY TO INTRINSIC, SVT VS NSVT, MOST RECENT FOR 5 BEATS, AVG CL~340MS. PT ON ASA 81MG, CLOPIDOGREL & METOPROLOL. NORMAL DEVICE FUNCTION. GV

## 2025-03-14 NOTE — PROGRESS NOTES
Name: Felix Mackey      : 1940      MRN: [id number]  Encounter Provider: Alek Amezcua PA-C  Encounter Date: 3/14/2025   Encounter department: [facility] PRIMARY [facility] [place]  :  Assessment & Plan  Dermatitis  Discussed with patient steroid cream use for 10-14 days and can d/c when resolved. Follow up if not resolving   Lesions imaged below are itchy and leg lesions are irritated, has been rubbing it a lot   Orders:    triamcinolone (KENALOG) 0.1 % ointment; Apply topically 2 (two) times a day for 14 days    Ambulatory Referral to Dermatology; Future    Seborrheic keratosis  Reassured patient, Has an SK on the right chest wall that has been there awhile unchanged but now itchy and interested in removal  Can apply steroid cream and/or moisturizer for itching, follow up with dermatology for removal, patient interested in this option if not improving with creams   Orders:    Ambulatory Referral to Dermatology; Future    Type 2 diabetes mellitus with stage 3 chronic kidney disease, with long-term current use of insulin, unspecified whether stage 3a or 3b CKD (HCC)    Lab Results   Component Value Date    HGBA1C 7.9 (H) 2025   Stable, continue current medications and following with endocrinology          Chronic systolic congestive heart failure (HCC)  Wt Readings from Last 3 Encounters:   25 52.2 kg (115 lb)   25 54 kg (119 lb)   10/16/24 54.4 kg (120 lb)     Weight is stable, appears well compensated on exam, continue current medications and following with cardiology               Primary hypertension  Well controlled today at 102/60  Continue current medications and following with cardiology               History of Present Illness   Presents today with at itchy rash on the legs and back that has been there about a week. Has been rubbing the legs together a lot. No tx tried yet   Has a lesion on the chest that also gets irritated at times.       Review of Systems  "  Constitutional:  Positive for fatigue. Negative for activity change, diaphoresis and fever.   HENT: Negative.     Eyes: Negative.    Respiratory: Negative.  Negative for cough, chest tightness and shortness of breath.    Cardiovascular:  Negative for chest pain, palpitations and leg swelling.   Gastrointestinal: Negative.    Endocrine: Negative.  Negative for polydipsia, polyphagia and polyuria.   Genitourinary: Negative.  Negative for dysuria, flank pain, frequency, hematuria and urgency.   Musculoskeletal: Negative.  Negative for back pain, joint swelling, neck pain and neck stiffness.   Skin:  Positive for rash. Negative for color change.   Neurological: Negative.  Negative for dizziness, weakness, light-headedness and headaches.   Hematological:  Negative for adenopathy.   Psychiatric/Behavioral: Negative.  Negative for confusion and sleep disturbance. The patient is not nervous/anxious.        Objective   /60 (Patient Position: Sitting, Cuff Size: Standard)   Pulse 80   Temp 98 °F (36.7 °C)   Resp 15   Ht 5' 5\" (1.651 m)   Wt 52.2 kg (115 lb)   SpO2 98%   BMI 19.14 kg/m²      Physical Exam  Vitals and nursing note reviewed.   Constitutional:       General: He is not in acute distress.     Appearance: He is normal weight. He is not ill-appearing, toxic-appearing or diaphoretic.   HENT:      Head: Normocephalic and atraumatic.      Right Ear: External ear normal.      Left Ear: External ear normal.      Nose: Nose normal.   Eyes:      General: No scleral icterus.        Right eye: No discharge.         Left eye: No discharge.      Extraocular Movements: Extraocular movements intact.      Conjunctiva/sclera: Conjunctivae normal.   Cardiovascular:      Rate and Rhythm: Normal rate.   Pulmonary:      Effort: Pulmonary effort is normal. No respiratory distress.   Musculoskeletal:      Cervical back: Normal range of motion and neck supple.      Right lower leg: No edema.      Left lower leg: No edema. " "  Skin:     Findings: Lesion and rash present.      Comments: Right lower leg top image below. No pain to palpation, no discharge  Right lower back image below. No pain to palpation, no discharge  Has approx 1.5 cm well demarcated hyperpigmented raised warty growth on the right chest wall with a stuck on appearance and is nontender, no pain/burning/discharge    Neurological:      Mental Status: He is alert. Mental status is at baseline.   Psychiatric:         Mood and Affect: Mood normal.         Behavior: Behavior normal.         Thought Content: Thought content normal.         Judgment: Judgment normal.                 Portions of the record may have been created with voice recognition software. Occasional wrong word or \"sound a like\" substitutions may have occurred due to the inherent limitations of voice recognition software. Read the chart carefully and recognize, using context, where substitutions have occurred.    "

## 2025-03-14 NOTE — ASSESSMENT & PLAN NOTE
Wt Readings from Last 3 Encounters:   03/14/25 52.2 kg (115 lb)   01/02/25 54 kg (119 lb)   10/16/24 54.4 kg (120 lb)     Weight is stable, appears well compensated on exam, continue current medications and following with cardiology

## 2025-03-14 NOTE — ASSESSMENT & PLAN NOTE
Lab Results   Component Value Date    HGBA1C 7.9 (H) 01/07/2025   Stable, continue current medications and following with endocrinology

## 2025-03-21 DIAGNOSIS — I25.5 ISCHEMIC CARDIOMYOPATHY: ICD-10-CM

## 2025-03-22 DIAGNOSIS — L30.9 DERMATITIS: ICD-10-CM

## 2025-03-23 RX ORDER — TRIAMCINOLONE ACETONIDE 1 MG/G
OINTMENT TOPICAL 2 TIMES DAILY
Qty: 15 G | Refills: 1 | OUTPATIENT
Start: 2025-03-23 | End: 2025-04-06

## 2025-03-23 RX ORDER — METOPROLOL TARTRATE 25 MG/1
25 TABLET, FILM COATED ORAL 2 TIMES DAILY
Qty: 180 TABLET | Refills: 1 | Status: SHIPPED | OUTPATIENT
Start: 2025-03-23

## 2025-03-25 ENCOUNTER — RESULTS FOLLOW-UP (OUTPATIENT)
Dept: CARDIOLOGY CLINIC | Facility: CLINIC | Age: 85
End: 2025-03-25

## 2025-03-31 DIAGNOSIS — K59.00 CONSTIPATION, UNSPECIFIED CONSTIPATION TYPE: ICD-10-CM

## 2025-03-31 RX ORDER — DOCUSATE SODIUM AND SENNOSIDES 8.6; 5 MG/1; MG/1
1 TABLET ORAL
Qty: 30 TABLET | Refills: 1 | Status: SHIPPED | OUTPATIENT
Start: 2025-03-31

## 2025-04-03 DIAGNOSIS — L30.9 DERMATITIS: ICD-10-CM

## 2025-04-04 RX ORDER — TRIAMCINOLONE ACETONIDE 1 MG/G
OINTMENT TOPICAL 2 TIMES DAILY
Qty: 15 G | Refills: 1 | OUTPATIENT
Start: 2025-04-04 | End: 2025-04-18

## 2025-04-28 DIAGNOSIS — K59.00 CONSTIPATION, UNSPECIFIED CONSTIPATION TYPE: ICD-10-CM

## 2025-04-29 RX ORDER — DOCUSATE SODIUM AND SENNOSIDES 8.6; 5 MG/1; MG/1
1 TABLET ORAL
Qty: 30 TABLET | Refills: 1 | Status: SHIPPED | OUTPATIENT
Start: 2025-04-29

## 2025-05-17 ENCOUNTER — RESULTS FOLLOW-UP (OUTPATIENT)
Dept: CARDIOLOGY CLINIC | Facility: CLINIC | Age: 85
End: 2025-05-17

## 2025-06-06 DIAGNOSIS — D64.9 ANEMIA, UNSPECIFIED TYPE: ICD-10-CM

## 2025-06-06 DIAGNOSIS — E11.9 TYPE 2 DIABETES MELLITUS WITHOUT COMPLICATION, WITHOUT LONG-TERM CURRENT USE OF INSULIN (HCC): ICD-10-CM

## 2025-06-06 RX ORDER — TAMSULOSIN HYDROCHLORIDE 0.4 MG/1
0.4 CAPSULE ORAL DAILY
Qty: 90 CAPSULE | Refills: 1 | Status: SHIPPED | OUTPATIENT
Start: 2025-06-06

## 2025-06-13 ENCOUNTER — REMOTE DEVICE CLINIC VISIT (OUTPATIENT)
Dept: CARDIOLOGY CLINIC | Facility: CLINIC | Age: 85
End: 2025-06-13
Payer: COMMERCIAL

## 2025-06-13 DIAGNOSIS — I47.20 VENTRICULAR TACHYCARDIA (HCC): Primary | ICD-10-CM

## 2025-06-13 NOTE — PROGRESS NOTES
Results for orders placed or performed in visit on 06/13/25   Cardiac EP device report    Narrative    BSC SC ICD/NOT MRI CONDITIONAL  Jun 11, 2025 22:34 - Yellow Alert - Nonsustained ventricular arrhythmia episode(s).  LATITUDE TRANSMISSION: BATTERY VOLTAGE ADEQUATE (1.5 YRS). : 0%. ALL AVAILABLE LEAD PARAMETERS WITHIN NORMAL LIMITS. 1 NON-SUSTV EPISODE W/ EGM SHOWING NSVT 6 BEATS @ 157 BPM. PT TAKES METOPORLOL TART, PLAVIX, ASA 81MG. EF: 20-25% (ECHO 5/10/25). NORMAL DEVICE FUNCTION. CH

## 2025-06-15 ENCOUNTER — RESULTS FOLLOW-UP (OUTPATIENT)
Dept: CARDIOLOGY CLINIC | Facility: CLINIC | Age: 85
End: 2025-06-15

## 2025-06-15 PROCEDURE — 93296 REM INTERROG EVL PM/IDS: CPT | Performed by: INTERNAL MEDICINE

## 2025-06-15 PROCEDURE — 93295 DEV INTERROG REMOTE 1/2/MLT: CPT | Performed by: INTERNAL MEDICINE

## 2025-06-18 DIAGNOSIS — K59.00 CONSTIPATION, UNSPECIFIED CONSTIPATION TYPE: ICD-10-CM

## 2025-06-18 DIAGNOSIS — I25.118 CORONARY ARTERY DISEASE OF NATIVE ARTERY OF NATIVE HEART WITH STABLE ANGINA PECTORIS (HCC): ICD-10-CM

## 2025-06-18 RX ORDER — DOCUSATE SODIUM AND SENNOSIDES 8.6; 5 MG/1; MG/1
1 TABLET ORAL
Qty: 30 TABLET | Refills: 1 | Status: SHIPPED | OUTPATIENT
Start: 2025-06-18

## 2025-06-19 RX ORDER — CLOPIDOGREL BISULFATE 75 MG/1
75 TABLET ORAL DAILY
Qty: 90 TABLET | Refills: 1 | Status: SHIPPED | OUTPATIENT
Start: 2025-06-19

## 2025-06-21 DIAGNOSIS — R35.0 BENIGN PROSTATIC HYPERPLASIA WITH URINARY FREQUENCY: ICD-10-CM

## 2025-06-21 DIAGNOSIS — N40.1 BENIGN PROSTATIC HYPERPLASIA WITH URINARY FREQUENCY: ICD-10-CM

## 2025-06-21 RX ORDER — FINASTERIDE 1 MG/1
1 TABLET, FILM COATED ORAL DAILY
Qty: 90 TABLET | Refills: 1 | Status: SHIPPED | OUTPATIENT
Start: 2025-06-21

## 2025-06-26 ENCOUNTER — OFFICE VISIT (OUTPATIENT)
Age: 85
End: 2025-06-26
Payer: COMMERCIAL

## 2025-06-26 VITALS
DIASTOLIC BLOOD PRESSURE: 60 MMHG | TEMPERATURE: 96 F | HEIGHT: 65 IN | OXYGEN SATURATION: 99 % | HEART RATE: 51 BPM | SYSTOLIC BLOOD PRESSURE: 104 MMHG | BODY MASS INDEX: 19.14 KG/M2

## 2025-06-26 DIAGNOSIS — I50.22 CHRONIC SYSTOLIC CONGESTIVE HEART FAILURE (HCC): ICD-10-CM

## 2025-06-26 DIAGNOSIS — L30.9 DERMATITIS: ICD-10-CM

## 2025-06-26 DIAGNOSIS — D69.6 THROMBOCYTOPENIA (HCC): ICD-10-CM

## 2025-06-26 DIAGNOSIS — E11.9 TYPE 2 DIABETES MELLITUS WITHOUT COMPLICATION, WITHOUT LONG-TERM CURRENT USE OF INSULIN (HCC): Primary | ICD-10-CM

## 2025-06-26 DIAGNOSIS — E03.9 ACQUIRED HYPOTHYROIDISM: ICD-10-CM

## 2025-06-26 DIAGNOSIS — M54.2 NECK PAIN: ICD-10-CM

## 2025-06-26 PROCEDURE — 99214 OFFICE O/P EST MOD 30 MIN: CPT

## 2025-06-26 RX ORDER — TRIAMCINOLONE ACETONIDE 1 MG/G
OINTMENT TOPICAL 2 TIMES DAILY
Qty: 15 G | Refills: 0 | Status: SHIPPED | OUTPATIENT
Start: 2025-06-26 | End: 2025-07-10

## 2025-06-26 NOTE — ASSESSMENT & PLAN NOTE
Lab Results   Component Value Date    HGBA1C 8.2 (H) 05/10/2025   Uncontrolled, continue current medications and following with endocrinology. Appointment scheduled 7/14/25   Follow up 7 months with labs 6 months   Orders:    Hemoglobin A1C; Future    Comprehensive metabolic panel; Future    CBC and Platelet; Future    Lipid Panel with Direct LDL reflex; Future

## 2025-06-26 NOTE — ASSESSMENT & PLAN NOTE
Continue levothyroxine, update labs before next ov, continue following with endocrinology  Orders:    TSH, 3rd generation with Free T4 reflex; Future

## 2025-06-26 NOTE — PROGRESS NOTES
Name: Felix Mackey      : 1940      MRN: [id number]  Encounter Provider: Alek Amezcua PA-C  Encounter Date: 2025   Encounter department: [facility] [place]  :  Assessment & Plan  Type 2 diabetes mellitus without complication, without long-term current use of insulin (HCC)    Lab Results   Component Value Date    HGBA1C 8.2 (H) 05/10/2025   Uncontrolled, continue current medications and following with endocrinology. Appointment scheduled 25   Follow up 7 months with labs 6 months   Orders:    Hemoglobin A1C; Future    Comprehensive metabolic panel; Future    CBC and Platelet; Future    Lipid Panel with Direct LDL reflex; Future    Chronic systolic congestive heart failure (HCC)  Wt Readings from Last 3 Encounters:   25 52.2 kg (115 lb)   25 54 kg (119 lb)   10/16/24 54.4 kg (120 lb)     Weight is stable, appears well compensated on exam, continue current medications and following with cardiology            Thrombocytopenia (HCC)  Normalized on last cbc, continue to monitor       Acquired hypothyroidism  Continue levothyroxine, update labs before next ov, continue following with endocrinology  Orders:    TSH, 3rd generation with Free T4 reflex; Future    Dermatitis  Doing well, refill provided, discussed long term hellen and to use only as needed and discussed holding off on medication for weeks at a time if possible, doesn't use that much   Orders:    triamcinolone (KENALOG) 0.1 % ointment; Apply topically 2 (two) times a day for 14 days    Neck pain  Daughter notes he hasn't been drinking a lot of water and will doze off during the day with chin down toward chest which has been improving with better hydration.  No precipitating events, going on about a couple months, no deformity, change in rom, bony tenderness on exam, no skin abnormalities  Recommend better hydration and discussed home exercises and massaging, declines pt for now, if persistent will  consider imaging and pt referral               History of Present Illness   HPI  Review of Systems   Constitutional:  Negative for activity change, diaphoresis, fatigue and fever.   HENT: Negative.     Eyes: Negative.    Respiratory: Negative.  Negative for cough, chest tightness and shortness of breath.    Cardiovascular:  Negative for chest pain, palpitations and leg swelling.   Gastrointestinal: Negative.    Endocrine: Negative.  Negative for polydipsia, polyphagia and polyuria.   Genitourinary: Negative.  Negative for dysuria, flank pain, frequency, hematuria and urgency.   Musculoskeletal: Negative.  Negative for back pain, joint swelling, neck pain and neck stiffness.   Skin:  Positive for rash.   Neurological: Negative.  Negative for dizziness, weakness, light-headedness and headaches.   Hematological:  Negative for adenopathy.   Psychiatric/Behavioral: Negative.  Negative for confusion and sleep disturbance. The patient is not nervous/anxious.        Objective   There were no vitals taken for this visit.     Physical Exam  Vitals and nursing note reviewed.   Constitutional:       General: He is not in acute distress.     Appearance: Normal appearance. He is normal weight. He is not ill-appearing, toxic-appearing or diaphoretic.   HENT:      Head: Normocephalic and atraumatic.      Right Ear: External ear normal.      Left Ear: External ear normal.      Nose: Nose normal.     Eyes:      General: No scleral icterus.        Right eye: No discharge.         Left eye: No discharge.      Extraocular Movements: Extraocular movements intact.      Conjunctiva/sclera: Conjunctivae normal.     Neck:      Vascular: No carotid bruit.     Cardiovascular:      Rate and Rhythm: Normal rate and regular rhythm.   Pulmonary:      Effort: Pulmonary effort is normal. No respiratory distress.      Breath sounds: Normal breath sounds. No wheezing or rales.     Musculoskeletal:         General: No swelling, tenderness, deformity or  "signs of injury.      Cervical back: Normal range of motion and neck supple.      Right lower leg: No edema.      Left lower leg: No edema.     Skin:     Findings: Rash (significantly improved compared to previous) present.     Neurological:      Mental Status: He is alert. Mental status is at baseline.     Psychiatric:         Mood and Affect: Mood normal.         Behavior: Behavior normal.         Thought Content: Thought content normal.         Judgment: Judgment normal.       Portions of the record may have been created with voice recognition software. Occasional wrong word or \"sound a like\" substitutions may have occurred due to the inherent limitations of voice recognition software. Read the chart carefully and recognize, using context, where substitutions have occurred.    "

## 2025-07-05 DIAGNOSIS — L30.9 DERMATITIS: ICD-10-CM

## 2025-07-07 RX ORDER — TRIAMCINOLONE ACETONIDE 1 MG/G
OINTMENT TOPICAL 2 TIMES DAILY
Qty: 15 G | Refills: 0 | Status: SHIPPED | OUTPATIENT
Start: 2025-07-07 | End: 2025-07-21

## 2025-07-16 DIAGNOSIS — L30.9 DERMATITIS: ICD-10-CM

## 2025-07-16 NOTE — PROGRESS NOTES
Assessment and Plan:     Problem List Items Addressed This Visit     None           Preventive health issues were discussed with patient, and age appropriate screening tests were ordered as noted in patient's After Visit Summary  Personalized health advice and appropriate referrals for health education or preventive services given if needed, as noted in patient's After Visit Summary       History of Present Illness:     Patient presents for Medicare Annual Wellness visit    Patient Care Team:  Easton Frye MD as PCP - Ambrocio Nayak MD as PCP - [address] [place], [place] [address] [facility]  MD Jett Kulkarni MD Alec Mosses, MD as Endoscopist     Problem List:     Patient Active Problem List   Diagnosis    Type 2 diabetes mellitus with chronic kidney disease, with long-term current use of insulin ([address] )    Anemia    Hyperlipidemia    Chronic systolic congestive heart failure ([address] )    BPH (benign prostatic hyperplasia)    Hypothyroidism (acquired)    Foot pain, bilateral    Ischemic cardiomyopathy    Coronary atherosclerosis of native coronary artery    Urinary retention    Prostate cancer screening      Past Medical and Surgical History:     Past Medical History:   Diagnosis Date    Acquired cyst of kidney     Anemia     Benign paroxysmal vertigo, unspecified ear     Cellulitis of leg     Cervical spinal stenosis     Chest pain     Coronary atherosclerosis of native coronary artery     Enlarged prostate     Esophageal candidiasis ([address] )     Hematuria     Herpes zoster     Hyperlipidemia     Hypertension     Incomplete emptying of bladder     Inflamed seborrheic keratosis     Internal hemorrhoids     Malignant neoplasm of skin of trunk     Myocardial infarction ([address] )     Nonmelanoma skin cancer     LAST ASSESSED: 8/9/17    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 infarction     Paroxysmal tachycardia (HCC)     Shortness of breath     Vitamin B deficiency      Past Surgical History: Please see the attached refill request.   Procedure Laterality Date    CARDIAC DEFIBRILLATOR PLACEMENT      COLONOSCOPY  10/07/2008    FIBEROPTIC SCREENING; NO FURTHER RECOMMENDATIONS    CORONARY ANGIOPLASTY WITH STENT PLACEMENT      CORONARY ANGIOPLASTY WITH STENT PLACEMENT      CYSTOSCOPY  2015    DIAGNOSTIC; MANAGED BY: Ethel Alfaro    EGD AND COLONOSCOPY N/A 2018    Procedure: EGD AND COLONOSCOPY;  Surgeon: Yadira Rowley MD;  Location: [facility];   Service: Gastroenterology    HIP ARTHROPLASTY Right     INSERT / REPLACE / Tawana Gather REPLACEMENT Right     TRUNK SKIN LESION EXCISIONAL BIOPSY  2007    MALIGNANT; [address]  Opentopic CHEST      Family History:     Family History   Problem Relation Age of Onset    Heart disease Mother     Coronary artery disease Mother     Sudden death Mother     Cancer Father         throat; MALIGNANT NEOPLASM OF HEAD, FACE OR NECK    Throat cancer Father     Cancer Family     Coronary artery disease Family       Social History:     E-Cigarette/Vaping    E-Cigarette Use Never User      E-Cigarette/Vaping Substances    Nicotine No     THC No     CBD No     Flavoring No     Other No     Unknown No      Social History     Socioeconomic History    Marital status: /Civil Union     Spouse name: None    Number of children: None    Years of education: None    Highest education level: None   Occupational History    Occupation: RETIRED   Social Needs    Financial resource strain: None    Food insecurity     Worry: None     Inability: None    Transportation needs     Medical: None     Non-medical: None   Tobacco Use    Smoking status: Former Smoker     Packs/day: 1 00     Years: 10 00     Pack years: 10 00     Types: Cigarettes     Quit date: 1978     Years since quittin 1    Smokeless tobacco: Never Used   Substance and Sexual Activity    Alcohol use: Yes     Frequency: Monthly or less     Drinks per session: 1 or 2     Binge frequency: Never Comment: occasionally 1-2 per month     Drug use: No    Sexual activity: Not Currently     Partners: Female   Lifestyle    Physical activity     Days per week: 0 days     Minutes per session: 0 min    Stress: Not at all   Relationships    Social connections     Talks on phone: None     Gets together: None     Attends Jew service: None     Active member of club or organization: None     Attends meetings of clubs or organizations: None     Relationship status: None    Intimate partner violence     Fear of current or ex partner: None     Emotionally abused: None     Physically abused: None     Forced sexual activity: None   Other Topics Concern    None   Social History Narrative    LIVING INDEPENDENTLY WITH SPOUSE    NO ADVANCE DIRECTIVE ON FILE      Medications and Allergies:     Current Outpatient Medications   Medication Sig Dispense Refill    aspirin 81 MG tablet Take 1 tablet by mouth daily      clopidogrel (PLAVIX) 75 mg tablet Take 1 tablet (75 mg total) by mouth daily 90 tablet 3    finasteride (PROSCAR) 5 mg tablet Take 1 tablet (5 mg total) by mouth daily 90 tablet 3    glipiZIDE (GLUCOTROL) 5 mg tablet Take 1 tablet (5 mg total) by mouth 2 (two) times a day 180 tablet 3    HumaLOG Albert KwikPen 100 units/mL injection pen Inject 20 Units under the skin 2 (two) times a day with meals 15 pen 3    insulin glargine (Lantus SoloStar) 100 units/mL injection pen Inject 26 Units under the skin daily 5 pen 5    Insulin Pen Needle (Pen Needles) 32G X 4 MM MISC Check blood glucose at home AC and HS 4 times a day 200 each 3    levothyroxine 88 mcg tablet Take 1 tablet (88 mcg total) by mouth daily 90 tablet 3    metoprolol succinate (TOPROL-XL) 50 mg 24 hr tablet TAKE 1 TABLET BY MOUTH EVERY DAY 90 tablet 2    rosuvastatin (CRESTOR) 20 MG tablet Take 1 tablet (20 mg total) by mouth daily 90 tablet 3    tamsulosin (FLOMAX) 0 4 mg Take 1 capsule (0 4 mg total) by mouth daily 90 capsule 3     No current facility-administered medications for this visit  Allergies   Allergen Reactions    Atorvastatin Other (See Comments)     Pt unsure      Percocet [Oxycodone-Acetaminophen] Nausea Only and GI Intolerance      Immunizations:     Immunization History   Administered Date(s) Administered    Fluzone Split Quad 0 5 mL 10/14/2010    INFLUENZA 10/14/2010, 10/07/2014, 09/01/2015, 09/03/2015, 09/12/2016, 10/13/2017, 10/09/2018    Influenza Split High Dose Preservative Free IM 10/13/2017    Influenza, high dose seasonal 0 7 mL 10/02/2020    Influenza, seasonal, injectable 09/01/2015, 09/12/2016    Pneumococcal Conjugate 13-Valent 07/06/2015, 09/07/2016    Pneumococcal Polysaccharide PPV23 09/01/2015    Tdap 02/23/2016    Zoster 01/01/2015, 03/23/2015    influenza, trivalent, adjuvanted 09/20/2019      Health Maintenance: There are no preventive care reminders to display for this patient  Topic Date Due    COVID-19 Vaccine (1) Never done      Medicare Health Risk Assessment:     /64 (BP Location: Left arm, Patient Position: Sitting, Cuff Size: Standard) Comment: BP  Pulse 58   Temp 97 7 °F (36 5 °C) (Temporal) Comment: NO NSAIDS  Ht 5' 6" (1 676 m)   Wt 72 3 kg (159 lb 6 4 oz)   SpO2 97%   BMI 25 73 kg/m²      Roby Cardenas is here for his Initial Wellness visit  Health Risk Assessment:   Patient rates overall health as fair  Patient feels that their physical health rating is slightly worse  Patient is very satisfied with their life  Eyesight was rated as slightly worse  Hearing was rated as same  Patient feels that their emotional and mental health rating is slightly worse  Patients states they are never, rarely angry  Patient states they are always unusually tired/fatigued  Pain experienced in the last 7 days has been a lot  Patient's pain rating has been 7/10  Patient states that he has experienced no weight loss or gain in last 6 months  Fall Risk Screening:    In the past year, patient has experienced: history of falling in past year    Number of falls: 2 or more  Injured during fall?: No    Feels unsteady when standing or walking?: Yes    Worried about falling?: Yes      Home Safety:  Patient has trouble with stairs inside or outside of their home  Patient has working smoke alarms and has no working carbon monoxide detector  Home safety hazards include: none  Nutrition:   Current diet is Diabetic, Low Cholesterol and Limited junk food  Medications:   Patient is currently taking over-the-counter supplements  OTC medications include: see medication list  Patient is not able to manage medications  Activities of Daily Living (ADLs)/Instrumental Activities of Daily Living (IADLs):   Walk and transfer into and out of bed and chair?: Yes  Dress and groom yourself?: No    Bathe or shower yourself?: Yes    Feed yourself? Yes  Do your laundry/housekeeping?: Yes  Manage your money, pay your bills and track your expenses?: Yes  Make your own meals?: Yes    Do your own shopping?: Yes    Previous Hospitalizations:   Any hospitalizations or ED visits within the last 12 months?: No      Advance Care Planning:   Living will: Yes    Durable POA for healthcare:  Yes    Advanced directive: Yes    Advanced directive counseling given: No    Five wishes given: No    Patient declined ACP directive: No    Provider agrees with end of life decisions: Yes      Cognitive Screening:   Provider or family/friend/caregiver concerned regarding cognition?: No    PREVENTIVE SCREENINGS      Cardiovascular Screening:    General: Screening Not Indicated and History Lipid Disorder      Diabetes Screening:     General: Screening Not Indicated and History Diabetes      Colorectal Cancer Screening:     General: Risks and Benefits Discussed      Prostate Cancer Screening:    General: Screening Not Indicated      Osteoporosis Screening:    General: Risks and Benefits Discussed      Abdominal Aortic Aneurysm (AAA) Screening:    Risk factors include: tobacco use        Lung Cancer Screening:     General: Screening Not Indicated      Hepatitis C Screening:    General: Risks and Benefits Discussed    Screening, Brief Intervention, and Referral to Treatment (SBIRT)    Screening      AUDIT-C Screenin) How often did you have a drink containing alcohol in the past year? monthly or less  2) How many drinks did you have on a typical day when you were drinking in the past year?  1 to 2  3) How often did you have 6 or more drinks on one occasion in the past year? never    AUDIT-C Score: 1  Interpretation: Score 0-3 (male): Negative screen for alcohol misuse      Zuleyma Sheets PA-C

## 2025-07-17 RX ORDER — TRIAMCINOLONE ACETONIDE 1 MG/G
OINTMENT TOPICAL 2 TIMES DAILY
Qty: 15 G | Refills: 0 | OUTPATIENT
Start: 2025-07-17 | End: 2025-07-31

## 2025-07-25 DIAGNOSIS — I25.5 ISCHEMIC CARDIOMYOPATHY: ICD-10-CM

## 2025-07-25 RX ORDER — ASPIRIN 81 MG/1
81 TABLET, COATED ORAL DAILY
Qty: 90 TABLET | Refills: 3 | Status: SHIPPED | OUTPATIENT
Start: 2025-07-25

## 2025-08-07 DIAGNOSIS — K59.00 CONSTIPATION, UNSPECIFIED CONSTIPATION TYPE: ICD-10-CM

## 2025-08-08 RX ORDER — DOCUSATE SODIUM AND SENNOSIDES 8.6; 5 MG/1; MG/1
1 TABLET ORAL
Qty: 30 TABLET | Refills: 1 | Status: SHIPPED | OUTPATIENT
Start: 2025-08-08

## (undated) DEVICE — MINI TREK CORONARY DILATATION CATHETER 1.50 MM X 15 MM / RAPID-EXCHANGE: Brand: MINI TREK

## (undated) DEVICE — CATH GUIDE LAUNCHER 6FR EBU 3.75

## (undated) DEVICE — NC TREK NEO™ CORONARY DILATATION CATHETER 3.00 MM X 20 MM / RAPID-EXCHANGE: Brand: NC TREK NEO™

## (undated) DEVICE — Device: Brand: FIELDER XT

## (undated) DEVICE — TREK CORONARY DILATATION CATHETER 2.50 MM X 12 MM / OVER-THE-WIRE: Brand: TREK

## (undated) DEVICE — GLIDESHEATH SLENDER STAINLESS STEEL KIT: Brand: GLIDESHEATH SLENDER

## (undated) DEVICE — CATH DIAG 5FR IMPULSE 100CM FR4

## (undated) DEVICE — CATH DIAG 5FR IMPULSE 100CM FL3.5

## (undated) DEVICE — GUIDEWIRE WHOLEY HI TORQUE INTERM MOD J .035 145CM

## (undated) DEVICE — TR BAND RADIAL ARTERY COMPRESSION DEVICE: Brand: TR BAND

## (undated) DEVICE — DGW .035 FC J3MM 260CM TEF: Brand: EMERALD

## (undated) DEVICE — HI-TORQUE BALANCE MIDDLEWEIGHT GUIDE WIRE W/HYDROCOAT .014 J TIP 3.0 CM X 190 CM: Brand: HI-TORQUE BALANCE MIDDLEWEIGHT

## (undated) DEVICE — GUIDELINER CATHETERS ARE INTENDED TO BE USED IN CONJUNCTION WITH GUIDE CATHETERS TO ACCESS DISCRETE REGIONS OF THE CORONARY AND/OR PERIPHERAL VASCULATURE, AND TO FACILITATE PLACEMENT OF INTERVENTIONAL DEVICES.: Brand: GUIDELINER® V3 CATHETER